# Patient Record
Sex: FEMALE | Race: WHITE | Employment: OTHER | URBAN - NONMETROPOLITAN AREA
[De-identification: names, ages, dates, MRNs, and addresses within clinical notes are randomized per-mention and may not be internally consistent; named-entity substitution may affect disease eponyms.]

---

## 2017-01-03 ENCOUNTER — ANTI-COAG VISIT (OUTPATIENT)
Dept: OTHER | Age: 82
End: 2017-01-03

## 2017-01-03 VITALS
DIASTOLIC BLOOD PRESSURE: 67 MMHG | WEIGHT: 240.4 LBS | HEART RATE: 79 BPM | BODY MASS INDEX: 38.8 KG/M2 | SYSTOLIC BLOOD PRESSURE: 153 MMHG

## 2017-01-03 DIAGNOSIS — I26.99 OTHER PULMONARY EMBOLISM WITHOUT ACUTE COR PULMONALE, UNSPECIFIED CHRONICITY (HCC): ICD-10-CM

## 2017-01-03 LAB — POC INR: 2 (ref 0.8–1.2)

## 2017-01-03 PROCEDURE — 99213 OFFICE O/P EST LOW 20 MIN: CPT | Performed by: NURSE PRACTITIONER

## 2017-01-03 PROCEDURE — 85610 PROTHROMBIN TIME: CPT | Performed by: NURSE PRACTITIONER

## 2017-01-03 ASSESSMENT — ENCOUNTER SYMPTOMS
CONSTIPATION: 0
BLOOD IN STOOL: 0
DIARRHEA: 0
SHORTNESS OF BREATH: 0

## 2017-01-04 ENCOUNTER — TELEPHONE (OUTPATIENT)
Dept: INTERNAL MEDICINE | Age: 82
End: 2017-01-04

## 2017-01-10 RX ORDER — CLOPIDOGREL BISULFATE 75 MG/1
75 TABLET ORAL DAILY
Qty: 30 TABLET | Refills: 0 | Status: SHIPPED | OUTPATIENT
Start: 2017-01-10 | End: 2017-03-01 | Stop reason: SDUPTHER

## 2017-01-17 ENCOUNTER — ANTI-COAG VISIT (OUTPATIENT)
Dept: OTHER | Age: 82
End: 2017-01-17

## 2017-01-17 VITALS
DIASTOLIC BLOOD PRESSURE: 62 MMHG | WEIGHT: 241.8 LBS | HEART RATE: 68 BPM | BODY MASS INDEX: 39.03 KG/M2 | SYSTOLIC BLOOD PRESSURE: 142 MMHG

## 2017-01-17 DIAGNOSIS — I26.99 OTHER PULMONARY EMBOLISM WITHOUT ACUTE COR PULMONALE, UNSPECIFIED CHRONICITY (HCC): ICD-10-CM

## 2017-01-17 LAB — POC INR: 1.3 (ref 0.8–1.2)

## 2017-01-17 PROCEDURE — 99999 PR OFFICE/OUTPT VISIT,PROCEDURE ONLY: CPT | Performed by: NURSE PRACTITIONER

## 2017-01-17 PROCEDURE — 85610 PROTHROMBIN TIME: CPT | Performed by: NURSE PRACTITIONER

## 2017-01-17 ASSESSMENT — ENCOUNTER SYMPTOMS
BLOOD IN STOOL: 0
CONSTIPATION: 0
DIARRHEA: 0
SHORTNESS OF BREATH: 0

## 2017-01-31 ENCOUNTER — ANTI-COAG VISIT (OUTPATIENT)
Dept: OTHER | Age: 82
End: 2017-01-31

## 2017-01-31 VITALS
HEART RATE: 88 BPM | SYSTOLIC BLOOD PRESSURE: 138 MMHG | DIASTOLIC BLOOD PRESSURE: 58 MMHG | WEIGHT: 240.2 LBS | BODY MASS INDEX: 38.77 KG/M2

## 2017-01-31 DIAGNOSIS — I26.99 OTHER PULMONARY EMBOLISM WITHOUT ACUTE COR PULMONALE, UNSPECIFIED CHRONICITY (HCC): ICD-10-CM

## 2017-01-31 LAB — POC INR: 3 (ref 0.8–1.2)

## 2017-01-31 PROCEDURE — 85610 PROTHROMBIN TIME: CPT | Performed by: PHARMACIST

## 2017-01-31 PROCEDURE — 99999 PR OFFICE/OUTPT VISIT,PROCEDURE ONLY: CPT | Performed by: PHARMACIST

## 2017-01-31 ASSESSMENT — ENCOUNTER SYMPTOMS
BLOOD IN STOOL: 0
DIARRHEA: 0
CONSTIPATION: 0
SHORTNESS OF BREATH: 0

## 2017-02-07 RX ORDER — AMLODIPINE BESYLATE 5 MG/1
TABLET ORAL
Qty: 30 TABLET | Refills: 0 | Status: SHIPPED | OUTPATIENT
Start: 2017-02-07 | End: 2017-04-20 | Stop reason: SDUPTHER

## 2017-02-14 ENCOUNTER — ANTI-COAG VISIT (OUTPATIENT)
Dept: OTHER | Age: 82
End: 2017-02-14

## 2017-02-14 VITALS
SYSTOLIC BLOOD PRESSURE: 166 MMHG | WEIGHT: 237 LBS | BODY MASS INDEX: 38.25 KG/M2 | HEART RATE: 80 BPM | DIASTOLIC BLOOD PRESSURE: 68 MMHG

## 2017-02-14 DIAGNOSIS — I26.99 OTHER PULMONARY EMBOLISM WITHOUT ACUTE COR PULMONALE, UNSPECIFIED CHRONICITY (HCC): ICD-10-CM

## 2017-02-14 LAB — POC INR: 3 (ref 0.8–1.2)

## 2017-02-14 PROCEDURE — 99999 PR OFFICE/OUTPT VISIT,PROCEDURE ONLY: CPT

## 2017-02-14 PROCEDURE — 85610 PROTHROMBIN TIME: CPT

## 2017-02-14 ASSESSMENT — ENCOUNTER SYMPTOMS
DIARRHEA: 0
BLOOD IN STOOL: 0
CONSTIPATION: 0
SHORTNESS OF BREATH: 0

## 2017-02-15 ENCOUNTER — OFFICE VISIT (OUTPATIENT)
Dept: CARDIOLOGY | Age: 82
End: 2017-02-15

## 2017-02-15 VITALS
SYSTOLIC BLOOD PRESSURE: 112 MMHG | BODY MASS INDEX: 38.47 KG/M2 | WEIGHT: 239.4 LBS | HEART RATE: 72 BPM | HEIGHT: 66 IN | DIASTOLIC BLOOD PRESSURE: 58 MMHG

## 2017-02-15 DIAGNOSIS — I10 ESSENTIAL HYPERTENSION: ICD-10-CM

## 2017-02-15 DIAGNOSIS — E78.5 DYSLIPIDEMIA: ICD-10-CM

## 2017-02-15 DIAGNOSIS — I77.9 MILD CAROTID ARTERY DISEASE (HCC): ICD-10-CM

## 2017-02-15 DIAGNOSIS — I50.32 CHF NYHA CLASS II, CHRONIC, DIASTOLIC (HCC): Primary | ICD-10-CM

## 2017-02-15 DIAGNOSIS — I25.10 CORONARY ARTERY DISEASE INVOLVING NATIVE CORONARY ARTERY OF NATIVE HEART WITHOUT ANGINA PECTORIS: ICD-10-CM

## 2017-02-15 DIAGNOSIS — R06.09 DOE (DYSPNEA ON EXERTION): ICD-10-CM

## 2017-02-15 DIAGNOSIS — Z95.810 BIVENTRICULAR IMPLANTABLE CARDIOVERTER-DEFIBRILLATOR IN SITU: ICD-10-CM

## 2017-02-15 DIAGNOSIS — Z87.891 EX-SMOKER: ICD-10-CM

## 2017-02-15 DIAGNOSIS — Z86.79 HISTORY OF CHF (CONGESTIVE HEART FAILURE): ICD-10-CM

## 2017-02-15 DIAGNOSIS — I51.89 DIASTOLIC DYSFUNCTION: ICD-10-CM

## 2017-02-15 PROCEDURE — 99213 OFFICE O/P EST LOW 20 MIN: CPT | Performed by: INTERNAL MEDICINE

## 2017-02-15 RX ORDER — CAPSAICIN 0.025 %
CREAM (GRAM) TOPICAL 2 TIMES DAILY
Status: ON HOLD | COMMUNITY
End: 2017-06-21 | Stop reason: HOSPADM

## 2017-02-15 RX ORDER — DOCUSATE SODIUM 100 MG/1
200 CAPSULE, LIQUID FILLED ORAL DAILY
COMMUNITY
End: 2018-06-26 | Stop reason: ALTCHOICE

## 2017-02-24 ENCOUNTER — TELEPHONE (OUTPATIENT)
Dept: CARDIOLOGY | Age: 82
End: 2017-02-24

## 2017-02-24 ENCOUNTER — PROCEDURE VISIT (OUTPATIENT)
Dept: CARDIOLOGY | Age: 82
End: 2017-02-24

## 2017-02-24 DIAGNOSIS — Z95.810 BIVENTRICULAR IMPLANTABLE CARDIOVERTER-DEFIBRILLATOR IN SITU: Primary | ICD-10-CM

## 2017-02-24 PROCEDURE — 93289 INTERROG DEVICE EVAL HEART: CPT | Performed by: INTERNAL MEDICINE

## 2017-02-27 ENCOUNTER — TELEPHONE (OUTPATIENT)
Dept: OTHER | Age: 82
End: 2017-02-27

## 2017-02-27 DIAGNOSIS — Z01.818 PRE-OP TESTING: Primary | ICD-10-CM

## 2017-02-28 ENCOUNTER — ANTI-COAG VISIT (OUTPATIENT)
Dept: OTHER | Age: 82
End: 2017-02-28

## 2017-02-28 VITALS
BODY MASS INDEX: 38.06 KG/M2 | SYSTOLIC BLOOD PRESSURE: 130 MMHG | WEIGHT: 235.8 LBS | DIASTOLIC BLOOD PRESSURE: 68 MMHG | HEART RATE: 72 BPM

## 2017-02-28 DIAGNOSIS — I26.99 OTHER PULMONARY EMBOLISM WITHOUT ACUTE COR PULMONALE, UNSPECIFIED CHRONICITY (HCC): ICD-10-CM

## 2017-02-28 LAB — POC INR: 2 (ref 0.8–1.2)

## 2017-02-28 PROCEDURE — 99212 OFFICE O/P EST SF 10 MIN: CPT | Performed by: NURSE PRACTITIONER

## 2017-02-28 PROCEDURE — 85610 PROTHROMBIN TIME: CPT | Performed by: NURSE PRACTITIONER

## 2017-02-28 ASSESSMENT — ENCOUNTER SYMPTOMS
BLOOD IN STOOL: 0
DIARRHEA: 0
CONSTIPATION: 0
SHORTNESS OF BREATH: 1

## 2017-03-01 RX ORDER — CLOPIDOGREL BISULFATE 75 MG/1
75 TABLET ORAL DAILY
Qty: 30 TABLET | Refills: 5 | Status: SHIPPED | OUTPATIENT
Start: 2017-03-01 | End: 2018-06-26 | Stop reason: ALTCHOICE

## 2017-03-07 ENCOUNTER — OFFICE VISIT (OUTPATIENT)
Dept: OTHER | Age: 82
End: 2017-03-07

## 2017-03-07 ENCOUNTER — ANTI-COAG VISIT (OUTPATIENT)
Dept: OTHER | Age: 82
End: 2017-03-07

## 2017-03-07 VITALS
BODY MASS INDEX: 37.93 KG/M2 | SYSTOLIC BLOOD PRESSURE: 149 MMHG | WEIGHT: 235 LBS | HEART RATE: 84 BPM | DIASTOLIC BLOOD PRESSURE: 62 MMHG

## 2017-03-07 VITALS
BODY MASS INDEX: 37.93 KG/M2 | DIASTOLIC BLOOD PRESSURE: 62 MMHG | WEIGHT: 235 LBS | SYSTOLIC BLOOD PRESSURE: 149 MMHG | HEART RATE: 84 BPM

## 2017-03-07 DIAGNOSIS — I26.99 OTHER PULMONARY EMBOLISM WITHOUT ACUTE COR PULMONALE, UNSPECIFIED CHRONICITY (HCC): ICD-10-CM

## 2017-03-07 LAB
INTERNATIONAL NORMALIZATION RATIO, POC: 2.2
POC INR: 2.2 (ref 0.8–1.2)

## 2017-03-07 PROCEDURE — 85610 PROTHROMBIN TIME: CPT | Performed by: PHARMACIST

## 2017-03-07 PROCEDURE — 99999 PR OFFICE/OUTPT VISIT,PROCEDURE ONLY: CPT | Performed by: PHARMACIST

## 2017-03-07 ASSESSMENT — ENCOUNTER SYMPTOMS
CONSTIPATION: 0
DIARRHEA: 0
SHORTNESS OF BREATH: 0
BLOOD IN STOOL: 0

## 2017-03-08 RX ORDER — ISOSORBIDE MONONITRATE 60 MG/1
60 TABLET, EXTENDED RELEASE ORAL DAILY
Qty: 30 TABLET | Refills: 5 | Status: SHIPPED | OUTPATIENT
Start: 2017-03-08 | End: 2018-06-26 | Stop reason: ALTCHOICE

## 2017-03-14 DIAGNOSIS — I26.99 OTHER PULMONARY EMBOLISM WITHOUT ACUTE COR PULMONALE, UNSPECIFIED CHRONICITY (HCC): Primary | ICD-10-CM

## 2017-03-16 ENCOUNTER — ANTI-COAG VISIT (OUTPATIENT)
Dept: OTHER | Age: 82
End: 2017-03-16

## 2017-03-16 VITALS
DIASTOLIC BLOOD PRESSURE: 64 MMHG | BODY MASS INDEX: 37.61 KG/M2 | HEART RATE: 75 BPM | WEIGHT: 233 LBS | SYSTOLIC BLOOD PRESSURE: 154 MMHG

## 2017-03-16 DIAGNOSIS — I26.99 OTHER PULMONARY EMBOLISM WITHOUT ACUTE COR PULMONALE, UNSPECIFIED CHRONICITY (HCC): ICD-10-CM

## 2017-03-16 LAB — POC INR: 1.1 (ref 0.8–1.2)

## 2017-03-16 ASSESSMENT — ENCOUNTER SYMPTOMS
BLOOD IN STOOL: 0
SHORTNESS OF BREATH: 0
DIARRHEA: 0
CONSTIPATION: 0

## 2017-03-20 ENCOUNTER — PROCEDURE VISIT (OUTPATIENT)
Dept: CARDIOLOGY | Age: 82
End: 2017-03-20

## 2017-03-20 DIAGNOSIS — Z95.810 BIVENTRICULAR IMPLANTABLE CARDIOVERTER-DEFIBRILLATOR IN SITU: Primary | ICD-10-CM

## 2017-03-20 PROCEDURE — 93284 PRGRMG EVAL IMPLANTABLE DFB: CPT | Performed by: INTERNAL MEDICINE

## 2017-03-23 ENCOUNTER — OFFICE VISIT (OUTPATIENT)
Dept: NEPHROLOGY | Age: 82
End: 2017-03-23

## 2017-03-23 ENCOUNTER — ANTI-COAG VISIT (OUTPATIENT)
Dept: OTHER | Age: 82
End: 2017-03-23

## 2017-03-23 VITALS
HEART RATE: 77 BPM | WEIGHT: 234 LBS | DIASTOLIC BLOOD PRESSURE: 58 MMHG | SYSTOLIC BLOOD PRESSURE: 151 MMHG | BODY MASS INDEX: 37.77 KG/M2

## 2017-03-23 VITALS
HEIGHT: 66 IN | BODY MASS INDEX: 37.61 KG/M2 | WEIGHT: 234 LBS | DIASTOLIC BLOOD PRESSURE: 68 MMHG | OXYGEN SATURATION: 99 % | SYSTOLIC BLOOD PRESSURE: 110 MMHG | HEART RATE: 71 BPM | RESPIRATION RATE: 18 BRPM

## 2017-03-23 DIAGNOSIS — I26.99 OTHER PULMONARY EMBOLISM WITHOUT ACUTE COR PULMONALE, UNSPECIFIED CHRONICITY (HCC): ICD-10-CM

## 2017-03-23 DIAGNOSIS — N17.9 AKI (ACUTE KIDNEY INJURY) (HCC): Primary | ICD-10-CM

## 2017-03-23 DIAGNOSIS — N18.4 CKD (CHRONIC KIDNEY DISEASE) STAGE 4, GFR 15-29 ML/MIN (HCC): ICD-10-CM

## 2017-03-23 LAB — POC INR: 2.4 (ref 0.8–1.2)

## 2017-03-23 PROCEDURE — 99213 OFFICE O/P EST LOW 20 MIN: CPT | Performed by: INTERNAL MEDICINE

## 2017-03-23 RX ORDER — POTASSIUM CHLORIDE 20 MEQ/1
20 TABLET, EXTENDED RELEASE ORAL DAILY
Refills: 0 | COMMUNITY
Start: 2017-01-18 | End: 2017-06-01

## 2017-03-23 ASSESSMENT — ENCOUNTER SYMPTOMS
DIARRHEA: 0
BLOOD IN STOOL: 0
SHORTNESS OF BREATH: 0
CONSTIPATION: 0

## 2017-03-24 RX ORDER — AMLODIPINE BESYLATE 5 MG/1
TABLET ORAL
Qty: 30 TABLET | Refills: 5 | Status: ON HOLD | OUTPATIENT
Start: 2017-03-24 | End: 2017-06-21 | Stop reason: HOSPADM

## 2017-04-06 ENCOUNTER — ANTI-COAG VISIT (OUTPATIENT)
Dept: OTHER | Age: 82
End: 2017-04-06

## 2017-04-06 VITALS
DIASTOLIC BLOOD PRESSURE: 56 MMHG | SYSTOLIC BLOOD PRESSURE: 133 MMHG | BODY MASS INDEX: 38.93 KG/M2 | WEIGHT: 241.2 LBS | HEART RATE: 75 BPM

## 2017-04-06 DIAGNOSIS — I26.99 OTHER PULMONARY EMBOLISM WITHOUT ACUTE COR PULMONALE, UNSPECIFIED CHRONICITY (HCC): ICD-10-CM

## 2017-04-06 LAB — POC INR: 2 (ref 0.8–1.2)

## 2017-04-06 ASSESSMENT — ENCOUNTER SYMPTOMS
DIARRHEA: 0
CONSTIPATION: 0
BLOOD IN STOOL: 0
SHORTNESS OF BREATH: 0

## 2017-04-20 ENCOUNTER — ANTI-COAG VISIT (OUTPATIENT)
Dept: OTHER | Age: 82
End: 2017-04-20

## 2017-04-20 VITALS
WEIGHT: 242.8 LBS | DIASTOLIC BLOOD PRESSURE: 61 MMHG | BODY MASS INDEX: 39.19 KG/M2 | HEART RATE: 80 BPM | SYSTOLIC BLOOD PRESSURE: 140 MMHG

## 2017-04-20 DIAGNOSIS — I26.99 OTHER PULMONARY EMBOLISM WITHOUT ACUTE COR PULMONALE, UNSPECIFIED CHRONICITY (HCC): ICD-10-CM

## 2017-04-20 LAB — POC INR: 1.7 (ref 0.8–1.2)

## 2017-04-20 ASSESSMENT — ENCOUNTER SYMPTOMS
DIARRHEA: 0
BLOOD IN STOOL: 0
CONSTIPATION: 0
SHORTNESS OF BREATH: 0

## 2017-04-24 RX ORDER — BUMETANIDE 2 MG/1
2 TABLET ORAL 2 TIMES DAILY
Qty: 14 TABLET | Refills: 0 | Status: SHIPPED | OUTPATIENT
Start: 2017-04-24 | End: 2017-05-18 | Stop reason: ALTCHOICE

## 2017-04-24 RX ORDER — BUMETANIDE 2 MG/1
2 TABLET ORAL 2 TIMES DAILY
COMMUNITY
Start: 2017-04-24 | End: 2017-04-24 | Stop reason: SDUPTHER

## 2017-04-27 ENCOUNTER — OFFICE VISIT (OUTPATIENT)
Dept: NEPHROLOGY | Age: 82
End: 2017-04-27

## 2017-04-27 VITALS
RESPIRATION RATE: 18 BRPM | HEART RATE: 87 BPM | WEIGHT: 236 LBS | OXYGEN SATURATION: 97 % | BODY MASS INDEX: 37.93 KG/M2 | DIASTOLIC BLOOD PRESSURE: 68 MMHG | HEIGHT: 66 IN | SYSTOLIC BLOOD PRESSURE: 118 MMHG

## 2017-04-27 DIAGNOSIS — N18.30 CKD (CHRONIC KIDNEY DISEASE) STAGE 3, GFR 30-59 ML/MIN (HCC): Primary | ICD-10-CM

## 2017-04-27 PROCEDURE — 99213 OFFICE O/P EST LOW 20 MIN: CPT | Performed by: INTERNAL MEDICINE

## 2017-05-04 ENCOUNTER — ANTI-COAG VISIT (OUTPATIENT)
Dept: OTHER | Age: 82
End: 2017-05-04

## 2017-05-04 VITALS
HEART RATE: 77 BPM | WEIGHT: 235.8 LBS | SYSTOLIC BLOOD PRESSURE: 142 MMHG | DIASTOLIC BLOOD PRESSURE: 63 MMHG | BODY MASS INDEX: 38.06 KG/M2

## 2017-05-04 DIAGNOSIS — I26.99 OTHER PULMONARY EMBOLISM WITHOUT ACUTE COR PULMONALE, UNSPECIFIED CHRONICITY (HCC): ICD-10-CM

## 2017-05-04 LAB — POC INR: 2.5 (ref 0.8–1.2)

## 2017-05-04 ASSESSMENT — ENCOUNTER SYMPTOMS
SHORTNESS OF BREATH: 1
BLOOD IN STOOL: 0
DIARRHEA: 0
CONSTIPATION: 0

## 2017-05-18 ENCOUNTER — ANTI-COAG VISIT (OUTPATIENT)
Dept: OTHER | Age: 82
End: 2017-05-18

## 2017-05-18 VITALS
SYSTOLIC BLOOD PRESSURE: 151 MMHG | BODY MASS INDEX: 37.45 KG/M2 | DIASTOLIC BLOOD PRESSURE: 65 MMHG | HEART RATE: 94 BPM | WEIGHT: 232 LBS

## 2017-05-18 DIAGNOSIS — I26.99 OTHER PULMONARY EMBOLISM WITHOUT ACUTE COR PULMONALE, UNSPECIFIED CHRONICITY (HCC): ICD-10-CM

## 2017-05-18 LAB — POC INR: 3 (ref 0.8–1.2)

## 2017-05-18 RX ORDER — METOPROLOL TARTRATE 50 MG/1
50 TABLET, FILM COATED ORAL 2 TIMES DAILY
Qty: 60 TABLET | Refills: 0 | Status: SHIPPED | OUTPATIENT
Start: 2017-05-18 | End: 2017-06-02 | Stop reason: SDUPTHER

## 2017-05-18 ASSESSMENT — ENCOUNTER SYMPTOMS
BLOOD IN STOOL: 0
SHORTNESS OF BREATH: 0
DIARRHEA: 0
CONSTIPATION: 0

## 2017-05-24 ENCOUNTER — OFFICE VISIT (OUTPATIENT)
Dept: CARDIOLOGY | Age: 82
End: 2017-05-24

## 2017-05-24 VITALS
DIASTOLIC BLOOD PRESSURE: 68 MMHG | WEIGHT: 230.6 LBS | SYSTOLIC BLOOD PRESSURE: 156 MMHG | HEART RATE: 72 BPM | BODY MASS INDEX: 37.22 KG/M2

## 2017-05-24 DIAGNOSIS — E78.5 DYSLIPIDEMIA: ICD-10-CM

## 2017-05-24 DIAGNOSIS — R06.09 DOE (DYSPNEA ON EXERTION): ICD-10-CM

## 2017-05-24 DIAGNOSIS — I20.8 STABLE ANGINA (HCC): ICD-10-CM

## 2017-05-24 DIAGNOSIS — I51.89 DIASTOLIC DYSFUNCTION: ICD-10-CM

## 2017-05-24 DIAGNOSIS — Z86.79 HISTORY OF CHF (CONGESTIVE HEART FAILURE): ICD-10-CM

## 2017-05-24 DIAGNOSIS — M79.89 LEG SWELLING: ICD-10-CM

## 2017-05-24 DIAGNOSIS — I10 ESSENTIAL HYPERTENSION: ICD-10-CM

## 2017-05-24 DIAGNOSIS — Z95.810 BIVENTRICULAR IMPLANTABLE CARDIOVERTER-DEFIBRILLATOR IN SITU: ICD-10-CM

## 2017-05-24 DIAGNOSIS — I25.10 CORONARY ARTERY DISEASE INVOLVING NATIVE CORONARY ARTERY OF NATIVE HEART WITHOUT ANGINA PECTORIS: Primary | ICD-10-CM

## 2017-05-24 DIAGNOSIS — Z87.891 EX-SMOKER: ICD-10-CM

## 2017-05-24 PROCEDURE — 99213 OFFICE O/P EST LOW 20 MIN: CPT | Performed by: INTERNAL MEDICINE

## 2017-06-01 ENCOUNTER — ANTI-COAG VISIT (OUTPATIENT)
Dept: OTHER | Age: 82
End: 2017-06-01

## 2017-06-01 VITALS
SYSTOLIC BLOOD PRESSURE: 138 MMHG | HEART RATE: 74 BPM | BODY MASS INDEX: 37.54 KG/M2 | WEIGHT: 232.6 LBS | DIASTOLIC BLOOD PRESSURE: 66 MMHG

## 2017-06-01 DIAGNOSIS — I26.99 OTHER PULMONARY EMBOLISM WITHOUT ACUTE COR PULMONALE, UNSPECIFIED CHRONICITY (HCC): ICD-10-CM

## 2017-06-01 LAB — POC INR: 1.2 (ref 0.8–1.2)

## 2017-06-01 RX ORDER — POTASSIUM CHLORIDE 1500 MG/1
10 TABLET, FILM COATED, EXTENDED RELEASE ORAL
Status: ON HOLD | COMMUNITY
End: 2018-06-13 | Stop reason: ALTCHOICE

## 2017-06-01 ASSESSMENT — ENCOUNTER SYMPTOMS
DIARRHEA: 0
CONSTIPATION: 0
SHORTNESS OF BREATH: 0
BLOOD IN STOOL: 0

## 2017-06-02 RX ORDER — METOPROLOL TARTRATE 50 MG/1
50 TABLET, FILM COATED ORAL 2 TIMES DAILY
Qty: 60 TABLET | Refills: 12 | Status: ON HOLD | OUTPATIENT
Start: 2017-06-02 | End: 2017-06-21 | Stop reason: HOSPADM

## 2017-06-13 ENCOUNTER — ANTI-COAG VISIT (OUTPATIENT)
Dept: OTHER | Age: 82
End: 2017-06-13

## 2017-06-13 VITALS
WEIGHT: 243.8 LBS | HEART RATE: 72 BPM | DIASTOLIC BLOOD PRESSURE: 62 MMHG | BODY MASS INDEX: 39.35 KG/M2 | SYSTOLIC BLOOD PRESSURE: 147 MMHG

## 2017-06-13 DIAGNOSIS — I26.99 OTHER PULMONARY EMBOLISM WITHOUT ACUTE COR PULMONALE, UNSPECIFIED CHRONICITY (HCC): ICD-10-CM

## 2017-06-13 LAB — POC INR: 4.8 (ref 0.8–1.2)

## 2017-06-13 ASSESSMENT — ENCOUNTER SYMPTOMS
DIARRHEA: 0
BLOOD IN STOOL: 0
CONSTIPATION: 0
SHORTNESS OF BREATH: 0

## 2017-06-16 ENCOUNTER — PROCEDURE VISIT (OUTPATIENT)
Dept: CARDIOLOGY | Age: 82
End: 2017-06-16

## 2017-06-16 DIAGNOSIS — Z95.810 BIVENTRICULAR IMPLANTABLE CARDIOVERTER-DEFIBRILLATOR IN SITU: Primary | ICD-10-CM

## 2017-06-16 PROCEDURE — 93295 DEV INTERROG REMOTE 1/2/MLT: CPT | Performed by: INTERNAL MEDICINE

## 2017-06-16 PROCEDURE — 93296 REM INTERROG EVL PM/IDS: CPT | Performed by: INTERNAL MEDICINE

## 2017-06-27 ENCOUNTER — CLINICAL DOCUMENTATION (OUTPATIENT)
Dept: FAMILY MEDICINE CLINIC | Age: 82
End: 2017-06-27

## 2017-06-27 VITALS
TEMPERATURE: 97.5 F | HEIGHT: 66 IN | RESPIRATION RATE: 16 BRPM | DIASTOLIC BLOOD PRESSURE: 75 MMHG | HEART RATE: 74 BPM | BODY MASS INDEX: 39.66 KG/M2 | WEIGHT: 246.8 LBS | SYSTOLIC BLOOD PRESSURE: 132 MMHG

## 2017-06-27 DIAGNOSIS — E11.69 TYPE 2 DIABETES MELLITUS WITH OTHER SPECIFIED COMPLICATION, WITH LONG-TERM CURRENT USE OF INSULIN (HCC): ICD-10-CM

## 2017-06-27 DIAGNOSIS — I50.32 CHRONIC DIASTOLIC CONGESTIVE HEART FAILURE (HCC): ICD-10-CM

## 2017-06-27 DIAGNOSIS — L97.519 DIABETIC ULCER OF RIGHT FOOT ASSOCIATED WITH TYPE 2 DIABETES MELLITUS (HCC): ICD-10-CM

## 2017-06-27 DIAGNOSIS — K21.9 GASTROESOPHAGEAL REFLUX DISEASE, ESOPHAGITIS PRESENCE NOT SPECIFIED: ICD-10-CM

## 2017-06-27 DIAGNOSIS — D50.9 IRON DEFICIENCY ANEMIA, UNSPECIFIED IRON DEFICIENCY ANEMIA TYPE: ICD-10-CM

## 2017-06-27 DIAGNOSIS — E78.2 MIXED HYPERLIPIDEMIA: ICD-10-CM

## 2017-06-27 DIAGNOSIS — E55.9 VITAMIN D DEFICIENCY: ICD-10-CM

## 2017-06-27 DIAGNOSIS — E16.2 HYPOGLYCEMIA: Primary | ICD-10-CM

## 2017-06-27 DIAGNOSIS — Z79.4 TYPE 2 DIABETES MELLITUS WITH OTHER SPECIFIED COMPLICATION, WITH LONG-TERM CURRENT USE OF INSULIN (HCC): ICD-10-CM

## 2017-06-27 DIAGNOSIS — Z86.711 HISTORY OF PULMONARY EMBOLISM: ICD-10-CM

## 2017-06-27 DIAGNOSIS — R55 SYNCOPE AND COLLAPSE: ICD-10-CM

## 2017-06-27 DIAGNOSIS — R53.81 PHYSICAL DECONDITIONING: ICD-10-CM

## 2017-06-27 DIAGNOSIS — I10 ESSENTIAL HYPERTENSION: ICD-10-CM

## 2017-06-27 DIAGNOSIS — E11.621 DIABETIC ULCER OF RIGHT FOOT ASSOCIATED WITH TYPE 2 DIABETES MELLITUS (HCC): ICD-10-CM

## 2017-06-27 DIAGNOSIS — E87.1 HYPONATREMIA: ICD-10-CM

## 2017-06-27 DIAGNOSIS — I25.10 CORONARY ARTERY DISEASE INVOLVING NATIVE CORONARY ARTERY OF NATIVE HEART WITHOUT ANGINA PECTORIS: ICD-10-CM

## 2017-06-27 DIAGNOSIS — I42.8 NON-ISCHEMIC CARDIOMYOPATHY (HCC): ICD-10-CM

## 2017-06-27 DIAGNOSIS — Z86.73 HISTORY OF CVA (CEREBROVASCULAR ACCIDENT): ICD-10-CM

## 2017-07-18 ENCOUNTER — HOSPITAL ENCOUNTER (OUTPATIENT)
Dept: PHYSICAL THERAPY | Age: 82
Setting detail: THERAPIES SERIES
Discharge: HOME | End: 2017-07-18
Payer: MEDICARE

## 2017-07-18 PROCEDURE — G8991 OTHER PT/OT GOAL STATUS: HCPCS

## 2017-07-18 PROCEDURE — 97162 PT EVAL MOD COMPLEX 30 MIN: CPT

## 2017-07-18 PROCEDURE — G8990 OTHER PT/OT CURRENT STATUS: HCPCS

## 2017-07-18 ASSESSMENT — PAIN SCALES - GENERAL: PAINLEVEL_OUTOF10: 4

## 2017-07-18 ASSESSMENT — PAIN DESCRIPTION - DESCRIPTORS: DESCRIPTORS: NUMBNESS;THROBBING;SHARP

## 2017-07-18 ASSESSMENT — PAIN DESCRIPTION - PAIN TYPE: TYPE: CHRONIC PAIN

## 2017-07-18 ASSESSMENT — PAIN DESCRIPTION - LOCATION: LOCATION: KNEE;FOOT

## 2017-07-18 ASSESSMENT — PAIN DESCRIPTION - ORIENTATION: ORIENTATION: RIGHT;LEFT

## 2017-07-25 ENCOUNTER — CLINICAL DOCUMENTATION (OUTPATIENT)
Dept: FAMILY MEDICINE CLINIC | Age: 82
End: 2017-07-25

## 2017-07-25 VITALS
WEIGHT: 255 LBS | DIASTOLIC BLOOD PRESSURE: 72 MMHG | BODY MASS INDEX: 40.98 KG/M2 | HEIGHT: 66 IN | RESPIRATION RATE: 18 BRPM | SYSTOLIC BLOOD PRESSURE: 134 MMHG | HEART RATE: 79 BPM | TEMPERATURE: 98.2 F

## 2017-07-25 DIAGNOSIS — R53.81 PHYSICAL DECONDITIONING: ICD-10-CM

## 2017-07-25 DIAGNOSIS — Z79.4 TYPE 2 DIABETES MELLITUS WITH OTHER SPECIFIED COMPLICATION, WITH LONG-TERM CURRENT USE OF INSULIN (HCC): ICD-10-CM

## 2017-07-25 DIAGNOSIS — E11.621 DIABETIC ULCER OF RIGHT FOOT ASSOCIATED WITH TYPE 2 DIABETES MELLITUS (HCC): ICD-10-CM

## 2017-07-25 DIAGNOSIS — E87.1 HYPONATREMIA: ICD-10-CM

## 2017-07-25 DIAGNOSIS — K21.9 GASTROESOPHAGEAL REFLUX DISEASE, ESOPHAGITIS PRESENCE NOT SPECIFIED: ICD-10-CM

## 2017-07-25 DIAGNOSIS — I42.8 NON-ISCHEMIC CARDIOMYOPATHY (HCC): ICD-10-CM

## 2017-07-25 DIAGNOSIS — N18.30 CKD (CHRONIC KIDNEY DISEASE) STAGE 3, GFR 30-59 ML/MIN (HCC): ICD-10-CM

## 2017-07-25 DIAGNOSIS — E11.69 TYPE 2 DIABETES MELLITUS WITH OTHER SPECIFIED COMPLICATION, WITH LONG-TERM CURRENT USE OF INSULIN (HCC): ICD-10-CM

## 2017-07-25 DIAGNOSIS — L97.519 DIABETIC ULCER OF RIGHT FOOT ASSOCIATED WITH TYPE 2 DIABETES MELLITUS (HCC): ICD-10-CM

## 2017-07-25 DIAGNOSIS — R55 SYNCOPE AND COLLAPSE: ICD-10-CM

## 2017-07-25 DIAGNOSIS — D50.9 IRON DEFICIENCY ANEMIA, UNSPECIFIED IRON DEFICIENCY ANEMIA TYPE: ICD-10-CM

## 2017-07-25 DIAGNOSIS — E16.2 HYPOGLYCEMIA: Primary | ICD-10-CM

## 2017-07-25 DIAGNOSIS — Z86.711 HISTORY OF PULMONARY EMBOLISM: ICD-10-CM

## 2017-07-25 DIAGNOSIS — I50.32 CHRONIC DIASTOLIC (CONGESTIVE) HEART FAILURE (HCC): ICD-10-CM

## 2017-07-25 DIAGNOSIS — E55.9 VITAMIN D DEFICIENCY: ICD-10-CM

## 2017-07-25 DIAGNOSIS — Z86.73 HISTORY OF CVA (CEREBROVASCULAR ACCIDENT): ICD-10-CM

## 2017-07-25 DIAGNOSIS — I25.10 CORONARY ARTERY DISEASE INVOLVING NATIVE CORONARY ARTERY OF NATIVE HEART WITHOUT ANGINA PECTORIS: ICD-10-CM

## 2017-07-25 DIAGNOSIS — I10 ESSENTIAL HYPERTENSION: ICD-10-CM

## 2017-07-25 DIAGNOSIS — E78.2 MIXED HYPERLIPIDEMIA: ICD-10-CM

## 2017-07-26 ENCOUNTER — HOSPITAL ENCOUNTER (OUTPATIENT)
Dept: PHYSICAL THERAPY | Age: 82
Setting detail: THERAPIES SERIES
Discharge: HOME | End: 2017-07-26
Payer: MEDICARE

## 2017-08-31 ENCOUNTER — TELEPHONE (OUTPATIENT)
Dept: CARDIOLOGY CLINIC | Age: 82
End: 2017-08-31

## 2017-09-18 ENCOUNTER — ANESTHESIA (OUTPATIENT)
Dept: ENDOSCOPY | Age: 82
End: 2017-09-18
Payer: MEDICARE

## 2017-09-18 ENCOUNTER — HOSPITAL ENCOUNTER (OUTPATIENT)
Age: 82
Setting detail: OUTPATIENT SURGERY
Discharge: OTHER INSTITUTION | End: 2017-09-18
Attending: INTERNAL MEDICINE | Admitting: INTERNAL MEDICINE
Payer: MEDICARE

## 2017-09-18 ENCOUNTER — ANESTHESIA EVENT (OUTPATIENT)
Dept: ENDOSCOPY | Age: 82
End: 2017-09-18
Payer: MEDICARE

## 2017-09-18 VITALS
TEMPERATURE: 96.7 F | WEIGHT: 261 LBS | HEART RATE: 71 BPM | BODY MASS INDEX: 41.95 KG/M2 | SYSTOLIC BLOOD PRESSURE: 175 MMHG | OXYGEN SATURATION: 95 % | DIASTOLIC BLOOD PRESSURE: 86 MMHG | HEIGHT: 66 IN | RESPIRATION RATE: 16 BRPM

## 2017-09-18 VITALS — SYSTOLIC BLOOD PRESSURE: 172 MMHG | OXYGEN SATURATION: 100 % | DIASTOLIC BLOOD PRESSURE: 73 MMHG

## 2017-09-18 LAB
BASOPHILS ABSOLUTE: ABNORMAL /ΜL
BASOPHILS RELATIVE PERCENT: ABNORMAL %
BUN BLDV-MCNC: 46 MG/DL
CALCIUM SERPL-MCNC: 9.2 MG/DL
CHLORIDE BLD-SCNC: 110 MMOL/L
CO2: 25 MMOL/L
CREAT SERPL-MCNC: 1.6 MG/DL
EOSINOPHILS ABSOLUTE: ABNORMAL /ΜL
EOSINOPHILS RELATIVE PERCENT: ABNORMAL %
GFR CALCULATED: 31
GLUCOSE BLD-MCNC: 78 MG/DL
HCT VFR BLD CALC: 31.7 % (ref 36–46)
HEMOGLOBIN: 10 G/DL (ref 12–16)
INR BLD: 1
LYMPHOCYTES ABSOLUTE: ABNORMAL /ΜL
LYMPHOCYTES RELATIVE PERCENT: ABNORMAL %
MCH RBC QN AUTO: ABNORMAL PG
MCHC RBC AUTO-ENTMCNC: ABNORMAL G/DL
MCV RBC AUTO: ABNORMAL FL
MONOCYTES ABSOLUTE: ABNORMAL /ΜL
MONOCYTES RELATIVE PERCENT: ABNORMAL %
NEUTROPHILS ABSOLUTE: ABNORMAL /ΜL
NEUTROPHILS RELATIVE PERCENT: ABNORMAL %
PLATELET # BLD: 216 K/ΜL
PMV BLD AUTO: ABNORMAL FL
POTASSIUM SERPL-SCNC: 4.9 MMOL/L
PROTIME: 11.1 SECONDS
RBC # BLD: 3.9 10^6/ΜL
SODIUM BLD-SCNC: 136 MMOL/L
WBC # BLD: 7.7 10^3/ML

## 2017-09-18 PROCEDURE — 3700000000 HC ANESTHESIA ATTENDED CARE: Performed by: INTERNAL MEDICINE

## 2017-09-18 PROCEDURE — 3700000001 HC ADD 15 MINUTES (ANESTHESIA): Performed by: INTERNAL MEDICINE

## 2017-09-18 PROCEDURE — 3609017100 HC EGD: Performed by: INTERNAL MEDICINE

## 2017-09-18 PROCEDURE — 3609017700 HC EGD DILATION GASTRIC/DUODENAL STRICTURE: Performed by: INTERNAL MEDICINE

## 2017-09-18 PROCEDURE — 2500000003 HC RX 250 WO HCPCS: Performed by: NURSE ANESTHETIST, CERTIFIED REGISTERED

## 2017-09-18 PROCEDURE — 2580000003 HC RX 258: Performed by: INTERNAL MEDICINE

## 2017-09-18 PROCEDURE — 7100000001 HC PACU RECOVERY - ADDTL 15 MIN: Performed by: INTERNAL MEDICINE

## 2017-09-18 PROCEDURE — 88305 TISSUE EXAM BY PATHOLOGIST: CPT

## 2017-09-18 PROCEDURE — 6360000002 HC RX W HCPCS: Performed by: NURSE ANESTHETIST, CERTIFIED REGISTERED

## 2017-09-18 PROCEDURE — 7100000000 HC PACU RECOVERY - FIRST 15 MIN: Performed by: INTERNAL MEDICINE

## 2017-09-18 RX ORDER — LIDOCAINE HYDROCHLORIDE 20 MG/ML
INJECTION, SOLUTION INFILTRATION; PERINEURAL PRN
Status: DISCONTINUED | OUTPATIENT
Start: 2017-09-18 | End: 2017-09-18 | Stop reason: SDUPTHER

## 2017-09-18 RX ORDER — PROPOFOL 10 MG/ML
INJECTION, EMULSION INTRAVENOUS PRN
Status: DISCONTINUED | OUTPATIENT
Start: 2017-09-18 | End: 2017-09-18 | Stop reason: SDUPTHER

## 2017-09-18 RX ORDER — ASCORBIC ACID 500 MG
500 TABLET ORAL 2 TIMES DAILY
COMMUNITY
End: 2018-06-26 | Stop reason: ALTCHOICE

## 2017-09-18 RX ORDER — PANTOPRAZOLE SODIUM 40 MG/1
40 TABLET, DELAYED RELEASE ORAL
Qty: 60 TABLET | Refills: 11 | Status: SHIPPED | OUTPATIENT
Start: 2017-09-18 | End: 2018-06-26 | Stop reason: ALTCHOICE

## 2017-09-18 RX ORDER — SODIUM CHLORIDE 450 MG/100ML
INJECTION, SOLUTION INTRAVENOUS CONTINUOUS
Status: DISCONTINUED | OUTPATIENT
Start: 2017-09-18 | End: 2017-09-18 | Stop reason: HOSPADM

## 2017-09-18 RX ADMIN — PROPOFOL 30 MG: 10 INJECTION, EMULSION INTRAVENOUS at 14:34

## 2017-09-18 RX ADMIN — PROPOFOL 50 MG: 10 INJECTION, EMULSION INTRAVENOUS at 14:26

## 2017-09-18 RX ADMIN — SODIUM CHLORIDE: 4.5 INJECTION, SOLUTION INTRAVENOUS at 13:47

## 2017-09-18 RX ADMIN — PROPOFOL 50 MG: 10 INJECTION, EMULSION INTRAVENOUS at 14:29

## 2017-09-18 RX ADMIN — LIDOCAINE HYDROCHLORIDE 100 MG: 20 INJECTION, SOLUTION INFILTRATION; PERINEURAL at 14:26

## 2017-09-18 ASSESSMENT — ENCOUNTER SYMPTOMS: SHORTNESS OF BREATH: 1

## 2017-09-18 ASSESSMENT — PAIN SCALES - GENERAL
PAINLEVEL_OUTOF10: 0
PAINLEVEL_OUTOF10: 0

## 2017-09-20 LAB
AVERAGE GLUCOSE: NORMAL
CHOLESTEROL, TOTAL: 126 MG/DL
CHOLESTEROL/HDL RATIO: 1.5
HBA1C MFR BLD: 6.6 %
HDLC SERPL-MCNC: 36 MG/DL (ref 35–70)
LDL CHOLESTEROL CALCULATED: 54 MG/DL (ref 0–160)
TRIGL SERPL-MCNC: 181 MG/DL
VITAMIN D 25-HYDROXY: 39
VITAMIN D2, 25 HYDROXY: NORMAL
VITAMIN D3,25 HYDROXY: NORMAL
VLDLC SERPL CALC-MCNC: 36 MG/DL

## 2017-09-21 ENCOUNTER — HOSPITAL ENCOUNTER (OUTPATIENT)
Dept: PHYSICAL THERAPY | Age: 82
Setting detail: THERAPIES SERIES
Discharge: HOME | End: 2017-09-21
Payer: MEDICARE

## 2017-09-21 PROCEDURE — G8991 OTHER PT/OT GOAL STATUS: HCPCS

## 2017-09-21 PROCEDURE — G8990 OTHER PT/OT CURRENT STATUS: HCPCS

## 2017-09-28 ENCOUNTER — OFFICE VISIT (OUTPATIENT)
Dept: NEPHROLOGY | Age: 82
End: 2017-09-28
Payer: MEDICARE

## 2017-09-28 VITALS
OXYGEN SATURATION: 99 % | BODY MASS INDEX: 42.91 KG/M2 | HEART RATE: 73 BPM | WEIGHT: 267 LBS | DIASTOLIC BLOOD PRESSURE: 54 MMHG | RESPIRATION RATE: 18 BRPM | HEIGHT: 66 IN | SYSTOLIC BLOOD PRESSURE: 128 MMHG

## 2017-09-28 DIAGNOSIS — N18.30 CKD (CHRONIC KIDNEY DISEASE), STAGE III (HCC): Primary | ICD-10-CM

## 2017-09-28 PROCEDURE — 99213 OFFICE O/P EST LOW 20 MIN: CPT | Performed by: INTERNAL MEDICINE

## 2017-09-28 RX ORDER — PREDNISONE 20 MG/1
20 TABLET ORAL DAILY
COMMUNITY
End: 2017-12-28 | Stop reason: ALTCHOICE

## 2017-10-03 ENCOUNTER — HOSPITAL ENCOUNTER (OUTPATIENT)
Dept: PHYSICAL THERAPY | Age: 82
Setting detail: THERAPIES SERIES
Discharge: HOME | End: 2017-10-03
Payer: MEDICARE

## 2017-10-03 PROCEDURE — 97140 MANUAL THERAPY 1/> REGIONS: CPT

## 2017-10-03 ASSESSMENT — PAIN DESCRIPTION - DESCRIPTORS: DESCRIPTORS: THROBBING;CONSTANT

## 2017-10-03 ASSESSMENT — PAIN DESCRIPTION - PAIN TYPE: TYPE: CHRONIC PAIN

## 2017-10-03 ASSESSMENT — PAIN DESCRIPTION - ORIENTATION: ORIENTATION: RIGHT;LEFT

## 2017-10-03 ASSESSMENT — PAIN DESCRIPTION - LOCATION: LOCATION: KNEE;FOOT

## 2017-10-03 NOTE — PROGRESS NOTES
region  -151.2 cm     TREATMENT SESSION  Manual Lymph Drainage  Unilateral Lower Extremity Manual Lymph Drainage (MLD):   Right  Trunk:  Effleurage, Profundus, Terminus, Inguinal Lnn, Axillary Lnn and (IA) Anterior Inguinal to Axillary (3 steps)       Lower Extremity:  Thigh (Anterior, Posterior, Lateral, Medial to Lateral), Knee (Anterior, Posterior, Lateral, Medial, Lower Leg (Anterior,Posterior), Ankle (Anterior, Posterior, Lateral, Medial and Foot/Toes (Anterior, Posterior)       Rework  Lower Extremity (Toes to Groin), Inguinal Lnn, Axillary Lnn, (IA) Anterior Inguinal to Axillary, Terminus, Profundus and Effleurage      Skin Care Measures  Washed involved extremity/body part and applied Original Eucerin Moisturizing creme and Vaseline to moisturize skin    Compression Bandaging  Location: Right lower leg (Metatarsal heads to knee joint line)  Compression Gradient: Moderate to Minimal  Supplies (per involved extremity):   Stockinette: Size: 4 inches, Thickness: Thick, soft   Transelast none   10 cm Artiflex Cotton 1 roll   15 cm Artiflex Cotton none   4 cm Rosidal K none   6 cm Rosidal K 1 roll   8 cm Rosidal K 1 roll   10 cm Rosidal K 1 roll   12 cm Rosidal K none   Rosidal Soft 1 roll   Tubigrip stockinette   -Size E: Metatarsal heads to above ankle (L)  -Size F: Heel to knee joint line (L)       Decongestive/Therapeutic Exercise  The patient was able to complete Decongestive Therapy exercises (x 10 reps) including ankle pumps (3 sets). The exercises were completed with compression bandages on, without complaints of pain from the patient. The Decongestive Therapy exercises assist with decreasing the swelling by increasing the muscle pumping action of the lymph collectors and by increasing the fluid uptake in the lymphatic system.       Patient Education  New Education Provided:   -10/03/2017: Reviewed compression bandaging precautions, goals, plan of care and safety awareness with recommendation for use of walker. Learner:patient  Method: demonstration, explanation and handout       Outcome: acknowledged understanding of precautions/goals/plan of care, demonstrated understanding and asked questions     GOALS   SHORT-TERM GOALS:  to be met in 6 weeks   X  Patient will demonstrate a decrease in circumferential measurements of the affected extremity by 10.0 cm working towards the lymphedema swelling stabilizing and patient being able to get measured and fitted for a new compression garment to be worn daily to keep swelling down. X  Patient will tolerate wearing the compression bandages from one treatment session until the next treatment session working towards meeting short-term goal #1. X Patient/family/caregiver will demonstrate and verbalize 3-5 skin care precautionary measures to decrease dry skin and risk of infection. X Patient/family/caregiver will demonstrate applying compression bandages with no greater than moderate assist and verbal cues from the therapist working towards being modified independent with applying the compression bandages for night time swelling. ASSESSMENT:  Assessment:  Patient progressing toward goal achievement. Activity Tolerance:  Patient tolerance of  treatment: Good.       Plan: Continue treatment according to established plan of care         Time In: 0945   Time Out: 1115   Timed Code Minutes: 90   Untimed Code Minutes: 0   Total Treatment Time: [address], [address][id number], [place] [id number]       10/3/2017

## 2017-10-04 ENCOUNTER — HOSPITAL ENCOUNTER (OUTPATIENT)
Dept: PHYSICAL THERAPY | Age: 82
Setting detail: THERAPIES SERIES
Discharge: HOME | End: 2017-10-04
Payer: MEDICARE

## 2017-10-06 ENCOUNTER — HOSPITAL ENCOUNTER (OUTPATIENT)
Dept: PHYSICAL THERAPY | Age: 82
Setting detail: THERAPIES SERIES
Discharge: HOME | End: 2017-10-06
Payer: MEDICARE

## 2017-10-06 ENCOUNTER — APPOINTMENT (OUTPATIENT)
Dept: PHYSICAL THERAPY | Age: 82
End: 2017-10-06
Payer: MEDICARE

## 2017-10-06 PROCEDURE — 97140 MANUAL THERAPY 1/> REGIONS: CPT

## 2017-10-09 ENCOUNTER — HOSPITAL ENCOUNTER (OUTPATIENT)
Dept: PHYSICAL THERAPY | Age: 82
Setting detail: THERAPIES SERIES
Discharge: HOME | End: 2017-10-09
Payer: MEDICARE

## 2017-10-11 ENCOUNTER — HOSPITAL ENCOUNTER (OUTPATIENT)
Dept: PHYSICAL THERAPY | Age: 82
Setting detail: THERAPIES SERIES
Discharge: HOME | End: 2017-10-11
Payer: MEDICARE

## 2017-10-11 PROCEDURE — 97140 MANUAL THERAPY 1/> REGIONS: CPT

## 2017-10-11 ASSESSMENT — PAIN SCALES - GENERAL: PAINLEVEL_OUTOF10: 5

## 2017-10-11 ASSESSMENT — PAIN DESCRIPTION - ORIENTATION: ORIENTATION: LEFT;RIGHT

## 2017-10-11 ASSESSMENT — PAIN DESCRIPTION - PAIN TYPE: TYPE: CHRONIC PAIN

## 2017-10-11 ASSESSMENT — PAIN DESCRIPTION - DESCRIPTORS: DESCRIPTORS: ACHING;THROBBING;SHARP

## 2017-10-11 ASSESSMENT — PAIN DESCRIPTION - LOCATION: LOCATION: KNEE

## 2017-10-11 NOTE — PROGRESS NOTES
[facility]  LYMPHEDEMA SERVICES  DAILY NOTE    Date: 10/11/2017  Patient Name: Dany Block        CSN: [id number]   : 1934  (80 y.o.)  Gender: female      Diagnosis: LYMPHEDEMA LE BILATERALLY  Referral Date : 17    PT Visit Information  PT Insurance Information: [employer] MEDICARE (MUST SUBMIT SCRIPT AND EVALUATION TO Butch Bynum). Total # of Visits to Date: 4  Plan of Care/Certification Expiration Date: 17  No Show: 0  Canceled Appointment: 0  Progress Note Counter: 4/10    Restrictions/Precautions  Fall risk, Universal precautions,Sit to stand transfer with moderate to minimal assistance due to knee pain. She was transported to the clinic via wheelchair (LACP). Pain  Patient Currently in Pain: Yes  Pain Assessment  Pain Assessment: 0-10  Pain Level: 5  Pain Type: Chronic pain  Pain Location: Knee  Pain Orientation: Left, Right  Pain Descriptors: Aching, Throbbing, Sharp    SUBJECTIVE     -10/11/2017: The patient presented to the Lymphedema clinic on this date with bilateral lower extremity compression bandages intact. Patient denied pain from the bandages but reported increased itching sensation in bilateral lower legs. Yesika Hall (staff at nursing home facility) tried to take my bandages off on Monday but I told them not to. They also tried to say that they didn't know that I had an appointment on Monday, but I know the girls knew I was suppose to be here, they just didn't set up the transportation. \" per patient. OBJECTIVE  Skin Appearance/Texture: Dry with areas of scabs bilateral lower legs. Skin Color: Moderate redness noted from ankle to just below knee joint line. Temperature: Normal  Hyperkeratosis: - None  Hyperplasia:  Moderate (Bilateral lower extremities)  Hyperpigmentation:Moderate (Bilateral lower extremities). Papillomas: None  Lymphorrhea (Weeping): Mild (Lateral aspect of the left lower leg).      TREATMENT SESSION  Manual Lymph Drainage  Unilateral Lower Extremity Manual Lymph Drainage (MLD):   Left  Trunk:  Effleurage, Profundus, Terminus, Inguinal Lnn, Axillary Lnn and (IA) Anterior Inguinal to Axillary (3 steps)       Lower Extremity:  Thigh (Anterior, Posterior, Lateral, Medial to Lateral), Knee (Anterior, Posterior, Lateral, Medial, Lower Leg (Anterior,Posterior), Ankle (Anterior, Posterior, Lateral, Medial and Foot/Toes (Anterior, Posterior)       Rework  Lower Extremity (Toes to Groin), Inguinal Lnn, Axillary Lnn, (IA) Anterior Inguinal to Axillary, Terminus, Profundus and Effleurage      Skin Care Measures  Washed involved extremity/body part and applied Original Eucerin Moisturizing creme and Vaseline to moisturize skin    Compression Bandaging  Location: Bilateral  lower leg (Metatarsal heads to knee joint line)  Compression Gradient: Moderate to Minimal  Supplies (per involved extremity):   Stockinette: Size: 4 inches, Thickness: Thick, soft   Transelast none   10 cm Artiflex Cotton 1 roll   15 cm Artiflex Cotton none   4 cm Rosidal K none   6 cm Rosidal K 1 roll   8 cm Rosidal K 1 roll   10 cm Rosidal K 1 roll   12 cm Rosidal K none   Rosidal Soft 1 roll   Tubigrip stockinette   -Left proximal lower leg to groin region. Secured with pantyhose. Decongestive/Therapeutic Exercise  The patient was able to complete Decongestive Therapy exercises (x 10 reps) including ankle pumps (3 sets). The exercises were completed with compression bandages on, without complaints of pain from the patient. The Decongestive Therapy exercises assist with decreasing the swelling by increasing the muscle pumping action of the lymph collectors and by increasing the fluid uptake in the lymphatic system. Patient Education  Education Provided:   -10/11/2017: Reviewed plan of care, discussed proper use of Tubigrip stockinette with pantyhose and wearing schedule.  Instructed the patient remove if increased pain or discomfort.   -10/03/2017: Reviewed compression bandaging precautions, goals, plan of care and safety awareness with recommendation for use of walker. Learner:patient  Method: demonstration, explanation and handout       Outcome: acknowledged understanding of precautions/goals/plan of care, demonstrated understanding and asked questions     GOALS   SHORT-TERM GOALS:  to be met in 6 weeks   X  Patient will demonstrate a decrease in circumferential measurements of the affected extremity by 10.0 cm working towards the lymphedema swelling stabilizing and patient being able to get measured and fitted for a new compression garment to be worn daily to keep swelling down. X  Patient will tolerate wearing the compression bandages from one treatment session until the next treatment session working towards meeting short-term goal #1. X Patient/family/caregiver will demonstrate and verbalize 3-5 skin care precautionary measures to decrease dry skin and risk of infection. X Patient/family/caregiver will demonstrate applying compression bandages with no greater than moderate assist and verbal cues from the therapist working towards being modified independent with applying the compression bandages for night time swelling. ASSESSMENT:  Assessment:  Patient progressing toward goal achievement. Activity Tolerance:  Patient tolerance of  treatment: Good. Plan: Week of October 9 (2x)-resume 3x/week the week of October 16, 2017.          Time In: 0945   Time Out: 1120   Timed Code Minutes: 95   Untimed Code Minutes: 0   Total Treatment Time: [id number] [facility], [facility] [address][id number], [place]       10/11/2017

## 2017-10-11 NOTE — PROGRESS NOTES
understanding and asked questions     GOALS   SHORT-TERM GOALS:  to be met in 6 weeks   X  Patient will demonstrate a decrease in circumferential measurements of the affected extremity by 10.0 cm working towards the lymphedema swelling stabilizing and patient being able to get measured and fitted for a new compression garment to be worn daily to keep swelling down. X  Patient will tolerate wearing the compression bandages from one treatment session until the next treatment session working towards meeting short-term goal #1. X Patient/family/caregiver will demonstrate and verbalize 3-5 skin care precautionary measures to decrease dry skin and risk of infection. X Patient/family/caregiver will demonstrate applying compression bandages with no greater than moderate assist and verbal cues from the therapist working towards being modified independent with applying the compression bandages for night time swelling. ASSESSMENT:  Assessment:  Patient progressing toward goal achievement. Activity Tolerance:  Patient tolerance of  treatment: Good.       Plan: Continue treatment according to established plan of care         Time In: 1445   Time Out: 1615   Timed Code Minutes: 90   Untimed Code Minutes: 0   Total Treatment Time: [address], [facility][id number], [facility]       10/06/2017

## 2017-10-13 ENCOUNTER — HOSPITAL ENCOUNTER (OUTPATIENT)
Dept: PHYSICAL THERAPY | Age: 82
Setting detail: THERAPIES SERIES
Discharge: HOME | End: 2017-10-13
Payer: MEDICARE

## 2017-10-13 PROCEDURE — 97140 MANUAL THERAPY 1/> REGIONS: CPT

## 2017-10-13 ASSESSMENT — PAIN DESCRIPTION - LOCATION: LOCATION: KNEE

## 2017-10-13 ASSESSMENT — PAIN DESCRIPTION - ORIENTATION: ORIENTATION: RIGHT;LEFT

## 2017-10-13 ASSESSMENT — PAIN DESCRIPTION - DESCRIPTORS: DESCRIPTORS: THROBBING;ACHING;SHARP

## 2017-10-13 ASSESSMENT — PAIN SCALES - GENERAL: PAINLEVEL_OUTOF10: 5

## 2017-10-13 ASSESSMENT — PAIN DESCRIPTION - PAIN TYPE: TYPE: CHRONIC PAIN

## 2017-10-13 NOTE — PROGRESS NOTES
[facility]  LYMPHEDEMA SERVICES  DAILY NOTE    Date: 10/13/2017  Patient Name: Anne Moura        CSN: [id number]   : 1934  (80 y.o.)  Gender: female      Diagnosis: LYMPHEDEMA LE BILATERALLY  Referral Date : 17    PT Visit Information  PT Insurance Information: [employer] MEDICARE (MUST SUBMIT SCRIPT AND EVALUATION TO Butch Bynum). Total # of Visits to Date: 5  Plan of Care/Certification Expiration Date: 17  No Show: 0  Canceled Appointment: 0  Progress Note Counter: 5/10    Restrictions/Precautions  Fall risk, Universal precautions,Sit to stand transfer with moderate to minimal assistance due to knee pain. She was transported to the clinic via wheelchair (LACP). Pain  Patient Currently in Pain: Yes  Pain Assessment  Pain Assessment: 0-10  Pain Level: 5  Pain Type: Chronic pain  Pain Location: Knee  Pain Orientation: Right, Left  Pain Descriptors: Throbbing, Aching, Sharp    SUBJECTIVE     -10/13/2017: The patient presented to the Lymphedema clinic on this date with bilateral lower extremity compression bandages intact. She reported that she needed assistance to remove the pantyhose that were donned during the last treatment session due to them feeling too tight and the Tubigrip stockinette on the left thigh region was too tight and she folded it down to the distal aspect of the left thigh/knee region. Patient slightly short of breath with sit to stand transfer and transfer from wheelchair to/from treatment table. Resolved shortness of breath with sitting briefly. Will continue to monitor shortness of breath.   -10/11/2017: The patient presented to the Lymphedema clinic on this date with bilateral lower extremity compression bandages intact. Patient denied pain from the bandages but reported increased itching sensation in bilateral lower legs. Lesley Lawson (staff at nursing home facility) tried to take my bandages off on Monday but I told them not to.   They also tried to say that they didn't know that I had an appointment on Monday, but I know the girls knew I was suppose to be here, they just didn't set up the transportation. \" per patient. OBJECTIVE  Skin Appearance/Texture: Dry with areas of scabs bilateral lower legs. Skin Color: Moderate redness noted from ankle to just below knee joint line. Scratch marks noted on the left thigh/knee region (Patient scratched leg). Temperature: Normal  Hyperkeratosis: - None  Hyperplasia:  Moderate (Bilateral lower extremities)  Hyperpigmentation:Moderate (Bilateral lower extremities). Papillomas: None  Lymphorrhea (Weeping): Mild (Lateral aspect of the left lower leg). TREATMENT SESSION  Manual Lymph Drainage  Unilateral Lower Extremity Manual Lymph Drainage (MLD):   Left  Trunk:  Effleurage, Profundus, Terminus, Inguinal Lnn, Axillary Lnn and (IA) Anterior Inguinal to Axillary (3 steps)       Lower Extremity:  Thigh (Anterior, Posterior, Lateral, Medial to Lateral), Knee (Anterior, Posterior, Lateral, Medial, Lower Leg (Anterior,Posterior), Ankle (Anterior, Posterior, Lateral, Medial and Foot/Toes (Anterior, Posterior)       Rework  Lower Extremity (Toes to Groin), Inguinal Lnn, Axillary Lnn, (IA) Anterior Inguinal to Axillary, Terminus, Profundus and Effleurage      Skin Care Measures  Washed involved extremity/body part and applied Original Eucerin Moisturizing creme and Vaseline to moisturize skin    Compression Bandaging  Location: Bilateral  lower leg (Metatarsal heads to knee joint line)  Compression Gradient: Moderate to Minimal  Supplies (per involved extremity):   Stockinette: Size: 4 inches, Thickness:  Thick, soft   Transelast none   10 cm Artiflex Cotton 1 roll   15 cm Artiflex Cotton none   4 cm Rosidal K none   6 cm Rosidal K 1 roll   8 cm Rosidal K 1 roll   10 cm Rosidal K 1 roll   12 cm Rosidal K none   Rosidal Soft 1 roll   Tubigrip stockinette   -NA       Decongestive/Therapeutic Exercise  The patient was able to complete Decongestive Therapy exercises (x 10 reps) including ankle pumps (3 sets). The exercises were completed with compression bandages on, without complaints of pain from the patient. The Decongestive Therapy exercises assist with decreasing the swelling by increasing the muscle pumping action of the lymph collectors and by increasing the fluid uptake in the lymphatic system. Patient Education  Education Provided:   -10/13/2017: Discussed possible options for thigh and lower abdominal/truncal compression.    -10/11/2017: Reviewed plan of care, discussed proper use of Tubigrip stockinette with pantyhose and wearing schedule. Instructed the patient remove if increased pain or discomfort.   -10/03/2017: Reviewed compression bandaging precautions, goals, plan of care and safety awareness with recommendation for use of walker. Learner:patient  Method: demonstration, explanation and handout       Outcome: acknowledged understanding of precautions/goals/plan of care, demonstrated understanding and asked questions     GOALS   SHORT-TERM GOALS:  to be met in 6 weeks   X  Patient will demonstrate a decrease in circumferential measurements of the affected extremity by 10.0 cm working towards the lymphedema swelling stabilizing and patient being able to get measured and fitted for a new compression garment to be worn daily to keep swelling down. X  Patient will tolerate wearing the compression bandages from one treatment session until the next treatment session working towards meeting short-term goal #1. X Patient/family/caregiver will demonstrate and verbalize 3-5 skin care precautionary measures to decrease dry skin and risk of infection. X Patient/family/caregiver will demonstrate applying compression bandages with no greater than moderate assist and verbal cues from the therapist working towards being modified independent with applying the compression bandages for night time swelling. ASSESSMENT:  Assessment:  Patient progressing toward goal achievement. Activity Tolerance:  Patient tolerance of  treatment: Good. Plan: Week of October 16 (3x).          Time In: 0930   Time Out: 1040   Timed Code Minutes: 70   Untimed Code Minutes: 0   Total Treatment Time: [address], [facility] [id number], [place] [id number]       10/13/2017

## 2017-10-16 ENCOUNTER — HOSPITAL ENCOUNTER (OUTPATIENT)
Dept: PHYSICAL THERAPY | Age: 82
Setting detail: THERAPIES SERIES
Discharge: HOME | End: 2017-10-16
Payer: MEDICARE

## 2017-10-16 PROCEDURE — 97140 MANUAL THERAPY 1/> REGIONS: CPT

## 2017-10-16 ASSESSMENT — PAIN DESCRIPTION - DESCRIPTORS: DESCRIPTORS: THROBBING;SHARP

## 2017-10-16 ASSESSMENT — PAIN SCALES - GENERAL: PAINLEVEL_OUTOF10: 5

## 2017-10-16 ASSESSMENT — PAIN DESCRIPTION - ORIENTATION: ORIENTATION: RIGHT;LEFT

## 2017-10-16 ASSESSMENT — PAIN DESCRIPTION - PAIN TYPE: TYPE: CHRONIC PAIN

## 2017-10-16 ASSESSMENT — PAIN DESCRIPTION - LOCATION: LOCATION: KNEE

## 2017-10-16 NOTE — PROGRESS NOTES
[facility]  LYMPHEDEMA SERVICES  DAILY NOTE    Date: 10/16/2017  Patient Name: Jayne Badillo        CSN: [id number]   : 1934  (80 y.o.)  Gender: female      Diagnosis: LYMPHEDEMA LE BILATERALLY  Referral Date : 17    PT Visit Information  PT Insurance Information: [employer] MEDICARE (MUST SUBMIT SCRIPT AND EVALUATION TO Butch Bynum). Total # of Visits to Date: 6  Plan of Care/Certification Expiration Date: 17  No Show: 0  Canceled Appointment: 0  Progress Note Counter: 6/10    Restrictions/Precautions  Fall risk, Universal precautions,Sit to stand transfer with moderate to minimal assistance due to knee pain. She was transported to the clinic via wheelchair (LACP). Pain  Patient Currently in Pain: Yes  Pain Assessment  Pain Assessment: 0-10  Pain Level: 5  Pain Type: Chronic pain  Pain Location: Knee  Pain Orientation: Right, Left  Pain Descriptors: Throbbing, Sharp (Increased pain with weight bearing in bilateral knees/lower extremities.)    SUBJECTIVE     -10/16/2017: The patient presented to the Lymphedema clinic on this date with bilateral lower extremity compression bandages intact. Noted increased swelling in bilateral thigh and knee regions. She denied pain from the compression bandages. -10/13/2017: The patient presented to the Lymphedema clinic on this date with bilateral lower extremity compression bandages intact. She reported that she needed assistance to remove the pantyhose that were donned during the last treatment session due to them feeling too tight and the Tubigrip stockinette on the left thigh region was too tight and she folded it down to the distal aspect of the left thigh/knee region. Patient slightly short of breath with sit to stand transfer and transfer from wheelchair to/from treatment table. Resolved shortness of breath with sitting briefly.  Will continue to monitor shortness of breath.   -10/11/2017: The patient presented to the Lymphedema clinic on this date with bilateral lower extremity compression bandages intact. Patient denied pain from the bandages but reported increased itching sensation in bilateral lower legs. Yesika Hall (staff at nursing home facility) tried to take my bandages off on Monday but I told them not to. They also tried to say that they didn't know that I had an appointment on Monday, but I know the girls knew I was suppose to be here, they just didn't set up the transportation. \" per patient. OBJECTIVE  Skin Appearance/Texture: Dry with areas of scabs bilateral lower legs. Skin Color: Moderate redness noted from ankle to just below knee joint line. Temperature: Normal  Hyperkeratosis: - None  Hyperplasia:  Moderate (Bilateral lower extremities)  Hyperpigmentation:Moderate (Bilateral lower extremities). Papillomas: None  Lymphorrhea (Weeping): None (Scabbed areas on the anterior aspect of bilateral lower legs-shins). MEASUREMENTS  -LOCATION (A): Metatarsal heads to knee joint line. -RIGHT: 327.2 cm (Decreased by 37.8 cm in comparison to baseline measurements). -LEFT: 335.3 cm (Decreased by 32.7 cm in comparison to baseline measurements). -LOCATION (B): Knee joint line to proximal thigh. -RIGHT: 306.9 cm (Increased by 10.3 cm in comparison to baseline measurements). -LEFT: 311.1 cm (Increased by 13.0 cm in comparison to baseline measurements).      TREATMENT SESSION  Manual Lymph Drainage  Unilateral Lower Extremity Manual Lymph Drainage (MLD):   Left  Trunk:  Effleurage, Profundus, Terminus, Inguinal Lnn, Axillary Lnn and (IA) Anterior Inguinal to Axillary (3 steps)       Lower Extremity:  Thigh (Anterior, Posterior, Lateral, Medial to Lateral), Knee (Anterior, Posterior, Lateral, Medial, Lower Leg (Anterior,Posterior), Ankle (Anterior, Posterior, Lateral, Medial and Foot/Toes (Anterior, Posterior)       Rework  Lower Extremity (Toes to Groin), Inguinal Lnn, Axillary Lnn, (IA) Anterior Inguinal to Axillary, Terminus, Profundus and Effleurage    Skin Care Measures  Washed involved extremity/body part and applied Original Eucerin Moisturizing creme and Vaseline to moisturize skin    Compression Bandaging  Location: Bilateral  lower leg (Metatarsal heads to knee joint line)  Compression Gradient: Moderate to Minimal  Supplies (per involved extremity):   Stockinette: Size: 4 inches, Thickness: Thick, soft   Transelast none   10 cm Artiflex Cotton 1 roll   15 cm Artiflex Cotton none   4 cm Rosidal K none   6 cm Rosidal K 1 roll   8 cm Rosidal K 1 roll   10 cm Rosidal K 1 roll   12 cm Rosidal K none   Rosidal Soft 1 roll   Slipshort    Tubigrip (Size H) -Extending down to mid-thigh region (Bilaterally). -Bilateral knee and distal thigh region. Decongestive/Therapeutic Exercise  The patient was able to complete Decongestive Therapy exercises (x 10 reps) including ankle pumps (2 sets). The exercises were completed with compression bandages on, without complaints of pain from the patient. The Decongestive Therapy exercises assist with decreasing the swelling by increasing the muscle pumping action of the lymph collectors and by increasing the fluid uptake in the lymphatic system. Patient Education  Education Provided:   -10/16/2017: Discussed options for compression until ready to obtain compression garments. -10/13/2017: Discussed possible options for thigh and lower abdominal/truncal compression.    -10/11/2017: Reviewed plan of care, discussed proper use of Tubigrip stockinette with pantyhose and wearing schedule. Instructed the patient remove if increased pain or discomfort.   -10/03/2017: Reviewed compression bandaging precautions, goals, plan of care and safety awareness with recommendation for use of walker.   Learner:patient  Method: demonstration, explanation and handout       Outcome: acknowledged understanding of precautions/goals/plan of care, demonstrated understanding and asked questions GOALS   SHORT-TERM GOALS:  to be met in 6 weeks   X  MET  (ONGOING)  Patient will demonstrate a decrease in circumferential measurements of the affected extremity by 10.0 cm working towards the lymphedema swelling stabilizing and patient being able to get measured and fitted for a new compression garment to be worn daily to keep swelling down. X  Patient will tolerate wearing the compression bandages from one treatment session until the next treatment session working towards meeting short-term goal #1. X Patient/family/caregiver will demonstrate and verbalize 3-5 skin care precautionary measures to decrease dry skin and risk of infection. X Patient/family/caregiver will demonstrate applying compression bandages with no greater than moderate assist and verbal cues from the therapist working towards being modified independent with applying the compression bandages for night time swelling. ASSESSMENT:  Assessment:  Patient progressing toward goal achievement.      -The patient has demonstrated good progress with the decrease in weeping of fluid from the lower extremities, bilaterally and by the decrease in total circumferential measurements as noted above. However, noted an increase in swelling in the knee and thigh regions, bilaterally. Activity Tolerance:  Patient tolerance of  treatment: Fair (+)-Decreased endurance and increased pain with weight bearing in lower extremities. Plan: Week of October 16 (3x).          Time In: 0930   Time Out: 1055   Timed Code Minutes: 85   Untimed Code Minutes: 0   Total Treatment Time: [address] [facility] [address]4757, [place]       10/16/2017

## 2017-10-16 NOTE — PROGRESS NOTES
are. She is following with lymphedema clinic for leg swelling     HTN/CKD: so far BPs <140/90. Denies CP/SOB. Labs stable with CKD3     HLD: on statin, tolerating well. No myalgias     Hx of PE: on hx PE and possibly DVT. On life long coumadin. INR at goal typically     Hx of CVA: no recent stroke sxs. No residual deficits that she's aware of.      SARAH: hx of SARAH. Currently on iron and working well. Last cbc stable. Last GI w/u 2011     Vit D def: on supplementation. Working well.      GERD: sxs controlled with meds. No dysphagia. I have reviewed patients past medical, surgical, social, and family history and have made updates where appropriate. Allergies and Medications were reviewed through the [facility] EMR.       Patient Active Problem List    Diagnosis Date Noted    Bilateral lower extremity edema 10/17/2017    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 ([address]     GERD (gastroesophageal reflux disease)     Heart failure with preserved ejection fraction ([address]      Dr. Abdon Patino His of Heparin induced thrombocytopenia     History of breast cancer      right 1982 s/p mastectomy and chemo, left 2007 s/p mastectomy      History of CVA (cerebrovascular accident)     Iron deficiency anemia      Dr. 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

## 2017-10-17 ENCOUNTER — CLINICAL DOCUMENTATION (OUTPATIENT)
Dept: FAMILY MEDICINE CLINIC | Age: 82
End: 2017-10-17

## 2017-10-17 VITALS
HEART RATE: 76 BPM | BODY MASS INDEX: 44.52 KG/M2 | RESPIRATION RATE: 18 BRPM | HEIGHT: 66 IN | DIASTOLIC BLOOD PRESSURE: 78 MMHG | TEMPERATURE: 98.2 F | SYSTOLIC BLOOD PRESSURE: 136 MMHG | WEIGHT: 277 LBS

## 2017-10-17 DIAGNOSIS — E11.69 TYPE 2 DIABETES MELLITUS WITH OTHER SPECIFIED COMPLICATION, WITH LONG-TERM CURRENT USE OF INSULIN (HCC): Primary | Chronic | ICD-10-CM

## 2017-10-17 DIAGNOSIS — L97.519 DIABETIC ULCER OF OTHER PART OF RIGHT FOOT ASSOCIATED WITH TYPE 2 DIABETES MELLITUS, UNSPECIFIED ULCER STAGE (HCC): ICD-10-CM

## 2017-10-17 DIAGNOSIS — I25.10 CORONARY ARTERY DISEASE INVOLVING NATIVE CORONARY ARTERY OF NATIVE HEART WITHOUT ANGINA PECTORIS: ICD-10-CM

## 2017-10-17 DIAGNOSIS — K21.9 GASTROESOPHAGEAL REFLUX DISEASE, ESOPHAGITIS PRESENCE NOT SPECIFIED: ICD-10-CM

## 2017-10-17 DIAGNOSIS — Z79.4 TYPE 2 DIABETES MELLITUS WITH OTHER SPECIFIED COMPLICATION, WITH LONG-TERM CURRENT USE OF INSULIN (HCC): Primary | Chronic | ICD-10-CM

## 2017-10-17 DIAGNOSIS — R60.0 BILATERAL LOWER EXTREMITY EDEMA: ICD-10-CM

## 2017-10-17 DIAGNOSIS — N18.30 CKD (CHRONIC KIDNEY DISEASE) STAGE 3, GFR 30-59 ML/MIN (HCC): Chronic | ICD-10-CM

## 2017-10-17 DIAGNOSIS — Z86.711 HISTORY OF PULMONARY EMBOLISM: Chronic | ICD-10-CM

## 2017-10-17 DIAGNOSIS — E78.2 MIXED HYPERLIPIDEMIA: Chronic | ICD-10-CM

## 2017-10-17 DIAGNOSIS — E55.9 VITAMIN D DEFICIENCY: ICD-10-CM

## 2017-10-17 DIAGNOSIS — I42.8 CARDIOMYOPATHY, NONISCHEMIC (HCC): Chronic | ICD-10-CM

## 2017-10-17 DIAGNOSIS — I50.30 HEART FAILURE WITH PRESERVED EJECTION FRACTION (HCC): Chronic | ICD-10-CM

## 2017-10-17 DIAGNOSIS — I10 ESSENTIAL HYPERTENSION: Chronic | ICD-10-CM

## 2017-10-17 DIAGNOSIS — Z86.73 HISTORY OF CVA (CEREBROVASCULAR ACCIDENT): ICD-10-CM

## 2017-10-17 DIAGNOSIS — E11.621 DIABETIC ULCER OF OTHER PART OF RIGHT FOOT ASSOCIATED WITH TYPE 2 DIABETES MELLITUS, UNSPECIFIED ULCER STAGE (HCC): ICD-10-CM

## 2017-10-17 DIAGNOSIS — D50.9 IRON DEFICIENCY ANEMIA, UNSPECIFIED IRON DEFICIENCY ANEMIA TYPE: ICD-10-CM

## 2017-10-18 ENCOUNTER — HOSPITAL ENCOUNTER (OUTPATIENT)
Dept: PHYSICAL THERAPY | Age: 82
Setting detail: THERAPIES SERIES
Discharge: HOME | End: 2017-10-18
Payer: MEDICARE

## 2017-10-18 PROCEDURE — 97140 MANUAL THERAPY 1/> REGIONS: CPT

## 2017-10-18 ASSESSMENT — PAIN DESCRIPTION - DESCRIPTORS: DESCRIPTORS: THROBBING;SHARP

## 2017-10-18 ASSESSMENT — PAIN DESCRIPTION - ORIENTATION: ORIENTATION: LEFT;RIGHT

## 2017-10-18 ASSESSMENT — PAIN DESCRIPTION - LOCATION: LOCATION: KNEE

## 2017-10-18 ASSESSMENT — PAIN DESCRIPTION - PAIN TYPE: TYPE: CHRONIC PAIN

## 2017-10-18 ASSESSMENT — PAIN SCALES - GENERAL: PAINLEVEL_OUTOF10: 5

## 2017-10-18 NOTE — PROGRESS NOTES
[facility]  LYMPHEDEMA SERVICES  DAILY NOTE    Date: 10/18/2017  Patient Name: Abdi Weber        CSN: [id number]   : 1934  (80 y.o.)  Gender: female      Diagnosis: LYMPHEDEMA LE BILATERALLY  Referral Date : 17    PT Visit Information  PT Insurance Information: [employer] MEDICARE (MUST SUBMIT SCRIPT AND EVALUATION TO Butch Bynum). Total # of Visits to Date: 7  Plan of Care/Certification Expiration Date: 17  No Show: 0  Canceled Appointment: 0  Progress Note Counter: 7/10    Restrictions/Precautions  Fall risk, Universal precautions,Sit to stand transfer with moderate to minimal assistance due to knee pain. She was transported to the clinic via wheelchair (LACP). Pain  Patient Currently in Pain: Yes  Pain Assessment  Pain Assessment: 0-10  Pain Level: 5  Pain Type: Chronic pain  Pain Location: Knee (With standing.)  Pain Orientation: Left, Right  Pain Descriptors: Throbbing, Sharp    SUBJECTIVE     -10/18/2017: The patient presented to the Lymphedema clinic on this date with bilateral lower extremity compression bandages intact. The patient reported that the slipshort was tight (tolerable during the first day) but she was unable to sleep in it, aids assisted the patient with removal.  She reported that she did not allow the aids to radha the slipshorts on yesterday or today, \"I told her that they're too tight, I can't wear that again\" per patient.   -10/16/2017: The patient presented to the Lymphedema clinic on this date with bilateral lower extremity compression bandages intact. Noted increased swelling in bilateral thigh and knee regions. She denied pain from the compression bandages. -10/13/2017: The patient presented to the Lymphedema clinic on this date with bilateral lower extremity compression bandages intact.   She reported that she needed assistance to remove the pantyhose that were donned during the last treatment session due to them feeling Effleurage    Skin Care Measures  Washed involved extremity/body part and applied Original Eucerin Moisturizing creme and Vaseline to moisturize skin    Compression Bandaging-(10/18/2017 No bandaging applied except Tubigrip stockinette size E with toe wraps and 1/4 inch thick grey foam applied to the lower extremities, due to the patient having an appointment this afternoon with the foot doctor.)  Location: Bilateral  lower leg (Metatarsal heads to knee joint line)  Compression Gradient: Moderate to Minimal  Supplies (per involved extremity):   Stockinette: Size: 4 inches, Thickness: Thick, soft   Transelast none   10 cm Artiflex Cotton 1 roll   15 cm Artiflex Cotton none   4 cm Rosidal K none   6 cm Rosidal K 1 roll   8 cm Rosidal K 1 roll   10 cm Rosidal K 1 roll   12 cm Rosidal K none   Rosidal Soft 1 roll   Slipshort    Tubigrip (Size H) -Extending down to mid-thigh region (Bilaterally). -Bilateral knee and distal thigh region. Decongestive/Therapeutic Exercise  The patient was able to complete Decongestive Therapy exercises (x 10 reps) including ankle pumps (2 sets). The exercises were completed with compression bandages on, without complaints of pain from the patient. The Decongestive Therapy exercises assist with decreasing the swelling by increasing the muscle pumping action of the lymph collectors and by increasing the fluid uptake in the lymphatic system. Patient Education  Education Provided:   -10/16/2017: Discussed options for compression until ready to obtain compression garments. -10/13/2017: Discussed possible options for thigh and lower abdominal/truncal compression.    -10/11/2017: Reviewed plan of care, discussed proper use of Tubigrip stockinette with pantyhose and wearing schedule.  Instructed the patient remove if increased pain or discomfort.   -10/03/2017: Reviewed compression bandaging precautions, goals, plan of care and safety awareness with recommendation for use of

## 2017-10-20 ENCOUNTER — APPOINTMENT (OUTPATIENT)
Dept: PHYSICAL THERAPY | Age: 82
End: 2017-10-20
Payer: MEDICARE

## 2017-10-24 ENCOUNTER — HOSPITAL ENCOUNTER (OUTPATIENT)
Dept: PHYSICAL THERAPY | Age: 82
Setting detail: THERAPIES SERIES
Discharge: HOME | End: 2017-10-24
Payer: MEDICARE

## 2017-10-24 PROCEDURE — 97140 MANUAL THERAPY 1/> REGIONS: CPT

## 2017-10-24 ASSESSMENT — PAIN DESCRIPTION - ORIENTATION: ORIENTATION: RIGHT;LEFT

## 2017-10-24 ASSESSMENT — PAIN DESCRIPTION - LOCATION: LOCATION: KNEE

## 2017-10-24 ASSESSMENT — PAIN DESCRIPTION - PAIN TYPE: TYPE: CHRONIC PAIN

## 2017-10-24 ASSESSMENT — PAIN DESCRIPTION - DESCRIPTORS: DESCRIPTORS: ACHING;THROBBING;SHARP

## 2017-10-24 ASSESSMENT — PAIN SCALES - GENERAL: PAINLEVEL_OUTOF10: 6

## 2017-10-24 NOTE — PROGRESS NOTES
joint line)  Compression Gradient: Moderate to Minimal  Supplies (per involved extremity):ISSUED NEW BANDAGES. Stockinette: Size: 4 inches, Thickness: Thick, soft   Transelast none   10 cm Artiflex Cotton 1 roll   15 cm Artiflex Cotton none   4 cm Rosidal K none   6 cm Rosidal K none   8 cm Rosidal K 1 roll   10 cm Rosidal K 1 roll   12 cm Rosidal K none   Rosidal Soft 1 roll   - -     Decongestive/Therapeutic Exercise  The patient was able to complete Decongestive Therapy exercises (x 10 reps) including ankle pumps (2 sets). The exercises were completed with compression bandages on, without complaints of pain from the patient. The Decongestive Therapy exercises assist with decreasing the swelling by increasing the muscle pumping action of the lymph collectors and by increasing the fluid uptake in the lymphatic system. Patient Education  Education Provided:   -10/24/2017: Reviewed plan of care and compression bandaging precautions. -10/16/2017: Discussed options for compression until ready to obtain compression garments. -10/13/2017: Discussed possible options for thigh and lower abdominal/truncal compression.    -10/11/2017: Reviewed plan of care, discussed proper use of Tubigrip stockinette with pantyhose and wearing schedule. Instructed the patient remove if increased pain or discomfort.   -10/03/2017: Reviewed compression bandaging precautions, goals, plan of care and safety awareness with recommendation for use of walker.   Learner:patient  Method: demonstration, explanation and handout       Outcome: acknowledged understanding of precautions/goals/plan of care, demonstrated understanding and asked questions     GOALS   SHORT-TERM GOALS:  to be met in 6 weeks   X  MET  (ONGOING)  Patient will demonstrate a decrease in circumferential measurements of the affected extremity by 10.0 cm working towards the lymphedema swelling stabilizing and patient being able to get measured and fitted for a new compression garment to be worn daily to keep swelling down. X  Patient will tolerate wearing the compression bandages from one treatment session until the next treatment session working towards meeting short-term goal #1. X Patient/family/caregiver will demonstrate and verbalize 3-5 skin care precautionary measures to decrease dry skin and risk of infection. X Patient/family/caregiver will demonstrate applying compression bandages with no greater than moderate assist and verbal cues from the therapist working towards being modified independent with applying the compression bandages for night time swelling. ASSESSMENT:  Assessment:  Patient progressing toward goal achievement. Activity Tolerance:  Patient tolerance of  treatment: Fair (+)-Decreased endurance and increased pain with weight bearing in lower extremities. Plan: Week of October 23 (2x).          Time In: 0930   Time Out: 1045   Timed Code Minutes: 75   Untimed Code Minutes: 0   Total Treatment Time: [address], [address] #[id number], [facility]       10/24/2017

## 2017-10-27 ENCOUNTER — HOSPITAL ENCOUNTER (OUTPATIENT)
Dept: PHYSICAL THERAPY | Age: 82
Setting detail: THERAPIES SERIES
Discharge: HOME | End: 2017-10-27
Payer: MEDICARE

## 2017-10-27 PROCEDURE — 97140 MANUAL THERAPY 1/> REGIONS: CPT

## 2017-10-27 ASSESSMENT — PAIN DESCRIPTION - LOCATION: LOCATION: HIP;GROIN

## 2017-10-27 ASSESSMENT — PAIN DESCRIPTION - DESCRIPTORS: DESCRIPTORS: STABBING;THROBBING

## 2017-10-27 ASSESSMENT — PAIN DESCRIPTION - PAIN TYPE: TYPE: CHRONIC PAIN

## 2017-10-27 ASSESSMENT — PAIN SCALES - GENERAL: PAINLEVEL_OUTOF10: 9

## 2017-10-27 ASSESSMENT — PAIN DESCRIPTION - ORIENTATION: ORIENTATION: LEFT

## 2017-10-27 NOTE — PROGRESS NOTES
(Anterior,Posterior), Ankle (Anterior, Posterior, Lateral, Medial and Foot/Toes (Anterior, Posterior)       Rework  Lower Extremity (Toes to Groin), Inguinal Lnn, Axillary Lnn, (IA) Anterior Inguinal to Axillary, Terminus, Profundus and Effleurage    Skin Care Measures  Washed involved extremity/body part and applied Original Eucerin Moisturizing creme and Vaseline to moisturize skin   -Dry 4 x 4 gauze applied to the anterior aspect of the anterior lower leg regions (left > right) and secured with Transelast roll). Compression Bandaging-  Location: Bilateral  lower leg (Metatarsal heads to knee joint line)  Compression Gradient: Moderate to Minimal  Supplies (per involved extremity):ISSUED NEW BANDAGES. Stockinette: Size: 4 inches, Thickness: Thick, soft   Transelast none   10 cm Artiflex Cotton 1 roll   15 cm Artiflex Cotton none   4 cm Rosidal K none   6 cm Rosidal K none   8 cm Rosidal K none   10 cm Rosidal K 1 roll   12 cm Rosidal K none   Rosidal Soft 1 roll   -Tubigrip stockinette -Size H applied on top of bandages. Decongestive/Therapeutic Exercise  The patient was able to complete Decongestive Therapy exercises (x 10 reps) including ankle pumps (2 sets). The exercises were completed with compression bandages on, without complaints of pain from the patient. The Decongestive Therapy exercises assist with decreasing the swelling by increasing the muscle pumping action of the lymph collectors and by increasing the fluid uptake in the lymphatic system. Patient Education  Education Provided:    -10/27/2017: Reviewed skin care and goal to heal skin as it was healing prior to last week when the patient went to MD appointment without compression bandages for several days and the increased swelling cause skin blistering, leaking and opening. Patient verbalized being in agreement.   -10/24/2017: Reviewed plan of care and compression bandaging precautions.    -10/16/2017: Discussed options for compression until ready to obtain compression garments. -10/13/2017: Discussed possible options for thigh and lower abdominal/truncal compression.    -10/11/2017: Reviewed plan of care, discussed proper use of Tubigrip stockinette with pantyhose and wearing schedule. Instructed the patient remove if increased pain or discomfort.   -10/03/2017: Reviewed compression bandaging precautions, goals, plan of care and safety awareness with recommendation for use of walker. Learner:patient  Method: demonstration, explanation and handout       Outcome: acknowledged understanding of precautions/goals/plan of care, demonstrated understanding and asked questions     GOALS   SHORT-TERM GOALS:  to be met in 6 weeks   X  MET  (ONGOING)  Patient will demonstrate a decrease in circumferential measurements of the affected extremity by 10.0 cm working towards the lymphedema swelling stabilizing and patient being able to get measured and fitted for a new compression garment to be worn daily to keep swelling down. X  MET  Patient will tolerate wearing the compression bandages from one treatment session until the next treatment session working towards meeting short-term goal #1. X  (ONGOING) Patient/family/caregiver will demonstrate and verbalize 3-5 skin care precautionary measures to decrease dry skin and risk of infection. X Patient/family/caregiver will demonstrate applying compression bandages with no greater than moderate assist and verbal cues from the therapist working towards being modified independent with applying the compression bandages for night time swelling. ASSESSMENT:  Assessment:  Patient progressing toward goal achievement. Activity Tolerance:  Patient tolerance of  treatment: Fair      Plan: Week of October 30 (2x).          Time In: 0930   Time Out: 1035   Timed Code Minutes: 65   Untimed Code Minutes: 0   Total Treatment Time: [address], P.T. #[id number], [facility]       10/27/2017

## 2017-10-30 ENCOUNTER — HOSPITAL ENCOUNTER (OUTPATIENT)
Dept: PHYSICAL THERAPY | Age: 82
Setting detail: THERAPIES SERIES
Discharge: HOME | End: 2017-10-30
Payer: MEDICARE

## 2017-11-02 ENCOUNTER — HOSPITAL ENCOUNTER (OUTPATIENT)
Dept: PHYSICAL THERAPY | Age: 82
Setting detail: THERAPIES SERIES
Discharge: HOME | End: 2017-11-02
Payer: MEDICARE

## 2017-11-02 PROCEDURE — 97140 MANUAL THERAPY 1/> REGIONS: CPT

## 2017-11-02 PROCEDURE — G8990 OTHER PT/OT CURRENT STATUS: HCPCS

## 2017-11-02 PROCEDURE — G8991 OTHER PT/OT GOAL STATUS: HCPCS

## 2017-11-02 ASSESSMENT — PAIN DESCRIPTION - LOCATION: LOCATION: HIP;GROIN

## 2017-11-02 ASSESSMENT — PAIN DESCRIPTION - PAIN TYPE: TYPE: CHRONIC PAIN

## 2017-11-02 ASSESSMENT — PAIN DESCRIPTION - ORIENTATION: ORIENTATION: LEFT

## 2017-11-02 ASSESSMENT — PAIN DESCRIPTION - DESCRIPTORS: DESCRIPTORS: SHARP;THROBBING

## 2017-11-02 ASSESSMENT — PAIN SCALES - GENERAL: PAINLEVEL_OUTOF10: 6

## 2017-11-02 NOTE — PROGRESS NOTES
understanding of precautions/goals/plan of care, demonstrated understanding and asked questions     GOALS   SHORT-TERM GOALS:  to be met in 6 weeks   X  MET    Patient will demonstrate a decrease in circumferential measurements of the affected extremity by 10.0 cm working towards the lymphedema swelling stabilizing and patient being able to get measured and fitted for a new compression garment to be worn daily to keep swelling down. X  MET  Patient will tolerate wearing the compression bandages from one treatment session until the next treatment session working towards meeting short-term goal #1. X  NOT MET Patient/family/caregiver will demonstrate and verbalize 3-5 skin care precautionary measures to decrease dry skin and risk of infection. X  NOT MET Patient/family/caregiver will demonstrate applying compression bandages with no greater than moderate assist and verbal cues from the therapist working towards being modified independent with applying the compression bandages for night time swelling. LONG-TERM GOALS:  to be met in 12 weeks   X  NOT MET 1. Lymphedema swelling will stabilize as noted by total circumferential changes being no greater than 3.0-5.0 cm in preparation for being measured for new compression garment(s) to be worn daily to keep swelling down. X  NOT MET 2. Patient/family/caregiver will demonstrate applying compression bandages with modified independence to apply at night to keep swelling down overnight to be able to radha compression stockings in the morning. X  NOT MET 3. Patient/family/caregiver will demonstrate donning/doffing compression garments with modified independence to wear compression garments daily to keep swelling down. X  NOT MET 4. Patient/family/caregiver will verbalize a good understanding regarding proper wearing and replacement schedule, precautions and care of garment(s).               ASSESSMENT:  Assessment:  Patient progressing toward goal PATIENT!       Suhail Oscar P.T. #[id number], [id number]       11/2/2017

## 2017-11-06 ENCOUNTER — HOSPITAL ENCOUNTER (OUTPATIENT)
Dept: PHYSICAL THERAPY | Age: 82
Setting detail: THERAPIES SERIES
Discharge: HOME | End: 2017-11-06
Payer: MEDICARE

## 2017-11-06 PROCEDURE — 97140 MANUAL THERAPY 1/> REGIONS: CPT

## 2017-11-06 ASSESSMENT — PAIN DESCRIPTION - PAIN TYPE: TYPE: CHRONIC PAIN

## 2017-11-06 ASSESSMENT — PAIN DESCRIPTION - DESCRIPTORS: DESCRIPTORS: STABBING;THROBBING

## 2017-11-06 ASSESSMENT — PAIN DESCRIPTION - ORIENTATION: ORIENTATION: LEFT

## 2017-11-06 ASSESSMENT — PAIN DESCRIPTION - LOCATION: LOCATION: GROIN;HIP

## 2017-11-06 ASSESSMENT — PAIN SCALES - GENERAL: PAINLEVEL_OUTOF10: 8

## 2017-11-06 NOTE — PROGRESS NOTES
circumferential measurements of the affected extremity by 10.0 cm working towards the lymphedema swelling stabilizing and patient being able to get measured and fitted for a new compression garment to be worn daily to keep swelling down. X  MET  Patient will tolerate wearing the compression bandages from one treatment session until the next treatment session working towards meeting short-term goal #1. X  NOT MET Patient/family/caregiver will demonstrate and verbalize 3-5 skin care precautionary measures to decrease dry skin and risk of infection. X  NOT MET Patient/family/caregiver will demonstrate applying compression bandages with no greater than moderate assist and verbal cues from the therapist working towards being modified independent with applying the compression bandages for night time swelling. LONG-TERM GOALS:  to be met in 12 weeks   X  NOT MET 1. Lymphedema swelling will stabilize as noted by total circumferential changes being no greater than 3.0-5.0 cm in preparation for being measured for new compression garment(s) to be worn daily to keep swelling down. X  NOT MET 2. Patient/family/caregiver will demonstrate applying compression bandages with modified independence to apply at night to keep swelling down overnight to be able to radha compression stockings in the morning. X  NOT MET 3. Patient/family/caregiver will demonstrate donning/doffing compression garments with modified independence to wear compression garments daily to keep swelling down. X  NOT MET 4. Patient/family/caregiver will verbalize a good understanding regarding proper wearing and replacement schedule, precautions and care of garment(s). ASSESSMENT:  Assessment:  Patient progressing toward goal achievement. Activity Tolerance:  Patient tolerance of  treatment: Fair      Plan: Week of Nov. 6 (2x).          Time In: 0930   Time Out: 1045   Timed Code Minutes: 75   Untimed Code Minutes: 0   Total Treatment Time: [address], P.T[address] #[id number], [facility]       11/6/2017

## 2017-11-09 ENCOUNTER — HOSPITAL ENCOUNTER (OUTPATIENT)
Dept: PHYSICAL THERAPY | Age: 82
Setting detail: THERAPIES SERIES
Discharge: HOME | End: 2017-11-09
Payer: MEDICARE

## 2017-11-09 ENCOUNTER — OFFICE VISIT (OUTPATIENT)
Dept: CARDIOLOGY CLINIC | Age: 82
End: 2017-11-09
Payer: MEDICARE

## 2017-11-09 VITALS
DIASTOLIC BLOOD PRESSURE: 70 MMHG | SYSTOLIC BLOOD PRESSURE: 162 MMHG | BODY MASS INDEX: 43.55 KG/M2 | HEART RATE: 74 BPM | WEIGHT: 271 LBS | HEIGHT: 66 IN

## 2017-11-09 DIAGNOSIS — I50.31 CHF (CONGESTIVE HEART FAILURE), NYHA CLASS II, ACUTE, DIASTOLIC (HCC): ICD-10-CM

## 2017-11-09 DIAGNOSIS — M79.671 RIGHT FOOT PAIN: ICD-10-CM

## 2017-11-09 DIAGNOSIS — I25.10 CORONARY ARTERY DISEASE INVOLVING NATIVE CORONARY ARTERY OF NATIVE HEART WITHOUT ANGINA PECTORIS: ICD-10-CM

## 2017-11-09 DIAGNOSIS — E66.01 MORBID OBESITY WITH BMI OF 40.0-44.9, ADULT (HCC): ICD-10-CM

## 2017-11-09 DIAGNOSIS — I10 ESSENTIAL HYPERTENSION: ICD-10-CM

## 2017-11-09 DIAGNOSIS — R06.09 DOE (DYSPNEA ON EXERTION): ICD-10-CM

## 2017-11-09 DIAGNOSIS — Z86.79 HISTORY OF CHF (CONGESTIVE HEART FAILURE): ICD-10-CM

## 2017-11-09 DIAGNOSIS — I51.89 DIASTOLIC DYSFUNCTION: ICD-10-CM

## 2017-11-09 DIAGNOSIS — Z01.818 PRE-OPERATIVE CLEARANCE: Primary | ICD-10-CM

## 2017-11-09 DIAGNOSIS — E78.5 DYSLIPIDEMIA: ICD-10-CM

## 2017-11-09 DIAGNOSIS — Z87.891 EX-SMOKER: ICD-10-CM

## 2017-11-09 PROCEDURE — G8417 CALC BMI ABV UP PARAM F/U: HCPCS | Performed by: INTERNAL MEDICINE

## 2017-11-09 PROCEDURE — 1090F PRES/ABSN URINE INCON ASSESS: CPT | Performed by: INTERNAL MEDICINE

## 2017-11-09 PROCEDURE — G8484 FLU IMMUNIZE NO ADMIN: HCPCS | Performed by: INTERNAL MEDICINE

## 2017-11-09 PROCEDURE — 99213 OFFICE O/P EST LOW 20 MIN: CPT | Performed by: INTERNAL MEDICINE

## 2017-11-09 PROCEDURE — G8427 DOCREV CUR MEDS BY ELIG CLIN: HCPCS | Performed by: INTERNAL MEDICINE

## 2017-11-09 PROCEDURE — G8399 PT W/DXA RESULTS DOCUMENT: HCPCS | Performed by: INTERNAL MEDICINE

## 2017-11-09 PROCEDURE — 1123F ACP DISCUSS/DSCN MKR DOCD: CPT | Performed by: INTERNAL MEDICINE

## 2017-11-09 PROCEDURE — 97140 MANUAL THERAPY 1/> REGIONS: CPT

## 2017-11-09 PROCEDURE — G8598 ASA/ANTIPLAT THER USED: HCPCS | Performed by: INTERNAL MEDICINE

## 2017-11-09 PROCEDURE — 4040F PNEUMOC VAC/ADMIN/RCVD: CPT | Performed by: INTERNAL MEDICINE

## 2017-11-09 PROCEDURE — 1036F TOBACCO NON-USER: CPT | Performed by: INTERNAL MEDICINE

## 2017-11-09 ASSESSMENT — PAIN DESCRIPTION - LOCATION: LOCATION: GROIN;HIP

## 2017-11-09 ASSESSMENT — PAIN SCALES - GENERAL: PAINLEVEL_OUTOF10: 8

## 2017-11-09 ASSESSMENT — PAIN DESCRIPTION - PAIN TYPE: TYPE: CHRONIC PAIN

## 2017-11-09 ASSESSMENT — PAIN DESCRIPTION - ORIENTATION: ORIENTATION: LEFT

## 2017-11-09 ASSESSMENT — PAIN DESCRIPTION - DESCRIPTORS: DESCRIPTORS: STABBING;THROBBING

## 2017-11-09 NOTE — PROGRESS NOTES
I certify that I have examined the patient below and determined that Physical Medicine and Rehabilitation service is necessary; that the secondary diagnosis for the provision of rehabilitation services is consistent with identified needs; that service will be furnished on an outpatient basis while the patient is in my care; that I approve the above plan of care for up to 90 days or as specifically noted above and will review it within that time frame or more often if the patients condition requires. Attestation, signature or co-signature of physician indicates approval of certification requirements.    ________________________ ____________ __________  Physician Signature   Date   Time    [facility]  LYMPHEDEMA SERVICES  DAILY NOTE & PROGRESS NOTE    Date: 2017  Patient Name: Damian Sifuentes        CSN: [id number]   : 1934  (80 y.o.)  Gender: female      Diagnosis: LYMPHEDEMA LE BILATERALLY  Referral Date : 17    PT Visit Information  PT Insurance Information: [employer] MEDICARE   Total # of Visits Approved: 18  Total # of Visits to Date: 15  Plan of Care/Certification Expiration Date: 17  No Show: 0  Canceled Appointment: 1  Progress Note Counter:     Restrictions/Precautions  Fall risk, Universal precautions,Sit to stand transfer with moderate to minimal assistance due to knee pain. She was transported to the clinic via wheelchair (LACP). Pain  Patient Currently in Pain: Yes  Pain Assessment  Pain Assessment: 0-10  Pain Level: 8  Pain Type: Chronic pain  Pain Location: Groin, Hip  Pain Orientation: Left  Pain Descriptors: Stabbing, Throbbing    SUBJECTIVE     -2017: The patient is a very pleasant and cooperative 80year old female who has undergone Complete Decongestive Therapy/Manual Lymph Drainage (CDT/MLD) treatment sessions due to having severe bilateral lower extremity and lower truncal Lymphedema swelling.  The patient has demonstrated good progress and has verbalized being pleased with the treatment results from the Complete Decongestive Therapy/Manual Lymph Drainage (CDT/MLD). She has difficulty tolerating compression bandaging and garments on the thighs and lower abdominal region, however, she is in need for compression to these areas. \"Sometimes they just get too tight around my stomach. I can tolerate the wraps on my lower legs, but it's up higher that gives me the trouble\" per patient. The therapist requested that the patient's nurse remove the compression bandages prior to today's treatment so that they (nursing and MD) could assess the patient's feet and open wound areas. The nurse returned the call and informed the therapist that they applied Betadine on all open wound areas and that will be the practice, when the patient comes to the Lymphedema clinic, the bandages will be removed and they will apply Betadine until further notice. The patient mentioned a possible pending surgery on her feet, will get additional information from the nursing home staff or patient's POA, it may alter current Lymphedema treatments. OBJECTIVE  Lymphedema Assessment:  Pitting Edema Slight (+1)-Dorsum of bilateral feet and ankles. Skin Appearance/Condition Mild Hyperplasia -Bilateral lower legs, slight dark discoloration (Hyperpigmentation) noted on bilateral lower legs (left > right). Skin Condition Dry-Decreased by approximately 75% in bilateral lower extremities in comparison to initial evaluation. Open Wound(s) Yes: Location: Plantar surface of bilateral feet (Metatarsal heads) also anterior aspect of the bilateral lower legs (shins)-the right leg wound no longer drains. Description\" Brownish drainage noted on the plantar surface of bilateral feet (left > right) and minimal to moderate drainage (greenish/yellowish in color) from the left leg wound. Tissue Temperature Normal   Skin Folds Small - Location: Bulging noted at bilateral knee region.     Fibrotic Tissue Decreased Fibrotic/firm tissue texture in the lower legs (bilaterally), continues to demonstrate fibrotic tissue in the ankle regions and thighs (medially). Orange Peel Texture Mild - Location: Medial thighs (distally). Nailbed Appearance/Condition Thickened. Leakage of Fluid Amount: Refer to Open Wounds above. Measurements -LOCATION (A): Metatarsal heads to knee joint line. -RIGHT: 327.2 cm (Decreased by 37.8 cm)  -LEFT: 335.3 cm (Decreased by 32.7 cm)  -LOCATION (B): Knee joint line to proximal thigh. -RIGHT: 306.9 cm (Increased by 10.3 cm)  -LEFT: 311.1 cm (Increased by 13.0 cm)  -Lower truncal: 150 cm        TREATMENT SESSION  Manual Lymph Drainage  Unilateral Lower Extremity Manual Lymph Drainage (MLD):   Left  Trunk:  Effleurage, Profundus, Terminus, Inguinal Lnn, Axillary Lnn and (IA) Anterior Inguinal to Axillary (3 steps)       Lower Extremity:  Thigh (Anterior, Posterior, Lateral, Medial to Lateral), Knee (Anterior, Posterior, Lateral, Medial, Lower Leg (Anterior,Posterior), Ankle (Anterior, Posterior, Lateral, Medial and Foot/Toes (Anterior, Posterior)       Rework  Lower Extremity (Toes to Groin), Inguinal Lnn, Axillary Lnn, (IA) Anterior Inguinal to Axillary, Terminus, Profundus and Effleurage    Skin Care Measures  Washed involved extremity/body part and applied Original Eucerin Moisturizing creme and Vaseline to moisturize skin   -Dry 4 x 4 gauze applied to the anterior aspect of the anterior lower leg regions (left > right) and secured with Transelast roll), and plantar surface of bilateral feet. Compression Bandaging-  Location: Bilateral  lower leg (Metatarsal heads to knee joint line)  Compression Gradient: Moderate to Minimal  Supplies (per involved extremity):   Stockinette: Size: 4 inches, Thickness:  Thick, soft   Transelast none   10 cm Artiflex Cotton 1 roll   15 cm Artiflex Cotton none   4 cm Rosidal K none   6 cm Rosidal K none   8 cm Rosidal K none   10 cm Rosidal K 1 roll   12 cm Rosidal K none   Rosidal Soft 1 roll   -Tubigrip stockinette -Size H applied on top of bandages. Decongestive/Therapeutic Exercise  The patient was able to complete Decongestive Therapy exercises (x 10 reps) including ankle pumps (3 sets). The exercises were completed with compression bandages on, without complaints of pain from the patient. The Decongestive Therapy exercises assist with decreasing the swelling by increasing the muscle pumping action of the lymph collectors and by increasing the fluid uptake in the lymphatic system. Patient Education  Education Provided:    -11/09/2017: Reviewed goals and plan of care. -11/06/2017: Discussed possibility of sequential compression pump for home use due to patient not tolerating compression bandaging around the thighs or abdominal region. -11/02/2017: Reviewed goals and plan of care.   -10/27/2017: Reviewed skin care and goal to heal skin as it was healing prior to last week when the patient went to MD appointment without compression bandages for several days and the increased swelling cause skin blistering, leaking and opening. Patient verbalized being in agreement.   -10/24/2017: Reviewed plan of care and compression bandaging precautions. -10/16/2017: Discussed options for compression until ready to obtain compression garments. -10/13/2017: Discussed possible options for thigh and lower abdominal/truncal compression.    -10/11/2017: Reviewed plan of care, discussed proper use of Tubigrip stockinette with pantyhose and wearing schedule. Instructed the patient remove if increased pain or discomfort.   -10/03/2017: Reviewed compression bandaging precautions, goals, plan of care and safety awareness with recommendation for use of walker.   Learner:patient  Method: demonstration, explanation and handout       Outcome: acknowledged understanding of precautions/goals/plan of care, demonstrated understanding and asked questions     GOALS Complete Decongestive Therapy/Manual Lymph Drainage (CDT/MLD) treatments as noted by the followin) The patient has demonstrated a decrease in total circumferential measurements in comparison to previous measurements by 37.8 cm and 32.7 cm from the right and left lower legs, respectively. 2) The patient has met 2/4 short term goals and 0/4 long term goals. 3) The patient has demonstrated a decrease in firm/fibrotic tissue from moderate to minimal (lower legs) as noted by the increase in skin stretch during MLD and by softened tissue texture. 4) The patient has demonstrated good wound healing in the lower leg (anterior shins) as noted by decreased drainage from the left and the absence of drainage on the right (anterior shins). 5) The patient has demonstrated decreased dry skin and noted by decreased flaking of skin by approximately 75% in comparison to the initial evaluation. The patient is extremely cooperative and is working towards meeting goals, however, she continues to struggle with increased swelling proximally from the knees to the lower abdominal region as noted by the increased swelling listed above from the knee joint line proximally. The patient has limited mobility and is not able to bend forward to touch her legs for donning compression stockings. She would benefit from a compression garment with velcro and a sequential compression pump for use when discharged. She would be able to use this equipment will assistance from the staff at the nursing home. The patient has good potential to meet all remaining goals while progressing through Phases I & II working towards becoming modified independent prior to discharge to home management with the proper equipment. Activity Tolerance:  Patient tolerance of  treatment: Fair (+)      Plan:  Recommend 5 weeks (2x/week).     THE PATIENT DEMONSTRATES GOOD POTENTIAL TO MAKE GOOD PROGRESS AND MEET ALL GOALS WITH TREATMENTS COMPLETED BY A SKILLED PHYSICAL THERAPIST/PHYSICAL THERAPIST ASSISTANT. Time In: 0830   Time Out: 0945   Timed Code Minutes: 75   Untimed Code Minutes: 0   Total Treatment Time: 75     [address] TO ASSIST THE PATIENT IN THE CARE OF THIS PATIENT!       Yumi Blanco, P.T. #[id number], URVASHI       11/9/2017

## 2017-11-09 NOTE — PROGRESS NOTES
Pt here as hospital follow up     Pt denies:chest pain,heart palpitations    Pt complains:SOB,dizziness,peripheral edema in bilateral legs and feet

## 2017-11-09 NOTE — PROGRESS NOTES
Chief Complaint   Patient presents with    [place] [facility] [place] Coronary Artery Disease    Congestive Heart Failure    Cardiac Clearance     [facility] foot surgery November 21,        80 yrs old pt with PMH of severe LV dysfunction, s/p Biv - ICD insertion, dyslipidemia, HTN, DM, CKD, Anemia is here for the cardiac clearance. Current Outpatient Prescriptions   Medication Sig Dispense Refill    mupirocin (BACTROBAN) 2 % ointment Apply topically 3 times daily Apply topically 3 times daily.       Insulin Aspart (NOVOLOG SC) Inject 12 Units into the skin 3 times daily      CAPSAICIN EX Apply topically      Ascorbic Acid (VITAMIN C) 500 MG tablet Take 500 mg by mouth 2 times daily      pantoprazole (PROTONIX) 40 MG tablet Take 1 tablet by mouth 2 times daily (before meals) Indications: Gastroesophageal Reflux Disease 30-60\" before breakfast and dinner 60 tablet 11    insulin glargine (LANTUS) 100 UNIT/ML injection vial Inject 35 Units into the skin nightly 1 vial 0    metoprolol tartrate (LOPRESSOR) 25 MG tablet Take 1 tablet by mouth 2 times daily 60 tablet 3    amLODIPine (NORVASC) 5 MG tablet Take 2 tablets by mouth daily 30 tablet 3    bumetanide (BUMEX) 1 MG tablet Take 1 tablet by mouth 2 times daily 30 tablet 0    vitamin D (ERGOCALCIFEROL) 53895 units CAPS capsule Take 50,000 Units by mouth Twice a Week Take on Monday and Thursday      albuterol sulfate (PROAIR RESPICLICK) 283 (90 Base) MCG/ACT aerosol powder inhalation Inhale 2 puffs into the lungs every 4 hours as needed for Wheezing or Shortness of Breath (space out to every 6 hours as needed when symptoms improve.)      warfarin (COUMADIN) 4 MG tablet Take 2 mg by mouth every evening Take on Sunday, Tuesday, Wednesday, Thursday, Saturday      magnesium hydroxide (MILK OF MAGNESIA) 400 MG/5ML suspension Take 30 mLs by mouth daily as needed for Constipation      potassium chloride (KLOR-CON M) 20 MEQ TBCR extended release tablet Take 10 mEq by mouth daily (with breakfast)       isosorbide mononitrate (IMDUR) 60 MG extended release tablet Take 1 tablet by mouth daily Indications: Treatment to Prevent Angina 30 tablet 5    clopidogrel (PLAVIX) 75 MG tablet Take 1 tablet by mouth daily Indications: Treatment to Prevent Blood Clotting 30 tablet 5    docusate sodium (COLACE) 100 MG capsule Take 100 mg by mouth 2 times daily       Multiple Vitamins-Minerals (OCUVITE PO) Take 1 tablet by mouth daily Indications: Disease of the Eye       pravastatin (PRAVACHOL) 40 MG tablet Take 40 mg by mouth every evening Indications: Blood Cholesterol Abnormal       miconazole (MICOTIN) 2 % powder Apply topically as needed for Itching Apply to abdmen 45 g 1    vitamin B-12 (CYANOCOBALAMIN) 500 MCG tablet Take 1,000 mcg by mouth daily Indications: Treatment to Prevent Vitamin Deficiency       acetaminophen (TYLENOL) 325 MG tablet Take 650 mg by mouth every 4 hours as needed for Pain or Fever Don't take more than 3,000 mg per day.  ferrous sulfate 325 (65 FE) MG EC tablet Take 325 mg by mouth daily (with breakfast). Indications: Anemia      predniSONE (DELTASONE) 20 MG tablet Take 20 mg by mouth daily       No current facility-administered medications for this visit. Review of Systems - General ROS: negative  Psychological ROS: negative  Hematological and Lymphatic ROS: No history of blood clots or bleeding disorder. Respiratory ROS: no cough, shortness of breath, or wheezing  Cardiovascular ROS: no chest pain, chronic dyspnea on exertion  Gastrointestinal ROS: negative  Genito-Urinary ROS: no dysuria, trouble voiding, or hematuria  Musculoskeletal ROS: negative  Neurological ROS: no TIA or stroke symptoms  Leg swelling/foot pain      BP (!) 162/70   Pulse 74   Ht 5' 6\" (1.676 m)   Wt 271 lb (122.9 kg)   Breastfeeding?  No   BMI 43.74 kg/m²       Physical Examination: General appearance - alert, well appearing, and in no distress  Mental status - alert, oriented to person, place, and time  Neck - supple, no significant adenopathy, no JVD, or carotid bruits  Chest - clear to auscultation, no wheezes, rales or rhonchi,  Heart - normal rate, regular rhythm, normal S1, S2, no murmurs  Abdomen - soft, nontender, nondistended, no masses   Neurological - alert, oriented, normal speech  Musculoskeletal - no joint tenderness, deformity or swelling  Extremities - peripheral pulses normal  1+ pedal edema    Left Heart catheterization on 12/13/16  LM/LCX/RCA- Patent  LAD - 50% ds     Rt heart cath  Mild-moderate pulm HTN  No shunting      Echo 2016  EF - 55%  Mild MR, Mild TR  Diastolic dysfunction grade 1      EKG   PPM rhythm     S/P Biv - ICD insertion on 3/2/11 by Dr. Clau Pulliam  Interrogation showed occ. SVT episode. Carotid duplex on 12/27/11  Mild ds    VANESSA on 12/27/11  Normal    Lower ext vein duplex - No venous insufficiency     Stress test on 7/22/14  No ischemia      Assessment/Plan:      Pt is scheduled for foot surgery and needs pre-op cardiac clearance. It is not an emergency surgery. Pt does not have any active cardiac condition like Acute coronary syndrome, decompensated CHF, significant MS or AS, significant arrhythmias. Pt is going for intermediate  risk sx. Pt's functional level is more than 4 METs. According to ACC/AHA guidelines, there is no need for any further cardiac work up or intervention prior to OR. Continue perioperative B-Blocker and ASA to reduce further cardiovascular events. NYHA class II Diastolic heart failure. Continue Nitrate/Diuretic/BB. EF - Normal  S/P BiV - ICD insertion. Normal EF    Non-obstructive Coronary artery disease, seems to be stable. Denies angina or change in breathing pattern. Continue Plavix/Statin/Nitrate/BB    Hypertension, on medical treatment. Needs better control. Will reassess after surgery. No venous insufficiency.     Hyperlipidemia: on statin, followed

## 2017-11-13 ENCOUNTER — HOSPITAL ENCOUNTER (OUTPATIENT)
Dept: PHYSICAL THERAPY | Age: 82
Setting detail: THERAPIES SERIES
Discharge: HOME | End: 2017-11-13
Payer: MEDICARE

## 2017-11-13 PROCEDURE — G8990 OTHER PT/OT CURRENT STATUS: HCPCS

## 2017-11-13 PROCEDURE — G8991 OTHER PT/OT GOAL STATUS: HCPCS

## 2017-11-13 PROCEDURE — 97140 MANUAL THERAPY 1/> REGIONS: CPT

## 2017-11-13 ASSESSMENT — PAIN DESCRIPTION - ORIENTATION: ORIENTATION: LEFT

## 2017-11-13 ASSESSMENT — PAIN SCALES - GENERAL: PAINLEVEL_OUTOF10: 6

## 2017-11-13 ASSESSMENT — PAIN DESCRIPTION - LOCATION: LOCATION: GROIN;HIP

## 2017-11-13 ASSESSMENT — PAIN DESCRIPTION - PAIN TYPE: TYPE: CHRONIC PAIN

## 2017-11-13 ASSESSMENT — PAIN DESCRIPTION - DESCRIPTORS: DESCRIPTORS: THROBBING;SORE

## 2017-11-13 NOTE — PROGRESS NOTES
-Size H applied on top of bandages. Decongestive/Therapeutic Exercise  The patient was able to complete Decongestive Therapy exercises (x 10 reps) including ankle pumps (3 sets). The exercises were completed with compression bandages on, without complaints of pain from the patient. The Decongestive Therapy exercises assist with decreasing the swelling by increasing the muscle pumping action of the lymph collectors and by increasing the fluid uptake in the lymphatic system. Patient Education  Education Provided:    -11/13/2017: Discussed recommendation for the patient to have a sequential compression pump and velcro compression garments (ie. CIRC AID leggings) in order to allow the patient to be more consistent and tolerate the garments when discharged from the program.   -11/09/2017: Reviewed goals and plan of care. -11/06/2017: Discussed possibility of sequential compression pump for home use due to patient not tolerating compression bandaging around the thighs or abdominal region. -11/02/2017: Reviewed goals and plan of care.   -10/27/2017: Reviewed skin care and goal to heal skin as it was healing prior to last week when the patient went to MD appointment without compression bandages for several days and the increased swelling cause skin blistering, leaking and opening. Patient verbalized being in agreement.   -10/24/2017: Reviewed plan of care and compression bandaging precautions. -10/16/2017: Discussed options for compression until ready to obtain compression garments. -10/13/2017: Discussed possible options for thigh and lower abdominal/truncal compression.    -10/11/2017: Reviewed plan of care, discussed proper use of Tubigrip stockinette with pantyhose and wearing schedule.  Instructed the patient remove if increased pain or discomfort.   -10/03/2017: Reviewed compression bandaging precautions, goals, plan of care and safety awareness with recommendation for use of

## 2017-11-16 ENCOUNTER — APPOINTMENT (OUTPATIENT)
Dept: PHYSICAL THERAPY | Age: 82
End: 2017-11-16
Payer: MEDICARE

## 2017-12-22 LAB
BASOPHILS ABSOLUTE: ABNORMAL /ΜL
BASOPHILS RELATIVE PERCENT: ABNORMAL %
BUN BLDV-MCNC: 36 MG/DL
CALCIUM SERPL-MCNC: 9 MG/DL
CHLORIDE BLD-SCNC: 104 MMOL/L
CO2: 29 MMOL/L
CREAT SERPL-MCNC: 1.7 MG/DL
EOSINOPHILS ABSOLUTE: ABNORMAL /ΜL
EOSINOPHILS RELATIVE PERCENT: ABNORMAL %
GFR CALCULATED: 29
GLUCOSE BLD-MCNC: 71 MG/DL
HCT VFR BLD CALC: 31.6 % (ref 36–46)
HEMOGLOBIN: 10 G/DL (ref 12–16)
LYMPHOCYTES ABSOLUTE: ABNORMAL /ΜL
LYMPHOCYTES RELATIVE PERCENT: ABNORMAL %
MCH RBC QN AUTO: ABNORMAL PG
MCHC RBC AUTO-ENTMCNC: ABNORMAL G/DL
MCV RBC AUTO: ABNORMAL FL
MONOCYTES ABSOLUTE: ABNORMAL /ΜL
MONOCYTES RELATIVE PERCENT: ABNORMAL %
NEUTROPHILS ABSOLUTE: ABNORMAL /ΜL
NEUTROPHILS RELATIVE PERCENT: ABNORMAL %
PLATELET # BLD: 230 K/ΜL
PMV BLD AUTO: ABNORMAL FL
POTASSIUM SERPL-SCNC: 5.1 MMOL/L
RBC # BLD: 3.97 10^6/ΜL
SODIUM BLD-SCNC: 138 MMOL/L
WBC # BLD: 8.5 10^3/ML

## 2017-12-28 ENCOUNTER — OFFICE VISIT (OUTPATIENT)
Dept: NEPHROLOGY | Age: 82
End: 2017-12-28
Payer: MEDICARE

## 2017-12-28 VITALS
BODY MASS INDEX: 42.43 KG/M2 | OXYGEN SATURATION: 96 % | HEIGHT: 66 IN | HEART RATE: 76 BPM | WEIGHT: 264 LBS | SYSTOLIC BLOOD PRESSURE: 136 MMHG | DIASTOLIC BLOOD PRESSURE: 68 MMHG | RESPIRATION RATE: 18 BRPM

## 2017-12-28 DIAGNOSIS — N18.30 CKD (CHRONIC KIDNEY DISEASE), STAGE III (HCC): Primary | ICD-10-CM

## 2017-12-28 PROCEDURE — G8427 DOCREV CUR MEDS BY ELIG CLIN: HCPCS | Performed by: INTERNAL MEDICINE

## 2017-12-28 PROCEDURE — G8399 PT W/DXA RESULTS DOCUMENT: HCPCS | Performed by: INTERNAL MEDICINE

## 2017-12-28 PROCEDURE — 1123F ACP DISCUSS/DSCN MKR DOCD: CPT | Performed by: INTERNAL MEDICINE

## 2017-12-28 PROCEDURE — G8417 CALC BMI ABV UP PARAM F/U: HCPCS | Performed by: INTERNAL MEDICINE

## 2017-12-28 PROCEDURE — G8484 FLU IMMUNIZE NO ADMIN: HCPCS | Performed by: INTERNAL MEDICINE

## 2017-12-28 PROCEDURE — 1036F TOBACCO NON-USER: CPT | Performed by: INTERNAL MEDICINE

## 2017-12-28 PROCEDURE — 4040F PNEUMOC VAC/ADMIN/RCVD: CPT | Performed by: INTERNAL MEDICINE

## 2017-12-28 PROCEDURE — 99213 OFFICE O/P EST LOW 20 MIN: CPT | Performed by: INTERNAL MEDICINE

## 2017-12-28 PROCEDURE — 1090F PRES/ABSN URINE INCON ASSESS: CPT | Performed by: INTERNAL MEDICINE

## 2017-12-28 PROCEDURE — G8598 ASA/ANTIPLAT THER USED: HCPCS | Performed by: INTERNAL MEDICINE

## 2017-12-28 RX ORDER — ERGOCALCIFEROL (VITAMIN D2) 1250 MCG
50000 CAPSULE ORAL
COMMUNITY
End: 2018-06-26 | Stop reason: ALTCHOICE

## 2017-12-28 RX ORDER — CIPROFLOXACIN 500 MG/1
500 TABLET, FILM COATED ORAL 2 TIMES DAILY
COMMUNITY
End: 2018-01-04 | Stop reason: ALTCHOICE

## 2017-12-28 RX ORDER — TRAMADOL HYDROCHLORIDE 50 MG/1
50 TABLET ORAL EVERY 6 HOURS PRN
Status: ON HOLD | COMMUNITY
End: 2018-06-13 | Stop reason: ALTCHOICE

## 2017-12-28 RX ORDER — AMLODIPINE BESYLATE 10 MG/1
10 TABLET ORAL DAILY
Status: ON HOLD | COMMUNITY
End: 2018-06-19 | Stop reason: HOSPADM

## 2017-12-28 NOTE — PROGRESS NOTES
[facility]    [facility]  [address], [place]  [phone]       Progress Note    12/28/2017 2:45 PM    Pt Name:    Cee Carvalho  YOB: 1934  Primary Care Physician:  Georgina Arroyo CNP       Chief Complaint:   Chief Complaint   Patient presents with    Chronic Kidney Disease        History of Chief Complaint: CKD stage IIIB from DM, HTN, aging and obesity. Subjective:  I last saw the patient in clinic 09/28/17. I follow the patient for Chronic Kidney disease stage IIIB. Since our last visit the patient has not been hospitalized. The patient is sleeping fairly well at night with 1-2 times per night nocturia. The patient has a good appetite and is remaining active. The patient denied N/V/C/D/SOB/CP. She has been gaining weight and fluid lately. She is on a low sodium diet. EGFR=pending        Objective:  VITALS:  /68 (Site: Left Arm, Position: Sitting, Cuff Size: Large Adult)   Pulse 76   Resp 18   Ht 5' 6\" (1.676 m)   Wt 264 lb (119.7 kg)   SpO2 96%   BMI 42.61 kg/m²   Weight:   Wt Readings from Last 3 Encounters:   12/28/17 264 lb (119.7 kg)   11/09/17 271 lb (122.9 kg)   10/17/17 277 lb (125.6 kg)     Body mass index is 42.61 kg/m². Physical examination    General:  Alert and cooperative with exam  HEENT:  Head: Normocephalic, no lesions, without obvious abnormality. Neck:   No JVD and no bruits. Thyroid gland is normal  Lungs:  clear to auscultation bilaterally  Heart:  regular rate and rhythm, S1, S2 normal, no murmur, click, rub or gallop  Abdomen:  soft, non-tender; bowel sounds normal; no masses,  no organomegaly  Extremities:  extremities normal, atraumatic, no cyanosis or edema  Neurologic:  Mental status: Alert, oriented, thought content appropriate  Skin:                Warm and dry with no rashes. Muscles:         Hand  and leg strength are equal and strong bilaterally.      Lab Data      CBC:   Lab Results as needed for Constipation      potassium chloride (KLOR-CON M) 20 MEQ TBCR extended release tablet Take 10 mEq by mouth daily (with breakfast)       isosorbide mononitrate (IMDUR) 60 MG extended release tablet Take 1 tablet by mouth daily Indications: Treatment to Prevent Angina 30 tablet 5    clopidogrel (PLAVIX) 75 MG tablet Take 1 tablet by mouth daily Indications: Treatment to Prevent Blood Clotting 30 tablet 5    docusate sodium (COLACE) 100 MG capsule Take 100 mg by mouth 2 times daily       Multiple Vitamins-Minerals (OCUVITE PO) Take 1 tablet by mouth daily Indications: Disease of the Eye       pravastatin (PRAVACHOL) 40 MG tablet Take 40 mg by mouth every evening Indications: Blood Cholesterol Abnormal       miconazole (MICOTIN) 2 % powder Apply topically as needed for Itching Apply to abdmen 45 g 1    vitamin B-12 (CYANOCOBALAMIN) 500 MCG tablet Take 1,000 mcg by mouth daily Indications: Treatment to Prevent Vitamin Deficiency       acetaminophen (TYLENOL) 325 MG tablet Take 650 mg by mouth every 4 hours as needed for Pain or Fever Don't take more than 3,000 mg per day.  ferrous sulfate 325 (65 FE) MG EC tablet Take 325 mg by mouth daily (with breakfast). Indications: Anemia       No current facility-administered medications for this visit. IMPRESSIONS:      1. Acute gain in weight per the facility physician (she has actually lost weight per our records)  2. CKD stage IIIB         PLAN:  1. We discussed the eGFR today. 2. We will continue all current medications without changes. 3. We will see the patient back in 1 week               _________________________________  Ramer Presume.  Rupal French, DO  [facility]      CC  Rehana Wilcox, CNP

## 2018-01-04 ENCOUNTER — OFFICE VISIT (OUTPATIENT)
Dept: CARDIOLOGY CLINIC | Age: 83
End: 2018-01-04
Payer: MEDICARE

## 2018-01-04 ENCOUNTER — OFFICE VISIT (OUTPATIENT)
Dept: NEPHROLOGY | Age: 83
End: 2018-01-04
Payer: MEDICARE

## 2018-01-04 VITALS
OXYGEN SATURATION: 98 % | SYSTOLIC BLOOD PRESSURE: 128 MMHG | RESPIRATION RATE: 18 BRPM | BODY MASS INDEX: 42.11 KG/M2 | DIASTOLIC BLOOD PRESSURE: 74 MMHG | HEIGHT: 66 IN | WEIGHT: 262 LBS | HEART RATE: 71 BPM

## 2018-01-04 VITALS
WEIGHT: 262 LBS | SYSTOLIC BLOOD PRESSURE: 122 MMHG | HEART RATE: 60 BPM | HEIGHT: 66 IN | BODY MASS INDEX: 42.11 KG/M2 | DIASTOLIC BLOOD PRESSURE: 60 MMHG

## 2018-01-04 DIAGNOSIS — N18.30 CKD (CHRONIC KIDNEY DISEASE), STAGE III (HCC): Primary | ICD-10-CM

## 2018-01-04 DIAGNOSIS — R06.09 DOE (DYSPNEA ON EXERTION): ICD-10-CM

## 2018-01-04 DIAGNOSIS — I77.9 MILD CAROTID ARTERY DISEASE (HCC): ICD-10-CM

## 2018-01-04 DIAGNOSIS — I25.10 CORONARY ARTERY DISEASE INVOLVING NATIVE CORONARY ARTERY OF NATIVE HEART WITHOUT ANGINA PECTORIS: ICD-10-CM

## 2018-01-04 DIAGNOSIS — I10 ESSENTIAL HYPERTENSION: ICD-10-CM

## 2018-01-04 DIAGNOSIS — Z87.891 EX-SMOKER: ICD-10-CM

## 2018-01-04 DIAGNOSIS — I50.32 CHRONIC DIASTOLIC CONGESTIVE HEART FAILURE (HCC): ICD-10-CM

## 2018-01-04 DIAGNOSIS — E78.5 DYSLIPIDEMIA: ICD-10-CM

## 2018-01-04 DIAGNOSIS — I50.32 CHF NYHA CLASS II, CHRONIC, DIASTOLIC (HCC): Primary | ICD-10-CM

## 2018-01-04 PROCEDURE — G8484 FLU IMMUNIZE NO ADMIN: HCPCS | Performed by: INTERNAL MEDICINE

## 2018-01-04 PROCEDURE — 4040F PNEUMOC VAC/ADMIN/RCVD: CPT | Performed by: INTERNAL MEDICINE

## 2018-01-04 PROCEDURE — G8417 CALC BMI ABV UP PARAM F/U: HCPCS | Performed by: INTERNAL MEDICINE

## 2018-01-04 PROCEDURE — 1036F TOBACCO NON-USER: CPT | Performed by: INTERNAL MEDICINE

## 2018-01-04 PROCEDURE — G8399 PT W/DXA RESULTS DOCUMENT: HCPCS | Performed by: INTERNAL MEDICINE

## 2018-01-04 PROCEDURE — 99213 OFFICE O/P EST LOW 20 MIN: CPT | Performed by: INTERNAL MEDICINE

## 2018-01-04 PROCEDURE — 1090F PRES/ABSN URINE INCON ASSESS: CPT | Performed by: INTERNAL MEDICINE

## 2018-01-04 PROCEDURE — G8427 DOCREV CUR MEDS BY ELIG CLIN: HCPCS | Performed by: INTERNAL MEDICINE

## 2018-01-04 PROCEDURE — G8598 ASA/ANTIPLAT THER USED: HCPCS | Performed by: INTERNAL MEDICINE

## 2018-01-04 PROCEDURE — 1123F ACP DISCUSS/DSCN MKR DOCD: CPT | Performed by: INTERNAL MEDICINE

## 2018-01-04 RX ORDER — HYDROCODONE BITARTRATE AND ACETAMINOPHEN 7.5; 325 MG/1; MG/1
1 TABLET ORAL 2 TIMES DAILY PRN
Qty: 12 TABLET | Refills: 0 | Status: SHIPPED | OUTPATIENT
Start: 2018-01-04 | End: 2018-01-04 | Stop reason: CLARIF

## 2018-01-04 RX ORDER — SODIUM PHOSPHATE, DIBASIC AND SODIUM PHOSPHATE, MONOBASIC 7; 19 G/133ML; G/133ML
1 ENEMA RECTAL
Status: ON HOLD | COMMUNITY
End: 2018-06-13 | Stop reason: ALTCHOICE

## 2018-01-04 NOTE — PROGRESS NOTES
09/18/2017    HCT 31.7 (A) 09/18/2017    MCV 84.3 06/19/2017     09/18/2017     BMP:    Lab Results   Component Value Date     09/18/2017     (L) 06/21/2017     06/20/2017    K 5.0 11/21/2017    K 4.9 09/18/2017    K 4.6 06/21/2017     09/18/2017    CL 94 (L) 06/21/2017    CL 97 (L) 06/20/2017    CO2 25 09/18/2017    CO2 25 06/21/2017    CO2 24 06/20/2017    BUN 46 09/18/2017    BUN 50 (H) 06/21/2017    BUN 39 (H) 06/20/2017    CREATININE 1.6 09/18/2017    CREATININE 1.9 (H) 06/21/2017    CREATININE 1.6 (H) 06/20/2017    GLUCOSE 78 09/18/2017    GLUCOSE 159 (H) 06/21/2017    GLUCOSE 174 (H) 06/20/2017      Hepatic:   Lab Results   Component Value Date    AST 15 06/19/2017    AST 12 03/17/2017    AST 16 06/11/2016    ALT 11 06/19/2017    ALT 9 (L) 03/17/2017    ALT 11 06/11/2016    BILITOT 0.2 (L) 06/19/2017    BILITOT 0.2 (L) 03/17/2017    BILITOT 0.2 (L) 06/11/2016    ALKPHOS 90 06/19/2017    ALKPHOS 90 03/17/2017    ALKPHOS 97 06/11/2016     BNP:   Lab Results   Component Value Date    BNP 46.0 05/20/2013    .5 (H) 02/08/2013     Lipids:   Lab Results   Component Value Date    CHOL 126 09/20/2017    HDL 36 09/20/2017     INR:   Lab Results   Component Value Date    INR 2.04 (H) 11/21/2017    INR 1.0 09/18/2017    INR 2.55 (H) 06/21/2017     URINE:   Lab Results   Component Value Date    NAUR 103 04/24/2017    PROTUR 20.0 04/11/2013     Lab Results   Component Value Date    NITRU NEGATIVE 06/19/2017    COLORU YELLOW 06/19/2017    PHUR 5.5 06/19/2017    LABCAST NONE SEEN 04/24/2017    LABCAST NONE SEEN 04/24/2017    WBCUA 0-2 06/19/2017    RBCUA 3-5 06/19/2017    MUCUS NONE SEEN 06/24/2012    YEAST NONE SEEN 06/19/2017    BACTERIA NONE 06/19/2017    CLARITYU Clear 04/02/2014    SPECGRAV 1.016 04/24/2017    LEUKOCYTESUR NEGATIVE 06/19/2017    UROBILINOGEN 0.2 06/19/2017    BILIRUBINUR NEGATIVE 06/19/2017    BLOODU SMALL 06/19/2017    GLUCOSEU NEGATIVE 06/19/2017    KETUA NEGATIVE 06/19/2017    AMORPHOUS URATES 06/24/2012      Microalbumen/Creatinine ratio:  No components found for: RUCREAT        Medications:    Current Outpatient Prescriptions   Medication Sig Dispense Refill    Glycerin, Laxative, (ADULT SUPPOSITORY RE) Place rectally      Sodium Phosphates (FLEET) 7-19 GM/118ML Place 1 enema rectally once as needed      amLODIPine (NORVASC) 10 MG tablet Take 10 mg by mouth daily      ergocalciferol (ERGOCALCIFEROL) 27480 units capsule Take 50,000 Units by mouth once a week      traMADol (ULTRAM) 50 MG tablet Take 50 mg by mouth every 6 hours as needed for Pain .       Insulin Aspart (NOVOLOG SC) Inject 12 Units into the skin 12 u bid 8 u qd      CAPSAICIN EX Apply topically      Ascorbic Acid (VITAMIN C) 500 MG tablet Take 500 mg by mouth 2 times daily      pantoprazole (PROTONIX) 40 MG tablet Take 1 tablet by mouth 2 times daily (before meals) Indications: Gastroesophageal Reflux Disease 30-60\" before breakfast and dinner 60 tablet 11    insulin glargine (LANTUS) 100 UNIT/ML injection vial Inject 35 Units into the skin nightly 1 vial 0    metoprolol tartrate (LOPRESSOR) 25 MG tablet Take 1 tablet by mouth 2 times daily 60 tablet 3    bumetanide (BUMEX) 1 MG tablet Take 1 tablet by mouth 2 times daily 30 tablet 0    vitamin D (ERGOCALCIFEROL) 28810 units CAPS capsule Take 50,000 Units by mouth Twice a Week Take on Monday and Thursday      albuterol sulfate (PROAIR RESPICLICK) 086 (90 Base) MCG/ACT aerosol powder inhalation Inhale 2 puffs into the lungs every 4 hours as needed for Wheezing or Shortness of Breath (space out to every 6 hours as needed when symptoms improve.)      warfarin (COUMADIN) 4 MG tablet Take 2 mg by mouth every evening Take on Sunday, Tuesday, Wednesday, Thursday, Saturday      magnesium hydroxide (MILK OF MAGNESIA) 400 MG/5ML suspension Take 30 mLs by mouth daily as needed for Constipation      potassium chloride (KLOR-CON M) 20 MEQ TBCR extended release tablet Take 10 mEq by mouth daily (with breakfast)       isosorbide mononitrate (IMDUR) 60 MG extended release tablet Take 1 tablet by mouth daily Indications: Treatment to Prevent Angina 30 tablet 5    clopidogrel (PLAVIX) 75 MG tablet Take 1 tablet by mouth daily Indications: Treatment to Prevent Blood Clotting 30 tablet 5    docusate sodium (COLACE) 100 MG capsule Take 100 mg by mouth 2 times daily       Multiple Vitamins-Minerals (OCUVITE PO) Take 1 tablet by mouth daily Indications: Disease of the Eye       pravastatin (PRAVACHOL) 40 MG tablet Take 40 mg by mouth every evening Indications: Blood Cholesterol Abnormal       vitamin B-12 (CYANOCOBALAMIN) 500 MCG tablet Take 1,000 mcg by mouth daily Indications: Treatment to Prevent Vitamin Deficiency       acetaminophen (TYLENOL) 325 MG tablet Take 650 mg by mouth every 4 hours as needed for Pain or Fever Don't take more than 3,000 mg per day.  ferrous sulfate 325 (65 FE) MG EC tablet Take 325 mg by mouth daily (with breakfast). Indications: Anemia       No current facility-administered medications for this visit. IMPRESSIONS:      1.   CKD stage IIIB. 2.   Stage III obesity         PLAN:  1. We discussed the eGFR today. 2. We will continue all current medications without changes. 3. We will see the patient back in 4 months.               _________________________________  Kathy Sheppard.  [place], DO  [facility]      CC  Rehana Wilcox, CNP

## 2018-01-04 NOTE — PROGRESS NOTES
Chief Complaint   Patient presents with   Marely      CAD CHF       80 yrs old pt with PMH of severe LV dysfunction, s/p Biv - ICD insertion, dyslipidemia, HTN, DM, CKD, Anemia is here for the follow up. Current Outpatient Prescriptions   Medication Sig Dispense Refill    amLODIPine (NORVASC) 10 MG tablet Take 10 mg by mouth daily      ergocalciferol (ERGOCALCIFEROL) 24974 units capsule Take 50,000 Units by mouth once a week      traMADol (ULTRAM) 50 MG tablet Take 50 mg by mouth every 6 hours as needed for Pain .       Insulin Aspart (NOVOLOG SC) Inject 12 Units into the skin 3 times daily      CAPSAICIN EX Apply topically      Ascorbic Acid (VITAMIN C) 500 MG tablet Take 500 mg by mouth 2 times daily      pantoprazole (PROTONIX) 40 MG tablet Take 1 tablet by mouth 2 times daily (before meals) Indications: Gastroesophageal Reflux Disease 30-60\" before breakfast and dinner 60 tablet 11    insulin glargine (LANTUS) 100 UNIT/ML injection vial Inject 35 Units into the skin nightly 1 vial 0    metoprolol tartrate (LOPRESSOR) 25 MG tablet Take 1 tablet by mouth 2 times daily 60 tablet 3    bumetanide (BUMEX) 1 MG tablet Take 1 tablet by mouth 2 times daily 30 tablet 0    vitamin D (ERGOCALCIFEROL) 89737 units CAPS capsule Take 50,000 Units by mouth Twice a Week Take on Monday and Thursday      warfarin (COUMADIN) 4 MG tablet Take 2 mg by mouth every evening Take on Sunday, Tuesday, Wednesday, Thursday, Saturday      magnesium hydroxide (MILK OF MAGNESIA) 400 MG/5ML suspension Take 30 mLs by mouth daily as needed for Constipation      potassium chloride (KLOR-CON M) 20 MEQ TBCR extended release tablet Take 10 mEq by mouth daily (with breakfast)       isosorbide mononitrate (IMDUR) 60 MG extended release tablet Take 1 tablet by mouth daily Indications: Treatment to Prevent Angina 30 tablet 5    clopidogrel (PLAVIX) 75 MG tablet Take 1 tablet by mouth daily Indications: Treatment to Prevent

## 2018-01-10 NOTE — PROGRESS NOTES
[facility]  LYMPHEDEMA [facility]  DISCHARGE NOTE    Date: 1/10/2018  Patient Name: Abdi Weber        CSN: [id number]   : 1934  (80 y.o.)  Gender: female      Diagnosis: LYMPHEDEMA LE BILATERALLY  Referral Date : 17    PT Visit Information  PT Insurance Information: [employer] MEDICARE   Total # of Visits Approved: 10 (Received new auths for 10 treatment sessions from 11/10/17 to 17 per Travis Gong)  Total # of Visits to Date: 1  Plan of Care/Certification Expiration Date: 17  No Show: 0  Canceled Appointment: 1  Progress Note Counter: 1/10    Restrictions/Precautions  Fall risk, Universal precautions,Sit to stand transfer with moderate to minimal assistance due to knee pain. She was transported to the clinic via wheelchair (LACP). Patient Education  Education Provided:    -2017: Discussed recommendation for the patient to have a sequential compression pump and velcro compression garments (ie. CIRC AID leggings) in order to allow the patient to be more consistent and tolerate the garments when discharged from the program.   -2017: Reviewed goals and plan of care. -2017: Discussed possibility of sequential compression pump for home use due to patient not tolerating compression bandaging around the thighs or abdominal region. -2017: Reviewed goals and plan of care.   -10/27/2017: Reviewed skin care and goal to heal skin as it was healing prior to last week when the patient went to MD appointment without compression bandages for several days and the increased swelling cause skin blistering, leaking and opening. Patient verbalized being in agreement.   -10/24/2017: Reviewed plan of care and compression bandaging precautions. -10/16/2017: Discussed options for compression until ready to obtain compression garments.    -10/13/2017: Discussed possible options for thigh and lower abdominal/truncal compression.    -10/11/2017: Reviewed plan of care, discussed proper use of Tubigrip stockinette with pantyhose and wearing schedule. Instructed the patient remove if increased pain or discomfort.   -10/03/2017: Reviewed compression bandaging precautions, goals, plan of care and safety awareness with recommendation for use of walker. Learner:patient  Method: demonstration, explanation and handout       Outcome: acknowledged understanding of precautions/goals/plan of care, demonstrated understanding and asked questions     GOALS   SHORT-TERM GOALS:  to be met in 6 weeks   X  MET    Patient will demonstrate a decrease in circumferential measurements of the affected extremity by 10.0 cm working towards the lymphedema swelling stabilizing and patient being able to get measured and fitted for a new compression garment to be worn daily to keep swelling down. X  MET  Patient will tolerate wearing the compression bandages from one treatment session until the next treatment session working towards meeting short-term goal #1. X  NOT MET Patient/family/caregiver will demonstrate and verbalize 3-5 skin care precautionary measures to decrease dry skin and risk of infection. X  NOT MET Patient/family/caregiver will demonstrate applying compression bandages with no greater than moderate assist and verbal cues from the therapist working towards being modified independent with applying the compression bandages for night time swelling. LONG-TERM GOALS:  to be met in 12 weeks   X  NOT MET 1. Lymphedema swelling will stabilize as noted by total circumferential changes being no greater than 3.0-5.0 cm in preparation for being measured for new compression garment(s) to be worn daily to keep swelling down. X  NOT MET 2. Patient/family/caregiver will demonstrate applying compression bandages with modified independence to apply at night to keep swelling down overnight to be able to radha compression stockings in the morning. X  NOT MET 3.  Patient/family/caregiver will demonstrate donning/doffing compression garments with modified independence to wear compression garments daily to keep swelling down. X  NOT MET 4. Patient/family/caregiver will verbalize a good understanding regarding proper wearing and replacement schedule, precautions and care of garment(s). ASSESSMENT:  Assessment:  All goals not met due to the patient having to stop Complete Decongestive Therapy/Manual Lymph Drainage (CDT/MLD) treatments in clinic due to recent foot/ankle surgery. The patient has good potential to make progress when she is medically stable to continue. She will need a new physician signed script to proceed with treatments. Plan:  DISCHARGE NOTE. NO CHARGES ON THIS DATE: DISCHARGE NOTE ONLY.   Time In: 0   Time Out: 0   Timed Code Minutes: 0   Untimed Code Minutes: 0   Total Treatment Time: [address], [id number]       1/10/2018

## 2018-02-05 ENCOUNTER — OFFICE VISIT (OUTPATIENT)
Dept: CARDIOLOGY CLINIC | Age: 83
End: 2018-02-05
Payer: MEDICARE

## 2018-02-05 VITALS
DIASTOLIC BLOOD PRESSURE: 62 MMHG | OXYGEN SATURATION: 98 % | HEIGHT: 66 IN | SYSTOLIC BLOOD PRESSURE: 159 MMHG | HEART RATE: 70 BPM | BODY MASS INDEX: 42.75 KG/M2 | WEIGHT: 266 LBS

## 2018-02-05 DIAGNOSIS — I50.32 CHF (CONGESTIVE HEART FAILURE), NYHA CLASS III, CHRONIC, DIASTOLIC (HCC): Primary | ICD-10-CM

## 2018-02-05 PROCEDURE — G8399 PT W/DXA RESULTS DOCUMENT: HCPCS | Performed by: NURSE PRACTITIONER

## 2018-02-05 PROCEDURE — G8484 FLU IMMUNIZE NO ADMIN: HCPCS | Performed by: NURSE PRACTITIONER

## 2018-02-05 PROCEDURE — G8598 ASA/ANTIPLAT THER USED: HCPCS | Performed by: NURSE PRACTITIONER

## 2018-02-05 PROCEDURE — 1123F ACP DISCUSS/DSCN MKR DOCD: CPT | Performed by: NURSE PRACTITIONER

## 2018-02-05 PROCEDURE — G8427 DOCREV CUR MEDS BY ELIG CLIN: HCPCS | Performed by: NURSE PRACTITIONER

## 2018-02-05 PROCEDURE — 99213 OFFICE O/P EST LOW 20 MIN: CPT | Performed by: NURSE PRACTITIONER

## 2018-02-05 PROCEDURE — 1090F PRES/ABSN URINE INCON ASSESS: CPT | Performed by: NURSE PRACTITIONER

## 2018-02-05 PROCEDURE — G8417 CALC BMI ABV UP PARAM F/U: HCPCS | Performed by: NURSE PRACTITIONER

## 2018-02-05 PROCEDURE — 1036F TOBACCO NON-USER: CPT | Performed by: NURSE PRACTITIONER

## 2018-02-05 PROCEDURE — 4040F PNEUMOC VAC/ADMIN/RCVD: CPT | Performed by: NURSE PRACTITIONER

## 2018-02-05 ASSESSMENT — ENCOUNTER SYMPTOMS
ABDOMINAL PAIN: 0
CHEST TIGHTNESS: 0
ABDOMINAL DISTENTION: 1
APNEA: 0
WHEEZING: 0
SHORTNESS OF BREATH: 1
COLOR CHANGE: 0
NAUSEA: 0
COUGH: 0

## 2018-02-05 NOTE — PROGRESS NOTES
failure with preserved ejection fraction (HCC)     Dr. Rafat Serrano His of Heparin induced thrombocytopenia     History of breast cancer     right 1982 s/p mastectomy and chemo, left 2007 s/p mastectomy    History of CVA (cerebrovascular accident)     History of pulmonary embolism 05/2014    on [facility] (coumadin)    Hyperlipidemia     Hypertension     pulmonary    Iron deficiency anemia     Dr. 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 No aortic stenosis. No mitral stenosis. Coronaries; mild disease of the LAD not more than 40%. LV; severe LV dysfunction and EF 30-35%.  CARDIAC CATHETERIZATION  05-    Mild disease of small distal LAD (approximately  50% stenosis) angiographically normal CA. Mild to moderate LV systolic dysfunction. EF 35%. Mildly elevated LV end diastolic pressure. No significant MR. Systemic hypertension.  CARDIAC DEFIBRILLATOR PLACEMENT  02/02/2011    CARDIOVASCULAR STRESS TEST  03/23/10    EF 30%  Severe LV Dys    COLONOSCOPY      Dr. Concepción Christian  03/22/10    EF 45%. LV mildly dilated. Systolic function mildly reducted. No regional wall motion abnormalities. Wall thickness mildly increased. LA mildly dilated. MV mild annular calification. Mild TR. Ventricular septum tickness mildly increased.  EYE SURGERY      bilateral cataracts    EYE SURGERY Left 9/2014    laser surgery for retinopathy?     FOOT SURGERY Left 1/12/2016    PACEMAKER PLACEMENT      IN EGD BALLOON DILATION ESOPHAGUS <30 MM DIAM N/A 01/04/18 71   01/04/18 60     Body mass index is 42.93 kg/m². ECHO:   11/29/16      Summary   Normal left ventricle size and systolic function. Ejection fraction was   estimated at 55-60%. There were no regional left ventricular wall motion   abnormalities and wall thickness was within normal limits.   Doppler parameters were consistent with abnormal left ventricular   relaxation (grade 1 diastolic dysfunction).   There was mild mitral regurgitation.   There was mild tricuspid regurgitation.      Signature      ----------------------------------------------------------------   Electronically signed by Serena Paredes MD (Interpreting   physician) on 11/29/2016 at 10:15 PM   ----------------------------------------------------------------      Findings      Mitral Valve   The mitral valve structure was normal with normal leaflet separation.   DOPPLER: The transmitral velocity was within the normal range with no   evidence for mitral stenosis. There was mild mitral regurgitation.      Aortic Valve   The aortic valve was trileaflet with normal thickness and cuspal   separation. DOPPLER: Transaortic velocity was within the normal range with   no evidence of aortic stenosis. There was no evidence of aortic   regurgitation.      Tricuspid Valve   The tricuspid valve structure was normal with normal leaflet separation.   DOPPLER: There was no evidence of tricuspid stenosis. There was mild   tricuspid regurgitation.      Left Atrium   Left atrial size was normal.      Left Ventricle   Normal left ventricle size and systolic function. Ejection fraction was   estimated at 55-60%.  There were no regional left ventricular wall motion   abnormalities and wall thickness was within normal limits.   Doppler parameters were consistent with abnormal left ventricular   relaxation (grade 1 diastolic dysfunction).      Right Atrium   Right atrial size was normal.      Right Ventricle   The right ventricular size was normal with

## 2018-02-15 ENCOUNTER — TELEPHONE (OUTPATIENT)
Dept: CARDIOLOGY CLINIC | Age: 83
End: 2018-02-15

## 2018-03-28 ENCOUNTER — NURSE ONLY (OUTPATIENT)
Dept: CARDIOLOGY CLINIC | Age: 83
End: 2018-03-28
Payer: MEDICARE

## 2018-03-28 DIAGNOSIS — Z95.810 BIVENTRICULAR IMPLANTABLE CARDIOVERTER-DEFIBRILLATOR IN SITU: Primary | Chronic | ICD-10-CM

## 2018-03-28 PROCEDURE — 93284 PRGRMG EVAL IMPLANTABLE DFB: CPT | Performed by: INTERNAL MEDICINE

## 2018-04-09 ENCOUNTER — OFFICE VISIT (OUTPATIENT)
Dept: CARDIOLOGY CLINIC | Age: 83
End: 2018-04-09
Payer: MEDICARE

## 2018-04-09 VITALS
DIASTOLIC BLOOD PRESSURE: 80 MMHG | WEIGHT: 263 LBS | OXYGEN SATURATION: 91 % | BODY MASS INDEX: 42.27 KG/M2 | SYSTOLIC BLOOD PRESSURE: 156 MMHG | HEIGHT: 66 IN | HEART RATE: 104 BPM

## 2018-04-09 DIAGNOSIS — I50.32 CHF (CONGESTIVE HEART FAILURE), NYHA CLASS III, CHRONIC, DIASTOLIC (HCC): Primary | ICD-10-CM

## 2018-04-09 PROCEDURE — 1036F TOBACCO NON-USER: CPT | Performed by: NURSE PRACTITIONER

## 2018-04-09 PROCEDURE — G8399 PT W/DXA RESULTS DOCUMENT: HCPCS | Performed by: NURSE PRACTITIONER

## 2018-04-09 PROCEDURE — G8427 DOCREV CUR MEDS BY ELIG CLIN: HCPCS | Performed by: NURSE PRACTITIONER

## 2018-04-09 PROCEDURE — G8417 CALC BMI ABV UP PARAM F/U: HCPCS | Performed by: NURSE PRACTITIONER

## 2018-04-09 PROCEDURE — 1123F ACP DISCUSS/DSCN MKR DOCD: CPT | Performed by: NURSE PRACTITIONER

## 2018-04-09 PROCEDURE — 4040F PNEUMOC VAC/ADMIN/RCVD: CPT | Performed by: NURSE PRACTITIONER

## 2018-04-09 PROCEDURE — G8598 ASA/ANTIPLAT THER USED: HCPCS | Performed by: NURSE PRACTITIONER

## 2018-04-09 PROCEDURE — 99213 OFFICE O/P EST LOW 20 MIN: CPT | Performed by: NURSE PRACTITIONER

## 2018-04-09 PROCEDURE — 1090F PRES/ABSN URINE INCON ASSESS: CPT | Performed by: NURSE PRACTITIONER

## 2018-04-09 RX ORDER — PROMETHAZINE HYDROCHLORIDE 25 MG/1
25 SUPPOSITORY RECTAL EVERY 4 HOURS PRN
Status: ON HOLD | COMMUNITY
End: 2018-06-13 | Stop reason: ALTCHOICE

## 2018-04-09 RX ORDER — WARFARIN SODIUM 2.5 MG/1
2.5 TABLET ORAL
Status: ON HOLD | COMMUNITY
End: 2018-06-13 | Stop reason: ALTCHOICE

## 2018-04-09 RX ORDER — PROMETHAZINE HYDROCHLORIDE 25 MG/1
25 TABLET ORAL EVERY 4 HOURS PRN
Status: ON HOLD | COMMUNITY
End: 2018-06-13 | Stop reason: ALTCHOICE

## 2018-04-09 RX ORDER — BISACODYL 10 MG
10 SUPPOSITORY, RECTAL RECTAL DAILY PRN
Status: ON HOLD | COMMUNITY
End: 2020-01-01

## 2018-04-09 RX ORDER — HYDRALAZINE HYDROCHLORIDE 10 MG/1
10 TABLET, FILM COATED ORAL 3 TIMES DAILY
Qty: 90 TABLET | Refills: 3 | Status: ON HOLD | OUTPATIENT
Start: 2018-04-09 | End: 2018-06-19 | Stop reason: HOSPADM

## 2018-04-09 ASSESSMENT — ENCOUNTER SYMPTOMS
ABDOMINAL PAIN: 0
WHEEZING: 0
NAUSEA: 0
SHORTNESS OF BREATH: 1
COUGH: 0
ABDOMINAL DISTENTION: 0
APNEA: 0
COLOR CHANGE: 0
CHEST TIGHTNESS: 0

## 2018-05-10 ENCOUNTER — OFFICE VISIT (OUTPATIENT)
Dept: NEPHROLOGY | Age: 83
End: 2018-05-10
Payer: MEDICARE

## 2018-05-10 VITALS
DIASTOLIC BLOOD PRESSURE: 64 MMHG | BODY MASS INDEX: 40.5 KG/M2 | OXYGEN SATURATION: 98 % | HEIGHT: 66 IN | WEIGHT: 252 LBS | HEART RATE: 69 BPM | SYSTOLIC BLOOD PRESSURE: 138 MMHG | RESPIRATION RATE: 16 BRPM

## 2018-05-10 DIAGNOSIS — N18.4 CKD (CHRONIC KIDNEY DISEASE), STAGE IV (HCC): ICD-10-CM

## 2018-05-10 DIAGNOSIS — N17.9 AKI (ACUTE KIDNEY INJURY) (HCC): Primary | ICD-10-CM

## 2018-05-10 LAB
BASOPHILS ABSOLUTE: ABNORMAL /ΜL
BASOPHILS RELATIVE PERCENT: ABNORMAL %
BUN BLDV-MCNC: 24 MG/DL
CALCIUM SERPL-MCNC: 9 MG/DL
CHLORIDE BLD-SCNC: 104 MMOL/L
CO2: 30 MMOL/L
CREAT SERPL-MCNC: 2.3 MG/DL
EOSINOPHILS ABSOLUTE: ABNORMAL /ΜL
EOSINOPHILS RELATIVE PERCENT: ABNORMAL %
GFR CALCULATED: 20
GLUCOSE BLD-MCNC: 74 MG/DL
HCT VFR BLD CALC: 31.3 % (ref 36–46)
HEMOGLOBIN: 10 G/DL (ref 12–16)
LYMPHOCYTES ABSOLUTE: ABNORMAL /ΜL
LYMPHOCYTES RELATIVE PERCENT: ABNORMAL %
MCH RBC QN AUTO: ABNORMAL PG
MCHC RBC AUTO-ENTMCNC: ABNORMAL G/DL
MCV RBC AUTO: ABNORMAL FL
MONOCYTES ABSOLUTE: ABNORMAL /ΜL
MONOCYTES RELATIVE PERCENT: ABNORMAL %
NEUTROPHILS ABSOLUTE: ABNORMAL /ΜL
NEUTROPHILS RELATIVE PERCENT: ABNORMAL %
PLATELET # BLD: 169 K/ΜL
PMV BLD AUTO: ABNORMAL FL
POTASSIUM SERPL-SCNC: 4.7 MMOL/L
RBC # BLD: 3.74 10^6/ΜL
SODIUM BLD-SCNC: 139 MMOL/L
WBC # BLD: 6.8 10^3/ML

## 2018-05-10 PROCEDURE — 4040F PNEUMOC VAC/ADMIN/RCVD: CPT | Performed by: INTERNAL MEDICINE

## 2018-05-10 PROCEDURE — G8417 CALC BMI ABV UP PARAM F/U: HCPCS | Performed by: INTERNAL MEDICINE

## 2018-05-10 PROCEDURE — G8427 DOCREV CUR MEDS BY ELIG CLIN: HCPCS | Performed by: INTERNAL MEDICINE

## 2018-05-10 PROCEDURE — 99213 OFFICE O/P EST LOW 20 MIN: CPT | Performed by: INTERNAL MEDICINE

## 2018-05-10 PROCEDURE — 1036F TOBACCO NON-USER: CPT | Performed by: INTERNAL MEDICINE

## 2018-05-10 PROCEDURE — 1090F PRES/ABSN URINE INCON ASSESS: CPT | Performed by: INTERNAL MEDICINE

## 2018-05-10 PROCEDURE — G8598 ASA/ANTIPLAT THER USED: HCPCS | Performed by: INTERNAL MEDICINE

## 2018-05-10 PROCEDURE — G8399 PT W/DXA RESULTS DOCUMENT: HCPCS | Performed by: INTERNAL MEDICINE

## 2018-05-10 PROCEDURE — 1123F ACP DISCUSS/DSCN MKR DOCD: CPT | Performed by: INTERNAL MEDICINE

## 2018-05-10 RX ORDER — ONDANSETRON 4 MG/1
4 TABLET, ORALLY DISINTEGRATING ORAL 2 TIMES DAILY PRN
Status: ON HOLD | COMMUNITY
End: 2018-06-13 | Stop reason: CLARIF

## 2018-05-18 ENCOUNTER — APPOINTMENT (OUTPATIENT)
Dept: CT IMAGING | Age: 83
End: 2018-05-18
Payer: MEDICARE

## 2018-05-18 ENCOUNTER — HOSPITAL ENCOUNTER (EMERGENCY)
Age: 83
Discharge: HOME | End: 2018-05-18
Payer: MEDICARE

## 2018-05-18 VITALS
OXYGEN SATURATION: 90 % | RESPIRATION RATE: 18 BRPM | TEMPERATURE: 97.9 F | SYSTOLIC BLOOD PRESSURE: 157 MMHG | HEART RATE: 87 BPM | WEIGHT: 268 LBS | BODY MASS INDEX: 43.07 KG/M2 | DIASTOLIC BLOOD PRESSURE: 59 MMHG | HEIGHT: 66 IN
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
## 2018-05-18 DIAGNOSIS — S00.03XA CONTUSION OF SCALP, INITIAL ENCOUNTER: ICD-10-CM

## 2018-05-18 LAB
ALBUMIN SERPL-MCNC: 3.5 G/DL (ref 3.5–5.1)
ALP BLD-CCNC: 104 U/L (ref 38–126)
ALT SERPL-CCNC: 14 U/L (ref 11–66)
ANION GAP SERPL CALCULATED.3IONS-SCNC: 12 MEQ/L (ref 8–16)
ANISOCYTOSIS: ABNORMAL
AST SERPL-CCNC: 18 U/L (ref 5–40)
BACTERIA: ABNORMAL
BASOPHILS # BLD: 0.6 %
BASOPHILS ABSOLUTE: 0 THOU/MM3 (ref 0–0.1)
BILIRUB SERPL-MCNC: 0.2 MG/DL (ref 0.3–1.2)
BILIRUBIN DIRECT: < 0.2 MG/DL (ref 0–0.3)
BILIRUBIN URINE: NEGATIVE
BLOOD, URINE: NEGATIVE
BUN BLDV-MCNC: 34 MG/DL (ref 7–22)
CALCIUM SERPL-MCNC: 9.4 MG/DL (ref 8.5–10.5)
CASTS: ABNORMAL /LPF
CASTS: ABNORMAL /LPF
CHARACTER, URINE: ABNORMAL
CHLORIDE BLD-SCNC: 101 MEQ/L (ref 98–111)
CO2: 25 MEQ/L (ref 23–33)
COLOR: YELLOW
CREAT SERPL-MCNC: 2.2 MG/DL (ref 0.4–1.2)
CRYSTALS: ABNORMAL
EKG ATRIAL RATE: 74 BPM
EKG P AXIS: 62 DEGREES
EKG P-R INTERVAL: 138 MS
EKG Q-T INTERVAL: 452 MS
EKG QRS DURATION: 178 MS
EKG QTC CALCULATION (BAZETT): 501 MS
EKG R AXIS: -64 DEGREES
EKG T AXIS: 112 DEGREES
EKG VENTRICULAR RATE: 74 BPM
EOSINOPHIL # BLD: 1.6 %
EOSINOPHILS ABSOLUTE: 0.1 THOU/MM3 (ref 0–0.4)
EPITHELIAL CELLS, UA: ABNORMAL /HPF
GFR SERPL CREATININE-BSD FRML MDRD: 21 ML/MIN/1.73M2
GLUCOSE BLD-MCNC: 149 MG/DL (ref 70–108)
GLUCOSE, URINE: NEGATIVE MG/DL
HCT VFR BLD CALC: 34.2 % (ref 37–47)
HEMOGLOBIN: 10.9 GM/DL (ref 12–16)
HYPOCHROMIA: ABNORMAL
INR BLD: 1.54 (ref 0.85–1.13)
KETONES, URINE: NEGATIVE
LEUKOCYTE ESTERASE, URINE: ABNORMAL
LYMPHOCYTES # BLD: 17.4 %
LYMPHOCYTES ABSOLUTE: 1.4 THOU/MM3 (ref 1–4.8)
MCH RBC QN AUTO: 26.6 PG (ref 27–31)
MCHC RBC AUTO-ENTMCNC: 32 GM/DL (ref 33–37)
MCV RBC AUTO: 83.1 FL (ref 81–99)
MISCELLANEOUS LAB TEST RESULT: ABNORMAL
MONOCYTES # BLD: 8.6 %
MONOCYTES ABSOLUTE: 0.7 THOU/MM3 (ref 0.4–1.3)
NITRITE, URINE: NEGATIVE
NUCLEATED RED BLOOD CELLS: 0 /100 WBC
OSMOLALITY CALCULATION: 286.1 MOSMOL/KG (ref 275–300)
PDW BLD-RTO: 19.8 % (ref 11.5–14.5)
PH UA: 6
PLATELET # BLD: 202 THOU/MM3 (ref 130–400)
PMV BLD AUTO: 9.4 FL (ref 7.4–10.4)
POTASSIUM SERPL-SCNC: 4.8 MEQ/L (ref 3.5–5.2)
PROTEIN UA: 100 MG/DL
RBC # BLD: 4.11 MILL/MM3 (ref 4.2–5.4)
RBC URINE: ABNORMAL /HPF
RENAL EPITHELIAL, UA: ABNORMAL
SEG NEUTROPHILS: 71.8 %
SEGMENTED NEUTROPHILS ABSOLUTE COUNT: 5.8 THOU/MM3 (ref 1.8–7.7)
SODIUM BLD-SCNC: 138 MEQ/L (ref 135–145)
SPECIFIC GRAVITY UA: 1.01 (ref 1–1.03)
TOTAL PROTEIN: 6.9 G/DL (ref 6.1–8)
TROPONIN T: 0.02 NG/ML
TROPONIN T: 0.04 NG/ML
UROBILINOGEN, URINE: 0.2 EU/DL
WBC # BLD: 8.1 THOU/MM3 (ref 4.8–10.8)
WBC UA: ABNORMAL /HPF
YEAST: ABNORMAL

## 2018-05-18 PROCEDURE — 70450 CT HEAD/BRAIN W/O DYE: CPT

## 2018-05-18 PROCEDURE — 82248 BILIRUBIN DIRECT: CPT

## 2018-05-18 PROCEDURE — 84484 ASSAY OF TROPONIN QUANT: CPT

## 2018-05-18 PROCEDURE — 85610 PROTHROMBIN TIME: CPT

## 2018-05-18 PROCEDURE — 99284 EMERGENCY DEPT VISIT MOD MDM: CPT

## 2018-05-18 PROCEDURE — 93010 ELECTROCARDIOGRAM REPORT: CPT | Performed by: INTERNAL MEDICINE

## 2018-05-18 PROCEDURE — 85025 COMPLETE CBC W/AUTO DIFF WBC: CPT

## 2018-05-18 PROCEDURE — 80053 COMPREHEN METABOLIC PANEL: CPT

## 2018-05-18 PROCEDURE — 81001 URINALYSIS AUTO W/SCOPE: CPT

## 2018-05-18 PROCEDURE — 93005 ELECTROCARDIOGRAM TRACING: CPT | Performed by: PHYSICIAN ASSISTANT

## 2018-05-18 PROCEDURE — 36415 COLL VENOUS BLD VENIPUNCTURE: CPT

## 2018-05-18 ASSESSMENT — ENCOUNTER SYMPTOMS
BACK PAIN: 0
NAUSEA: 0
DIARRHEA: 0
WHEEZING: 0
SHORTNESS OF BREATH: 0
VOMITING: 0
EYE DISCHARGE: 0
RHINORRHEA: 0
EYE PAIN: 0
COUGH: 0
ABDOMINAL PAIN: 0
SORE THROAT: 0

## 2018-05-18 ASSESSMENT — PAIN DESCRIPTION - ONSET
ONSET: ON-GOING
ONSET: ON-GOING

## 2018-05-18 ASSESSMENT — PAIN DESCRIPTION - FREQUENCY
FREQUENCY: CONTINUOUS

## 2018-05-18 ASSESSMENT — PAIN DESCRIPTION - LOCATION
LOCATION: HEAD

## 2018-05-18 ASSESSMENT — PAIN DESCRIPTION - DESCRIPTORS
DESCRIPTORS: HEADACHE

## 2018-05-18 ASSESSMENT — PAIN DESCRIPTION - PAIN TYPE
TYPE: ACUTE PAIN

## 2018-05-18 ASSESSMENT — PAIN SCALES - GENERAL
PAINLEVEL_OUTOF10: 1

## 2018-05-18 ASSESSMENT — PAIN DESCRIPTION - ORIENTATION
ORIENTATION: POSTERIOR
ORIENTATION: POSTERIOR

## 2018-05-31 LAB
BASOPHILS ABSOLUTE: ABNORMAL /ΜL
BASOPHILS RELATIVE PERCENT: ABNORMAL %
BUN BLDV-MCNC: 45 MG/DL
CALCIUM SERPL-MCNC: 9.2 MG/DL
CHLORIDE BLD-SCNC: 105 MMOL/L
CO2: 26 MMOL/L
CREAT SERPL-MCNC: 2.1 MG/DL
EOSINOPHILS ABSOLUTE: ABNORMAL /ΜL
EOSINOPHILS RELATIVE PERCENT: ABNORMAL %
GFR CALCULATED: 22
GLUCOSE BLD-MCNC: 90 MG/DL
HCT VFR BLD CALC: 31.7 % (ref 36–46)
HEMOGLOBIN: 10.4 G/DL (ref 12–16)
LYMPHOCYTES ABSOLUTE: ABNORMAL /ΜL
LYMPHOCYTES RELATIVE PERCENT: ABNORMAL %
MCH RBC QN AUTO: ABNORMAL PG
MCHC RBC AUTO-ENTMCNC: ABNORMAL G/DL
MCV RBC AUTO: ABNORMAL FL
MONOCYTES ABSOLUTE: ABNORMAL /ΜL
MONOCYTES RELATIVE PERCENT: ABNORMAL %
NEUTROPHILS ABSOLUTE: ABNORMAL /ΜL
NEUTROPHILS RELATIVE PERCENT: ABNORMAL %
PLATELET # BLD: 178 K/ΜL
PMV BLD AUTO: ABNORMAL FL
POTASSIUM SERPL-SCNC: 4.8 MMOL/L
RBC # BLD: 3.76 10^6/ΜL
SODIUM BLD-SCNC: 140 MMOL/L
WBC # BLD: 6.9 10^3/ML

## 2018-06-04 ENCOUNTER — TELEPHONE (OUTPATIENT)
Dept: CARDIOLOGY CLINIC | Age: 83
End: 2018-06-04

## 2018-06-07 ENCOUNTER — OFFICE VISIT (OUTPATIENT)
Dept: NEPHROLOGY | Age: 83
End: 2018-06-07
Payer: MEDICARE

## 2018-06-07 VITALS
DIASTOLIC BLOOD PRESSURE: 67 MMHG | OXYGEN SATURATION: 94 % | WEIGHT: 261 LBS | RESPIRATION RATE: 18 BRPM | SYSTOLIC BLOOD PRESSURE: 139 MMHG | HEIGHT: 66 IN | BODY MASS INDEX: 41.95 KG/M2 | HEART RATE: 94 BPM

## 2018-06-07 DIAGNOSIS — N18.4 CKD (CHRONIC KIDNEY DISEASE), STAGE IV (HCC): Primary | ICD-10-CM

## 2018-06-07 PROCEDURE — 99213 OFFICE O/P EST LOW 20 MIN: CPT | Performed by: INTERNAL MEDICINE

## 2018-06-07 PROCEDURE — 1090F PRES/ABSN URINE INCON ASSESS: CPT | Performed by: INTERNAL MEDICINE

## 2018-06-07 PROCEDURE — G8399 PT W/DXA RESULTS DOCUMENT: HCPCS | Performed by: INTERNAL MEDICINE

## 2018-06-07 PROCEDURE — G8427 DOCREV CUR MEDS BY ELIG CLIN: HCPCS | Performed by: INTERNAL MEDICINE

## 2018-06-07 PROCEDURE — G8417 CALC BMI ABV UP PARAM F/U: HCPCS | Performed by: INTERNAL MEDICINE

## 2018-06-07 PROCEDURE — 1123F ACP DISCUSS/DSCN MKR DOCD: CPT | Performed by: INTERNAL MEDICINE

## 2018-06-07 PROCEDURE — G8598 ASA/ANTIPLAT THER USED: HCPCS | Performed by: INTERNAL MEDICINE

## 2018-06-07 PROCEDURE — 1036F TOBACCO NON-USER: CPT | Performed by: INTERNAL MEDICINE

## 2018-06-07 PROCEDURE — 4040F PNEUMOC VAC/ADMIN/RCVD: CPT | Performed by: INTERNAL MEDICINE

## 2018-06-12 ENCOUNTER — HOSPITAL ENCOUNTER (INPATIENT)
Age: 83
LOS: 7 days | Discharge: NURSING, REHAB OR LONG-TERM CARE | DRG: 291 | End: 2018-06-19
Attending: FAMILY MEDICINE | Admitting: INTERNAL MEDICINE
Payer: MEDICARE

## 2018-06-12 ENCOUNTER — APPOINTMENT (OUTPATIENT)
Dept: GENERAL RADIOLOGY | Age: 83
DRG: 291 | End: 2018-06-12
Payer: MEDICARE

## 2018-06-12 DIAGNOSIS — E87.5 HYPERKALEMIA: ICD-10-CM

## 2018-06-12 DIAGNOSIS — N17.9 ACUTE RENAL FAILURE SUPERIMPOSED ON CHRONIC KIDNEY DISEASE, UNSPECIFIED CKD STAGE, UNSPECIFIED ACUTE RENAL FAILURE TYPE (HCC): ICD-10-CM

## 2018-06-12 DIAGNOSIS — N17.9 AKI (ACUTE KIDNEY INJURY) (HCC): ICD-10-CM

## 2018-06-12 DIAGNOSIS — N18.9 ACUTE RENAL FAILURE SUPERIMPOSED ON CHRONIC KIDNEY DISEASE, UNSPECIFIED CKD STAGE, UNSPECIFIED ACUTE RENAL FAILURE TYPE (HCC): ICD-10-CM

## 2018-06-12 DIAGNOSIS — J44.1 COPD EXACERBATION (HCC): Primary | ICD-10-CM

## 2018-06-12 DIAGNOSIS — I50.9 ACUTE ON CHRONIC CONGESTIVE HEART FAILURE, UNSPECIFIED CONGESTIVE HEART FAILURE TYPE: ICD-10-CM

## 2018-06-12 PROBLEM — I50.43 CHF (CONGESTIVE HEART FAILURE), NYHA CLASS I, ACUTE ON CHRONIC, COMBINED (HCC): Status: ACTIVE | Noted: 2018-06-12

## 2018-06-12 LAB
ALBUMIN SERPL-MCNC: 3.3 G/DL (ref 3.5–5.1)
ALP BLD-CCNC: 109 U/L (ref 38–126)
ALT SERPL-CCNC: 12 U/L (ref 11–66)
ANION GAP SERPL CALCULATED.3IONS-SCNC: 15 MEQ/L (ref 8–16)
ANISOCYTOSIS: ABNORMAL
APTT: 51.6 SECONDS (ref 22–38)
AST SERPL-CCNC: 16 U/L (ref 5–40)
BACTERIA: ABNORMAL /HPF
BASE EXCESS MIXED: 1.5 MMOL/L (ref -2–3)
BASOPHILS # BLD: 0.2 %
BASOPHILS ABSOLUTE: 0 THOU/MM3 (ref 0–0.1)
BILIRUB SERPL-MCNC: 0.3 MG/DL (ref 0.3–1.2)
BILIRUBIN URINE: NEGATIVE
BLOOD, URINE: ABNORMAL
BUN BLDV-MCNC: 46 MG/DL (ref 7–22)
CALCIUM SERPL-MCNC: 9.3 MG/DL (ref 8.5–10.5)
CASTS 2: ABNORMAL /LPF
CASTS UA: ABNORMAL /LPF
CHARACTER, URINE: CLEAR
CHLORIDE BLD-SCNC: 102 MEQ/L (ref 98–111)
CO2: 23 MEQ/L (ref 23–33)
COLLECTED BY:: ABNORMAL
COLOR: YELLOW
CREAT SERPL-MCNC: 2.3 MG/DL (ref 0.4–1.2)
CRYSTALS, UA: ABNORMAL
EKG ATRIAL RATE: 89 BPM
EKG P AXIS: 63 DEGREES
EKG P-R INTERVAL: 136 MS
EKG Q-T INTERVAL: 404 MS
EKG QRS DURATION: 148 MS
EKG QTC CALCULATION (BAZETT): 491 MS
EKG R AXIS: -104 DEGREES
EKG T AXIS: 74 DEGREES
EKG VENTRICULAR RATE: 89 BPM
EOSINOPHIL # BLD: 0.4 %
EOSINOPHILS ABSOLUTE: 0 THOU/MM3 (ref 0–0.4)
EPITHELIAL CELLS, UA: ABNORMAL /HPF
GFR SERPL CREATININE-BSD FRML MDRD: 20 ML/MIN/1.73M2
GLUCOSE BLD-MCNC: 194 MG/DL (ref 70–108)
GLUCOSE URINE: NEGATIVE MG/DL
HCO3, MIXED: 28 MMOL/L (ref 23–28)
HCT VFR BLD CALC: 30.8 % (ref 37–47)
HEMOGLOBIN: 9.9 GM/DL (ref 12–16)
INR BLD: 2.03 (ref 0.85–1.13)
KETONES, URINE: NEGATIVE
LACTIC ACID: 0.8 MMOL/L (ref 0.5–2.2)
LEUKOCYTE ESTERASE, URINE: NEGATIVE
LYMPHOCYTES # BLD: 10.4 %
LYMPHOCYTES ABSOLUTE: 1.1 THOU/MM3 (ref 1–4.8)
MCH RBC QN AUTO: 27.1 PG (ref 27–31)
MCHC RBC AUTO-ENTMCNC: 32.1 GM/DL (ref 33–37)
MCV RBC AUTO: 84.4 FL (ref 81–99)
MISCELLANEOUS 2: ABNORMAL
MONOCYTES # BLD: 7.7 %
MONOCYTES ABSOLUTE: 0.8 THOU/MM3 (ref 0.4–1.3)
NITRITE, URINE: NEGATIVE
NUCLEATED RED BLOOD CELLS: 0 /100 WBC
O2 SAT, MIXED: 80 %
OSMOLALITY CALCULATION: 296.6 MOSMOL/KG (ref 275–300)
PCO2, MIXED VENOUS: 54 MMHG (ref 41–51)
PDW BLD-RTO: 18.7 % (ref 11.5–14.5)
PH UA: 5
PH, MIXED: 7.33 (ref 7.31–7.41)
PLATELET # BLD: 213 THOU/MM3 (ref 130–400)
PMV BLD AUTO: 9.3 FL (ref 7.4–10.4)
PO2 MIXED: 48 MMHG (ref 25–40)
POTASSIUM REFLEX MAGNESIUM: 6 MEQ/L (ref 3.5–5.2)
PRO-BNP: 3621 PG/ML (ref 0–1800)
PROTEIN UA: 100
RBC # BLD: 3.65 MILL/MM3 (ref 4.2–5.4)
RBC URINE: ABNORMAL /HPF
RENAL EPITHELIAL, UA: ABNORMAL
SEG NEUTROPHILS: 81.3 %
SEGMENTED NEUTROPHILS ABSOLUTE COUNT: 8.7 THOU/MM3 (ref 1.8–7.7)
SODIUM BLD-SCNC: 140 MEQ/L (ref 135–145)
SPECIFIC GRAVITY, URINE: 1.01 (ref 1–1.03)
TOTAL PROTEIN: 7.1 G/DL (ref 6.1–8)
TROPONIN T: 0.03 NG/ML
TSH SERPL DL<=0.05 MIU/L-ACNC: 4.79 UIU/ML (ref 0.4–4.2)
UROBILINOGEN, URINE: 0.2 EU/DL
WBC # BLD: 10.7 THOU/MM3 (ref 4.8–10.8)
WBC UA: ABNORMAL /HPF
YEAST: ABNORMAL

## 2018-06-12 PROCEDURE — 1200000000 HC SEMI PRIVATE

## 2018-06-12 PROCEDURE — 96374 THER/PROPH/DIAG INJ IV PUSH: CPT

## 2018-06-12 PROCEDURE — 81001 URINALYSIS AUTO W/SCOPE: CPT

## 2018-06-12 PROCEDURE — 6360000002 HC RX W HCPCS: Performed by: FAMILY MEDICINE

## 2018-06-12 PROCEDURE — 71045 X-RAY EXAM CHEST 1 VIEW: CPT

## 2018-06-12 PROCEDURE — 84443 ASSAY THYROID STIM HORMONE: CPT

## 2018-06-12 PROCEDURE — 80053 COMPREHEN METABOLIC PANEL: CPT

## 2018-06-12 PROCEDURE — 94640 AIRWAY INHALATION TREATMENT: CPT

## 2018-06-12 PROCEDURE — 83605 ASSAY OF LACTIC ACID: CPT

## 2018-06-12 PROCEDURE — 96375 TX/PRO/DX INJ NEW DRUG ADDON: CPT

## 2018-06-12 PROCEDURE — 93010 ELECTROCARDIOGRAM REPORT: CPT | Performed by: INTERNAL MEDICINE

## 2018-06-12 PROCEDURE — 83880 ASSAY OF NATRIURETIC PEPTIDE: CPT

## 2018-06-12 PROCEDURE — 85610 PROTHROMBIN TIME: CPT

## 2018-06-12 PROCEDURE — 6370000000 HC RX 637 (ALT 250 FOR IP): Performed by: FAMILY MEDICINE

## 2018-06-12 PROCEDURE — 2580000003 HC RX 258: Performed by: FAMILY MEDICINE

## 2018-06-12 PROCEDURE — 93005 ELECTROCARDIOGRAM TRACING: CPT | Performed by: FAMILY MEDICINE

## 2018-06-12 PROCEDURE — 84484 ASSAY OF TROPONIN QUANT: CPT

## 2018-06-12 PROCEDURE — 99285 EMERGENCY DEPT VISIT HI MDM: CPT

## 2018-06-12 PROCEDURE — 36415 COLL VENOUS BLD VENIPUNCTURE: CPT

## 2018-06-12 PROCEDURE — 85730 THROMBOPLASTIN TIME PARTIAL: CPT

## 2018-06-12 PROCEDURE — 85025 COMPLETE CBC W/AUTO DIFF WBC: CPT

## 2018-06-12 RX ORDER — DEXTROSE MONOHYDRATE 25 G/50ML
25 INJECTION, SOLUTION INTRAVENOUS ONCE
Status: COMPLETED | OUTPATIENT
Start: 2018-06-12 | End: 2018-06-12

## 2018-06-12 RX ORDER — IPRATROPIUM BROMIDE AND ALBUTEROL SULFATE 2.5; .5 MG/3ML; MG/3ML
2 SOLUTION RESPIRATORY (INHALATION) ONCE
Status: COMPLETED | OUTPATIENT
Start: 2018-06-12 | End: 2018-06-12

## 2018-06-12 RX ORDER — FUROSEMIDE 10 MG/ML
80 INJECTION INTRAMUSCULAR; INTRAVENOUS ONCE
Status: COMPLETED | OUTPATIENT
Start: 2018-06-12 | End: 2018-06-12

## 2018-06-12 RX ORDER — METHYLPREDNISOLONE SODIUM SUCCINATE 125 MG/2ML
125 INJECTION, POWDER, LYOPHILIZED, FOR SOLUTION INTRAMUSCULAR; INTRAVENOUS ONCE
Status: COMPLETED | OUTPATIENT
Start: 2018-06-12 | End: 2018-06-12

## 2018-06-12 RX ORDER — SODIUM POLYSTYRENE SULFONATE 15 G/60ML
30 SUSPENSION ORAL; RECTAL ONCE
Status: COMPLETED | OUTPATIENT
Start: 2018-06-12 | End: 2018-06-12

## 2018-06-12 RX ORDER — MORPHINE SULFATE 4 MG/ML
4 INJECTION, SOLUTION INTRAMUSCULAR; INTRAVENOUS
Status: DISCONTINUED | OUTPATIENT
Start: 2018-06-12 | End: 2018-06-13 | Stop reason: HOSPADM

## 2018-06-12 RX ADMIN — FUROSEMIDE 80 MG: 10 INJECTION, SOLUTION INTRAMUSCULAR; INTRAVENOUS at 21:19

## 2018-06-12 RX ADMIN — METHYLPREDNISOLONE SODIUM SUCCINATE 125 MG: 125 INJECTION, POWDER, FOR SOLUTION INTRAMUSCULAR; INTRAVENOUS at 21:18

## 2018-06-12 RX ADMIN — IPRATROPIUM BROMIDE AND ALBUTEROL SULFATE 2 AMPULE: .5; 3 SOLUTION RESPIRATORY (INHALATION) at 21:21

## 2018-06-12 RX ADMIN — SODIUM POLYSTYRENE SULFONATE 30 G: 15 SUSPENSION ORAL; RECTAL at 23:34

## 2018-06-12 RX ADMIN — DEXTROSE MONOHYDRATE 25 G: 25 INJECTION, SOLUTION INTRAVENOUS at 23:15

## 2018-06-12 RX ADMIN — INSULIN HUMAN 10 UNITS: 100 INJECTION, SOLUTION PARENTERAL at 23:16

## 2018-06-12 ASSESSMENT — ENCOUNTER SYMPTOMS
SINUS PRESSURE: 0
VOMITING: 0
VOICE CHANGE: 0
ABDOMINAL DISTENTION: 0
NAUSEA: 0
SORE THROAT: 0
CHOKING: 0
TROUBLE SWALLOWING: 0
BACK PAIN: 0
FACIAL SWELLING: 0
BLOOD IN STOOL: 0
RHINORRHEA: 0
COLOR CHANGE: 0
WHEEZING: 1
ABDOMINAL PAIN: 0
COUGH: 1
CONSTIPATION: 0
EYE DISCHARGE: 0
DIARRHEA: 0

## 2018-06-13 ENCOUNTER — APPOINTMENT (OUTPATIENT)
Dept: GENERAL RADIOLOGY | Age: 83
DRG: 291 | End: 2018-06-13
Payer: MEDICARE

## 2018-06-13 LAB
ANION GAP SERPL CALCULATED.3IONS-SCNC: 14 MEQ/L (ref 8–16)
ANISOCYTOSIS: ABNORMAL
AVERAGE GLUCOSE: 96 MG/DL (ref 70–126)
BASOPHILS # BLD: 0.1 %
BASOPHILS ABSOLUTE: 0 THOU/MM3 (ref 0–0.1)
BUN BLDV-MCNC: 50 MG/DL (ref 7–22)
CALCIUM SERPL-MCNC: 9.2 MG/DL (ref 8.5–10.5)
CHLORIDE BLD-SCNC: 103 MEQ/L (ref 98–111)
CO2: 23 MEQ/L (ref 23–33)
CREAT SERPL-MCNC: 2.4 MG/DL (ref 0.4–1.2)
EOSINOPHIL # BLD: 0 %
EOSINOPHILS ABSOLUTE: 0 THOU/MM3 (ref 0–0.4)
GFR SERPL CREATININE-BSD FRML MDRD: 19 ML/MIN/1.73M2
GLUCOSE BLD-MCNC: 230 MG/DL (ref 70–108)
GLUCOSE BLD-MCNC: 231 MG/DL (ref 70–108)
GLUCOSE BLD-MCNC: 236 MG/DL (ref 70–108)
GLUCOSE BLD-MCNC: 247 MG/DL (ref 70–108)
GLUCOSE BLD-MCNC: 309 MG/DL (ref 70–108)
GLUCOSE BLD-MCNC: 340 MG/DL (ref 70–108)
HBA1C MFR BLD: 5.2 % (ref 4.4–6.4)
HCT VFR BLD CALC: 30.2 % (ref 37–47)
HEMOGLOBIN: 9.6 GM/DL (ref 12–16)
HYPOCHROMIA: ABNORMAL
LV EF: 58 %
LVEF MODALITY: NORMAL
LYMPHOCYTES # BLD: 8.5 %
LYMPHOCYTES ABSOLUTE: 0.5 THOU/MM3 (ref 1–4.8)
MAGNESIUM: 2.1 MG/DL (ref 1.6–2.4)
MCH RBC QN AUTO: 26.7 PG (ref 27–31)
MCHC RBC AUTO-ENTMCNC: 31.7 GM/DL (ref 33–37)
MCV RBC AUTO: 84.4 FL (ref 81–99)
MONOCYTES # BLD: 1.2 %
MONOCYTES ABSOLUTE: 0.1 THOU/MM3 (ref 0.4–1.3)
NUCLEATED RED BLOOD CELLS: 0 /100 WBC
PDW BLD-RTO: 18.2 % (ref 11.5–14.5)
PLATELET # BLD: 182 THOU/MM3 (ref 130–400)
PMV BLD AUTO: 9.2 FL (ref 7.4–10.4)
POTASSIUM SERPL-SCNC: 5.6 MEQ/L (ref 3.5–5.2)
PRO-BNP: 4253 PG/ML (ref 0–1800)
RBC # BLD: 3.58 MILL/MM3 (ref 4.2–5.4)
SEG NEUTROPHILS: 90.2 %
SEGMENTED NEUTROPHILS ABSOLUTE COUNT: 5.3 THOU/MM3 (ref 1.8–7.7)
SODIUM BLD-SCNC: 140 MEQ/L (ref 135–145)
T4 FREE: 1 NG/DL (ref 0.93–1.76)
TROPONIN T: 0.01 NG/ML
TROPONIN T: < 0.01 NG/ML
TSH SERPL DL<=0.05 MIU/L-ACNC: 2.65 UIU/ML (ref 0.4–4.2)
TSH SERPL DL<=0.05 MIU/L-ACNC: 3.42 UIU/ML (ref 0.4–4.2)
WBC # BLD: 5.9 THOU/MM3 (ref 4.8–10.8)

## 2018-06-13 PROCEDURE — 84484 ASSAY OF TROPONIN QUANT: CPT

## 2018-06-13 PROCEDURE — 1200000000 HC SEMI PRIVATE

## 2018-06-13 PROCEDURE — 84443 ASSAY THYROID STIM HORMONE: CPT

## 2018-06-13 PROCEDURE — 85025 COMPLETE CBC W/AUTO DIFF WBC: CPT

## 2018-06-13 PROCEDURE — 83036 HEMOGLOBIN GLYCOSYLATED A1C: CPT

## 2018-06-13 PROCEDURE — 93306 TTE W/DOPPLER COMPLETE: CPT

## 2018-06-13 PROCEDURE — 2700000000 HC OXYGEN THERAPY PER DAY

## 2018-06-13 PROCEDURE — 6370000000 HC RX 637 (ALT 250 FOR IP): Performed by: INTERNAL MEDICINE

## 2018-06-13 PROCEDURE — 99223 1ST HOSP IP/OBS HIGH 75: CPT | Performed by: INTERNAL MEDICINE

## 2018-06-13 PROCEDURE — 84439 ASSAY OF FREE THYROXINE: CPT

## 2018-06-13 PROCEDURE — 83735 ASSAY OF MAGNESIUM: CPT

## 2018-06-13 PROCEDURE — 80048 BASIC METABOLIC PNL TOTAL CA: CPT

## 2018-06-13 PROCEDURE — 94640 AIRWAY INHALATION TREATMENT: CPT

## 2018-06-13 PROCEDURE — 2500000003 HC RX 250 WO HCPCS: Performed by: INTERNAL MEDICINE

## 2018-06-13 PROCEDURE — 36415 COLL VENOUS BLD VENIPUNCTURE: CPT

## 2018-06-13 PROCEDURE — 82948 REAGENT STRIP/BLOOD GLUCOSE: CPT

## 2018-06-13 PROCEDURE — 2580000003 HC RX 258: Performed by: INTERNAL MEDICINE

## 2018-06-13 PROCEDURE — 83880 ASSAY OF NATRIURETIC PEPTIDE: CPT

## 2018-06-13 RX ORDER — CLOPIDOGREL BISULFATE 75 MG/1
75 TABLET ORAL DAILY
Status: DISCONTINUED | OUTPATIENT
Start: 2018-06-13 | End: 2018-06-19 | Stop reason: HOSPADM

## 2018-06-13 RX ORDER — GUAIFENESIN 100 MG/5ML
200 SYRUP ORAL EVERY 4 HOURS PRN
Status: ON HOLD | COMMUNITY
End: 2020-01-01 | Stop reason: HOSPADM

## 2018-06-13 RX ORDER — INSULIN GLARGINE 100 [IU]/ML
22 INJECTION, SOLUTION SUBCUTANEOUS NIGHTLY
Status: DISCONTINUED | OUTPATIENT
Start: 2018-06-13 | End: 2018-06-19 | Stop reason: HOSPADM

## 2018-06-13 RX ORDER — METOPROLOL TARTRATE 50 MG/1
50 TABLET, FILM COATED ORAL 2 TIMES DAILY
Status: ON HOLD | COMMUNITY
End: 2018-06-19 | Stop reason: HOSPADM

## 2018-06-13 RX ORDER — ISOSORBIDE MONONITRATE 60 MG/1
60 TABLET, EXTENDED RELEASE ORAL DAILY
Status: DISCONTINUED | OUTPATIENT
Start: 2018-06-13 | End: 2018-06-19 | Stop reason: HOSPADM

## 2018-06-13 RX ORDER — ACETAMINOPHEN 325 MG/1
650 TABLET ORAL EVERY 4 HOURS PRN
Status: DISCONTINUED | OUTPATIENT
Start: 2018-06-13 | End: 2018-06-19 | Stop reason: HOSPADM

## 2018-06-13 RX ORDER — DOCUSATE SODIUM 100 MG/1
200 CAPSULE, LIQUID FILLED ORAL DAILY
Status: DISCONTINUED | OUTPATIENT
Start: 2018-06-13 | End: 2018-06-19 | Stop reason: HOSPADM

## 2018-06-13 RX ORDER — DEXTROSE MONOHYDRATE 25 G/50ML
12.5 INJECTION, SOLUTION INTRAVENOUS PRN
Status: DISCONTINUED | OUTPATIENT
Start: 2018-06-13 | End: 2018-06-19 | Stop reason: HOSPADM

## 2018-06-13 RX ORDER — INSULIN GLARGINE 100 [IU]/ML
35 INJECTION, SOLUTION SUBCUTANEOUS NIGHTLY
Status: DISCONTINUED | OUTPATIENT
Start: 2018-06-13 | End: 2018-06-13

## 2018-06-13 RX ORDER — BUMETANIDE 0.25 MG/ML
1 INJECTION, SOLUTION INTRAMUSCULAR; INTRAVENOUS ONCE
Status: COMPLETED | OUTPATIENT
Start: 2018-06-13 | End: 2018-06-13

## 2018-06-13 RX ORDER — IPRATROPIUM BROMIDE AND ALBUTEROL SULFATE 2.5; .5 MG/3ML; MG/3ML
1 SOLUTION RESPIRATORY (INHALATION)
Status: DISCONTINUED | OUTPATIENT
Start: 2018-06-13 | End: 2018-06-19 | Stop reason: HOSPADM

## 2018-06-13 RX ORDER — PANTOPRAZOLE SODIUM 40 MG/1
40 TABLET, DELAYED RELEASE ORAL
Status: DISCONTINUED | OUTPATIENT
Start: 2018-06-13 | End: 2018-06-17

## 2018-06-13 RX ORDER — LANOLIN ALCOHOL/MO/W.PET/CERES
1000 CREAM (GRAM) TOPICAL DAILY
Status: DISCONTINUED | OUTPATIENT
Start: 2018-06-13 | End: 2018-06-19 | Stop reason: HOSPADM

## 2018-06-13 RX ORDER — M-VIT,TX,IRON,MINS/CALC/FOLIC 27MG-0.4MG
1 TABLET ORAL DAILY
COMMUNITY
End: 2018-06-26 | Stop reason: ALTCHOICE

## 2018-06-13 RX ORDER — DEXTROSE MONOHYDRATE 50 MG/ML
100 INJECTION, SOLUTION INTRAVENOUS PRN
Status: DISCONTINUED | OUTPATIENT
Start: 2018-06-13 | End: 2018-06-19 | Stop reason: HOSPADM

## 2018-06-13 RX ORDER — WARFARIN SODIUM 2.5 MG/1
2.5 TABLET ORAL DAILY
Status: DISCONTINUED | OUTPATIENT
Start: 2018-06-13 | End: 2018-06-13 | Stop reason: ALTCHOICE

## 2018-06-13 RX ORDER — ERGOCALCIFEROL 1.25 MG/1
50000 CAPSULE ORAL
Status: DISCONTINUED | OUTPATIENT
Start: 2018-06-14 | End: 2018-06-19 | Stop reason: HOSPADM

## 2018-06-13 RX ORDER — SODIUM CHLORIDE 0.9 % (FLUSH) 0.9 %
10 SYRINGE (ML) INJECTION 2 TIMES DAILY
Status: DISCONTINUED | OUTPATIENT
Start: 2018-06-13 | End: 2018-06-19 | Stop reason: HOSPADM

## 2018-06-13 RX ORDER — WARFARIN SODIUM 3 MG/1
3 TABLET ORAL DAILY
COMMUNITY
End: 2018-06-26 | Stop reason: ALTCHOICE

## 2018-06-13 RX ORDER — INSULIN GLARGINE 100 [IU]/ML
16 INJECTION, SOLUTION SUBCUTANEOUS NIGHTLY
COMMUNITY
End: 2019-01-01

## 2018-06-13 RX ORDER — LANOLIN ALCOHOL/MO/W.PET/CERES
1000 CREAM (GRAM) TOPICAL DAILY
COMMUNITY
End: 2018-06-26 | Stop reason: ALTCHOICE

## 2018-06-13 RX ORDER — PROMETHAZINE HYDROCHLORIDE 25 MG/1
25 TABLET ORAL EVERY 4 HOURS PRN
Status: DISCONTINUED | OUTPATIENT
Start: 2018-06-13 | End: 2018-06-19 | Stop reason: HOSPADM

## 2018-06-13 RX ORDER — ONDANSETRON 4 MG/1
4 TABLET, FILM COATED ORAL 2 TIMES DAILY PRN
COMMUNITY
End: 2018-06-26 | Stop reason: ALTCHOICE

## 2018-06-13 RX ORDER — BUMETANIDE 1 MG/1
1 TABLET ORAL 2 TIMES DAILY
Status: DISCONTINUED | OUTPATIENT
Start: 2018-06-13 | End: 2018-06-14

## 2018-06-13 RX ORDER — ONDANSETRON 4 MG/1
4 TABLET, ORALLY DISINTEGRATING ORAL EVERY 8 HOURS PRN
Status: DISCONTINUED | OUTPATIENT
Start: 2018-06-13 | End: 2018-06-19 | Stop reason: HOSPADM

## 2018-06-13 RX ORDER — NICOTINE POLACRILEX 4 MG
15 LOZENGE BUCCAL PRN
Status: DISCONTINUED | OUTPATIENT
Start: 2018-06-13 | End: 2018-06-19 | Stop reason: HOSPADM

## 2018-06-13 RX ORDER — HYDRALAZINE HYDROCHLORIDE 10 MG/1
10 TABLET, FILM COATED ORAL 3 TIMES DAILY
Status: DISCONTINUED | OUTPATIENT
Start: 2018-06-13 | End: 2018-06-14

## 2018-06-13 RX ORDER — WARFARIN SODIUM 3 MG/1
3 TABLET ORAL ONCE
Status: COMPLETED | OUTPATIENT
Start: 2018-06-13 | End: 2018-06-13

## 2018-06-13 RX ADMIN — BUMETANIDE 1 MG: 1 TABLET ORAL at 08:35

## 2018-06-13 RX ADMIN — INSULIN LISPRO 12 UNITS: 100 INJECTION, SOLUTION INTRAVENOUS; SUBCUTANEOUS at 17:16

## 2018-06-13 RX ADMIN — Medication 3 UNITS: at 21:31

## 2018-06-13 RX ADMIN — Medication 1000 MCG: at 08:35

## 2018-06-13 RX ADMIN — PANTOPRAZOLE SODIUM 40 MG: 40 TABLET, DELAYED RELEASE ORAL at 15:57

## 2018-06-13 RX ADMIN — CLOPIDOGREL BISULFATE 75 MG: 75 TABLET ORAL at 08:37

## 2018-06-13 RX ADMIN — METOPROLOL TARTRATE 25 MG: 25 TABLET ORAL at 08:35

## 2018-06-13 RX ADMIN — METOPROLOL TARTRATE 25 MG: 25 TABLET ORAL at 20:11

## 2018-06-13 RX ADMIN — HYDRALAZINE HYDROCHLORIDE 10 MG: 10 TABLET, FILM COATED ORAL at 08:35

## 2018-06-13 RX ADMIN — Medication 10 ML: at 20:12

## 2018-06-13 RX ADMIN — IPRATROPIUM BROMIDE AND ALBUTEROL SULFATE 1 AMPULE: .5; 3 SOLUTION RESPIRATORY (INHALATION) at 18:09

## 2018-06-13 RX ADMIN — PANTOPRAZOLE SODIUM 40 MG: 40 TABLET, DELAYED RELEASE ORAL at 05:38

## 2018-06-13 RX ADMIN — WARFARIN SODIUM 3 MG: 3 TABLET ORAL at 17:16

## 2018-06-13 RX ADMIN — INSULIN GLARGINE 22 UNITS: 100 INJECTION, SOLUTION SUBCUTANEOUS at 21:30

## 2018-06-13 RX ADMIN — INSULIN LISPRO 6 UNITS: 100 INJECTION, SOLUTION INTRAVENOUS; SUBCUTANEOUS at 08:36

## 2018-06-13 RX ADMIN — DOCUSATE SODIUM 200 MG: 100 CAPSULE, LIQUID FILLED ORAL at 08:37

## 2018-06-13 RX ADMIN — IPRATROPIUM BROMIDE AND ALBUTEROL SULFATE 1 AMPULE: .5; 3 SOLUTION RESPIRATORY (INHALATION) at 21:06

## 2018-06-13 RX ADMIN — INSULIN LISPRO 12 UNITS: 100 INJECTION, SOLUTION INTRAVENOUS; SUBCUTANEOUS at 11:54

## 2018-06-13 RX ADMIN — BUMETANIDE 1 MG: 1 TABLET ORAL at 20:11

## 2018-06-13 RX ADMIN — ISOSORBIDE MONONITRATE 60 MG: 60 TABLET ORAL at 08:35

## 2018-06-13 RX ADMIN — IPRATROPIUM BROMIDE AND ALBUTEROL SULFATE 1 AMPULE: .5; 3 SOLUTION RESPIRATORY (INHALATION) at 08:21

## 2018-06-13 RX ADMIN — BUMETANIDE 1 MG: 0.25 INJECTION INTRAMUSCULAR; INTRAVENOUS at 09:58

## 2018-06-13 RX ADMIN — IPRATROPIUM BROMIDE AND ALBUTEROL SULFATE 1 AMPULE: .5; 3 SOLUTION RESPIRATORY (INHALATION) at 12:28

## 2018-06-13 RX ADMIN — HYDRALAZINE HYDROCHLORIDE 10 MG: 10 TABLET, FILM COATED ORAL at 15:55

## 2018-06-13 RX ADMIN — HYDRALAZINE HYDROCHLORIDE 10 MG: 10 TABLET, FILM COATED ORAL at 20:11

## 2018-06-13 ASSESSMENT — PAIN SCALES - GENERAL
PAINLEVEL_OUTOF10: 0

## 2018-06-14 ENCOUNTER — TELEPHONE (OUTPATIENT)
Dept: CARDIOLOGY CLINIC | Age: 83
End: 2018-06-14

## 2018-06-14 LAB
ANION GAP SERPL CALCULATED.3IONS-SCNC: 14 MEQ/L (ref 8–16)
BUN BLDV-MCNC: 61 MG/DL (ref 7–22)
CALCIUM SERPL-MCNC: 9.1 MG/DL (ref 8.5–10.5)
CHLORIDE BLD-SCNC: 99 MEQ/L (ref 98–111)
CO2: 26 MEQ/L (ref 23–33)
CREAT SERPL-MCNC: 2.3 MG/DL (ref 0.4–1.2)
GFR SERPL CREATININE-BSD FRML MDRD: 20 ML/MIN/1.73M2
GLUCOSE BLD-MCNC: 108 MG/DL (ref 70–108)
GLUCOSE BLD-MCNC: 123 MG/DL (ref 70–108)
GLUCOSE BLD-MCNC: 142 MG/DL (ref 70–108)
GLUCOSE BLD-MCNC: 237 MG/DL (ref 70–108)
GLUCOSE BLD-MCNC: 254 MG/DL (ref 70–108)
INR BLD: 1.55 (ref 0.85–1.13)
MAGNESIUM: 2 MG/DL (ref 1.6–2.4)
POTASSIUM SERPL-SCNC: 4.8 MEQ/L (ref 3.5–5.2)
SODIUM BLD-SCNC: 139 MEQ/L (ref 135–145)

## 2018-06-14 PROCEDURE — 2700000000 HC OXYGEN THERAPY PER DAY

## 2018-06-14 PROCEDURE — 36415 COLL VENOUS BLD VENIPUNCTURE: CPT

## 2018-06-14 PROCEDURE — 85610 PROTHROMBIN TIME: CPT

## 2018-06-14 PROCEDURE — 6370000000 HC RX 637 (ALT 250 FOR IP): Performed by: INTERNAL MEDICINE

## 2018-06-14 PROCEDURE — 80048 BASIC METABOLIC PNL TOTAL CA: CPT

## 2018-06-14 PROCEDURE — 6370000000 HC RX 637 (ALT 250 FOR IP): Performed by: PHARMACIST

## 2018-06-14 PROCEDURE — 99231 SBSQ HOSP IP/OBS SF/LOW 25: CPT | Performed by: NURSE PRACTITIONER

## 2018-06-14 PROCEDURE — 2580000003 HC RX 258: Performed by: INTERNAL MEDICINE

## 2018-06-14 PROCEDURE — 94640 AIRWAY INHALATION TREATMENT: CPT

## 2018-06-14 PROCEDURE — 99222 1ST HOSP IP/OBS MODERATE 55: CPT | Performed by: INTERNAL MEDICINE

## 2018-06-14 PROCEDURE — 82948 REAGENT STRIP/BLOOD GLUCOSE: CPT

## 2018-06-14 PROCEDURE — 6360000002 HC RX W HCPCS: Performed by: INTERNAL MEDICINE

## 2018-06-14 PROCEDURE — 6370000000 HC RX 637 (ALT 250 FOR IP): Performed by: NURSE PRACTITIONER

## 2018-06-14 PROCEDURE — 1200000000 HC SEMI PRIVATE

## 2018-06-14 PROCEDURE — 83735 ASSAY OF MAGNESIUM: CPT

## 2018-06-14 RX ORDER — WARFARIN SODIUM 5 MG/1
5 TABLET ORAL
Status: COMPLETED | OUTPATIENT
Start: 2018-06-14 | End: 2018-06-14

## 2018-06-14 RX ORDER — HYDRALAZINE HYDROCHLORIDE 25 MG/1
25 TABLET, FILM COATED ORAL 3 TIMES DAILY
Status: DISCONTINUED | OUTPATIENT
Start: 2018-06-14 | End: 2018-06-15

## 2018-06-14 RX ORDER — FUROSEMIDE 10 MG/ML
80 INJECTION INTRAMUSCULAR; INTRAVENOUS EVERY 8 HOURS
Status: DISCONTINUED | OUTPATIENT
Start: 2018-06-14 | End: 2018-06-15

## 2018-06-14 RX ADMIN — HYDRALAZINE HYDROCHLORIDE 25 MG: 25 TABLET, FILM COATED ORAL at 21:46

## 2018-06-14 RX ADMIN — ERGOCALCIFEROL 50000 UNITS: 1.25 CAPSULE ORAL at 08:48

## 2018-06-14 RX ADMIN — Medication 1000 MCG: at 08:48

## 2018-06-14 RX ADMIN — INSULIN GLARGINE 22 UNITS: 100 INJECTION, SOLUTION SUBCUTANEOUS at 21:46

## 2018-06-14 RX ADMIN — IPRATROPIUM BROMIDE AND ALBUTEROL SULFATE 1 AMPULE: .5; 3 SOLUTION RESPIRATORY (INHALATION) at 08:10

## 2018-06-14 RX ADMIN — PANTOPRAZOLE SODIUM 40 MG: 40 TABLET, DELAYED RELEASE ORAL at 16:06

## 2018-06-14 RX ADMIN — IPRATROPIUM BROMIDE AND ALBUTEROL SULFATE 1 AMPULE: .5; 3 SOLUTION RESPIRATORY (INHALATION) at 11:55

## 2018-06-14 RX ADMIN — FUROSEMIDE 80 MG: 10 INJECTION, SOLUTION INTRAMUSCULAR; INTRAVENOUS at 21:46

## 2018-06-14 RX ADMIN — HYDRALAZINE HYDROCHLORIDE 10 MG: 10 TABLET, FILM COATED ORAL at 08:48

## 2018-06-14 RX ADMIN — Medication 10 ML: at 21:47

## 2018-06-14 RX ADMIN — METOPROLOL TARTRATE 25 MG: 25 TABLET ORAL at 21:46

## 2018-06-14 RX ADMIN — Medication 10 ML: at 08:49

## 2018-06-14 RX ADMIN — METOPROLOL TARTRATE 25 MG: 25 TABLET ORAL at 08:48

## 2018-06-14 RX ADMIN — ISOSORBIDE MONONITRATE 60 MG: 60 TABLET ORAL at 08:48

## 2018-06-14 RX ADMIN — INSULIN LISPRO 9 UNITS: 100 INJECTION, SOLUTION INTRAVENOUS; SUBCUTANEOUS at 17:56

## 2018-06-14 RX ADMIN — BUMETANIDE 1 MG: 1 TABLET ORAL at 08:48

## 2018-06-14 RX ADMIN — DOCUSATE SODIUM 200 MG: 100 CAPSULE, LIQUID FILLED ORAL at 08:48

## 2018-06-14 RX ADMIN — WARFARIN SODIUM 5 MG: 5 TABLET ORAL at 16:09

## 2018-06-14 RX ADMIN — Medication 2 UNITS: at 21:47

## 2018-06-14 RX ADMIN — IPRATROPIUM BROMIDE AND ALBUTEROL SULFATE 1 AMPULE: .5; 3 SOLUTION RESPIRATORY (INHALATION) at 17:06

## 2018-06-14 RX ADMIN — PANTOPRAZOLE SODIUM 40 MG: 40 TABLET, DELAYED RELEASE ORAL at 06:39

## 2018-06-14 RX ADMIN — IPRATROPIUM BROMIDE AND ALBUTEROL SULFATE 1 AMPULE: .5; 3 SOLUTION RESPIRATORY (INHALATION) at 21:05

## 2018-06-14 RX ADMIN — CLOPIDOGREL BISULFATE 75 MG: 75 TABLET ORAL at 08:48

## 2018-06-14 ASSESSMENT — PAIN SCALES - GENERAL
PAINLEVEL_OUTOF10: 0

## 2018-06-15 ENCOUNTER — TELEPHONE (OUTPATIENT)
Dept: CARDIOLOGY CLINIC | Age: 83
End: 2018-06-15

## 2018-06-15 LAB
ANION GAP SERPL CALCULATED.3IONS-SCNC: 15 MEQ/L (ref 8–16)
BUN BLDV-MCNC: 70 MG/DL (ref 7–22)
CALCIUM SERPL-MCNC: 9.6 MG/DL (ref 8.5–10.5)
CHLORIDE BLD-SCNC: 99 MEQ/L (ref 98–111)
CO2: 26 MEQ/L (ref 23–33)
CREAT SERPL-MCNC: 2.3 MG/DL (ref 0.4–1.2)
GFR SERPL CREATININE-BSD FRML MDRD: 20 ML/MIN/1.73M2
GLUCOSE BLD-MCNC: 102 MG/DL (ref 70–108)
GLUCOSE BLD-MCNC: 107 MG/DL (ref 70–108)
GLUCOSE BLD-MCNC: 177 MG/DL (ref 70–108)
GLUCOSE BLD-MCNC: 198 MG/DL (ref 70–108)
GLUCOSE BLD-MCNC: 200 MG/DL (ref 70–108)
INR BLD: 1.58 (ref 0.85–1.13)
MAGNESIUM: 2.1 MG/DL (ref 1.6–2.4)
POTASSIUM SERPL-SCNC: 4.8 MEQ/L (ref 3.5–5.2)
PRO-BNP: 3978 PG/ML (ref 0–1800)
SODIUM BLD-SCNC: 140 MEQ/L (ref 135–145)

## 2018-06-15 PROCEDURE — 2500000003 HC RX 250 WO HCPCS: Performed by: INTERNAL MEDICINE

## 2018-06-15 PROCEDURE — 36415 COLL VENOUS BLD VENIPUNCTURE: CPT

## 2018-06-15 PROCEDURE — 85610 PROTHROMBIN TIME: CPT

## 2018-06-15 PROCEDURE — 83735 ASSAY OF MAGNESIUM: CPT

## 2018-06-15 PROCEDURE — 99233 SBSQ HOSP IP/OBS HIGH 50: CPT | Performed by: INTERNAL MEDICINE

## 2018-06-15 PROCEDURE — 94640 AIRWAY INHALATION TREATMENT: CPT

## 2018-06-15 PROCEDURE — 2580000003 HC RX 258: Performed by: INTERNAL MEDICINE

## 2018-06-15 PROCEDURE — 6370000000 HC RX 637 (ALT 250 FOR IP): Performed by: INTERNAL MEDICINE

## 2018-06-15 PROCEDURE — 6360000002 HC RX W HCPCS: Performed by: INTERNAL MEDICINE

## 2018-06-15 PROCEDURE — 94760 N-INVAS EAR/PLS OXIMETRY 1: CPT

## 2018-06-15 PROCEDURE — 6370000000 HC RX 637 (ALT 250 FOR IP): Performed by: NURSE PRACTITIONER

## 2018-06-15 PROCEDURE — 83880 ASSAY OF NATRIURETIC PEPTIDE: CPT

## 2018-06-15 PROCEDURE — 82948 REAGENT STRIP/BLOOD GLUCOSE: CPT

## 2018-06-15 PROCEDURE — 80048 BASIC METABOLIC PNL TOTAL CA: CPT

## 2018-06-15 PROCEDURE — 1200000000 HC SEMI PRIVATE

## 2018-06-15 RX ORDER — WARFARIN SODIUM 3 MG/1
6 TABLET ORAL
Status: COMPLETED | OUTPATIENT
Start: 2018-06-15 | End: 2018-06-15

## 2018-06-15 RX ORDER — HYDRALAZINE HYDROCHLORIDE 50 MG/1
50 TABLET, FILM COATED ORAL 3 TIMES DAILY
Status: DISCONTINUED | OUTPATIENT
Start: 2018-06-15 | End: 2018-06-17

## 2018-06-15 RX ORDER — METOPROLOL SUCCINATE 25 MG/1
25 TABLET, EXTENDED RELEASE ORAL DAILY
Status: DISCONTINUED | OUTPATIENT
Start: 2018-06-15 | End: 2018-06-16

## 2018-06-15 RX ORDER — ATORVASTATIN CALCIUM 10 MG/1
10 TABLET, FILM COATED ORAL NIGHTLY
Status: DISCONTINUED | OUTPATIENT
Start: 2018-06-15 | End: 2018-06-19 | Stop reason: HOSPADM

## 2018-06-15 RX ORDER — FUROSEMIDE 10 MG/ML
80 INJECTION INTRAMUSCULAR; INTRAVENOUS ONCE
Status: COMPLETED | OUTPATIENT
Start: 2018-06-15 | End: 2018-06-15

## 2018-06-15 RX ADMIN — ATORVASTATIN CALCIUM 10 MG: 10 TABLET, FILM COATED ORAL at 21:40

## 2018-06-15 RX ADMIN — INSULIN GLARGINE 22 UNITS: 100 INJECTION, SOLUTION SUBCUTANEOUS at 21:40

## 2018-06-15 RX ADMIN — PANTOPRAZOLE SODIUM 40 MG: 40 TABLET, DELAYED RELEASE ORAL at 06:25

## 2018-06-15 RX ADMIN — INSULIN LISPRO 3 UNITS: 100 INJECTION, SOLUTION INTRAVENOUS; SUBCUTANEOUS at 12:42

## 2018-06-15 RX ADMIN — IPRATROPIUM BROMIDE AND ALBUTEROL SULFATE 1 AMPULE: .5; 3 SOLUTION RESPIRATORY (INHALATION) at 19:16

## 2018-06-15 RX ADMIN — CLOPIDOGREL BISULFATE 75 MG: 75 TABLET ORAL at 09:19

## 2018-06-15 RX ADMIN — INSULIN LISPRO 3 UNITS: 100 INJECTION, SOLUTION INTRAVENOUS; SUBCUTANEOUS at 16:42

## 2018-06-15 RX ADMIN — Medication 3 UNITS: at 21:40

## 2018-06-15 RX ADMIN — HYDRALAZINE HYDROCHLORIDE 50 MG: 50 TABLET, FILM COATED ORAL at 12:42

## 2018-06-15 RX ADMIN — METOPROLOL SUCCINATE 25 MG: 25 TABLET, FILM COATED, EXTENDED RELEASE ORAL at 16:42

## 2018-06-15 RX ADMIN — Medication 10 ML: at 21:40

## 2018-06-15 RX ADMIN — Medication 1000 MCG: at 12:42

## 2018-06-15 RX ADMIN — HYDRALAZINE HYDROCHLORIDE 50 MG: 50 TABLET, FILM COATED ORAL at 21:40

## 2018-06-15 RX ADMIN — IPRATROPIUM BROMIDE AND ALBUTEROL SULFATE 1 AMPULE: .5; 3 SOLUTION RESPIRATORY (INHALATION) at 12:33

## 2018-06-15 RX ADMIN — PANTOPRAZOLE SODIUM 40 MG: 40 TABLET, DELAYED RELEASE ORAL at 16:42

## 2018-06-15 RX ADMIN — FUROSEMIDE 80 MG: 10 INJECTION, SOLUTION INTRAMUSCULAR; INTRAVENOUS at 09:14

## 2018-06-15 RX ADMIN — METOPROLOL TARTRATE 25 MG: 25 TABLET ORAL at 09:19

## 2018-06-15 RX ADMIN — Medication 10 ML: at 09:14

## 2018-06-15 RX ADMIN — DOCUSATE SODIUM 200 MG: 100 CAPSULE, LIQUID FILLED ORAL at 09:19

## 2018-06-15 RX ADMIN — IPRATROPIUM BROMIDE AND ALBUTEROL SULFATE 1 AMPULE: .5; 3 SOLUTION RESPIRATORY (INHALATION) at 09:07

## 2018-06-15 RX ADMIN — FUROSEMIDE 80 MG: 10 INJECTION, SOLUTION INTRAMUSCULAR; INTRAVENOUS at 06:25

## 2018-06-15 RX ADMIN — WARFARIN SODIUM 6 MG: 3 TABLET ORAL at 16:42

## 2018-06-15 RX ADMIN — BUMETANIDE 0.5 MG/HR: 0.25 INJECTION INTRAMUSCULAR; INTRAVENOUS at 09:14

## 2018-06-15 RX ADMIN — ISOSORBIDE MONONITRATE 60 MG: 60 TABLET ORAL at 12:42

## 2018-06-16 LAB
ANION GAP SERPL CALCULATED.3IONS-SCNC: 15 MEQ/L (ref 8–16)
BUN BLDV-MCNC: 76 MG/DL (ref 7–22)
CALCIUM SERPL-MCNC: 9.3 MG/DL (ref 8.5–10.5)
CHLORIDE BLD-SCNC: 97 MEQ/L (ref 98–111)
CO2: 24 MEQ/L (ref 23–33)
CREAT SERPL-MCNC: 2.1 MG/DL (ref 0.4–1.2)
GFR SERPL CREATININE-BSD FRML MDRD: 22 ML/MIN/1.73M2
GLUCOSE BLD-MCNC: 118 MG/DL (ref 70–108)
GLUCOSE BLD-MCNC: 125 MG/DL (ref 70–108)
GLUCOSE BLD-MCNC: 153 MG/DL (ref 70–108)
GLUCOSE BLD-MCNC: 172 MG/DL (ref 70–108)
GLUCOSE BLD-MCNC: 176 MG/DL (ref 70–108)
INR BLD: 2.33 (ref 0.85–1.13)
MAGNESIUM: 2 MG/DL (ref 1.6–2.4)
POTASSIUM SERPL-SCNC: 4.3 MEQ/L (ref 3.5–5.2)
SODIUM BLD-SCNC: 136 MEQ/L (ref 135–145)

## 2018-06-16 PROCEDURE — 36415 COLL VENOUS BLD VENIPUNCTURE: CPT

## 2018-06-16 PROCEDURE — 6370000000 HC RX 637 (ALT 250 FOR IP): Performed by: INTERNAL MEDICINE

## 2018-06-16 PROCEDURE — 83735 ASSAY OF MAGNESIUM: CPT

## 2018-06-16 PROCEDURE — 85610 PROTHROMBIN TIME: CPT

## 2018-06-16 PROCEDURE — 6370000000 HC RX 637 (ALT 250 FOR IP): Performed by: NURSE PRACTITIONER

## 2018-06-16 PROCEDURE — 82948 REAGENT STRIP/BLOOD GLUCOSE: CPT

## 2018-06-16 PROCEDURE — 99232 SBSQ HOSP IP/OBS MODERATE 35: CPT | Performed by: INTERNAL MEDICINE

## 2018-06-16 PROCEDURE — 99231 SBSQ HOSP IP/OBS SF/LOW 25: CPT | Performed by: NURSE PRACTITIONER

## 2018-06-16 PROCEDURE — 6370000000 HC RX 637 (ALT 250 FOR IP): Performed by: PHARMACIST

## 2018-06-16 PROCEDURE — 2580000003 HC RX 258: Performed by: INTERNAL MEDICINE

## 2018-06-16 PROCEDURE — 1200000000 HC SEMI PRIVATE

## 2018-06-16 PROCEDURE — 94640 AIRWAY INHALATION TREATMENT: CPT

## 2018-06-16 PROCEDURE — 80048 BASIC METABOLIC PNL TOTAL CA: CPT

## 2018-06-16 PROCEDURE — 2500000003 HC RX 250 WO HCPCS: Performed by: INTERNAL MEDICINE

## 2018-06-16 RX ORDER — WARFARIN SODIUM 3 MG/1
3 TABLET ORAL ONCE
Status: COMPLETED | OUTPATIENT
Start: 2018-06-16 | End: 2018-06-16

## 2018-06-16 RX ORDER — METOPROLOL SUCCINATE 50 MG/1
50 TABLET, EXTENDED RELEASE ORAL DAILY
Status: DISCONTINUED | OUTPATIENT
Start: 2018-06-17 | End: 2018-06-19 | Stop reason: HOSPADM

## 2018-06-16 RX ADMIN — DOCUSATE SODIUM 200 MG: 100 CAPSULE, LIQUID FILLED ORAL at 08:56

## 2018-06-16 RX ADMIN — CLOPIDOGREL BISULFATE 75 MG: 75 TABLET ORAL at 08:56

## 2018-06-16 RX ADMIN — WARFARIN SODIUM 3 MG: 3 TABLET ORAL at 17:44

## 2018-06-16 RX ADMIN — IPRATROPIUM BROMIDE AND ALBUTEROL SULFATE 1 AMPULE: .5; 3 SOLUTION RESPIRATORY (INHALATION) at 16:18

## 2018-06-16 RX ADMIN — ISOSORBIDE MONONITRATE 60 MG: 60 TABLET ORAL at 08:56

## 2018-06-16 RX ADMIN — IPRATROPIUM BROMIDE AND ALBUTEROL SULFATE 1 AMPULE: .5; 3 SOLUTION RESPIRATORY (INHALATION) at 21:51

## 2018-06-16 RX ADMIN — BUMETANIDE 0.25 MG/HR: 0.25 INJECTION INTRAMUSCULAR; INTRAVENOUS at 08:56

## 2018-06-16 RX ADMIN — INSULIN LISPRO 3 UNITS: 100 INJECTION, SOLUTION INTRAVENOUS; SUBCUTANEOUS at 13:30

## 2018-06-16 RX ADMIN — HYDRALAZINE HYDROCHLORIDE 50 MG: 50 TABLET, FILM COATED ORAL at 22:46

## 2018-06-16 RX ADMIN — HYDRALAZINE HYDROCHLORIDE 50 MG: 50 TABLET, FILM COATED ORAL at 08:56

## 2018-06-16 RX ADMIN — ATORVASTATIN CALCIUM 10 MG: 10 TABLET, FILM COATED ORAL at 22:47

## 2018-06-16 RX ADMIN — METOPROLOL SUCCINATE 25 MG: 25 TABLET, FILM COATED, EXTENDED RELEASE ORAL at 08:56

## 2018-06-16 RX ADMIN — Medication 2 UNITS: at 22:49

## 2018-06-16 RX ADMIN — HYDRALAZINE HYDROCHLORIDE 50 MG: 50 TABLET, FILM COATED ORAL at 13:30

## 2018-06-16 RX ADMIN — INSULIN GLARGINE 22 UNITS: 100 INJECTION, SOLUTION SUBCUTANEOUS at 22:47

## 2018-06-16 RX ADMIN — Medication 1000 MCG: at 08:56

## 2018-06-16 RX ADMIN — INSULIN LISPRO 3 UNITS: 100 INJECTION, SOLUTION INTRAVENOUS; SUBCUTANEOUS at 17:45

## 2018-06-16 RX ADMIN — IPRATROPIUM BROMIDE AND ALBUTEROL SULFATE 1 AMPULE: .5; 3 SOLUTION RESPIRATORY (INHALATION) at 09:01

## 2018-06-16 RX ADMIN — PANTOPRAZOLE SODIUM 40 MG: 40 TABLET, DELAYED RELEASE ORAL at 17:44

## 2018-06-16 RX ADMIN — IPRATROPIUM BROMIDE AND ALBUTEROL SULFATE 1 AMPULE: .5; 3 SOLUTION RESPIRATORY (INHALATION) at 12:56

## 2018-06-16 RX ADMIN — PANTOPRAZOLE SODIUM 40 MG: 40 TABLET, DELAYED RELEASE ORAL at 06:52

## 2018-06-17 LAB
ANION GAP SERPL CALCULATED.3IONS-SCNC: 13 MEQ/L (ref 8–16)
BUN BLDV-MCNC: 74 MG/DL (ref 7–22)
CALCIUM SERPL-MCNC: 9 MG/DL (ref 8.5–10.5)
CHLORIDE BLD-SCNC: 101 MEQ/L (ref 98–111)
CO2: 27 MEQ/L (ref 23–33)
CREAT SERPL-MCNC: 2.2 MG/DL (ref 0.4–1.2)
GFR SERPL CREATININE-BSD FRML MDRD: 21 ML/MIN/1.73M2
GLUCOSE BLD-MCNC: 113 MG/DL (ref 70–108)
GLUCOSE BLD-MCNC: 128 MG/DL (ref 70–108)
GLUCOSE BLD-MCNC: 210 MG/DL (ref 70–108)
GLUCOSE BLD-MCNC: 222 MG/DL (ref 70–108)
GLUCOSE BLD-MCNC: 246 MG/DL (ref 70–108)
INR BLD: 2.97 (ref 0.85–1.13)
MAGNESIUM: 1.9 MG/DL (ref 1.6–2.4)
POTASSIUM SERPL-SCNC: 4.3 MEQ/L (ref 3.5–5.2)
PRO-BNP: 3510 PG/ML (ref 0–1800)
SODIUM BLD-SCNC: 141 MEQ/L (ref 135–145)

## 2018-06-17 PROCEDURE — 6370000000 HC RX 637 (ALT 250 FOR IP): Performed by: PHARMACIST

## 2018-06-17 PROCEDURE — 85610 PROTHROMBIN TIME: CPT

## 2018-06-17 PROCEDURE — 99232 SBSQ HOSP IP/OBS MODERATE 35: CPT | Performed by: INTERNAL MEDICINE

## 2018-06-17 PROCEDURE — 1200000000 HC SEMI PRIVATE

## 2018-06-17 PROCEDURE — 83880 ASSAY OF NATRIURETIC PEPTIDE: CPT

## 2018-06-17 PROCEDURE — 82948 REAGENT STRIP/BLOOD GLUCOSE: CPT

## 2018-06-17 PROCEDURE — 6370000000 HC RX 637 (ALT 250 FOR IP): Performed by: INTERNAL MEDICINE

## 2018-06-17 PROCEDURE — 2500000003 HC RX 250 WO HCPCS: Performed by: INTERNAL MEDICINE

## 2018-06-17 PROCEDURE — 80048 BASIC METABOLIC PNL TOTAL CA: CPT

## 2018-06-17 PROCEDURE — 83735 ASSAY OF MAGNESIUM: CPT

## 2018-06-17 PROCEDURE — 99999 PR OFFICE/OUTPT VISIT,PROCEDURE ONLY: CPT | Performed by: INTERNAL MEDICINE

## 2018-06-17 PROCEDURE — 2580000003 HC RX 258: Performed by: INTERNAL MEDICINE

## 2018-06-17 PROCEDURE — 6370000000 HC RX 637 (ALT 250 FOR IP): Performed by: NURSE PRACTITIONER

## 2018-06-17 PROCEDURE — 94640 AIRWAY INHALATION TREATMENT: CPT

## 2018-06-17 PROCEDURE — 36415 COLL VENOUS BLD VENIPUNCTURE: CPT

## 2018-06-17 RX ORDER — FAMOTIDINE 20 MG/1
20 TABLET, FILM COATED ORAL DAILY
Status: DISCONTINUED | OUTPATIENT
Start: 2018-06-17 | End: 2018-06-19 | Stop reason: HOSPADM

## 2018-06-17 RX ORDER — BUMETANIDE 0.25 MG/ML
1 INJECTION, SOLUTION INTRAMUSCULAR; INTRAVENOUS EVERY 12 HOURS
Status: DISCONTINUED | OUTPATIENT
Start: 2018-06-17 | End: 2018-06-18

## 2018-06-17 RX ADMIN — Medication 3 UNITS: at 21:17

## 2018-06-17 RX ADMIN — INSULIN LISPRO 6 UNITS: 100 INJECTION, SOLUTION INTRAVENOUS; SUBCUTANEOUS at 12:28

## 2018-06-17 RX ADMIN — FAMOTIDINE 20 MG: 20 TABLET ORAL at 08:14

## 2018-06-17 RX ADMIN — BUMETANIDE 1 MG: 0.25 INJECTION INTRAMUSCULAR; INTRAVENOUS at 08:13

## 2018-06-17 RX ADMIN — INSULIN GLARGINE 22 UNITS: 100 INJECTION, SOLUTION SUBCUTANEOUS at 21:17

## 2018-06-17 RX ADMIN — INSULIN LISPRO 6 UNITS: 100 INJECTION, SOLUTION INTRAVENOUS; SUBCUTANEOUS at 18:01

## 2018-06-17 RX ADMIN — HYDRALAZINE HYDROCHLORIDE 75 MG: 50 TABLET, FILM COATED ORAL at 21:17

## 2018-06-17 RX ADMIN — Medication 1.5 MG: at 18:01

## 2018-06-17 RX ADMIN — DOCUSATE SODIUM 200 MG: 100 CAPSULE, LIQUID FILLED ORAL at 08:14

## 2018-06-17 RX ADMIN — METOPROLOL SUCCINATE 50 MG: 50 TABLET, FILM COATED, EXTENDED RELEASE ORAL at 08:14

## 2018-06-17 RX ADMIN — ISOSORBIDE MONONITRATE 60 MG: 60 TABLET ORAL at 08:14

## 2018-06-17 RX ADMIN — Medication 10 ML: at 21:17

## 2018-06-17 RX ADMIN — IPRATROPIUM BROMIDE AND ALBUTEROL SULFATE 1 AMPULE: .5; 3 SOLUTION RESPIRATORY (INHALATION) at 09:48

## 2018-06-17 RX ADMIN — Medication 1000 MCG: at 08:14

## 2018-06-17 RX ADMIN — IPRATROPIUM BROMIDE AND ALBUTEROL SULFATE 1 AMPULE: .5; 3 SOLUTION RESPIRATORY (INHALATION) at 17:54

## 2018-06-17 RX ADMIN — HYDRALAZINE HYDROCHLORIDE 75 MG: 50 TABLET, FILM COATED ORAL at 08:14

## 2018-06-17 RX ADMIN — IPRATROPIUM BROMIDE AND ALBUTEROL SULFATE 1 AMPULE: .5; 3 SOLUTION RESPIRATORY (INHALATION) at 21:38

## 2018-06-17 RX ADMIN — ATORVASTATIN CALCIUM 10 MG: 10 TABLET, FILM COATED ORAL at 21:17

## 2018-06-17 RX ADMIN — IPRATROPIUM BROMIDE AND ALBUTEROL SULFATE 1 AMPULE: .5; 3 SOLUTION RESPIRATORY (INHALATION) at 14:09

## 2018-06-17 RX ADMIN — Medication 10 ML: at 08:14

## 2018-06-17 RX ADMIN — PANTOPRAZOLE SODIUM 40 MG: 40 TABLET, DELAYED RELEASE ORAL at 06:21

## 2018-06-17 RX ADMIN — HYDRALAZINE HYDROCHLORIDE 75 MG: 50 TABLET, FILM COATED ORAL at 14:19

## 2018-06-17 RX ADMIN — BUMETANIDE 1 MG: 0.25 INJECTION INTRAMUSCULAR; INTRAVENOUS at 18:01

## 2018-06-17 RX ADMIN — CLOPIDOGREL BISULFATE 75 MG: 75 TABLET ORAL at 08:14

## 2018-06-17 ASSESSMENT — PAIN SCALES - GENERAL: PAINLEVEL_OUTOF10: 0

## 2018-06-18 ENCOUNTER — APPOINTMENT (OUTPATIENT)
Dept: NON INVASIVE DIAGNOSTICS | Age: 83
DRG: 291 | End: 2018-06-18
Payer: MEDICARE

## 2018-06-18 LAB
ANION GAP SERPL CALCULATED.3IONS-SCNC: 15 MEQ/L (ref 8–16)
BUN BLDV-MCNC: 71 MG/DL (ref 7–22)
CALCIUM SERPL-MCNC: 9.4 MG/DL (ref 8.5–10.5)
CHLORIDE BLD-SCNC: 100 MEQ/L (ref 98–111)
CO2: 25 MEQ/L (ref 23–33)
CREAT SERPL-MCNC: 2.1 MG/DL (ref 0.4–1.2)
GFR SERPL CREATININE-BSD FRML MDRD: 22 ML/MIN/1.73M2
GLUCOSE BLD-MCNC: 141 MG/DL (ref 70–108)
GLUCOSE BLD-MCNC: 221 MG/DL (ref 70–108)
GLUCOSE BLD-MCNC: 229 MG/DL (ref 70–108)
GLUCOSE BLD-MCNC: 258 MG/DL (ref 70–108)
INR BLD: 3.07 (ref 0.85–1.13)
MAGNESIUM: 1.8 MG/DL (ref 1.6–2.4)
POTASSIUM SERPL-SCNC: 4.1 MEQ/L (ref 3.5–5.2)
SODIUM BLD-SCNC: 140 MEQ/L (ref 135–145)

## 2018-06-18 PROCEDURE — 6370000000 HC RX 637 (ALT 250 FOR IP): Performed by: INTERNAL MEDICINE

## 2018-06-18 PROCEDURE — 80048 BASIC METABOLIC PNL TOTAL CA: CPT

## 2018-06-18 PROCEDURE — 2580000003 HC RX 258: Performed by: INTERNAL MEDICINE

## 2018-06-18 PROCEDURE — 99232 SBSQ HOSP IP/OBS MODERATE 35: CPT | Performed by: INTERNAL MEDICINE

## 2018-06-18 PROCEDURE — 83735 ASSAY OF MAGNESIUM: CPT

## 2018-06-18 PROCEDURE — 94640 AIRWAY INHALATION TREATMENT: CPT

## 2018-06-18 PROCEDURE — 2500000003 HC RX 250 WO HCPCS: Performed by: INTERNAL MEDICINE

## 2018-06-18 PROCEDURE — 36415 COLL VENOUS BLD VENIPUNCTURE: CPT

## 2018-06-18 PROCEDURE — 78452 HT MUSCLE IMAGE SPECT MULT: CPT

## 2018-06-18 PROCEDURE — 6370000000 HC RX 637 (ALT 250 FOR IP): Performed by: NURSE PRACTITIONER

## 2018-06-18 PROCEDURE — 3430000000 HC RX DIAGNOSTIC RADIOPHARMACEUTICAL: Performed by: INTERNAL MEDICINE

## 2018-06-18 PROCEDURE — 6360000002 HC RX W HCPCS

## 2018-06-18 PROCEDURE — A9500 TC99M SESTAMIBI: HCPCS | Performed by: INTERNAL MEDICINE

## 2018-06-18 PROCEDURE — 85610 PROTHROMBIN TIME: CPT

## 2018-06-18 PROCEDURE — 82948 REAGENT STRIP/BLOOD GLUCOSE: CPT

## 2018-06-18 PROCEDURE — 1200000000 HC SEMI PRIVATE

## 2018-06-18 PROCEDURE — 93017 CV STRESS TEST TRACING ONLY: CPT

## 2018-06-18 RX ORDER — BUMETANIDE 0.25 MG/ML
2 INJECTION, SOLUTION INTRAMUSCULAR; INTRAVENOUS EVERY 8 HOURS
Status: DISCONTINUED | OUTPATIENT
Start: 2018-06-18 | End: 2018-06-19

## 2018-06-18 RX ADMIN — HYDRALAZINE HYDROCHLORIDE 75 MG: 50 TABLET, FILM COATED ORAL at 10:43

## 2018-06-18 RX ADMIN — IPRATROPIUM BROMIDE AND ALBUTEROL SULFATE 1 AMPULE: .5; 3 SOLUTION RESPIRATORY (INHALATION) at 17:26

## 2018-06-18 RX ADMIN — ISOSORBIDE MONONITRATE 60 MG: 60 TABLET ORAL at 10:42

## 2018-06-18 RX ADMIN — INSULIN LISPRO 6 UNITS: 100 INJECTION, SOLUTION INTRAVENOUS; SUBCUTANEOUS at 12:56

## 2018-06-18 RX ADMIN — INSULIN GLARGINE 22 UNITS: 100 INJECTION, SOLUTION SUBCUTANEOUS at 21:59

## 2018-06-18 RX ADMIN — IPRATROPIUM BROMIDE AND ALBUTEROL SULFATE 1 AMPULE: .5; 3 SOLUTION RESPIRATORY (INHALATION) at 09:58

## 2018-06-18 RX ADMIN — Medication 10 ML: at 10:42

## 2018-06-18 RX ADMIN — METOPROLOL SUCCINATE 50 MG: 50 TABLET, FILM COATED, EXTENDED RELEASE ORAL at 10:42

## 2018-06-18 RX ADMIN — ATORVASTATIN CALCIUM 10 MG: 10 TABLET, FILM COATED ORAL at 22:00

## 2018-06-18 RX ADMIN — IPRATROPIUM BROMIDE AND ALBUTEROL SULFATE 1 AMPULE: .5; 3 SOLUTION RESPIRATORY (INHALATION) at 13:53

## 2018-06-18 RX ADMIN — IPRATROPIUM BROMIDE AND ALBUTEROL SULFATE 1 AMPULE: .5; 3 SOLUTION RESPIRATORY (INHALATION) at 21:47

## 2018-06-18 RX ADMIN — Medication 3 UNITS: at 22:00

## 2018-06-18 RX ADMIN — DOCUSATE SODIUM 200 MG: 100 CAPSULE, LIQUID FILLED ORAL at 10:41

## 2018-06-18 RX ADMIN — Medication 34.6 MILLICURIE: at 08:35

## 2018-06-18 RX ADMIN — Medication 0.5 MG: at 16:57

## 2018-06-18 RX ADMIN — HYDRALAZINE HYDROCHLORIDE 75 MG: 50 TABLET, FILM COATED ORAL at 22:00

## 2018-06-18 RX ADMIN — Medication 9 MILLICURIE: at 07:25

## 2018-06-18 RX ADMIN — Medication 10 ML: at 06:30

## 2018-06-18 RX ADMIN — HYDRALAZINE HYDROCHLORIDE 75 MG: 50 TABLET, FILM COATED ORAL at 16:12

## 2018-06-18 RX ADMIN — BUMETANIDE 2 MG: 0.25 INJECTION INTRAMUSCULAR; INTRAVENOUS at 17:53

## 2018-06-18 RX ADMIN — INSULIN LISPRO 9 UNITS: 100 INJECTION, SOLUTION INTRAVENOUS; SUBCUTANEOUS at 16:12

## 2018-06-18 RX ADMIN — FAMOTIDINE 20 MG: 20 TABLET ORAL at 10:42

## 2018-06-18 RX ADMIN — ERGOCALCIFEROL 50000 UNITS: 1.25 CAPSULE ORAL at 12:57

## 2018-06-18 RX ADMIN — BUMETANIDE 1 MG: 0.25 INJECTION INTRAMUSCULAR; INTRAVENOUS at 06:29

## 2018-06-18 RX ADMIN — Medication 10 ML: at 21:59

## 2018-06-18 RX ADMIN — CLOPIDOGREL BISULFATE 75 MG: 75 TABLET ORAL at 10:41

## 2018-06-18 RX ADMIN — Medication 1000 MCG: at 10:42

## 2018-06-18 ASSESSMENT — PAIN SCALES - GENERAL: PAINLEVEL_OUTOF10: 0

## 2018-06-19 VITALS
OXYGEN SATURATION: 96 % | WEIGHT: 256.6 LBS | RESPIRATION RATE: 18 BRPM | HEART RATE: 87 BPM | SYSTOLIC BLOOD PRESSURE: 161 MMHG | DIASTOLIC BLOOD PRESSURE: 70 MMHG | TEMPERATURE: 98 F | BODY MASS INDEX: 41.24 KG/M2 | HEIGHT: 66 IN

## 2018-06-19 LAB
ANION GAP SERPL CALCULATED.3IONS-SCNC: 13 MEQ/L (ref 8–16)
BUN BLDV-MCNC: 66 MG/DL (ref 7–22)
CALCIUM SERPL-MCNC: 9.5 MG/DL (ref 8.5–10.5)
CHLORIDE BLD-SCNC: 101 MEQ/L (ref 98–111)
CO2: 26 MEQ/L (ref 23–33)
CREAT SERPL-MCNC: 2.2 MG/DL (ref 0.4–1.2)
GFR SERPL CREATININE-BSD FRML MDRD: 21 ML/MIN/1.73M2
GLUCOSE BLD-MCNC: 148 MG/DL (ref 70–108)
GLUCOSE BLD-MCNC: 170 MG/DL (ref 70–108)
GLUCOSE BLD-MCNC: 232 MG/DL (ref 70–108)
INR BLD: 2.74 (ref 0.85–1.13)
POTASSIUM SERPL-SCNC: 3.9 MEQ/L (ref 3.5–5.2)
SODIUM BLD-SCNC: 140 MEQ/L (ref 135–145)

## 2018-06-19 PROCEDURE — 36415 COLL VENOUS BLD VENIPUNCTURE: CPT

## 2018-06-19 PROCEDURE — 6360000002 HC RX W HCPCS: Performed by: INTERNAL MEDICINE

## 2018-06-19 PROCEDURE — 82948 REAGENT STRIP/BLOOD GLUCOSE: CPT

## 2018-06-19 PROCEDURE — 2500000003 HC RX 250 WO HCPCS: Performed by: INTERNAL MEDICINE

## 2018-06-19 PROCEDURE — 6370000000 HC RX 637 (ALT 250 FOR IP): Performed by: NURSE PRACTITIONER

## 2018-06-19 PROCEDURE — 2580000003 HC RX 258: Performed by: INTERNAL MEDICINE

## 2018-06-19 PROCEDURE — 6370000000 HC RX 637 (ALT 250 FOR IP): Performed by: INTERNAL MEDICINE

## 2018-06-19 PROCEDURE — 99231 SBSQ HOSP IP/OBS SF/LOW 25: CPT | Performed by: NURSE PRACTITIONER

## 2018-06-19 PROCEDURE — 94640 AIRWAY INHALATION TREATMENT: CPT

## 2018-06-19 PROCEDURE — 80048 BASIC METABOLIC PNL TOTAL CA: CPT

## 2018-06-19 PROCEDURE — 99232 SBSQ HOSP IP/OBS MODERATE 35: CPT | Performed by: INTERNAL MEDICINE

## 2018-06-19 PROCEDURE — 85610 PROTHROMBIN TIME: CPT

## 2018-06-19 RX ORDER — POTASSIUM CHLORIDE 20 MEQ/1
10 TABLET, EXTENDED RELEASE ORAL DAILY
Qty: 60 TABLET | Refills: 3 | Status: SHIPPED | OUTPATIENT
Start: 2018-06-19 | End: 2018-09-11 | Stop reason: ALTCHOICE

## 2018-06-19 RX ORDER — METOPROLOL SUCCINATE 50 MG/1
50 TABLET, EXTENDED RELEASE ORAL DAILY
Qty: 30 TABLET | Refills: 3 | DISCHARGE
Start: 2018-06-20 | End: 2018-09-11 | Stop reason: SDUPTHER

## 2018-06-19 RX ORDER — WARFARIN SODIUM 3 MG/1
3 TABLET ORAL
Status: COMPLETED | OUTPATIENT
Start: 2018-06-19 | End: 2018-06-19

## 2018-06-19 RX ORDER — BUMETANIDE 0.25 MG/ML
2 INJECTION, SOLUTION INTRAMUSCULAR; INTRAVENOUS EVERY 8 HOURS
Status: DISCONTINUED | OUTPATIENT
Start: 2018-06-19 | End: 2018-06-19 | Stop reason: RX

## 2018-06-19 RX ORDER — FUROSEMIDE 10 MG/ML
80 INJECTION INTRAMUSCULAR; INTRAVENOUS EVERY 8 HOURS
Status: DISCONTINUED | OUTPATIENT
Start: 2018-06-19 | End: 2018-06-19 | Stop reason: HOSPADM

## 2018-06-19 RX ORDER — HYDRALAZINE HYDROCHLORIDE 25 MG/1
75 TABLET, FILM COATED ORAL 3 TIMES DAILY
Qty: 90 TABLET | Refills: 3 | Status: ON HOLD | DISCHARGE
Start: 2018-06-19 | End: 2019-03-30 | Stop reason: HOSPADM

## 2018-06-19 RX ORDER — IPRATROPIUM BROMIDE AND ALBUTEROL SULFATE 2.5; .5 MG/3ML; MG/3ML
3 SOLUTION RESPIRATORY (INHALATION)
Qty: 360 ML | Status: ON HOLD | DISCHARGE
Start: 2018-06-19 | End: 2019-03-27

## 2018-06-19 RX ORDER — BUMETANIDE 1 MG/1
2 TABLET ORAL 2 TIMES DAILY
Qty: 60 TABLET | Refills: 1 | Status: SHIPPED | OUTPATIENT
Start: 2018-06-19 | End: 2018-10-24

## 2018-06-19 RX ADMIN — FUROSEMIDE 80 MG: 10 INJECTION, SOLUTION INTRAMUSCULAR; INTRAVENOUS at 08:29

## 2018-06-19 RX ADMIN — HYDRALAZINE HYDROCHLORIDE 75 MG: 50 TABLET, FILM COATED ORAL at 12:50

## 2018-06-19 RX ADMIN — IPRATROPIUM BROMIDE AND ALBUTEROL SULFATE 1 AMPULE: .5; 3 SOLUTION RESPIRATORY (INHALATION) at 13:30

## 2018-06-19 RX ADMIN — CLOPIDOGREL BISULFATE 75 MG: 75 TABLET ORAL at 08:29

## 2018-06-19 RX ADMIN — Medication 1000 MCG: at 08:29

## 2018-06-19 RX ADMIN — FAMOTIDINE 20 MG: 20 TABLET ORAL at 08:29

## 2018-06-19 RX ADMIN — IPRATROPIUM BROMIDE AND ALBUTEROL SULFATE 1 AMPULE: .5; 3 SOLUTION RESPIRATORY (INHALATION) at 16:08

## 2018-06-19 RX ADMIN — HYDRALAZINE HYDROCHLORIDE 75 MG: 50 TABLET, FILM COATED ORAL at 08:29

## 2018-06-19 RX ADMIN — IPRATROPIUM BROMIDE AND ALBUTEROL SULFATE 1 AMPULE: .5; 3 SOLUTION RESPIRATORY (INHALATION) at 08:29

## 2018-06-19 RX ADMIN — INSULIN LISPRO 3 UNITS: 100 INJECTION, SOLUTION INTRAVENOUS; SUBCUTANEOUS at 08:31

## 2018-06-19 RX ADMIN — ISOSORBIDE MONONITRATE 60 MG: 60 TABLET ORAL at 08:29

## 2018-06-19 RX ADMIN — Medication 10 ML: at 08:29

## 2018-06-19 RX ADMIN — DOCUSATE SODIUM 200 MG: 100 CAPSULE, LIQUID FILLED ORAL at 08:29

## 2018-06-19 RX ADMIN — METOPROLOL SUCCINATE 50 MG: 50 TABLET, FILM COATED, EXTENDED RELEASE ORAL at 08:29

## 2018-06-19 RX ADMIN — BUMETANIDE 2 MG: 0.25 INJECTION INTRAMUSCULAR; INTRAVENOUS at 04:36

## 2018-06-19 RX ADMIN — INSULIN LISPRO 6 UNITS: 100 INJECTION, SOLUTION INTRAVENOUS; SUBCUTANEOUS at 12:50

## 2018-06-19 RX ADMIN — WARFARIN SODIUM 3 MG: 3 TABLET ORAL at 16:07

## 2018-06-25 LAB
BUN BLDV-MCNC: 36 MG/DL
CALCIUM SERPL-MCNC: 8.5 MG/DL
CHLORIDE BLD-SCNC: 107 MMOL/L
CO2: 28 MMOL/L
CREAT SERPL-MCNC: 1.9 MG/DL
GFR CALCULATED: 25
GLUCOSE BLD-MCNC: 128 MG/DL
POTASSIUM SERPL-SCNC: 4.5 MMOL/L
SODIUM BLD-SCNC: 143 MMOL/L

## 2018-06-26 ENCOUNTER — OFFICE VISIT (OUTPATIENT)
Dept: NEPHROLOGY | Age: 83
End: 2018-06-26
Payer: MEDICARE

## 2018-06-26 VITALS — HEART RATE: 77 BPM | SYSTOLIC BLOOD PRESSURE: 154 MMHG | DIASTOLIC BLOOD PRESSURE: 69 MMHG | OXYGEN SATURATION: 96 %

## 2018-06-26 DIAGNOSIS — D63.8 ANEMIA, CHRONIC DISEASE: ICD-10-CM

## 2018-06-26 DIAGNOSIS — I50.32 CHRONIC DIASTOLIC CHF (CONGESTIVE HEART FAILURE) (HCC): ICD-10-CM

## 2018-06-26 DIAGNOSIS — N18.4 CKD (CHRONIC KIDNEY DISEASE), STAGE IV (HCC): Primary | ICD-10-CM

## 2018-06-26 DIAGNOSIS — E55.9 VITAMIN D DEFICIENCY: Chronic | ICD-10-CM

## 2018-06-26 DIAGNOSIS — D50.8 IRON DEFICIENCY ANEMIA DUE TO DIETARY CAUSES: ICD-10-CM

## 2018-06-26 PROCEDURE — 1123F ACP DISCUSS/DSCN MKR DOCD: CPT | Performed by: NURSE PRACTITIONER

## 2018-06-26 PROCEDURE — 99213 OFFICE O/P EST LOW 20 MIN: CPT | Performed by: NURSE PRACTITIONER

## 2018-06-26 PROCEDURE — G8598 ASA/ANTIPLAT THER USED: HCPCS | Performed by: NURSE PRACTITIONER

## 2018-06-26 PROCEDURE — G8417 CALC BMI ABV UP PARAM F/U: HCPCS | Performed by: NURSE PRACTITIONER

## 2018-06-26 PROCEDURE — 4040F PNEUMOC VAC/ADMIN/RCVD: CPT | Performed by: NURSE PRACTITIONER

## 2018-06-26 PROCEDURE — G8399 PT W/DXA RESULTS DOCUMENT: HCPCS | Performed by: NURSE PRACTITIONER

## 2018-06-26 PROCEDURE — 1036F TOBACCO NON-USER: CPT | Performed by: NURSE PRACTITIONER

## 2018-06-26 PROCEDURE — 1090F PRES/ABSN URINE INCON ASSESS: CPT | Performed by: NURSE PRACTITIONER

## 2018-06-26 PROCEDURE — G8427 DOCREV CUR MEDS BY ELIG CLIN: HCPCS | Performed by: NURSE PRACTITIONER

## 2018-06-26 PROCEDURE — 1111F DSCHRG MED/CURRENT MED MERGE: CPT | Performed by: NURSE PRACTITIONER

## 2018-06-27 ENCOUNTER — PROCEDURE VISIT (OUTPATIENT)
Dept: CARDIOLOGY CLINIC | Age: 83
End: 2018-06-27
Payer: MEDICARE

## 2018-06-27 DIAGNOSIS — Z95.810 BIVENTRICULAR IMPLANTABLE CARDIOVERTER-DEFIBRILLATOR IN SITU: Primary | Chronic | ICD-10-CM

## 2018-06-27 PROCEDURE — 93296 REM INTERROG EVL PM/IDS: CPT | Performed by: INTERNAL MEDICINE

## 2018-06-27 PROCEDURE — 93295 DEV INTERROG REMOTE 1/2/MLT: CPT | Performed by: INTERNAL MEDICINE

## 2018-07-03 LAB
FERRITIN: 721 NG/ML (ref 9–150)
IRON: 61
IRON: 61
TOTAL IRON BINDING CAPACITY: 230

## 2018-07-05 ENCOUNTER — OFFICE VISIT (OUTPATIENT)
Dept: NEPHROLOGY | Age: 83
End: 2018-07-05
Payer: MEDICARE

## 2018-07-05 VITALS
DIASTOLIC BLOOD PRESSURE: 58 MMHG | OXYGEN SATURATION: 94 % | WEIGHT: 257 LBS | HEART RATE: 85 BPM | SYSTOLIC BLOOD PRESSURE: 117 MMHG | BODY MASS INDEX: 41.48 KG/M2

## 2018-07-05 DIAGNOSIS — N18.4 CKD (CHRONIC KIDNEY DISEASE), STAGE IV (HCC): Primary | ICD-10-CM

## 2018-07-05 DIAGNOSIS — I82.723: ICD-10-CM

## 2018-07-05 PROCEDURE — 1123F ACP DISCUSS/DSCN MKR DOCD: CPT | Performed by: INTERNAL MEDICINE

## 2018-07-05 PROCEDURE — 4040F PNEUMOC VAC/ADMIN/RCVD: CPT | Performed by: INTERNAL MEDICINE

## 2018-07-05 PROCEDURE — 1111F DSCHRG MED/CURRENT MED MERGE: CPT | Performed by: INTERNAL MEDICINE

## 2018-07-05 PROCEDURE — G8399 PT W/DXA RESULTS DOCUMENT: HCPCS | Performed by: INTERNAL MEDICINE

## 2018-07-05 PROCEDURE — G8417 CALC BMI ABV UP PARAM F/U: HCPCS | Performed by: INTERNAL MEDICINE

## 2018-07-05 PROCEDURE — 1036F TOBACCO NON-USER: CPT | Performed by: INTERNAL MEDICINE

## 2018-07-05 PROCEDURE — 99213 OFFICE O/P EST LOW 20 MIN: CPT | Performed by: INTERNAL MEDICINE

## 2018-07-05 PROCEDURE — 1090F PRES/ABSN URINE INCON ASSESS: CPT | Performed by: INTERNAL MEDICINE

## 2018-07-05 PROCEDURE — G8598 ASA/ANTIPLAT THER USED: HCPCS | Performed by: INTERNAL MEDICINE

## 2018-07-05 PROCEDURE — G8427 DOCREV CUR MEDS BY ELIG CLIN: HCPCS | Performed by: INTERNAL MEDICINE

## 2018-07-05 RX ORDER — LANOLIN ALCOHOL/MO/W.PET/CERES
1000 CREAM (GRAM) TOPICAL DAILY
COMMUNITY
End: 2019-01-01

## 2018-07-05 RX ORDER — CLOPIDOGREL BISULFATE 75 MG/1
75 TABLET ORAL EVERY EVENING
COMMUNITY

## 2018-07-05 RX ORDER — PANTOPRAZOLE SODIUM 40 MG/1
40 TABLET, DELAYED RELEASE ORAL 2 TIMES DAILY
COMMUNITY
End: 2019-06-06

## 2018-07-05 RX ORDER — ONDANSETRON 4 MG/1
4 TABLET, FILM COATED ORAL 2 TIMES DAILY
Status: ON HOLD | COMMUNITY
End: 2020-01-01 | Stop reason: SDUPTHER

## 2018-07-09 ENCOUNTER — OFFICE VISIT (OUTPATIENT)
Dept: CARDIOLOGY CLINIC | Age: 83
End: 2018-07-09
Payer: MEDICARE

## 2018-07-09 ENCOUNTER — TELEPHONE (OUTPATIENT)
Dept: NEPHROLOGY | Age: 83
End: 2018-07-09
Payer: MEDICARE

## 2018-07-09 ENCOUNTER — TELEPHONE (OUTPATIENT)
Dept: CARDIAC REHAB | Age: 83
End: 2018-07-09

## 2018-07-09 ENCOUNTER — HOSPITAL ENCOUNTER (OUTPATIENT)
Dept: GENERAL RADIOLOGY | Age: 83
Discharge: HOME | End: 2018-07-09
Payer: MEDICARE

## 2018-07-09 ENCOUNTER — HOSPITAL ENCOUNTER (OUTPATIENT)
Age: 83
Discharge: HOME | End: 2018-07-09
Payer: MEDICARE

## 2018-07-09 VITALS
HEART RATE: 103 BPM | DIASTOLIC BLOOD PRESSURE: 61 MMHG | HEIGHT: 66 IN | BODY MASS INDEX: 40.82 KG/M2 | OXYGEN SATURATION: 92 % | WEIGHT: 254 LBS | SYSTOLIC BLOOD PRESSURE: 117 MMHG

## 2018-07-09 DIAGNOSIS — I50.32 CHF (CONGESTIVE HEART FAILURE), NYHA CLASS III, CHRONIC, DIASTOLIC (HCC): Primary | ICD-10-CM

## 2018-07-09 DIAGNOSIS — D50.8 IRON DEFICIENCY ANEMIA DUE TO DIETARY CAUSES: ICD-10-CM

## 2018-07-09 DIAGNOSIS — N18.4 CKD (CHRONIC KIDNEY DISEASE), STAGE IV (HCC): Primary | ICD-10-CM

## 2018-07-09 DIAGNOSIS — D63.8 ANEMIA, CHRONIC DISEASE: ICD-10-CM

## 2018-07-09 DIAGNOSIS — I50.32 CHF (CONGESTIVE HEART FAILURE), NYHA CLASS III, CHRONIC, DIASTOLIC (HCC): ICD-10-CM

## 2018-07-09 PROCEDURE — 99213 OFFICE O/P EST LOW 20 MIN: CPT | Performed by: NURSE PRACTITIONER

## 2018-07-09 PROCEDURE — G8417 CALC BMI ABV UP PARAM F/U: HCPCS | Performed by: NURSE PRACTITIONER

## 2018-07-09 PROCEDURE — 99490 CHRNC CARE MGMT STAFF 1ST 20: CPT | Performed by: NURSE PRACTITIONER

## 2018-07-09 PROCEDURE — G8598 ASA/ANTIPLAT THER USED: HCPCS | Performed by: NURSE PRACTITIONER

## 2018-07-09 PROCEDURE — 4040F PNEUMOC VAC/ADMIN/RCVD: CPT | Performed by: NURSE PRACTITIONER

## 2018-07-09 PROCEDURE — G8427 DOCREV CUR MEDS BY ELIG CLIN: HCPCS | Performed by: NURSE PRACTITIONER

## 2018-07-09 PROCEDURE — 71046 X-RAY EXAM CHEST 2 VIEWS: CPT

## 2018-07-09 PROCEDURE — 1111F DSCHRG MED/CURRENT MED MERGE: CPT | Performed by: NURSE PRACTITIONER

## 2018-07-09 PROCEDURE — 1036F TOBACCO NON-USER: CPT | Performed by: NURSE PRACTITIONER

## 2018-07-09 PROCEDURE — 1123F ACP DISCUSS/DSCN MKR DOCD: CPT | Performed by: NURSE PRACTITIONER

## 2018-07-09 PROCEDURE — 1090F PRES/ABSN URINE INCON ASSESS: CPT | Performed by: NURSE PRACTITIONER

## 2018-07-09 PROCEDURE — G8399 PT W/DXA RESULTS DOCUMENT: HCPCS | Performed by: NURSE PRACTITIONER

## 2018-07-09 RX ORDER — ASCORBIC ACID 500 MG
500 TABLET ORAL 2 TIMES DAILY
COMMUNITY
End: 2018-10-19 | Stop reason: ALTCHOICE

## 2018-07-09 RX ORDER — ISOSORBIDE MONONITRATE 60 MG/1
60 TABLET, EXTENDED RELEASE ORAL DAILY
Status: ON HOLD | COMMUNITY
End: 2019-03-27

## 2018-07-09 RX ORDER — FERROUS SULFATE 325(65) MG
325 TABLET ORAL 2 TIMES DAILY
Qty: 60 TABLET | Refills: 3 | Status: ON HOLD | OUTPATIENT
Start: 2018-07-09 | End: 2020-01-01

## 2018-07-09 RX ORDER — ACETAMINOPHEN 325 MG/1
650 TABLET ORAL EVERY 4 HOURS PRN
COMMUNITY
End: 2018-08-27 | Stop reason: SDUPTHER

## 2018-07-09 RX ORDER — GUAIFENESIN 600 MG/1
600 TABLET, EXTENDED RELEASE ORAL 2 TIMES DAILY
Qty: 60 TABLET | Refills: 1 | Status: SHIPPED | OUTPATIENT
Start: 2018-07-09 | End: 2018-08-08

## 2018-07-09 RX ORDER — DOCUSATE SODIUM 100 MG/1
200 CAPSULE, LIQUID FILLED ORAL DAILY
COMMUNITY

## 2018-07-09 ASSESSMENT — ENCOUNTER SYMPTOMS
COLOR CHANGE: 0
ABDOMINAL DISTENTION: 0
SHORTNESS OF BREATH: 1
COUGH: 1
WHEEZING: 0
NAUSEA: 0
ABDOMINAL PAIN: 0
APNEA: 0
CHEST TIGHTNESS: 0

## 2018-07-09 NOTE — TELEPHONE ENCOUNTER
Chronic Care Management    Kendra Garcia is a 80 y. o.oldfemale Is followed for Chronic Care Management for kidney disease,   Rosealee Lax most recent   Lab Results   Component Value Date    CREATININE 1.9 06/25/2018    LABGLOM 25 06/25/2018    LABGLOM 21 06/19/2018   . Immunization History   Administered Date(s) Administered    Influenza Vaccine, unspecified formulation 10/31/2016    Influenza Virus Vaccine 10/03/2013, 10/09/2014, 12/24/2015    Pneumococcal 13-valent Conjugate ([id number]) 12/14/2016    Pneumococcal Polysaccharide ([id number]) 06/20/2017       Diagnostic Data:    BMP:  Lab Results   Component Value Date     06/25/2018    K 4.5 06/25/2018    K 6.0 06/12/2018     06/25/2018    CO2 28 06/25/2018    BUN 36 06/25/2018    CREATININE 1.9 06/25/2018    LABGLOM 25 06/25/2018    LABGLOM 21 06/19/2018    GLUCOSE 128 06/25/2018    GLUCOSE 316 11/09/2016       Anemia: Goal hgb 10 g/dL  Lab Results   Component Value Date    HGB 9.6 06/13/2018    HGB 9.9 06/12/2018    HGB 10.4 05/31/2018    HGB 10.9 05/18/2018    HGB 10.0 05/09/2018      Lab Results   Component Value Date    FERRITIN 721 07/03/2018    IRON 61 07/03/2018    IRON 61 07/03/2018    LABIRON 22 03/17/2017    [id number] 03/17/2017    FOLATE > 20.0 03/17/2017        6/13/18 7/3/18          Hgb 9.3 10.8          Aranesp - -                      Retic Count            T sat  27          Iron  61          Ferritan            Venofer            PO Iron                                         Assessment:    Diagnosis Orders   1. CKD (chronic kidney disease), stage IV ([facility] [address]     2. Anemia, chronic disease  Hgb at goal, will follow Oct labs previously ordered per Dr Elodia Tucker   3. Iron deficiency anemia due to dietary causes  Start PO iron twice daily.  Script sent in but may not be covered by her insurance  Pt to keep FU appt with Dr Joshua Townsend, APRN - CNP APRN    Time spent- 15 minutes non face-to-face

## 2018-07-09 NOTE — PROGRESS NOTES
release tablet Take 1 tablet by mouth daily 30 tablet 3    insulin aspart (NOVOLOG) 100 UNIT/ML injection vial Inject 12 Units into the skin 2 times daily (with meals)      insulin aspart (NOVOLOG) 100 UNIT/ML injection vial Inject 8 Units into the skin Daily with lunch      insulin aspart (NOVOLOG) 100 UNIT/ML injection vial Inject 0-6 Units into the skin 2 times daily (with meals) Low Dose Corrective Insulin Scale for Meals 0-150 = No insulin, 151-200 = 1 unit, 201-250 = 2 units, 251-300 = 3 units, 301-350 = 4 units, 351-400 = 5 units, 401 - 450 = 6 units      insulin glargine (LANTUS) 100 UNIT/ML injection vial Inject 22 Units into the skin nightly      guaiFENesin (ROBITUSSIN) 100 MG/5ML syrup Take 200 mg by mouth every 4 hours as needed for Cough      Multiple Vitamins-Minerals (OCUVITE EXTRA PO) Take 1 tablet by mouth daily       bisacodyl (DULCOLAX) 10 MG suppository Place 10 mg rectally daily as needed for Constipation      magnesium hydroxide (MILK OF MAGNESIA) 400 MG/5ML suspension Take 30 mLs by mouth daily as needed for Constipation      pravastatin (PRAVACHOL) 40 MG tablet Take 40 mg by mouth every evening Indications: Blood Cholesterol Abnormal       acetaminophen (TYLENOL) 325 MG tablet Take 650 mg by mouth 4 times daily       potassium chloride (KLOR-CON M) 20 MEQ extended release tablet Take 0.5 tablets by mouth daily 60 tablet 3     No current facility-administered medications for this visit. No Known Allergies    SUBJECTIVE:   Review of Systems   Constitutional: Negative for activity change, appetite change, fatigue and fever. HENT: Negative for congestion. Respiratory: Positive for cough and shortness of breath (with activty ). Negative for apnea, chest tightness and wheezing. Cardiovascular: Negative for chest pain, palpitations and leg swelling. Gastrointestinal: Negative for abdominal distention, abdominal pain and nausea.    Genitourinary: Negative for difficulty urinating and dysuria. Musculoskeletal: Positive for gait problem (walker, WC). Negative for arthralgias. Skin: Negative for color change. Neurological: Negative for dizziness, numbness and headaches. Psychiatric/Behavioral: Negative for agitation, confusion and sleep disturbance. The patient is not nervous/anxious. OBJECTIVE:   Today's Vitals:  /61   Pulse 103   Ht 5' 6\" (1.676 m)   Wt 254 lb (115.2 kg)   SpO2 92%   BMI 41.00 kg/m²     Physical Exam   Constitutional: She is oriented to person, place, and time. She appears well-developed and well-nourished. HENT:   Head: Normocephalic and atraumatic. Eyes: Pupils are equal, round, and reactive to light. Neck: Normal range of motion. No JVD present. Cardiovascular: Normal rate and normal heart sounds. No murmur heard. +ICD   Pulmonary/Chest: Effort normal. No respiratory distress. She has no rales. Abdominal: Soft. She exhibits no distension. There is no tenderness. Musculoskeletal: She exhibits edema (trace LE, wearing compression hose ). She exhibits no tenderness. Neurological: She is alert and oriented to person, place, and time. Skin: Skin is warm and dry. Psychiatric: She has a normal mood and affect. Her behavior is normal.   Vitals reviewed. Wt Readings from Last 3 Encounters:   07/09/18 254 lb (115.2 kg)   07/05/18 257 lb (116.6 kg)   06/19/18 256 lb 9.6 oz (116.4 kg)     BP Readings from Last 3 Encounters:   07/09/18 117/61   07/05/18 (!) 117/58   06/26/18 (!) 154/69     Pulse Readings from Last 3 Encounters:   07/09/18 103   07/05/18 85   06/26/18 77     Body mass index is 41 kg/m². ECHO:   6/13/18   Summary   Normal left ventricle size and systolic function. Ejection fraction was   estimated at 55-60%.  There were no regional left ventricular wall motion   abnormalities and wall thickness was within normal limits.   There was trivial mitral regurgitation.  Dane Randall was mild tricuspid regurgitation.   Pulmonary artery systolic pressure calculated from tricuspid regurgitation   jet is 35 mmhg.      Signature      ----------------------------------------------------------------   Electronically signed by Ck Knowles MD (Interpreting   physician) on 06/13/2018 at 09:23 PM   ----------------------------------------------------------------      Findings      Mitral Valve   The mitral valve structure was normal with normal leaflet separation.   DOPPLER: The transmitral velocity was within the normal range with no   evidence for mitral stenosis. There was trivial mitral regurgitation.      Aortic Valve   The aortic valve was trileaflet with normal thickness and cuspal   separation. DOPPLER: Transaortic velocity was within the normal range with   no evidence of aortic stenosis. There was no evidence of aortic   regurgitation.      Tricuspid Valve   The tricuspid valve structure was normal with normal leaflet separation.   DOPPLER: There was no evidence of tricuspid stenosis. There was mild   tricuspid regurgitation.      Pulmonic Valve   Pulmonary artery systolic pressure calculated from tricuspid regurgitation   jet is 35 mmhg.      Left Atrium   Left atrial size was normal.      Left Ventricle   Normal left ventricle size and systolic function. Ejection fraction was   estimated at 55-60%.  There were no regional left ventricular wall motion   abnormalities and wall thickness was within normal limits.      Right Atrium   Right atrial size was normal.      Right Ventricle   The right ventricular size was normal.      Aorta / Great Vessels   Aortic root dimension within normal limits.       Results reviewed:  BNP:   Lab Results   Component Value Date    BNP 46.0 05/20/2013     CBC:   Lab Results   Component Value Date    WBC 5.9 06/13/2018    RBC 3.58 06/13/2018    HGB 9.6 06/13/2018    HCT 30.2 06/13/2018     06/13/2018     CMP:    Lab Results   Component Value Date     06/25/2018    K Return in about 2 months (around 9/9/2018). or sooner if needed     Patient given educational materials - see patient instructions. We discussed the importance of weighing oneself and recording daily. We also discussed the importance of a low sodium diet, higher sodium foods to avoid and better low sodium food options. Patient verbalizes understanding of plan of care using teach back method, and is agreeable to the treatment plan.        Electronically signed by ARELI Weiner CNP on 7/9/2018 at 9:49 AM

## 2018-07-09 NOTE — TELEPHONE ENCOUNTER
Spoke with Jesus ROOT regarding nutrition workshops that will be starting on Monday, July 23rd from 12- 2p. She states the Misa Velasquez is currently in a nursing home and that she will be seeing patient tomorrow. She will pass on the information to call back if she is interested. Office number provided.

## 2018-07-16 ENCOUNTER — HOSPITAL ENCOUNTER (OUTPATIENT)
Dept: INTERVENTIONAL RADIOLOGY/VASCULAR | Age: 83
Discharge: HOME | End: 2018-07-16
Payer: MEDICARE

## 2018-07-16 DIAGNOSIS — I82.723: ICD-10-CM

## 2018-07-16 DIAGNOSIS — N18.4 CKD (CHRONIC KIDNEY DISEASE), STAGE IV (HCC): ICD-10-CM

## 2018-07-16 PROCEDURE — 93971 EXTREMITY STUDY: CPT

## 2018-08-13 NOTE — TELEPHONE ENCOUNTER
Have been transferred to 3 different departments regarding this Appeal   [phone] is the # to get directly to a representative. Ref #  [id number]     Spoke with the Laila Norris at [facility]-  Call Ref# [id number] I am being told that the fax and address on the paperwork that was sent by Natali Del Rio is incorrect.        Faxed info directly to the supervisor -  [phone]- this will be expedited    54mins

## 2018-08-27 ENCOUNTER — OFFICE VISIT (OUTPATIENT)
Dept: CARDIOTHORACIC SURGERY | Age: 83
End: 2018-08-27
Payer: MEDICARE

## 2018-08-27 VITALS
BODY MASS INDEX: 39.83 KG/M2 | SYSTOLIC BLOOD PRESSURE: 154 MMHG | HEIGHT: 67 IN | DIASTOLIC BLOOD PRESSURE: 69 MMHG | WEIGHT: 253.8 LBS | HEART RATE: 84 BPM

## 2018-08-27 DIAGNOSIS — Z87.891 EX-SMOKER: ICD-10-CM

## 2018-08-27 DIAGNOSIS — J43.9 PULMONARY EMPHYSEMA, UNSPECIFIED EMPHYSEMA TYPE (HCC): ICD-10-CM

## 2018-08-27 DIAGNOSIS — E11.69 TYPE 2 DIABETES MELLITUS WITH OTHER SPECIFIED COMPLICATION, WITH LONG-TERM CURRENT USE OF INSULIN (HCC): ICD-10-CM

## 2018-08-27 DIAGNOSIS — D75.829 HIT (HEPARIN-INDUCED THROMBOCYTOPENIA): ICD-10-CM

## 2018-08-27 DIAGNOSIS — E11.22 CKD STAGE 4 DUE TO TYPE 2 DIABETES MELLITUS (HCC): Primary | ICD-10-CM

## 2018-08-27 DIAGNOSIS — I44.7 LBBB (LEFT BUNDLE BRANCH BLOCK): ICD-10-CM

## 2018-08-27 DIAGNOSIS — Z01.818 PRE-OP EXAM: ICD-10-CM

## 2018-08-27 DIAGNOSIS — N18.4 CKD STAGE 4 DUE TO TYPE 2 DIABETES MELLITUS (HCC): Primary | ICD-10-CM

## 2018-08-27 DIAGNOSIS — Z95.810 BIVENTRICULAR IMPLANTABLE CARDIOVERTER-DEFIBRILLATOR IN SITU: ICD-10-CM

## 2018-08-27 DIAGNOSIS — Z79.4 TYPE 2 DIABETES MELLITUS WITH OTHER SPECIFIED COMPLICATION, WITH LONG-TERM CURRENT USE OF INSULIN (HCC): ICD-10-CM

## 2018-08-27 DIAGNOSIS — Z01.810 ENCOUNTER FOR PREPROCEDURAL CARDIOVASCULAR EXAMINATION: ICD-10-CM

## 2018-08-27 DIAGNOSIS — M88.9 PAGET'S DISEASE OF BONE: ICD-10-CM

## 2018-08-27 PROCEDURE — 1036F TOBACCO NON-USER: CPT | Performed by: THORACIC SURGERY (CARDIOTHORACIC VASCULAR SURGERY)

## 2018-08-27 PROCEDURE — 1123F ACP DISCUSS/DSCN MKR DOCD: CPT | Performed by: THORACIC SURGERY (CARDIOTHORACIC VASCULAR SURGERY)

## 2018-08-27 PROCEDURE — 99204 OFFICE O/P NEW MOD 45 MIN: CPT | Performed by: THORACIC SURGERY (CARDIOTHORACIC VASCULAR SURGERY)

## 2018-08-27 PROCEDURE — 3023F SPIROM DOC REV: CPT | Performed by: THORACIC SURGERY (CARDIOTHORACIC VASCULAR SURGERY)

## 2018-08-27 PROCEDURE — 4040F PNEUMOC VAC/ADMIN/RCVD: CPT | Performed by: THORACIC SURGERY (CARDIOTHORACIC VASCULAR SURGERY)

## 2018-08-27 PROCEDURE — G8427 DOCREV CUR MEDS BY ELIG CLIN: HCPCS | Performed by: THORACIC SURGERY (CARDIOTHORACIC VASCULAR SURGERY)

## 2018-08-27 PROCEDURE — G8926 SPIRO NO PERF OR DOC: HCPCS | Performed by: THORACIC SURGERY (CARDIOTHORACIC VASCULAR SURGERY)

## 2018-08-27 PROCEDURE — G8417 CALC BMI ABV UP PARAM F/U: HCPCS | Performed by: THORACIC SURGERY (CARDIOTHORACIC VASCULAR SURGERY)

## 2018-08-27 PROCEDURE — G8399 PT W/DXA RESULTS DOCUMENT: HCPCS | Performed by: THORACIC SURGERY (CARDIOTHORACIC VASCULAR SURGERY)

## 2018-08-27 PROCEDURE — G8598 ASA/ANTIPLAT THER USED: HCPCS | Performed by: THORACIC SURGERY (CARDIOTHORACIC VASCULAR SURGERY)

## 2018-08-27 PROCEDURE — 1090F PRES/ABSN URINE INCON ASSESS: CPT | Performed by: THORACIC SURGERY (CARDIOTHORACIC VASCULAR SURGERY)

## 2018-08-27 PROCEDURE — 1101F PT FALLS ASSESS-DOCD LE1/YR: CPT | Performed by: THORACIC SURGERY (CARDIOTHORACIC VASCULAR SURGERY)

## 2018-08-27 RX ORDER — WARFARIN SODIUM 3 MG/1
3 TABLET ORAL DAILY
COMMUNITY
End: 2018-10-11 | Stop reason: ALTCHOICE

## 2018-08-27 ASSESSMENT — ENCOUNTER SYMPTOMS: SHORTNESS OF BREATH: 1

## 2018-08-28 NOTE — PROGRESS NOTES
[address] [place] [address]  Dept: [phone]  Dept Fax: [phone]: [phone]    Visit Date: 8/27/2018    Ms. Darby Arteaga is a 80 y.o. female  who presented for:  Chief Complaint   Patient presents with    New Patient     rederral from Dr. Annalisa Feng Other     CKD    Consultation     access creation       HPI:   HPI : 81 y/o morbidly obese lady BMI 39.75 kg/m2, hx right mastectomy axillary lymph node dissection for carcinoma in the 80's then left breast lumpectomy 2012. Left handed, no right upper extremity swelling or lymphedema post lymph node dissection. Venous duplex in office with proximal tourniquet showed a large right cephalic vein in the antecubital area about 6 mm extending to deltoid area. Bifurcates distal to the flexion crease. Since is has been several years after the right axillary node dissection and there is no lymphedema or any extremity swelling, it appears reasonable to try to create a right upper extremity AV fistula for subsequent hemodialysis. Potential risks benefits and alternatives were explained, Mrs Darby Arteaga very well appeared to understand, all of her questions were answered to her satisfaction and she is agreeable.         Current Outpatient Prescriptions:     warfarin (COUMADIN) 3 MG tablet, Take 3 mg by mouth daily, Disp: , Rfl:     vitamin C (ASCORBIC ACID) 500 MG tablet, Take 500 mg by mouth 2 times daily, Disp: , Rfl:     docusate sodium (COLACE) 100 MG capsule, Take 200 mg by mouth daily, Disp: , Rfl:     isosorbide mononitrate (IMDUR) 60 MG extended release tablet, Take 60 mg by mouth daily, Disp: , Rfl:     Multiple Vitamins-Minerals (MULTIVITAL) TABS, Take 1 tablet by mouth every morning, Disp: , Rfl:     ferrous sulfate (JAMIR-RAJANI) 325 (65 Fe) MG tablet, Take 1 tablet by mouth 2 times daily, Disp: 60 tablet, Rfl: 3    clopidogrel (PLAVIX) 75 MG tablet, Take 75 mg by mouth daily, Disp: , Rfl:     vitamin B-12 (CYANOCOBALAMIN) 1000 MCG tablet, Take 1,000 mcg by mouth daily, Disp: , Rfl:     ondansetron (ZOFRAN) 4 MG tablet, Take 4 mg by mouth 2 times daily , Disp: , Rfl:     pantoprazole (PROTONIX) 40 MG tablet, Take 40 mg by mouth 2 times daily , Disp: , Rfl:     bumetanide (BUMEX) 1 MG tablet, Take 2 tablets by mouth 2 times daily, Disp: 60 tablet, Rfl: 1    potassium chloride (KLOR-CON M) 20 MEQ extended release tablet, Take 0.5 tablets by mouth daily, Disp: 60 tablet, Rfl: 3    ipratropium-albuterol (DUONEB) 0.5-2.5 (3) MG/3ML SOLN nebulizer solution, Inhale 3 mLs into the lungs every 4 hours (while awake), Disp: 360 mL, Rfl:     hydrALAZINE (APRESOLINE) 25 MG tablet, Take 3 tablets by mouth 3 times daily (Patient taking differently: Take 25 mg by mouth 3 times daily ), Disp: 90 tablet, Rfl: 3    metoprolol succinate (TOPROL XL) 50 MG extended release tablet, Take 1 tablet by mouth daily, Disp: 30 tablet, Rfl: 3    insulin aspart (NOVOLOG) 100 UNIT/ML injection vial, Inject 12 Units into the skin 2 times daily (with meals), Disp: , Rfl:     insulin aspart (NOVOLOG) 100 UNIT/ML injection vial, Inject 8 Units into the skin Daily with lunch, Disp: , Rfl:     insulin aspart (NOVOLOG) 100 UNIT/ML injection vial, Inject 0-6 Units into the skin 2 times daily (with meals) Low Dose Corrective Insulin Scale for Meals 0-150 = No insulin, 151-200 = 1 unit, 201-250 = 2 units, 251-300 = 3 units, 301-350 = 4 units, 351-400 = 5 units, 401 - 450 = 6 units, Disp: , Rfl:     insulin glargine (LANTUS) 100 UNIT/ML injection vial, Inject 22 Units into the skin nightly, Disp: , Rfl:     guaiFENesin (ROBITUSSIN) 100 MG/5ML syrup, Take 200 mg by mouth every 4 hours as needed for Cough, Disp: , Rfl:     Multiple Vitamins-Minerals (OCUVITE EXTRA PO), Take 1 tablet by mouth daily , Disp: , Rfl:     bisacodyl (DULCOLAX) 10 MG suppository, Place 10 mg rectally daily as needed for Constipation, Disp: , Rfl:     magnesium hydroxide (MILK OF MAGNESIA) 400 MG/5ML suspension, Take 30 mLs by mouth daily as needed for Constipation, Disp: , Rfl:     pravastatin (PRAVACHOL) 40 MG tablet, Take 40 mg by mouth every evening Indications: Blood Cholesterol Abnormal , Disp: , Rfl:     acetaminophen (TYLENOL) 325 MG tablet, Take 650 mg by mouth 4 times daily , Disp: , Rfl:     No Known Allergies    Past Medical History  Gen Max  has a past medical history of CAD (coronary artery disease); Cancer ([facility] Cardiomyopathy, nonischemic (HCC); CHF (congestive heart failure) (HCC); CKD (chronic kidney disease) stage 3, GFR 30-59 ml/min; Congenital heart disease; COPD exacerbation ([facility] 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 ([facility] DM2 (diabetes mellitus, type 2) ([facility] Ex-smoker; GERD (gastroesophageal reflux disease); Heart failure with preserved ejection fraction ([facility] His of Heparin induced thrombocytopenia; History of breast cancer; History of CVA (cerebrovascular accident); History of pulmonary embolism; Hyperlipidemia; Hypertension; Iron deficiency anemia; LBBB (left bundle branch block); Osteoarthritis; Paget's disease of bone; [facility] BiV ICD; and Vitamin D deficiency. Social History  Gen Max  reports that she quit smoking about 66 years ago. Her smoking use included Cigarettes. She has a 1.00 pack-year smoking history. She has never used smokeless tobacco. She reports that she does not drink alcohol or use drugs. Family History  Radha family history includes Cancer in her brother, sister, and sister; Diabetes in her father and mother; Heart Disease in her brother and mother; Pacemaker in her mother; Stroke in her mother. There is no family history of bicuspid aortic valve, aneurysms, heart transplant, pacemakers, defibrillators, or sudden cardiac death.       Past Surgical History   Past Surgical History:   Procedure Laterality Date    BREAST SURGERY      s/p right mastectomy 1980's and left mastectomy 2007    CARDIAC CATHETERIZATION  11-    Hemodynamics w pulmonary hypertension, systemic hypertension and no sats gradient difference. No aortic stenosis. No mitral stenosis. Coronaries; mild disease of the LAD not more than 40%. LV; severe LV dysfunction and EF 30-35%.  CARDIAC CATHETERIZATION  05-    Mild disease of small distal LAD (approximately  50% stenosis) angiographically normal CA. Mild to moderate LV systolic dysfunction. EF 35%. Mildly elevated LV end diastolic pressure. No significant MR. Systemic hypertension.  CARDIAC DEFIBRILLATOR PLACEMENT  02/02/2011    CARDIOVASCULAR STRESS TEST  03/23/10    EF 30%  Severe LV Dys    COLONOSCOPY      Dr. Adalberto Marx  03/22/10    EF 45%. LV mildly dilated. Systolic function mildly reducted. No regional wall motion abnormalities. Wall thickness mildly increased. LA mildly dilated. MV mild annular calification. Mild TR. Ventricular septum tickness mildly increased.  ENDOSCOPY, COLON, DIAGNOSTIC      EYE SURGERY      bilateral cataracts    EYE SURGERY Left 9/2014    laser surgery for retinopathy?  FOOT SURGERY Left 1/12/2016    PACEMAKER PLACEMENT      SC EGD BALLOON DILATION ESOPHAGUS <30 MM DIAM N/A 9/18/2017    EGD ESOPHAGOGASTRODUODENOSCOPY DILATATION performed by Barbie Alonso MD at [facility]    SC ESOPHAGOGASTRODUODENOSCOPY TRANSORAL DIAGNOSTIC  9/18/2017    EGD ESOPHAGOGASTRODUODENOSCOPY performed by Barbie Alonso MD at [facility]       Subjective:     Review of Systems   Respiratory: Positive for shortness of breath. Musculoskeletal: Positive for gait problem.        Objective:     BP (!) 154/69   Pulse 84   Ht 5' 7\" (1.702 m)   Wt 253 lb 12.8 oz (115.1 kg)   BMI 39.75 kg/m²     Wt Readings from Last 3 Encounters:   08/27/18 253 lb 12.8 oz (115.1 kg)   07/09/18 254 lb (115.2 kg)   07/05/18 257 lb (116.6 kg)     BP Readings from Last 3 Encounters:   08/27/18 (!) 154/69   07/09/18 117/61   07/05/18 (!) 117/58       Physical Exam   Constitutional: She is oriented to person, place, and time. She appears well-developed and well-nourished. HENT:   Head: Normocephalic and atraumatic. Right Ear: External ear normal.   Left Ear: External ear normal.   Nose: Nose normal.   Mouth/Throat: Oropharynx is clear and moist.   Eyes: Pupils are equal, round, and reactive to light. Conjunctivae and EOM are normal. No scleral icterus. Neck: Normal range of motion. Neck supple. No JVD present. No tracheal deviation present. No thyromegaly present. Cardiovascular: Normal rate, regular rhythm, S1 normal and S2 normal.  Exam reveals no S3, no S4, no distant heart sounds and no friction rub. No murmur heard. Pulses:       Carotid pulses are 2+ on the right side, and 2+ on the left side. Radial pulses are 2+ on the right side, and 2+ on the left side. Femoral pulses are 1+ on the right side, and 1+ on the left side. Dorsalis pedis pulses are 0 on the right side, and 0 on the left side. Posterior tibial pulses are 0 on the right side, and 0 on the left side. Modified Memo test done right wrist. There was spontaneous capillary refill all fingertips upon release of the ulnar artery whilst occluding the radial artery at the wrist.   Pulmonary/Chest: Effort normal and breath sounds normal. No stridor. No respiratory distress. She has no wheezes. She has no rales. She exhibits no tenderness. Abdominal: Soft. Bowel sounds are normal. She exhibits no distension and no mass. There is no tenderness. There is no rebound and no guarding. Musculoskeletal: Normal range of motion. She exhibits no edema, tenderness or deformity. Lymphadenopathy:     She has no cervical adenopathy. Neurological: She is alert and oriented to person, place, and time. She has normal reflexes. No cranial nerve deficit. She exhibits normal muscle tone. mitral stenosis. There was trivial mitral regurgitation. Aortic Valve   The aortic valve was trileaflet with normal thickness and cuspal   separation. DOPPLER: Transaortic velocity was within the normal range with   no evidence of aortic stenosis. There was no evidence of aortic   regurgitation. Tricuspid Valve   The tricuspid valve structure was normal with normal leaflet separation. DOPPLER: There was no evidence of tricuspid stenosis. There was mild   tricuspid regurgitation. Pulmonic Valve   Pulmonary artery systolic pressure calculated from tricuspid regurgitation   jet is 35 mmhg. Left Atrium   Left atrial size was normal.      Left Ventricle   Normal left ventricle size and systolic function. Ejection fraction was   estimated at 55-60%. There were no regional left ventricular wall motion   abnormalities and wall thickness was within normal limits. Right Atrium   Right atrial size was normal.      Right Ventricle   The right ventricular size was normal.      Aorta / Great Vessels   Aortic root dimension within normal limits.      M-Mode/2D Measurements & Calculations      LV Diastolic    LV Systolic Dimension: 3.3  AV Cusp Separation: 1.7 cmLA   Dimension: 4.8  cm                          Dimension: 4.1 cmAO Root   cm              LV Volume Diastolic: 124 ml Dimension: 2.8 cm   LV FS:31.3 %    LV Volume Systolic: 78.6 ml   LV PW           LV EDV/LV EDV Index: 903   Diastolic: 1 cm PO/44 T^1QJ ESV/LV ESV   Septum          Index: 44.1 ml/20 m^2       RV Diastolic Dimension: 3.2 cm   Diastolic: 0.9  EF Calculated: 59.2 %   cm                                          LA/Aorta: 1.46     Doppler Measurements & Calculations      MV Peak E-Wave: 98.7 cm/s AV Peak Velocity: 132  LVOT Peak Velocity: 112   MV Peak A-Wave: 79.5 cm/s cm/s                   cm/s   MV E/A Ratio: 1.24        AV Peak Gradient: 6.97 LVOT Peak Gradient: 5   MV Peak Gradient: 3.9     mmHg                   mmHg   mmHg MV Deceleration Time: 180   msec                                             TR Velocity:270 cm/s                                                    TR Gradient:29.16 mmHg                                                    PV Peak Velocity: 83.4                                                    cm/s                             AV DVI (Vmax):0.85     PV Peak Gradient: 2.78                                                    mmHg     [email or web address] [id number]        CATH:     Assessment/Plan     1. CKD stage 4 due to type 2 diabetes mellitus ([address]    2. Pre-op exam    3. Encounter for preprocedural cardiovascular examination     4. [facility] BiV ICD    5. LBBB (left bundle branch block)    6. Type 2 diabetes mellitus with other specified complication, with long-term current use of insulin (HCC)    7. Paget's disease of bone    8. Ex-smoker    9. His of Heparin induced thrombocytopenia    10. Pulmonary emphysema, unspecified emphysema type ([address]      Orders Placed This Encounter   Procedures    Stress test ([facility])     No orders of the defined types were placed in this encounter. Return in about 2 weeks (around 9/10/2018) for Right upper extremity AV fistula.       Electronically signed by Liza Delgado MD   8/27/2018 at 10:06 PM

## 2018-08-31 NOTE — TELEPHONE ENCOUNTER
Spoke with Francheska Ng at [address] are having system issues right now. Francheska Ng did take my number, hopefully he will call me back when he has some answers or when the system comes back up.      26mins

## 2018-09-03 ENCOUNTER — HOSPITAL ENCOUNTER (INPATIENT)
Age: 83
LOS: 4 days | Discharge: NURSING, REHAB OR LONG-TERM CARE | DRG: 683 | End: 2018-09-07
Attending: FAMILY MEDICINE | Admitting: INTERNAL MEDICINE
Payer: MEDICARE

## 2018-09-03 ENCOUNTER — APPOINTMENT (OUTPATIENT)
Dept: GENERAL RADIOLOGY | Age: 83
DRG: 683 | End: 2018-09-03
Payer: MEDICARE

## 2018-09-03 ENCOUNTER — APPOINTMENT (OUTPATIENT)
Dept: CT IMAGING | Age: 83
DRG: 683 | End: 2018-09-03
Payer: MEDICARE

## 2018-09-03 ENCOUNTER — APPOINTMENT (OUTPATIENT)
Dept: ULTRASOUND IMAGING | Age: 83
DRG: 683 | End: 2018-09-03
Payer: MEDICARE

## 2018-09-03 DIAGNOSIS — S40.011A CONTUSION OF MULTIPLE SITES OF RIGHT SHOULDER AND UPPER ARM, INITIAL ENCOUNTER: ICD-10-CM

## 2018-09-03 DIAGNOSIS — S40.021A CONTUSION OF MULTIPLE SITES OF RIGHT SHOULDER AND UPPER ARM, INITIAL ENCOUNTER: ICD-10-CM

## 2018-09-03 DIAGNOSIS — N17.9 AKI (ACUTE KIDNEY INJURY) (HCC): ICD-10-CM
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
## 2018-09-03 PROBLEM — S80.02XA CONTUSION OF LEFT KNEE: Status: ACTIVE | Noted: 2018-09-03

## 2018-09-03 PROBLEM — E87.5 HYPERKALEMIA: Status: ACTIVE | Noted: 2018-09-03

## 2018-09-03 PROBLEM — L73.9 FOLLICULITIS: Status: ACTIVE | Noted: 2018-09-03

## 2018-09-03 LAB
ANION GAP SERPL CALCULATED.3IONS-SCNC: 15 MEQ/L (ref 8–16)
APTT: 42.7 SECONDS (ref 22–38)
BACTERIA: ABNORMAL
BASOPHILS # BLD: 0.5 %
BASOPHILS ABSOLUTE: 0 THOU/MM3 (ref 0–0.1)
BILIRUBIN URINE: NEGATIVE
BLOOD, URINE: NEGATIVE
BUN BLDV-MCNC: 54 MG/DL (ref 7–22)
CALCIUM SERPL-MCNC: 9.6 MG/DL (ref 8.5–10.5)
CASTS: ABNORMAL /LPF
CASTS: ABNORMAL /LPF
CHARACTER, URINE: CLEAR
CHLORIDE BLD-SCNC: 102 MEQ/L (ref 98–111)
CHLORIDE, URINE: 66.3 MEQ/L
CO2: 21 MEQ/L (ref 23–33)
COLOR: YELLOW
CREAT SERPL-MCNC: 3.4 MG/DL (ref 0.4–1.2)
CREATININE URINE: 58.6 MG/DL
CRYSTALS: ABNORMAL
EKG ATRIAL RATE: 110 BPM
EKG P AXIS: 67 DEGREES
EKG Q-T INTERVAL: 458 MS
EKG QRS DURATION: 178 MS
EKG QTC CALCULATION (BAZETT): 569 MS
EKG R AXIS: -117 DEGREES
EKG T AXIS: 72 DEGREES
EKG VENTRICULAR RATE: 93 BPM
EOSINOPHIL # BLD: 3.1 %
EOSINOPHIL SMEAR: NORMAL
EOSINOPHILS ABSOLUTE: 0.2 THOU/MM3 (ref 0–0.4)
EPITHELIAL CELLS, UA: ABNORMAL /HPF
ERYTHROCYTE [DISTWIDTH] IN BLOOD BY AUTOMATED COUNT: 15.1 % (ref 11.5–14.5)
ERYTHROCYTE [DISTWIDTH] IN BLOOD BY AUTOMATED COUNT: 47.9 FL (ref 35–45)
GFR SERPL CREATININE-BSD FRML MDRD: 13 ML/MIN/1.73M2
GLUCOSE BLD-MCNC: 108 MG/DL (ref 70–108)
GLUCOSE BLD-MCNC: 109 MG/DL (ref 70–108)
GLUCOSE BLD-MCNC: 112 MG/DL (ref 70–108)
GLUCOSE BLD-MCNC: 234 MG/DL (ref 70–108)
GLUCOSE, URINE: NEGATIVE MG/DL
HCT VFR BLD CALC: 33.7 % (ref 37–47)
HEMOGLOBIN: 10.6 GM/DL (ref 12–16)
IMMATURE GRANS (ABS): 0.03 THOU/MM3 (ref 0–0.07)
IMMATURE GRANULOCYTES: 0.5 %
INR BLD: 2.09 (ref 0.85–1.13)
KETONES, URINE: NEGATIVE
LEUKOCYTE ESTERASE, URINE: NEGATIVE
LYMPHOCYTES # BLD: 23.6 %
LYMPHOCYTES ABSOLUTE: 1.4 THOU/MM3 (ref 1–4.8)
MCH RBC QN AUTO: 27.9 PG (ref 26–33)
MCHC RBC AUTO-ENTMCNC: 31.5 GM/DL (ref 32.2–35.5)
MCV RBC AUTO: 88.7 FL (ref 81–99)
MISCELLANEOUS LAB TEST RESULT: ABNORMAL
MONOCYTES # BLD: 9.7 %
MONOCYTES ABSOLUTE: 0.6 THOU/MM3 (ref 0.4–1.3)
NITRITE, URINE: NEGATIVE
NUCLEATED RED BLOOD CELLS: 0 /100 WBC
OSMOLALITY CALCULATION: 291 MOSMOL/KG (ref 275–300)
PH UA: 5.5
PLATELET # BLD: 204 THOU/MM3 (ref 130–400)
PMV BLD AUTO: 11.5 FL (ref 9.4–12.4)
POTASSIUM SERPL-SCNC: 5.5 MEQ/L (ref 3.5–5.2)
POTASSIUM SERPL-SCNC: 5.7 MEQ/L (ref 3.5–5.2)
POTASSIUM SERPL-SCNC: 5.9 MEQ/L (ref 3.5–5.2)
POTASSIUM, URINE: 44.7 MEQ/L
PROTEIN UA: 30 MG/DL
RBC # BLD: 3.8 MILL/MM3 (ref 4.2–5.4)
RBC URINE: ABNORMAL /HPF
RENAL EPITHELIAL, UA: ABNORMAL
SEG NEUTROPHILS: 62.6 %
SEGMENTED NEUTROPHILS ABSOLUTE COUNT: 3.8 THOU/MM3 (ref 1.8–7.7)
SODIUM BLD-SCNC: 138 MEQ/L (ref 135–145)
SODIUM URINE: 79 MEQ/L
SPECIFIC GRAVITY UA: 1.01 (ref 1–1.03)
SPECIMEN: NORMAL
UROBILINOGEN, URINE: 0.2 EU/DL
WBC # BLD: 6.1 THOU/MM3 (ref 4.8–10.8)
WBC UA: ABNORMAL /HPF
YEAST: ABNORMAL

## 2018-09-03 PROCEDURE — 6370000000 HC RX 637 (ALT 250 FOR IP): Performed by: INTERNAL MEDICINE

## 2018-09-03 PROCEDURE — 90471 IMMUNIZATION ADMIN: CPT | Performed by: FAMILY MEDICINE

## 2018-09-03 PROCEDURE — 82570 ASSAY OF URINE CREATININE: CPT

## 2018-09-03 PROCEDURE — 87075 CULTR BACTERIA EXCEPT BLOOD: CPT

## 2018-09-03 PROCEDURE — 2580000003 HC RX 258: Performed by: INTERNAL MEDICINE

## 2018-09-03 PROCEDURE — 87070 CULTURE OTHR SPECIMN AEROBIC: CPT

## 2018-09-03 PROCEDURE — 73564 X-RAY EXAM KNEE 4 OR MORE: CPT

## 2018-09-03 PROCEDURE — 84133 ASSAY OF URINE POTASSIUM: CPT

## 2018-09-03 PROCEDURE — 99285 EMERGENCY DEPT VISIT HI MDM: CPT

## 2018-09-03 PROCEDURE — 73060 X-RAY EXAM OF HUMERUS: CPT

## 2018-09-03 PROCEDURE — 99222 1ST HOSP IP/OBS MODERATE 55: CPT | Performed by: SURGERY

## 2018-09-03 PROCEDURE — 85025 COMPLETE CBC W/AUTO DIFF WBC: CPT

## 2018-09-03 PROCEDURE — 94640 AIRWAY INHALATION TREATMENT: CPT

## 2018-09-03 PROCEDURE — 72170 X-RAY EXAM OF PELVIS: CPT

## 2018-09-03 PROCEDURE — 87077 CULTURE AEROBIC IDENTIFY: CPT

## 2018-09-03 PROCEDURE — 6820000001 HC L2 TRAUMA SURGERY EVALUATION

## 2018-09-03 PROCEDURE — 6370000000 HC RX 637 (ALT 250 FOR IP): Performed by: PHYSICIAN ASSISTANT

## 2018-09-03 PROCEDURE — 1200000003 HC TELEMETRY R&B

## 2018-09-03 PROCEDURE — 73030 X-RAY EXAM OF SHOULDER: CPT

## 2018-09-03 PROCEDURE — 90715 TDAP VACCINE 7 YRS/> IM: CPT | Performed by: FAMILY MEDICINE

## 2018-09-03 PROCEDURE — 87147 CULTURE TYPE IMMUNOLOGIC: CPT

## 2018-09-03 PROCEDURE — 73590 X-RAY EXAM OF LOWER LEG: CPT

## 2018-09-03 PROCEDURE — 84300 ASSAY OF URINE SODIUM: CPT

## 2018-09-03 PROCEDURE — 76770 US EXAM ABDO BACK WALL COMP: CPT

## 2018-09-03 PROCEDURE — 93010 ELECTROCARDIOGRAM REPORT: CPT | Performed by: NUCLEAR MEDICINE

## 2018-09-03 PROCEDURE — 89190 NASAL SMEAR FOR EOSINOPHILS: CPT

## 2018-09-03 PROCEDURE — 87205 SMEAR GRAM STAIN: CPT

## 2018-09-03 PROCEDURE — 85730 THROMBOPLASTIN TIME PARTIAL: CPT

## 2018-09-03 PROCEDURE — 6360000002 HC RX W HCPCS: Performed by: INTERNAL MEDICINE

## 2018-09-03 PROCEDURE — 82436 ASSAY OF URINE CHLORIDE: CPT

## 2018-09-03 PROCEDURE — 93005 ELECTROCARDIOGRAM TRACING: CPT | Performed by: FAMILY MEDICINE

## 2018-09-03 PROCEDURE — 85610 PROTHROMBIN TIME: CPT

## 2018-09-03 PROCEDURE — 81001 URINALYSIS AUTO W/SCOPE: CPT

## 2018-09-03 PROCEDURE — 84132 ASSAY OF SERUM POTASSIUM: CPT

## 2018-09-03 PROCEDURE — 87040 BLOOD CULTURE FOR BACTERIA: CPT

## 2018-09-03 PROCEDURE — 82948 REAGENT STRIP/BLOOD GLUCOSE: CPT

## 2018-09-03 PROCEDURE — 71046 X-RAY EXAM CHEST 2 VIEWS: CPT

## 2018-09-03 PROCEDURE — 36415 COLL VENOUS BLD VENIPUNCTURE: CPT

## 2018-09-03 PROCEDURE — 70450 CT HEAD/BRAIN W/O DYE: CPT

## 2018-09-03 PROCEDURE — 2580000003 HC RX 258: Performed by: FAMILY MEDICINE

## 2018-09-03 PROCEDURE — APPSS180 APP SPLIT SHARED TIME > 60 MINUTES: Performed by: PHYSICIAN ASSISTANT

## 2018-09-03 PROCEDURE — 2709999900 HC NON-CHARGEABLE SUPPLY

## 2018-09-03 PROCEDURE — 87186 SC STD MICRODIL/AGAR DIL: CPT

## 2018-09-03 PROCEDURE — 6360000002 HC RX W HCPCS: Performed by: FAMILY MEDICINE

## 2018-09-03 PROCEDURE — 80048 BASIC METABOLIC PNL TOTAL CA: CPT

## 2018-09-03 RX ORDER — IPRATROPIUM BROMIDE AND ALBUTEROL SULFATE 2.5; .5 MG/3ML; MG/3ML
3 SOLUTION RESPIRATORY (INHALATION)
Status: DISCONTINUED | OUTPATIENT
Start: 2018-09-03 | End: 2018-09-07 | Stop reason: HOSPADM

## 2018-09-03 RX ORDER — ONDANSETRON 4 MG/1
4 TABLET, FILM COATED ORAL 2 TIMES DAILY
Status: DISCONTINUED | OUTPATIENT
Start: 2018-09-03 | End: 2018-09-07 | Stop reason: HOSPADM

## 2018-09-03 RX ORDER — ONDANSETRON 2 MG/ML
4 INJECTION INTRAMUSCULAR; INTRAVENOUS EVERY 6 HOURS PRN
Status: DISCONTINUED | OUTPATIENT
Start: 2018-09-03 | End: 2018-09-03 | Stop reason: ALTCHOICE

## 2018-09-03 RX ORDER — SODIUM CHLORIDE 0.9 % (FLUSH) 0.9 %
10 SYRINGE (ML) INJECTION PRN
Status: DISCONTINUED | OUTPATIENT
Start: 2018-09-03 | End: 2018-09-07 | Stop reason: HOSPADM

## 2018-09-03 RX ORDER — PANTOPRAZOLE SODIUM 40 MG/1
40 TABLET, DELAYED RELEASE ORAL 2 TIMES DAILY
Status: DISCONTINUED | OUTPATIENT
Start: 2018-09-03 | End: 2018-09-07 | Stop reason: HOSPADM

## 2018-09-03 RX ORDER — FERROUS SULFATE 325(65) MG
325 TABLET ORAL 2 TIMES DAILY
Status: DISCONTINUED | OUTPATIENT
Start: 2018-09-03 | End: 2018-09-07 | Stop reason: HOSPADM

## 2018-09-03 RX ORDER — MORPHINE SULFATE 2 MG/ML
2 INJECTION, SOLUTION INTRAMUSCULAR; INTRAVENOUS ONCE
Status: COMPLETED | OUTPATIENT
Start: 2018-09-03 | End: 2018-09-03

## 2018-09-03 RX ORDER — DOCUSATE SODIUM 100 MG/1
200 CAPSULE, LIQUID FILLED ORAL DAILY
Status: DISCONTINUED | OUTPATIENT
Start: 2018-09-03 | End: 2018-09-07 | Stop reason: HOSPADM

## 2018-09-03 RX ORDER — CLOPIDOGREL BISULFATE 75 MG/1
75 TABLET ORAL DAILY
Status: DISCONTINUED | OUTPATIENT
Start: 2018-09-03 | End: 2018-09-07 | Stop reason: HOSPADM

## 2018-09-03 RX ORDER — GUAIFENESIN 100 MG/5ML
200 SOLUTION ORAL EVERY 4 HOURS PRN
Status: DISCONTINUED | OUTPATIENT
Start: 2018-09-03 | End: 2018-09-07 | Stop reason: HOSPADM

## 2018-09-03 RX ORDER — DOXYCYCLINE HYCLATE 100 MG
100 TABLET ORAL EVERY 12 HOURS SCHEDULED
Status: DISCONTINUED | OUTPATIENT
Start: 2018-09-03 | End: 2018-09-07 | Stop reason: HOSPADM

## 2018-09-03 RX ORDER — METOPROLOL SUCCINATE 50 MG/1
50 TABLET, EXTENDED RELEASE ORAL DAILY
Status: DISCONTINUED | OUTPATIENT
Start: 2018-09-03 | End: 2018-09-07 | Stop reason: HOSPADM

## 2018-09-03 RX ORDER — PRAVASTATIN SODIUM 40 MG
40 TABLET ORAL NIGHTLY
Status: DISCONTINUED | OUTPATIENT
Start: 2018-09-03 | End: 2018-09-07 | Stop reason: HOSPADM

## 2018-09-03 RX ORDER — HEPARIN SODIUM 5000 [USP'U]/ML
5000 INJECTION, SOLUTION INTRAVENOUS; SUBCUTANEOUS EVERY 8 HOURS SCHEDULED
Status: DISCONTINUED | OUTPATIENT
Start: 2018-09-03 | End: 2018-09-03 | Stop reason: ALTCHOICE

## 2018-09-03 RX ORDER — ASCORBIC ACID 500 MG
500 TABLET ORAL 2 TIMES DAILY
Status: DISCONTINUED | OUTPATIENT
Start: 2018-09-03 | End: 2018-09-07 | Stop reason: HOSPADM

## 2018-09-03 RX ORDER — 0.9 % SODIUM CHLORIDE 0.9 %
500 INTRAVENOUS SOLUTION INTRAVENOUS ONCE
Status: COMPLETED | OUTPATIENT
Start: 2018-09-03 | End: 2018-09-03

## 2018-09-03 RX ORDER — MULTIVITAMIN WITH FOLIC ACID 400 MCG
1 TABLET ORAL EVERY MORNING
Status: DISCONTINUED | OUTPATIENT
Start: 2018-09-04 | End: 2018-09-07 | Stop reason: HOSPADM

## 2018-09-03 RX ORDER — DOCUSATE SODIUM 100 MG/1
100 CAPSULE, LIQUID FILLED ORAL 2 TIMES DAILY
Status: DISCONTINUED | OUTPATIENT
Start: 2018-09-03 | End: 2018-09-03 | Stop reason: SDUPTHER

## 2018-09-03 RX ORDER — SODIUM POLYSTYRENE SULFONATE 15 G/60ML
30 SUSPENSION ORAL; RECTAL ONCE
Status: COMPLETED | OUTPATIENT
Start: 2018-09-03 | End: 2018-09-03

## 2018-09-03 RX ORDER — SODIUM CHLORIDE 9 MG/ML
INJECTION, SOLUTION INTRAVENOUS CONTINUOUS
Status: DISCONTINUED | OUTPATIENT
Start: 2018-09-03 | End: 2018-09-03

## 2018-09-03 RX ORDER — HYDROCODONE BITARTRATE AND ACETAMINOPHEN 5; 325 MG/1; MG/1
1 TABLET ORAL EVERY 6 HOURS PRN
Status: DISCONTINUED | OUTPATIENT
Start: 2018-09-03 | End: 2018-09-07 | Stop reason: HOSPADM

## 2018-09-03 RX ORDER — ISOSORBIDE MONONITRATE 60 MG/1
60 TABLET, EXTENDED RELEASE ORAL DAILY
Status: DISCONTINUED | OUTPATIENT
Start: 2018-09-03 | End: 2018-09-07 | Stop reason: HOSPADM

## 2018-09-03 RX ORDER — BISACODYL 10 MG
10 SUPPOSITORY, RECTAL RECTAL DAILY PRN
Status: DISCONTINUED | OUTPATIENT
Start: 2018-09-03 | End: 2018-09-07 | Stop reason: HOSPADM

## 2018-09-03 RX ORDER — LANOLIN ALCOHOL/MO/W.PET/CERES
1000 CREAM (GRAM) TOPICAL DAILY
Status: DISCONTINUED | OUTPATIENT
Start: 2018-09-03 | End: 2018-09-07 | Stop reason: HOSPADM

## 2018-09-03 RX ORDER — SODIUM CHLORIDE 0.9 % (FLUSH) 0.9 %
10 SYRINGE (ML) INJECTION EVERY 12 HOURS SCHEDULED
Status: DISCONTINUED | OUTPATIENT
Start: 2018-09-03 | End: 2018-09-07 | Stop reason: HOSPADM

## 2018-09-03 RX ORDER — ONDANSETRON 4 MG/1
4 TABLET, FILM COATED ORAL EVERY 6 HOURS PRN
Status: DISCONTINUED | OUTPATIENT
Start: 2018-09-03 | End: 2018-09-07 | Stop reason: HOSPADM

## 2018-09-03 RX ORDER — LIDOCAINE 50 MG/G
2 PATCH TOPICAL DAILY
Status: DISCONTINUED | OUTPATIENT
Start: 2018-09-03 | End: 2018-09-07 | Stop reason: HOSPADM

## 2018-09-03 RX ORDER — INSULIN GLARGINE 100 [IU]/ML
22 INJECTION, SOLUTION SUBCUTANEOUS NIGHTLY
Status: DISCONTINUED | OUTPATIENT
Start: 2018-09-03 | End: 2018-09-07 | Stop reason: HOSPADM

## 2018-09-03 RX ORDER — HYDRALAZINE HYDROCHLORIDE 25 MG/1
25 TABLET, FILM COATED ORAL 3 TIMES DAILY
Status: DISCONTINUED | OUTPATIENT
Start: 2018-09-03 | End: 2018-09-07 | Stop reason: HOSPADM

## 2018-09-03 RX ORDER — WARFARIN SODIUM 3 MG/1
3 TABLET ORAL
Status: COMPLETED | OUTPATIENT
Start: 2018-09-03 | End: 2018-09-03

## 2018-09-03 RX ORDER — SODIUM CHLORIDE 9 MG/ML
INJECTION, SOLUTION INTRAVENOUS CONTINUOUS
Status: DISCONTINUED | OUTPATIENT
Start: 2018-09-03 | End: 2018-09-07 | Stop reason: HOSPADM

## 2018-09-03 RX ADMIN — INSULIN GLARGINE 22 UNITS: 100 INJECTION, SOLUTION SUBCUTANEOUS at 22:30

## 2018-09-03 RX ADMIN — DOXYCYCLINE HYCLATE 100 MG: 100 TABLET, COATED ORAL at 22:29

## 2018-09-03 RX ADMIN — TETANUS TOXOID, REDUCED DIPHTHERIA TOXOID AND ACELLULAR PERTUSSIS VACCINE, ADSORBED 0.5 ML: 5; 2.5; 8; 8; 2.5 SUSPENSION INTRAMUSCULAR at 09:48

## 2018-09-03 RX ADMIN — GUAIFENESIN 200 MG: 200 SOLUTION ORAL at 16:56

## 2018-09-03 RX ADMIN — HYDRALAZINE HYDROCHLORIDE 25 MG: 25 TABLET ORAL at 22:29

## 2018-09-03 RX ADMIN — WARFARIN SODIUM 3 MG: 3 TABLET ORAL at 18:47

## 2018-09-03 RX ADMIN — Medication 500 MG: at 22:29

## 2018-09-03 RX ADMIN — DOCUSATE SODIUM 200 MG: 100 CAPSULE, LIQUID FILLED ORAL at 16:58

## 2018-09-03 RX ADMIN — SODIUM CHLORIDE: 9 INJECTION, SOLUTION INTRAVENOUS at 16:09

## 2018-09-03 RX ADMIN — Medication 1000 MCG: at 16:55

## 2018-09-03 RX ADMIN — ISOSORBIDE MONONITRATE 60 MG: 60 TABLET ORAL at 16:58

## 2018-09-03 RX ADMIN — PRAVASTATIN SODIUM 40 MG: 40 TABLET ORAL at 22:29

## 2018-09-03 RX ADMIN — HYDRALAZINE HYDROCHLORIDE 25 MG: 25 TABLET ORAL at 16:58

## 2018-09-03 RX ADMIN — HYDROCODONE BITARTRATE AND ACETAMINOPHEN 1 TABLET: 5; 325 TABLET ORAL at 16:55

## 2018-09-03 RX ADMIN — IPRATROPIUM BROMIDE AND ALBUTEROL SULFATE 3 ML: .5; 3 SOLUTION RESPIRATORY (INHALATION) at 21:36

## 2018-09-03 RX ADMIN — FERROUS SULFATE TAB 325 MG (65 MG ELEMENTAL FE) 325 MG: 325 (65 FE) TAB at 22:30

## 2018-09-03 RX ADMIN — MORPHINE SULFATE 2 MG: 2 INJECTION, SOLUTION INTRAMUSCULAR; INTRAVENOUS at 12:51

## 2018-09-03 RX ADMIN — METOPROLOL SUCCINATE 50 MG: 50 TABLET, FILM COATED, EXTENDED RELEASE ORAL at 16:58

## 2018-09-03 RX ADMIN — SODIUM CHLORIDE 500 ML: 9 INJECTION, SOLUTION INTRAVENOUS at 09:44

## 2018-09-03 RX ADMIN — CLOPIDOGREL BISULFATE 75 MG: 75 TABLET ORAL at 17:00

## 2018-09-03 RX ADMIN — ONDANSETRON HYDROCHLORIDE 4 MG: 4 TABLET, FILM COATED ORAL at 22:29

## 2018-09-03 RX ADMIN — SODIUM POLYSTYRENE SULFONATE 30 G: 15 SUSPENSION ORAL; RECTAL at 16:56

## 2018-09-03 RX ADMIN — PANTOPRAZOLE SODIUM 40 MG: 40 TABLET, DELAYED RELEASE ORAL at 22:30

## 2018-09-03 RX ADMIN — IPRATROPIUM BROMIDE AND ALBUTEROL SULFATE 3 ML: .5; 3 SOLUTION RESPIRATORY (INHALATION) at 18:02

## 2018-09-03 RX ADMIN — SODIUM CHLORIDE: 9 INJECTION, SOLUTION INTRAVENOUS at 15:54

## 2018-09-03 ASSESSMENT — ENCOUNTER SYMPTOMS
RHINORRHEA: 0
SINUS PRESSURE: 0
ANAL BLEEDING: 0
EYE PAIN: 0
BLOOD IN STOOL: 0
EYE REDNESS: 0
PHOTOPHOBIA: 0
NAUSEA: 0
COLOR CHANGE: 0
CONSTIPATION: 0
WHEEZING: 0
VOICE CHANGE: 0
STRIDOR: 0
BACK PAIN: 0
SINUS PAIN: 0
EYE DISCHARGE: 0
SORE THROAT: 0
COUGH: 0
RECTAL PAIN: 0
DIARRHEA: 0
VOMITING: 0
TROUBLE SWALLOWING: 0
EYE ITCHING: 0
ABDOMINAL PAIN: 0
APNEA: 0
CHOKING: 0
SHORTNESS OF BREATH: 0
FACIAL SWELLING: 0
ABDOMINAL DISTENTION: 0
CHEST TIGHTNESS: 0

## 2018-09-03 ASSESSMENT — PAIN SCALES - GENERAL
PAINLEVEL_OUTOF10: 0
PAINLEVEL_OUTOF10: 9
PAINLEVEL_OUTOF10: 0
PAINLEVEL_OUTOF10: 10
PAINLEVEL_OUTOF10: 9

## 2018-09-03 ASSESSMENT — PAIN DESCRIPTION - ORIENTATION
ORIENTATION: LEFT
ORIENTATION_2: RIGHT

## 2018-09-03 ASSESSMENT — PAIN DESCRIPTION - PAIN TYPE
TYPE_2: ACUTE PAIN
TYPE: ACUTE PAIN;CHRONIC PAIN

## 2018-09-03 ASSESSMENT — PAIN DESCRIPTION - LOCATION
LOCATION_2: SHOULDER
LOCATION: LEG

## 2018-09-03 ASSESSMENT — PAIN DESCRIPTION - INTENSITY: RATING_2: 9

## 2018-09-03 NOTE — PROGRESS NOTES
range.     Thank you for the consult.      Karie Bryan, PharmD, [facility] [place] [facility]  9/3/2018  5:01 PM

## 2018-09-03 NOTE — ED PROVIDER NOTES
Take 200 mg by mouth daily    FERROUS SULFATE (JAMIR-RAJANI) 325 (65 FE) MG TABLET    Take 1 tablet by mouth 2 times daily    GUAIFENESIN (ROBITUSSIN) 100 MG/5ML SYRUP    Take 200 mg by mouth every 4 hours as needed for Cough    HYDRALAZINE (APRESOLINE) 25 MG TABLET    Take 3 tablets by mouth 3 times daily    INSULIN ASPART (NOVOLOG) 100 UNIT/ML INJECTION VIAL    Inject 12 Units into the skin 2 times daily (with meals)    INSULIN ASPART (NOVOLOG) 100 UNIT/ML INJECTION VIAL    Inject 8 Units into the skin Daily with lunch    INSULIN ASPART (NOVOLOG) 100 UNIT/ML INJECTION VIAL    Inject 0-6 Units into the skin 2 times daily (with meals) Low Dose Corrective Insulin Scale for Meals 0-150 = No insulin, 151-200 = 1 unit, 201-250 = 2 units, 251-300 = 3 units, 301-350 = 4 units, 351-400 = 5 units, 401 - 450 = 6 units    INSULIN GLARGINE (LANTUS) 100 UNIT/ML INJECTION VIAL    Inject 22 Units into the skin nightly    IPRATROPIUM-ALBUTEROL (DUONEB) 0.5-2.5 (3) MG/3ML SOLN NEBULIZER SOLUTION    Inhale 3 mLs into the lungs every 4 hours (while awake)    ISOSORBIDE MONONITRATE (IMDUR) 60 MG EXTENDED RELEASE TABLET    Take 60 mg by mouth daily    MAGNESIUM HYDROXIDE (MILK OF MAGNESIA) 400 MG/5ML SUSPENSION    Take 30 mLs by mouth daily as needed for Constipation    METOPROLOL SUCCINATE (TOPROL XL) 50 MG EXTENDED RELEASE TABLET    Take 1 tablet by mouth daily    MULTIPLE VITAMINS-MINERALS (MULTIVITAL) TABS    Take 1 tablet by mouth every morning    MULTIPLE VITAMINS-MINERALS (OCUVITE EXTRA PO)    Take 1 tablet by mouth daily     ONDANSETRON (ZOFRAN) 4 MG TABLET    Take 4 mg by mouth 2 times daily     PANTOPRAZOLE (PROTONIX) 40 MG TABLET    Take 40 mg by mouth 2 times daily     POTASSIUM CHLORIDE (KLOR-CON M) 20 MEQ EXTENDED RELEASE TABLET    Take 0.5 tablets by mouth daily    PRAVASTATIN (PRAVACHOL) 40 MG TABLET    Take 40 mg by mouth every evening Indications: Blood Cholesterol Abnormal     VITAMIN B-12 (CYANOCOBALAMIN) 1000 MCG TABLET    Take 1,000 mcg by mouth daily    VITAMIN C (ASCORBIC ACID) 500 MG TABLET    Take 500 mg by mouth 2 times daily    WARFARIN (COUMADIN) 3 MG TABLET    Take 3 mg by mouth daily       ALLERGIES     has No Known Allergies. FAMILY HISTORY     indicated that her mother is . She indicated that her father is . She indicated that both of her sisters are . She indicated that both of her brothers are . She indicated that her maternal grandmother is . She indicated that her maternal grandfather is . She indicated that her paternal grandmother is . She indicated that her paternal grandfather is . family history includes Cancer in her brother, sister, and sister; Diabetes in her father and mother; Heart Disease in her brother and mother; Pacemaker in her mother; Stroke in her mother. SOCIAL HISTORY      reports that she quit smoking about 66 years ago. Her smoking use included Cigarettes. She has a 1.00 pack-year smoking history. She has never used smokeless tobacco. She reports that she does not drink alcohol or use drugs. PHYSICAL EXAM     INITIAL VITALS:  weight is 250 lb (113.4 kg). Her oral temperature is 97.7 °F (36.5 °C). Her blood pressure is 131/55 (abnormal) and her pulse is 90. Her respiration is 16 and oxygen saturation is 93%. Physical Exam   Constitutional: She is oriented to person, place, and time. She appears well-developed and well-nourished. GCS 15   HENT:   She has some frontal scalp sores which ooze a little bit of blood but appear to be nontraumatic in origin    No scalp tenderness elsewhere    Nose and face normal             Eyes: Pupils are equal, round, and reactive to light. Conjunctivae and EOM are normal.   Neck: Normal range of motion. Neck supple. No JVD present. No direct neck tenderness to palpation   Cardiovascular: Normal rate and regular rhythm.     Pulmonary/Chest: Effort normal and breath sounds normal. She exhibits no tenderness. Nontender over the sternum, clavicle, ribs   Abdominal: Soft. Bowel sounds are normal. There is no tenderness. There is no rebound and no guarding. Musculoskeletal:   Tender over the right shoulder without deformity    Tender over both shins    Skin tear right shin mild       Lymphadenopathy:     She has no cervical adenopathy. Neurological: She is alert and oriented to person, place, and time. She exhibits normal muscle tone. Skin: Skin is warm and dry. She has some frontal scalp sores which are small raised bumps some erosions of the epidermis with little bit of dermal oozing   Psychiatric: She has a normal mood and affect. Her behavior is normal.   Nursing note and vitals reviewed. DIFFERENTIAL DIAGNOSIS:       Fall with multiple contusions    Question of history of hitting her head on Coumadin        DIAGNOSTIC RESULTS     EKG: All EKG's are interpreted by the Emergency Department Physician who either signs or Co-signs this chart in the absence of a cardiologist.  EKG interpreted by Moo Amador MD:    EKG showed paced rhythm with rate 93. QRS complexes show left axis, 170 ms conduction. ST-T waves show changes secondary to paced rhythm        RADIOLOGY: non-plain film images(s) such as CT, Ultrasound and MRI are read by the radiologist.    XR TIBIA FIBULA RIGHT (2 VIEWS)   Final Result   1. No acute fracture or malalignment. **This report has been created using voice recognition software. It may contain minor errors which are inherent in voice recognition technology. **      Final report electronically signed by Dr. Cleveland Magaña on 9/3/2018 9:41 AM      XR TIBIA FIBULA LEFT (2 VIEWS)   Final Result   1. No acute fracture or malalignment. **This report has been created using voice recognition software. It may contain minor errors which are inherent in voice recognition technology. **      Final report electronically signed by Dr. Cleveland Magaña on 9/3/2018 9:39 AM      XR PELVIS (1-2 VIEWS)   Final Result   1. 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 2. No acute fracture. 3. Bilateral hip joint arthrosis is noted, greater on the left. **This report has been created using voice recognition software. It may contain minor errors which are inherent in voice recognition technology. **      Final report electronically signed by Dr. Jorge Lau on 9/3/2018 9:38 AM      XR SHOULDER RIGHT (MIN 2 VIEWS)   Final Result   1. There is a subtle cortical linear lucency demonstrated along the inferior aspect of the glenoid which may represent a nondisplaced fracture. There is also spurring along the inferior glenoid. No other acute fracture or malalignment is otherwise seen. **This report has been created using voice recognition software. It may contain minor errors which are inherent in voice recognition technology. **      Final report electronically signed by Dr. Jorge Lau on 9/3/2018 9:35 AM      XR HUMERUS RIGHT (MIN 2 VIEWS)   Final Result   1. No acute fracture or malalignment is seen. **This report has been created using voice recognition software. It may contain minor errors which are inherent in voice recognition technology. **      Final report electronically signed by Dr. Jorge Lau on 9/3/2018 9:21 AM      XR CHEST STANDARD (2 VW)   Final Result   1. Mild elevation of the right hemidiaphragm. The lung volumes are slightly low. No other acute cardiopulmonary findings are noted. **This report has been created using voice recognition software. It may contain minor errors which are inherent in voice recognition technology. **      Final report electronically signed by Dr. Jorge Lau on 9/3/2018 9:19 AM      CT HEAD WO CONTRAST   Final Result   1. No acute intracranial hemorrhage or mass effect.  Focal areas of chronic appearing encephalomalacia along the right temporal occipital region and anterior left temporal lobe are demonstrated. 2. Mild to moderate chronic small vessel ischemic change. **This report has been created using voice recognition software. It may contain minor errors which are inherent in voice recognition technology. **      Final report electronically signed by Dr. Vida Matt on 9/3/2018 9:09 AM          LABS:   Labs Reviewed   PROTIME-INR - Abnormal; Notable for the following:        Result Value    INR 2.09 (*)     All other components within normal limits   APTT - Abnormal; Notable for the following:     aPTT 42.7 (*)     All other components within normal limits   CBC WITH AUTO DIFFERENTIAL - Abnormal; Notable for the following:     RBC 3.80 (*)     Hemoglobin 10.6 (*)     Hematocrit 33.7 (*)     MCHC 31.5 (*)     RDW-CV 15.1 (*)     RDW-SD 47.9 (*)     All other components within normal limits   BASIC METABOLIC PANEL - Abnormal; Notable for the following:     Potassium 5.7 (*)     CO2 21 (*)     Glucose 109 (*)     BUN 54 (*)     CREATININE 3.4 (*)     All other components within normal limits   GLOMERULAR FILTRATION RATE, ESTIMATED - Abnormal; Notable for the following:     Est, Glom Filt Rate 13 (*)     All other components within normal limits   ANION GAP   OSMOLALITY       EMERGENCY DEPARTMENT COURSE:   Vitals:    Vitals:    09/03/18 0741 09/03/18 0944 09/03/18 1049   BP: (!) 137/58 (!) 150/58 (!) 131/55   Pulse: 95 89 90   Resp: 15 16 16   Temp:  97.7 °F (36.5 °C)    TempSrc:  Oral    SpO2: 94% 94% 93%   Weight: 250 lb (113.4 kg)         7:56 AM: The patient was seen and evaluated.     Nursing notes reviewed    Patient has plain x-rays are negative for fractures    CT of the brain no intracranial bleeding    INR 2.09     anemia 10.6    BUN 54, creatinine 3.4 elevated from previous creatinine of 2.2    Potassium 5.7     Given Boostrix IM    IV hydration    Recommend admission because the acute kidney injury    Did consult

## 2018-09-03 NOTE — ED NOTES
Patient resting quietly in cot showing no signs of distress. Rates pain 10/10. Dr. Chana Salamanca notified. Will continue to monitor.       Jose Rivas RN  09/03/18 [id number]

## 2018-09-03 NOTE — H&P
Trauma H&P     Patient: Corby Penn date: 9/3/2018   YOB: 1934 Date of Evaluation: 9/3/2018  MRN: [id number]  Acct: [de-identified]    Injury Date: 9/3/18  Injury time: Morning  PCP: No primary care provider on file. Referring physician: Mor Lawrence MD    Time of Trauma Surgeon Notification:    Time of Trauma Surgeon Arrival:     Assessment:      Active Problems:    SHAILA (acute kidney injury) ([facility]    Hyperkalemia    Fall    Contusion of right shoulder    Contusion of left knee    Folliculitis  Resolved Problems:    * No resolved hospital problems.  *    Plan:      Patient with no traumatic injuries requiring admission to trauma services  Patient will be admitted to medicine for SHAILA with hyperkalemia  Will order SLP cognitive evaluation 2/2 possible closed head injury   Lidoderm patches in addition to Yumiko Gemma for pain control      Activation: []Level I (Trauma Alert) []Level II (Injury Call) [x]Level III (Trauma Consult)  Mode of Arrival: EMS transportation  Referring Facility:   Loss of Consciousness [x]No []Yes[]Unknown  N/A Duration(min)  Mechanism of Injury:  []Motor Vehicle crash   []Single Vehicle [] []Passenger []Scene Fatality []Front Seat  []Restrained   []Air Bag Deployed   []Ejected []Rollover []Pedestrian []Trapped   Type of vehicle:   Protective Devices:   []Motorcycle  Wearing Helmet []Yes []No  []Bicycle  Wearing Helmet []Yes []No  [x]Fall   Distance - Standing  []Assault    Abuse Reported []Yes []No  []Gunshot  []Stabbing  []Work Related  []Burn: []Flame []Scald []Electrical []Chemical []Contact []Inhalation []House Fire  []Other:   Patient Active Problem List   Diagnosis    Cardiomyopathy, nonischemic ([facility]    Hypertension    LBBB (left bundle branch block)    Hyperlipidemia    [facility] [employer] ICD    CKD (chronic kidney disease) stage 3, GFR 30-59 ml/min    Ex-smoker    Mild carotid artery disease ([facility]    CAD (coronary artery disease)    Hyperkalemia    numbness or tingling of bilateral upper and lower extremities. No focal deficits were noted on neurological examination. Labs revealed incidental findings of SHAILA and hyperkalemia. CT imaging of head was negative for acute process, x-rays of right and left tibia/fibular were negative. X-ray of chest, pelvis, right shoulder, and right humerus were all negative as well. Patient will be admitted under medicine service for SHAILA and hyperkalemia. Trauma services will place consult for SLP cognitive evaluation secondary to head injury, and will see patient as necessary. Review of Systems:   Review of Systems   Constitutional: Negative for activity change, appetite change, chills, diaphoresis, fatigue, fever and unexpected weight change. HENT: Negative for congestion, dental problem, drooling, ear discharge, ear pain, facial swelling, hearing loss, mouth sores, nosebleeds, postnasal drip, rhinorrhea, sinus pain, sinus pressure, sneezing, sore throat, tinnitus, trouble swallowing and voice change. Eyes: Negative for photophobia, pain, discharge, redness, itching and visual disturbance. Respiratory: Negative for apnea, cough, choking, chest tightness, shortness of breath, wheezing and stridor. Cardiovascular: Negative for chest pain, palpitations and leg swelling. Gastrointestinal: Negative for abdominal distention, abdominal pain, anal bleeding, blood in stool, constipation, diarrhea, nausea, rectal pain and vomiting. Endocrine: Negative for cold intolerance, heat intolerance, polydipsia, polyphagia and polyuria. Genitourinary: Negative for difficulty urinating, dysuria, enuresis, flank pain and frequency. Musculoskeletal: Positive for arthralgias and myalgias. Negative for back pain, gait problem, joint swelling, neck pain and neck stiffness. Skin: Positive for wound (Scalp wound, right LE skin tear). Negative for color change, pallor and rash.    Allergic/Immunologic: Negative for environmental allergies, food allergies and immunocompromised state. Neurological: Positive for headaches. Negative for dizziness, tremors, seizures, syncope, facial asymmetry, speech difficulty, weakness, light-headedness and numbness. Hematological: Negative for adenopathy. Bruises/bleeds easily. Psychiatric/Behavioral: Negative for agitation, confusion, decreased concentration, dysphoric mood, hallucinations, self-injury, sleep disturbance and suicidal ideas. The patient is not nervous/anxious and is not hyperactive. Patient has no known allergies. Past Surgical History:   Procedure Laterality Date    BREAST SURGERY      s/p right mastectomy 1980's and left mastectomy 2007    CARDIAC CATHETERIZATION  11-    Hemodynamics w pulmonary hypertension, systemic hypertension and no sats gradient difference. No aortic stenosis. No mitral stenosis. Coronaries; mild disease of the LAD not more than 40%. LV; severe LV dysfunction and EF 30-35%.  CARDIAC CATHETERIZATION  05-    Mild disease of small distal LAD (approximately  50% stenosis) angiographically normal CA. Mild to moderate LV systolic dysfunction. EF 35%. Mildly elevated LV end diastolic pressure. No significant MR. Systemic hypertension.  CARDIAC DEFIBRILLATOR PLACEMENT  02/02/2011    CARDIOVASCULAR STRESS TEST  03/23/10    EF 30%  Severe LV Dys    COLONOSCOPY      Dr. Ferro Factor  03/22/10    EF 45%. LV mildly dilated. Systolic function mildly reducted. No regional wall motion abnormalities. Wall thickness mildly increased. LA mildly dilated. MV mild annular calification. Mild TR. Ventricular septum tickness mildly increased.  ENDOSCOPY, COLON, DIAGNOSTIC      EYE SURGERY      bilateral cataracts    EYE SURGERY Left 9/2014    laser surgery for retinopathy?     FOOT SURGERY Left 1/12/2016    PACEMAKER PLACEMENT      [place] EGD BALLOON DILATION ESOPHAGUS <30 MM DIAM N/A 9/18/2017    EGD Mild disease of small distal LAD (approximately  50% stenosis) angiographically normal CA. Mild to moderate LV systolic dysfunction. EF 35%. Mildly elevated LV end diastolic pressure. No significant MR. Systemic hypertension.  CARDIAC DEFIBRILLATOR PLACEMENT  02/02/2011    CARDIOVASCULAR STRESS TEST  03/23/10    EF 30%  Severe LV Dys    COLONOSCOPY      Dr. Martha Braun  03/22/10    EF 45%. LV mildly dilated. Systolic function mildly reducted. No regional wall motion abnormalities. Wall thickness mildly increased. LA mildly dilated. MV mild annular calification. Mild TR. Ventricular septum tickness mildly increased.  ENDOSCOPY, COLON, DIAGNOSTIC      EYE SURGERY      bilateral cataracts    EYE SURGERY Left 9/2014    laser surgery for retinopathy?  FOOT SURGERY Left 1/12/2016    PACEMAKER PLACEMENT      [place] EGD BALLOON DILATION ESOPHAGUS <30 MM DIAM N/A 9/18/2017    EGD ESOPHAGOGASTRODUODENOSCOPY DILATATION performed by Berto Cordon MD at [address] [facility]    NE ESOPHAGOGASTRODUODENOSCOPY TRANSORAL DIAGNOSTIC  9/18/2017    EGD ESOPHAGOGASTRODUODENOSCOPY performed by Berto Cordon MD at [facility] Marital status:       Spouse name: N/A    Number of children: 0    Years of education: N/A     Occupational History    Retired       Social History Main Topics    Smoking status: Former Smoker     Packs/day: 0.50     Years: 2.00     Types: Cigarettes     Quit date: 1/1/1952    Smokeless tobacco: Never Used    Alcohol use No    Drug use: No    Sexual activity: No     Other Topics Concern    None     Social History Narrative    None     Family History   Problem Relation Age of Onset    Diabetes Mother    [facility] Pacemaker Mother     Stroke Mother     Heart Disease Mother     Diabetes Father     Cancer Sister     Cancer Brother     Cancer Sister     Heart Disease Brother         cardiomegaly       Home medications:    Previous Medications    ACETAMINOPHEN (TYLENOL) 325 MG TABLET    Take 650 mg by mouth 4 times daily     BISACODYL (DULCOLAX) 10 MG SUPPOSITORY    Place 10 mg rectally daily as needed for Constipation    BUMETANIDE (BUMEX) 1 MG TABLET    Take 2 tablets by mouth 2 times daily    CLOPIDOGREL (PLAVIX) 75 MG TABLET    Take 75 mg by mouth daily    DOCUSATE SODIUM (COLACE) 100 MG CAPSULE    Take 200 mg by mouth daily    FERROUS SULFATE (JAMIR-RAJANI) 325 (65 FE) MG TABLET    Take 1 tablet by mouth 2 times daily    GUAIFENESIN (ROBITUSSIN) 100 MG/5ML SYRUP    Take 200 mg by mouth every 4 hours as needed for Cough    HYDRALAZINE (APRESOLINE) 25 MG TABLET    Take 3 tablets by mouth 3 times daily    INSULIN ASPART (NOVOLOG) 100 UNIT/ML INJECTION VIAL    Inject 12 Units into the skin 2 times daily (with meals)    INSULIN ASPART (NOVOLOG) 100 UNIT/ML INJECTION VIAL    Inject 8 Units into the skin Daily with lunch    INSULIN ASPART (NOVOLOG) 100 UNIT/ML INJECTION VIAL    Inject 0-6 Units into the skin 2 times daily (with meals) Low Dose Corrective Insulin Scale for Meals 0-150 = No insulin, 151-200 = 1 unit, 201-250 = 2 units, 251-300 = 3 units, 301-350 = 4 units, 351-400 = 5 units, 401 - 450 = 6 units    INSULIN GLARGINE (LANTUS) 100 UNIT/ML INJECTION VIAL    Inject 22 Units into the skin nightly    IPRATROPIUM-ALBUTEROL (DUONEB) 0.5-2.5 (3) MG/3ML SOLN NEBULIZER SOLUTION    Inhale 3 mLs into the lungs every 4 hours (while awake)    ISOSORBIDE MONONITRATE (IMDUR) 60 MG EXTENDED RELEASE TABLET    Take 60 mg by mouth daily    MAGNESIUM HYDROXIDE (MILK OF MAGNESIA) 400 MG/5ML SUSPENSION    Take 30 mLs by mouth daily as needed for Constipation    METOPROLOL SUCCINATE (TOPROL XL) 50 MG EXTENDED RELEASE TABLET    Take 1 tablet by mouth daily    MULTIPLE VITAMINS-MINERALS (MULTIVITAL) TABS    Take 1 tablet by mouth every morning    MULTIPLE VITAMINS-MINERALS (OCUVITE EXTRA PO)    Take 1 tablet by mouth daily ONDANSETRON (ZOFRAN) 4 MG TABLET    Take 4 mg by mouth 2 times daily     PANTOPRAZOLE (PROTONIX) 40 MG TABLET    Take 40 mg by mouth 2 times daily     POTASSIUM CHLORIDE (KLOR-CON M) 20 MEQ EXTENDED RELEASE TABLET    Take 0.5 tablets by mouth daily    PRAVASTATIN (PRAVACHOL) 40 MG TABLET    Take 40 mg by mouth every evening Indications: Blood Cholesterol Abnormal     VITAMIN B-12 (CYANOCOBALAMIN) 1000 MCG TABLET    Take 1,000 mcg by mouth daily    VITAMIN C (ASCORBIC ACID) 500 MG TABLET    Take 500 mg by mouth 2 times daily    WARFARIN (COUMADIN) 3 MG TABLET    Take 3 mg by mouth daily       Hospital medications:  Scheduled Meds:  Continuous Infusions:   sodium chloride       PRN Meds:  Objective   ED TRIAGE VITALS  BP: (!) 131/55, Temp: 97.7 °F (36.5 °C), Pulse: 90, Resp: 16, SpO2: 93 %  [place] Coma Scale  Eye Opening: Spontaneous  Best Verbal Response: Oriented  Best Motor Response: Obeys commands  Kaushik Coma Scale Score: 15  Results for orders placed or performed during the hospital encounter of 09/03/18   Protime-INR   Result Value Ref Range    INR 2.09 (H) 0.85 - 1.13   APTT   Result Value Ref Range    aPTT 42.7 (H) 22.0 - 38.0 seconds   CBC auto differential   Result Value Ref Range    WBC 6.1 4.8 - 10.8 thou/mm3    RBC 3.80 (L) 4.20 - 5.40 mill/mm3    Hemoglobin 10.6 (L) 12.0 - 16.0 gm/dl    Hematocrit 33.7 (L) 37.0 - 47.0 %    MCV 88.7 81.0 - 99.0 fL    MCH 27.9 26.0 - 33.0 pg    MCHC 31.5 (L) 32.2 - 35.5 gm/dl    RDW-CV 15.1 (H) 11.5 - 14.5 %    RDW-SD 47.9 (H) 35.0 - 45.0 fL    Platelets 505 407 - 474 thou/mm3    MPV 11.5 9.4 - 12.4 fL    Seg Neutrophils 62.6 %    Lymphocytes 23.6 %    Monocytes 9.7 %    Eosinophils 3.1 %    Basophils 0.5 %    Immature Granulocytes 0.5 %    Segs Absolute 3.8 1.8 - 7.7 thou/mm3    Lymphocytes # 1.4 1.0 - 4.8 thou/mm3    Monocytes # 0.6 0.4 - 1.3 thou/mm3    Eosinophils # 0.2 0.0 - 0.4 thou/mm3    Basophils # 0.0 0.0 - 0.1 thou/mm3    Immature Grans (Abs) 0.03 0.00 - 0.07 thou/mm3    nRBC 0 /100 wbc   Basic Metabolic Panel   Result Value Ref Range    Sodium 138 135 - 145 meq/L    Potassium 5.7 (H) 3.5 - 5.2 meq/L    Chloride 102 98 - 111 meq/L    CO2 21 (L) 23 - 33 meq/L    Glucose 109 (H) 70 - 108 mg/dL    BUN 54 (H) 7 - 22 mg/dL    CREATININE 3.4 (HH) 0.4 - 1.2 mg/dL    Calcium 9.6 8.5 - 10.5 mg/dL   Anion Gap   Result Value Ref Range    Anion Gap 15.0 8.0 - 16.0 meq/L   Osmolality   Result Value Ref Range    Osmolality Calc 291.0 275.0 - 300 mOsmol/kg   Glomerular Filtration Rate, Estimated   Result Value Ref Range    Est, Glom Filt Rate 13 (A) ml/min/1.73m2   EKG Fall   Result Value Ref Range    Ventricular Rate 93 BPM    Atrial Rate 110 BPM    QRS Duration 178 ms    Q-T Interval 458 ms    QTc Calculation (Bazett) 569 ms    P Axis 67 degrees    R Axis -117 degrees    T Axis 72 degrees       Physical Exam:  Patient Vitals for the past 24 hrs:   BP Temp Temp src Pulse Resp SpO2 Weight   09/03/18 1049 (!) 131/55 - - 90 16 93 % -   09/03/18 0944 (!) 150/58 97.7 °F (36.5 °C) Oral 89 16 94 % -   09/03/18 0741 (!) 137/58 - - 95 15 94 % 250 lb (113.4 kg)     Primary Assessment:  Airway: Patent, trachea midline  Breathing: Breath sounds present and equal bilaterally, spontaneous, and unlabored  Circulation: Hemodynamically stable, 2+ central and peripheral pulses. Disability: HARRELL x 4, following commands. GCS =15    Secondary Assessment:  General: Alert, NAD. Head: Normocephalic, sore of parietal scalp ×2, active bleeding noted from one, mid face stable, Tympanic membranes intact, Nares patent bilaterally, no epistaxis. Mouth clear of foreign bodies, no lacerations or abrasions. Eyes: PERRLA, EOMI, Nontraumatic  Neurologic: A & O x3. Following commands. CN 2-12 intact, no focal deficits  Neck:  Trachea midline. Cervical spines NTTP midline, without step-offs, crepitus or deformity. Back:TL spines are NTTP midline, without step-offs, crepitus or deformity.   No abrasions, contusions, or ecchymosis noted. Lungs: Clear to auscultation bilaterally. Chest Wall: Chest rise symmetrical.  Chest wall without tenderness to palpation. No crepitus, deformities, lacerations, or abrasions. Heart: RRR. Normal S1/S2. No obvious M/G/R. Abdomen:  Soft, NTTP. No guarding. Non-peritoneal.  Pelvis:  NTTP, stable to compression. Femoral pulses 2+. GI/: No blood at the urinary meatus. No gross hematuria. Extremities: No gross deformities. PMS intact. Radial /DP/PT pulses 2+ bilaterally. Limited range of motion right upper extremity, pain with forward flexion, skin tear of RLE  Skin: Skin warm and dry. Normal for ethnicity. Various sites of ecchymosis     Radiology:     XR TIBIA FIBULA RIGHT (2 VIEWS)   Final Result   1. No acute fracture or malalignment. **This report has been created using voice recognition software. It may contain minor errors which are inherent in voice recognition technology. **      Final report electronically signed by Dr. Imtiaz Thomas on 9/3/2018 9:41 AM      XR TIBIA FIBULA LEFT (2 VIEWS)   Final Result   1. No acute fracture or malalignment. **This report has been created using voice recognition software. It may contain minor errors which are inherent in voice recognition technology. **      Final report electronically signed by Dr. Imtiaz Thomas on 9/3/2018 9:39 AM      XR PELVIS (1-2 VIEWS)   Final Result   1. 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 2. No acute fracture. 3. Bilateral hip joint arthrosis is noted, greater on the left. **This report has been created using voice recognition software. It may contain minor errors which are inherent in voice recognition technology. **      Final report electronically signed by Dr. Imtiaz Thomas on 9/3/2018 9:38 AM      XR SHOULDER RIGHT (MIN 2 VIEWS)   Final Result   1.  There is a subtle cortical linear lucency demonstrated along the inferior aspect of the glenoid which may represent a nondisplaced fracture. There is also spurring along the inferior glenoid. No other acute fracture or malalignment is otherwise seen. **This report has been created using voice recognition software. It may contain minor errors which are inherent in voice recognition technology. **      Final report electronically signed by Dr. Sunday Sarah on 9/3/2018 9:35 AM      XR HUMERUS RIGHT (MIN 2 VIEWS)   Final Result   1. No acute fracture or malalignment is seen. **This report has been created using voice recognition software. It may contain minor errors which are inherent in voice recognition technology. **      Final report electronically signed by Dr. Sunday Sarah on 9/3/2018 9:21 AM      XR CHEST STANDARD (2 VW)   Final Result   1. Mild elevation of the right hemidiaphragm. The lung volumes are slightly low. No other acute cardiopulmonary findings are noted. **This report has been created using voice recognition software. It may contain minor errors which are inherent in voice recognition technology. **      Final report electronically signed by Dr. Sunday Sarah on 9/3/2018 9:19 AM      CT HEAD WO CONTRAST   Final Result   1. No acute intracranial hemorrhage or mass effect. Focal areas of chronic appearing encephalomalacia along the right temporal occipital region and anterior left temporal lobe are demonstrated. 2. Mild to moderate chronic small vessel ischemic change. **This report has been created using voice recognition software. It may contain minor errors which are inherent in voice recognition technology. **      Final report electronically signed by Dr. Sunday Sarah on 9/3/2018 9:09 AM        Fast Exam: No    Electronically signed by Yael English PA-C on 9/3/2018 at 4:59 PM Patient seen and examined independently by me. Above discussed and I agree with CNP.    Labs, cultures, and radiographs where available were reviewed. See orders for the updated patient care plan. Mayte Yao MD, This was a trauma by fall on anticoagulants.   I was called well after the patient had been worked up technically this likely wouldn't have been a level II trauma consult with possible poor potential for head injury but given the timeline I felt it best to be a level III consultation patient has a small scalp abrasion otherwise CT the head was negative but has significant medical issues discussed with Sharlene Lerma CNP I feel appropriate for medicine admit there is no other evidence of traumatic injury we'll see as needed  9/3/2018   6:20 PM

## 2018-09-03 NOTE — ED NOTES
Patient resting quietly in cot showing no signs of distress at this time. Family at bedside. Rates pain 5/10 but denies any need for pain medication at this time. Updated on POC. Will continue to monitor.       Imani Elena RN  09/03/18 [id number]

## 2018-09-03 NOTE — ED NOTES
Patient resting quietly in cot showing no signs of distress. Medicated per MAR. Will continue to monitor.       Sandeep Ballesteros RN  09/03/18 [id number]

## 2018-09-03 NOTE — H&P
right 1982 s/p mastectomy and chemo, left 2007 s/p mastectomy    History of CVA (cerebrovascular accident)     History of pulmonary embolism 05/2014    on [address] (coumadin)    Hyperlipidemia     Hypertension     pulmonary    Iron deficiency anemia     Dr. 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 No aortic stenosis. No mitral stenosis. Coronaries; mild disease of the LAD not more than 40%. LV; severe LV dysfunction and EF 30-35%.  CARDIAC CATHETERIZATION  05-    Mild disease of small distal LAD (approximately  50% stenosis) angiographically normal CA. Mild to moderate LV systolic dysfunction. EF 35%. Mildly elevated LV end diastolic pressure. No significant MR. Systemic hypertension.  CARDIAC DEFIBRILLATOR PLACEMENT  02/02/2011    CARDIOVASCULAR STRESS TEST  03/23/10    EF 30%  Severe LV Dys    COLONOSCOPY      Dr. Brittney Becker  03/22/10    EF 45%. LV mildly dilated. Systolic function mildly reducted. No regional wall motion abnormalities. Wall thickness mildly increased. LA mildly dilated. MV mild annular calification. Mild TR. Ventricular septum tickness mildly increased.  ENDOSCOPY, COLON, DIAGNOSTIC      EYE SURGERY      bilateral cataracts    EYE SURGERY Left 9/2014    laser surgery for retinopathy?     FOOT SURGERY Left 1/12/2016    PACEMAKER PLACEMENT      HI EGD BALLOON DILATION ESOPHAGUS <30 MM DIAM N/A 9/18/2017    EGD ESOPHAGOGASTRODUODENOSCOPY DILATATION performed by Haley Fitzpatrick MD at [facility]    IL ESOPHAGOGASTRODUODENOSCOPY TRANSORAL DIAGNOSTIC  9/18/2017    EGD ESOPHAGOGASTRODUODENOSCOPY performed by Mariana Osorio MD at [facility]       Home Medications:   No current facility-administered medications on file prior to encounter.       Current Outpatient Prescriptions on File Prior to Encounter   Medication Sig Dispense Refill    warfarin (COUMADIN) 3 MG tablet Take 3 mg by mouth daily      vitamin C (ASCORBIC ACID) 500 MG tablet Take 500 mg by mouth 2 times daily      docusate sodium (COLACE) 100 MG capsule Take 200 mg by mouth daily      isosorbide mononitrate (IMDUR) 60 MG extended release tablet Take 60 mg by mouth daily      Multiple Vitamins-Minerals (MULTIVITAL) TABS Take 1 tablet by mouth every morning      ferrous sulfate (JAMIR-RAJANI) 325 (65 Fe) MG tablet Take 1 tablet by mouth 2 times daily 60 tablet 3    clopidogrel (PLAVIX) 75 MG tablet Take 75 mg by mouth daily      vitamin B-12 (CYANOCOBALAMIN) 1000 MCG tablet Take 1,000 mcg by mouth daily      ondansetron (ZOFRAN) 4 MG tablet Take 4 mg by mouth 2 times daily       pantoprazole (PROTONIX) 40 MG tablet Take 40 mg by mouth 2 times daily       bumetanide (BUMEX) 1 MG tablet Take 2 tablets by mouth 2 times daily 60 tablet 1    potassium chloride (KLOR-CON M) 20 MEQ extended release tablet Take 0.5 tablets by mouth daily 60 tablet 3    ipratropium-albuterol (DUONEB) 0.5-2.5 (3) MG/3ML SOLN nebulizer solution Inhale 3 mLs into the lungs every 4 hours (while awake) 360 mL     hydrALAZINE (APRESOLINE) 25 MG tablet Take 3 tablets by mouth 3 times daily (Patient taking differently: Take 25 mg by mouth 3 times daily ) 90 tablet 3    metoprolol succinate (TOPROL XL) 50 MG extended release tablet Take 1 tablet by mouth daily 30 tablet 3    insulin aspart (NOVOLOG) 100 UNIT/ML injection vial Inject 12 Units into the skin 2 times daily (with meals)      insulin aspart (NOVOLOG) 100 UNIT/ML injection vial Inject 8 Units into the skin Daily with lunch      insulin aspart (NOVOLOG) 100 UNIT/ML injection vial Inject 0-6 Units into the skin 2 times daily (with meals) Low Dose Corrective Insulin Scale for Meals 0-150 = No insulin, 151-200 = 1 unit, 201-250 = 2 units, 251-300 = 3 units, 301-350 = 4 units, 351-400 = 5 units, 401 - 450 = 6 units      insulin glargine (LANTUS) 100 UNIT/ML injection vial Inject 22 Units into the skin nightly      guaiFENesin (ROBITUSSIN) 100 MG/5ML syrup Take 200 mg by mouth every 4 hours as needed for Cough      Multiple Vitamins-Minerals (OCUVITE EXTRA PO) Take 1 tablet by mouth daily       bisacodyl (DULCOLAX) 10 MG suppository Place 10 mg rectally daily as needed for Constipation      magnesium hydroxide (MILK OF MAGNESIA) 400 MG/5ML suspension Take 30 mLs by mouth daily as needed for Constipation      pravastatin (PRAVACHOL) 40 MG tablet Take 40 mg by mouth every evening Indications: Blood Cholesterol Abnormal       acetaminophen (TYLENOL) 325 MG tablet Take 650 mg by mouth 4 times daily          Allergies:  Patient has no known allergies. Social History:    reports that she quit smoking about 66 years ago. Her smoking use included Cigarettes. She has a 1.00 pack-year smoking history. She has never used smokeless tobacco. She reports that she does not drink alcohol or use drugs.     Family History:   Family History   Problem Relation Age of Onset    Diabetes Mother    [facility] Pacemaker Mother     Stroke Mother     Heart Disease Mother     Diabetes Father     Cancer Sister     Cancer Brother     Cancer Sister     Heart Disease Brother         cardiomegaly       Review of systems:    CNS: As per HPI or otherwise negative    CARDIOVASCULAR: No chest pain, dyspnea at rest or with activity, no orthopnea or PND, no palpitations, no ankle edema  RESPIRATORY: No cough wheezing , rhinorrhea, nasal congestion or sore throat  GASTROINTESTINAL SYSTEM: No nausea,  vomiting , diarrhea , abdominal pain mill/mm3    Hemoglobin 10.6 (L) 12.0 - 16.0 gm/dl    Hematocrit 33.7 (L) 37.0 - 47.0 %    MCV 88.7 81.0 - 99.0 fL    MCH 27.9 26.0 - 33.0 pg    MCHC 31.5 (L) 32.2 - 35.5 gm/dl    RDW-CV 15.1 (H) 11.5 - 14.5 %    RDW-SD 47.9 (H) 35.0 - 45.0 fL    Platelets 372 757 - 306 thou/mm3    MPV 11.5 9.4 - 12.4 fL    Seg Neutrophils 62.6 %    Lymphocytes 23.6 %    Monocytes 9.7 %    Eosinophils 3.1 %    Basophils 0.5 %    Immature Granulocytes 0.5 %    Segs Absolute 3.8 1.8 - 7.7 thou/mm3    Lymphocytes # 1.4 1.0 - 4.8 thou/mm3    Monocytes # 0.6 0.4 - 1.3 thou/mm3    Eosinophils # 0.2 0.0 - 0.4 thou/mm3    Basophils # 0.0 0.0 - 0.1 thou/mm3    Immature Grans (Abs) 0.03 0.00 - 0.07 thou/mm3    nRBC 0 /100 wbc   Basic Metabolic Panel    Collection Time: 09/03/18  7:56 AM   Result Value Ref Range    Sodium 138 135 - 145 meq/L    Potassium 5.7 (H) 3.5 - 5.2 meq/L    Chloride 102 98 - 111 meq/L    CO2 21 (L) 23 - 33 meq/L    Glucose 109 (H) 70 - 108 mg/dL    BUN 54 (H) 7 - 22 mg/dL    CREATININE 3.4 (HH) 0.4 - 1.2 mg/dL    Calcium 9.6 8.5 - 10.5 mg/dL   Anion Gap    Collection Time: 09/03/18  7:56 AM   Result Value Ref Range    Anion Gap 15.0 8.0 - 16.0 meq/L   Osmolality    Collection Time: 09/03/18  7:56 AM   Result Value Ref Range    Osmolality Calc 291.0 275.0 - 300 mOsmol/kg   Glomerular Filtration Rate, Estimated    Collection Time: 09/03/18  7:56 AM   Result Value Ref Range    Est, Glom Filt Rate 13 (A) ml/min/1.73m2   Protime-INR    Collection Time: 09/03/18  9:08 AM   Result Value Ref Range    INR 2.09 (H) 0.85 - 1.13   APTT    Collection Time: 09/03/18  9:08 AM   Result Value Ref Range    aPTT 42.7 (H) 22.0 - 38.0 seconds   EKG Fall    Collection Time: 09/03/18  9:37 AM   Result Value Ref Range    Ventricular Rate 93 BPM    Atrial Rate 110 BPM    QRS Duration 178 ms    Q-T Interval 458 ms    QTc Calculation (Bazett) 569 ms    P Axis 67 degrees    R Axis -117 degrees    T Axis 72 degrees       Radiology:     Xr Chest Standard (2 Vw)    Result Date: 9/3/2018  PROCEDURE: XR CHEST (2 VW) CLINICAL INFORMATION: fall,  COMPARISON: 7/9/2018 TECHNIQUE:  AP and lateral chest x-ray  FINDINGS: There is mild elevation of the right hemidiaphragm. There is a left-sided AICD. The heart size appears within the upper limits of normal. No focal consolidation or pleural effusion is seen. No acute osseous findings are demonstrated. 1. Mild elevation of the right hemidiaphragm. The lung volumes are slightly low. No other acute cardiopulmonary findings are noted. **This report has been created using voice recognition software. It may contain minor errors which are inherent in voice recognition technology. ** Final report electronically signed by Dr. Alecia Vann on 9/3/2018 9:19 AM    Xr Pelvis (1-2 Views)    Result Date: 9/3/2018  PROCEDURE: XR PELVIS (1-2 VIEWS) CLINICAL INFORMATION: fall,  COMPARISON: 6/19/2017 TECHNIQUE:  AP pelvis single view  FINDINGS: There is increased sclerosis of the left iliac bone which may be related to Paget's disease. This had a similar appearance in June 2017. There is joint space narrowing within the left hip joint. The visualized femoral head contours appear within normal limits. Gas and stool-filled bowel loops are seen overlying the lower abdomen and pelvis. 1. 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 2. No acute fracture. 3. Bilateral hip joint arthrosis is noted, greater on the left. **This report has been created using voice recognition software. It may contain minor errors which are inherent in voice recognition technology. ** Final report electronically signed by Dr. Alecia Vann on 9/3/2018 9:38 AM    Xr Shoulder Right (min 2 Views)    Result Date: 9/3/2018  PROCEDURE: XR SHOULDER RIGHT (MIN 2 VIEWS) CLINICAL INFORMATION: fall,  COMPARISON: No prior study.  TECHNIQUE:  Right shoulder 3 views  FINDINGS: 3 views of the right shoulder were obtained. There is a subtle linear lucency through the inferior glenoid. There is also spurring along the inferior aspect of the glenoid. The humeral head contour appears within normal limits. The visualized lungs appear clear. 1. There is a subtle cortical linear lucency demonstrated along the inferior aspect of the glenoid which may represent a nondisplaced fracture. There is also spurring along the inferior glenoid. No other acute fracture or malalignment is otherwise seen. **This report has been created using voice recognition software. It may contain minor errors which are inherent in voice recognition technology. ** Final report electronically signed by Dr. Eva Curran on 9/3/2018 9:35 AM    Xr Humerus Right (min 2 Views)    Result Date: 9/3/2018  PROCEDURE: XR HUMERUS RIGHT (MIN 2 VIEWS) CLINICAL INFORMATION: fall,  COMPARISON: No prior study. TECHNIQUE:  Right humerus 3 views  FINDINGS: 3 views of the right humerus were obtained. No acute fracture or malalignment is demonstrated. There is mild spurring along the greater tuberosity. There is mild spurring along the inferior aspect of the glenoid. 1. No acute fracture or malalignment is seen. **This report has been created using voice recognition software. It may contain minor errors which are inherent in voice recognition technology. ** Final report electronically signed by Dr. Eva Curran on 9/3/2018 9:21 AM    Xr Tibia Fibula Left (2 Views)    Result Date: 9/3/2018  PROCEDURE: XR TIBIA FIBULA LEFT (2 VIEWS) CLINICAL INFORMATION: fall,  COMPARISON: No prior study. TECHNIQUE:  Left tibia and fibula 2 views  FINDINGS: No acute fracture or malalignment is seen. Linear vessel calcifications are seen dorsal to the knee joint on the crossfire lateral projection. 1. No acute fracture or malalignment. **This report has been created using voice recognition software. It may contain minor errors which are inherent in voice recognition technology. ** Final

## 2018-09-03 NOTE — ED TRIAGE NOTES
Pt presents to ED via EMS from [facility]. C/c fall. Per pt she tripped on the door jam of the bathroom and fell hitting her right shoulder on the toilet. Pt also states she thinks she may have hit her head. Pt c/o right shoulder and right knee pain. Pt also has a skin tear on the right lower extremity. Pt is A&O x 4, respirations easy and unlabored.

## 2018-09-03 NOTE — ED NOTES
Patient transported to [facility] via cart in stable condition. Patient monitored on cardiac telemetry.      Contact made with floor prior to transport        FITZ Perdomo-P  09/03/18 3030

## 2018-09-04 LAB
ALBUMIN SERPL-MCNC: 3 G/DL (ref 3.5–5.1)
ALP BLD-CCNC: 71 U/L (ref 38–126)
ALT SERPL-CCNC: 8 U/L (ref 11–66)
ANION GAP SERPL CALCULATED.3IONS-SCNC: 14 MEQ/L (ref 8–16)
AST SERPL-CCNC: 13 U/L (ref 5–40)
BASOPHILS # BLD: 0.3 %
BASOPHILS ABSOLUTE: 0 THOU/MM3 (ref 0–0.1)
BILIRUB SERPL-MCNC: < 0.2 MG/DL (ref 0.3–1.2)
BUN BLDV-MCNC: 51 MG/DL (ref 7–22)
CALCIUM SERPL-MCNC: 8.5 MG/DL (ref 8.5–10.5)
CHLORIDE BLD-SCNC: 105 MEQ/L (ref 98–111)
CO2: 21 MEQ/L (ref 23–33)
CREAT SERPL-MCNC: 2.8 MG/DL (ref 0.4–1.2)
EOSINOPHIL # BLD: 3.2 %
EOSINOPHILS ABSOLUTE: 0.2 THOU/MM3 (ref 0–0.4)
ERYTHROCYTE [DISTWIDTH] IN BLOOD BY AUTOMATED COUNT: 15 % (ref 11.5–14.5)
ERYTHROCYTE [DISTWIDTH] IN BLOOD BY AUTOMATED COUNT: 48 FL (ref 35–45)
GFR SERPL CREATININE-BSD FRML MDRD: 16 ML/MIN/1.73M2
GLUCOSE BLD-MCNC: 113 MG/DL (ref 70–108)
GLUCOSE BLD-MCNC: 165 MG/DL (ref 70–108)
GLUCOSE BLD-MCNC: 166 MG/DL (ref 70–108)
GLUCOSE BLD-MCNC: 177 MG/DL (ref 70–108)
GLUCOSE BLD-MCNC: 193 MG/DL (ref 70–108)
HCT VFR BLD CALC: 28.1 % (ref 37–47)
HEMOGLOBIN: 8.9 GM/DL (ref 12–16)
IMMATURE GRANS (ABS): 0.03 THOU/MM3 (ref 0–0.07)
IMMATURE GRANULOCYTES: 0.5 %
INR BLD: 2.03 (ref 0.85–1.13)
LYMPHOCYTES # BLD: 18.8 %
LYMPHOCYTES ABSOLUTE: 1.2 THOU/MM3 (ref 1–4.8)
MAGNESIUM: 1.8 MG/DL (ref 1.6–2.4)
MCH RBC QN AUTO: 28.3 PG (ref 26–33)
MCHC RBC AUTO-ENTMCNC: 31.7 GM/DL (ref 32.2–35.5)
MCV RBC AUTO: 89.2 FL (ref 81–99)
MONOCYTES # BLD: 12 %
MONOCYTES ABSOLUTE: 0.8 THOU/MM3 (ref 0.4–1.3)
NUCLEATED RED BLOOD CELLS: 0 /100 WBC
PLATELET # BLD: 164 THOU/MM3 (ref 130–400)
PMV BLD AUTO: 11.3 FL (ref 9.4–12.4)
POTASSIUM REFLEX MAGNESIUM: 5 MEQ/L (ref 3.5–5.2)
RBC # BLD: 3.15 MILL/MM3 (ref 4.2–5.4)
SEG NEUTROPHILS: 65.2 %
SEGMENTED NEUTROPHILS ABSOLUTE COUNT: 4.3 THOU/MM3 (ref 1.8–7.7)
SODIUM BLD-SCNC: 140 MEQ/L (ref 135–145)
TOTAL PROTEIN: 6.2 G/DL (ref 6.1–8)
WBC # BLD: 6.6 THOU/MM3 (ref 4.8–10.8)

## 2018-09-04 PROCEDURE — 6370000000 HC RX 637 (ALT 250 FOR IP): Performed by: INTERNAL MEDICINE

## 2018-09-04 PROCEDURE — 6360000002 HC RX W HCPCS: Performed by: INTERNAL MEDICINE

## 2018-09-04 PROCEDURE — 94640 AIRWAY INHALATION TREATMENT: CPT

## 2018-09-04 PROCEDURE — 80053 COMPREHEN METABOLIC PANEL: CPT

## 2018-09-04 PROCEDURE — 85025 COMPLETE CBC W/AUTO DIFF WBC: CPT

## 2018-09-04 PROCEDURE — 82948 REAGENT STRIP/BLOOD GLUCOSE: CPT

## 2018-09-04 PROCEDURE — 6370000000 HC RX 637 (ALT 250 FOR IP)

## 2018-09-04 PROCEDURE — 2580000003 HC RX 258: Performed by: INTERNAL MEDICINE

## 2018-09-04 PROCEDURE — 94760 N-INVAS EAR/PLS OXIMETRY 1: CPT

## 2018-09-04 PROCEDURE — 83735 ASSAY OF MAGNESIUM: CPT

## 2018-09-04 PROCEDURE — 36415 COLL VENOUS BLD VENIPUNCTURE: CPT

## 2018-09-04 PROCEDURE — 6370000000 HC RX 637 (ALT 250 FOR IP): Performed by: PHYSICIAN ASSISTANT

## 2018-09-04 PROCEDURE — 1200000003 HC TELEMETRY R&B

## 2018-09-04 PROCEDURE — 85610 PROTHROMBIN TIME: CPT

## 2018-09-04 RX ORDER — WARFARIN SODIUM 3 MG/1
3 TABLET ORAL ONCE
Status: COMPLETED | OUTPATIENT
Start: 2018-09-04 | End: 2018-09-04

## 2018-09-04 RX ORDER — DEXTROSE MONOHYDRATE 50 MG/ML
100 INJECTION, SOLUTION INTRAVENOUS PRN
Status: DISCONTINUED | OUTPATIENT
Start: 2018-09-04 | End: 2018-09-07 | Stop reason: HOSPADM

## 2018-09-04 RX ORDER — NICOTINE POLACRILEX 4 MG
15 LOZENGE BUCCAL PRN
Status: DISCONTINUED | OUTPATIENT
Start: 2018-09-04 | End: 2018-09-07 | Stop reason: HOSPADM

## 2018-09-04 RX ORDER — SODIUM POLYSTYRENE SULFONATE 15 G/60ML
15 SUSPENSION ORAL; RECTAL ONCE
Status: COMPLETED | OUTPATIENT
Start: 2018-09-04 | End: 2018-09-04

## 2018-09-04 RX ORDER — DEXTROSE MONOHYDRATE 25 G/50ML
12.5 INJECTION, SOLUTION INTRAVENOUS PRN
Status: DISCONTINUED | OUTPATIENT
Start: 2018-09-04 | End: 2018-09-07 | Stop reason: HOSPADM

## 2018-09-04 RX ADMIN — PRAVASTATIN SODIUM 40 MG: 40 TABLET ORAL at 20:04

## 2018-09-04 RX ADMIN — METOPROLOL SUCCINATE 50 MG: 50 TABLET, FILM COATED, EXTENDED RELEASE ORAL at 08:46

## 2018-09-04 RX ADMIN — IPRATROPIUM BROMIDE AND ALBUTEROL SULFATE 3 ML: .5; 3 SOLUTION RESPIRATORY (INHALATION) at 16:14

## 2018-09-04 RX ADMIN — Medication 500 MG: at 20:04

## 2018-09-04 RX ADMIN — HYDRALAZINE HYDROCHLORIDE 25 MG: 25 TABLET ORAL at 20:04

## 2018-09-04 RX ADMIN — HYDROCODONE BITARTRATE AND ACETAMINOPHEN 1 TABLET: 5; 325 TABLET ORAL at 02:00

## 2018-09-04 RX ADMIN — SODIUM CHLORIDE: 9 INJECTION, SOLUTION INTRAVENOUS at 05:42

## 2018-09-04 RX ADMIN — SODIUM POLYSTYRENE SULFONATE 15 G: 15 SUSPENSION ORAL; RECTAL at 01:54

## 2018-09-04 RX ADMIN — DOXYCYCLINE HYCLATE 100 MG: 100 TABLET, COATED ORAL at 08:46

## 2018-09-04 RX ADMIN — HYDROCODONE BITARTRATE AND ACETAMINOPHEN 1 TABLET: 5; 325 TABLET ORAL at 18:25

## 2018-09-04 RX ADMIN — IPRATROPIUM BROMIDE AND ALBUTEROL SULFATE 3 ML: .5; 3 SOLUTION RESPIRATORY (INHALATION) at 12:33

## 2018-09-04 RX ADMIN — INSULIN LISPRO 8 UNITS: 100 INJECTION, SOLUTION INTRAVENOUS; SUBCUTANEOUS at 11:55

## 2018-09-04 RX ADMIN — ONDANSETRON HYDROCHLORIDE 4 MG: 4 TABLET, FILM COATED ORAL at 08:46

## 2018-09-04 RX ADMIN — HYDRALAZINE HYDROCHLORIDE 25 MG: 25 TABLET ORAL at 08:46

## 2018-09-04 RX ADMIN — HYDRALAZINE HYDROCHLORIDE 25 MG: 25 TABLET ORAL at 16:29

## 2018-09-04 RX ADMIN — CLOPIDOGREL BISULFATE 75 MG: 75 TABLET ORAL at 08:41

## 2018-09-04 RX ADMIN — IPRATROPIUM BROMIDE AND ALBUTEROL SULFATE 3 ML: .5; 3 SOLUTION RESPIRATORY (INHALATION) at 07:25

## 2018-09-04 RX ADMIN — FERROUS SULFATE TAB 325 MG (65 MG ELEMENTAL FE) 325 MG: 325 (65 FE) TAB at 20:04

## 2018-09-04 RX ADMIN — DOCUSATE SODIUM 200 MG: 100 CAPSULE, LIQUID FILLED ORAL at 08:42

## 2018-09-04 RX ADMIN — PANTOPRAZOLE SODIUM 40 MG: 40 TABLET, DELAYED RELEASE ORAL at 08:41

## 2018-09-04 RX ADMIN — FERROUS SULFATE TAB 325 MG (65 MG ELEMENTAL FE) 325 MG: 325 (65 FE) TAB at 08:46

## 2018-09-04 RX ADMIN — Medication 12 UNITS: at 08:53

## 2018-09-04 RX ADMIN — PANTOPRAZOLE SODIUM 40 MG: 40 TABLET, DELAYED RELEASE ORAL at 20:04

## 2018-09-04 RX ADMIN — IPRATROPIUM BROMIDE AND ALBUTEROL SULFATE 3 ML: .5; 3 SOLUTION RESPIRATORY (INHALATION) at 20:21

## 2018-09-04 RX ADMIN — ONDANSETRON HYDROCHLORIDE 4 MG: 4 TABLET, FILM COATED ORAL at 20:04

## 2018-09-04 RX ADMIN — DOXYCYCLINE HYCLATE 100 MG: 100 TABLET, COATED ORAL at 20:03

## 2018-09-04 RX ADMIN — Medication 500 MG: at 09:03

## 2018-09-04 RX ADMIN — INSULIN GLARGINE 22 UNITS: 100 INJECTION, SOLUTION SUBCUTANEOUS at 20:35

## 2018-09-04 RX ADMIN — Medication 1000 MCG: at 09:02

## 2018-09-04 RX ADMIN — ISOSORBIDE MONONITRATE 60 MG: 60 TABLET ORAL at 08:46

## 2018-09-04 RX ADMIN — WARFARIN SODIUM 3 MG: 3 TABLET ORAL at 20:10

## 2018-09-04 RX ADMIN — Medication 10 ML: at 09:03

## 2018-09-04 ASSESSMENT — PAIN SCALES - GENERAL
PAINLEVEL_OUTOF10: 10
PAINLEVEL_OUTOF10: 5
PAINLEVEL_OUTOF10: 2
PAINLEVEL_OUTOF10: 1
PAINLEVEL_OUTOF10: 0
PAINLEVEL_OUTOF10: 10

## 2018-09-04 ASSESSMENT — PAIN DESCRIPTION - ORIENTATION: ORIENTATION: RIGHT

## 2018-09-04 ASSESSMENT — PAIN DESCRIPTION - LOCATION: LOCATION: SHOULDER

## 2018-09-04 ASSESSMENT — PAIN DESCRIPTION - PAIN TYPE: TYPE: ACUTE PAIN

## 2018-09-04 NOTE — FLOWSHEET NOTE
09/04/18 4332   Provider Notification   Reason for Communication Review case   Provider Name Dr Farahma Due   Provider Notification Physician   Method of Communication Secure Message   Response Waiting for response   Notification Time [phone] From: ana ren [facility] [address] RE: [facility] Pt BUN 50 creat 2.8 is it ok to consult nephrology?

## 2018-09-04 NOTE — FLOWSHEET NOTE
09/03/18 2371   Provider Notification   Reason for Communication Review case  (potassium 5.5)   Provider Name Dr. German Mak   Provider Notification Physician   Method of Communication Secure Message   Response Waiting for response   Notification Time [phone]

## 2018-09-04 NOTE — CARE COORDINATION
18, 11:35 AM      Nj Johnston       Admitted from: ED 9/3/2018/ [place] day: 1   Location: [facility]-A Reason for admit: [facility] [N17.[facility]] Status: IP  Admit order signed?: yes  PMH:  has a past medical history of CAD (coronary artery disease); Cancer ([facility] Cardiomyopathy, nonischemic (HCC); CHF (congestive heart failure) (HCC); CKD (chronic kidney disease) stage 3, GFR 30-59 ml/min; Congenital heart disease; COPD exacerbation ([facility] 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 ([facility] DM2 (diabetes mellitus, type 2) ([facility] Ex-smoker; GERD (gastroesophageal reflux disease); Heart failure with preserved ejection fraction ([facility] His of Heparin induced thrombocytopenia; History of breast cancer; History of CVA (cerebrovascular accident); History of pulmonary embolism; Hyperlipidemia; Hypertension; Iron deficiency anemia; LBBB (left bundle branch block); Osteoarthritis; Paget's disease of bone; [facility] BiV ICD; and Vitamin D deficiency. Procedure: none  Pertinent abnormal Imagin shoulder xray  There is a subtle cortical linear lucency demonstrated along the inferior aspect of the glenoid which may represent a nondisplaced fracture. There is also spurring along the inferior glenoid.  No other acute fracture or malalignment is otherwise seen.           Medications:  Scheduled Meds:   clopidogrel  75 mg Oral Daily    docusate sodium  200 mg Oral Daily    ferrous sulfate  325 mg Oral BID    hydrALAZINE  25 mg Oral TID    insulin lispro  12 Units Subcutaneous BID WC    insulin lispro  8 Units Subcutaneous Lunch    insulin glargine  22 Units Subcutaneous Nightly    ipratropium-albuterol  3 mL Inhalation Q4H WA    isosorbide mononitrate  60 mg Oral Daily    metoprolol succinate  50 mg Oral Daily    multivitamin  1 tablet Oral QAM    ondansetron  4 mg Oral BID    pantoprazole  40 mg Oral BID    pravastatin  40 mg Oral Nightly    vitamin B-12  1,000

## 2018-09-04 NOTE — FLOWSHEET NOTE
09/04/18 0035   Provider Notification   Reason for Communication Review case  (potassium 5.5)   Provider Name Dr. Jenny Mueller   Provider Notification Physician   Method of Communication Call   Response See orders  (15 gm. Kayexalate once, and BMP in the morning.)   Notification Time 6047   Notified Dr. Jenny Mueller regarding potassium of 5.5. Dr. Jenny Mueller ordered 15 gm. of Kayexalate to be given once and a BMP in the morning. Patient already has an order for CMP with reflex to Mg for the morning.

## 2018-09-04 NOTE — PROGRESS NOTES
Transdermal Daily    warfarin (COUMADIN) daily dosing (placeholder)   Other RX Placeholder     Continuous Infusions:   dextrose      sodium chloride 75 mL/hr at 09/04/18 0542     PRN Meds:.glucose, dextrose, glucagon (rDNA), dextrose, bisacodyl, guaiFENesin, magnesium hydroxide, sodium chloride flush, HYDROcodone 5 mg - acetaminophen, ondansetron  Continuous Infusions:   dextrose      sodium chloride 75 mL/hr at 09/04/18 0542       Intake/Output Summary (Last 24 hours) at 09/04/18 1527  Last data filed at 09/04/18 1456   Gross per 24 hour   Intake          2338.91 ml   Output              450 ml   Net          1888.91 ml       CBC:   Recent Labs      09/03/18 0756 09/04/18   0540   WBC  6.1  6.6   HGB  10.6*  8.9*   HCT  33.7*  28.1*   PLT  204  164     BMP:  Recent Labs      09/03/18 0756 09/03/18   1608  09/03/18 2052 09/04/18   0540   NA  138   --    --   140   K  5.7*  5.9*  5.5*  5.0   CL  102   --    --   105   CO2  21*   --    --   21*   BUN  54*   --    --   51*   CREATININE  3.4*   --    --   2.8*   GLUCOSE  109*   --    --   165*     Hepatic: Recent Labs      09/04/18   0540   AST  13   ALT  8*   BILITOT  <0.2*   ALKPHOS  71     Urine: urine culture  ABGs: No results found for: PHART, PO2ART, RLL8JMO  INR:   Recent Labs      09/03/18   0908 09/04/18   0540   INR  2.09*  2.03*     -----------------------------------------------------------------  Data Review Recent Labs      09/03/18 0756 09/04/18   0540   WBC  6.1  6.6   HGB  10.6*  8.9*   HCT  33.7*  28.1*   PLT  204  164     Recent Labs      09/03/18 0756 09/03/18 2052 09/04/18   0540   NA  138   --    --   140   CL  102   --    --   105   K  5.7*   < >  5.5*  5.0   CO2  21*   --    --   21*   BUN  54*   --    --   51*   CREATININE  3.4*   --    --   2.8*   GLUCOSE  109*   --    --   165*   MG   --    --    --   1.8   CALCIUM  9.6   --    --   8.5   PROT   --    --    --   6.2   BILITOT   --    --    --   <0.2*   ALKPHOS   -- Will change to eliquis  Will cont therapy  meds   Labs  Pt/ot  Pos bc could be contaminant.  Will cont present atb  Pharmacy declined the use of Shani Spain MD

## 2018-09-04 NOTE — PLAN OF CARE
Problem: Pain:  Goal: Pain level will decrease  Pain level will decrease   Outcome: Ongoing  Patient has denied any pain so far this shift. PRN medication is available. Will continue to monitor. Problem: Falls - Risk of:  Goal: Will remain free from falls  Will remain free from falls   Outcome: Ongoing  No falls this shift, bed alarm on, nonskid socks on, and falling star precautions in place. Patient uses call light appropriately. Call light and personal belongings are in reach. Problem: Risk for Impaired Skin Integrity  Goal: Tissue integrity - skin and mucous membranes  Structural intactness and normal physiological function of skin and  mucous membranes. Outcome: Ongoing  No new breakdown noted. Patient is able to reposition self, and encouraging to turn every 2 hours and as needed. Problem: Discharge Planning:  Goal: Discharged to appropriate level of care  Discharged to appropriate level of care   Outcome: Ongoing  Patient is from [facility], and states she will probably go back there when she leaves. Actively participates in care planning. Problem: Injury - Risk of, Postfracture Complications:  Goal: Absence of compartment syndrome signs and symptoms  Absence of compartment syndrome signs and symptoms   Outcome: Ongoing  Neuro checks to right arm are WNL, arm is warm and pink, patient denies increased pain, and pulses are palpable. Comments: Care plan reviewed with patient. Patient verbalizes understanding of the plan of care and contributes to goal setting.

## 2018-09-04 NOTE — PROGRESS NOTES
Patient was seen and examined by Renny Moreira PA-C. I agree with the assessment and plan as outlined by Renny Moreira PA-C; as described in her note.     Electronically signed by Kelsea Moore MD on 9/4/2018 at 2:17 PM

## 2018-09-05 LAB
ANION GAP SERPL CALCULATED.3IONS-SCNC: 14 MEQ/L (ref 8–16)
BASOPHILS # BLD: 0.4 %
BASOPHILS ABSOLUTE: 0 THOU/MM3 (ref 0–0.1)
BUN BLDV-MCNC: 48 MG/DL (ref 7–22)
CALCIUM SERPL-MCNC: 8.8 MG/DL (ref 8.5–10.5)
CHLORIDE BLD-SCNC: 105 MEQ/L (ref 98–111)
CHLORIDE, URINE: 28 MEQ/L
CO2: 20 MEQ/L (ref 23–33)
CREAT SERPL-MCNC: 3 MG/DL (ref 0.4–1.2)
CREATININE URINE: < 4.2 MG/DL
EOSINOPHIL # BLD: 3.4 %
EOSINOPHILS ABSOLUTE: 0.3 THOU/MM3 (ref 0–0.4)
ERYTHROCYTE [DISTWIDTH] IN BLOOD BY AUTOMATED COUNT: 15.2 % (ref 11.5–14.5)
ERYTHROCYTE [DISTWIDTH] IN BLOOD BY AUTOMATED COUNT: 48.8 FL (ref 35–45)
GFR SERPL CREATININE-BSD FRML MDRD: 15 ML/MIN/1.73M2
GLUCOSE BLD-MCNC: 102 MG/DL (ref 70–108)
GLUCOSE BLD-MCNC: 115 MG/DL (ref 70–108)
GLUCOSE BLD-MCNC: 127 MG/DL (ref 70–108)
GLUCOSE BLD-MCNC: 161 MG/DL (ref 70–108)
GLUCOSE BLD-MCNC: 195 MG/DL (ref 70–108)
HCT VFR BLD CALC: 29.4 % (ref 37–47)
HEMOGLOBIN: 9 GM/DL (ref 12–16)
IMMATURE GRANS (ABS): 0.03 THOU/MM3 (ref 0–0.07)
IMMATURE GRANULOCYTES: 0.4 %
INR BLD: 2.09 (ref 0.85–1.13)
LYMPHOCYTES # BLD: 22.6 %
LYMPHOCYTES ABSOLUTE: 1.7 THOU/MM3 (ref 1–4.8)
MCH RBC QN AUTO: 27.9 PG (ref 26–33)
MCHC RBC AUTO-ENTMCNC: 30.6 GM/DL (ref 32.2–35.5)
MCV RBC AUTO: 91 FL (ref 81–99)
MONOCYTES # BLD: 12.2 %
MONOCYTES ABSOLUTE: 0.9 THOU/MM3 (ref 0.4–1.3)
NUCLEATED RED BLOOD CELLS: 0 /100 WBC
PLATELET # BLD: 157 THOU/MM3 (ref 130–400)
PMV BLD AUTO: 11.1 FL (ref 9.4–12.4)
POTASSIUM SERPL-SCNC: 4.9 MEQ/L (ref 3.5–5.2)
PROT/CREAT RATIO, UR: 0.95
PROTEIN, URINE: < 4 MG/DL
RBC # BLD: 3.23 MILL/MM3 (ref 4.2–5.4)
SEG NEUTROPHILS: 61 %
SEGMENTED NEUTROPHILS ABSOLUTE COUNT: 4.6 THOU/MM3 (ref 1.8–7.7)
SODIUM BLD-SCNC: 139 MEQ/L (ref 135–145)
SODIUM URINE: 50 MEQ/L
WBC # BLD: 7.6 THOU/MM3 (ref 4.8–10.8)

## 2018-09-05 PROCEDURE — 6360000002 HC RX W HCPCS: Performed by: INTERNAL MEDICINE

## 2018-09-05 PROCEDURE — 97110 THERAPEUTIC EXERCISES: CPT

## 2018-09-05 PROCEDURE — 84156 ASSAY OF PROTEIN URINE: CPT

## 2018-09-05 PROCEDURE — 99223 1ST HOSP IP/OBS HIGH 75: CPT | Performed by: NURSE PRACTITIONER

## 2018-09-05 PROCEDURE — 2700000000 HC OXYGEN THERAPY PER DAY

## 2018-09-05 PROCEDURE — 36415 COLL VENOUS BLD VENIPUNCTURE: CPT

## 2018-09-05 PROCEDURE — 97166 OT EVAL MOD COMPLEX 45 MIN: CPT

## 2018-09-05 PROCEDURE — 2580000003 HC RX 258: Performed by: NURSE PRACTITIONER

## 2018-09-05 PROCEDURE — 80048 BASIC METABOLIC PNL TOTAL CA: CPT

## 2018-09-05 PROCEDURE — 2580000003 HC RX 258: Performed by: INTERNAL MEDICINE

## 2018-09-05 PROCEDURE — 97530 THERAPEUTIC ACTIVITIES: CPT

## 2018-09-05 PROCEDURE — 97161 PT EVAL LOW COMPLEX 20 MIN: CPT

## 2018-09-05 PROCEDURE — 6370000000 HC RX 637 (ALT 250 FOR IP)

## 2018-09-05 PROCEDURE — 85610 PROTHROMBIN TIME: CPT

## 2018-09-05 PROCEDURE — 84300 ASSAY OF URINE SODIUM: CPT

## 2018-09-05 PROCEDURE — 6370000000 HC RX 637 (ALT 250 FOR IP): Performed by: INTERNAL MEDICINE

## 2018-09-05 PROCEDURE — G8978 MOBILITY CURRENT STATUS: HCPCS

## 2018-09-05 PROCEDURE — 6370000000 HC RX 637 (ALT 250 FOR IP): Performed by: PHYSICIAN ASSISTANT

## 2018-09-05 PROCEDURE — 94640 AIRWAY INHALATION TREATMENT: CPT

## 2018-09-05 PROCEDURE — 85025 COMPLETE CBC W/AUTO DIFF WBC: CPT

## 2018-09-05 PROCEDURE — 1200000003 HC TELEMETRY R&B

## 2018-09-05 PROCEDURE — 82436 ASSAY OF URINE CHLORIDE: CPT

## 2018-09-05 PROCEDURE — G8987 SELF CARE CURRENT STATUS: HCPCS

## 2018-09-05 PROCEDURE — 82948 REAGENT STRIP/BLOOD GLUCOSE: CPT

## 2018-09-05 PROCEDURE — 51798 US URINE CAPACITY MEASURE: CPT

## 2018-09-05 PROCEDURE — 92523 SPEECH SOUND LANG COMPREHEN: CPT

## 2018-09-05 PROCEDURE — G8979 MOBILITY GOAL STATUS: HCPCS

## 2018-09-05 PROCEDURE — 82570 ASSAY OF URINE CREATININE: CPT

## 2018-09-05 PROCEDURE — G8988 SELF CARE GOAL STATUS: HCPCS

## 2018-09-05 RX ORDER — FUROSEMIDE 10 MG/ML
20 INJECTION INTRAMUSCULAR; INTRAVENOUS ONCE
Status: COMPLETED | OUTPATIENT
Start: 2018-09-05 | End: 2018-09-05

## 2018-09-05 RX ORDER — WARFARIN SODIUM 3 MG/1
3 TABLET ORAL
Status: COMPLETED | OUTPATIENT
Start: 2018-09-05 | End: 2018-09-05

## 2018-09-05 RX ADMIN — Medication 12 UNITS: at 17:49

## 2018-09-05 RX ADMIN — Medication 500 MG: at 20:16

## 2018-09-05 RX ADMIN — Medication 1000 MCG: at 08:19

## 2018-09-05 RX ADMIN — SODIUM CHLORIDE: 9 INJECTION, SOLUTION INTRAVENOUS at 21:53

## 2018-09-05 RX ADMIN — HYDROCODONE BITARTRATE AND ACETAMINOPHEN 1 TABLET: 5; 325 TABLET ORAL at 08:18

## 2018-09-05 RX ADMIN — IPRATROPIUM BROMIDE AND ALBUTEROL SULFATE 3 ML: .5; 3 SOLUTION RESPIRATORY (INHALATION) at 16:12

## 2018-09-05 RX ADMIN — PANTOPRAZOLE SODIUM 40 MG: 40 TABLET, DELAYED RELEASE ORAL at 08:18

## 2018-09-05 RX ADMIN — HYDRALAZINE HYDROCHLORIDE 25 MG: 25 TABLET ORAL at 08:19

## 2018-09-05 RX ADMIN — HYDRALAZINE HYDROCHLORIDE 25 MG: 25 TABLET ORAL at 14:34

## 2018-09-05 RX ADMIN — HYDROCODONE BITARTRATE AND ACETAMINOPHEN 1 TABLET: 5; 325 TABLET ORAL at 02:07

## 2018-09-05 RX ADMIN — HYDRALAZINE HYDROCHLORIDE 25 MG: 25 TABLET ORAL at 20:16

## 2018-09-05 RX ADMIN — FUROSEMIDE 20 MG: 10 INJECTION, SOLUTION INTRAMUSCULAR; INTRAVENOUS at 14:34

## 2018-09-05 RX ADMIN — THERA TABS 1 TABLET: TAB at 08:19

## 2018-09-05 RX ADMIN — WARFARIN SODIUM 3 MG: 3 TABLET ORAL at 17:49

## 2018-09-05 RX ADMIN — PANTOPRAZOLE SODIUM 40 MG: 40 TABLET, DELAYED RELEASE ORAL at 20:19

## 2018-09-05 RX ADMIN — SODIUM CHLORIDE: 9 INJECTION, SOLUTION INTRAVENOUS at 07:36

## 2018-09-05 RX ADMIN — METOPROLOL SUCCINATE 50 MG: 50 TABLET, FILM COATED, EXTENDED RELEASE ORAL at 08:19

## 2018-09-05 RX ADMIN — Medication 12 UNITS: at 07:37

## 2018-09-05 RX ADMIN — FERROUS SULFATE TAB 325 MG (65 MG ELEMENTAL FE) 325 MG: 325 (65 FE) TAB at 08:19

## 2018-09-05 RX ADMIN — ONDANSETRON HYDROCHLORIDE 4 MG: 4 TABLET, FILM COATED ORAL at 20:15

## 2018-09-05 RX ADMIN — CLOPIDOGREL BISULFATE 75 MG: 75 TABLET ORAL at 08:18

## 2018-09-05 RX ADMIN — DOXYCYCLINE HYCLATE 100 MG: 100 TABLET, COATED ORAL at 20:16

## 2018-09-05 RX ADMIN — ONDANSETRON HYDROCHLORIDE 4 MG: 4 TABLET, FILM COATED ORAL at 08:20

## 2018-09-05 RX ADMIN — DOCUSATE SODIUM 200 MG: 100 CAPSULE, LIQUID FILLED ORAL at 08:18

## 2018-09-05 RX ADMIN — IPRATROPIUM BROMIDE AND ALBUTEROL SULFATE 3 ML: .5; 3 SOLUTION RESPIRATORY (INHALATION) at 19:46

## 2018-09-05 RX ADMIN — INSULIN GLARGINE 22 UNITS: 100 INJECTION, SOLUTION SUBCUTANEOUS at 20:24

## 2018-09-05 RX ADMIN — PRAVASTATIN SODIUM 40 MG: 40 TABLET ORAL at 20:16

## 2018-09-05 RX ADMIN — DOXYCYCLINE HYCLATE 100 MG: 100 TABLET, COATED ORAL at 08:19

## 2018-09-05 RX ADMIN — FERROUS SULFATE TAB 325 MG (65 MG ELEMENTAL FE) 325 MG: 325 (65 FE) TAB at 20:15

## 2018-09-05 RX ADMIN — ONDANSETRON 4 MG: 4 TABLET, FILM COATED ORAL at 10:26

## 2018-09-05 RX ADMIN — Medication 500 MG: at 08:19

## 2018-09-05 RX ADMIN — IPRATROPIUM BROMIDE AND ALBUTEROL SULFATE 3 ML: .5; 3 SOLUTION RESPIRATORY (INHALATION) at 11:20

## 2018-09-05 RX ADMIN — ISOSORBIDE MONONITRATE 60 MG: 60 TABLET ORAL at 08:20

## 2018-09-05 ASSESSMENT — PAIN SCALES - GENERAL
PAINLEVEL_OUTOF10: 7
PAINLEVEL_OUTOF10: 5
PAINLEVEL_OUTOF10: 5
PAINLEVEL_OUTOF10: 4
PAINLEVEL_OUTOF10: 8
PAINLEVEL_OUTOF10: 0
PAINLEVEL_OUTOF10: 7

## 2018-09-05 ASSESSMENT — PAIN DESCRIPTION - PAIN TYPE
TYPE: ACUTE PAIN

## 2018-09-05 ASSESSMENT — PAIN DESCRIPTION - DESCRIPTORS: DESCRIPTORS: ACHING

## 2018-09-05 ASSESSMENT — PAIN DESCRIPTION - FREQUENCY: FREQUENCY: INTERMITTENT

## 2018-09-05 ASSESSMENT — PAIN DESCRIPTION - LOCATION
LOCATION: SHOULDER

## 2018-09-05 ASSESSMENT — PAIN DESCRIPTION - ORIENTATION
ORIENTATION: RIGHT

## 2018-09-05 ASSESSMENT — PAIN DESCRIPTION - PROGRESSION: CLINICAL_PROGRESSION: GRADUALLY WORSENING

## 2018-09-05 NOTE — PROGRESS NOTES
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 No aortic stenosis. No mitral stenosis. Coronaries; mild disease of the LAD not more than 40%. LV; severe LV dysfunction and EF 30-35%.  CARDIAC CATHETERIZATION  05-    Mild disease of small distal LAD (approximately  50% stenosis) angiographically normal CA. Mild to moderate LV systolic dysfunction. EF 35%. Mildly elevated LV end diastolic pressure. No significant MR. Systemic hypertension.  CARDIAC DEFIBRILLATOR PLACEMENT  02/02/2011    CARDIOVASCULAR STRESS TEST  03/23/10    EF 30%  Severe LV Dys    COLONOSCOPY      Dr. Ann Lee  03/22/10    EF 45%. LV mildly dilated. Systolic function mildly reducted. No regional wall motion abnormalities. Wall thickness mildly increased. LA mildly dilated. MV mild annular calification. Mild TR. Ventricular septum tickness mildly increased.  ENDOSCOPY, COLON, DIAGNOSTIC      EYE SURGERY      bilateral cataracts    EYE SURGERY Left 9/2014    laser surgery for retinopathy?     FOOT SURGERY Left 1/12/2016    PACEMAKER PLACEMENT      AL EGD BALLOON DILATION ESOPHAGUS <30 MM DIAM N/A 9/18/2017    EGD ESOPHAGOGASTRODUODENOSCOPY DILATATION performed by Vielka Hurd MD at [facility]    AL ESOPHAGOGASTRODUODENOSCOPY TRANSORAL DIAGNOSTIC  9/18/2017    EGD ESOPHAGOGASTRODUODENOSCOPY performed by Vielka Hurd MD at [facility]       Restrictions/Precautions:  Weight Bearing, General Precautions, Fall Risk         Right Upper Extremity Weight Bearing: Non Weight Bearing  Other Strengthening, Balance Training, Functional Mobility Training, Transfer Training, Gait Training, Home Exercise Program, Safety Education & Training, Patient/Caregiver Education & Training, Endurance Training    Goals:  Patient goals : go back to [employer] term goals  Time Frame for Short term goals: 3 days  Short term goal 1: Pt to be Min A x 1 for supine <> sit to get in/out of bed  Short term goal 2: Pt to be CGA for sit <> stand to get up to ambulate  Short term goal 3: Pt to ambulate > 10 ft with RW with SBA to get to the bathroom  Long term goals  Time Frame for Long term goals : not set due to short ELOS    Evaluation Complexity: Based on the findings of patient history, examination, clinical presentation, and decision making during this evaluation, the evaluation of Zack Alexis is of low complexity. PT G-Codes  Functional Assessment Tool Used: [facility]  Functional Limitation: Mobility: Walking and moving around  Mobility: Walking and Moving Around Current Status (): At least 60 percent but less than 80 percent impaired, limited or restricted  Mobility: Walking and Moving Around Goal Status (): At least 60 percent but less than 80 percent impaired, limited or restricted       AM-PAC Inpatient Mobility without Stair Climbing Raw Score : 10  AM-PAC Inpatient without Stair Climbing T-Scale Score : 34.07  Mobility Inpatient CMS 0-100% Score: 71.66  Mobility Inpatient without Stair CMS G-Code Modifier : SANTOSH Garcia.  Sherice Hassan [place] [address]

## 2018-09-05 NOTE — PROGRESS NOTES
right 1982 s/p mastectomy and chemo, left 2007 s/p mastectomy    History of CVA (cerebrovascular accident)     History of pulmonary embolism 05/2014    on [address] (coumadin)    Hyperlipidemia     Hypertension     pulmonary    Iron deficiency anemia     Dr. 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 No aortic stenosis. No mitral stenosis. Coronaries; mild disease of the LAD not more than 40%. LV; severe LV dysfunction and EF 30-35%.  CARDIAC CATHETERIZATION  05-    Mild disease of small distal LAD (approximately  50% stenosis) angiographically normal CA. Mild to moderate LV systolic dysfunction. EF 35%. Mildly elevated LV end diastolic pressure. No significant MR. Systemic hypertension.  CARDIAC DEFIBRILLATOR PLACEMENT  02/02/2011    CARDIOVASCULAR STRESS TEST  03/23/10    EF 30%  Severe LV Dys    COLONOSCOPY      Dr. Josefina Vargas  03/22/10    EF 45%. LV mildly dilated. Systolic function mildly reducted. No regional wall motion abnormalities. Wall thickness mildly increased. LA mildly dilated. MV mild annular calification. Mild TR. Ventricular septum tickness mildly increased.  ENDOSCOPY, COLON, DIAGNOSTIC      EYE SURGERY      bilateral cataracts    EYE SURGERY Left 9/2014    laser surgery for retinopathy?     FOOT SURGERY Left 1/12/2016    PACEMAKER PLACEMENT      MD EGD BALLOON DILATION ESOPHAGUS <30 MM DIAM N/A 9/18/2017    EGD ESOPHAGOGASTRODUODENOSCOPY DILATATION performed by Ruperto Staton MD at [facility] Endoscopy pain  Activity Tolerance: Pt tolerated sitting at EOB ~5-7 minutes before attempting to stand. Increased assistance required to complete with posterior lean noted requiring moderate cuing to correct to midline/upright posture. Assessment:  Assessment: Pt presented requiring increased assistance for ADLs, mobility, and transfers s/p fall. Pt will continue to benefit from OT services to increase independence with these tasks to facilitate return to PLOF. Performance deficits / Impairments: Decreased functional mobility , Decreased ADL status, Decreased ROM, Decreased strength, Decreased endurance, Decreased balance, Decreased fine motor control, Decreased coordination  Prognosis: Fair  Discharge Recommendations: Patient would benefit from continued therapy after discharge, Continue to assess pending progress    Clinical Decision Making: Clinical Decision making was of Moderate Complexity as the result of analysis of data from a detailed assessment, a consideration of several treatment options, the presence of comorbidities affecting the plan of care and the need for minimal to moderate modifications or assistance required to complete the evaluation.     Patient Education:  Patient Education: OT role, POC, RUE precautions/restrictions, appropriate positioning of sling, RUE distal exercises, safety with transfers/mobility    Equipment Recommendations:  Equipment Needed: No  Other: defer to next level of care    Safety:  Safety Devices in place: Yes  Type of devices: Call light within reach, Chair alarm in place, Gait belt, Left in chair, Nurse notified    Plan:  Times per week: 5x  Current Treatment Recommendations: Strengthening, ROM, Balance Training, Functional Mobility Training, Endurance Training, Safety Education & Training, Patient/Caregiver Education & Training, Equipment Evaluation, Education, & procurement, Self-Care / ADL    Goals:  Patient goals : \"start moving better\"    Short term goals  Time Frame

## 2018-09-05 NOTE — PLAN OF CARE
Problem: DISCHARGE BARRIERS  Goal: Patient's continuum of care needs are met  Outcome: Ongoing  Return to [facility]. See  progress notes.

## 2018-09-05 NOTE — PLAN OF CARE
Problem: Impaired respiratory status  Goal: Clear lung sounds  Outcome: Ongoing  Breath sounds diminished throughout- continue tx's as ordered

## 2018-09-05 NOTE — CONSULTS
[facility]    [address], [address]  [facility]  9/5/2018 3:29 PM    Pt Name:    Waleska Shannon  MRN:     [id number]   [id number]  YOB: 1934  Admit Date:    9/3/2018  7:35 AM  Primary Care Physician:  No primary care provider on file. Reason for Consult: SHAILA    Admit Chief Complaint: Fall     History:   The patient is a 80y.o. year old  female who is an ECF resident at [facility], with a PMH that includes CAD on Plavix, HTN, DM, CHF with AICD in place, COPD, GERD, HIT, breast Ca, PE, pagets disease, CKD III followed by Dr. Dewey Gar. She fell going into the bathroom, striking her right shoulder on the toilet and hitting her head. She had right shoulder and left knee pain and was sent into the ED for evaluation. She denies having any light headedness or dizziness prior to her fall and denies loss of consciousness. She states she has not been ill, no fevers/chills, NVD. She does state that she has been gaining weight lately, up to a pound a day, and she does not feel her diuretics are working as well as they used to as she is not urinating as much as previously. She also has been more fatigued. She noted worsening SOB after being admitted which improved with oxygen. She reports rigth shoulder pain 10/10, sharp and aching in nature. Improves with rest and meds, worsened by motion. Upon arrival to the ED ,  She was found to have a possible non displaced fracture of the right shoulder, negative CT head for acute findings, negative chest xray. INR was 2.09, Hg 10.6, Hct 33.7, K 5.7, crt 3.4, BUN 3.4, bicarb 21. BP was 130-150's. She was admitted to Dr. Kevin Marx on 9/3/18. She has been on normal saline at 75 mL/hr. She had 375 mL of UOP recorded yesterday and 150 mL the day before. She had a renal US which showed atrophic kidneys, no hydro and grossly normal bladder with 560 mL pre-void content.  Her creatinine has come down to 3.0, and potassium remains high at 4.9 with bicarb 20, so we are consulted. Home medications include Bumex, Coumadin, Plavix, Protonix, potassium, hydralazine, metoprolol, insulin, among other meds. She states she restricts fluids to approximately 4 cups of water daily. For the past few months her baseline creatinine has been ranging 1.9-2.2. Past Medical History:  Past Medical History:   Diagnosis Date    CAD (coronary artery disease)     nonobstructive    Cancer ([facility] [address] [facility] 2007    breast cancer    Cardiomyopathy, [facility]ischemic [facility]     Dr. Rula Rizvi CHF (congestive heart failure) ([facility]     CKD (chronic kidney disease) stage 3, GFR 30-59 ml/min     Dr. Chuckie Charles Congenital heart disease     COPD exacerbation ([facility]     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 ([facility]     DM2 (diabetes mellitus, type 2) ([facility] 1998    with CKD3, R foot ulcer and hx of prior ulcerations and PVD    Ex-smoker 10/2/2012    GERD (gastroesophageal reflux disease)     Heart failure with preserved ejection fraction ([facility]     Dr. Rula Rizvi His of Heparin induced thrombocytopenia     History of breast cancer     right 1982 s/p mastectomy and chemo, left 2007 s/p mastectomy    History of CVA (cerebrovascular accident)     History of pulmonary embolism 05/2014    on [facility] (coumadin)    Hyperlipidemia     Hypertension     pulmonary    Iron deficiency anemia     Dr. 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 No aortic stenosis.  No (TOPROL XL) 50 MG extended release tablet Take 1 tablet by mouth daily 6/20/18   Jamel Barillasbecki, APRN - CNP   insulin aspart (NOVOLOG) 100 UNIT/ML injection vial Inject 12 Units into the skin 2 times daily (with meals)    Historical Provider, MD   insulin aspart (NOVOLOG) 100 UNIT/ML injection vial Inject 8 Units into the skin Daily with lunch    Historical Provider, MD   insulin aspart (NOVOLOG) 100 UNIT/ML injection vial Inject 0-6 Units into the skin 2 times daily (with meals) Low Dose Corrective Insulin Scale for Meals 0-150 = No insulin, 151-200 = 1 unit, 201-250 = 2 units, 251-300 = 3 units, 301-350 = 4 units, 351-400 = 5 units, 401 - 450 = 6 units    Historical Provider, MD   insulin glargine (LANTUS) 100 UNIT/ML injection vial Inject 22 Units into the skin nightly    Historical Provider, MD   guaiFENesin (ROBITUSSIN) 100 MG/5ML syrup Take 200 mg by mouth every 4 hours as needed for Cough    Historical Provider, MD   Multiple Vitamins-Minerals (OCUVITE EXTRA PO) Take 1 tablet by mouth daily     Historical Provider, MD   bisacodyl (DULCOLAX) 10 MG suppository Place 10 mg rectally daily as needed for Constipation    Historical Provider, MD   magnesium hydroxide (MILK OF MAGNESIA) 400 MG/5ML suspension Take 30 mLs by mouth daily as needed for Constipation    Historical Provider, MD   pravastatin (PRAVACHOL) 40 MG tablet Take 40 mg by mouth every evening Indications: Blood Cholesterol Abnormal  5/8/16   Historical Provider, MD   acetaminophen (TYLENOL) 325 MG tablet Take 650 mg by mouth 4 times daily     Historical Provider, MD       Review of Systems:  Source of data: Patient  Constitutional: Denies fever, chills, Reports malaise, fatigue. HEENT: Denies visual changes, runny nose, sore throat, dysphagia, [place], tinnitus. Chest: Denies sputum production, cough. Reports SOB. Cardiovascular: Denies palpitations, chest pain or pressure. GI: Denies abdominal pain, vomiting, diarrhea, constipation. --   105  105   CO2  21*   --    --   21*  20*   BUN  54*   --    --   51*  48*   CREATININE  3.4*   --    --   2.8*  3.0*   GLUCOSE  109*   --    --   165*  102   CALCIUM  9.6   --    --   8.5  8.8   MG   --    --    --   1.8   --     < > = values in this interval not displayed. Hepatic: Recent Labs      09/04/18   0540   LABALBU  3.0*   AST  13   ALT  8*   BILITOT  <0.2*   ALKPHOS  71     INR:   Recent Labs      09/03/18   0908  09/04/18   0540  09/05/18   0555   INR  2.09*  2.03*  2.09*     Results for Macy Vail (MRN [id number]) as of 9/5/2018 16:05   Ref.  Range 9/3/2018 18:37 9/3/2018 18:37   Color, UA Latest Ref Range: YELLOW-STR  YELLOW    Bilirubin, Urine Latest Ref Range: NEGATIVE  NEGATIVE    Ketones, Urine Latest Ref Range: NEGATIVE  NEGATIVE    Specific Gravity, UA Latest Ref Range: 1.002 - 1.03  1.015    Blood, Urine Latest Ref Range: NEGATIVE  NEGATIVE    pH, UA Latest Ref Range: 5.0 - 9.0  5.5    Protein, UA Latest Ref Range: NEGATIVE mg/dl 30 (A)    Urobilinogen, Urine Latest Ref Range: 0.0 - 1.0 eu/dl 0.2    Nitrite, Urine Latest Ref Range: NEGATIVE  NEGATIVE    Leukocyte Esterase, Urine Latest Ref Range: NEGATIVE  NEGATIVE    Glucose, Urine Latest Ref Range: NEGATIVE mg/dl NEGATIVE    Casts Latest Ref Range: NONE SEEN /lpf NONE SEEN NONE SEEN   WBC, UA Latest Ref Range: 0-4/hpf /hpf 0-2    RBC, UA Latest Ref Range: 0-2/hpf /hpf 0-2    Epi Cells Latest Ref Range: 3-5/hpf /hpf 0-2    Renal Epithelial, Urine Latest Ref Range: NONE SEEN  NONE SEEN    Bacteria, UA Latest Ref Range: FEW/NONE S  NONE    Yeast, Urine Latest Ref Range: NONE SEEN  NONE SEEN    Crystals Latest Ref Range: NONE SEEN  NONE SEEN    Character, Urine Latest Ref Range: CLR-SL.JOIE  CLEAR    Chloride, Urine Latest Units: meq/l 66.3    Creatinine, Urine Latest Units: mg/dl 58.6    Potassium, Urine Latest Units: meq/l 44.7    Sodium, Ur Latest Units: meq/l 79      US renal 9/3/18  Narrative   PROCEDURE: US RENAL COMPLETE     in the setting of chronic CHF, also possibility of worsening of CKD   - Send stat urine sodium, chloride, UPCR. U/A reviewed and does not appear to be UTI.   - Given lasix to stimulate UOP, has not urinated since 0900, bladder scan ordered by me, > 999 mL in the bladder. Place knowles now. Fits clinical picture with acidosis and SHAILA. Renal US also showed 560 mL in bladder. If creatinine does not come down after knowles insertion may need to repeat renal US to evaluate for hydro. - Avoid NSAID's, non-emergent IV contrast dye, fleets phospho enema, ACE, ARB and diuretics for now. - Hold Bumex, potassium at this time. Will diurese PRN. - Will continue IVF but reduce rate to 50 mL/hr in light of CHF.   - BMP in am.     2. Urinary retention, acute. See plan above. Watch for post obstructive diuresis. 3. Hyperkalemia, borderline today with K 4.9, down from 5.9 on admission, partly due to urinary retention, and exogenous administration of potassium. Has had kayexalate 30 g and 15 g this admission. 4. Acidosis, likely 2/2 urinary retention/SHAILA, monitor with daily labs. Bicarb 20 today. 5. Anemia, normocytic, likely chronic disease related, will send iron studies and CBC in am.   6. Chronic diastolic CHF/AICD in place, mild crackles and edema, however, beleive this is due to retention. Will monitor closely and reduce fluids to 50 mL/hr. Mag in am.   7. Right shoulder fracture s/p fall, orthopedics/trauma following. 8. Scalp lesion, + MRSA, on Doxycycline per primary service. 9. CKD III/IV, likely due to DM/HTN/obesity/senile nephrosclerosis, baseline crt 1.9-2.2 in the past few months with eGFR around 20 mL/min    Discussed with Dr. Leslie Han. All labs, radiology and medications were reviewed and discussed with the patient and RN caring for pt. Thank you for the consult. Please feel free to call me if you have any questions.      ARELI Chaves, CNP  9/5/2018  3:29 PM  [facility]

## 2018-09-05 NOTE — CARE COORDINATION
9/5/18, 2:04 PM    DISCHARGE BARRIERS    SW informed by RN that physician plans to discharge patient tomorrow and has placed consult for nephrology. Therapy recommending follow-up at [facility]. EASTON called and left message for Katie Perry at [facility] to update.

## 2018-09-05 NOTE — CARE COORDINATION
9/5/18  1:52 PM  Internal med and Ortho following. Sling to right arm. Creatinine 3.0. 92% on 2L NC. Blood culture pending. Doxycycline. PT/OT.

## 2018-09-05 NOTE — PROCEDURES
Bladder scan was completed by ROBB Cox at 1600. The residual amount of >999 ml was reported to [facility].

## 2018-09-05 NOTE — PROGRESS NOTES
Clinical Pharmacy Note    Warfarin consult follow-up    Recent Labs      09/05/18   0555   INR  2.09*     Recent Labs      09/03/18   0756  09/04/18   0540  09/05/18   0555   HGB  10.6*  8.9*  9.0*   HCT  33.7*  28.1*  29.4*   PLT  204  164  157       Significant Drug-Drug Interactions:  New warfarin drug-drug interactions: none  Discontinued drug-drug interactions: none  Current warfarin drug-drug interactions: doxycycline, clopidogrel        Date INR Warfarin Dose   9/3/18 2.09 3 mg    9/4/2018 2.03  3 mg     9/5/2018  2.09  3 mg                                       Notes:                     Daily PT/INR until stable within therapeutic range.      Lulu Hernandez, PharmD  9/5/2018  2:48 PM

## 2018-09-05 NOTE — PROGRESS NOTES
2011    Vitamin D deficiency        Precautions: Fall risk  Pain:  None reported    Subjective:  Pt alert and pleasant, cooperative with clinician and POC for completion of evaluation. SOCIAL HISTORY: Pt current resident at [facility] within the local area. Required assistance for completion of ADLs, not responsible for IADLs including driving and management of medications/finances. Wears glasses and hearing aids. ORAL MOTOR:  Labial / Facial:  WFL  Lingual: WFL  Soft Palate: DNT  Sensation: DNT  Dentition: Sparse natural dentition     SPEECH / VOICE:  Speech: impaired articulatory precision resulting in blended word boundaries, however, speech intelligibility approximated 95% across all contexts  Voice: dysphonic tendencies with hoarse quality noted; will continue to monitor    LANGUAGE:  Receptive:  1 step commands: 3/3  2 step commands: 2/3  Simple yes/no: 3/3  Complex yes/no: 3/3    Expressive:  Automatic speech: WFL, 1-10 numerical counting  Sentence Completion (automatic): 2/3  Confrontational namin/3  Responsive namin/2  Sentence Formulation: Intact for basic wants/needs  Conversational speech: Impaired thought organization   Paraphasias: None evidenced  Repetition: 1/2 phrase level      COGNITION: Mozier Cognitive Assessment (MOCA) version 7.2 completed. Pt scored 14/25. Normal is greater than or equal to 26/30. *adjusted score given pt unable to complete written subtests  Orientation: 4/6  Immediate recall: 4/5  Short term recall: 2/5  Divergent namin items named within 1 minute time frame (target=11)  Problem solvin/3  Reasonin/2 verbal  Sequencin/1  Thought organization: Impaired  Insight: Adequate  Attention: Adequate sustained and selective attention   Numerical relations: 4/5 serial subtraction     SWALLOWING:  CEM Martin reports pt with current safe toleration for diet of regular with thin liquids with pt denying concerns for swallow function.  Will monitor peripherally. IMPRESSIONS: Pt presents with a moderate cognitive deficit given impaired temporal orientation, immediate and delayed recall, working memory/mental manipulation, problem solving, verbal reasoning, sequencing, thought organization, processing speed, and mental flexibility. Expressive and receptive language mildly impaired with communicative breakdowns occurring due to presence of anomia and decreased confrontational naming; impaired execution for multi-step commands, potentially impacting ADL independence. Dysphonic tendencies noted with pt indicating have \"become worse\" as of recent; will monitor to determine need for further assessment. No presence of dysphagia or dysarthria. Skilled ST services are medically necessary to decrease risk of medical decline and for improving cognitive domains to obtain optimal level of functionality. EDUCATION:   Learner: [x]Patient [] Significant other [] Son/Daughter [] Parent     [] Other:   Education: [x] Reviewed results and recommendations of this evaluation     [] Reviewed diet and strategies     [] Reviewed signs, symptoms and risk of aspiration     [] Demonstrated how to thick liquid appropriately. [x] Reviewed goals and Plan of Care     [] OTHER:   Method: [x] Discussion [] Demonstration [] Hand-out     [] OTHER:   Evaluation of Education:     [x] Verbalizes understanding [] Demonstrates with assistance     [] Demonstrates without assistance [x]Needs further instruction     [] No evidence of learning  [x] Family not present    PLAN / TREATMENT RECOMMENDATIONS:  [x] Skilled SLP intervention on acute care 3-5 x per week or until goals met and/or pt plateaus in function.   Specific interventions for next session may include: cognitive tasks      SHORT TERM GOALS:  Short-term Goals  Timeframe for Short-term Goals: two weeks  Goal 1: Pt will complete basic orientation, immediate, and delayed recall tasks with 70% accuracy given mod cues to improve retention of novel and functional information. Goal 2: Pt will complete problem solving, reasoning, sequencing, and thought organization tasks with 70% accuracy given mod cues to improve awareness for ADL completion. Goal 3: Pt will complete expressive (confrontational, responsive, divergent) and receptive (multi-step commands) language tasks with 70% accuracy given mod cues to improve efficacy for communicative exchanges. Goal 4: Monitor voice; complete further assessment should continued deficits present.        LONG TERM GOALS:  No LTG established given short ELOS      David Arrieta M.A., [address] [place] [address]

## 2018-09-05 NOTE — PROGRESS NOTES
Orthopedic Progress Note    Waleska Draft  9/5/2018    Subjective:     CHIEF COMPLAINT:  Right shoulder pain     HISTORY OF PRESENT ILLNESS:       Following up on Radha, 80 y.o. female diagnosed with right shoulder minimally displaced glenoid fracture, OA, possible RCT s/p a fall hitting her shoulder on toilet. Still reports moderate pain in right shoulder with locking, catching, popping and grinding sensations to right shoulder. Decreased motion strength right shoulder. Pain mechanical symptoms debilitating in nature altering her ADL's. Responding well to conservative methods of treatment sling, ice, activity modification, PT, OT, pain medications per admitting. Objective:     /60   Pulse 102   Temp 99.1 °F (37.3 °C) (Oral)   Resp 18   Ht 5' 7\" (1.702 m)   Wt 254 lb 10.1 oz (115.5 kg)   SpO2 93% Comment: repostion/ deep breaths  BMI 39.88 kg/m²     Intake/Output Summary (Last 24 hours) at 09/05/18 0729  Last data filed at 09/05/18 0332   Gross per 24 hour   Intake          2829.22 ml   Output              375 ml   Net          2454.22 ml     ROS:  She denies numbness and tingling in the hand. She does report some swelling to the right shoulder. Physical Exam:  Gen: alert and oriented  Head: normocephalic atraumatic   Neck: supple  Chest: no audible wheezes  Heart: RRR   Abdomen: soft  Pelvis: stable  Extremity:  Right shoulder skin intact, continued mild swelling. Mild tenderness along the superior anterior aspect of the right shoulder. ROM right shoulder decreased. Positive crepitance throughout ROM. Positive Neers, Hawking, cross arm adduction test.  Good stability to apprehension relocation test.  Positive Speeds Yeagersons test.  Right shoulder 3/5 motor/muscle strength. Hand sensation intact. Normal radial pulse capillary refill.     Data Review  CBC: Lab Results   Component Value Date    WBC 7.6 09/05/2018    RBC 3.23 09/05/2018    HGB 9.0 09/05/2018    HCT 29.4 09/05/2018     09/05/2018     BMP:  Lab Results   Component Value Date     09/05/2018    K 4.9 09/05/2018    K 5.0 09/04/2018     09/05/2018    CO2 20 09/05/2018    BUN 48 09/05/2018    CREATININE 3.0 09/05/2018    CALCIUM 8.8 09/05/2018    GLUCOSE 102 09/05/2018    GLUCOSE 316 11/09/2016     PT/INR:    Lab Results   Component Value Date    PROTIME 24.2 11/21/2017    PROTIME 11.1 09/18/2017    INR 2.09 09/05/2018       Radiology: See electronic record to view reports. Reports reviewed. Assessment:     Radiology: See electronic record to view reports. Reports reviewed.     X-rays 3 views right shoulder show changes consistent with:     There is a subtle cortical linear lucency demonstrated along the inferior aspect of the glenoid which may represent a nondisplaced fracture. There is also spurring along the inferior glenoid. No other acute fracture or malalignment is otherwise seen.     As per read by Dr. Eva Curran     ASSESSMENT:Active Problems:    SHAILA (acute kidney injury) ([facility] [address]    Hyperkalemia    Fall    Contusion of right shoulder    Contusion of left knee    Folliculitis  Resolved Problems:    * No resolved hospital problems. *     Right shoulder minimally displaced glenoid fracture  Right shoulder OA  Possible right shoulder RCT     PLAN:  1)  Continue current hospital course and pain management per admitting. 2)  Sling RUE. Only come out of sling for PT/OT and showering.   3)  Ice right shoulder at least TID x 30 min  4)  PT- Codman and pendulum exercises, NWB right UE  5)  OT-ADL's, NWB RUE  6)  F/u with Dr. Maximilian Morales office at [facility] 9/17 at 1 pm.  Appt provided on patients chart  7)  Okay to d/c from ortho standpoint.     Electronically signed by Michi Flores PA-C on 9/5/2018 at 7:29 AM

## 2018-09-05 NOTE — FLOWSHEET NOTE
Inserted a 16 fr knowles catheter using aseptic technique. 10 mL of saline put into catheter balloon. Balloon intact. Catheter secured to right upper thigh. Patient voiced some discomfort, but tolerated well. Patient stated she felt \"a lot of relief\"  after urine drained. Obtained a urine specimen for nurse, urine was clear, darnell in color.      Lucero [address], SN

## 2018-09-05 NOTE — PLAN OF CARE
Problem: Pain:  Goal: Pain level will decrease  Pain level will decrease   Outcome: Ongoing  Patient has pain in right shoulder. Pain medication prn. Pain rated on 0-10 pain rating scale. Will continue to reassess. Problem: Falls - Risk of:  Goal: Will remain free from falls  Will remain free from falls   Outcome: Ongoing  Call light within reach. Side rails up x2. Bed alarm on. Non skid slippers available. Problem: Risk for Impaired Skin Integrity  Goal: Tissue integrity - skin and mucous membranes  Structural intactness and normal physiological function of skin and  mucous membranes. Outcome: Ongoing  Patient has DTI on coccyx. Turning patient every 2 hours and prn. Problem: Discharge Planning:  Goal: Discharged to appropriate level of care  Discharged to appropriate level of care   Outcome: Ongoing  Patient plans to be discharged to [facility] when medically stable. Problem: Infection - Surgical Site:  Goal: Will show no infection signs and symptoms  Will show no infection signs and symptoms   Outcome: Ongoing  Patient in contact isolation for MRSA    Comments: Care plan reviewed with patient. Patient verbalize understanding of the plan of care and contribute to goal setting.

## 2018-09-06 LAB
ANION GAP SERPL CALCULATED.3IONS-SCNC: 12 MEQ/L (ref 8–16)
BUN BLDV-MCNC: 43 MG/DL (ref 7–22)
CALCIUM SERPL-MCNC: 8.7 MG/DL (ref 8.5–10.5)
CHLORIDE BLD-SCNC: 106 MEQ/L (ref 98–111)
CO2: 20 MEQ/L (ref 23–33)
CREAT SERPL-MCNC: 2.5 MG/DL (ref 0.4–1.2)
EOSINOPHIL SMEAR: NORMAL
ERYTHROCYTE [DISTWIDTH] IN BLOOD BY AUTOMATED COUNT: 14.9 % (ref 11.5–14.5)
ERYTHROCYTE [DISTWIDTH] IN BLOOD BY AUTOMATED COUNT: 48.8 FL (ref 35–45)
FERRITIN: 591 NG/ML (ref 10–291)
GFR SERPL CREATININE-BSD FRML MDRD: 18 ML/MIN/1.73M2
GLUCOSE BLD-MCNC: 108 MG/DL (ref 70–108)
GLUCOSE BLD-MCNC: 120 MG/DL (ref 70–108)
GLUCOSE BLD-MCNC: 121 MG/DL (ref 70–108)
GLUCOSE BLD-MCNC: 148 MG/DL (ref 70–108)
GLUCOSE BLD-MCNC: 151 MG/DL (ref 70–108)
HCT VFR BLD CALC: 28.2 % (ref 37–47)
HEMOGLOBIN: 8.7 GM/DL (ref 12–16)
INR BLD: 2.64 (ref 0.85–1.13)
IRON SATURATION: 17 % (ref 20–50)
IRON: 24 UG/DL (ref 50–170)
MAGNESIUM: 1.7 MG/DL (ref 1.6–2.4)
MCH RBC QN AUTO: 28.5 PG (ref 26–33)
MCHC RBC AUTO-ENTMCNC: 30.9 GM/DL (ref 32.2–35.5)
MCV RBC AUTO: 92.5 FL (ref 81–99)
PLATELET # BLD: 145 THOU/MM3 (ref 130–400)
PMV BLD AUTO: 11.4 FL (ref 9.4–12.4)
POTASSIUM SERPL-SCNC: 4.3 MEQ/L (ref 3.5–5.2)
RBC # BLD: 3.05 MILL/MM3 (ref 4.2–5.4)
SODIUM BLD-SCNC: 138 MEQ/L (ref 135–145)
SPECIMEN: NORMAL
TOTAL IRON BINDING CAPACITY: 143 UG/DL (ref 171–450)
WBC # BLD: 7.8 THOU/MM3 (ref 4.8–10.8)

## 2018-09-06 PROCEDURE — 6360000002 HC RX W HCPCS: Performed by: INTERNAL MEDICINE

## 2018-09-06 PROCEDURE — 94640 AIRWAY INHALATION TREATMENT: CPT

## 2018-09-06 PROCEDURE — 83735 ASSAY OF MAGNESIUM: CPT

## 2018-09-06 PROCEDURE — 82948 REAGENT STRIP/BLOOD GLUCOSE: CPT

## 2018-09-06 PROCEDURE — 97127 HC SP THER IVNTJ W/FOCUS COG FUNCJ: CPT

## 2018-09-06 PROCEDURE — 99232 SBSQ HOSP IP/OBS MODERATE 35: CPT | Performed by: INTERNAL MEDICINE

## 2018-09-06 PROCEDURE — 6370000000 HC RX 637 (ALT 250 FOR IP): Performed by: INTERNAL MEDICINE

## 2018-09-06 PROCEDURE — 6370000000 HC RX 637 (ALT 250 FOR IP): Performed by: PHYSICIAN ASSISTANT

## 2018-09-06 PROCEDURE — 83550 IRON BINDING TEST: CPT

## 2018-09-06 PROCEDURE — 2580000003 HC RX 258: Performed by: NURSE PRACTITIONER

## 2018-09-06 PROCEDURE — 82728 ASSAY OF FERRITIN: CPT

## 2018-09-06 PROCEDURE — 2700000000 HC OXYGEN THERAPY PER DAY

## 2018-09-06 PROCEDURE — 89190 NASAL SMEAR FOR EOSINOPHILS: CPT

## 2018-09-06 PROCEDURE — 85610 PROTHROMBIN TIME: CPT

## 2018-09-06 PROCEDURE — 80048 BASIC METABOLIC PNL TOTAL CA: CPT

## 2018-09-06 PROCEDURE — 83540 ASSAY OF IRON: CPT

## 2018-09-06 PROCEDURE — 85027 COMPLETE CBC AUTOMATED: CPT

## 2018-09-06 PROCEDURE — 6370000000 HC RX 637 (ALT 250 FOR IP)

## 2018-09-06 PROCEDURE — 1200000003 HC TELEMETRY R&B

## 2018-09-06 PROCEDURE — 36415 COLL VENOUS BLD VENIPUNCTURE: CPT

## 2018-09-06 RX ORDER — WARFARIN SODIUM 2 MG/1
2 TABLET ORAL
Status: COMPLETED | OUTPATIENT
Start: 2018-09-06 | End: 2018-09-06

## 2018-09-06 RX ADMIN — IPRATROPIUM BROMIDE AND ALBUTEROL SULFATE 3 ML: .5; 3 SOLUTION RESPIRATORY (INHALATION) at 20:50

## 2018-09-06 RX ADMIN — FERROUS SULFATE TAB 325 MG (65 MG ELEMENTAL FE) 325 MG: 325 (65 FE) TAB at 20:26

## 2018-09-06 RX ADMIN — METOPROLOL SUCCINATE 50 MG: 50 TABLET, FILM COATED, EXTENDED RELEASE ORAL at 09:47

## 2018-09-06 RX ADMIN — ONDANSETRON HYDROCHLORIDE 4 MG: 4 TABLET, FILM COATED ORAL at 09:46

## 2018-09-06 RX ADMIN — HYDRALAZINE HYDROCHLORIDE 25 MG: 25 TABLET ORAL at 14:06

## 2018-09-06 RX ADMIN — HYDRALAZINE HYDROCHLORIDE 25 MG: 25 TABLET ORAL at 20:26

## 2018-09-06 RX ADMIN — ONDANSETRON HYDROCHLORIDE 4 MG: 4 TABLET, FILM COATED ORAL at 20:26

## 2018-09-06 RX ADMIN — IPRATROPIUM BROMIDE AND ALBUTEROL SULFATE 3 ML: .5; 3 SOLUTION RESPIRATORY (INHALATION) at 17:32

## 2018-09-06 RX ADMIN — PANTOPRAZOLE SODIUM 40 MG: 40 TABLET, DELAYED RELEASE ORAL at 09:48

## 2018-09-06 RX ADMIN — ISOSORBIDE MONONITRATE 60 MG: 60 TABLET ORAL at 09:47

## 2018-09-06 RX ADMIN — THERA TABS 1 TABLET: TAB at 09:46

## 2018-09-06 RX ADMIN — Medication 500 MG: at 09:46

## 2018-09-06 RX ADMIN — INSULIN GLARGINE 22 UNITS: 100 INJECTION, SOLUTION SUBCUTANEOUS at 22:46

## 2018-09-06 RX ADMIN — HYDROCODONE BITARTRATE AND ACETAMINOPHEN 1 TABLET: 5; 325 TABLET ORAL at 01:51

## 2018-09-06 RX ADMIN — Medication 12 UNITS: at 08:18

## 2018-09-06 RX ADMIN — DOCUSATE SODIUM 200 MG: 100 CAPSULE, LIQUID FILLED ORAL at 09:46

## 2018-09-06 RX ADMIN — DOXYCYCLINE HYCLATE 100 MG: 100 TABLET, COATED ORAL at 09:45

## 2018-09-06 RX ADMIN — Medication 1000 MCG: at 09:46

## 2018-09-06 RX ADMIN — CLOPIDOGREL BISULFATE 75 MG: 75 TABLET ORAL at 09:48

## 2018-09-06 RX ADMIN — SODIUM CHLORIDE: 9 INJECTION, SOLUTION INTRAVENOUS at 20:13

## 2018-09-06 RX ADMIN — HYDRALAZINE HYDROCHLORIDE 25 MG: 25 TABLET ORAL at 09:48

## 2018-09-06 RX ADMIN — PANTOPRAZOLE SODIUM 40 MG: 40 TABLET, DELAYED RELEASE ORAL at 20:28

## 2018-09-06 RX ADMIN — IPRATROPIUM BROMIDE AND ALBUTEROL SULFATE 3 ML: .5; 3 SOLUTION RESPIRATORY (INHALATION) at 08:34

## 2018-09-06 RX ADMIN — WARFARIN SODIUM 2 MG: 2 TABLET ORAL at 17:17

## 2018-09-06 RX ADMIN — Medication 12 UNITS: at 17:18

## 2018-09-06 RX ADMIN — INSULIN LISPRO 8 UNITS: 100 INJECTION, SOLUTION INTRAVENOUS; SUBCUTANEOUS at 11:46

## 2018-09-06 RX ADMIN — IPRATROPIUM BROMIDE AND ALBUTEROL SULFATE 3 ML: .5; 3 SOLUTION RESPIRATORY (INHALATION) at 12:10

## 2018-09-06 RX ADMIN — Medication 500 MG: at 20:26

## 2018-09-06 RX ADMIN — PRAVASTATIN SODIUM 40 MG: 40 TABLET ORAL at 20:26

## 2018-09-06 RX ADMIN — DOXYCYCLINE HYCLATE 100 MG: 100 TABLET, COATED ORAL at 20:26

## 2018-09-06 RX ADMIN — FERROUS SULFATE TAB 325 MG (65 MG ELEMENTAL FE) 325 MG: 325 (65 FE) TAB at 09:47

## 2018-09-06 ASSESSMENT — PAIN DESCRIPTION - ORIENTATION
ORIENTATION_2: RIGHT
ORIENTATION: RIGHT

## 2018-09-06 ASSESSMENT — PAIN DESCRIPTION - LOCATION
LOCATION: SHOULDER
LOCATION_2: SHOULDER

## 2018-09-06 ASSESSMENT — PAIN SCALES - GENERAL
PAINLEVEL_OUTOF10: 0
PAINLEVEL_OUTOF10: 3
PAINLEVEL_OUTOF10: 9

## 2018-09-06 ASSESSMENT — PAIN DESCRIPTION - PAIN TYPE
TYPE_2: ACUTE PAIN
TYPE: ACUTE PAIN

## 2018-09-06 ASSESSMENT — PAIN DESCRIPTION - INTENSITY: RATING_2: 8

## 2018-09-06 ASSESSMENT — PAIN DESCRIPTION - FREQUENCY: FREQUENCY: INTERMITTENT

## 2018-09-06 ASSESSMENT — PAIN DESCRIPTION - DESCRIPTORS: DESCRIPTORS: THROBBING

## 2018-09-06 NOTE — PROGRESS NOTES
dosing (placeholder)   Other RX Placeholder     Continuous Infusions:   dextrose      sodium chloride 50 mL/hr at 09/06/18 0838     PRN Meds:.glucose, dextrose, glucagon (rDNA), dextrose, bisacodyl, guaiFENesin, magnesium hydroxide, sodium chloride flush, HYDROcodone 5 mg - acetaminophen, ondansetron  Continuous Infusions:   dextrose      sodium chloride 50 mL/hr at 09/06/18 0838       Intake/Output Summary (Last 24 hours) at 09/06/18 1117  Last data filed at 09/06/18 0955   Gross per 24 hour   Intake          3314.43 ml   Output             2075 ml   Net          1239.43 ml       CBC:   Recent Labs      09/04/18 0540  09/05/18 0555  09/06/18   0531   WBC  6.6  7.6  7.8   HGB  8.9*  9.0*  8.7*   HCT  28.1*  29.4*  28.2*   PLT  164  157  145     BMP:    Recent Labs      09/04/18 0540 09/05/18 0555 09/06/18 0531   NA  140  139  138   K  5.0  4.9  4.3   CL  105  105  106   CO2  21*  20*  20*   BUN  51*  48*  43*   CREATININE  2.8*  3.0*  2.5*   GLUCOSE  165*  102  108     Hepatic:   Recent Labs      09/04/18 0540   AST  13   ALT  8*   BILITOT  <0.2*   ALKPHOS  71     Urine: urine culture  ABGs: No results found for: PHART, PO2ART, BOD6GTR  INR:   Recent Labs      09/04/18 0540 09/05/18 0555 09/06/18 0531   INR  2.03*  2.09*  2.64*     -----------------------------------------------------------------  Data Review   Recent Labs      09/05/18 0555 09/06/18   0531   WBC  7.6  7.8   HGB  9.0*  8.7*   HCT  29.4*  28.2*   PLT  157  145     Recent Labs      09/04/18 0540 09/05/18 0555 09/06/18   0531   NA  140  139  138   CL  105  105  106   K  5.0  4.9  4.3   CO2  21*  20*  20*   BUN  51*  48*  43*   CREATININE  2.8*  3.0*  2.5*   GLUCOSE  165*  102  108   MG  1.8   --   1.7   CALCIUM  8.5  8.8  8.7   PROT  6.2   --    --    BILITOT  <0.2*   --    --    ALKPHOS  71   --    --      No results for input(s): CKTOTAL, CKMB, CKMBINDEX, CKMBCALCMETH, TROPONINI in the last 72 hours.   No results for input(s): HDL, LDLDIRECT, TRIG in the last 72 hours. Invalid input(s): TOTALCHLTL, LDLCHLC  No results for input(s): BNP, TSH, FT4, ACETONE in the last 72 hours. Assessment   Active Problems:    SHAILA (acute kidney injury) ([address]    Hyperkalemia    Fall    Contusion of right shoulder    Contusion of left knee    Folliculitis  Resolved Problems:    * No resolved hospital problems.  *        Patient Active Problem List   Diagnosis    Cardiomyopathy, nonischemic (HCC)    Hypertension    LBBB (left bundle branch block)    Hyperlipidemia    [facility] BiV ICD    CKD (chronic kidney disease) stage 3, GFR 30-59 ml/min    Ex-smoker    Mild carotid artery disease ([address]    CAD (coronary artery disease)    Hyperkalemia    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 ([address]    DM2 [facility]diabetes mellitus, type 2) ([facility]    GERD (gastroesophageal reflux disease)    Heart failure with preserved ejection fraction ([address]    His of Heparin induced thrombocytopenia    History of breast cancer    History of CVA (cerebrovascular accident)    History of pulmonary embolism    Iron deficiency anemia    Osteoarthritis    Paget's disease of bone    Vitamin D deficiency    Bilateral lower extremity edema    CHF (congestive heart failure), NYHA class I, acute on chronic, combined ([address]    COPD exacerbation (HCC)    Acute renal failure superimposed on chronic kidney disease (HCC)    Acute on chronic congestive heart failure (HCC)    Other fluid overload (CODE)    Acute on chronic diastolic ACC/AHA stage C congestive heart failure ([address]    SHAILA (acute kidney injury) ([address]    SHAILA (acute kidney injury) ([address]    Hyperkalemia    Fall    Contusion of right shoulder    Contusion of left knee    Folliculitis        Plan   Discharge held  Will keep on the current    Corazon Strickland MD

## 2018-09-06 NOTE — FLOWSHEET NOTE
[address]  OCCUPATIONAL THERAPY MISSED TREATMENT NOTE  [facility] TELEMET[facility] 6K  [id number]      Date: 2018  Patient Name: Yonathan Wolff        CSN: [id number]   : 1934  (80 y.o.)  Gender: female   Referring Practitioner: Waddell Siemens, PA-C  Diagnosis: SHAILA         REASON FOR MISSED TREATMENT:  Patient with other ancillary department.

## 2018-09-06 NOTE — PROGRESS NOTES
[facility]    [address], [facility]  [place] [facility] Progress Note  9/6/2018 8:10 AM    Pt Name:    Lacey Rendon  MRN:     [id number]   [id number]  YOB: 1934  Admit Date:    9/3/2018  7:35 AM  Primary Care Physician:  No primary care provider on file. Reason for Consult: SHAILA    Admit Chief Complaint: Fall     History:   The patient is a 80y.o. year old  female who is an ECF resident at [facility], with a PMH that includes CAD on Plavix, HTN, DM, CHF with AICD in place, COPD, GERD, HIT, breast Ca, PE, pagets disease, CKD III followed by Dr. Leslie Han. She fell going into the bathroom, striking her right shoulder on the toilet and hitting her head. She had right shoulder and left knee pain and was sent into the ED for evaluation. She denies having any light headedness or dizziness prior to her fall and denies loss of consciousness. She states she has not been ill, no fevers/chills, NVD. She does state that she has been gaining weight lately, up to a pound a day, and she does not feel her diuretics are working as well as they used to as she is not urinating as much as previously. She also has been more fatigued. She noted worsening SOB after being admitted which improved with oxygen. She reports rigth shoulder pain 10/10, sharp and aching in nature. Improves with rest and meds, worsened by motion. Upon arrival to the ED ,  She was found to have a possible non displaced fracture of the right shoulder, negative CT head for acute findings, negative chest xray. INR was 2.09, Hg 10.6, Hct 33.7, K 5.7, crt 3.4, BUN 3.4, bicarb 21. BP was 130-150's. She was admitted to Dr. Neto Dyson on 9/3/18. She has been on normal saline at 75 mL/hr. She had 375 mL of UOP recorded yesterday and 150 mL the day before. She had a renal US which showed atrophic kidneys, no hydro and grossly normal bladder with 560 mL pre-void content.  Her creatinine has come down to 3.0, and potassium remains high at 4.9 with bicarb 20, so we are consulted. Home medications include Bumex, Coumadin, Plavix, Protonix, potassium, hydralazine, metoprolol, insulin, among other meds. She states she restricts fluids to approximately 4 cups of water daily. For the past few months her baseline creatinine has been ranging 1.9-2.2.     24 hour summary:  The patient was relaxing in bed eating breakfast. She complains of right shoulder pain. Her appetite is good. Urine output has improved but eGFR has not improved (typical for the early recovery phase of acute tubular necrosis).         Past Medical History:  Past Medical History:   Diagnosis Date    CAD (coronary artery disease)     nonobstructive    Cancer ([facility] [address] 2007    breast cancer    Cardiomyopathy, nonischemic ([facility] [address]     Dr. Cassi Thompson CHF (congestive heart failure) ([facility] [address]     CKD (chronic kidney disease) stage 3, GFR 30-59 ml/min     Dr. Luis Antonio Montano Congenital heart disease     COPD exacerbation ([facility] [address]     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 ([facility] [address]     DM2 (diabetes mellitus, type 2) ([facility] [address] 1998    with CKD3, R foot ulcer and hx of prior ulcerations and PVD    Ex-smoker 10/2/2012    GERD (gastroesophageal reflux disease)     Heart failure with preserved ejection fraction ([facility] [address]     Dr. Cassi Thompson His of Heparin induced thrombocytopenia     History of breast cancer     right 1982 s/p mastectomy and chemo, left 2007 s/p mastectomy    History of CVA (cerebrovascular accident)     History of pulmonary embolism 05/2014    on [address] (coumadin)    Hyperlipidemia     Hypertension     pulmonary    Iron deficiency anemia     Dr. Giselle Lu did EGD and colonoscopy 2011 - normal/neg w/u per chart    LBBB (left bundle branch block)     Osteoarthritis     Paget's disease of bone 09/2014    left hip    [facility] [employer] BiV ICD 11/17/2011    Vitamin D deficiency        Past Surgical History:  Past  Marital status:      Spouse name: N/A    Number of children: 0    Years of education: N/A     Occupational History    Retired       Social History Main Topics    Smoking status: Former Smoker     Packs/day: 0.50     Years: 2.00     Types: Cigarettes     Quit date: 1/1/1952    Smokeless tobacco: Never Used    Alcohol use No    Drug use: No    Sexual activity: No     Other Topics Concern    Not on file     Social History Narrative    No narrative on file       Home Meds:  Prior to Admission medications    Medication Sig Start Date End Date Taking?  Authorizing Provider   warfarin (COUMADIN) 3 MG tablet Take 3 mg by mouth daily    Historical Provider, MD   vitamin C (ASCORBIC ACID) 500 MG tablet Take 500 mg by mouth 2 times daily    Historical Provider, MD   docusate sodium (COLACE) 100 MG capsule Take 200 mg by mouth daily    Historical Provider, MD   isosorbide mononitrate (IMDUR) 60 MG extended release tablet Take 60 mg by mouth daily    Historical Provider, MD   Multiple Vitamins-Minerals (MULTIVITAL) TABS Take 1 tablet by mouth every morning    Historical Provider, MD   ferrous sulfate (JAMIR-RAJANI) 325 (65 Fe) MG tablet Take 1 tablet by mouth 2 times daily 7/9/18   ARELI Vaughan - CNP   clopidogrel (PLAVIX) 75 MG tablet Take 75 mg by mouth daily    Historical Provider, MD   vitamin B-12 (CYANOCOBALAMIN) 1000 MCG tablet Take 1,000 mcg by mouth daily    Historical Provider, MD   ondansetron (ZOFRAN) 4 MG tablet Take 4 mg by mouth 2 times daily     Historical Provider, MD   pantoprazole (PROTONIX) 40 MG tablet Take 40 mg by mouth 2 times daily     Historical Provider, MD   bumetanide (BUMEX) 1 MG tablet Take 2 tablets by mouth 2 times daily 6/19/18   Sonia Fernando MD   potassium chloride (KLOR-CON M) 20 MEQ extended release tablet Take 0.5 tablets by mouth daily 6/19/18   Sonia Fernando MD   ipratropium-albuterol (DUONEB) 0.5-2.5 (3) MG/3ML SOLN nebulizer solution Inhale 3 mLs fatigue. HEENT: Denies visual changes, runny nose, sore throat, dysphagia, Sun'aq, tinnitus. Chest: Denies sputum production, cough. Reports SOB. Cardiovascular: Denies palpitations, chest pain or pressure. GI: Denies abdominal pain, vomiting, diarrhea, constipation. Reports nausea. : Denies hematuria, foamy urine, hesitancy, or painful urination. Reports reduced urination. Skin: Denies wounds, rashes, redness. Reports skin tear leg. Musculoskeletal: Denies pain, swelling, tingling/numbness, weakness. Reports right shoulder pain. Neurological: Denies dizziness, light headedness, syncope, confusion, numbness, tingling, gait disturbance. Current Meds:  Infusion:    dextrose      sodium chloride 50 mL/hr at 09/05/18 2153     Meds:    clopidogrel  75 mg Oral Daily    docusate sodium  200 mg Oral Daily    ferrous sulfate  325 mg Oral BID    hydrALAZINE  25 mg Oral TID    insulin lispro  12 Units Subcutaneous BID WC    insulin lispro  8 Units Subcutaneous Lunch    insulin glargine  22 Units Subcutaneous Nightly    ipratropium-albuterol  3 mL Inhalation Q4H WA    isosorbide mononitrate  60 mg Oral Daily    metoprolol succinate  50 mg Oral Daily    multivitamin  1 tablet Oral QAM    ondansetron  4 mg Oral BID    pantoprazole  40 mg Oral BID    pravastatin  40 mg Oral Nightly    vitamin B-12  1,000 mcg Oral Daily    vitamin C  500 mg Oral BID    sodium chloride flush  10 mL Intravenous 2 times per day    doxycycline hyclate  100 mg Oral 2 times per day    lidocaine  2 patch Transdermal Daily    warfarin (COUMADIN) daily dosing (placeholder)   Other RX Placeholder     Meds prn: glucose, dextrose, glucagon (rDNA), dextrose, bisacodyl, guaiFENesin, magnesium hydroxide, sodium chloride flush, HYDROcodone 5 mg - acetaminophen, ondansetron     Allergies/Intolerances: ALLERGIES: Patient has no known allergies.     Lab Data  CBC:   Recent Labs      09/04/18   0540  09/05/18   0555 44.7    Sodium, Ur Latest Units: meq/l 79      US renal 9/3/18  Narrative   PROCEDURE: US RENAL COMPLETE       CLINICAL INFORMATION: SHAILA, SHAILA.       COMPARISON: Renal ultrasound dated 4/8/2013       TECHNIQUE:Real-time and color flow ultrasound of the kidneys and bladder were performed.        FINDINGS:       Kidneys:    Morphology: The kidneys are atrophic bilaterally. Echogenicity is normal..   Mass/cyst: none   Hydronephrosis: none   Shadowing calculi: none       RIGHT KIDNEY - 7.1 x 4.0 x 4.0 cm   Resistive Index - unable to obtain    Cortical Thickness - 1.0 cm       LEFT KIDNEY - 8.7 x 5.0 x 4.8 cm   Resistive Index - 0.79   Cortical Thickness - 1.1 cm       URINARY BLADDER: No bladder wall thickening or obvious mass. Bilateral urine bladder jets were not studied. Pre-Void - 560 mL   Post void: Not attempted           Impression       Atrophic kidneys. No hydronephrosis. Elevated left renal resistive index. Grossly normal urinary bladder. Narrative     Source: scalp       Site: swab of scalp drainage          Current Antibiotics: none   Culture & Susceptibility     STAPHYLOCOCCUS AUREUS     Antibiotic Interpretation AMADO Unit Status   Penicillin G Resistant >=0.5 mcg/mL Final   clindamycin Sensitive <=0.25 mcg/mL Final   erythromycin Sensitive <=0.25 mcg/mL Final   gentamicin Sensitive <=0.5 mcg/mL Final   oxacillin Resistant >=4 mcg/mL Final   tetracycline Sensitive <=1 mcg/mL Final   trimethoprim-sulfamethoxazole Sensitive <=10 mcg/mL Final        Echocardiogram 6/13/18  Conclusions      Summary   Normal left ventricle size and systolic function. Ejection fraction was   estimated at 55-60%.  There were no regional left ventricular wall motion   abnormalities and wall thickness was within normal limits.   There was trivial mitral regurgitation.   There was mild tricuspid regurgitation.   Pulmonary artery systolic pressure calculated from tricuspid regurgitation   jet is 35 mmhg.     24HR

## 2018-09-07 VITALS
SYSTOLIC BLOOD PRESSURE: 135 MMHG | DIASTOLIC BLOOD PRESSURE: 61 MMHG | OXYGEN SATURATION: 95 % | TEMPERATURE: 97.9 F | RESPIRATION RATE: 16 BRPM | BODY MASS INDEX: 41.21 KG/M2 | HEIGHT: 67 IN | WEIGHT: 262.57 LBS | HEART RATE: 78 BPM

## 2018-09-07 LAB
GLUCOSE BLD-MCNC: 102 MG/DL (ref 70–108)
GLUCOSE BLD-MCNC: 128 MG/DL (ref 70–108)
INR BLD: 2.83 (ref 0.85–1.13)

## 2018-09-07 PROCEDURE — 2700000000 HC OXYGEN THERAPY PER DAY

## 2018-09-07 PROCEDURE — 94640 AIRWAY INHALATION TREATMENT: CPT

## 2018-09-07 PROCEDURE — 97535 SELF CARE MNGMENT TRAINING: CPT

## 2018-09-07 PROCEDURE — 94760 N-INVAS EAR/PLS OXIMETRY 1: CPT

## 2018-09-07 PROCEDURE — 2580000003 HC RX 258: Performed by: INTERNAL MEDICINE

## 2018-09-07 PROCEDURE — 6370000000 HC RX 637 (ALT 250 FOR IP): Performed by: INTERNAL MEDICINE

## 2018-09-07 PROCEDURE — 6360000002 HC RX W HCPCS: Performed by: INTERNAL MEDICINE

## 2018-09-07 PROCEDURE — 85610 PROTHROMBIN TIME: CPT

## 2018-09-07 PROCEDURE — 97110 THERAPEUTIC EXERCISES: CPT

## 2018-09-07 PROCEDURE — 82948 REAGENT STRIP/BLOOD GLUCOSE: CPT

## 2018-09-07 PROCEDURE — 97530 THERAPEUTIC ACTIVITIES: CPT

## 2018-09-07 PROCEDURE — 36415 COLL VENOUS BLD VENIPUNCTURE: CPT

## 2018-09-07 PROCEDURE — 6370000000 HC RX 637 (ALT 250 FOR IP): Performed by: PHYSICIAN ASSISTANT

## 2018-09-07 PROCEDURE — 99232 SBSQ HOSP IP/OBS MODERATE 35: CPT | Performed by: INTERNAL MEDICINE

## 2018-09-07 RX ORDER — HYDROCODONE BITARTRATE AND ACETAMINOPHEN 5; 325 MG/1; MG/1
1 TABLET ORAL EVERY 6 HOURS PRN
Qty: 10 TABLET | Refills: 0 | Status: SHIPPED | OUTPATIENT
Start: 2018-09-07 | End: 2018-09-14

## 2018-09-07 RX ORDER — DOXYCYCLINE HYCLATE 100 MG
100 TABLET ORAL EVERY 12 HOURS SCHEDULED
Qty: 20 TABLET | Refills: 0 | DISCHARGE
Start: 2018-09-07 | End: 2018-09-17

## 2018-09-07 RX ORDER — WARFARIN SODIUM 2 MG/1
2 TABLET ORAL
Status: DISCONTINUED | OUTPATIENT
Start: 2018-09-07 | End: 2018-09-07 | Stop reason: HOSPADM

## 2018-09-07 RX ADMIN — DOCUSATE SODIUM 200 MG: 100 CAPSULE, LIQUID FILLED ORAL at 08:08

## 2018-09-07 RX ADMIN — IPRATROPIUM BROMIDE AND ALBUTEROL SULFATE 3 ML: .5; 3 SOLUTION RESPIRATORY (INHALATION) at 11:00

## 2018-09-07 RX ADMIN — ISOSORBIDE MONONITRATE 60 MG: 60 TABLET ORAL at 08:01

## 2018-09-07 RX ADMIN — THERA TABS 1 TABLET: TAB at 08:01

## 2018-09-07 RX ADMIN — HYDROCODONE BITARTRATE AND ACETAMINOPHEN 1 TABLET: 5; 325 TABLET ORAL at 00:22

## 2018-09-07 RX ADMIN — CLOPIDOGREL BISULFATE 75 MG: 75 TABLET ORAL at 08:08

## 2018-09-07 RX ADMIN — PANTOPRAZOLE SODIUM 40 MG: 40 TABLET, DELAYED RELEASE ORAL at 08:08

## 2018-09-07 RX ADMIN — Medication 500 MG: at 08:01

## 2018-09-07 RX ADMIN — HYDRALAZINE HYDROCHLORIDE 25 MG: 25 TABLET ORAL at 08:01

## 2018-09-07 RX ADMIN — Medication 1000 MCG: at 08:01

## 2018-09-07 RX ADMIN — Medication 10 ML: at 08:01

## 2018-09-07 RX ADMIN — Medication 12 UNITS: at 08:03

## 2018-09-07 RX ADMIN — METOPROLOL SUCCINATE 50 MG: 50 TABLET, FILM COATED, EXTENDED RELEASE ORAL at 08:01

## 2018-09-07 RX ADMIN — ONDANSETRON HYDROCHLORIDE 4 MG: 4 TABLET, FILM COATED ORAL at 08:09

## 2018-09-07 RX ADMIN — HYDROCODONE BITARTRATE AND ACETAMINOPHEN 1 TABLET: 5; 325 TABLET ORAL at 08:08

## 2018-09-07 RX ADMIN — FERROUS SULFATE TAB 325 MG (65 MG ELEMENTAL FE) 325 MG: 325 (65 FE) TAB at 08:01

## 2018-09-07 RX ADMIN — DOXYCYCLINE HYCLATE 100 MG: 100 TABLET, COATED ORAL at 08:01

## 2018-09-07 ASSESSMENT — PAIN SCALES - GENERAL
PAINLEVEL_OUTOF10: 4
PAINLEVEL_OUTOF10: 8
PAINLEVEL_OUTOF10: 4
PAINLEVEL_OUTOF10: 0
PAINLEVEL_OUTOF10: 3

## 2018-09-07 ASSESSMENT — PAIN DESCRIPTION - LOCATION: LOCATION: SHOULDER

## 2018-09-07 ASSESSMENT — PAIN DESCRIPTION - ORIENTATION: ORIENTATION: RIGHT

## 2018-09-07 ASSESSMENT — PAIN DESCRIPTION - FREQUENCY: FREQUENCY: INTERMITTENT

## 2018-09-07 NOTE — DISCHARGE SUMMARY
suppository  Place 10 mg rectally daily as needed for Constipation             bumetanide (BUMEX) 1 MG tablet  Take 2 tablets by mouth 2 times daily             clopidogrel (PLAVIX) 75 MG tablet  Take 75 mg by mouth daily             docusate sodium (COLACE) 100 MG capsule  Take 200 mg by mouth daily             doxycycline hyclate (VIBRA-TABS) 100 MG tablet  Take 1 tablet by mouth every 12 hours for 10 days             ferrous sulfate (JAMIR-RAJANI) 325 (65 Fe) MG tablet  Take 1 tablet by mouth 2 times daily             guaiFENesin (ROBITUSSIN) 100 MG/5ML syrup  Take 200 mg by mouth every 4 hours as needed for Cough             hydrALAZINE (APRESOLINE) 25 MG tablet  Take 3 tablets by mouth 3 times daily             HYDROcodone-acetaminophen (NORCO) 5-325 MG per tablet  Take 1 tablet by mouth every 6 hours as needed for Pain for up to 7 days. .             insulin aspart (NOVOLOG) 100 UNIT/ML injection vial  Inject 12 Units into the skin 2 times daily (with meals)             insulin aspart (NOVOLOG) 100 UNIT/ML injection vial  Inject 8 Units into the skin Daily with lunch             insulin aspart (NOVOLOG) 100 UNIT/ML injection vial  Inject 0-6 Units into the skin 2 times daily (with meals) Low Dose Corrective Insulin Scale for Meals 0-150 = No insulin, 151-200 = 1 unit, 201-250 = 2 units, 251-300 = 3 units, 301-350 = 4 units, 351-400 = 5 units, 401 - 450 = 6 units             insulin glargine (LANTUS) 100 UNIT/ML injection vial  Inject 22 Units into the skin nightly             ipratropium-albuterol (DUONEB) 0.5-2.5 (3) MG/3ML SOLN nebulizer solution  Inhale 3 mLs into the lungs every 4 hours (while awake)             isosorbide mononitrate (IMDUR) 60 MG extended release tablet  Take 60 mg by mouth daily             magnesium hydroxide (MILK OF MAGNESIA) 400 MG/5ML suspension  Take 30 mLs by mouth daily as needed for Constipation             metoprolol succinate (TOPROL XL) 50 MG extended release tablet  Take 1 tablet by mouth daily             Multiple Vitamins-Minerals (MULTIVITAL) TABS  Take 1 tablet by mouth every morning             Multiple Vitamins-Minerals (OCUVITE EXTRA PO)  Take 1 tablet by mouth daily              ondansetron (ZOFRAN) 4 MG tablet  Take 4 mg by mouth 2 times daily              pantoprazole (PROTONIX) 40 MG tablet  Take 40 mg by mouth 2 times daily              potassium chloride (KLOR-CON M) 20 MEQ extended release tablet  Take 0.5 tablets by mouth daily             pravastatin (PRAVACHOL) 40 MG tablet  Take 40 mg by mouth every evening Indications: Blood Cholesterol Abnormal              vitamin B-12 (CYANOCOBALAMIN) 1000 MCG tablet  Take 1,000 mcg by mouth daily             vitamin C (ASCORBIC ACID) 500 MG tablet  Take 500 mg by mouth 2 times daily             warfarin (COUMADIN) 3 MG tablet  Take 3 mg by mouth daily               Scheduled Meds: Scheduled Meds:   clopidogrel  75 mg Oral Daily    docusate sodium  200 mg Oral Daily    ferrous sulfate  325 mg Oral BID    hydrALAZINE  25 mg Oral TID    insulin lispro  12 Units Subcutaneous BID WC    insulin lispro  8 Units Subcutaneous Lunch    insulin glargine  22 Units Subcutaneous Nightly    ipratropium-albuterol  3 mL Inhalation Q4H WA    isosorbide mononitrate  60 mg Oral Daily    metoprolol succinate  50 mg Oral Daily    multivitamin  1 tablet Oral QAM    ondansetron  4 mg Oral BID    pantoprazole  40 mg Oral BID    pravastatin  40 mg Oral Nightly    vitamin B-12  1,000 mcg Oral Daily    vitamin C  500 mg Oral BID    sodium chloride flush  10 mL Intravenous 2 times per day    doxycycline hyclate  100 mg Oral 2 times per day    lidocaine  2 patch Transdermal Daily    warfarin (COUMADIN) daily dosing (placeholder)   Other RX Placeholder     Continuous Infusions:   dextrose      sodium chloride 50 mL/hr at 09/06/18 2013     PRN Meds:.glucose, dextrose, glucagon (rDNA), dextrose, bisacodyl, guaiFENesin, magnesium hydroxide,

## 2018-09-07 NOTE — PROGRESS NOTES
Clinical Pharmacy Note                                               Warfarin Discharge Recommendations      Pt discharged from [facility] today after admission for fall    INR today:  Recent Labs      09/07/18   0432   INR  2.83*       Coumadin 3 mg tabs    Interacting medications at discharge: doxycycline, clopidogrel    INR goal during admission:2-3    Date INR Warfarin Dose   9/3/18 2.09 3 mg    9/4/2018 2.03  3 mg     9/5/2018  2.09  3 mg    9/6/2018  2.64  2 mg     9/7/2018  2.83  2 mg       Recommendations for discharge:   Date Warfarin Dose   9/8/18 2 mg   9/9/18 2 mg   9/10/18 INR     Provider dosing warfarin: Lydia Ann [facility]    Recheck INR:  9/10/18  with results to provider at [facility]

## 2018-09-07 NOTE — PROGRESS NOTES
[facility] 60  INPATIENT OCCUPATIONAL THERAPY  [facility] RENAL TELEMETRY 6K  DAILY NOTE     Time:  Time In: 3077  Time Out: 2725  Timed Code Treatment Minutes: 25 Minutes  Minutes: 25          Date: 2018  Patient Name: Yonathan Wolff,   Gender: female      Room: [facility]16/016-A  MRN: [id number]  : 1934  (80 y.o.)  Referring Practitioner: Waddell Siemens, PA-C  Diagnosis: SHAILA  Additional Pertinent Hx: The patient is a 80 y.o. female  who presents with right shoulder pain s/p a fall that resulted in her hitting her shoulder on her toilet. She reports moderate to severe pain in right shoulder. Xray R shoulder showed There is a subtle cortical linear lucency demonstrated along the inferior aspect of the glenoid which may represent a nondisplaced fracture. There is also spurring along the inferior glenoid. . All other xrays negative for fx. Non op intervention.      Past Medical History:   Diagnosis Date    CAD (coronary artery disease)     nonobstructive    Cancer ([address]     breast cancer    Cardiomyopathy, nonischemic ([address]     Dr. Diana Kumar CHF (congestive heart failure) ([address]     CKD (chronic kidney disease) stage 3, GFR 30-59 ml/min     Dr. Brandon Alves Congenital heart disease     COPD exacerbation ([address]     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 ([address]     DM2 (diabetes mellitus, type 2) ([address]     with CKD3, R foot ulcer and hx of prior ulcerations and PVD    Ex-smoker 10/2/2012    GERD (gastroesophageal reflux disease)     Heart failure with preserved ejection fraction ([address]     Dr. Diana Kumar His of Heparin induced thrombocytopenia     History of breast cancer     right  s/p mastectomy and chemo, left  s/p mastectomy    History of CVA (cerebrovascular accident)     History of pulmonary embolism 2014    on [address] (coumadin)    Hyperlipidemia     Hypertension     pulmonary    Iron deficiency anemia     Dr. Maycol Swanson did EGD and colonoscopy  Bearing  Other position/activity restrictions: ok for Codmans/Pendulum exercises  Right Upper Extremity Brace/Splint: Sling (ok to come out of sling for PT/OT and shower)    Prior Level of Function:  ADL Assistance: Needs assistance  Homemaking Assistance: Needs assistance (staff completes all)  Ambulation Assistance: Independent  Transfer Assistance: Independent  Additional Comments: Pt would use w/c when outside of room, RW inside of room. Pt reported was no longer doing therapy prior to this admission. Subjective     Subjective: cooperative, pleasant, voiced being fearful of falling         Pain:  Pain Assessment  Patient Currently in Pain: Yes (L knee & thigh; no number given)       Objective  Overall Cognitive Status: WFL        ADL  Grooming: Setup (combed hair while seated in recliner)          Bed mobility  Supine to Sit: Maximum assistance (x 1)  Scooting: Maximal assistance    Transfers  Sit to stand: Moderate assistance (x 1 + min A x 1)  Stand to sit: Moderate assistance (x 1 into recliner from [address])       Balance  Sitting Balance: Stand by assistance  Standing Balance: Minimal assistance (x 2 at EOB, then min A x 1 in BABATUNDE STEDY)     Time: Able to  [address] with erect posture LUE support on bar 30 seconds x 2 during session  Comment: Therapist educated Pt on technique using BABATUNDE STEDY for sit-stand, Pt demo good awareness of maintaining NWB RUE with BABATUNDE STEDY use     Functional Mobility  Functional Mobility Comments: Unable to advance either LE-d/t pain & fearful of falling           Comment: Completed R elbow flexion/extension, wrist flexion/extension, composite finger flexion/extension, & finger opposition x 10 reps. Noted decreased AROM d/t edema in RUE. Therapist assisted Pt with placing pillow under RUE at end of session.                          Activity Tolerance: fair, fatigues quickly          Assessment:     Performance deficits / Impairments: Decreased functional mobility

## 2018-09-07 NOTE — TELEPHONE ENCOUNTER
[facility] Medicare today, was transferred 5 times. Amber [address] 833am  Ximena 837am  Mercedez Matias 907am call ref#  [id number]    Supervisor of Exped[employer] appeal dept -  Lovely Padron had originally told me that there was no appeal on file. I told her that is not true and told her to refer back to the phone conversation on 8.13.18 with Supervisor Augustin. Mercedez Matias then came back on the phone and states that the Appeal was received after the 60 day window. And I would need to do a whole new prior auth. I told her that was not true. Refer to phone call on 8.13.2018. The denial was sent out on 6.26.2018 and the 1st Appeal was sent on 7. 6.2018, the 2nd appeal was sent on 8.13.2018 and was confirmed received by JOSELITO CHUA ( so that is within the 60 day window)   I asked to speak with a supervisor. Spoke with Tamiko at [employer] appeals: she did find some notes in the record from Reissued recvd 8.13.2018 8.28.2018-  Unable to find Claim info-  ( well yes b/c this is NOT a claim appeal-  It is a Prior [facility] appeal)  9.4.2018-  Notation was made-     Tamiko will call me back today.      1hr 28min

## 2018-09-07 NOTE — PROGRESS NOTES
Bearing  Other position/activity restrictions: ok for Codmans/Pendulum exercises  Right Upper Extremity Brace/Splint: Sling (ok to come out of sling for PT/OT and shower)    Prior Level of Function:  ADL Assistance: Needs assistance  Homemaking Assistance: Needs assistance (staff completes all)  Ambulation Assistance: Independent  Transfer Assistance: Independent  Additional Comments: Pt would use w/c when outside of room, RW inside of room. Pt reported was no longer doing therapy prior to this admission. Subjective:     Subjective: pt in recliner upon arrival and agrees to therapy. Tech in to assist with transfers    Pain:  Yes. Pain Assessment  Pain Assessment: 0-10  Pain Level: 4 (R shoulder, L thigh)       Social/Functional:  Lives With: Other (comment)  Type of Home: Facility ([address] [place] [address] since 11/17)  Home Equipment: Rolling walker, [employer], Cane     Objective:     Transfers  Sit to Stand: Minimal Assistance (of 2 from recliner, cue for hand placement)  Stand to sit: Minimal Assistance (of 2 to recliner to control descent)    Balance  Comments: static stand ~3mins with CGA/min A of 1 and mod/min A of another. pt leaning L and posterior. pt needing cues and assist to weight shift forward. Attmepted pre gait weight shifting L to R however pt c/o pain in LLE and nausea, requesting to sit. Exercises:  Exercises  Comments: reclined BLE therex to increase strength and independence with functional mobility 1x10 reps ankle pumps, quad sets, glute sets, heel slides, hip ab/add, SLR        Activity Tolerance:  Activity Tolerance: Patient limited by fatigue;Patient limited by pain; Patient limited by endurance  Activity Tolerance: nausea    Assessment: Body structures, Functions, Activity limitations: Decreased functional mobility , Decreased strength, Decreased endurance, Decreased balance  Assessment: pt tolerated session fair. pt is limited by pain and functional tolerance.  pt will benefit from cont

## 2018-09-07 NOTE — CARE COORDINATION
9/7/18, 12:10 PM    Discharge plan discussed by  and . Discharge plan reviewed with patient/ family. Patient/ family verbalize understanding of discharge plan and are in agreement with plan. Understanding was demonstrated using the teach back method. Services After Discharge  Services At/After Discharge: In ambulance, [employer], Skilled Therapy, Nursing Services (Patito Wheeler)   IMM Letter  IMM Letter given to Patient/Family/Significant other/Guardian/POA/by[de-identified] Eladio Pereira CM   IMM Letter date given[de-identified] 09/07/18  IMM Letter time given[de-identified] 46     SW notified Franco Li at [facility] of discharge today. SW faxed AVS and script. [facility] for 1:30 pm . Pt is agreeable with POC. RN Allie Ferrari updated.

## 2018-09-07 NOTE — PROGRESS NOTES
ml   Output              825 ml   Net          1534.96 ml     I/O last 3 completed shifts: In: 2888.7 [P.O.:1700; I.V.:1188.7]  Out: 1225 [Urine:1225]  I/O this shift: In: 48 [P.O.:50]  Out: -   Admission weight: 250 lb (113.4 kg)  Wt Readings from Last 3 Encounters:   09/06/18 262 lb 9.1 oz (119.1 kg)   08/27/18 253 lb 12.8 oz (115.1 kg)   07/09/18 254 lb (115.2 kg)     Body mass index is 41.12 kg/m². Physical Examination:  VITALS:  /61   Pulse 106   Temp 97.8 °F (36.6 °C) (Oral)   Resp 22   Ht 5' 7\" (1.702 m)   Wt 262 lb 9.1 oz (119.1 kg)   SpO2 96%   BMI 41.12 kg/m²   Weight:   Wt Readings from Last 3 Encounters:   09/06/18 262 lb 9.1 oz (119.1 kg)   08/27/18 253 lb 12.8 oz (115.1 kg)   07/09/18 254 lb (115.2 kg)     Constitutional: alert and cooperative with exam, appears comfortable, no distress  Head: NC/AT, scalp lesion noted to be scabbed over. Eyes: no icteric sclera, no pallor conjunctiva, no discharge seen from either eye  Oral: dry oral mucus membranes  Ears: normal external appearance of left and right ear, both ear lobules nontender to palpation  Nose: no active drainage  Neck: No jugular venous distention, appears symmetric, good ROM  Lungs: Air entry B/L, soft crackles in bilateral bases posterior. Heart: regular rate and rhythm, S1, S2, no murmurs rubs or gallops. Extremities: Generalized and bilateral 1+ pitting bilateral UE/LE edema. Right arm in sling. Painful to mobilize. GI: soft, non-tender, no guarding  : Unable to urinate, palpable urinary bladder.    Skin: no rash seen on exposed extremities, pale, dry appearing  Musculo: moves all extremities  Neuro: no slurred speech, no facial drooping, symmetric strength  Psychiatric: Awake, alert, Oriented    Impression and Plan:    1. SHAILA/CKD III, likely multifactorial due to urinary retention, possibly some component of effective arterial blood volume depletion due to diuretics and fluid restriction in the setting of

## 2018-09-09 LAB
AEROBIC CULTURE: ABNORMAL
ANAEROBIC CULTURE: ABNORMAL
BLOOD CULTURE, ROUTINE: NORMAL
BLOOD CULTURE, ROUTINE: NORMAL
GRAM STAIN RESULT: ABNORMAL
ORGANISM: ABNORMAL

## 2018-09-10 ENCOUNTER — TELEPHONE (OUTPATIENT)
Dept: NEPHROLOGY | Age: 83
End: 2018-09-10

## 2018-09-11 ENCOUNTER — OFFICE VISIT (OUTPATIENT)
Dept: CARDIOLOGY CLINIC | Age: 83
End: 2018-09-11
Payer: MEDICARE

## 2018-09-11 VITALS
DIASTOLIC BLOOD PRESSURE: 70 MMHG | SYSTOLIC BLOOD PRESSURE: 130 MMHG | BODY MASS INDEX: 41.91 KG/M2 | HEART RATE: 110 BPM | HEIGHT: 67 IN | WEIGHT: 267 LBS | OXYGEN SATURATION: 96 %

## 2018-09-11 DIAGNOSIS — I50.32 CHF (CONGESTIVE HEART FAILURE), NYHA CLASS III, CHRONIC, DIASTOLIC (HCC): Primary | ICD-10-CM

## 2018-09-11 PROCEDURE — 1123F ACP DISCUSS/DSCN MKR DOCD: CPT | Performed by: NURSE PRACTITIONER

## 2018-09-11 PROCEDURE — 4040F PNEUMOC VAC/ADMIN/RCVD: CPT | Performed by: NURSE PRACTITIONER

## 2018-09-11 PROCEDURE — 1090F PRES/ABSN URINE INCON ASSESS: CPT | Performed by: NURSE PRACTITIONER

## 2018-09-11 PROCEDURE — 1101F PT FALLS ASSESS-DOCD LE1/YR: CPT | Performed by: NURSE PRACTITIONER

## 2018-09-11 PROCEDURE — G8598 ASA/ANTIPLAT THER USED: HCPCS | Performed by: NURSE PRACTITIONER

## 2018-09-11 PROCEDURE — G8417 CALC BMI ABV UP PARAM F/U: HCPCS | Performed by: NURSE PRACTITIONER

## 2018-09-11 PROCEDURE — 1111F DSCHRG MED/CURRENT MED MERGE: CPT | Performed by: NURSE PRACTITIONER

## 2018-09-11 PROCEDURE — 1036F TOBACCO NON-USER: CPT | Performed by: NURSE PRACTITIONER

## 2018-09-11 PROCEDURE — G8427 DOCREV CUR MEDS BY ELIG CLIN: HCPCS | Performed by: NURSE PRACTITIONER

## 2018-09-11 PROCEDURE — 99213 OFFICE O/P EST LOW 20 MIN: CPT | Performed by: NURSE PRACTITIONER

## 2018-09-11 PROCEDURE — G8399 PT W/DXA RESULTS DOCUMENT: HCPCS | Performed by: NURSE PRACTITIONER

## 2018-09-11 RX ORDER — METOPROLOL SUCCINATE 50 MG/1
75 TABLET, EXTENDED RELEASE ORAL DAILY
Qty: 30 TABLET | Refills: 3 | Status: ON HOLD
Start: 2018-09-11 | End: 2018-10-18 | Stop reason: ALTCHOICE

## 2018-09-11 RX ORDER — METOCLOPRAMIDE 5 MG/1
5 TABLET ORAL 3 TIMES DAILY
COMMUNITY
End: 2018-10-24

## 2018-09-11 RX ORDER — SPIRONOLACTONE 25 MG/1
25 TABLET ORAL DAILY
Qty: 30 TABLET | Refills: 3
Start: 2018-09-11 | End: 2018-09-25 | Stop reason: ALTCHOICE

## 2018-09-11 RX ORDER — METOLAZONE 5 MG/1
5 TABLET ORAL DAILY
Qty: 3 TABLET | Refills: 0
Start: 2018-09-11 | End: 2018-09-25 | Stop reason: SDUPTHER

## 2018-09-11 ASSESSMENT — ENCOUNTER SYMPTOMS
ABDOMINAL DISTENTION: 0
COUGH: 0
SHORTNESS OF BREATH: 1

## 2018-09-11 NOTE — PROGRESS NOTES
disease)     Heart failure with preserved ejection fraction [facility]     Dr. Clint Maza His of Heparin induced thrombocytopenia     History of breast cancer     right 1982 s/p mastectomy and chemo, left 2007 s/p mastectomy    History of CVA (cerebrovascular accident)     History of pulmonary embolism 05/2014    on [address] (coumadin)    Hyperlipidemia     Hypertension     pulmonary    Iron deficiency anemia     Dr. 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 No aortic stenosis. No mitral stenosis. Coronaries; mild disease of the LAD not more than 40%. LV; severe LV dysfunction and EF 30-35%.  CARDIAC CATHETERIZATION  05-    Mild disease of small distal LAD (approximately  50% stenosis) angiographically normal CA. Mild to moderate LV systolic dysfunction. EF 35%. Mildly elevated LV end diastolic pressure. No significant MR. Systemic hypertension.  CARDIAC DEFIBRILLATOR PLACEMENT  02/02/2011    CARDIOVASCULAR STRESS TEST  03/23/10    EF 30%  Severe LV Dys    COLONOSCOPY      Dr. Carlito Castle  03/22/10    EF 45%. LV mildly dilated. Systolic function mildly reducted. No regional wall motion abnormalities. Wall thickness mildly increased. LA mildly dilated. MV mild annular calification. Mild TR. Ventricular septum tickness mildly increased.  ENDOSCOPY, COLON, DIAGNOSTIC      EYE SURGERY      bilateral cataracts    EYE SURGERY Left 9/2014    laser surgery for retinopathy?     FOOT SURGERY Left 1/12/2016    PACEMAKER PLACEMENT Allergies    SUBJECTIVE:   Review of Systems   Constitutional: Negative for activity change, appetite change and fatigue. Respiratory: Positive for shortness of breath. Negative for cough. Cardiovascular: Positive for leg swelling. Negative for chest pain and palpitations. Gastrointestinal: Negative for abdominal distention. Neurological: Negative for weakness, light-headedness and headaches. Hematological: Negative for adenopathy. Psychiatric/Behavioral: Negative for sleep disturbance. OBJECTIVE:   Today's Vitals:  /70   Pulse 110   Ht 5' 7\" (1.702 m)   Wt 267 lb (121.1 kg)   SpO2 96%   BMI 41.82 kg/m²     Physical Exam   Constitutional: She is oriented to person, place, and time. She appears well-developed and well-nourished. No distress. HENT:   Head: Normocephalic and atraumatic. Eyes: Conjunctivae are normal.   Neck: Normal range of motion. Neck supple. No JVD   Cardiovascular: Regular rhythm and normal heart sounds. Tachycardia present. No murmur heard. Pulmonary/Chest: Effort normal and breath sounds normal. No respiratory distress. She has no wheezes. She has no rales. Abdominal: Soft. Bowel sounds are normal. She exhibits distension. There is no tenderness. Musculoskeletal: Normal range of motion. She exhibits edema (significant pitting edema 4+ bilaterally, R shin with weeping area/drsg intact. ). Right arm in sling, mild pitting edema to right hand. Neurological: She is alert and oriented to person, place, and time. Coordination normal.   Skin: Skin is warm and dry. She is not diaphoretic. Psychiatric: She has a normal mood and affect. Her behavior is normal.   Vitals reviewed.       Wt Readings from Last 3 Encounters:   09/11/18 267 lb (121.1 kg)   09/06/18 262 lb 9.1 oz (119.1 kg)   08/27/18 253 lb 12.8 oz (115.1 kg)     BP Readings from Last 3 Encounters:   09/11/18 130/70   09/07/18 135/61   08/27/18 (!) 154/69     Pulse Readings from Last 3 Encounters: 09/11/18 110   09/07/18 78   08/27/18 84     Body mass index is 41.82 kg/m². ECHO:    Electronically signed by Ila Palomino MD (Interpreting  [phone]) on 06/13/2018 at 09:23 PM   ----------------------------------------------------------------      Findings      Mitral Valve   The mitral valve structure was normal with normal leaflet separation.   DOPPLER: The transmitral velocity was within the normal range with no   evidence for mitral stenosis. There was trivial mitral regurgitation.      Aortic Valve   The aortic valve was trileaflet with normal thickness and cuspal   separation. DOPPLER: Transaortic velocity was within the normal range with   no evidence of aortic stenosis. There was no evidence of aortic   regurgitation.      Tricuspid Valve   The tricuspid valve structure was normal with normal leaflet separation.   DOPPLER: There was no evidence of tricuspid stenosis. There was mild   tricuspid regurgitation.      Pulmonic Valve   Pulmonary artery systolic pressure calculated from tricuspid regurgitation   jet is 35 mmhg.      Left Atrium   Left atrial size was normal.      Left Ventricle   Normal left ventricle size and systolic function. Ejection fraction was   estimated at 55-60%. There were no regional left ventricular wall motion   abnormalities and wall thickness was within normal limits.      Right Atrium   Right atrial size was normal.      Right Ventricle   The right ventricular size was normal.      Aorta / Great Vessels   Aortic root dimension within normal limits.     Results reviewed:  BNP:   Lab Results   Component Value Date    BNP 46.0 05/20/2013     CBC:   Lab Results   Component Value Date    WBC 7.8 09/06/2018    RBC 3.05 09/06/2018    HGB 8.7 09/06/2018    HCT 28.2 09/06/2018     09/06/2018     CMP:    Lab Results   Component Value Date     09/06/2018    K 4.3 09/06/2018    K 5.0 09/04/2018     09/06/2018    CO2 20 09/06/2018    BUN 43 09/06/2018    CREATININE

## 2018-09-13 LAB
BUN BLDV-MCNC: 51 MG/DL
CALCIUM SERPL-MCNC: 9.2 MG/DL
CHLORIDE BLD-SCNC: 109 MMOL/L
CO2: 23 MMOL/L
CREAT SERPL-MCNC: 2.2 MG/DL
GFR CALCULATED: 21
GLUCOSE BLD-MCNC: 118 MG/DL
POTASSIUM SERPL-SCNC: 4.4 MMOL/L
SODIUM BLD-SCNC: 141 MMOL/L

## 2018-09-14 NOTE — TELEPHONE ENCOUNTER
Spoke with Tamiko at [place] Status of this appeal -  Auth #  [id number]  Still waiting for the actual ERLANGER JORGE ALBERTO!!!!!!!!!!!!!!!!!!!!!    Now I need to get the [id number] re-approved-  The valid date has  (18)  Spoke with León Lora has been extended- at [facility] valid thru 2018-10.22.2018

## 2018-09-17 ENCOUNTER — TELEPHONE (OUTPATIENT)
Dept: CARDIOLOGY CLINIC | Age: 83
End: 2018-09-17

## 2018-09-18 ENCOUNTER — TELEPHONE (OUTPATIENT)
Dept: CARDIOTHORACIC SURGERY | Age: 83
End: 2018-09-18

## 2018-09-18 LAB
BUN BLDV-MCNC: 59 MG/DL
CALCIUM SERPL-MCNC: 8.8 MG/DL
CHLORIDE BLD-SCNC: 107 MMOL/L
CO2: 25 MMOL/L
CREAT SERPL-MCNC: 2.6 MG/DL
GFR CALCULATED: 18
GLUCOSE BLD-MCNC: 91 MG/DL
POTASSIUM SERPL-SCNC: 4.7 MMOL/L
SODIUM BLD-SCNC: 141 MMOL/L

## 2018-09-20 ENCOUNTER — TELEPHONE (OUTPATIENT)
Dept: CARDIOLOGY CLINIC | Age: 83
End: 2018-09-20

## 2018-09-25 ENCOUNTER — TELEPHONE (OUTPATIENT)
Dept: CARDIOLOGY CLINIC | Age: 83
End: 2018-09-25

## 2018-09-25 DIAGNOSIS — I50.32 CHF (CONGESTIVE HEART FAILURE), NYHA CLASS III, CHRONIC, DIASTOLIC (HCC): ICD-10-CM

## 2018-09-25 RX ORDER — METOLAZONE 5 MG/1
2.5 TABLET ORAL DAILY
Qty: 2 TABLET | Refills: 0
Start: 2018-09-25 | End: 2018-10-24

## 2018-10-01 ENCOUNTER — HOSPITAL ENCOUNTER (OUTPATIENT)
Dept: NON INVASIVE DIAGNOSTICS | Age: 83
Discharge: HOME | End: 2018-10-01
Payer: MEDICARE

## 2018-10-01 DIAGNOSIS — Z01.818 PRE-OP EXAM: ICD-10-CM

## 2018-10-01 DIAGNOSIS — Z01.810 ENCOUNTER FOR PREPROCEDURAL CARDIOVASCULAR EXAMINATION: ICD-10-CM

## 2018-10-01 PROCEDURE — A9500 TC99M SESTAMIBI: HCPCS | Performed by: THORACIC SURGERY (CARDIOTHORACIC VASCULAR SURGERY)

## 2018-10-01 PROCEDURE — 93017 CV STRESS TEST TRACING ONLY: CPT

## 2018-10-01 PROCEDURE — 3430000000 HC RX DIAGNOSTIC RADIOPHARMACEUTICAL: Performed by: THORACIC SURGERY (CARDIOTHORACIC VASCULAR SURGERY)

## 2018-10-01 PROCEDURE — 6360000002 HC RX W HCPCS

## 2018-10-01 PROCEDURE — 78452 HT MUSCLE IMAGE SPECT MULT: CPT

## 2018-10-01 PROCEDURE — 2709999900 HC NON-CHARGEABLE SUPPLY

## 2018-10-01 RX ADMIN — Medication 9.9 MILLICURIE: at 12:00

## 2018-10-01 RX ADMIN — Medication 33.7 MILLICURIE: at 13:15

## 2018-10-03 ENCOUNTER — PROCEDURE VISIT (OUTPATIENT)
Dept: CARDIOLOGY CLINIC | Age: 83
End: 2018-10-03
Payer: MEDICARE

## 2018-10-03 DIAGNOSIS — Z95.810 BIVENTRICULAR IMPLANTABLE CARDIOVERTER-DEFIBRILLATOR IN SITU: Primary | Chronic | ICD-10-CM
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
## 2018-10-03 PROCEDURE — 93296 REM INTERROG EVL PM/IDS: CPT | Performed by: INTERNAL MEDICINE

## 2018-10-03 PROCEDURE — 93295 DEV INTERROG REMOTE 1/2/MLT: CPT | Performed by: INTERNAL MEDICINE

## 2018-10-09 LAB
BASOPHILS ABSOLUTE: 0.2 /ΜL
BASOPHILS RELATIVE PERCENT: 2.3 %
BUN BLDV-MCNC: 53 MG/DL
CALCIUM SERPL-MCNC: 9.6 MG/DL
CHLORIDE BLD-SCNC: 103 MMOL/L
CO2: 28 MMOL/L
CREAT SERPL-MCNC: 2.5 MG/DL
EOSINOPHILS ABSOLUTE: 0.2 /ΜL
EOSINOPHILS RELATIVE PERCENT: 3.1 %
GFR CALCULATED: 18
GLUCOSE BLD-MCNC: 117 MG/DL
HCT VFR BLD CALC: 32.7 % (ref 36–46)
HEMOGLOBIN: 10.4 G/DL (ref 12–16)
LYMPHOCYTES ABSOLUTE: 1.6 /ΜL
LYMPHOCYTES RELATIVE PERCENT: 22.7 %
MCH RBC QN AUTO: 28.3 PG
MCHC RBC AUTO-ENTMCNC: 31.7 G/DL
MCV RBC AUTO: 89.3 FL
MONOCYTES ABSOLUTE: 0.5 /ΜL
MONOCYTES RELATIVE PERCENT: 7.1 %
NEUTROPHILS ABSOLUTE: 4.5 /ΜL
NEUTROPHILS RELATIVE PERCENT: 64.8 %
PLATELET # BLD: 177 K/ΜL
PMV BLD AUTO: 9.9 FL
POTASSIUM SERPL-SCNC: 4.6 MMOL/L
RBC # BLD: 3.66 10^6/ΜL
SODIUM BLD-SCNC: 140 MMOL/L
WBC # BLD: 6.9 10^3/ML

## 2018-10-11 ENCOUNTER — OFFICE VISIT (OUTPATIENT)
Dept: NEPHROLOGY | Age: 83
End: 2018-10-11
Payer: MEDICARE

## 2018-10-11 VITALS
SYSTOLIC BLOOD PRESSURE: 163 MMHG | HEIGHT: 66 IN | DIASTOLIC BLOOD PRESSURE: 66 MMHG | WEIGHT: 252 LBS | OXYGEN SATURATION: 94 % | BODY MASS INDEX: 40.5 KG/M2 | HEART RATE: 69 BPM

## 2018-10-11 DIAGNOSIS — N18.4 CKD (CHRONIC KIDNEY DISEASE), STAGE IV (HCC): Primary | ICD-10-CM

## 2018-10-11 PROCEDURE — 1123F ACP DISCUSS/DSCN MKR DOCD: CPT | Performed by: INTERNAL MEDICINE

## 2018-10-11 PROCEDURE — G8598 ASA/ANTIPLAT THER USED: HCPCS | Performed by: INTERNAL MEDICINE

## 2018-10-11 PROCEDURE — G8417 CALC BMI ABV UP PARAM F/U: HCPCS | Performed by: INTERNAL MEDICINE

## 2018-10-11 PROCEDURE — 1090F PRES/ABSN URINE INCON ASSESS: CPT | Performed by: INTERNAL MEDICINE

## 2018-10-11 PROCEDURE — G8427 DOCREV CUR MEDS BY ELIG CLIN: HCPCS | Performed by: INTERNAL MEDICINE

## 2018-10-11 PROCEDURE — 1101F PT FALLS ASSESS-DOCD LE1/YR: CPT | Performed by: INTERNAL MEDICINE

## 2018-10-11 PROCEDURE — G8484 FLU IMMUNIZE NO ADMIN: HCPCS | Performed by: INTERNAL MEDICINE

## 2018-10-11 PROCEDURE — 4040F PNEUMOC VAC/ADMIN/RCVD: CPT | Performed by: INTERNAL MEDICINE

## 2018-10-11 PROCEDURE — 99213 OFFICE O/P EST LOW 20 MIN: CPT | Performed by: INTERNAL MEDICINE

## 2018-10-11 PROCEDURE — 1036F TOBACCO NON-USER: CPT | Performed by: INTERNAL MEDICINE

## 2018-10-11 PROCEDURE — G8399 PT W/DXA RESULTS DOCUMENT: HCPCS | Performed by: INTERNAL MEDICINE

## 2018-10-11 RX ORDER — ASCORBIC ACID 500 MG
500 TABLET ORAL 2 TIMES DAILY
Status: ON HOLD | COMMUNITY
End: 2020-01-01 | Stop reason: HOSPADM

## 2018-10-11 NOTE — PROGRESS NOTES
[facility]    [address] [facility]  [address], [address]  [phone]       Progress Note    10/11/2018 3:50 PM    Pt Name:    Anthony Peterson:    1934  Primary Care Physician:  Ventura Tiwari MD       Chief Complaint:   Chief Complaint   Patient presents with    Chronic Kidney Disease     iv    Hypertension    Diabetes        History of Chief Complaint: CKD stage IV from DM, Aging, HTN     Subjective:  I last saw the patient in clinic 07/05/18. I follow the patient for Chronic Kidney disease stage IV. Since our last visit the patient has been hospitalized at [facility] after fracturing her right elbow. . The patient is sleeping well at night with 1-2 times per night nocturia. The patient has a good appetite and is remaining active. The patient denied N/V/C/D/SOB/CP. Last six eGFR readings:  Lab Results   Component Value Date    LABGLOM 18 10/09/2018    LABGLOM 18 09/18/2018    LABGLOM 21 09/13/2018    LABGLOM 18 09/06/2018    LABGLOM 15 09/05/2018    LABGLOM 16 09/04/2018    LABGLOM 13 09/03/2018    LABGLOM 25 06/25/2018    LABGLOM 21 06/19/2018    LABGLOM 22 06/18/2018          Objective:  VITALS:  BP (!) 163/66 (Site: Left Lower Arm, Position: Sitting, Cuff Size: Small Adult)   Pulse 69   Ht 5' 6\" (1.676 m)   Wt 252 lb (114.3 kg)   SpO2 94%   BMI 40.67 kg/m²   Weight:   Wt Readings from Last 3 Encounters:   10/11/18 252 lb (114.3 kg)   09/11/18 267 lb (121.1 kg)   09/06/18 262 lb 9.1 oz (119.1 kg)     Body mass index is 40.67 kg/m². Physical examination    General:  Alert and cooperative with exam  HEENT:  Head: Normocephalic, no lesions, without obvious abnormality. Neck:   No JVD and no bruits.  Thyroid gland is normal  Lungs:  clear to auscultation bilaterally  Heart:  regular rate and rhythm, S1, S2 normal, no murmur, click, rub or gallop  Abdomen:  soft, non-tender; bowel sounds normal; no masses,  no organomegaly  Extremities:  extremities normal,

## 2018-10-16 ENCOUNTER — TELEPHONE (OUTPATIENT)
Dept: CARDIOLOGY CLINIC | Age: 83
End: 2018-10-16

## 2018-10-16 ENCOUNTER — PREP FOR PROCEDURE (OUTPATIENT)
Dept: CARDIOLOGY CLINIC | Age: 83
End: 2018-10-16

## 2018-10-16 RX ORDER — SODIUM CHLORIDE 0.9 % (FLUSH) 0.9 %
10 SYRINGE (ML) INJECTION PRN
Status: CANCELLED | OUTPATIENT
Start: 2018-10-16

## 2018-10-16 RX ORDER — SODIUM CHLORIDE 9 MG/ML
INJECTION, SOLUTION INTRAVENOUS CONTINUOUS
Status: CANCELLED | OUTPATIENT
Start: 2018-10-16

## 2018-10-16 RX ORDER — ASPIRIN 325 MG
325 TABLET ORAL ONCE
Status: CANCELLED | OUTPATIENT
Start: 2018-10-16 | End: 2018-10-16

## 2018-10-16 RX ORDER — NITROGLYCERIN 0.4 MG/1
0.4 TABLET SUBLINGUAL EVERY 5 MIN PRN
Status: CANCELLED | OUTPATIENT
Start: 2018-10-16

## 2018-10-16 RX ORDER — SODIUM CHLORIDE 0.9 % (FLUSH) 0.9 %
10 SYRINGE (ML) INJECTION EVERY 12 HOURS SCHEDULED
Status: CANCELLED | OUTPATIENT
Start: 2018-10-16

## 2018-10-16 NOTE — TELEPHONE ENCOUNTER
Spoke with nurse Luwanna Klinefelter  Orders given to hold bumex today then decrease to 2 mg daily  Orders reviewed regarding Earnestine Quintanilla states transportation is set up and coumadin is being held(patient on lovenox) and diabetic orders will be followed

## 2018-10-18 ENCOUNTER — HOSPITAL ENCOUNTER (OUTPATIENT)
Dept: INPATIENT UNIT | Age: 83
Discharge: HOME | End: 2018-10-18
Attending: INTERNAL MEDICINE | Admitting: INTERNAL MEDICINE
Payer: MEDICARE

## 2018-10-18 VITALS
RESPIRATION RATE: 16 BRPM | WEIGHT: 252 LBS | DIASTOLIC BLOOD PRESSURE: 51 MMHG | BODY MASS INDEX: 40.5 KG/M2 | OXYGEN SATURATION: 96 % | TEMPERATURE: 98 F | SYSTOLIC BLOOD PRESSURE: 147 MMHG | HEIGHT: 66 IN | HEART RATE: 75 BPM

## 2018-10-18 DIAGNOSIS — I50.32 CHF (CONGESTIVE HEART FAILURE), NYHA CLASS III, CHRONIC, DIASTOLIC (HCC): ICD-10-CM

## 2018-10-18 LAB
ANION GAP SERPL CALCULATED.3IONS-SCNC: 13 MEQ/L (ref 8–16)
BUN BLDV-MCNC: 60 MG/DL (ref 7–22)
CALCIUM SERPL-MCNC: 9.7 MG/DL (ref 8.5–10.5)
CHLORIDE BLD-SCNC: 102 MEQ/L (ref 98–111)
CO2: 26 MEQ/L (ref 23–33)
COLLECTED BY:: ABNORMAL
COLLECTED BY:: ABNORMAL
CREAT SERPL-MCNC: 2.7 MG/DL (ref 0.4–1.2)
ERYTHROCYTE [DISTWIDTH] IN BLOOD BY AUTOMATED COUNT: 14.6 % (ref 11.5–14.5)
ERYTHROCYTE [DISTWIDTH] IN BLOOD BY AUTOMATED COUNT: 50.2 FL (ref 35–45)
GFR SERPL CREATININE-BSD FRML MDRD: 17 ML/MIN/1.73M2
GLUCOSE BLD-MCNC: 132 MG/DL (ref 70–108)
HCT VFR BLD CALC: 34 % (ref 37–47)
HEMOGLOBIN: 10.2 GM/DL (ref 12–16)
INR BLD: 1.08 (ref 0.85–1.13)
MCH RBC QN AUTO: 28.5 PG (ref 26–33)
MCHC RBC AUTO-ENTMCNC: 30 GM/DL (ref 32.2–35.5)
MCV RBC AUTO: 95 FL (ref 81–99)
PLATELET # BLD: 182 THOU/MM3 (ref 130–400)
PMV BLD AUTO: 11.4 FL (ref 9.4–12.4)
POC O2 SATURATION: 65 % (ref 94–97)
POC O2 SATURATION: 66 % (ref 94–97)
POTASSIUM REFLEX MAGNESIUM: 4.8 MEQ/L (ref 3.5–5.2)
RBC # BLD: 3.58 MILL/MM3 (ref 4.2–5.4)
SODIUM BLD-SCNC: 141 MEQ/L (ref 135–145)
SOURCE, BLOOD GAS: ABNORMAL
SOURCE, BLOOD GAS: ABNORMAL
WBC # BLD: 6 THOU/MM3 (ref 4.8–10.8)

## 2018-10-18 PROCEDURE — 93568 NJX CAR CTH NSLC P-ART ANGRP: CPT | Performed by: INTERNAL MEDICINE

## 2018-10-18 PROCEDURE — 2580000003 HC RX 258: Performed by: NURSE PRACTITIONER

## 2018-10-18 PROCEDURE — 82810 BLOOD GASES O2 SAT ONLY: CPT

## 2018-10-18 PROCEDURE — 36415 COLL VENOUS BLD VENIPUNCTURE: CPT

## 2018-10-18 PROCEDURE — C2624 WIRELESS PRESSURE SENSOR: HCPCS

## 2018-10-18 PROCEDURE — C1769 GUIDE WIRE: HCPCS

## 2018-10-18 PROCEDURE — 93451 RIGHT HEART CATH: CPT

## 2018-10-18 PROCEDURE — 76937 US GUIDE VASCULAR ACCESS: CPT

## 2018-10-18 PROCEDURE — 85610 PROTHROMBIN TIME: CPT

## 2018-10-18 PROCEDURE — 2500000003 HC RX 250 WO HCPCS

## 2018-10-18 PROCEDURE — 2709999900 HC NON-CHARGEABLE SUPPLY

## 2018-10-18 PROCEDURE — 6360000004 HC RX CONTRAST MEDICATION: Performed by: INTERNAL MEDICINE

## 2018-10-18 PROCEDURE — 85027 COMPLETE CBC AUTOMATED: CPT

## 2018-10-18 PROCEDURE — 80048 BASIC METABOLIC PNL TOTAL CA: CPT

## 2018-10-18 PROCEDURE — 93568 NJX CAR CTH NSLC P-ART ANGRP: CPT

## 2018-10-18 PROCEDURE — 93799 UNLISTED CV SVC/PROCEDURE: CPT

## 2018-10-18 PROCEDURE — C1894 INTRO/SHEATH, NON-LASER: HCPCS

## 2018-10-18 PROCEDURE — C1751 CATH, INF, PER/CENT/MIDLINE: HCPCS

## 2018-10-18 PROCEDURE — 93451 RIGHT HEART CATH: CPT | Performed by: INTERNAL MEDICINE

## 2018-10-18 PROCEDURE — 6370000000 HC RX 637 (ALT 250 FOR IP)

## 2018-10-18 PROCEDURE — 93799 UNLISTED CV SVC/PROCEDURE: CPT | Performed by: INTERNAL MEDICINE

## 2018-10-18 PROCEDURE — 6360000002 HC RX W HCPCS

## 2018-10-18 RX ORDER — SODIUM CHLORIDE 9 MG/ML
INJECTION, SOLUTION INTRAVENOUS CONTINUOUS
Status: DISCONTINUED | OUTPATIENT
Start: 2018-10-18 | End: 2018-10-18 | Stop reason: HOSPADM

## 2018-10-18 RX ORDER — ACETAMINOPHEN 325 MG/1
650 TABLET ORAL EVERY 4 HOURS PRN
Status: DISCONTINUED | OUTPATIENT
Start: 2018-10-18 | End: 2018-10-18 | Stop reason: HOSPADM

## 2018-10-18 RX ORDER — ASPIRIN 325 MG
325 TABLET ORAL ONCE
Status: DISCONTINUED | OUTPATIENT
Start: 2018-10-18 | End: 2018-10-18 | Stop reason: HOSPADM

## 2018-10-18 RX ORDER — ASPIRIN 325 MG
325 TABLET ORAL ONCE
Qty: 30 TABLET | Refills: 3 | Status: SHIPPED | OUTPATIENT
Start: 2018-10-18 | End: 2018-10-18 | Stop reason: HOSPADM

## 2018-10-18 RX ORDER — SODIUM CHLORIDE 0.9 % (FLUSH) 0.9 %
10 SYRINGE (ML) INJECTION EVERY 12 HOURS SCHEDULED
Status: DISCONTINUED | OUTPATIENT
Start: 2018-10-18 | End: 2018-10-18 | Stop reason: SDUPTHER

## 2018-10-18 RX ORDER — NITROGLYCERIN 0.4 MG/1
TABLET SUBLINGUAL
Qty: 25 TABLET | Refills: 3 | Status: SHIPPED | OUTPATIENT
Start: 2018-10-18

## 2018-10-18 RX ORDER — WARFARIN SODIUM 2.5 MG/1
2.5 TABLET ORAL
COMMUNITY
End: 2018-10-19 | Stop reason: ALTCHOICE

## 2018-10-18 RX ORDER — SODIUM CHLORIDE 0.9 % (FLUSH) 0.9 %
10 SYRINGE (ML) INJECTION PRN
Status: DISCONTINUED | OUTPATIENT
Start: 2018-10-18 | End: 2018-10-18 | Stop reason: HOSPADM

## 2018-10-18 RX ORDER — SODIUM CHLORIDE 0.9 % (FLUSH) 0.9 %
10 SYRINGE (ML) INJECTION EVERY 12 HOURS SCHEDULED
Status: DISCONTINUED | OUTPATIENT
Start: 2018-10-18 | End: 2018-10-18 | Stop reason: HOSPADM

## 2018-10-18 RX ORDER — SODIUM CHLORIDE 0.9 % (FLUSH) 0.9 %
10 SYRINGE (ML) INJECTION PRN
Status: DISCONTINUED | OUTPATIENT
Start: 2018-10-18 | End: 2018-10-18 | Stop reason: SDUPTHER

## 2018-10-18 RX ORDER — METOPROLOL SUCCINATE 50 MG/1
75 TABLET, EXTENDED RELEASE ORAL DAILY
Status: ON HOLD | COMMUNITY
End: 2019-03-27

## 2018-10-18 RX ORDER — METOPROLOL SUCCINATE 50 MG/1
75 TABLET, EXTENDED RELEASE ORAL DAILY
Status: ON HOLD | COMMUNITY
End: 2018-10-18 | Stop reason: ALTCHOICE

## 2018-10-18 RX ORDER — ATROPINE SULFATE 0.4 MG/ML
0.5 AMPUL (ML) INJECTION
Status: DISCONTINUED | OUTPATIENT
Start: 2018-10-18 | End: 2018-10-18 | Stop reason: HOSPADM

## 2018-10-18 RX ORDER — NITROGLYCERIN 0.4 MG/1
0.4 TABLET SUBLINGUAL EVERY 5 MIN PRN
Status: DISCONTINUED | OUTPATIENT
Start: 2018-10-18 | End: 2018-10-18 | Stop reason: HOSPADM

## 2018-10-18 RX ORDER — ONDANSETRON 2 MG/ML
4 INJECTION INTRAMUSCULAR; INTRAVENOUS EVERY 6 HOURS PRN
Status: DISCONTINUED | OUTPATIENT
Start: 2018-10-18 | End: 2018-10-18 | Stop reason: HOSPADM

## 2018-10-18 RX ADMIN — IOPAMIDOL 10 ML: 755 INJECTION, SOLUTION INTRAVENOUS at 10:25

## 2018-10-18 RX ADMIN — SODIUM CHLORIDE: 9 INJECTION, SOLUTION INTRAVENOUS at 08:06

## 2018-10-18 NOTE — PLAN OF CARE
Problem: Falls - Risk of:  Goal: Will remain free from falls  Will remain free from falls   No falls     Problem: Risk for Impaired Skin Integrity  Goal: Tissue integrity - skin and mucous membranes  Structural intactness and normal physiological function of skin and  mucous membranes.    Right femoral site looks good with no bleeding or hematoma     Problem: Discharge Planning:  Goal: Discharged to appropriate level of care  Discharged to appropriate level of care   Discharge instructions given to pt, \"voices understanding\"  \"ready to go home\"    1550 discharged per own home wc with assist of [address] [place] [address], [address] [place] [address] with pt and glasses

## 2018-10-18 NOTE — PRE SEDATION
further questions and wished to proceed; the consent form was signed. MEDICAL HISTORY  []ASHD/ANGINA/MI/CHF   []Hypertension  []Diabetes  []Hyperlipidemia  []Smoking  []Family Hx of ASHD  [x]Additional information:       has a past medical history of CAD (coronary artery disease); Cancer ([facility] Cardiomyopathy, nonischemic (HCC); CHF (congestive heart failure) ([facility] CKD (chronic kidney disease) stage 3, GFR 30-59 ml/min ([facility] Congenital heart disease; COPD exacerbation ([facility] 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 ([facility] DM2 (diabetes mellitus, type 2) ([facility] Ex-smoker; GERD (gastroesophageal reflux disease); Heart failure with preserved ejection fraction ([facility] His of Heparin induced thrombocytopenia; History of breast cancer; History of CVA (cerebrovascular accident); History of pulmonary embolism; Hyperlipidemia; Hypertension; Iron deficiency anemia; LBBB (left bundle branch block); Osteoarthritis; Paget's disease of bone; [facility] [place] BiV ICD; and Vitamin D deficiency. SURGICAL HISTORY   has a past surgical history that includes doppler echocardiography (03/22/10); cardiovascular stress test (03/23/10); pacemaker placement; eye surgery; Colonoscopy; Eye surgery (Left, 9/2014); Foot surgery (Left, 1/12/2016); Breast surgery; Cardiac catheterization (11-); Cardiac catheterization (05-); Cardiac defibrillator placement (02/02/2011); pr egd balloon dilation esophagus <30 mm diam (N/A, 9/18/2017); pr esophagogastroduodenoscopy transoral diagnostic (9/18/2017); and Endoscopy, colon, diagnostic.   Additional information:       ALLERGIES   Allergies as of 10/18/2018    (No Known Allergies)     Additional information:       MEDICATIONS   Aspirin  [x] 81 mg  [] 325 mg  [] None  Antiplatelet drug therapy use last 5 days  [x]No []Yes  Coumadin Use Last 5 Days [x]No []Yes  Other anticoagulant use last 5 days  []No [x]Yes    Current Facility-Administered Medications:     0.9 % PHYSICAL:   General: No acute distress  HEENT:  Unremarkable for age  Neck: without increased JVD, carotid pulses 2+ bilaterally without bruits  Heart: RRR, S1 & S2 WNL, S4 gallop, without murmurs or rubs    Lungs: Clear to auscultation    Abdomen: BS present, without HSM, masses, or tenderness    Extremities: without C,C,E.  Pulses 2+ bilaterally  Mental Status: Alert & Oriented        PLANNED PROCEDURE   []Cath  []PCI                []Pacemaker/AICD  []RONY             []Cardioversion []Peripheral angiography/PTA  [x]Other: Cardiomems      SEDATION  Planned agent:[x]Midazolam []Meperidine [x]Sublimaze []Morphine  []Diazepam  []Other:     ASA Classification:  []1 []2 [x]3 []4 []5  Class 1: A normal healthy patient  Class 2: Pt with mild to moderate systemic disease  Class 3: Severe systemic disease or disturbance  Class 4: Severe systemic disorders that are already life threatening. Class 5: Moribund pt with little chances of survival, for more than 24 hours. Mallampati I Airway Classification:   []1 []2 [x]3 []4    [x]Pre-procedure diagnostic studies complete and results available. Comment:    [x]Previous sedation/anesthesia experiences assessed. Comment:    [x]The patient is an appropriate candidate to undergo the planned procedure sedation and anesthesia. (Refer to nursing sedation/analgesia documentation record)  [x]Formulation and discussion of sedation/procedure plan, risks, and expectations with patient and/or responsible adult completed. [x]Patient examined immediately prior to the procedure.  (Refer to nursing sedation/analgesia documentation record)    Shailesh Painting MD   Electronically signed 10/18/2018 at 8:38 AM

## 2018-10-19 ENCOUNTER — OFFICE VISIT (OUTPATIENT)
Dept: CARDIOLOGY CLINIC | Age: 83
End: 2018-10-19
Payer: MEDICARE

## 2018-10-19 VITALS
HEIGHT: 66 IN | BODY MASS INDEX: 40.47 KG/M2 | DIASTOLIC BLOOD PRESSURE: 62 MMHG | HEART RATE: 68 BPM | SYSTOLIC BLOOD PRESSURE: 152 MMHG | WEIGHT: 251.8 LBS

## 2018-10-19 DIAGNOSIS — I10 ESSENTIAL HYPERTENSION: Chronic | ICD-10-CM

## 2018-10-19 DIAGNOSIS — Z95.810 BIVENTRICULAR IMPLANTABLE CARDIOVERTER-DEFIBRILLATOR IN SITU: Chronic | ICD-10-CM

## 2018-10-19 DIAGNOSIS — E78.2 MIXED HYPERLIPIDEMIA: Chronic | ICD-10-CM

## 2018-10-19 DIAGNOSIS — I50.32 CHF (CONGESTIVE HEART FAILURE), NYHA CLASS III, CHRONIC, DIASTOLIC (HCC): Primary | ICD-10-CM

## 2018-10-19 DIAGNOSIS — I44.7 LBBB (LEFT BUNDLE BRANCH BLOCK): Chronic | ICD-10-CM

## 2018-10-19 PROCEDURE — 4040F PNEUMOC VAC/ADMIN/RCVD: CPT | Performed by: INTERNAL MEDICINE

## 2018-10-19 PROCEDURE — 99214 OFFICE O/P EST MOD 30 MIN: CPT | Performed by: INTERNAL MEDICINE

## 2018-10-19 PROCEDURE — G8417 CALC BMI ABV UP PARAM F/U: HCPCS | Performed by: INTERNAL MEDICINE

## 2018-10-19 PROCEDURE — 1090F PRES/ABSN URINE INCON ASSESS: CPT | Performed by: INTERNAL MEDICINE

## 2018-10-19 PROCEDURE — 1101F PT FALLS ASSESS-DOCD LE1/YR: CPT | Performed by: INTERNAL MEDICINE

## 2018-10-19 PROCEDURE — 1123F ACP DISCUSS/DSCN MKR DOCD: CPT | Performed by: INTERNAL MEDICINE

## 2018-10-19 PROCEDURE — G8399 PT W/DXA RESULTS DOCUMENT: HCPCS | Performed by: INTERNAL MEDICINE

## 2018-10-19 PROCEDURE — G8427 DOCREV CUR MEDS BY ELIG CLIN: HCPCS | Performed by: INTERNAL MEDICINE

## 2018-10-19 PROCEDURE — 1036F TOBACCO NON-USER: CPT | Performed by: INTERNAL MEDICINE

## 2018-10-19 PROCEDURE — G8484 FLU IMMUNIZE NO ADMIN: HCPCS | Performed by: INTERNAL MEDICINE

## 2018-10-19 PROCEDURE — G8598 ASA/ANTIPLAT THER USED: HCPCS | Performed by: INTERNAL MEDICINE

## 2018-10-19 NOTE — PROGRESS NOTES
Packs/day: 0.50     Years: 2.00     Types: Cigarettes     Quit date: 1/1/1952    Smokeless tobacco: Never Used    Alcohol use No    Drug use: No    Sexual activity: No     Other Topics Concern    Not on file     Social History Narrative    No narrative on file       Current Outpatient Prescriptions   Medication Sig Dispense Refill    metoprolol succinate (TOPROL XL) 50 MG extended release tablet Take 75 mg by mouth daily       Probiotic Product (PROBIOTIC-10) CAPS Take 1 capsule by mouth      ascorbic acid (VITAMIN C) 500 MG tablet Take 500 mg by mouth 2 times daily      enoxaparin (LOVENOX) 120 MG/0.8ML injection Inject 1 mg/kg into the skin daily      docusate sodium (COLACE) 100 MG capsule Take 200 mg by mouth daily      isosorbide mononitrate (IMDUR) 60 MG extended release tablet Take 60 mg by mouth daily      Multiple Vitamins-Minerals (MULTIVITAL) TABS Take 1 tablet by mouth every morning      ferrous sulfate (JAMIR-RAJANI) 325 (65 Fe) MG tablet Take 1 tablet by mouth 2 times daily 60 tablet 3    clopidogrel (PLAVIX) 75 MG tablet Take 75 mg by mouth daily      vitamin B-12 (CYANOCOBALAMIN) 1000 MCG tablet Take 1,000 mcg by mouth daily      ondansetron (ZOFRAN) 4 MG tablet Take 4 mg by mouth 2 times daily       pantoprazole (PROTONIX) 40 MG tablet Take 40 mg by mouth 2 times daily       bumetanide (BUMEX) 1 MG tablet Take 2 tablets by mouth 2 times daily 60 tablet 1    ipratropium-albuterol (DUONEB) 0.5-2.5 (3) MG/3ML SOLN nebulizer solution Inhale 3 mLs into the lungs every 4 hours (while awake) 360 mL     hydrALAZINE (APRESOLINE) 25 MG tablet Take 3 tablets by mouth 3 times daily 90 tablet 3    insulin aspart (NOVOLOG) 100 UNIT/ML injection vial Inject 12 Units into the skin 2 times daily (with meals)      insulin glargine (LANTUS) 100 UNIT/ML injection vial Inject 22 Units into the skin nightly      guaiFENesin (ROBITUSSIN) 100 MG/5ML syrup Take 200 mg by mouth every 4 hours as needed for Cough      Multiple Vitamins-Minerals (OCUVITE EXTRA PO) Take 1 tablet by mouth daily       bisacodyl (DULCOLAX) 10 MG suppository Place 10 mg rectally daily as needed for Constipation      pravastatin (PRAVACHOL) 40 MG tablet Take 40 mg by mouth every evening Indications: Blood Cholesterol Abnormal       acetaminophen (TYLENOL) 325 MG tablet Take 650 mg by mouth 4 times daily       nitroGLYCERIN (NITROSTAT) 0.4 MG SL tablet up to max of 3 total doses. If no relief after 1 dose, call 911. 25 tablet 3    metolazone (ZAROXOLYN) 5 MG tablet Take 0.5 tablets by mouth daily for 3 days 2 tablet 0    metoclopramide (REGLAN) 5 MG tablet Take 5 mg by mouth 3 times daily      magnesium hydroxide (MILK OF MAGNESIA) 400 MG/5ML suspension Take 30 mLs by mouth daily as needed for Constipation       No current facility-administered medications for this visit. Review of Systems -     General ROS: negative  Psychological ROS: negative  Hematological and Lymphatic ROS: No history of blood clots or bleeding disorder. Respiratory ROS: no cough, shortness of breath, or wheezing  Cardiovascular ROS: no chest pain or dyspnea on exertion  Gastrointestinal ROS: negative  Genito-Urinary ROS: no dysuria, trouble voiding, or hematuria  Musculoskeletal ROS: negative  Neurological ROS: no TIA or stroke symptoms  Dermatological ROS: negative      Blood pressure (!) 145/58, pulse 68, height 5' 6\" (1.676 m), weight 251 lb 12.8 oz (114.2 kg), not currently breastfeeding.         Physical Examination:    General appearance - alert, well appearing, and in no distress  Mental status - alert, oriented to person, place, and time  Neck - supple, no significant adenopathy, no JVD, or carotid bruits  Chest - clear to auscultation, no wheezes, rales or rhonchi, symmetric air entry  Heart - normal rate, regular rhythm, normal S1, S2, no murmurs, rubs, clicks or gallops  Abdomen - soft, nontender, nondistended, no masses or organomegaly  Neurological - alert, oriented, normal speech, no focal findings or movement disorder noted  Musculoskeletal - no joint tenderness, deformity or swelling  Extremities - peripheral pulses normal, no pedal edema, no clubbing or cyanosis  Skin - normal coloration and turgor, no rashes, no suspicious skin lesions noted    Lab  No results for input(s): CKTOTAL, CKMB, CKMBINDEX, TROPONINI in the last 72 hours. CBC:   Lab Results   Component Value Date    WBC 6.0 10/18/2018    RBC 3.58 10/18/2018    HGB 10.2 10/18/2018    HCT 34.0 10/18/2018    MCV 95.0 10/18/2018    MCH 28.5 10/18/2018    MCHC 30.0 10/18/2018    RDW 18.2 06/13/2018     10/18/2018    MPV 11.4 10/18/2018     BMP:    Lab Results   Component Value Date     10/18/2018    K 4.8 10/18/2018     10/18/2018    CO2 26 10/18/2018    BUN 60 10/18/2018    LABALBU 3.0 09/04/2018    CREATININE 2.7 10/18/2018    CALCIUM 9.7 10/18/2018    LABGLOM 17 10/18/2018    GLUCOSE 132 10/18/2018    GLUCOSE 316 11/09/2016     Hepatic Function Panel:    Lab Results   Component Value Date    ALKPHOS 71 09/04/2018    ALT 8 09/04/2018    AST 13 09/04/2018    PROT 6.2 09/04/2018    BILITOT <0.2 09/04/2018    BILIDIR <0.2 05/18/2018    LABALBU 3.0 09/04/2018     Magnesium:    Lab Results   Component Value Date    MG 1.7 09/06/2018     Warfarin PT/INR:  No components found for: PTPATWAR, PTINRWAR  HgBA1c:    Lab Results   Component Value Date    LABA1C 5.2 06/13/2018     FLP:    Lab Results   Component Value Date    TRIG 181 09/20/2017    HDL 36 09/20/2017    LDLCALC 54 09/20/2017     TSH:    Lab Results   Component Value Date    TSH 2.650 06/13/2018           Assessment   Diagnosis Orders   1. CHF (congestive heart failure), NYHA class III, chronic, diastolic (HCC)     2. [facility] BiV ICD     3. LBBB (left bundle branch block)     4. Essential hypertension     5.  Mixed hyperlipidemia             Dr. Opal Miller office Visit DX       NYHA class II Diastolic

## 2018-10-19 NOTE — PROCEDURES
CardioMEMS guidewire into the left PA. We  removed the [place] catheter. We calibrated the device per  's recommendation. This was inserted over the guidewire. Once we had the PA sensor implant in position, this was released under  cine imaging. Once released, the delivery catheter was removed. The  PA [place]-Claudette catheter was re-advanced up over the 0.018 wire into the  left PA. We then removed the CardioMEMS guidewire. We performed  calibration of the CardioMEMS sensor per 's  recommendations. Once we were satisfied, all equipment was removed  from the patient. The 12-Brazilian sheath was removed from the patient. Manual pressure was used for hemostasis. The patient tolerated the  procedure well. IMMEDIATE COMPLICATIONS:  None. MEDICATIONS:  See EMR. SUMMARY:  Successful CardioMEMS implantation; right heart  catheterization; PA angiogram.    PLAN:  1. Bed rest.  2.  Routine access site care. 3.  Oral anticoagulation/antiplatelet therapy per protocol. 4.  Follow up with myself or [facility] Failure [facility] within one to two  weeks postprocedure. All of the above was explained to the patient and the patient's  family. They were agreeable and amenable to the plan.         Isaías Moraes MD    D: 10/18/2018 10:15:32       T: 10/18/2018 23:54:27     [email or web address]  Job#: [id number]     Doc#: [id number]    CC:

## 2018-10-22 ENCOUNTER — TELEPHONE (OUTPATIENT)
Dept: CARDIOLOGY CLINIC | Age: 83
End: 2018-10-22

## 2018-10-24 ENCOUNTER — OFFICE VISIT (OUTPATIENT)
Dept: CARDIOLOGY CLINIC | Age: 83
End: 2018-10-24
Payer: MEDICARE

## 2018-10-24 VITALS
BODY MASS INDEX: 40.5 KG/M2 | WEIGHT: 252 LBS | HEIGHT: 66 IN | HEART RATE: 90 BPM | DIASTOLIC BLOOD PRESSURE: 72 MMHG | OXYGEN SATURATION: 96 % | SYSTOLIC BLOOD PRESSURE: 134 MMHG

## 2018-10-24 DIAGNOSIS — I50.32 CHF (CONGESTIVE HEART FAILURE), NYHA CLASS III, CHRONIC, DIASTOLIC (HCC): ICD-10-CM

## 2018-10-24 DIAGNOSIS — S81.801A WOUND OF RIGHT LOWER EXTREMITY, INITIAL ENCOUNTER: Primary | ICD-10-CM

## 2018-10-24 PROCEDURE — G8417 CALC BMI ABV UP PARAM F/U: HCPCS | Performed by: NURSE PRACTITIONER

## 2018-10-24 PROCEDURE — 1036F TOBACCO NON-USER: CPT | Performed by: NURSE PRACTITIONER

## 2018-10-24 PROCEDURE — G8399 PT W/DXA RESULTS DOCUMENT: HCPCS | Performed by: NURSE PRACTITIONER

## 2018-10-24 PROCEDURE — 1090F PRES/ABSN URINE INCON ASSESS: CPT | Performed by: NURSE PRACTITIONER

## 2018-10-24 PROCEDURE — G8598 ASA/ANTIPLAT THER USED: HCPCS | Performed by: NURSE PRACTITIONER

## 2018-10-24 PROCEDURE — 1123F ACP DISCUSS/DSCN MKR DOCD: CPT | Performed by: NURSE PRACTITIONER

## 2018-10-24 PROCEDURE — 99213 OFFICE O/P EST LOW 20 MIN: CPT | Performed by: NURSE PRACTITIONER

## 2018-10-24 PROCEDURE — 1101F PT FALLS ASSESS-DOCD LE1/YR: CPT | Performed by: NURSE PRACTITIONER

## 2018-10-24 PROCEDURE — G8484 FLU IMMUNIZE NO ADMIN: HCPCS | Performed by: NURSE PRACTITIONER

## 2018-10-24 PROCEDURE — G8427 DOCREV CUR MEDS BY ELIG CLIN: HCPCS | Performed by: NURSE PRACTITIONER

## 2018-10-24 PROCEDURE — 4040F PNEUMOC VAC/ADMIN/RCVD: CPT | Performed by: NURSE PRACTITIONER

## 2018-10-24 RX ORDER — BUMETANIDE 2 MG/1
2 TABLET ORAL 2 TIMES DAILY
Status: ON HOLD | COMMUNITY
End: 2019-01-01 | Stop reason: HOSPADM

## 2018-10-24 RX ORDER — CEPHALEXIN 250 MG/1
500 CAPSULE ORAL 4 TIMES DAILY
Qty: 40 CAPSULE | Refills: 0
Start: 2018-10-24 | End: 2019-01-23 | Stop reason: ALTCHOICE

## 2018-10-24 ASSESSMENT — ENCOUNTER SYMPTOMS
CHEST TIGHTNESS: 0
NAUSEA: 0
ABDOMINAL PAIN: 0
APNEA: 0
WHEEZING: 0
COUGH: 0
SHORTNESS OF BREATH: 1
ABDOMINAL DISTENTION: 0
COLOR CHANGE: 0

## 2018-10-24 NOTE — PROGRESS NOTES
 vitamin B-12 (CYANOCOBALAMIN) 1000 MCG tablet Take 1,000 mcg by mouth daily      ondansetron (ZOFRAN) 4 MG tablet Take 4 mg by mouth 2 times daily       pantoprazole (PROTONIX) 40 MG tablet Take 40 mg by mouth 2 times daily       ipratropium-albuterol (DUONEB) 0.5-2.5 (3) MG/3ML SOLN nebulizer solution Inhale 3 mLs into the lungs every 4 hours (while awake) 360 mL     hydrALAZINE (APRESOLINE) 25 MG tablet Take 3 tablets by mouth 3 times daily 90 tablet 3    insulin aspart (NOVOLOG) 100 UNIT/ML injection vial Inject 12 Units into the skin 2 times daily (with meals)      insulin glargine (LANTUS) 100 UNIT/ML injection vial Inject 22 Units into the skin nightly      guaiFENesin (ROBITUSSIN) 100 MG/5ML syrup Take 200 mg by mouth every 4 hours as needed for Cough      Multiple Vitamins-Minerals (OCUVITE EXTRA PO) Take 1 tablet by mouth daily       bisacodyl (DULCOLAX) 10 MG suppository Place 10 mg rectally daily as needed for Constipation      magnesium hydroxide (MILK OF MAGNESIA) 400 MG/5ML suspension Take 30 mLs by mouth daily as needed for Constipation      pravastatin (PRAVACHOL) 40 MG tablet Take 40 mg by mouth every evening Indications: Blood Cholesterol Abnormal       acetaminophen (TYLENOL) 325 MG tablet Take 650 mg by mouth 4 times daily       metolazone (ZAROXOLYN) 5 MG tablet Take 0.5 tablets by mouth daily for 3 days 2 tablet 0     No current facility-administered medications for this visit. No Known Allergies    SUBJECTIVE:   Review of Systems   Constitutional: Negative for activity change, appetite change, fatigue and fever. HENT: Negative for congestion. Respiratory: Positive for shortness of breath (with exertion ). Negative for apnea, cough, chest tightness and wheezing. Cardiovascular: Negative for chest pain, palpitations and leg swelling. Gastrointestinal: Negative for abdominal distention, abdominal pain and nausea.    Genitourinary: Negative for difficulty urinating and dysuria. Musculoskeletal: Positive for gait problem (walker or WC). Negative for arthralgias. Skin: Negative for color change. Neurological: Negative for dizziness, numbness and headaches. Psychiatric/Behavioral: Negative for agitation, confusion and sleep disturbance. The patient is not nervous/anxious. OBJECTIVE:   Today's Vitals:  /72   Pulse 90   Ht 5' 6\" (1.676 m)   Wt 252 lb (114.3 kg)   SpO2 96%   BMI 40.67 kg/m²     Physical Exam   Constitutional: She is oriented to person, place, and time. She appears well-developed and well-nourished. HENT:   Head: Normocephalic and atraumatic. Eyes: Pupils are equal, round, and reactive to light. Neck: Normal range of motion. No JVD present. Cardiovascular: Normal rate and normal heart sounds. No murmur heard. +ICD   Pulmonary/Chest: Effort normal. No respiratory distress. She has no rales. Abdominal: Soft. She exhibits no distension. There is no tenderness. Musculoskeletal: She exhibits no edema or tenderness. Neurological: She is alert and oriented to person, place, and time. Skin: Skin is warm and dry. Right groin site red, yeasty with yellow drainage from a 1 cm area to the right of the insertion site (approx 2 inches)   Psychiatric: She has a normal mood and affect. Her behavior is normal.   Vitals reviewed. Wt Readings from Last 3 Encounters:   10/24/18 252 lb (114.3 kg)   10/19/18 251 lb 12.8 oz (114.2 kg)   10/18/18 252 lb (114.3 kg)     BP Readings from Last 3 Encounters:   10/24/18 134/72   10/19/18 (!) 152/62   10/18/18 (!) 147/51     Pulse Readings from Last 3 Encounters:   10/24/18 90   10/19/18 68   10/18/18 75     Body mass index is 40.67 kg/m². ECHO:   6/13/18   Summary   Normal left ventricle size and systolic function. Ejection fraction was   estimated at 55-60%.  There were no regional left ventricular wall motion   abnormalities and wall thickness was within normal limits.   There was trivial

## 2018-10-27 LAB
AEROBIC CULTURE: ABNORMAL
ANAEROBIC CULTURE: ABNORMAL
GRAM STAIN RESULT: ABNORMAL
ORGANISM: ABNORMAL
ORGANISM: ABNORMAL

## 2018-10-30 LAB
ALBUMIN SERPL-MCNC: 3.2 G/DL
ALP BLD-CCNC: 72 U/L
ALT SERPL-CCNC: 8 U/L
ANION GAP SERPL CALCULATED.3IONS-SCNC: 1.2 MMOL/L
AST SERPL-CCNC: 12 U/L
BILIRUB SERPL-MCNC: 0.2 MG/DL (ref 0.1–1.4)
BUN BLDV-MCNC: 47 MG/DL
CALCIUM SERPL-MCNC: 8.8 MG/DL
CHLORIDE BLD-SCNC: 105 MMOL/L
CHOLESTEROL, TOTAL: 87 MG/DL
CHOLESTEROL/HDL RATIO: 28
CO2: 26 MMOL/L
CREAT SERPL-MCNC: 2.4 MG/DL
GFR CALCULATED: 19
GLUCOSE BLD-MCNC: 79 MG/DL
HDLC SERPL-MCNC: 28 MG/DL (ref 35–70)
LDL CHOLESTEROL CALCULATED: 34 MG/DL (ref 0–160)
POTASSIUM SERPL-SCNC: 4.7 MMOL/L
SODIUM BLD-SCNC: 138 MMOL/L
TOTAL PROTEIN: 5.8
TRIGL SERPL-MCNC: 124 MG/DL
VLDLC SERPL CALC-MCNC: 25 MG/DL

## 2018-11-01 ENCOUNTER — TELEPHONE (OUTPATIENT)
Dept: CARDIOLOGY CLINIC | Age: 83
End: 2018-11-01

## 2018-11-02 DIAGNOSIS — I50.32 CHF (CONGESTIVE HEART FAILURE), NYHA CLASS III, CHRONIC, DIASTOLIC (HCC): Primary | ICD-10-CM

## 2018-11-02 PROCEDURE — 93297 REM INTERROG DEV EVAL ICPMS: CPT | Performed by: NURSE PRACTITIONER

## 2018-11-02 PROCEDURE — 93299 PR PR INTERRO EVALM REMOTE, UP TO 30 DAYS: CPT | Performed by: NURSE PRACTITIONER

## 2018-11-07 LAB
BUN BLDV-MCNC: 54 MG/DL
CALCIUM SERPL-MCNC: 9.2 MG/DL
CHLORIDE BLD-SCNC: 108 MMOL/L
CO2: 28 MMOL/L
CREAT SERPL-MCNC: 2.6 MG/DL
GFR CALCULATED: 18
GLUCOSE BLD-MCNC: 91 MG/DL
POTASSIUM SERPL-SCNC: 5.2 MMOL/L
SODIUM BLD-SCNC: 142 MMOL/L

## 2018-11-14 LAB
BASOPHILS ABSOLUTE: ABNORMAL /ΜL
BASOPHILS RELATIVE PERCENT: ABNORMAL %
EOSINOPHILS ABSOLUTE: ABNORMAL /ΜL
EOSINOPHILS RELATIVE PERCENT: ABNORMAL %
HCT VFR BLD CALC: 30.7 % (ref 36–46)
HEMOGLOBIN: 10 G/DL (ref 12–16)
LYMPHOCYTES ABSOLUTE: ABNORMAL /ΜL
LYMPHOCYTES RELATIVE PERCENT: ABNORMAL %
MCH RBC QN AUTO: ABNORMAL PG
MCHC RBC AUTO-ENTMCNC: ABNORMAL G/DL
MCV RBC AUTO: ABNORMAL FL
MONOCYTES ABSOLUTE: ABNORMAL /ΜL
MONOCYTES RELATIVE PERCENT: ABNORMAL %
NEUTROPHILS ABSOLUTE: ABNORMAL /ΜL
NEUTROPHILS RELATIVE PERCENT: ABNORMAL %
PLATELET # BLD: 162 K/ΜL
PMV BLD AUTO: ABNORMAL FL
RBC # BLD: 3.45 10^6/ΜL
WBC # BLD: 5.6 10^3/ML

## 2018-11-26 ENCOUNTER — TELEPHONE (OUTPATIENT)
Dept: CARDIOLOGY CLINIC | Age: 83
End: 2018-11-26

## 2018-12-04 DIAGNOSIS — I50.32 CHF (CONGESTIVE HEART FAILURE), NYHA CLASS III, CHRONIC, DIASTOLIC (HCC): Primary | ICD-10-CM

## 2018-12-04 PROCEDURE — 93297 REM INTERROG DEV EVAL ICPMS: CPT | Performed by: NURSE PRACTITIONER

## 2018-12-04 PROCEDURE — 93299 PR PR INTERRO EVALM REMOTE, UP TO 30 DAYS: CPT | Performed by: NURSE PRACTITIONER

## 2018-12-05 ENCOUNTER — TELEPHONE (OUTPATIENT)
Dept: CARDIOLOGY CLINIC | Age: 83
End: 2018-12-05

## 2018-12-05 NOTE — TELEPHONE ENCOUNTER
Please call [facility] for recall appt not this patient's niece/POA

## 2018-12-11 ENCOUNTER — OFFICE VISIT (OUTPATIENT)
Dept: NEPHROLOGY | Age: 83
End: 2018-12-11
Payer: MEDICARE

## 2018-12-11 VITALS
DIASTOLIC BLOOD PRESSURE: 60 MMHG | BODY MASS INDEX: 45.64 KG/M2 | HEART RATE: 80 BPM | WEIGHT: 284 LBS | HEIGHT: 66 IN | OXYGEN SATURATION: 96 % | SYSTOLIC BLOOD PRESSURE: 143 MMHG

## 2018-12-11 DIAGNOSIS — Z11.59 NEED FOR HEPATITIS B SCREENING TEST: ICD-10-CM

## 2018-12-11 DIAGNOSIS — D50.8 IRON DEFICIENCY ANEMIA DUE TO DIETARY CAUSES: ICD-10-CM

## 2018-12-11 DIAGNOSIS — N18.4 CKD (CHRONIC KIDNEY DISEASE), STAGE IV (HCC): Primary | ICD-10-CM

## 2018-12-11 DIAGNOSIS — D63.8 ANEMIA, CHRONIC DISEASE: ICD-10-CM

## 2018-12-11 PROCEDURE — 4040F PNEUMOC VAC/ADMIN/RCVD: CPT | Performed by: NURSE PRACTITIONER

## 2018-12-11 PROCEDURE — G8484 FLU IMMUNIZE NO ADMIN: HCPCS | Performed by: NURSE PRACTITIONER

## 2018-12-11 PROCEDURE — G8417 CALC BMI ABV UP PARAM F/U: HCPCS | Performed by: NURSE PRACTITIONER

## 2018-12-11 PROCEDURE — G8399 PT W/DXA RESULTS DOCUMENT: HCPCS | Performed by: NURSE PRACTITIONER

## 2018-12-11 PROCEDURE — 1101F PT FALLS ASSESS-DOCD LE1/YR: CPT | Performed by: NURSE PRACTITIONER

## 2018-12-11 PROCEDURE — 1123F ACP DISCUSS/DSCN MKR DOCD: CPT | Performed by: NURSE PRACTITIONER

## 2018-12-11 PROCEDURE — 1090F PRES/ABSN URINE INCON ASSESS: CPT | Performed by: NURSE PRACTITIONER

## 2018-12-11 PROCEDURE — 99213 OFFICE O/P EST LOW 20 MIN: CPT | Performed by: NURSE PRACTITIONER

## 2018-12-11 PROCEDURE — 1036F TOBACCO NON-USER: CPT | Performed by: NURSE PRACTITIONER

## 2018-12-11 PROCEDURE — G8427 DOCREV CUR MEDS BY ELIG CLIN: HCPCS | Performed by: NURSE PRACTITIONER

## 2018-12-11 PROCEDURE — G8598 ASA/ANTIPLAT THER USED: HCPCS | Performed by: NURSE PRACTITIONER

## 2018-12-11 RX ORDER — WARFARIN SODIUM 3 MG/1
3 TABLET ORAL DAILY
COMMUNITY
End: 2019-10-29 | Stop reason: ALTCHOICE

## 2018-12-11 NOTE — PROGRESS NOTES
Mayuri Rock    [id number]      1934   80 y.o. Chronic Care Management Visit  Patient Consent for Chronic Care Management Signed: Date: 6/26/18    Mayuri Rock is a 80 y. o.oldfemale came for follow up for Chronic Care Management for chronic kidney disease of several year duration and anemia of chronic disease. Last Office Note from Dr Rachel Velazquez was reviewed and discussed with pt. The Creatinine was   Lab Results   Component Value Date    CREATININE 2.6 11/07/2018    at that time. Mayuri Rock states that they overall feel good. Reports fair/good appetite. The pt states compliance with meds and denies adverse effects. Uses pill box. States compliance with prescription meds. The pt denies that they need help with the phone, transportation, shopping, preparing meals, housework, laundry, medications or managing money? Pt lives at extended care facility     REVIEW OF SYSTEMS:   GENERAL: Usual state of health. Stable weight  HEENT:  Denies Upper respiratory complaints  CARDIOVASCULAR: Denies chest pain/angina. RESPIRATORY:Denies sob  ABDOMEN: Denies nausea, vomiting  CNS:Denies headache, dizziness  MUSCULOSKELETAL: Reports joint arthralgia  SKIN: Denies rash, pruritis  HEMATOLOGIC: Denies bruising  ENDOCRINE:  Negative for heat or cold intolerance    EXAM:  VITALS:   BP (!) 143/66 (Site: Left Upper Arm, Position: Sitting, Cuff Size: Large Adult)   Pulse 80   Ht 5' 6\" (1.676 m)   Wt 284 lb (128.8 kg)   SpO2 96%   BMI 45.84 kg/m²  Body mass index is 45.84 kg/m². CONSTITUTIONAL:  No acute distress. HEENT:  Head is normocephalic, Neck is supple  CARDIOVASCULAR: No lower extremity edema  RESPIRATORY: No shortness of breath. NEUROLOGICAL: Patient is alert and oriented to person, place, and time. SKIN: No rash on exposed surfaces, No significant bruises  MUSCULOSKELETAL: Movement is coordinated  EXTREMITIES: Moves all extremities.  Chronic lower extremities edema  PSYCHIATRIC: mood and affect appropriate needed for Constipation      magnesium hydroxide (MILK OF MAGNESIA) 400 MG/5ML suspension Take 30 mLs by mouth daily as needed for Constipation      pravastatin (PRAVACHOL) 40 MG tablet Take 40 mg by mouth every evening Indications: Blood Cholesterol Abnormal       acetaminophen (TYLENOL) 325 MG tablet Take 650 mg by mouth 4 times daily        No current facility-administered medications for this visit. Home Medication reviewed and discussed with pt    Diagnostic Data:    Labs reviewed and discussed with pt  BMP:  Lab Results   Component Value Date     11/07/2018    K 5.2 11/07/2018    K 4.8 10/18/2018     11/07/2018    CO2 28 11/07/2018    BUN 54 11/07/2018    CREATININE 2.6 11/07/2018    LABGLOM 18 11/07/2018    LABGLOM 17 10/18/2018    GLUCOSE 91 11/07/2018    GLUCOSE 316 11/09/2016     Lab Results   Component Value Date    PHOS 3.2 10/18/2014    CALCIUM 9.2 11/07/2018     No results found for: PTH    Anemia: Goal hgb 10-11 g/dL. Lab Results   Component Value Date    HGB 10.0 11/14/2018    HGB 10.2 10/18/2018    HGB 10.4 10/09/2018    HGB 8.7 09/06/2018    HGB 9.0 09/05/2018      Lab Results   Component Value Date    FERRITIN 591 09/06/2018    IRON 24 09/06/2018    LABIRON 17 09/06/2018    [id number] 1625 03/17/2017    FOLATE > 20.0 03/17/2017     No results found for: RETICABS    Last Colonoscopy Date: Does not remember    Immunization:  Immunization History   Administered Date(s) Administered    Influenza Vaccine, unspecified formulation 10/31/2016    Influenza Virus Vaccine 10/03/2013, 10/09/2014, 12/24/2015    Pneumococcal 13-valent Conjugate ([id number]) 12/14/2016    Pneumococcal Polysaccharide ([id number]) 06/20/2017    Tdap (Boostrix, Adacel) 09/03/2018       Advanced Care Planning:     Pt advanced Directives:  DNR-Comfort Care Arrest                    Assessment:     1.    Chronic Kidney Disease Stage IV    Goals of care include slowing progression of Chronic Kidney Disease by

## 2019-01-01 ENCOUNTER — HOSPITAL ENCOUNTER (OUTPATIENT)
Dept: NURSING | Age: 84
End: 2019-01-01
Payer: MEDICARE

## 2019-01-01 ENCOUNTER — TELEPHONE (OUTPATIENT)
Dept: NEPHROLOGY | Age: 84
End: 2019-01-01

## 2019-01-01 ENCOUNTER — HOSPITAL ENCOUNTER (INPATIENT)
Age: 84
LOS: 10 days | Discharge: NURSING, REHAB OR LONG-TERM CARE | DRG: 291 | End: 2019-12-19
Attending: EMERGENCY MEDICINE | Admitting: INTERNAL MEDICINE
Payer: MEDICARE

## 2019-01-01 ENCOUNTER — APPOINTMENT (OUTPATIENT)
Dept: GENERAL RADIOLOGY | Age: 84
DRG: 291 | End: 2019-01-01
Payer: MEDICARE

## 2019-01-01 ENCOUNTER — APPOINTMENT (OUTPATIENT)
Dept: INTERVENTIONAL RADIOLOGY/VASCULAR | Age: 84
DRG: 291 | End: 2019-01-01
Payer: MEDICARE

## 2019-01-01 ENCOUNTER — TELEPHONE (OUTPATIENT)
Dept: CARDIOLOGY CLINIC | Age: 84
End: 2019-01-01

## 2019-01-01 ENCOUNTER — HOSPITAL ENCOUNTER (OUTPATIENT)
Dept: NURSING | Age: 84
Discharge: HOME | End: 2019-11-18
Payer: MEDICARE

## 2019-01-01 ENCOUNTER — OFFICE VISIT (OUTPATIENT)
Dept: CARDIOLOGY CLINIC | Age: 84
End: 2019-01-01
Payer: MEDICARE

## 2019-01-01 ENCOUNTER — TELEPHONE (OUTPATIENT)
Dept: NEPHROLOGY | Age: 84
End: 2019-01-01
Payer: MEDICARE

## 2019-01-01 ENCOUNTER — OFFICE VISIT (OUTPATIENT)
Dept: NEPHROLOGY | Age: 84
End: 2019-01-01
Payer: MEDICARE

## 2019-01-01 ENCOUNTER — HOSPITAL ENCOUNTER (OUTPATIENT)
Dept: NURSING | Age: 84
Discharge: HOME | End: 2019-11-25
Payer: MEDICARE

## 2019-01-01 VITALS
OXYGEN SATURATION: 98 % | SYSTOLIC BLOOD PRESSURE: 135 MMHG | WEIGHT: 246.3 LBS | HEIGHT: 66 IN | DIASTOLIC BLOOD PRESSURE: 63 MMHG | RESPIRATION RATE: 18 BRPM | BODY MASS INDEX: 39.58 KG/M2 | HEART RATE: 75 BPM | TEMPERATURE: 97.4 F

## 2019-01-01 VITALS
HEIGHT: 66 IN | HEART RATE: 75 BPM | RESPIRATION RATE: 18 BRPM | OXYGEN SATURATION: 79 % | WEIGHT: 278 LBS | BODY MASS INDEX: 44.68 KG/M2 | DIASTOLIC BLOOD PRESSURE: 69 MMHG | SYSTOLIC BLOOD PRESSURE: 136 MMHG

## 2019-01-01 VITALS
HEART RATE: 73 BPM | DIASTOLIC BLOOD PRESSURE: 51 MMHG | OXYGEN SATURATION: 92 % | TEMPERATURE: 97.1 F | SYSTOLIC BLOOD PRESSURE: 139 MMHG | RESPIRATION RATE: 18 BRPM

## 2019-01-01 VITALS
SYSTOLIC BLOOD PRESSURE: 174 MMHG | RESPIRATION RATE: 18 BRPM | OXYGEN SATURATION: 97 % | TEMPERATURE: 96.9 F | DIASTOLIC BLOOD PRESSURE: 76 MMHG | HEART RATE: 71 BPM

## 2019-01-01 VITALS
BODY MASS INDEX: 44.77 KG/M2 | OXYGEN SATURATION: 90 % | SYSTOLIC BLOOD PRESSURE: 140 MMHG | HEART RATE: 80 BPM | DIASTOLIC BLOOD PRESSURE: 76 MMHG | WEIGHT: 277.4 LBS

## 2019-01-01 VITALS — HEART RATE: 68 BPM | OXYGEN SATURATION: 100 % | SYSTOLIC BLOOD PRESSURE: 110 MMHG | DIASTOLIC BLOOD PRESSURE: 52 MMHG

## 2019-01-01 DIAGNOSIS — D50.8 IRON DEFICIENCY ANEMIA SECONDARY TO INADEQUATE DIETARY IRON INTAKE: ICD-10-CM

## 2019-01-01 DIAGNOSIS — I50.32 CHF (CONGESTIVE HEART FAILURE), NYHA CLASS III, CHRONIC, DIASTOLIC (HCC): Primary | ICD-10-CM

## 2019-01-01 DIAGNOSIS — N18.4 CKD (CHRONIC KIDNEY DISEASE), STAGE IV (HCC): Primary | ICD-10-CM

## 2019-01-01 DIAGNOSIS — N18.4 ANEMIA OF CHRONIC RENAL FAILURE, STAGE 4 (SEVERE) (HCC): ICD-10-CM

## 2019-01-01 DIAGNOSIS — D63.1 ANEMIA OF CHRONIC RENAL FAILURE, STAGE 4 (SEVERE) (HCC): ICD-10-CM

## 2019-01-01 DIAGNOSIS — E87.29 RESPIRATORY ACIDOSIS: ICD-10-CM

## 2019-01-01 DIAGNOSIS — D50.8 IRON DEFICIENCY ANEMIA DUE TO DIETARY CAUSES: ICD-10-CM

## 2019-01-01 DIAGNOSIS — M88.9 PAGET'S DISEASE OF BONE: Chronic | ICD-10-CM

## 2019-01-01 DIAGNOSIS — D63.8 ANEMIA, CHRONIC DISEASE: ICD-10-CM

## 2019-01-01 DIAGNOSIS — I10 ESSENTIAL HYPERTENSION: ICD-10-CM

## 2019-01-01 DIAGNOSIS — N18.4 CKD (CHRONIC KIDNEY DISEASE), STAGE IV (HCC): ICD-10-CM

## 2019-01-01 DIAGNOSIS — I50.32 CHF (CONGESTIVE HEART FAILURE), NYHA CLASS II, CHRONIC, DIASTOLIC (HCC): Primary | ICD-10-CM

## 2019-01-01 DIAGNOSIS — D63.8 ANEMIA, CHRONIC DISEASE: Primary | ICD-10-CM

## 2019-01-01 DIAGNOSIS — I50.9 CHRONIC CONGESTIVE HEART FAILURE, UNSPECIFIED HEART FAILURE TYPE (HCC): Primary | ICD-10-CM

## 2019-01-01 DIAGNOSIS — Z23 NEED FOR PROPHYLACTIC VACCINATION AGAINST HEPATITIS B VIRUS: ICD-10-CM

## 2019-01-01 LAB
ABO: NORMAL
ABO: NORMAL
ABSOLUTE RETIC #: 136 THOU/MM3 (ref 20–115)
ALLEN TEST: POSITIVE
ALLEN TEST: POSITIVE
AMORPHOUS: ABNORMAL
ANION GAP SERPL CALCULATED.3IONS-SCNC: 10 MEQ/L (ref 8–16)
ANION GAP SERPL CALCULATED.3IONS-SCNC: 11 MEQ/L (ref 8–16)
ANION GAP SERPL CALCULATED.3IONS-SCNC: 12 MEQ/L (ref 8–16)
ANION GAP SERPL CALCULATED.3IONS-SCNC: 12 MEQ/L (ref 8–16)
ANION GAP SERPL CALCULATED.3IONS-SCNC: 13 MEQ/L (ref 8–16)
ANION GAP SERPL CALCULATED.3IONS-SCNC: 13 MEQ/L (ref 8–16)
ANION GAP SERPL CALCULATED.3IONS-SCNC: 14 MEQ/L (ref 8–16)
ANION GAP SERPL CALCULATED.3IONS-SCNC: 16 MEQ/L (ref 8–16)
ANION GAP SERPL CALCULATED.3IONS-SCNC: 8 MEQ/L (ref 8–16)
ANISOCYTOSIS: PRESENT
ANTIBODY SCREEN: NORMAL
ANTIBODY SCREEN: NORMAL
APTT: 31.7 SECONDS (ref 22–38)
BACTERIA: ABNORMAL /HPF
BASE EXCESS (CALCULATED): 2.3 MMOL/L (ref -2.5–2.5)
BASE EXCESS (CALCULATED): 4.5 MMOL/L (ref -2.5–2.5)
BASOPHILIA: ABNORMAL
BASOPHILIC STIPPLING: ABNORMAL
BASOPHILS # BLD: 0.3 %
BASOPHILS # BLD: 0.4 %
BASOPHILS # BLD: 0.5 %
BASOPHILS ABSOLUTE: 0 THOU/MM3 (ref 0–0.1)
BASOPHILS ABSOLUTE: ABNORMAL
BASOPHILS ABSOLUTE: ABNORMAL
BASOPHILS RELATIVE PERCENT: ABNORMAL
BASOPHILS RELATIVE PERCENT: ABNORMAL
BILIRUB SERPL-MCNC: 0.4 MG/DL (ref 0.3–1.2)
BILIRUBIN DIRECT: < 0.2 MG/DL (ref 0–0.3)
BILIRUBIN URINE: NEGATIVE
BLOOD CULTURE, ROUTINE: NORMAL
BLOOD CULTURE, ROUTINE: NORMAL
BLOOD, URINE: NEGATIVE
BUN BLDV-MCNC: 24 MG/DL
BUN BLDV-MCNC: 30 MG/DL (ref 7–22)
BUN BLDV-MCNC: 43 MG/DL (ref 7–22)
BUN BLDV-MCNC: 48 MG/DL (ref 7–22)
BUN BLDV-MCNC: 52 MG/DL (ref 7–22)
BUN BLDV-MCNC: 58 MG/DL (ref 7–22)
BUN BLDV-MCNC: 61 MG/DL
BUN BLDV-MCNC: 62 MG/DL (ref 7–22)
BUN BLDV-MCNC: 64 MG/DL (ref 7–22)
BUN BLDV-MCNC: 67 MG/DL (ref 7–22)
BUN BLDV-MCNC: 68 MG/DL (ref 7–22)
BUN BLDV-MCNC: 73 MG/DL (ref 7–22)
BUN BLDV-MCNC: 74 MG/DL (ref 7–22)
BUN BLDV-MCNC: 76 MG/DL (ref 7–22)
BUN BLDV-MCNC: 85 MG/DL (ref 7–22)
CALCIUM SERPL-MCNC: 8.3 MG/DL (ref 8.5–10.5)
CALCIUM SERPL-MCNC: 8.4 MG/DL (ref 8.5–10.5)
CALCIUM SERPL-MCNC: 8.5 MG/DL (ref 8.5–10.5)
CALCIUM SERPL-MCNC: 8.5 MG/DL (ref 8.5–10.5)
CALCIUM SERPL-MCNC: 8.7 MG/DL (ref 8.5–10.5)
CALCIUM SERPL-MCNC: 8.8 MG/DL (ref 8.5–10.5)
CALCIUM SERPL-MCNC: 9 MG/DL
CALCIUM SERPL-MCNC: 9 MG/DL (ref 8.5–10.5)
CALCIUM SERPL-MCNC: 9.1 MG/DL
CALCIUM SERPL-MCNC: 9.1 MG/DL (ref 8.5–10.5)
CALCIUM SERPL-MCNC: 9.1 MG/DL (ref 8.5–10.5)
CASTS 2: ABNORMAL /LPF
CASTS UA: ABNORMAL /LPF
CHARACTER, URINE: CLEAR
CHLORIDE BLD-SCNC: 100 MEQ/L (ref 98–111)
CHLORIDE BLD-SCNC: 100 MEQ/L (ref 98–111)
CHLORIDE BLD-SCNC: 101 MEQ/L (ref 98–111)
CHLORIDE BLD-SCNC: 101 MEQ/L (ref 98–111)
CHLORIDE BLD-SCNC: 102 MEQ/L (ref 98–111)
CHLORIDE BLD-SCNC: 102 MEQ/L (ref 98–111)
CHLORIDE BLD-SCNC: 106 MMOL/L
CHLORIDE BLD-SCNC: 109 MMOL/L
CHLORIDE BLD-SCNC: 96 MEQ/L (ref 98–111)
CHLORIDE BLD-SCNC: 96 MEQ/L (ref 98–111)
CHLORIDE BLD-SCNC: 97 MEQ/L (ref 98–111)
CHLORIDE BLD-SCNC: 99 MEQ/L (ref 98–111)
CO2: 23 MEQ/L (ref 23–33)
CO2: 23 MEQ/L (ref 23–33)
CO2: 24 MEQ/L (ref 23–33)
CO2: 24 MEQ/L (ref 23–33)
CO2: 25 MEQ/L (ref 23–33)
CO2: 25 MMOL/L
CO2: 26 MEQ/L (ref 23–33)
CO2: 27 MEQ/L (ref 23–33)
CO2: 27 MEQ/L (ref 23–33)
CO2: 30 MMOL/L
COLLECTED BY:: ABNORMAL
COLLECTED BY:: ABNORMAL
COLOR: YELLOW
CREAT SERPL-MCNC: 2 MG/DL (ref 0.4–1.2)
CREAT SERPL-MCNC: 2 MG/DL (ref 0.4–1.2)
CREAT SERPL-MCNC: 2.2 MG/DL (ref 0.4–1.2)
CREAT SERPL-MCNC: 2.3 MG/DL
CREAT SERPL-MCNC: 2.5 MG/DL
CREAT SERPL-MCNC: 2.6 MG/DL (ref 0.4–1.2)
CREAT SERPL-MCNC: 2.6 MG/DL (ref 0.4–1.2)
CREAT SERPL-MCNC: 2.8 MG/DL (ref 0.4–1.2)
CREAT SERPL-MCNC: 3.1 MG/DL (ref 0.4–1.2)
CREAT SERPL-MCNC: 3.2 MG/DL (ref 0.4–1.2)
CREAT SERPL-MCNC: 3.2 MG/DL (ref 0.4–1.2)
CREAT SERPL-MCNC: 3.4 MG/DL (ref 0.4–1.2)
CREAT SERPL-MCNC: 3.5 MG/DL (ref 0.4–1.2)
CREAT SERPL-MCNC: 3.6 MG/DL (ref 0.4–1.2)
CREAT SERPL-MCNC: 3.7 MG/DL (ref 0.4–1.2)
CRYSTALS, UA: ABNORMAL
DEVICE: ABNORMAL
DEVICE: ABNORMAL
DIFFERENTIAL TYPE: ABNORMAL
DIRECT COOMBS: NORMAL
EKG ATRIAL RATE: 84 BPM
EKG P AXIS: 52 DEGREES
EKG P-R INTERVAL: 150 MS
EKG Q-T INTERVAL: 432 MS
EKG QRS DURATION: 154 MS
EKG QTC CALCULATION (BAZETT): 510 MS
EKG R AXIS: -123 DEGREES
EKG T AXIS: 45 DEGREES
EKG VENTRICULAR RATE: 84 BPM
EOSINOPHIL # BLD: 1.6 %
EOSINOPHIL # BLD: 3.8 %
EOSINOPHIL # BLD: 5 %
EOSINOPHIL # BLD: 5.7 %
EOSINOPHIL # BLD: 6.1 %
EOSINOPHIL # BLD: 6.2 %
EOSINOPHIL # BLD: 6.4 %
EOSINOPHIL # BLD: 6.7 %
EOSINOPHIL # BLD: 6.8 %
EOSINOPHILS ABSOLUTE: 0.1 THOU/MM3 (ref 0–0.4)
EOSINOPHILS ABSOLUTE: 0.3 THOU/MM3 (ref 0–0.4)
EOSINOPHILS ABSOLUTE: 0.3 THOU/MM3 (ref 0–0.4)
EOSINOPHILS ABSOLUTE: 0.4 THOU/MM3 (ref 0–0.4)
EOSINOPHILS ABSOLUTE: 0.5 THOU/MM3 (ref 0–0.4)
EOSINOPHILS ABSOLUTE: ABNORMAL
EOSINOPHILS ABSOLUTE: ABNORMAL
EOSINOPHILS RELATIVE PERCENT: ABNORMAL
EOSINOPHILS RELATIVE PERCENT: ABNORMAL
EPITHELIAL CELLS, UA: ABNORMAL /HPF
ERYTHROCYTE [DISTWIDTH] IN BLOOD BY AUTOMATED COUNT: 16.1 % (ref 11.5–14.5)
ERYTHROCYTE [DISTWIDTH] IN BLOOD BY AUTOMATED COUNT: 16.2 % (ref 11.5–14.5)
ERYTHROCYTE [DISTWIDTH] IN BLOOD BY AUTOMATED COUNT: 16.3 % (ref 11.5–14.5)
ERYTHROCYTE [DISTWIDTH] IN BLOOD BY AUTOMATED COUNT: 16.7 % (ref 11.5–14.5)
ERYTHROCYTE [DISTWIDTH] IN BLOOD BY AUTOMATED COUNT: 16.8 % (ref 11.5–14.5)
ERYTHROCYTE [DISTWIDTH] IN BLOOD BY AUTOMATED COUNT: 16.9 % (ref 11.5–14.5)
ERYTHROCYTE [DISTWIDTH] IN BLOOD BY AUTOMATED COUNT: 17.2 % (ref 11.5–14.5)
ERYTHROCYTE [DISTWIDTH] IN BLOOD BY AUTOMATED COUNT: 17.7 % (ref 11.5–14.5)
ERYTHROCYTE [DISTWIDTH] IN BLOOD BY AUTOMATED COUNT: 18.3 % (ref 11.5–14.5)
ERYTHROCYTE [DISTWIDTH] IN BLOOD BY AUTOMATED COUNT: 18.4 % (ref 11.5–14.5)
ERYTHROCYTE [DISTWIDTH] IN BLOOD BY AUTOMATED COUNT: 18.4 % (ref 11.5–14.5)
ERYTHROCYTE [DISTWIDTH] IN BLOOD BY AUTOMATED COUNT: 18.6 % (ref 11.5–14.5)
ERYTHROCYTE [DISTWIDTH] IN BLOOD BY AUTOMATED COUNT: 18.8 % (ref 11.5–14.5)
ERYTHROCYTE [DISTWIDTH] IN BLOOD BY AUTOMATED COUNT: 55.8 FL (ref 35–45)
ERYTHROCYTE [DISTWIDTH] IN BLOOD BY AUTOMATED COUNT: 56 FL (ref 35–45)
ERYTHROCYTE [DISTWIDTH] IN BLOOD BY AUTOMATED COUNT: 56.7 FL (ref 35–45)
ERYTHROCYTE [DISTWIDTH] IN BLOOD BY AUTOMATED COUNT: 58.2 FL (ref 35–45)
ERYTHROCYTE [DISTWIDTH] IN BLOOD BY AUTOMATED COUNT: 59.1 FL (ref 35–45)
ERYTHROCYTE [DISTWIDTH] IN BLOOD BY AUTOMATED COUNT: 60.4 FL (ref 35–45)
ERYTHROCYTE [DISTWIDTH] IN BLOOD BY AUTOMATED COUNT: 60.6 FL (ref 35–45)
ERYTHROCYTE [DISTWIDTH] IN BLOOD BY AUTOMATED COUNT: 63.8 FL (ref 35–45)
ERYTHROCYTE [DISTWIDTH] IN BLOOD BY AUTOMATED COUNT: 65.6 FL (ref 35–45)
ERYTHROCYTE [DISTWIDTH] IN BLOOD BY AUTOMATED COUNT: 67.3 FL (ref 35–45)
ERYTHROCYTE [DISTWIDTH] IN BLOOD BY AUTOMATED COUNT: 67.4 FL (ref 35–45)
ERYTHROCYTE [DISTWIDTH] IN BLOOD BY AUTOMATED COUNT: 67.7 FL (ref 35–45)
ERYTHROCYTE [DISTWIDTH] IN BLOOD BY AUTOMATED COUNT: 68.7 FL (ref 35–45)
FERRITIN: 1134 NG/ML (ref 9–150)
FERRITIN: 1245 NG/ML (ref 10–291)
FERRITIN: 414 NG/ML (ref 9–150)
FIBRIN SPLIT PRODUCTS: < 5 MCG/ML
FIBRINOGEN: 416 MG/100ML (ref 155–475)
FLU A ANTIGEN: NEGATIVE
FLU B ANTIGEN: NEGATIVE
FOLATE: 7.9 NG/ML (ref 4.8–24.2)
GFR CALCULATED: 18
GFR CALCULATED: 20
GFR SERPL CREATININE-BSD FRML MDRD: 12 ML/MIN/1.73M2
GFR SERPL CREATININE-BSD FRML MDRD: 13 ML/MIN/1.73M2
GFR SERPL CREATININE-BSD FRML MDRD: 14 ML/MIN/1.73M2
GFR SERPL CREATININE-BSD FRML MDRD: 16 ML/MIN/1.73M2
GFR SERPL CREATININE-BSD FRML MDRD: 17 ML/MIN/1.73M2
GFR SERPL CREATININE-BSD FRML MDRD: 17 ML/MIN/1.73M2
GFR SERPL CREATININE-BSD FRML MDRD: 21 ML/MIN/1.73M2
GFR SERPL CREATININE-BSD FRML MDRD: 24 ML/MIN/1.73M2
GFR SERPL CREATININE-BSD FRML MDRD: 24 ML/MIN/1.73M2
GLUCOSE BLD-MCNC: 102 MG/DL
GLUCOSE BLD-MCNC: 104 MG/DL (ref 70–108)
GLUCOSE BLD-MCNC: 104 MG/DL (ref 70–108)
GLUCOSE BLD-MCNC: 105 MG/DL (ref 70–108)
GLUCOSE BLD-MCNC: 106 MG/DL (ref 70–108)
GLUCOSE BLD-MCNC: 110 MG/DL (ref 70–108)
GLUCOSE BLD-MCNC: 112 MG/DL (ref 70–108)
GLUCOSE BLD-MCNC: 112 MG/DL (ref 70–108)
GLUCOSE BLD-MCNC: 113 MG/DL (ref 70–108)
GLUCOSE BLD-MCNC: 114 MG/DL (ref 70–108)
GLUCOSE BLD-MCNC: 116 MG/DL (ref 70–108)
GLUCOSE BLD-MCNC: 116 MG/DL (ref 70–108)
GLUCOSE BLD-MCNC: 118 MG/DL (ref 70–108)
GLUCOSE BLD-MCNC: 126 MG/DL (ref 70–108)
GLUCOSE BLD-MCNC: 127 MG/DL (ref 70–108)
GLUCOSE BLD-MCNC: 127 MG/DL (ref 70–108)
GLUCOSE BLD-MCNC: 128 MG/DL (ref 70–108)
GLUCOSE BLD-MCNC: 131 MG/DL (ref 70–108)
GLUCOSE BLD-MCNC: 132 MG/DL (ref 70–108)
GLUCOSE BLD-MCNC: 134 MG/DL (ref 70–108)
GLUCOSE BLD-MCNC: 137 MG/DL (ref 70–108)
GLUCOSE BLD-MCNC: 138 MG/DL (ref 70–108)
GLUCOSE BLD-MCNC: 140 MG/DL (ref 70–108)
GLUCOSE BLD-MCNC: 140 MG/DL (ref 70–108)
GLUCOSE BLD-MCNC: 141 MG/DL (ref 70–108)
GLUCOSE BLD-MCNC: 145 MG/DL (ref 70–108)
GLUCOSE BLD-MCNC: 145 MG/DL (ref 70–108)
GLUCOSE BLD-MCNC: 146 MG/DL (ref 70–108)
GLUCOSE BLD-MCNC: 147 MG/DL (ref 70–108)
GLUCOSE BLD-MCNC: 147 MG/DL (ref 70–108)
GLUCOSE BLD-MCNC: 149 MG/DL (ref 70–108)
GLUCOSE BLD-MCNC: 149 MG/DL (ref 70–108)
GLUCOSE BLD-MCNC: 150 MG/DL (ref 70–108)
GLUCOSE BLD-MCNC: 150 MG/DL (ref 70–108)
GLUCOSE BLD-MCNC: 151 MG/DL (ref 70–108)
GLUCOSE BLD-MCNC: 151 MG/DL (ref 70–108)
GLUCOSE BLD-MCNC: 157 MG/DL (ref 70–108)
GLUCOSE BLD-MCNC: 160 MG/DL (ref 70–108)
GLUCOSE BLD-MCNC: 163 MG/DL (ref 70–108)
GLUCOSE BLD-MCNC: 165 MG/DL (ref 70–108)
GLUCOSE BLD-MCNC: 166 MG/DL (ref 70–108)
GLUCOSE BLD-MCNC: 171 MG/DL (ref 70–108)
GLUCOSE BLD-MCNC: 173 MG/DL (ref 70–108)
GLUCOSE BLD-MCNC: 175 MG/DL (ref 70–108)
GLUCOSE BLD-MCNC: 175 MG/DL (ref 70–108)
GLUCOSE BLD-MCNC: 176 MG/DL (ref 70–108)
GLUCOSE BLD-MCNC: 180 MG/DL (ref 70–108)
GLUCOSE BLD-MCNC: 187 MG/DL (ref 70–108)
GLUCOSE BLD-MCNC: 189 MG/DL (ref 70–108)
GLUCOSE BLD-MCNC: 195 MG/DL (ref 70–108)
GLUCOSE BLD-MCNC: 195 MG/DL (ref 70–108)
GLUCOSE BLD-MCNC: 88 MG/DL
GLUCOSE BLD-MCNC: 98 MG/DL (ref 70–108)
GLUCOSE URINE: NEGATIVE MG/DL
HAPTOGLOBIN: 187 MG/DL (ref 30–200)
HBV SURFACE AB TITR SER: NEGATIVE {TITER}
HCO3: 30 MMOL/L (ref 23–28)
HCO3: 32 MMOL/L (ref 23–28)
HCT VFR BLD CALC: 23.8 % (ref 37–47)
HCT VFR BLD CALC: 25.7 % (ref 37–47)
HCT VFR BLD CALC: 25.9 % (ref 37–47)
HCT VFR BLD CALC: 26.2 % (ref 37–47)
HCT VFR BLD CALC: 26.4 % (ref 37–47)
HCT VFR BLD CALC: 26.5 % (ref 37–47)
HCT VFR BLD CALC: 27.2 % (ref 37–47)
HCT VFR BLD CALC: 27.6 % (ref 37–47)
HCT VFR BLD CALC: 28 % (ref 37–47)
HCT VFR BLD CALC: 28.3 % (ref 37–47)
HCT VFR BLD CALC: 29 % (ref 36–46)
HCT VFR BLD CALC: 29.2 % (ref 37–47)
HCT VFR BLD CALC: 29.6 % (ref 37–47)
HCT VFR BLD CALC: 29.7 % (ref 37–47)
HCT VFR BLD CALC: 30.1 % (ref 37–47)
HCT VFR BLD CALC: 30.9 % (ref 36–46)
HCT VFR BLD CALC: 30.9 % (ref 36–46)
HCT VFR BLD CALC: 31.4 % (ref 37–47)
HCT VFR BLD CALC: 31.5 % (ref 36–46)
HCT VFR BLD CALC: 31.7 % (ref 37–47)
HCT VFR BLD CALC: 31.8 % (ref 36–46)
HCT VFR BLD CALC: 32.7 % (ref 37–47)
HCT VFR BLD CALC: 32.9 % (ref 37–47)
HEMOGLOBIN: 6.7 GM/DL (ref 12–16)
HEMOGLOBIN: 7.5 GM/DL (ref 12–16)
HEMOGLOBIN: 7.6 GM/DL (ref 12–16)
HEMOGLOBIN: 7.6 GM/DL (ref 12–16)
HEMOGLOBIN: 7.7 GM/DL (ref 12–16)
HEMOGLOBIN: 7.9 GM/DL (ref 12–16)
HEMOGLOBIN: 7.9 GM/DL (ref 12–16)
HEMOGLOBIN: 8 GM/DL (ref 12–16)
HEMOGLOBIN: 8 GM/DL (ref 12–16)
HEMOGLOBIN: 8.1 GM/DL (ref 12–16)
HEMOGLOBIN: 8.2 GM/DL (ref 12–16)
HEMOGLOBIN: 8.2 GM/DL (ref 12–16)
HEMOGLOBIN: 8.4 GM/DL (ref 12–16)
HEMOGLOBIN: 8.5 GM/DL (ref 12–16)
HEMOGLOBIN: 8.7 GM/DL (ref 12–16)
HEMOGLOBIN: 9 G/DL (ref 12–16)
HEMOGLOBIN: 9.1 GM/DL (ref 12–16)
HEMOGLOBIN: 9.2 GM/DL (ref 12–16)
HEMOGLOBIN: 9.2 GM/DL (ref 12–16)
HEMOGLOBIN: 9.5 G/DL (ref 12–16)
HEMOGLOBIN: 9.5 G/DL (ref 12–16)
HEMOGLOBIN: 9.7 G/DL (ref 12–16)
HEMOGLOBIN: 9.8 G/DL (ref 12–16)
HEPATITIS B CORE IGM ANTIBODY: NEGATIVE
HEPATITIS B SURFACE ANTIGEN: NEGATIVE
HYPOCHROMIA: PRESENT
IFIO2: 35
IFIO2: 4
IMMATURE GRANS (ABS): 0.03 THOU/MM3 (ref 0–0.07)
IMMATURE GRANS (ABS): 0.04 THOU/MM3 (ref 0–0.07)
IMMATURE GRANS (ABS): 0.05 THOU/MM3 (ref 0–0.07)
IMMATURE GRANS (ABS): 0.05 THOU/MM3 (ref 0–0.07)
IMMATURE GRANS (ABS): 0.06 THOU/MM3 (ref 0–0.07)
IMMATURE GRANS (ABS): 0.08 THOU/MM3 (ref 0–0.07)
IMMATURE GRANS (ABS): 0.09 THOU/MM3 (ref 0–0.07)
IMMATURE GRANS (ABS): 0.11 THOU/MM3 (ref 0–0.07)
IMMATURE GRANS (ABS): 0.12 THOU/MM3 (ref 0–0.07)
IMMATURE GRANULOCYTES: 0.5 %
IMMATURE GRANULOCYTES: 0.7 %
IMMATURE GRANULOCYTES: 0.7 %
IMMATURE GRANULOCYTES: 0.8 %
IMMATURE GRANULOCYTES: 1 %
IMMATURE GRANULOCYTES: 1.2 %
IMMATURE GRANULOCYTES: 1.2 %
IMMATURE GRANULOCYTES: 1.6 %
IMMATURE GRANULOCYTES: 1.7 %
IMMATURE RETIC FRACT: 21.2 % (ref 3–15.9)
INR BLD: 1.03 (ref 0.85–1.13)
INR BLD: 1.04 (ref 0.85–1.13)
INR BLD: 1.06 (ref 0.85–1.13)
INR BLD: 1.11 (ref 0.85–1.13)
INR BLD: 1.24 (ref 0.85–1.13)
INR BLD: 1.63 (ref 0.85–1.13)
INR BLD: 1.91 (ref 0.85–1.13)
INR BLD: 2.15 (ref 0.85–1.13)
INR BLD: 2.17 (ref 0.85–1.13)
INR BLD: 2.32 (ref 0.85–1.13)
INR BLD: 3.06 (ref 0.85–1.13)
INR BLD: 3.53 (ref 0.85–1.13)
IRON SATURATION: 16
IRON SATURATION: 31 % (ref 20–50)
IRON: 29
IRON: 49 UG/DL (ref 50–170)
KETONES, URINE: NEGATIVE
LACTIC ACID: 0.5 MMOL/L (ref 0.5–2.2)
LD: 183 U/L (ref 100–190)
LD: 188 U/L (ref 100–190)
LEUKOCYTE ESTERASE, URINE: ABNORMAL
LV EF: 55 %
LVEF MODALITY: NORMAL
LYMPHOCYTES # BLD: 12 %
LYMPHOCYTES # BLD: 12.7 %
LYMPHOCYTES # BLD: 13.1 %
LYMPHOCYTES # BLD: 14.1 %
LYMPHOCYTES # BLD: 16.8 %
LYMPHOCYTES # BLD: 18 %
LYMPHOCYTES # BLD: 18.7 %
LYMPHOCYTES # BLD: 18.9 %
LYMPHOCYTES # BLD: 22.7 %
LYMPHOCYTES ABSOLUTE: 0.9 THOU/MM3 (ref 1–4.8)
LYMPHOCYTES ABSOLUTE: 1 THOU/MM3 (ref 1–4.8)
LYMPHOCYTES ABSOLUTE: 1.4 THOU/MM3 (ref 1–4.8)
LYMPHOCYTES ABSOLUTE: 1.4 THOU/MM3 (ref 1–4.8)
LYMPHOCYTES ABSOLUTE: ABNORMAL
LYMPHOCYTES ABSOLUTE: ABNORMAL
LYMPHOCYTES RELATIVE PERCENT: ABNORMAL
LYMPHOCYTES RELATIVE PERCENT: ABNORMAL
MAGNESIUM: 1.8 MG/DL (ref 1.6–2.4)
MAGNESIUM: 1.8 MG/DL (ref 1.6–2.4)
MAGNESIUM: 1.9 MG/DL (ref 1.6–2.4)
MAGNESIUM: 2 MG/DL (ref 1.6–2.4)
MAGNESIUM: 2.1 MG/DL (ref 1.6–2.4)
MAGNESIUM: 2.1 MG/DL (ref 1.6–2.4)
MAGNESIUM: 2.3 MG/DL (ref 1.6–2.4)
MCH RBC QN AUTO: 27.3 PG (ref 26–33)
MCH RBC QN AUTO: 27.5 PG (ref 26–33)
MCH RBC QN AUTO: 27.6 PG (ref 26–33)
MCH RBC QN AUTO: 27.7 PG (ref 26–33)
MCH RBC QN AUTO: 27.8 PG (ref 26–33)
MCH RBC QN AUTO: 27.9 PG (ref 26–33)
MCH RBC QN AUTO: 28 PG (ref 26–33)
MCH RBC QN AUTO: 28 PG (ref 26–33)
MCH RBC QN AUTO: 28.6 PG (ref 26–33)
MCH RBC QN AUTO: ABNORMAL PG
MCH RBC QN AUTO: ABNORMAL PG
MCHC RBC AUTO-ENTMCNC: 27.6 GM/DL (ref 32.2–35.5)
MCHC RBC AUTO-ENTMCNC: 27.7 GM/DL (ref 32.2–35.5)
MCHC RBC AUTO-ENTMCNC: 27.7 GM/DL (ref 32.2–35.5)
MCHC RBC AUTO-ENTMCNC: 27.8 GM/DL (ref 32.2–35.5)
MCHC RBC AUTO-ENTMCNC: 28 GM/DL (ref 32.2–35.5)
MCHC RBC AUTO-ENTMCNC: 28.4 GM/DL (ref 32.2–35.5)
MCHC RBC AUTO-ENTMCNC: 28.6 GM/DL (ref 32.2–35.5)
MCHC RBC AUTO-ENTMCNC: 29 GM/DL (ref 32.2–35.5)
MCHC RBC AUTO-ENTMCNC: 29.1 GM/DL (ref 32.2–35.5)
MCHC RBC AUTO-ENTMCNC: 29.3 GM/DL (ref 32.2–35.5)
MCHC RBC AUTO-ENTMCNC: 29.4 GM/DL (ref 32.2–35.5)
MCHC RBC AUTO-ENTMCNC: 29.6 GM/DL (ref 32.2–35.5)
MCHC RBC AUTO-ENTMCNC: 30.5 GM/DL (ref 32.2–35.5)
MCHC RBC AUTO-ENTMCNC: ABNORMAL G/DL
MCHC RBC AUTO-ENTMCNC: ABNORMAL G/DL
MCV RBC AUTO: 100 FL (ref 81–99)
MCV RBC AUTO: 100.7 FL (ref 81–99)
MCV RBC AUTO: 91.6 FL (ref 81–99)
MCV RBC AUTO: 94.8 FL (ref 81–99)
MCV RBC AUTO: 94.8 FL (ref 81–99)
MCV RBC AUTO: 94.9 FL (ref 81–99)
MCV RBC AUTO: 95.8 FL (ref 81–99)
MCV RBC AUTO: 97.3 FL (ref 81–99)
MCV RBC AUTO: 97.8 FL (ref 81–99)
MCV RBC AUTO: 98.2 FL (ref 81–99)
MCV RBC AUTO: 98.3 FL (ref 81–99)
MCV RBC AUTO: 98.5 FL (ref 81–99)
MCV RBC AUTO: 99.7 FL (ref 81–99)
MCV RBC AUTO: ABNORMAL FL
MCV RBC AUTO: ABNORMAL FL
MISCELLANEOUS 2: ABNORMAL
MONOCYTES # BLD: 10.1 %
MONOCYTES # BLD: 10.7 %
MONOCYTES # BLD: 11.4 %
MONOCYTES # BLD: 13.5 %
MONOCYTES # BLD: 14.2 %
MONOCYTES # BLD: 6.3 %
MONOCYTES # BLD: 6.9 %
MONOCYTES # BLD: 9.7 %
MONOCYTES # BLD: 9.9 %
MONOCYTES ABSOLUTE: 0.3 THOU/MM3 (ref 0.4–1.3)
MONOCYTES ABSOLUTE: 0.5 THOU/MM3 (ref 0.4–1.3)
MONOCYTES ABSOLUTE: 0.7 THOU/MM3 (ref 0.4–1.3)
MONOCYTES ABSOLUTE: 0.7 THOU/MM3 (ref 0.4–1.3)
MONOCYTES ABSOLUTE: 0.8 THOU/MM3 (ref 0.4–1.3)
MONOCYTES ABSOLUTE: ABNORMAL
MONOCYTES ABSOLUTE: ABNORMAL
MONOCYTES RELATIVE PERCENT: ABNORMAL
MONOCYTES RELATIVE PERCENT: ABNORMAL
MRSA SCREEN: ABNORMAL
MUCUS: ABNORMAL
NEUTROPHILS ABSOLUTE: ABNORMAL
NEUTROPHILS ABSOLUTE: ABNORMAL
NEUTROPHILS RELATIVE PERCENT: ABNORMAL
NEUTROPHILS RELATIVE PERCENT: ABNORMAL
NITRITE, URINE: NEGATIVE
NUCLEATED RED BLOOD CELLS: 0 /100 WBC
O2 SATURATION: 83 %
O2 SATURATION: 83 %
ORGANISM: ABNORMAL
ORGANISM: ABNORMAL
OSMOLALITY CALCULATION: 299.6 MOSMOL/KG (ref 275–300)
PATHOLOGIST REVIEW: ABNORMAL
PCO2: 62 MMHG (ref 35–45)
PCO2: 66 MMHG (ref 35–45)
PH BLOOD GAS: 7.29 (ref 7.35–7.45)
PH BLOOD GAS: 7.29 (ref 7.35–7.45)
PH UA: 5 (ref 5–9)
PHOSPHORUS: 3.2 MG/DL (ref 2.4–4.7)
PHOSPHORUS: 3.8 MG/DL (ref 2.4–4.7)
PHOSPHORUS: 4.1 MG/DL (ref 2.4–4.7)
PLATELET # BLD: 127 THOU/MM3 (ref 130–400)
PLATELET # BLD: 128 K/ΜL
PLATELET # BLD: 151 K/ΜL
PLATELET # BLD: 177 THOU/MM3 (ref 130–400)
PLATELET # BLD: 178 THOU/MM3 (ref 130–400)
PLATELET # BLD: 179 THOU/MM3 (ref 130–400)
PLATELET # BLD: 180 THOU/MM3 (ref 130–400)
PLATELET # BLD: 184 THOU/MM3 (ref 130–400)
PLATELET # BLD: 45 THOU/MM3 (ref 130–400)
PLATELET # BLD: 54 THOU/MM3 (ref 130–400)
PLATELET # BLD: 62 THOU/MM3 (ref 130–400)
PLATELET # BLD: 63 THOU/MM3 (ref 130–400)
PLATELET # BLD: 70 THOU/MM3 (ref 130–400)
PLATELET # BLD: 71 THOU/MM3 (ref 130–400)
PLATELET # BLD: 78 THOU/MM3 (ref 130–400)
PLATELET ESTIMATE: ABNORMAL
PMV BLD AUTO: 10.6 FL (ref 9.4–12.4)
PMV BLD AUTO: 10.9 FL (ref 9.4–12.4)
PMV BLD AUTO: 11 FL (ref 9.4–12.4)
PMV BLD AUTO: 11.2 FL (ref 9.4–12.4)
PMV BLD AUTO: 11.4 FL (ref 9.4–12.4)
PMV BLD AUTO: 11.5 FL (ref 9.4–12.4)
PMV BLD AUTO: 11.6 FL (ref 9.4–12.4)
PMV BLD AUTO: 12.2 FL (ref 9.4–12.4)
PMV BLD AUTO: 12.3 FL (ref 9.4–12.4)
PMV BLD AUTO: 12.4 FL (ref 9.4–12.4)
PMV BLD AUTO: 12.5 FL (ref 9.4–12.4)
PMV BLD AUTO: 12.6 FL (ref 9.4–12.4)
PMV BLD AUTO: 13 FL (ref 9.4–12.4)
PMV BLD AUTO: ABNORMAL FL
PMV BLD AUTO: ABNORMAL FL
PO2: 54 MMHG (ref 71–104)
PO2: 54 MMHG (ref 71–104)
POTASSIUM REFLEX MAGNESIUM: 5 MEQ/L (ref 3.5–5.2)
POTASSIUM SERPL-SCNC: 3.7 MEQ/L (ref 3.5–5.2)
POTASSIUM SERPL-SCNC: 3.8 MEQ/L (ref 3.5–5.2)
POTASSIUM SERPL-SCNC: 3.9 MEQ/L (ref 3.5–5.2)
POTASSIUM SERPL-SCNC: 4 MEQ/L (ref 3.5–5.2)
POTASSIUM SERPL-SCNC: 4 MEQ/L (ref 3.5–5.2)
POTASSIUM SERPL-SCNC: 4.2 MEQ/L (ref 3.5–5.2)
POTASSIUM SERPL-SCNC: 4.5 MEQ/L (ref 3.5–5.2)
POTASSIUM SERPL-SCNC: 4.9 MEQ/L (ref 3.5–5.2)
POTASSIUM SERPL-SCNC: 5 MEQ/L (ref 3.5–5.2)
POTASSIUM SERPL-SCNC: 5.1 MEQ/L (ref 3.5–5.2)
POTASSIUM SERPL-SCNC: 5.1 MEQ/L (ref 3.5–5.2)
POTASSIUM SERPL-SCNC: 5.2 MEQ/L (ref 3.5–5.2)
POTASSIUM SERPL-SCNC: 5.2 MMOL/L
POTASSIUM SERPL-SCNC: 5.4 MMOL/L
POTASSIUM SERPL-SCNC: 5.7 MEQ/L (ref 3.5–5.2)
PRO-BNP: ABNORMAL PG/ML (ref 0–1800)
PROCALCITONIN: 0.13 NG/ML (ref 0.01–0.09)
PROCALCITONIN: 0.25 NG/ML (ref 0.01–0.09)
PROCALCITONIN: 0.28 NG/ML (ref 0.01–0.09)
PROCALCITONIN: 0.32 NG/ML (ref 0.01–0.09)
PROTEIN UA: NEGATIVE
RBC # BLD: 2.7 MILL/MM3 (ref 4.2–5.4)
RBC # BLD: 2.71 MILL/MM3 (ref 4.2–5.4)
RBC # BLD: 2.73 MILL/MM3 (ref 4.2–5.4)
RBC # BLD: 2.86 MILL/MM3 (ref 4.2–5.4)
RBC # BLD: 2.87 MILL/MM3 (ref 4.2–5.4)
RBC # BLD: 2.91 MILL/MM3 (ref 4.2–5.4)
RBC # BLD: 2.95 MILL/MM3 (ref 4.2–5.4)
RBC # BLD: 2.97 MILL/MM3 (ref 4.2–5.4)
RBC # BLD: 2.97 MILL/MM3 (ref 4.2–5.4)
RBC # BLD: 3.14 MILL/MM3 (ref 4.2–5.4)
RBC # BLD: 3.19 10^6/ΜL
RBC # BLD: 3.31 MILL/MM3 (ref 4.2–5.4)
RBC # BLD: 3.33 MILL/MM3 (ref 4.2–5.4)
RBC # BLD: 3.34 MILL/MM3 (ref 4.2–5.4)
RBC # BLD: 3.59 10^6/ΜL
RBC URINE: ABNORMAL /HPF
RENAL EPITHELIAL, UA: ABNORMAL
RETIC HEMOGLOBIN: 27.1 PG (ref 28.2–35.7)
RETICULOCYTE ABSOLUTE COUNT: 4.6 % (ref 0.5–2)
RH FACTOR: NORMAL
RH FACTOR: NORMAL
SCAN OF BLOOD SMEAR: NORMAL
SCAN OF BLOOD SMEAR: NORMAL
SEG NEUTROPHILS: 60.1 %
SEG NEUTROPHILS: 61.2 %
SEG NEUTROPHILS: 62.5 %
SEG NEUTROPHILS: 62.9 %
SEG NEUTROPHILS: 68.9 %
SEG NEUTROPHILS: 69 %
SEG NEUTROPHILS: 69 %
SEG NEUTROPHILS: 69.3 %
SEG NEUTROPHILS: 76.4 %
SEGMENTED NEUTROPHILS ABSOLUTE COUNT: 2.6 THOU/MM3 (ref 1.8–7.7)
SEGMENTED NEUTROPHILS ABSOLUTE COUNT: 3.4 THOU/MM3 (ref 1.8–7.7)
SEGMENTED NEUTROPHILS ABSOLUTE COUNT: 3.5 THOU/MM3 (ref 1.8–7.7)
SEGMENTED NEUTROPHILS ABSOLUTE COUNT: 4.6 THOU/MM3 (ref 1.8–7.7)
SEGMENTED NEUTROPHILS ABSOLUTE COUNT: 4.8 THOU/MM3 (ref 1.8–7.7)
SEGMENTED NEUTROPHILS ABSOLUTE COUNT: 4.9 THOU/MM3 (ref 1.8–7.7)
SEGMENTED NEUTROPHILS ABSOLUTE COUNT: 5.1 THOU/MM3 (ref 1.8–7.7)
SEGMENTED NEUTROPHILS ABSOLUTE COUNT: 5.5 THOU/MM3 (ref 1.8–7.7)
SEGMENTED NEUTROPHILS ABSOLUTE COUNT: 5.7 THOU/MM3 (ref 1.8–7.7)
SET PEEP: 6 MMHG
SODIUM BLD-SCNC: 133 MEQ/L (ref 135–145)
SODIUM BLD-SCNC: 134 MEQ/L (ref 135–145)
SODIUM BLD-SCNC: 136 MEQ/L (ref 135–145)
SODIUM BLD-SCNC: 137 MEQ/L (ref 135–145)
SODIUM BLD-SCNC: 137 MEQ/L (ref 135–145)
SODIUM BLD-SCNC: 138 MEQ/L (ref 135–145)
SODIUM BLD-SCNC: 138 MEQ/L (ref 135–145)
SODIUM BLD-SCNC: 139 MEQ/L (ref 135–145)
SODIUM BLD-SCNC: 139 MEQ/L (ref 135–145)
SODIUM BLD-SCNC: 141 MEQ/L (ref 135–145)
SODIUM BLD-SCNC: 141 MMOL/L
SODIUM BLD-SCNC: 143 MMOL/L
SOURCE, BLOOD GAS: ABNORMAL
SOURCE, BLOOD GAS: ABNORMAL
SPECIFIC GRAVITY, URINE: 1.01 (ref 1–1.03)
STREP PNEUMO AG, UR: NEGATIVE
TOTAL IRON BINDING CAPACITY: 158 UG/DL (ref 171–450)
TOTAL IRON BINDING CAPACITY: 177
TOTAL IRON BINDING CAPACITY: 184
TROPONIN T: 0.04 NG/ML
URINE CULTURE REFLEX: ABNORMAL
UROBILINOGEN, URINE: 0.2 EU/DL (ref 0–1)
VANCOMYCIN RANDOM: 10.1 UG/ML (ref 0.1–39.9)
VITAMIN B-12: 1483 PG/ML (ref 211–911)
WBC # BLD: 4.1 THOU/MM3 (ref 4.8–10.8)
WBC # BLD: 5.1 10^3/ML
WBC # BLD: 5.3 10^3/ML
WBC # BLD: 5.5 THOU/MM3 (ref 4.8–10.8)
WBC # BLD: 5.6 THOU/MM3 (ref 4.8–10.8)
WBC # BLD: 5.7 THOU/MM3 (ref 4.8–10.8)
WBC # BLD: 6.7 THOU/MM3 (ref 4.8–10.8)
WBC # BLD: 7 THOU/MM3 (ref 4.8–10.8)
WBC # BLD: 7 THOU/MM3 (ref 4.8–10.8)
WBC # BLD: 7.1 THOU/MM3 (ref 4.8–10.8)
WBC # BLD: 7.3 THOU/MM3 (ref 4.8–10.8)
WBC # BLD: 7.4 THOU/MM3 (ref 4.8–10.8)
WBC # BLD: 7.4 THOU/MM3 (ref 4.8–10.8)
WBC # BLD: 7.8 THOU/MM3 (ref 4.8–10.8)
WBC # BLD: 7.9 THOU/MM3 (ref 4.8–10.8)
WBC UA: ABNORMAL /HPF
YEAST: ABNORMAL

## 2019-01-01 PROCEDURE — 86901 BLOOD TYPING SEROLOGIC RH(D): CPT

## 2019-01-01 PROCEDURE — 86900 BLOOD TYPING SEROLOGIC ABO: CPT

## 2019-01-01 PROCEDURE — 6370000000 HC RX 637 (ALT 250 FOR IP): Performed by: INTERNAL MEDICINE

## 2019-01-01 PROCEDURE — 83550 IRON BINDING TEST: CPT

## 2019-01-01 PROCEDURE — 99285 EMERGENCY DEPT VISIT HI MDM: CPT

## 2019-01-01 PROCEDURE — 2700000000 HC OXYGEN THERAPY PER DAY

## 2019-01-01 PROCEDURE — 83735 ASSAY OF MAGNESIUM: CPT

## 2019-01-01 PROCEDURE — 97530 THERAPEUTIC ACTIVITIES: CPT

## 2019-01-01 PROCEDURE — 84100 ASSAY OF PHOSPHORUS: CPT

## 2019-01-01 PROCEDURE — 2580000003 HC RX 258: Performed by: FAMILY MEDICINE

## 2019-01-01 PROCEDURE — 2709999900 HC NON-CHARGEABLE SUPPLY

## 2019-01-01 PROCEDURE — 85610 PROTHROMBIN TIME: CPT

## 2019-01-01 PROCEDURE — G8598 ASA/ANTIPLAT THER USED: HCPCS | Performed by: NURSE PRACTITIONER

## 2019-01-01 PROCEDURE — 87186 SC STD MICRODIL/AGAR DIL: CPT

## 2019-01-01 PROCEDURE — 6370000000 HC RX 637 (ALT 250 FOR IP): Performed by: FAMILY MEDICINE

## 2019-01-01 PROCEDURE — 1200000003 HC TELEMETRY R&B

## 2019-01-01 PROCEDURE — 4040F PNEUMOC VAC/ADMIN/RCVD: CPT | Performed by: INTERNAL MEDICINE

## 2019-01-01 PROCEDURE — 97166 OT EVAL MOD COMPLEX 45 MIN: CPT

## 2019-01-01 PROCEDURE — 36415 COLL VENOUS BLD VENIPUNCTURE: CPT

## 2019-01-01 PROCEDURE — 80048 BASIC METABOLIC PNL TOTAL CA: CPT

## 2019-01-01 PROCEDURE — 2580000003 HC RX 258: Performed by: INTERNAL MEDICINE

## 2019-01-01 PROCEDURE — 2140000000 HC CCU INTERMEDIATE R&B

## 2019-01-01 PROCEDURE — 82948 REAGENT STRIP/BLOOD GLUCOSE: CPT

## 2019-01-01 PROCEDURE — 82746 ASSAY OF FOLIC ACID SERUM: CPT

## 2019-01-01 PROCEDURE — 86923 COMPATIBILITY TEST ELECTRIC: CPT

## 2019-01-01 PROCEDURE — 84145 PROCALCITONIN (PCT): CPT

## 2019-01-01 PROCEDURE — 99232 SBSQ HOSP IP/OBS MODERATE 35: CPT | Performed by: INTERNAL MEDICINE

## 2019-01-01 PROCEDURE — 6360000002 HC RX W HCPCS

## 2019-01-01 PROCEDURE — C1894 INTRO/SHEATH, NON-LASER: HCPCS

## 2019-01-01 PROCEDURE — 99214 OFFICE O/P EST MOD 30 MIN: CPT | Performed by: NURSE PRACTITIONER

## 2019-01-01 PROCEDURE — 2500000003 HC RX 250 WO HCPCS: Performed by: INTERNAL MEDICINE

## 2019-01-01 PROCEDURE — 93010 ELECTROCARDIOGRAM REPORT: CPT | Performed by: INTERNAL MEDICINE

## 2019-01-01 PROCEDURE — 86705 HEP B CORE ANTIBODY IGM: CPT

## 2019-01-01 PROCEDURE — 6360000002 HC RX W HCPCS: Performed by: FAMILY MEDICINE

## 2019-01-01 PROCEDURE — 36415 COLL VENOUS BLD VENIPUNCTURE: CPT | Performed by: NURSE PRACTITIONER

## 2019-01-01 PROCEDURE — 2500000003 HC RX 250 WO HCPCS: Performed by: FAMILY MEDICINE

## 2019-01-01 PROCEDURE — 87081 CULTURE SCREEN ONLY: CPT

## 2019-01-01 PROCEDURE — 94660 CPAP INITIATION&MGMT: CPT

## 2019-01-01 PROCEDURE — 94640 AIRWAY INHALATION TREATMENT: CPT

## 2019-01-01 PROCEDURE — 94761 N-INVAS EAR/PLS OXIMETRY MLT: CPT

## 2019-01-01 PROCEDURE — 90935 HEMODIALYSIS ONE EVALUATION: CPT | Performed by: INTERNAL MEDICINE

## 2019-01-01 PROCEDURE — 83615 LACTATE (LD) (LDH) ENZYME: CPT

## 2019-01-01 PROCEDURE — 84484 ASSAY OF TROPONIN QUANT: CPT

## 2019-01-01 PROCEDURE — 85027 COMPLETE CBC AUTOMATED: CPT

## 2019-01-01 PROCEDURE — 85385 FIBRINOGEN ANTIGEN: CPT

## 2019-01-01 PROCEDURE — 99223 1ST HOSP IP/OBS HIGH 75: CPT | Performed by: INTERNAL MEDICINE

## 2019-01-01 PROCEDURE — 2580000003 HC RX 258: Performed by: NURSE PRACTITIONER

## 2019-01-01 PROCEDURE — 1036F TOBACCO NON-USER: CPT | Performed by: NURSE PRACTITIONER

## 2019-01-01 PROCEDURE — 94760 N-INVAS EAR/PLS OXIMETRY 1: CPT

## 2019-01-01 PROCEDURE — 71045 X-RAY EXAM CHEST 1 VIEW: CPT

## 2019-01-01 PROCEDURE — 85018 HEMOGLOBIN: CPT

## 2019-01-01 PROCEDURE — 97110 THERAPEUTIC EXERCISES: CPT

## 2019-01-01 PROCEDURE — 6370000000 HC RX 637 (ALT 250 FOR IP): Performed by: PHARMACIST

## 2019-01-01 PROCEDURE — 99232 SBSQ HOSP IP/OBS MODERATE 35: CPT | Performed by: FAMILY MEDICINE

## 2019-01-01 PROCEDURE — 1090F PRES/ABSN URINE INCON ASSESS: CPT | Performed by: INTERNAL MEDICINE

## 2019-01-01 PROCEDURE — 6360000002 HC RX W HCPCS: Performed by: RADIOLOGY

## 2019-01-01 PROCEDURE — 80202 ASSAY OF VANCOMYCIN: CPT

## 2019-01-01 PROCEDURE — 2500000003 HC RX 250 WO HCPCS: Performed by: RADIOLOGY

## 2019-01-01 PROCEDURE — 83880 ASSAY OF NATRIURETIC PEPTIDE: CPT

## 2019-01-01 PROCEDURE — 2580000003 HC RX 258: Performed by: PHYSICIAN ASSISTANT

## 2019-01-01 PROCEDURE — 96365 THER/PROPH/DIAG IV INF INIT: CPT

## 2019-01-01 PROCEDURE — 1090F PRES/ABSN URINE INCON ASSESS: CPT | Performed by: NURSE PRACTITIONER

## 2019-01-01 PROCEDURE — 85025 COMPLETE CBC W/AUTO DIFF WBC: CPT

## 2019-01-01 PROCEDURE — 82247 BILIRUBIN TOTAL: CPT

## 2019-01-01 PROCEDURE — 93005 ELECTROCARDIOGRAM TRACING: CPT | Performed by: EMERGENCY MEDICINE

## 2019-01-01 PROCEDURE — G8427 DOCREV CUR MEDS BY ELIG CLIN: HCPCS | Performed by: INTERNAL MEDICINE

## 2019-01-01 PROCEDURE — 99233 SBSQ HOSP IP/OBS HIGH 50: CPT | Performed by: INTERNAL MEDICINE

## 2019-01-01 PROCEDURE — 90935 HEMODIALYSIS ONE EVALUATION: CPT

## 2019-01-01 PROCEDURE — 81001 URINALYSIS AUTO W/SCOPE: CPT

## 2019-01-01 PROCEDURE — 6360000002 HC RX W HCPCS: Performed by: INTERNAL MEDICINE

## 2019-01-01 PROCEDURE — 77001 FLUOROGUIDE FOR VEIN DEVICE: CPT | Performed by: RADIOLOGY

## 2019-01-01 PROCEDURE — 4040F PNEUMOC VAC/ADMIN/RCVD: CPT | Performed by: NURSE PRACTITIONER

## 2019-01-01 PROCEDURE — 84132 ASSAY OF SERUM POTASSIUM: CPT

## 2019-01-01 PROCEDURE — 86850 RBC ANTIBODY SCREEN: CPT

## 2019-01-01 PROCEDURE — 1123F ACP DISCUSS/DSCN MKR DOCD: CPT | Performed by: NURSE PRACTITIONER

## 2019-01-01 PROCEDURE — 36558 INSERT TUNNELED CV CATH: CPT | Performed by: RADIOLOGY

## 2019-01-01 PROCEDURE — 1036F TOBACCO NON-USER: CPT | Performed by: INTERNAL MEDICINE

## 2019-01-01 PROCEDURE — 93264 REM MNTR WRLS P-ART PRS SNR: CPT | Performed by: NURSE PRACTITIONER

## 2019-01-01 PROCEDURE — 87040 BLOOD CULTURE FOR BACTERIA: CPT

## 2019-01-01 PROCEDURE — 99233 SBSQ HOSP IP/OBS HIGH 50: CPT | Performed by: FAMILY MEDICINE

## 2019-01-01 PROCEDURE — 83540 ASSAY OF IRON: CPT

## 2019-01-01 PROCEDURE — G8399 PT W/DXA RESULTS DOCUMENT: HCPCS | Performed by: INTERNAL MEDICINE

## 2019-01-01 PROCEDURE — P9016 RBC LEUKOCYTES REDUCED: HCPCS

## 2019-01-01 PROCEDURE — 82607 VITAMIN B-12: CPT

## 2019-01-01 PROCEDURE — 6360000002 HC RX W HCPCS: Performed by: NURSE PRACTITIONER

## 2019-01-01 PROCEDURE — 5A1D70Z PERFORMANCE OF URINARY FILTRATION, INTERMITTENT, LESS THAN 6 HOURS PER DAY: ICD-10-PCS | Performed by: INTERNAL MEDICINE

## 2019-01-01 PROCEDURE — G8417 CALC BMI ABV UP PARAM F/U: HCPCS | Performed by: NURSE PRACTITIONER

## 2019-01-01 PROCEDURE — 85362 FIBRIN DEGRADATION PRODUCTS: CPT

## 2019-01-01 PROCEDURE — 85014 HEMATOCRIT: CPT

## 2019-01-01 PROCEDURE — 82803 BLOOD GASES ANY COMBINATION: CPT

## 2019-01-01 PROCEDURE — 0JH63XZ INSERTION OF TUNNELED VASCULAR ACCESS DEVICE INTO CHEST SUBCUTANEOUS TISSUE AND FASCIA, PERCUTANEOUS APPROACH: ICD-10-PCS | Performed by: RADIOLOGY

## 2019-01-01 PROCEDURE — G8484 FLU IMMUNIZE NO ADMIN: HCPCS | Performed by: NURSE PRACTITIONER

## 2019-01-01 PROCEDURE — G8484 FLU IMMUNIZE NO ADMIN: HCPCS | Performed by: INTERNAL MEDICINE

## 2019-01-01 PROCEDURE — 99239 HOSP IP/OBS DSCHRG MGMT >30: CPT | Performed by: INTERNAL MEDICINE

## 2019-01-01 PROCEDURE — G8399 PT W/DXA RESULTS DOCUMENT: HCPCS | Performed by: NURSE PRACTITIONER

## 2019-01-01 PROCEDURE — 87449 NOS EACH ORGANISM AG IA: CPT

## 2019-01-01 PROCEDURE — 97535 SELF CARE MNGMENT TRAINING: CPT

## 2019-01-01 PROCEDURE — 36430 TRANSFUSION BLD/BLD COMPNT: CPT

## 2019-01-01 PROCEDURE — G8417 CALC BMI ABV UP PARAM F/U: HCPCS | Performed by: INTERNAL MEDICINE

## 2019-01-01 PROCEDURE — 99213 OFFICE O/P EST LOW 20 MIN: CPT | Performed by: NURSE PRACTITIONER

## 2019-01-01 PROCEDURE — 87804 INFLUENZA ASSAY W/OPTIC: CPT

## 2019-01-01 PROCEDURE — 83010 ASSAY OF HAPTOGLOBIN QUANT: CPT

## 2019-01-01 PROCEDURE — G8427 DOCREV CUR MEDS BY ELIG CLIN: HCPCS | Performed by: NURSE PRACTITIONER

## 2019-01-01 PROCEDURE — G8598 ASA/ANTIPLAT THER USED: HCPCS | Performed by: INTERNAL MEDICINE

## 2019-01-01 PROCEDURE — 1123F ACP DISCUSS/DSCN MKR DOCD: CPT | Performed by: INTERNAL MEDICINE

## 2019-01-01 PROCEDURE — 02H633Z INSERTION OF INFUSION DEVICE INTO RIGHT ATRIUM, PERCUTANEOUS APPROACH: ICD-10-PCS | Performed by: RADIOLOGY

## 2019-01-01 PROCEDURE — 77001 FLUOROGUIDE FOR VEIN DEVICE: CPT

## 2019-01-01 PROCEDURE — 85046 RETICYTE/HGB CONCENTRATE: CPT

## 2019-01-01 PROCEDURE — 96374 THER/PROPH/DIAG INJ IV PUSH: CPT

## 2019-01-01 PROCEDURE — 86706 HEP B SURFACE ANTIBODY: CPT

## 2019-01-01 PROCEDURE — 87899 AGENT NOS ASSAY W/OPTIC: CPT

## 2019-01-01 PROCEDURE — 99213 OFFICE O/P EST LOW 20 MIN: CPT | Performed by: INTERNAL MEDICINE

## 2019-01-01 PROCEDURE — 96374 THER/PROPH/DIAG INJ IV PUSH: CPT | Performed by: NURSE PRACTITIONER

## 2019-01-01 PROCEDURE — 99232 SBSQ HOSP IP/OBS MODERATE 35: CPT | Performed by: PHYSICIAN ASSISTANT

## 2019-01-01 PROCEDURE — 76937 US GUIDE VASCULAR ACCESS: CPT

## 2019-01-01 PROCEDURE — 99490 CHRNC CARE MGMT STAFF 1ST 20: CPT | Performed by: NURSE PRACTITIONER

## 2019-01-01 PROCEDURE — 2500000003 HC RX 250 WO HCPCS

## 2019-01-01 PROCEDURE — 87086 URINE CULTURE/COLONY COUNT: CPT

## 2019-01-01 PROCEDURE — 87077 CULTURE AEROBIC IDENTIFY: CPT

## 2019-01-01 PROCEDURE — 2500000003 HC RX 250 WO HCPCS: Performed by: EMERGENCY MEDICINE

## 2019-01-01 PROCEDURE — 83605 ASSAY OF LACTIC ACID: CPT

## 2019-01-01 PROCEDURE — 85730 THROMBOPLASTIN TIME PARTIAL: CPT

## 2019-01-01 PROCEDURE — 1111F DSCHRG MED/CURRENT MED MERGE: CPT | Performed by: NURSE PRACTITIONER

## 2019-01-01 PROCEDURE — 97162 PT EVAL MOD COMPLEX 30 MIN: CPT

## 2019-01-01 PROCEDURE — 36600 WITHDRAWAL OF ARTERIAL BLOOD: CPT

## 2019-01-01 PROCEDURE — 99223 1ST HOSP IP/OBS HIGH 75: CPT | Performed by: NUCLEAR MEDICINE

## 2019-01-01 PROCEDURE — 86880 COOMBS TEST DIRECT: CPT

## 2019-01-01 PROCEDURE — 93306 TTE W/DOPPLER COMPLETE: CPT

## 2019-01-01 PROCEDURE — 2580000003 HC RX 258: Performed by: RADIOLOGY

## 2019-01-01 PROCEDURE — 82728 ASSAY OF FERRITIN: CPT

## 2019-01-01 PROCEDURE — 36556 INSERT NON-TUNNEL CV CATH: CPT

## 2019-01-01 PROCEDURE — 6370000000 HC RX 637 (ALT 250 FOR IP): Performed by: EMERGENCY MEDICINE

## 2019-01-01 PROCEDURE — 87340 HEPATITIS B SURFACE AG IA: CPT

## 2019-01-01 PROCEDURE — 6370000000 HC RX 637 (ALT 250 FOR IP)

## 2019-01-01 PROCEDURE — 82248 BILIRUBIN DIRECT: CPT

## 2019-01-01 RX ORDER — METOPROLOL SUCCINATE 100 MG/1
100 TABLET, EXTENDED RELEASE ORAL DAILY
Status: DISCONTINUED | OUTPATIENT
Start: 2019-01-01 | End: 2019-01-01 | Stop reason: HOSPADM

## 2019-01-01 RX ORDER — HYDRALAZINE HYDROCHLORIDE 50 MG/1
100 TABLET, FILM COATED ORAL 3 TIMES DAILY
Status: DISCONTINUED | OUTPATIENT
Start: 2019-01-01 | End: 2019-01-01 | Stop reason: HOSPADM

## 2019-01-01 RX ORDER — SODIUM CHLORIDE 9 MG/ML
INJECTION, SOLUTION INTRAVENOUS CONTINUOUS
Status: DISCONTINUED | OUTPATIENT
Start: 2019-01-01 | End: 2019-01-01

## 2019-01-01 RX ORDER — METOLAZONE 5 MG/1
10 TABLET ORAL ONCE
Status: COMPLETED | OUTPATIENT
Start: 2019-01-01 | End: 2019-01-01

## 2019-01-01 RX ORDER — MIDAZOLAM HYDROCHLORIDE 1 MG/ML
1 INJECTION INTRAMUSCULAR; INTRAVENOUS ONCE
Status: DISCONTINUED | OUTPATIENT
Start: 2019-01-01 | End: 2019-01-01

## 2019-01-01 RX ORDER — 0.9 % SODIUM CHLORIDE 0.9 %
250 INTRAVENOUS SOLUTION INTRAVENOUS ONCE
Status: COMPLETED | OUTPATIENT
Start: 2019-01-01 | End: 2019-01-01

## 2019-01-01 RX ORDER — ISOSORBIDE DINITRATE 20 MG/1
40 TABLET ORAL 3 TIMES DAILY
Status: DISCONTINUED | OUTPATIENT
Start: 2019-01-01 | End: 2019-01-01

## 2019-01-01 RX ORDER — POTASSIUM CHLORIDE 20 MEQ/1
40 TABLET, EXTENDED RELEASE ORAL ONCE
Status: COMPLETED | OUTPATIENT
Start: 2019-01-01 | End: 2019-01-01

## 2019-01-01 RX ORDER — DOCUSATE SODIUM 100 MG/1
200 CAPSULE, LIQUID FILLED ORAL DAILY
Status: DISCONTINUED | OUTPATIENT
Start: 2019-01-01 | End: 2019-01-01 | Stop reason: HOSPADM

## 2019-01-01 RX ORDER — FUROSEMIDE 10 MG/ML
80 INJECTION INTRAMUSCULAR; INTRAVENOUS ONCE
Status: COMPLETED | OUTPATIENT
Start: 2019-01-01 | End: 2019-01-01

## 2019-01-01 RX ORDER — METOLAZONE 5 MG/1
10 TABLET ORAL DAILY
Status: DISCONTINUED | OUTPATIENT
Start: 2019-01-01 | End: 2019-01-01

## 2019-01-01 RX ORDER — MIDAZOLAM HYDROCHLORIDE 1 MG/ML
0.5 INJECTION INTRAMUSCULAR; INTRAVENOUS ONCE
Status: COMPLETED | OUTPATIENT
Start: 2019-01-01 | End: 2019-01-01

## 2019-01-01 RX ORDER — AMLODIPINE BESYLATE 5 MG/1
5 TABLET ORAL DAILY
Status: DISCONTINUED | OUTPATIENT
Start: 2019-01-01 | End: 2019-01-01

## 2019-01-01 RX ORDER — IPRATROPIUM BROMIDE AND ALBUTEROL SULFATE 2.5; .5 MG/3ML; MG/3ML
1 SOLUTION RESPIRATORY (INHALATION) EVERY 6 HOURS PRN
Status: DISCONTINUED | OUTPATIENT
Start: 2019-01-01 | End: 2019-01-01 | Stop reason: HOSPADM

## 2019-01-01 RX ORDER — ONDANSETRON 2 MG/ML
4 INJECTION INTRAMUSCULAR; INTRAVENOUS EVERY 30 MIN PRN
Status: DISCONTINUED | OUTPATIENT
Start: 2019-01-01 | End: 2019-01-01 | Stop reason: SDUPTHER

## 2019-01-01 RX ORDER — FERROUS SULFATE 325(65) MG
325 TABLET ORAL 2 TIMES DAILY
Status: DISCONTINUED | OUTPATIENT
Start: 2019-01-01 | End: 2019-01-01

## 2019-01-01 RX ORDER — BUMETANIDE 2 MG/1
2 TABLET ORAL EVERY 8 HOURS
Qty: 30 TABLET | Refills: 3 | DISCHARGE
Start: 2019-01-01 | End: 2019-01-01

## 2019-01-01 RX ORDER — DIPHENHYDRAMINE HYDROCHLORIDE 50 MG/ML
50 INJECTION INTRAMUSCULAR; INTRAVENOUS ONCE
Status: CANCELLED | OUTPATIENT
Start: 2019-01-01

## 2019-01-01 RX ORDER — ACETAMINOPHEN 325 MG/1
650 TABLET ORAL 4 TIMES DAILY
Status: DISCONTINUED | OUTPATIENT
Start: 2019-01-01 | End: 2019-01-01 | Stop reason: HOSPADM

## 2019-01-01 RX ORDER — BUMETANIDE 0.25 MG/ML
2 INJECTION, SOLUTION INTRAMUSCULAR; INTRAVENOUS ONCE
Status: COMPLETED | OUTPATIENT
Start: 2019-01-01 | End: 2019-01-01

## 2019-01-01 RX ORDER — BUMETANIDE 2 MG/1
2 TABLET ORAL 3 TIMES DAILY
Status: ON HOLD | COMMUNITY
End: 2020-01-01 | Stop reason: HOSPADM

## 2019-01-01 RX ORDER — SODIUM CHLORIDE 9 MG/ML
INJECTION, SOLUTION INTRAVENOUS CONTINUOUS
Status: CANCELLED | OUTPATIENT
Start: 2019-01-01

## 2019-01-01 RX ORDER — IPRATROPIUM BROMIDE AND ALBUTEROL SULFATE 2.5; .5 MG/3ML; MG/3ML
1 SOLUTION RESPIRATORY (INHALATION) ONCE
Status: COMPLETED | OUTPATIENT
Start: 2019-01-01 | End: 2019-01-01

## 2019-01-01 RX ORDER — BUMETANIDE 1 MG/1
2 TABLET ORAL DAILY
Status: DISCONTINUED | OUTPATIENT
Start: 2019-01-01 | End: 2019-01-01

## 2019-01-01 RX ORDER — BUMETANIDE 1 MG/1
2 TABLET ORAL EVERY 8 HOURS
Status: DISCONTINUED | OUTPATIENT
Start: 2019-01-01 | End: 2019-01-01 | Stop reason: HOSPADM

## 2019-01-01 RX ORDER — METHYLPREDNISOLONE SODIUM SUCCINATE 125 MG/2ML
125 INJECTION, POWDER, LYOPHILIZED, FOR SOLUTION INTRAMUSCULAR; INTRAVENOUS ONCE
Status: CANCELLED | OUTPATIENT
Start: 2019-01-01

## 2019-01-01 RX ORDER — DEXTROSE MONOHYDRATE 25 G/50ML
12.5 INJECTION, SOLUTION INTRAVENOUS PRN
Status: DISCONTINUED | OUTPATIENT
Start: 2019-01-01 | End: 2019-01-01 | Stop reason: HOSPADM

## 2019-01-01 RX ORDER — FERROUS SULFATE 325(65) MG
325 TABLET ORAL
Status: DISCONTINUED | OUTPATIENT
Start: 2019-01-01 | End: 2019-01-01 | Stop reason: HOSPADM

## 2019-01-01 RX ORDER — SODIUM CHLORIDE 0.9 % (FLUSH) 0.9 %
10 SYRINGE (ML) INJECTION EVERY 12 HOURS SCHEDULED
Status: DISCONTINUED | OUTPATIENT
Start: 2019-01-01 | End: 2019-01-01 | Stop reason: HOSPADM

## 2019-01-01 RX ORDER — SODIUM CHLORIDE 450 MG/100ML
INJECTION, SOLUTION INTRAVENOUS CONTINUOUS
Status: DISCONTINUED | OUTPATIENT
Start: 2019-01-01 | End: 2019-01-01 | Stop reason: HOSPADM

## 2019-01-01 RX ORDER — WARFARIN SODIUM 2 MG/1
TABLET ORAL
Status: ON HOLD | COMMUNITY
End: 2020-01-01 | Stop reason: HOSPADM

## 2019-01-01 RX ORDER — ONDANSETRON 2 MG/ML
4 INJECTION INTRAMUSCULAR; INTRAVENOUS EVERY 6 HOURS PRN
Status: DISCONTINUED | OUTPATIENT
Start: 2019-01-01 | End: 2019-01-01 | Stop reason: HOSPADM

## 2019-01-01 RX ORDER — CLOPIDOGREL BISULFATE 75 MG/1
75 TABLET ORAL EVERY EVENING
Status: DISCONTINUED | OUTPATIENT
Start: 2019-01-01 | End: 2019-01-01 | Stop reason: HOSPADM

## 2019-01-01 RX ORDER — SODIUM CHLORIDE 0.9 % (FLUSH) 0.9 %
10 SYRINGE (ML) INJECTION PRN
Status: DISCONTINUED | OUTPATIENT
Start: 2019-01-01 | End: 2019-01-01 | Stop reason: HOSPADM

## 2019-01-01 RX ORDER — HYDROCODONE BITARTRATE AND ACETAMINOPHEN 5; 325 MG/1; MG/1
1 TABLET ORAL EVERY 6 HOURS PRN
Qty: 12 TABLET | Refills: 0 | Status: SHIPPED | OUTPATIENT
Start: 2019-01-01 | End: 2020-01-01

## 2019-01-01 RX ORDER — ASCORBIC ACID 500 MG
500 TABLET ORAL 2 TIMES DAILY
Status: DISCONTINUED | OUTPATIENT
Start: 2019-01-01 | End: 2019-01-01

## 2019-01-01 RX ORDER — IPRATROPIUM BROMIDE AND ALBUTEROL SULFATE 2.5; .5 MG/3ML; MG/3ML
1 SOLUTION RESPIRATORY (INHALATION)
Status: DISCONTINUED | OUTPATIENT
Start: 2019-01-01 | End: 2019-01-01 | Stop reason: HOSPADM

## 2019-01-01 RX ORDER — PANTOPRAZOLE SODIUM 40 MG/1
40 TABLET, DELAYED RELEASE ORAL
Status: DISCONTINUED | OUTPATIENT
Start: 2019-01-01 | End: 2019-01-01 | Stop reason: HOSPADM

## 2019-01-01 RX ORDER — WARFARIN SODIUM 2 MG/1
2 TABLET ORAL DAILY
Status: DISCONTINUED | OUTPATIENT
Start: 2019-01-01 | End: 2019-01-01 | Stop reason: DRUGHIGH

## 2019-01-01 RX ORDER — ISOSORBIDE DINITRATE 40 MG/1
40 TABLET ORAL 3 TIMES DAILY
Qty: 90 TABLET | Refills: 2 | Status: ON HOLD
Start: 2019-01-01 | End: 2020-01-01 | Stop reason: HOSPADM

## 2019-01-01 RX ORDER — WARFARIN SODIUM 1 MG/1
1 TABLET ORAL ONCE
Status: COMPLETED | OUTPATIENT
Start: 2019-01-01 | End: 2019-01-01

## 2019-01-01 RX ORDER — EPINEPHRINE 1 MG/ML
0.3 INJECTION, SOLUTION, CONCENTRATE INTRAVENOUS PRN
Status: CANCELLED | OUTPATIENT
Start: 2019-01-01

## 2019-01-01 RX ORDER — ISOSORBIDE DINITRATE 20 MG/1
40 TABLET ORAL 3 TIMES DAILY
Status: DISCONTINUED | OUTPATIENT
Start: 2019-01-01 | End: 2019-01-01 | Stop reason: HOSPADM

## 2019-01-01 RX ORDER — DIAPER,BRIEF,INFANT-TODD,DISP
EACH MISCELLANEOUS ONCE
Status: DISCONTINUED | OUTPATIENT
Start: 2019-01-01 | End: 2019-01-01 | Stop reason: HOSPADM

## 2019-01-01 RX ORDER — BISACODYL 10 MG
10 SUPPOSITORY, RECTAL RECTAL DAILY PRN
Status: DISCONTINUED | OUTPATIENT
Start: 2019-01-01 | End: 2019-01-01 | Stop reason: HOSPADM

## 2019-01-01 RX ORDER — DEXTROSE MONOHYDRATE 50 MG/ML
100 INJECTION, SOLUTION INTRAVENOUS PRN
Status: DISCONTINUED | OUTPATIENT
Start: 2019-01-01 | End: 2019-01-01 | Stop reason: HOSPADM

## 2019-01-01 RX ORDER — WARFARIN SODIUM 1 MG/1
1 TABLET ORAL ONCE
Status: DISCONTINUED | OUTPATIENT
Start: 2019-01-01 | End: 2019-01-01

## 2019-01-01 RX ORDER — PRAVASTATIN SODIUM 40 MG
40 TABLET ORAL EVERY EVENING
Status: DISCONTINUED | OUTPATIENT
Start: 2019-01-01 | End: 2019-01-01 | Stop reason: HOSPADM

## 2019-01-01 RX ORDER — ONDANSETRON 4 MG/1
4 TABLET, FILM COATED ORAL 2 TIMES DAILY
Status: DISCONTINUED | OUTPATIENT
Start: 2019-01-01 | End: 2019-01-01 | Stop reason: HOSPADM

## 2019-01-01 RX ORDER — BUMETANIDE 0.25 MG/ML
2 INJECTION, SOLUTION INTRAMUSCULAR; INTRAVENOUS EVERY 8 HOURS
Status: DISCONTINUED | OUTPATIENT
Start: 2019-01-01 | End: 2019-01-01

## 2019-01-01 RX ORDER — NITROGLYCERIN 0.4 MG/1
0.4 TABLET SUBLINGUAL EVERY 5 MIN PRN
Status: DISCONTINUED | OUTPATIENT
Start: 2019-01-01 | End: 2019-01-01 | Stop reason: HOSPADM

## 2019-01-01 RX ORDER — GUAIFENESIN 100 MG/5ML
200 SOLUTION ORAL EVERY 4 HOURS PRN
Status: DISCONTINUED | OUTPATIENT
Start: 2019-01-01 | End: 2019-01-01

## 2019-01-01 RX ORDER — NICOTINE POLACRILEX 4 MG
15 LOZENGE BUCCAL PRN
Status: DISCONTINUED | OUTPATIENT
Start: 2019-01-01 | End: 2019-01-01 | Stop reason: HOSPADM

## 2019-01-01 RX ORDER — GUAIFENESIN 600 MG/1
600 TABLET, EXTENDED RELEASE ORAL 2 TIMES DAILY
Status: DISCONTINUED | OUTPATIENT
Start: 2019-01-01 | End: 2019-01-01 | Stop reason: HOSPADM

## 2019-01-01 RX ORDER — METHYLPREDNISOLONE SODIUM SUCCINATE 40 MG/ML
40 INJECTION, POWDER, LYOPHILIZED, FOR SOLUTION INTRAMUSCULAR; INTRAVENOUS DAILY
Status: DISCONTINUED | OUTPATIENT
Start: 2019-01-01 | End: 2019-01-01

## 2019-01-01 RX ORDER — BUMETANIDE 1 MG/1
2 TABLET ORAL EVERY 8 HOURS
Status: DISCONTINUED | OUTPATIENT
Start: 2019-01-01 | End: 2019-01-01

## 2019-01-01 RX ORDER — 0.9 % SODIUM CHLORIDE 0.9 %
250 INTRAVENOUS SOLUTION INTRAVENOUS ONCE
Status: DISCONTINUED | OUTPATIENT
Start: 2019-01-01 | End: 2019-01-01 | Stop reason: HOSPADM

## 2019-01-01 RX ORDER — AMLODIPINE BESYLATE 5 MG/1
5 TABLET ORAL DAILY
Qty: 30 TABLET | Refills: 5 | Status: ON HOLD
Start: 2019-01-01 | End: 2020-01-01 | Stop reason: HOSPADM

## 2019-01-01 RX ORDER — HYDROCODONE BITARTRATE AND ACETAMINOPHEN 5; 325 MG/1; MG/1
1 TABLET ORAL EVERY 6 HOURS PRN
Status: DISCONTINUED | OUTPATIENT
Start: 2019-01-01 | End: 2019-01-01 | Stop reason: HOSPADM

## 2019-01-01 RX ORDER — NITROGLYCERIN 20 MG/100ML
5 INJECTION INTRAVENOUS CONTINUOUS
Status: DISCONTINUED | OUTPATIENT
Start: 2019-01-01 | End: 2019-01-01

## 2019-01-01 RX ADMIN — IPRATROPIUM BROMIDE AND ALBUTEROL SULFATE 1 AMPULE: .5; 3 SOLUTION RESPIRATORY (INHALATION) at 15:56

## 2019-01-01 RX ADMIN — IPRATROPIUM BROMIDE AND ALBUTEROL SULFATE 1 AMPULE: .5; 3 SOLUTION RESPIRATORY (INHALATION) at 13:06

## 2019-01-01 RX ADMIN — DOCUSATE SODIUM 200 MG: 100 CAPSULE, LIQUID FILLED ORAL at 13:18

## 2019-01-01 RX ADMIN — ACETAMINOPHEN 650 MG: 325 TABLET ORAL at 10:21

## 2019-01-01 RX ADMIN — IPRATROPIUM BROMIDE AND ALBUTEROL SULFATE 1 AMPULE: .5; 3 SOLUTION RESPIRATORY (INHALATION) at 12:28

## 2019-01-01 RX ADMIN — BUMETANIDE 2 MG: 1 TABLET ORAL at 14:31

## 2019-01-01 RX ADMIN — FERROUS SULFATE TAB 325 MG (65 MG ELEMENTAL FE) 325 MG: 325 (65 FE) TAB at 20:18

## 2019-01-01 RX ADMIN — FERRIC CARBOXYMALTOSE INJECTION 750 MG: 50 INJECTION, SOLUTION INTRAVENOUS at 13:46

## 2019-01-01 RX ADMIN — ONDANSETRON HYDROCHLORIDE 4 MG: 4 TABLET, FILM COATED ORAL at 08:33

## 2019-01-01 RX ADMIN — IPRATROPIUM BROMIDE AND ALBUTEROL SULFATE 1 AMPULE: .5; 3 SOLUTION RESPIRATORY (INHALATION) at 12:35

## 2019-01-01 RX ADMIN — HYDRALAZINE HYDROCHLORIDE 100 MG: 50 TABLET, FILM COATED ORAL at 21:11

## 2019-01-01 RX ADMIN — HYDRALAZINE HYDROCHLORIDE 100 MG: 50 TABLET, FILM COATED ORAL at 20:29

## 2019-01-01 RX ADMIN — DOCUSATE SODIUM 200 MG: 100 CAPSULE, LIQUID FILLED ORAL at 10:20

## 2019-01-01 RX ADMIN — METOLAZONE 10 MG: 5 TABLET ORAL at 17:53

## 2019-01-01 RX ADMIN — HYDRALAZINE HYDROCHLORIDE 100 MG: 50 TABLET, FILM COATED ORAL at 08:41

## 2019-01-01 RX ADMIN — ACETAMINOPHEN 650 MG: 325 TABLET ORAL at 20:29

## 2019-01-01 RX ADMIN — METOPROLOL SUCCINATE 100 MG: 100 TABLET, FILM COATED, EXTENDED RELEASE ORAL at 17:48

## 2019-01-01 RX ADMIN — ACETAMINOPHEN 650 MG: 325 TABLET ORAL at 09:42

## 2019-01-01 RX ADMIN — BUMETANIDE 2 MG: 0.25 INJECTION INTRAMUSCULAR; INTRAVENOUS at 18:55

## 2019-01-01 RX ADMIN — ACETAMINOPHEN 650 MG: 325 TABLET ORAL at 15:06

## 2019-01-01 RX ADMIN — MICONAZOLE NITRATE: 20 POWDER TOPICAL at 21:12

## 2019-01-01 RX ADMIN — FERROUS SULFATE TAB 325 MG (65 MG ELEMENTAL FE) 325 MG: 325 (65 FE) TAB at 07:26

## 2019-01-01 RX ADMIN — ISOSORBIDE DINITRATE 40 MG: 20 TABLET ORAL at 15:01

## 2019-01-01 RX ADMIN — GUAIFENESIN 600 MG: 600 TABLET, EXTENDED RELEASE ORAL at 21:11

## 2019-01-01 RX ADMIN — HYDRALAZINE HYDROCHLORIDE 100 MG: 50 TABLET, FILM COATED ORAL at 08:22

## 2019-01-01 RX ADMIN — GUAIFENESIN 600 MG: 600 TABLET, EXTENDED RELEASE ORAL at 10:31

## 2019-01-01 RX ADMIN — Medication 500 MG: at 20:29

## 2019-01-01 RX ADMIN — FERROUS SULFATE TAB 325 MG (65 MG ELEMENTAL FE) 325 MG: 325 (65 FE) TAB at 12:58

## 2019-01-01 RX ADMIN — PANTOPRAZOLE SODIUM 40 MG: 40 TABLET, DELAYED RELEASE ORAL at 05:11

## 2019-01-01 RX ADMIN — BUMETANIDE 1 MG/HR: 0.25 INJECTION INTRAMUSCULAR; INTRAVENOUS at 19:54

## 2019-01-01 RX ADMIN — BUMETANIDE 1 MG/HR: 0.25 INJECTION INTRAMUSCULAR; INTRAVENOUS at 20:40

## 2019-01-01 RX ADMIN — ISOSORBIDE DINITRATE 40 MG: 20 TABLET ORAL at 09:37

## 2019-01-01 RX ADMIN — IPRATROPIUM BROMIDE AND ALBUTEROL SULFATE 1 AMPULE: .5; 3 SOLUTION RESPIRATORY (INHALATION) at 21:29

## 2019-01-01 RX ADMIN — ACETAMINOPHEN 650 MG: 325 TABLET ORAL at 08:32

## 2019-01-01 RX ADMIN — IPRATROPIUM BROMIDE AND ALBUTEROL SULFATE 1 AMPULE: .5; 3 SOLUTION RESPIRATORY (INHALATION) at 16:47

## 2019-01-01 RX ADMIN — ISOSORBIDE DINITRATE 40 MG: 20 TABLET ORAL at 13:20

## 2019-01-01 RX ADMIN — INSULIN LISPRO 1 UNITS: 100 INJECTION, SOLUTION INTRAVENOUS; SUBCUTANEOUS at 09:42

## 2019-01-01 RX ADMIN — ONDANSETRON HYDROCHLORIDE 4 MG: 4 TABLET, FILM COATED ORAL at 21:10

## 2019-01-01 RX ADMIN — MAGNESIUM GLUCONATE 500 MG ORAL TABLET 400 MG: 500 TABLET ORAL at 09:04

## 2019-01-01 RX ADMIN — ACETAMINOPHEN 650 MG: 325 TABLET ORAL at 08:22

## 2019-01-01 RX ADMIN — PANTOPRAZOLE SODIUM 40 MG: 40 TABLET, DELAYED RELEASE ORAL at 20:08

## 2019-01-01 RX ADMIN — ACETAMINOPHEN 650 MG: 325 TABLET ORAL at 21:03

## 2019-01-01 RX ADMIN — PRAVASTATIN SODIUM 40 MG: 40 TABLET ORAL at 17:53

## 2019-01-01 RX ADMIN — ISOSORBIDE DINITRATE 40 MG: 20 TABLET ORAL at 20:31

## 2019-01-01 RX ADMIN — PRAVASTATIN SODIUM 40 MG: 40 TABLET ORAL at 20:08

## 2019-01-01 RX ADMIN — ACETAMINOPHEN 650 MG: 325 TABLET ORAL at 12:58

## 2019-01-01 RX ADMIN — FERROUS SULFATE TAB 325 MG (65 MG ELEMENTAL FE) 325 MG: 325 (65 FE) TAB at 21:00

## 2019-01-01 RX ADMIN — ACETAMINOPHEN 650 MG: 325 TABLET ORAL at 15:26

## 2019-01-01 RX ADMIN — HYDRALAZINE HYDROCHLORIDE 100 MG: 50 TABLET, FILM COATED ORAL at 20:55

## 2019-01-01 RX ADMIN — IPRATROPIUM BROMIDE AND ALBUTEROL SULFATE 1 AMPULE: .5; 3 SOLUTION RESPIRATORY (INHALATION) at 12:27

## 2019-01-01 RX ADMIN — ISOSORBIDE DINITRATE 40 MG: 20 TABLET ORAL at 20:55

## 2019-01-01 RX ADMIN — ACETAMINOPHEN 650 MG: 325 TABLET ORAL at 13:19

## 2019-01-01 RX ADMIN — ACETAMINOPHEN 650 MG: 325 TABLET ORAL at 08:41

## 2019-01-01 RX ADMIN — PRAVASTATIN SODIUM 40 MG: 40 TABLET ORAL at 16:27

## 2019-01-01 RX ADMIN — PANTOPRAZOLE SODIUM 40 MG: 40 TABLET, DELAYED RELEASE ORAL at 07:26

## 2019-01-01 RX ADMIN — BUMETANIDE 1 MG/HR: 0.25 INJECTION INTRAMUSCULAR; INTRAVENOUS at 14:36

## 2019-01-01 RX ADMIN — ISOSORBIDE DINITRATE 40 MG: 20 TABLET ORAL at 10:31

## 2019-01-01 RX ADMIN — BUMETANIDE 2 MG: 0.25 INJECTION INTRAMUSCULAR; INTRAVENOUS at 13:18

## 2019-01-01 RX ADMIN — METOPROLOL SUCCINATE 100 MG: 100 TABLET, FILM COATED, EXTENDED RELEASE ORAL at 08:22

## 2019-01-01 RX ADMIN — IPRATROPIUM BROMIDE AND ALBUTEROL SULFATE 1 AMPULE: .5; 3 SOLUTION RESPIRATORY (INHALATION) at 12:04

## 2019-01-01 RX ADMIN — HYDRALAZINE HYDROCHLORIDE 100 MG: 50 TABLET, FILM COATED ORAL at 01:51

## 2019-01-01 RX ADMIN — PANTOPRAZOLE SODIUM 40 MG: 40 TABLET, DELAYED RELEASE ORAL at 05:43

## 2019-01-01 RX ADMIN — BUMETANIDE 2 MG: 0.25 INJECTION INTRAMUSCULAR; INTRAVENOUS at 09:42

## 2019-01-01 RX ADMIN — BUMETANIDE 2 MG: 0.25 INJECTION INTRAMUSCULAR; INTRAVENOUS at 23:15

## 2019-01-01 RX ADMIN — HYDRALAZINE HYDROCHLORIDE 100 MG: 50 TABLET, FILM COATED ORAL at 14:24

## 2019-01-01 RX ADMIN — FERROUS SULFATE TAB 325 MG (65 MG ELEMENTAL FE) 325 MG: 325 (65 FE) TAB at 08:32

## 2019-01-01 RX ADMIN — PANTOPRAZOLE SODIUM 40 MG: 40 TABLET, DELAYED RELEASE ORAL at 15:26

## 2019-01-01 RX ADMIN — Medication 500 MG: at 08:32

## 2019-01-01 RX ADMIN — METOPROLOL SUCCINATE 100 MG: 100 TABLET, FILM COATED, EXTENDED RELEASE ORAL at 08:32

## 2019-01-01 RX ADMIN — IPRATROPIUM BROMIDE AND ALBUTEROL SULFATE 1 AMPULE: .5; 3 SOLUTION RESPIRATORY (INHALATION) at 07:39

## 2019-01-01 RX ADMIN — PANTOPRAZOLE SODIUM 40 MG: 40 TABLET, DELAYED RELEASE ORAL at 16:42

## 2019-01-01 RX ADMIN — BUMETANIDE 1 MG/HR: 0.25 INJECTION INTRAMUSCULAR; INTRAVENOUS at 20:38

## 2019-01-01 RX ADMIN — DOCUSATE SODIUM 200 MG: 100 CAPSULE, LIQUID FILLED ORAL at 12:59

## 2019-01-01 RX ADMIN — HYDRALAZINE HYDROCHLORIDE 100 MG: 50 TABLET, FILM COATED ORAL at 14:25

## 2019-01-01 RX ADMIN — IPRATROPIUM BROMIDE AND ALBUTEROL SULFATE 1 AMPULE: .5; 3 SOLUTION RESPIRATORY (INHALATION) at 22:02

## 2019-01-01 RX ADMIN — PRAVASTATIN SODIUM 40 MG: 40 TABLET ORAL at 15:02

## 2019-01-01 RX ADMIN — MICONAZOLE NITRATE: 20 POWDER TOPICAL at 10:32

## 2019-01-01 RX ADMIN — HYDRALAZINE HYDROCHLORIDE 100 MG: 50 TABLET, FILM COATED ORAL at 10:31

## 2019-01-01 RX ADMIN — IPRATROPIUM BROMIDE AND ALBUTEROL SULFATE 1 AMPULE: .5; 3 SOLUTION RESPIRATORY (INHALATION) at 15:53

## 2019-01-01 RX ADMIN — ONDANSETRON HYDROCHLORIDE 4 MG: 4 TABLET, FILM COATED ORAL at 20:08

## 2019-01-01 RX ADMIN — MICONAZOLE NITRATE: 20 POWDER TOPICAL at 22:59

## 2019-01-01 RX ADMIN — MAGNESIUM GLUCONATE 500 MG ORAL TABLET 400 MG: 500 TABLET ORAL at 15:01

## 2019-01-01 RX ADMIN — IPRATROPIUM BROMIDE AND ALBUTEROL SULFATE 1 AMPULE: .5; 3 SOLUTION RESPIRATORY (INHALATION) at 12:49

## 2019-01-01 RX ADMIN — IPRATROPIUM BROMIDE AND ALBUTEROL SULFATE 1 AMPULE: .5; 3 SOLUTION RESPIRATORY (INHALATION) at 09:27

## 2019-01-01 RX ADMIN — CEFEPIME HYDROCHLORIDE 1 G: 1 INJECTION, POWDER, FOR SOLUTION INTRAMUSCULAR; INTRAVENOUS at 09:41

## 2019-01-01 RX ADMIN — ACETAMINOPHEN 650 MG: 325 TABLET ORAL at 14:12

## 2019-01-01 RX ADMIN — GUAIFENESIN 600 MG: 600 TABLET, EXTENDED RELEASE ORAL at 20:55

## 2019-01-01 RX ADMIN — ISOSORBIDE DINITRATE 40 MG: 20 TABLET ORAL at 20:05

## 2019-01-01 RX ADMIN — Medication 500 MG: at 01:51

## 2019-01-01 RX ADMIN — SODIUM CHLORIDE, PRESERVATIVE FREE 10 ML: 5 INJECTION INTRAVENOUS at 20:09

## 2019-01-01 RX ADMIN — SODIUM CHLORIDE, PRESERVATIVE FREE 10 ML: 5 INJECTION INTRAVENOUS at 21:06

## 2019-01-01 RX ADMIN — HYDRALAZINE HYDROCHLORIDE 100 MG: 50 TABLET, FILM COATED ORAL at 21:12

## 2019-01-01 RX ADMIN — SALINE NASAL SPRAY 1 SPRAY: 1.5 SOLUTION NASAL at 22:14

## 2019-01-01 RX ADMIN — PANTOPRAZOLE SODIUM 40 MG: 40 TABLET, DELAYED RELEASE ORAL at 15:01

## 2019-01-01 RX ADMIN — IPRATROPIUM BROMIDE AND ALBUTEROL SULFATE 1 AMPULE: .5; 3 SOLUTION RESPIRATORY (INHALATION) at 19:40

## 2019-01-01 RX ADMIN — IPRATROPIUM BROMIDE AND ALBUTEROL SULFATE 1 AMPULE: .5; 3 SOLUTION RESPIRATORY (INHALATION) at 13:51

## 2019-01-01 RX ADMIN — ONDANSETRON 4 MG: 2 INJECTION INTRAMUSCULAR; INTRAVENOUS at 12:13

## 2019-01-01 RX ADMIN — ISOSORBIDE DINITRATE 40 MG: 20 TABLET ORAL at 09:41

## 2019-01-01 RX ADMIN — GUAIFENESIN 600 MG: 600 TABLET, EXTENDED RELEASE ORAL at 21:12

## 2019-01-01 RX ADMIN — SODIUM CHLORIDE 250 ML: 9 INJECTION, SOLUTION INTRAVENOUS at 02:33

## 2019-01-01 RX ADMIN — INSULIN LISPRO 12 UNITS: 100 INJECTION, SOLUTION INTRAVENOUS; SUBCUTANEOUS at 17:52

## 2019-01-01 RX ADMIN — ISOSORBIDE DINITRATE 40 MG: 20 TABLET ORAL at 09:04

## 2019-01-01 RX ADMIN — ACETAMINOPHEN 650 MG: 325 TABLET ORAL at 22:59

## 2019-01-01 RX ADMIN — FERRIC CARBOXYMALTOSE INJECTION 750 MG: 50 INJECTION, SOLUTION INTRAVENOUS at 15:14

## 2019-01-01 RX ADMIN — ACETAMINOPHEN 650 MG: 325 TABLET ORAL at 17:48

## 2019-01-01 RX ADMIN — ISOSORBIDE DINITRATE 40 MG: 20 TABLET ORAL at 14:31

## 2019-01-01 RX ADMIN — FERROUS SULFATE TAB 325 MG (65 MG ELEMENTAL FE) 325 MG: 325 (65 FE) TAB at 08:41

## 2019-01-01 RX ADMIN — ONDANSETRON HYDROCHLORIDE 4 MG: 4 TABLET, FILM COATED ORAL at 20:34

## 2019-01-01 RX ADMIN — HYDRALAZINE HYDROCHLORIDE 100 MG: 50 TABLET, FILM COATED ORAL at 09:41

## 2019-01-01 RX ADMIN — IPRATROPIUM BROMIDE AND ALBUTEROL SULFATE 1 AMPULE: .5; 3 SOLUTION RESPIRATORY (INHALATION) at 13:42

## 2019-01-01 RX ADMIN — CEFEPIME HYDROCHLORIDE 1 G: 1 INJECTION, POWDER, FOR SOLUTION INTRAMUSCULAR; INTRAVENOUS at 13:19

## 2019-01-01 RX ADMIN — BUMETANIDE 2 MG: 0.25 INJECTION INTRAMUSCULAR; INTRAVENOUS at 21:10

## 2019-01-01 RX ADMIN — INSULIN LISPRO 1 UNITS: 100 INJECTION, SOLUTION INTRAVENOUS; SUBCUTANEOUS at 20:09

## 2019-01-01 RX ADMIN — ISOSORBIDE DINITRATE 40 MG: 20 TABLET ORAL at 08:40

## 2019-01-01 RX ADMIN — HYDROMORPHONE HYDROCHLORIDE 0.5 MG: 1 INJECTION, SOLUTION INTRAMUSCULAR; INTRAVENOUS; SUBCUTANEOUS at 08:58

## 2019-01-01 RX ADMIN — ONDANSETRON HYDROCHLORIDE 4 MG: 4 TABLET, FILM COATED ORAL at 21:12

## 2019-01-01 RX ADMIN — PANTOPRAZOLE SODIUM 40 MG: 40 TABLET, DELAYED RELEASE ORAL at 06:50

## 2019-01-01 RX ADMIN — IPRATROPIUM BROMIDE AND ALBUTEROL SULFATE 1 AMPULE: .5; 3 SOLUTION RESPIRATORY (INHALATION) at 16:05

## 2019-01-01 RX ADMIN — ACETAMINOPHEN 650 MG: 325 TABLET ORAL at 20:30

## 2019-01-01 RX ADMIN — INSULIN LISPRO 12 UNITS: 100 INJECTION, SOLUTION INTRAVENOUS; SUBCUTANEOUS at 08:40

## 2019-01-01 RX ADMIN — SODIUM CHLORIDE, PRESERVATIVE FREE 10 ML: 5 INJECTION INTRAVENOUS at 21:16

## 2019-01-01 RX ADMIN — DOCUSATE SODIUM 200 MG: 100 CAPSULE, LIQUID FILLED ORAL at 20:08

## 2019-01-01 RX ADMIN — FERROUS SULFATE TAB 325 MG (65 MG ELEMENTAL FE) 325 MG: 325 (65 FE) TAB at 22:58

## 2019-01-01 RX ADMIN — HYDRALAZINE HYDROCHLORIDE 100 MG: 50 TABLET, FILM COATED ORAL at 20:59

## 2019-01-01 RX ADMIN — SODIUM CHLORIDE, PRESERVATIVE FREE 10 ML: 5 INJECTION INTRAVENOUS at 09:05

## 2019-01-01 RX ADMIN — FERROUS SULFATE TAB 325 MG (65 MG ELEMENTAL FE) 325 MG: 325 (65 FE) TAB at 20:55

## 2019-01-01 RX ADMIN — ONDANSETRON HYDROCHLORIDE 4 MG: 4 TABLET, FILM COATED ORAL at 20:55

## 2019-01-01 RX ADMIN — MICONAZOLE NITRATE: 20 POWDER TOPICAL at 20:29

## 2019-01-01 RX ADMIN — IPRATROPIUM BROMIDE AND ALBUTEROL SULFATE 1 AMPULE: .5; 3 SOLUTION RESPIRATORY (INHALATION) at 21:16

## 2019-01-01 RX ADMIN — CEFEPIME HYDROCHLORIDE 1 G: 1 INJECTION, POWDER, FOR SOLUTION INTRAMUSCULAR; INTRAVENOUS at 09:43

## 2019-01-01 RX ADMIN — MICONAZOLE NITRATE: 20 POWDER TOPICAL at 17:48

## 2019-01-01 RX ADMIN — IPRATROPIUM BROMIDE AND ALBUTEROL SULFATE 1 AMPULE: .5; 3 SOLUTION RESPIRATORY (INHALATION) at 10:07

## 2019-01-01 RX ADMIN — FERROUS SULFATE TAB 325 MG (65 MG ELEMENTAL FE) 325 MG: 325 (65 FE) TAB at 21:12

## 2019-01-01 RX ADMIN — FERROUS SULFATE TAB 325 MG (65 MG ELEMENTAL FE) 325 MG: 325 (65 FE) TAB at 10:32

## 2019-01-01 RX ADMIN — IPRATROPIUM BROMIDE AND ALBUTEROL SULFATE 1 AMPULE: .5; 3 SOLUTION RESPIRATORY (INHALATION) at 17:05

## 2019-01-01 RX ADMIN — PANTOPRAZOLE SODIUM 40 MG: 40 TABLET, DELAYED RELEASE ORAL at 05:35

## 2019-01-01 RX ADMIN — IPRATROPIUM BROMIDE AND ALBUTEROL SULFATE 1 AMPULE: .5; 3 SOLUTION RESPIRATORY (INHALATION) at 07:50

## 2019-01-01 RX ADMIN — ISOSORBIDE DINITRATE 40 MG: 20 TABLET ORAL at 10:20

## 2019-01-01 RX ADMIN — PANTOPRAZOLE SODIUM 40 MG: 40 TABLET, DELAYED RELEASE ORAL at 05:00

## 2019-01-01 RX ADMIN — BUMETANIDE 1 MG/HR: 0.25 INJECTION INTRAMUSCULAR; INTRAVENOUS at 00:56

## 2019-01-01 RX ADMIN — CLOPIDOGREL BISULFATE 75 MG: 75 TABLET ORAL at 17:53

## 2019-01-01 RX ADMIN — MICONAZOLE NITRATE: 20 POWDER TOPICAL at 21:06

## 2019-01-01 RX ADMIN — IPRATROPIUM BROMIDE AND ALBUTEROL SULFATE 1 AMPULE: .5; 3 SOLUTION RESPIRATORY (INHALATION) at 20:38

## 2019-01-01 RX ADMIN — ACETAMINOPHEN 650 MG: 325 TABLET ORAL at 16:42

## 2019-01-01 RX ADMIN — BUMETANIDE 2 MG: 1 TABLET ORAL at 20:33

## 2019-01-01 RX ADMIN — CLOPIDOGREL BISULFATE 75 MG: 75 TABLET ORAL at 16:42

## 2019-01-01 RX ADMIN — VANCOMYCIN HYDROCHLORIDE 1500 MG: 5 INJECTION, POWDER, LYOPHILIZED, FOR SOLUTION INTRAVENOUS at 01:57

## 2019-01-01 RX ADMIN — Medication 0.5 MG: at 17:51

## 2019-01-01 RX ADMIN — MAGNESIUM GLUCONATE 500 MG ORAL TABLET 400 MG: 500 TABLET ORAL at 09:37

## 2019-01-01 RX ADMIN — FERROUS SULFATE TAB 325 MG (65 MG ELEMENTAL FE) 325 MG: 325 (65 FE) TAB at 21:11

## 2019-01-01 RX ADMIN — FERROUS SULFATE TAB 325 MG (65 MG ELEMENTAL FE) 325 MG: 325 (65 FE) TAB at 10:21

## 2019-01-01 RX ADMIN — BUMETANIDE 2 MG: 1 TABLET ORAL at 18:03

## 2019-01-01 RX ADMIN — ACETAMINOPHEN 650 MG: 325 TABLET ORAL at 13:20

## 2019-01-01 RX ADMIN — CEFEPIME HYDROCHLORIDE 1 G: 1 INJECTION, POWDER, FOR SOLUTION INTRAMUSCULAR; INTRAVENOUS at 12:49

## 2019-01-01 RX ADMIN — GUAIFENESIN 600 MG: 600 TABLET, EXTENDED RELEASE ORAL at 20:08

## 2019-01-01 RX ADMIN — BUMETANIDE 1 MG/HR: 0.25 INJECTION INTRAMUSCULAR; INTRAVENOUS at 09:38

## 2019-01-01 RX ADMIN — ONDANSETRON 4 MG: 2 INJECTION INTRAMUSCULAR; INTRAVENOUS at 21:32

## 2019-01-01 RX ADMIN — DARBEPOETIN ALFA 40 MCG: 40 INJECTION, SOLUTION INTRAVENOUS; SUBCUTANEOUS at 20:09

## 2019-01-01 RX ADMIN — METOPROLOL SUCCINATE 100 MG: 100 TABLET, FILM COATED, EXTENDED RELEASE ORAL at 12:58

## 2019-01-01 RX ADMIN — CEFEPIME HYDROCHLORIDE 1 G: 1 INJECTION, POWDER, FOR SOLUTION INTRAMUSCULAR; INTRAVENOUS at 09:05

## 2019-01-01 RX ADMIN — ONDANSETRON HYDROCHLORIDE 4 MG: 4 TABLET, FILM COATED ORAL at 08:22

## 2019-01-01 RX ADMIN — HYDRALAZINE HYDROCHLORIDE 100 MG: 50 TABLET, FILM COATED ORAL at 22:58

## 2019-01-01 RX ADMIN — CHLOROTHIAZIDE SODIUM 500 MG: 500 INJECTION, POWDER, LYOPHILIZED, FOR SOLUTION INTRAVENOUS at 13:20

## 2019-01-01 RX ADMIN — HYDRALAZINE HYDROCHLORIDE 100 MG: 50 TABLET, FILM COATED ORAL at 09:04

## 2019-01-01 RX ADMIN — HYDRALAZINE HYDROCHLORIDE 100 MG: 50 TABLET, FILM COATED ORAL at 20:05

## 2019-01-01 RX ADMIN — ACETAMINOPHEN 650 MG: 325 TABLET ORAL at 20:08

## 2019-01-01 RX ADMIN — IPRATROPIUM BROMIDE AND ALBUTEROL SULFATE 1 AMPULE: .5; 3 SOLUTION RESPIRATORY (INHALATION) at 12:23

## 2019-01-01 RX ADMIN — ISOSORBIDE DINITRATE 40 MG: 20 TABLET ORAL at 15:26

## 2019-01-01 RX ADMIN — HYDRALAZINE HYDROCHLORIDE 100 MG: 50 TABLET, FILM COATED ORAL at 23:08

## 2019-01-01 RX ADMIN — MAGNESIUM GLUCONATE 500 MG ORAL TABLET 400 MG: 500 TABLET ORAL at 20:08

## 2019-01-01 RX ADMIN — HYDRALAZINE HYDROCHLORIDE 100 MG: 50 TABLET, FILM COATED ORAL at 13:20

## 2019-01-01 RX ADMIN — HYDRALAZINE HYDROCHLORIDE 100 MG: 50 TABLET, FILM COATED ORAL at 14:31

## 2019-01-01 RX ADMIN — ISOSORBIDE DINITRATE 40 MG: 20 TABLET ORAL at 16:27

## 2019-01-01 RX ADMIN — BUMETANIDE 2 MG: 0.25 INJECTION INTRAMUSCULAR; INTRAVENOUS at 21:12

## 2019-01-01 RX ADMIN — METOPROLOL SUCCINATE 100 MG: 100 TABLET, FILM COATED, EXTENDED RELEASE ORAL at 10:20

## 2019-01-01 RX ADMIN — PANTOPRAZOLE SODIUM 40 MG: 40 TABLET, DELAYED RELEASE ORAL at 17:52

## 2019-01-01 RX ADMIN — ISOSORBIDE DINITRATE 40 MG: 20 TABLET ORAL at 21:11

## 2019-01-01 RX ADMIN — DOCUSATE SODIUM 200 MG: 100 CAPSULE, LIQUID FILLED ORAL at 09:41

## 2019-01-01 RX ADMIN — BUMETANIDE 2 MG: 0.25 INJECTION INTRAMUSCULAR; INTRAVENOUS at 01:49

## 2019-01-01 RX ADMIN — DOCUSATE SODIUM 200 MG: 100 CAPSULE, LIQUID FILLED ORAL at 09:38

## 2019-01-01 RX ADMIN — IPRATROPIUM BROMIDE AND ALBUTEROL SULFATE 1 AMPULE: .5; 3 SOLUTION RESPIRATORY (INHALATION) at 17:21

## 2019-01-01 RX ADMIN — METOPROLOL SUCCINATE 100 MG: 100 TABLET, FILM COATED, EXTENDED RELEASE ORAL at 09:04

## 2019-01-01 RX ADMIN — FERROUS SULFATE TAB 325 MG (65 MG ELEMENTAL FE) 325 MG: 325 (65 FE) TAB at 01:51

## 2019-01-01 RX ADMIN — DOCUSATE SODIUM 200 MG: 100 CAPSULE, LIQUID FILLED ORAL at 08:32

## 2019-01-01 RX ADMIN — PANTOPRAZOLE SODIUM 40 MG: 40 TABLET, DELAYED RELEASE ORAL at 06:52

## 2019-01-01 RX ADMIN — ISOSORBIDE DINITRATE 40 MG: 20 TABLET ORAL at 20:18

## 2019-01-01 RX ADMIN — SODIUM CHLORIDE, PRESERVATIVE FREE 10 ML: 5 INJECTION INTRAVENOUS at 20:24

## 2019-01-01 RX ADMIN — DOCUSATE SODIUM 200 MG: 100 CAPSULE, LIQUID FILLED ORAL at 08:41

## 2019-01-01 RX ADMIN — ACETAMINOPHEN 650 MG: 325 TABLET ORAL at 17:53

## 2019-01-01 RX ADMIN — INSULIN LISPRO 12 UNITS: 100 INJECTION, SOLUTION INTRAVENOUS; SUBCUTANEOUS at 08:56

## 2019-01-01 RX ADMIN — MICONAZOLE NITRATE: 20 POWDER TOPICAL at 20:19

## 2019-01-01 RX ADMIN — BUMETANIDE 2 MG: 0.25 INJECTION INTRAMUSCULAR; INTRAVENOUS at 02:36

## 2019-01-01 RX ADMIN — Medication 10 ML: at 02:08

## 2019-01-01 RX ADMIN — HYDRALAZINE HYDROCHLORIDE 100 MG: 50 TABLET, FILM COATED ORAL at 10:20

## 2019-01-01 RX ADMIN — PRAVASTATIN SODIUM 40 MG: 40 TABLET ORAL at 16:42

## 2019-01-01 RX ADMIN — ONDANSETRON HYDROCHLORIDE 4 MG: 4 TABLET, FILM COATED ORAL at 20:29

## 2019-01-01 RX ADMIN — ISOSORBIDE DINITRATE 40 MG: 20 TABLET ORAL at 17:16

## 2019-01-01 RX ADMIN — ISOSORBIDE DINITRATE 40 MG: 20 TABLET ORAL at 08:22

## 2019-01-01 RX ADMIN — INSULIN LISPRO 1 UNITS: 100 INJECTION, SOLUTION INTRAVENOUS; SUBCUTANEOUS at 17:01

## 2019-01-01 RX ADMIN — GUAIFENESIN 600 MG: 600 TABLET, EXTENDED RELEASE ORAL at 09:41

## 2019-01-01 RX ADMIN — METOLAZONE 10 MG: 5 TABLET ORAL at 15:01

## 2019-01-01 RX ADMIN — MICONAZOLE NITRATE: 20 POWDER TOPICAL at 12:58

## 2019-01-01 RX ADMIN — ACETAMINOPHEN 650 MG: 325 TABLET ORAL at 21:12

## 2019-01-01 RX ADMIN — SODIUM CHLORIDE, PRESERVATIVE FREE 10 ML: 5 INJECTION INTRAVENOUS at 09:43

## 2019-01-01 RX ADMIN — PANTOPRAZOLE SODIUM 40 MG: 40 TABLET, DELAYED RELEASE ORAL at 05:06

## 2019-01-01 RX ADMIN — DOXYCYCLINE 100 MG: 100 INJECTION, POWDER, LYOPHILIZED, FOR SOLUTION INTRAVENOUS at 02:07

## 2019-01-01 RX ADMIN — PANTOPRAZOLE SODIUM 40 MG: 40 TABLET, DELAYED RELEASE ORAL at 17:16

## 2019-01-01 RX ADMIN — PRAVASTATIN SODIUM 40 MG: 40 TABLET ORAL at 17:48

## 2019-01-01 RX ADMIN — BUMETANIDE 2 MG: 1 TABLET ORAL at 07:26

## 2019-01-01 RX ADMIN — METOPROLOL SUCCINATE 100 MG: 100 TABLET, FILM COATED, EXTENDED RELEASE ORAL at 08:40

## 2019-01-01 RX ADMIN — INSULIN LISPRO 12 UNITS: 100 INJECTION, SOLUTION INTRAVENOUS; SUBCUTANEOUS at 17:53

## 2019-01-01 RX ADMIN — GUAIFENESIN 600 MG: 600 TABLET, EXTENDED RELEASE ORAL at 08:22

## 2019-01-01 RX ADMIN — ONDANSETRON HYDROCHLORIDE 4 MG: 4 TABLET, FILM COATED ORAL at 09:38

## 2019-01-01 RX ADMIN — IPRATROPIUM BROMIDE AND ALBUTEROL SULFATE 1 AMPULE: .5; 3 SOLUTION RESPIRATORY (INHALATION) at 21:00

## 2019-01-01 RX ADMIN — ONDANSETRON HYDROCHLORIDE 4 MG: 4 TABLET, FILM COATED ORAL at 20:19

## 2019-01-01 RX ADMIN — MICONAZOLE NITRATE: 20 POWDER TOPICAL at 08:23

## 2019-01-01 RX ADMIN — IPRATROPIUM BROMIDE AND ALBUTEROL SULFATE 1 AMPULE: .5; 3 SOLUTION RESPIRATORY (INHALATION) at 08:55

## 2019-01-01 RX ADMIN — SODIUM CHLORIDE, PRESERVATIVE FREE 10 ML: 5 INJECTION INTRAVENOUS at 08:24

## 2019-01-01 RX ADMIN — METOPROLOL SUCCINATE 100 MG: 100 TABLET, FILM COATED, EXTENDED RELEASE ORAL at 14:35

## 2019-01-01 RX ADMIN — FERROUS SULFATE TAB 325 MG (65 MG ELEMENTAL FE) 325 MG: 325 (65 FE) TAB at 08:22

## 2019-01-01 RX ADMIN — FERROUS SULFATE TAB 325 MG (65 MG ELEMENTAL FE) 325 MG: 325 (65 FE) TAB at 20:05

## 2019-01-01 RX ADMIN — IPRATROPIUM BROMIDE AND ALBUTEROL SULFATE 1 AMPULE: .5; 3 SOLUTION RESPIRATORY (INHALATION) at 15:39

## 2019-01-01 RX ADMIN — IPRATROPIUM BROMIDE AND ALBUTEROL SULFATE 1 AMPULE: .5; 3 SOLUTION RESPIRATORY (INHALATION) at 21:09

## 2019-01-01 RX ADMIN — BUMETANIDE 1 MG/HR: 0.25 INJECTION INTRAMUSCULAR; INTRAVENOUS at 10:42

## 2019-01-01 RX ADMIN — BUMETANIDE 2 MG: 1 TABLET ORAL at 23:08

## 2019-01-01 RX ADMIN — FUROSEMIDE 80 MG: 10 INJECTION INTRAMUSCULAR; INTRAVENOUS at 10:54

## 2019-01-01 RX ADMIN — IPRATROPIUM BROMIDE AND ALBUTEROL SULFATE 1 AMPULE: .5; 3 SOLUTION RESPIRATORY (INHALATION) at 20:08

## 2019-01-01 RX ADMIN — ACETAMINOPHEN 650 MG: 325 TABLET ORAL at 20:18

## 2019-01-01 RX ADMIN — ONDANSETRON HYDROCHLORIDE 4 MG: 4 TABLET, FILM COATED ORAL at 12:58

## 2019-01-01 RX ADMIN — BUMETANIDE 2 MG: 0.25 INJECTION INTRAMUSCULAR; INTRAVENOUS at 12:59

## 2019-01-01 RX ADMIN — MICONAZOLE NITRATE: 20 POWDER TOPICAL at 14:35

## 2019-01-01 RX ADMIN — BUMETANIDE 2 MG: 1 TABLET ORAL at 15:01

## 2019-01-01 RX ADMIN — PANTOPRAZOLE SODIUM 40 MG: 40 TABLET, DELAYED RELEASE ORAL at 17:45

## 2019-01-01 RX ADMIN — IPRATROPIUM BROMIDE AND ALBUTEROL SULFATE 1 AMPULE: .5; 3 SOLUTION RESPIRATORY (INHALATION) at 09:36

## 2019-01-01 RX ADMIN — MICONAZOLE NITRATE: 20 POWDER TOPICAL at 20:11

## 2019-01-01 RX ADMIN — ONDANSETRON HYDROCHLORIDE 4 MG: 4 TABLET, FILM COATED ORAL at 21:05

## 2019-01-01 RX ADMIN — POTASSIUM CHLORIDE 40 MEQ: 20 TABLET, EXTENDED RELEASE ORAL at 10:54

## 2019-01-01 RX ADMIN — METOPROLOL SUCCINATE 100 MG: 100 TABLET, FILM COATED, EXTENDED RELEASE ORAL at 13:19

## 2019-01-01 RX ADMIN — Medication 10 ML: at 02:39

## 2019-01-01 RX ADMIN — PANTOPRAZOLE SODIUM 40 MG: 40 TABLET, DELAYED RELEASE ORAL at 05:16

## 2019-01-01 RX ADMIN — MICONAZOLE NITRATE: 20 POWDER TOPICAL at 09:49

## 2019-01-01 RX ADMIN — SODIUM CHLORIDE, PRESERVATIVE FREE 10 ML: 5 INJECTION INTRAVENOUS at 20:55

## 2019-01-01 RX ADMIN — HYDRALAZINE HYDROCHLORIDE 100 MG: 50 TABLET, FILM COATED ORAL at 15:26

## 2019-01-01 RX ADMIN — IPRATROPIUM BROMIDE AND ALBUTEROL SULFATE 1 AMPULE: .5; 3 SOLUTION RESPIRATORY (INHALATION) at 16:06

## 2019-01-01 RX ADMIN — IPRATROPIUM BROMIDE AND ALBUTEROL SULFATE 1 AMPULE: .5; 3 SOLUTION RESPIRATORY (INHALATION) at 21:36

## 2019-01-01 RX ADMIN — ISOSORBIDE DINITRATE 40 MG: 20 TABLET ORAL at 21:12

## 2019-01-01 RX ADMIN — PRAVASTATIN SODIUM 40 MG: 40 TABLET ORAL at 17:16

## 2019-01-01 RX ADMIN — CHLOROTHIAZIDE SODIUM 500 MG: 500 INJECTION, POWDER, LYOPHILIZED, FOR SOLUTION INTRAVENOUS at 01:01

## 2019-01-01 RX ADMIN — MAGNESIUM GLUCONATE 500 MG ORAL TABLET 400 MG: 500 TABLET ORAL at 20:30

## 2019-01-01 RX ADMIN — ISOSORBIDE DINITRATE 40 MG: 20 TABLET ORAL at 23:08

## 2019-01-01 RX ADMIN — MIDAZOLAM 0.5 MG: 1 INJECTION INTRAMUSCULAR; INTRAVENOUS at 08:58

## 2019-01-01 RX ADMIN — BUMETANIDE 1 MG/HR: 0.25 INJECTION INTRAMUSCULAR; INTRAVENOUS at 10:15

## 2019-01-01 RX ADMIN — PANTOPRAZOLE SODIUM 40 MG: 40 TABLET, DELAYED RELEASE ORAL at 16:27

## 2019-01-01 RX ADMIN — HYDRALAZINE HYDROCHLORIDE 100 MG: 50 TABLET, FILM COATED ORAL at 20:34

## 2019-01-01 RX ADMIN — ACETAMINOPHEN 650 MG: 325 TABLET ORAL at 14:35

## 2019-01-01 RX ADMIN — ISOSORBIDE DINITRATE 40 MG: 20 TABLET ORAL at 22:59

## 2019-01-01 RX ADMIN — BUMETANIDE 1 MG/HR: 0.25 INJECTION INTRAMUSCULAR; INTRAVENOUS at 04:30

## 2019-01-01 RX ADMIN — ONDANSETRON HYDROCHLORIDE 4 MG: 4 TABLET, FILM COATED ORAL at 09:41

## 2019-01-01 RX ADMIN — Medication 10 ML: at 13:20

## 2019-01-01 RX ADMIN — METOPROLOL SUCCINATE 100 MG: 100 TABLET, FILM COATED, EXTENDED RELEASE ORAL at 09:37

## 2019-01-01 RX ADMIN — BUMETANIDE 2 MG: 0.25 INJECTION INTRAMUSCULAR; INTRAVENOUS at 08:22

## 2019-01-01 RX ADMIN — INSULIN LISPRO 1 UNITS: 100 INJECTION, SOLUTION INTRAVENOUS; SUBCUTANEOUS at 21:11

## 2019-01-01 RX ADMIN — ACETAMINOPHEN 650 MG: 325 TABLET ORAL at 14:24

## 2019-01-01 RX ADMIN — ONDANSETRON 4 MG: 2 INJECTION INTRAMUSCULAR; INTRAVENOUS at 14:42

## 2019-01-01 RX ADMIN — SODIUM CHLORIDE 50000 UNITS: 0.9 IRRIGANT IRRIGATION at 09:10

## 2019-01-01 RX ADMIN — INSULIN LISPRO 2 UNITS: 100 INJECTION, SOLUTION INTRAVENOUS; SUBCUTANEOUS at 18:28

## 2019-01-01 RX ADMIN — FERROUS SULFATE TAB 325 MG (65 MG ELEMENTAL FE) 325 MG: 325 (65 FE) TAB at 20:29

## 2019-01-01 RX ADMIN — ACETAMINOPHEN 650 MG: 325 TABLET ORAL at 09:38

## 2019-01-01 RX ADMIN — SODIUM CHLORIDE, PRESERVATIVE FREE 10 ML: 5 INJECTION INTRAVENOUS at 21:11

## 2019-01-01 RX ADMIN — PANTOPRAZOLE SODIUM 40 MG: 40 TABLET, DELAYED RELEASE ORAL at 17:53

## 2019-01-01 RX ADMIN — GUAIFENESIN 600 MG: 600 TABLET, EXTENDED RELEASE ORAL at 09:04

## 2019-01-01 RX ADMIN — INSULIN LISPRO 1 UNITS: 100 INJECTION, SOLUTION INTRAVENOUS; SUBCUTANEOUS at 20:30

## 2019-01-01 RX ADMIN — GUAIFENESIN 600 MG: 600 TABLET, EXTENDED RELEASE ORAL at 12:58

## 2019-01-01 RX ADMIN — HYDRALAZINE HYDROCHLORIDE 100 MG: 50 TABLET, FILM COATED ORAL at 09:38

## 2019-01-01 RX ADMIN — GUAIFENESIN 600 MG: 600 TABLET, EXTENDED RELEASE ORAL at 20:30

## 2019-01-01 RX ADMIN — SODIUM CHLORIDE, PRESERVATIVE FREE 10 ML: 5 INJECTION INTRAVENOUS at 12:17

## 2019-01-01 RX ADMIN — SODIUM CHLORIDE, PRESERVATIVE FREE 10 ML: 5 INJECTION INTRAVENOUS at 09:53

## 2019-01-01 RX ADMIN — DARBEPOETIN ALFA 40 MCG: 40 INJECTION, SOLUTION INTRAVENOUS; SUBCUTANEOUS at 17:52

## 2019-01-01 RX ADMIN — INSULIN LISPRO 1 UNITS: 100 INJECTION, SOLUTION INTRAVENOUS; SUBCUTANEOUS at 17:17

## 2019-01-01 RX ADMIN — HYDRALAZINE HYDROCHLORIDE 100 MG: 50 TABLET, FILM COATED ORAL at 16:27

## 2019-01-01 RX ADMIN — VANCOMYCIN HYDROCHLORIDE 1500 MG: 5 INJECTION, POWDER, LYOPHILIZED, FOR SOLUTION INTRAVENOUS at 13:15

## 2019-01-01 RX ADMIN — DOCUSATE SODIUM 200 MG: 100 CAPSULE, LIQUID FILLED ORAL at 08:22

## 2019-01-01 RX ADMIN — ONDANSETRON HYDROCHLORIDE 4 MG: 4 TABLET, FILM COATED ORAL at 10:31

## 2019-01-01 RX ADMIN — BUMETANIDE 2 MG: 0.25 INJECTION INTRAMUSCULAR; INTRAVENOUS at 01:03

## 2019-01-01 RX ADMIN — IPRATROPIUM BROMIDE AND ALBUTEROL SULFATE 1 AMPULE: .5; 3 SOLUTION RESPIRATORY (INHALATION) at 07:57

## 2019-01-01 RX ADMIN — SODIUM CHLORIDE, PRESERVATIVE FREE 10 ML: 5 INJECTION INTRAVENOUS at 10:32

## 2019-01-01 RX ADMIN — SODIUM CHLORIDE, PRESERVATIVE FREE 10 ML: 5 INJECTION INTRAVENOUS at 20:35

## 2019-01-01 RX ADMIN — HYDRALAZINE HYDROCHLORIDE 100 MG: 50 TABLET, FILM COATED ORAL at 18:04

## 2019-01-01 RX ADMIN — GUAIFENESIN 600 MG: 600 TABLET, EXTENDED RELEASE ORAL at 09:38

## 2019-01-01 RX ADMIN — BUMETANIDE 2 MG: 0.25 INJECTION INTRAMUSCULAR; INTRAVENOUS at 15:31

## 2019-01-01 RX ADMIN — PRAVASTATIN SODIUM 40 MG: 40 TABLET ORAL at 21:01

## 2019-01-01 RX ADMIN — MICONAZOLE NITRATE: 20 POWDER TOPICAL at 08:42

## 2019-01-01 RX ADMIN — HYDRALAZINE HYDROCHLORIDE 100 MG: 50 TABLET, FILM COATED ORAL at 15:01

## 2019-01-01 RX ADMIN — ACETAMINOPHEN 650 MG: 325 TABLET ORAL at 21:11

## 2019-01-01 RX ADMIN — IPRATROPIUM BROMIDE AND ALBUTEROL SULFATE 1 AMPULE: .5; 3 SOLUTION RESPIRATORY (INHALATION) at 17:38

## 2019-01-01 RX ADMIN — ACETAMINOPHEN 650 MG: 325 TABLET ORAL at 17:51

## 2019-01-01 RX ADMIN — HYDRALAZINE HYDROCHLORIDE 100 MG: 50 TABLET, FILM COATED ORAL at 20:18

## 2019-01-01 RX ADMIN — IPRATROPIUM BROMIDE AND ALBUTEROL SULFATE 1 AMPULE: .5; 3 SOLUTION RESPIRATORY (INHALATION) at 15:50

## 2019-01-01 RX ADMIN — FERROUS SULFATE TAB 325 MG (65 MG ELEMENTAL FE) 325 MG: 325 (65 FE) TAB at 09:38

## 2019-01-01 RX ADMIN — METOLAZONE 10 MG: 5 TABLET ORAL at 12:58

## 2019-01-01 RX ADMIN — WARFARIN SODIUM 1 MG: 1 TABLET ORAL at 17:53

## 2019-01-01 RX ADMIN — FERROUS SULFATE TAB 325 MG (65 MG ELEMENTAL FE) 325 MG: 325 (65 FE) TAB at 09:41

## 2019-01-01 RX ADMIN — ISOSORBIDE DINITRATE 40 MG: 20 TABLET ORAL at 20:59

## 2019-01-01 RX ADMIN — SODIUM CHLORIDE 250 ML: 9 INJECTION, SOLUTION INTRAVENOUS at 17:49

## 2019-01-01 RX ADMIN — DARBEPOETIN ALFA 40 MCG: 40 INJECTION, SOLUTION INTRAVENOUS; SUBCUTANEOUS at 13:20

## 2019-01-01 RX ADMIN — ACETAMINOPHEN 650 MG: 325 TABLET ORAL at 02:02

## 2019-01-01 RX ADMIN — HYDRALAZINE HYDROCHLORIDE 100 MG: 50 TABLET, FILM COATED ORAL at 17:16

## 2019-01-01 RX ADMIN — BUMETANIDE 2 MG: 1 TABLET ORAL at 05:34

## 2019-01-01 RX ADMIN — ISOSORBIDE DINITRATE 40 MG: 20 TABLET ORAL at 14:24

## 2019-01-01 RX ADMIN — MICONAZOLE NITRATE: 20 POWDER TOPICAL at 09:39

## 2019-01-01 RX ADMIN — HYDRALAZINE HYDROCHLORIDE 100 MG: 50 TABLET, FILM COATED ORAL at 08:32

## 2019-01-01 RX ADMIN — ISOSORBIDE DINITRATE 40 MG: 20 TABLET ORAL at 18:04

## 2019-01-01 RX ADMIN — METHYLPREDNISOLONE SODIUM SUCCINATE 40 MG: 40 INJECTION, POWDER, FOR SOLUTION INTRAMUSCULAR; INTRAVENOUS at 02:01

## 2019-01-01 RX ADMIN — DOCUSATE SODIUM 200 MG: 100 CAPSULE, LIQUID FILLED ORAL at 17:45

## 2019-01-01 RX ADMIN — IPRATROPIUM BROMIDE AND ALBUTEROL SULFATE 1 AMPULE: .5; 3 SOLUTION RESPIRATORY (INHALATION) at 08:22

## 2019-01-01 RX ADMIN — CLOPIDOGREL BISULFATE 75 MG: 75 TABLET ORAL at 17:51

## 2019-01-01 RX ADMIN — PRAVASTATIN SODIUM 40 MG: 40 TABLET ORAL at 17:52

## 2019-01-01 RX ADMIN — BUMETANIDE 2 MG: 0.25 INJECTION INTRAMUSCULAR; INTRAVENOUS at 01:58

## 2019-01-01 RX ADMIN — PRAVASTATIN SODIUM 40 MG: 40 TABLET ORAL at 15:29

## 2019-01-01 RX ADMIN — MICONAZOLE NITRATE: 20 POWDER TOPICAL at 10:22

## 2019-01-01 RX ADMIN — MICONAZOLE NITRATE: 20 POWDER TOPICAL at 20:05

## 2019-01-01 RX ADMIN — METOPROLOL SUCCINATE 100 MG: 100 TABLET, FILM COATED, EXTENDED RELEASE ORAL at 09:41

## 2019-01-01 RX ADMIN — IPRATROPIUM BROMIDE AND ALBUTEROL SULFATE 1 AMPULE: .5; 3 SOLUTION RESPIRATORY (INHALATION) at 20:50

## 2019-01-01 RX ADMIN — CEFEPIME HYDROCHLORIDE 1 G: 1 INJECTION, POWDER, FOR SOLUTION INTRAMUSCULAR; INTRAVENOUS at 08:22

## 2019-01-01 RX ADMIN — NITROGLYCERIN 5 MCG/MIN: 20 INJECTION INTRAVENOUS at 02:07

## 2019-01-01 RX ADMIN — MICONAZOLE NITRATE: 20 POWDER TOPICAL at 20:55

## 2019-01-01 ASSESSMENT — PAIN SCALES - GENERAL
PAINLEVEL_OUTOF10: 0
PAINLEVEL_OUTOF10: 3
PAINLEVEL_OUTOF10: 0
PAINLEVEL_OUTOF10: 3
PAINLEVEL_OUTOF10: 0
PAINLEVEL_OUTOF10: 1
PAINLEVEL_OUTOF10: 0
PAINLEVEL_OUTOF10: 1
PAINLEVEL_OUTOF10: 0

## 2019-01-01 ASSESSMENT — ENCOUNTER SYMPTOMS
CHEST TIGHTNESS: 0
COLOR CHANGE: 0
SHORTNESS OF BREATH: 0
CHEST TIGHTNESS: 0
APNEA: 0
NAUSEA: 0
SHORTNESS OF BREATH: 1
COLOR CHANGE: 0
ABDOMINAL PAIN: 0
ABDOMINAL DISTENTION: 0
NAUSEA: 0
COUGH: 0
APNEA: 0
WHEEZING: 0
CONSTIPATION: 1
ABDOMINAL DISTENTION: 0
ABDOMINAL PAIN: 0
WHEEZING: 0
WHEEZING: 1
ABDOMINAL PAIN: 0
SHORTNESS OF BREATH: 1
COUGH: 0

## 2019-01-01 ASSESSMENT — PAIN - FUNCTIONAL ASSESSMENT
PAIN_FUNCTIONAL_ASSESSMENT: 0-10
PAIN_FUNCTIONAL_ASSESSMENT: 0-10

## 2019-01-08 LAB
BASOPHILS ABSOLUTE: ABNORMAL /ΜL
BASOPHILS RELATIVE PERCENT: ABNORMAL %
BUN BLDV-MCNC: 41 MG/DL
CALCIUM SERPL-MCNC: 9.2 MG/DL
CHLORIDE BLD-SCNC: 106 MMOL/L
CO2: 27 MMOL/L
CREAT SERPL-MCNC: 2.3 MG/DL
EOSINOPHILS ABSOLUTE: ABNORMAL /ΜL
EOSINOPHILS RELATIVE PERCENT: ABNORMAL %
GFR CALCULATED: 20
GLUCOSE BLD-MCNC: 117 MG/DL
HCT VFR BLD CALC: 31.5 % (ref 36–46)
HEMOGLOBIN: 10.1 G/DL (ref 12–16)
LYMPHOCYTES ABSOLUTE: ABNORMAL /ΜL
LYMPHOCYTES RELATIVE PERCENT: ABNORMAL %
MCH RBC QN AUTO: ABNORMAL PG
MCHC RBC AUTO-ENTMCNC: ABNORMAL G/DL
MCV RBC AUTO: ABNORMAL FL
MONOCYTES ABSOLUTE: ABNORMAL /ΜL
MONOCYTES RELATIVE PERCENT: ABNORMAL %
NEUTROPHILS ABSOLUTE: ABNORMAL /ΜL
NEUTROPHILS RELATIVE PERCENT: ABNORMAL %
PLATELET # BLD: 195 K/ΜL
PMV BLD AUTO: ABNORMAL FL
POTASSIUM SERPL-SCNC: 5.3 MMOL/L
RBC # BLD: 3.62 10^6/ΜL
SODIUM BLD-SCNC: 141 MMOL/L
WBC # BLD: 6.7 10^3/ML

## 2019-01-09 ENCOUNTER — PROCEDURE VISIT (OUTPATIENT)
Dept: CARDIOLOGY CLINIC | Age: 84
End: 2019-01-09
Payer: MEDICARE

## 2019-01-09 DIAGNOSIS — Z95.810 BIVENTRICULAR IMPLANTABLE CARDIOVERTER-DEFIBRILLATOR IN SITU: Primary | Chronic | ICD-10-CM

## 2019-01-09 PROCEDURE — 93296 REM INTERROG EVL PM/IDS: CPT | Performed by: INTERNAL MEDICINE

## 2019-01-09 PROCEDURE — 93295 DEV INTERROG REMOTE 1/2/MLT: CPT | Performed by: INTERNAL MEDICINE

## 2019-01-10 ENCOUNTER — OFFICE VISIT (OUTPATIENT)
Dept: NEPHROLOGY | Age: 84
End: 2019-01-10
Payer: MEDICARE

## 2019-01-10 ENCOUNTER — TELEPHONE (OUTPATIENT)
Dept: NEPHROLOGY | Age: 84
End: 2019-01-10

## 2019-01-10 VITALS
DIASTOLIC BLOOD PRESSURE: 66 MMHG | HEART RATE: 75 BPM | WEIGHT: 255 LBS | OXYGEN SATURATION: 94 % | HEIGHT: 66 IN | RESPIRATION RATE: 18 BRPM | BODY MASS INDEX: 40.98 KG/M2 | SYSTOLIC BLOOD PRESSURE: 140 MMHG

## 2019-01-10 DIAGNOSIS — N18.30 CKD (CHRONIC KIDNEY DISEASE), STAGE III (HCC): Primary | ICD-10-CM

## 2019-01-10 PROCEDURE — 99213 OFFICE O/P EST LOW 20 MIN: CPT | Performed by: INTERNAL MEDICINE

## 2019-01-10 PROCEDURE — 1090F PRES/ABSN URINE INCON ASSESS: CPT | Performed by: INTERNAL MEDICINE

## 2019-01-10 PROCEDURE — G8399 PT W/DXA RESULTS DOCUMENT: HCPCS | Performed by: INTERNAL MEDICINE

## 2019-01-10 PROCEDURE — 1036F TOBACCO NON-USER: CPT | Performed by: INTERNAL MEDICINE

## 2019-01-10 PROCEDURE — G8427 DOCREV CUR MEDS BY ELIG CLIN: HCPCS | Performed by: INTERNAL MEDICINE

## 2019-01-10 PROCEDURE — 1123F ACP DISCUSS/DSCN MKR DOCD: CPT | Performed by: INTERNAL MEDICINE

## 2019-01-10 PROCEDURE — G8484 FLU IMMUNIZE NO ADMIN: HCPCS | Performed by: INTERNAL MEDICINE

## 2019-01-10 PROCEDURE — 4040F PNEUMOC VAC/ADMIN/RCVD: CPT | Performed by: INTERNAL MEDICINE

## 2019-01-10 PROCEDURE — 1101F PT FALLS ASSESS-DOCD LE1/YR: CPT | Performed by: INTERNAL MEDICINE

## 2019-01-10 PROCEDURE — G8598 ASA/ANTIPLAT THER USED: HCPCS | Performed by: INTERNAL MEDICINE

## 2019-01-10 PROCEDURE — G8417 CALC BMI ABV UP PARAM F/U: HCPCS | Performed by: INTERNAL MEDICINE

## 2019-01-23 ENCOUNTER — OFFICE VISIT (OUTPATIENT)
Dept: CARDIOLOGY CLINIC | Age: 84
End: 2019-01-23
Payer: MEDICARE

## 2019-01-23 VITALS
OXYGEN SATURATION: 93 % | BODY MASS INDEX: 41 KG/M2 | DIASTOLIC BLOOD PRESSURE: 60 MMHG | HEART RATE: 70 BPM | SYSTOLIC BLOOD PRESSURE: 122 MMHG | WEIGHT: 254 LBS

## 2019-01-23 DIAGNOSIS — I50.32 CHF (CONGESTIVE HEART FAILURE), NYHA CLASS II, CHRONIC, DIASTOLIC (HCC): Primary | ICD-10-CM

## 2019-01-23 PROCEDURE — G8598 ASA/ANTIPLAT THER USED: HCPCS | Performed by: NURSE PRACTITIONER

## 2019-01-23 PROCEDURE — G8484 FLU IMMUNIZE NO ADMIN: HCPCS | Performed by: NURSE PRACTITIONER

## 2019-01-23 PROCEDURE — 1090F PRES/ABSN URINE INCON ASSESS: CPT | Performed by: NURSE PRACTITIONER

## 2019-01-23 PROCEDURE — 1036F TOBACCO NON-USER: CPT | Performed by: NURSE PRACTITIONER

## 2019-01-23 PROCEDURE — 1123F ACP DISCUSS/DSCN MKR DOCD: CPT | Performed by: NURSE PRACTITIONER

## 2019-01-23 PROCEDURE — G8417 CALC BMI ABV UP PARAM F/U: HCPCS | Performed by: NURSE PRACTITIONER

## 2019-01-23 PROCEDURE — G8399 PT W/DXA RESULTS DOCUMENT: HCPCS | Performed by: NURSE PRACTITIONER

## 2019-01-23 PROCEDURE — 99213 OFFICE O/P EST LOW 20 MIN: CPT | Performed by: NURSE PRACTITIONER

## 2019-01-23 PROCEDURE — G8427 DOCREV CUR MEDS BY ELIG CLIN: HCPCS | Performed by: NURSE PRACTITIONER

## 2019-01-23 PROCEDURE — 1101F PT FALLS ASSESS-DOCD LE1/YR: CPT | Performed by: NURSE PRACTITIONER

## 2019-01-23 PROCEDURE — 4040F PNEUMOC VAC/ADMIN/RCVD: CPT | Performed by: NURSE PRACTITIONER

## 2019-01-23 RX ORDER — GUAIFENESIN 600 MG/1
600 TABLET, EXTENDED RELEASE ORAL 2 TIMES DAILY
Status: ON HOLD | COMMUNITY
End: 2019-03-30 | Stop reason: HOSPADM

## 2019-01-23 ASSESSMENT — ENCOUNTER SYMPTOMS
NAUSEA: 0
COUGH: 0
ABDOMINAL PAIN: 0
WHEEZING: 0
COLOR CHANGE: 0
APNEA: 0
CHEST TIGHTNESS: 0
SHORTNESS OF BREATH: 1
ABDOMINAL DISTENTION: 0

## 2019-02-05 ENCOUNTER — TELEPHONE (OUTPATIENT)
Dept: NEPHROLOGY | Age: 84
End: 2019-02-05
Payer: MEDICARE

## 2019-02-05 DIAGNOSIS — D63.8 ANEMIA, CHRONIC DISEASE: Primary | ICD-10-CM

## 2019-02-05 DIAGNOSIS — N18.4 CKD (CHRONIC KIDNEY DISEASE), STAGE IV (HCC): ICD-10-CM

## 2019-02-05 DIAGNOSIS — D50.8 IRON DEFICIENCY ANEMIA DUE TO DIETARY CAUSES: ICD-10-CM

## 2019-02-05 LAB
FERRITIN: 362 NG/ML (ref 9–150)
HCT VFR BLD CALC: 30.5 % (ref 36–46)
HEMOGLOBIN: 9.9 G/DL (ref 12–16)
IRON SATURATION: 24
IRON SATURATION: 24

## 2019-02-05 PROCEDURE — 99490 CHRNC CARE MGMT STAFF 1ST 20: CPT | Performed by: NURSE PRACTITIONER

## 2019-02-06 DIAGNOSIS — I50.32 CHF (CONGESTIVE HEART FAILURE), NYHA CLASS III, CHRONIC, DIASTOLIC (HCC): Primary | ICD-10-CM

## 2019-02-06 LAB
ALBUMIN SERPL-MCNC: NORMAL G/DL
ALP BLD-CCNC: NORMAL U/L
ALT SERPL-CCNC: NORMAL U/L
ANION GAP SERPL CALCULATED.3IONS-SCNC: NORMAL MMOL/L
AST SERPL-CCNC: NORMAL U/L
AVERAGE GLUCOSE: NORMAL
BILIRUB SERPL-MCNC: NORMAL MG/DL (ref 0.1–1.4)
BUN BLDV-MCNC: 44 MG/DL
CALCIUM SERPL-MCNC: 9.5 MG/DL
CHLORIDE BLD-SCNC: 102 MMOL/L
CO2: 30 MMOL/L
CREAT SERPL-MCNC: 2.2 MG/DL
GFR CALCULATED: 21
GLUCOSE BLD-MCNC: 72 MG/DL
HBA1C MFR BLD: 4.9 %
POTASSIUM SERPL-SCNC: 4.9 MMOL/L
SODIUM BLD-SCNC: 138 MMOL/L
TOTAL PROTEIN: NORMAL

## 2019-02-06 PROCEDURE — 93264 REM MNTR WRLS P-ART PRS SNR: CPT | Performed by: NURSE PRACTITIONER

## 2019-03-05 LAB
FERRITIN: 310 NG/ML (ref 9–150)
HCT VFR BLD CALC: 30.1 % (ref 36–46)
HEMOGLOBIN: 9.7 G/DL (ref 12–16)

## 2019-03-06 ENCOUNTER — TELEPHONE (OUTPATIENT)
Dept: NEPHROLOGY | Age: 84
End: 2019-03-06
Payer: MEDICARE

## 2019-03-06 DIAGNOSIS — N18.4 CKD (CHRONIC KIDNEY DISEASE), STAGE IV (HCC): ICD-10-CM

## 2019-03-06 DIAGNOSIS — D63.8 ANEMIA, CHRONIC DISEASE: ICD-10-CM

## 2019-03-06 PROCEDURE — 99490 CHRNC CARE MGMT STAFF 1ST 20: CPT | Performed by: NURSE PRACTITIONER

## 2019-03-08 ENCOUNTER — HOSPITAL ENCOUNTER (OUTPATIENT)
Dept: NURSING | Age: 84
End: 2019-03-08
Payer: MEDICARE

## 2019-03-13 ENCOUNTER — HOSPITAL ENCOUNTER (OUTPATIENT)
Dept: NURSING | Age: 84
Discharge: HOME | End: 2019-03-13
Payer: MEDICARE

## 2019-03-13 VITALS
OXYGEN SATURATION: 97 % | HEART RATE: 77 BPM | SYSTOLIC BLOOD PRESSURE: 149 MMHG | DIASTOLIC BLOOD PRESSURE: 67 MMHG | RESPIRATION RATE: 18 BRPM | TEMPERATURE: 97.9 F

## 2019-03-13 DIAGNOSIS — D63.8 ANEMIA, CHRONIC DISEASE: Primary | ICD-10-CM

## 2019-03-13 DIAGNOSIS — N18.4 CKD (CHRONIC KIDNEY DISEASE), STAGE IV (HCC): ICD-10-CM

## 2019-03-13 DIAGNOSIS — D50.8 IRON DEFICIENCY ANEMIA SECONDARY TO INADEQUATE DIETARY IRON INTAKE: ICD-10-CM

## 2019-03-13 PROCEDURE — 96372 THER/PROPH/DIAG INJ SC/IM: CPT

## 2019-03-13 PROCEDURE — 6360000002 HC RX W HCPCS: Performed by: NURSE PRACTITIONER

## 2019-03-13 RX ADMIN — DARBEPOETIN ALFA 40 MCG: 40 INJECTION, SOLUTION INTRAVENOUS; SUBCUTANEOUS at 13:06

## 2019-03-13 ASSESSMENT — PAIN - FUNCTIONAL ASSESSMENT: PAIN_FUNCTIONAL_ASSESSMENT: 0-10

## 2019-03-13 NOTE — PROGRESS NOTES
__m__ Safety:       (Environmental)   [place] to environment   Ensure ID band is correct and in place/ allergy band as needed   Assess for fall risk   Initiate fall precautions as applicable (fall band, side rails, etc.)   Call light within reach   Bed in low position/ wheels locked    ____ Pain:        Assess pain level and characteristics   Administer analgesics as ordered   Assess effectiveness of pain management and report to MD as needed    ____ Knowledge Deficit:   Assess baseline knowledge   Provide teaching at level of understanding   Provide teaching via preferred learning method   Evaluate teaching effectiveness    ____ Hemodynamic/Respiratory Status:       (Pre and Post Procedure Monitoring)   Assess/Monitor vital signs and LOC   Assess Baseline SpO2 prior to any sedation   Obtain weight/height   Assess vital signs/ LOC until patient meets discharge criteria   Monitor procedure site and notify MD of any issues    ____ Infection-Risk of Central Venous Catheter:   Monitor for infection signs and symptoms (catheter site redness, temperature elevation, etc)   Assess for infection risks   Educate regarding infection prevention   Manage central venous catheter (flushes/ dressing changes per protocol)

## 2019-03-22 DIAGNOSIS — I50.23 ACUTE ON CHRONIC SYSTOLIC CONGESTIVE HEART FAILURE (HCC): Primary | ICD-10-CM

## 2019-03-22 PROCEDURE — 93264 REM MNTR WRLS P-ART PRS SNR: CPT | Performed by: NURSE PRACTITIONER

## 2019-03-26 ENCOUNTER — APPOINTMENT (OUTPATIENT)
Dept: GENERAL RADIOLOGY | Age: 84
DRG: 189 | End: 2019-03-26
Payer: MEDICARE

## 2019-03-26 ENCOUNTER — HOSPITAL ENCOUNTER (INPATIENT)
Age: 84
LOS: 3 days | Discharge: NURSING, REHAB OR LONG-TERM CARE | DRG: 189 | End: 2019-03-30
Attending: FAMILY MEDICINE | Admitting: INTERNAL MEDICINE
Payer: MEDICARE

## 2019-03-26 ENCOUNTER — TELEPHONE (OUTPATIENT)
Dept: CARDIOLOGY CLINIC | Age: 84
End: 2019-03-26

## 2019-03-26 DIAGNOSIS — R77.8 ELEVATED TROPONIN: ICD-10-CM

## 2019-03-26 DIAGNOSIS — N17.9 AKI (ACUTE KIDNEY INJURY) (HCC): ICD-10-CM

## 2019-03-26 DIAGNOSIS — I50.32 CHF (CONGESTIVE HEART FAILURE), NYHA CLASS II, CHRONIC, DIASTOLIC (HCC): Primary | ICD-10-CM

## 2019-03-26 DIAGNOSIS — I50.21 ACUTE SYSTOLIC CONGESTIVE HEART FAILURE (HCC): Primary | ICD-10-CM

## 2019-03-26 LAB
ALBUMIN SERPL-MCNC: 3.3 G/DL (ref 3.5–5.1)
ALP BLD-CCNC: 108 U/L (ref 38–126)
ALT SERPL-CCNC: 16 U/L (ref 11–66)
ANION GAP SERPL CALCULATED.3IONS-SCNC: 11 MEQ/L (ref 8–16)
AST SERPL-CCNC: 22 U/L (ref 5–40)
BACTERIA: ABNORMAL
BASOPHILS # BLD: 0.3 %
BASOPHILS ABSOLUTE: 0 THOU/MM3 (ref 0–0.1)
BILIRUB SERPL-MCNC: 0.3 MG/DL (ref 0.3–1.2)
BILIRUBIN DIRECT: < 0.2 MG/DL (ref 0–0.3)
BILIRUBIN URINE: NEGATIVE
BLOOD, URINE: NEGATIVE
BUN BLDV-MCNC: 51 MG/DL (ref 7–22)
CALCIUM SERPL-MCNC: 9.2 MG/DL (ref 8.5–10.5)
CASTS: ABNORMAL /LPF
CASTS: ABNORMAL /LPF
CHARACTER, URINE: CLEAR
CHLORIDE BLD-SCNC: 105 MEQ/L (ref 98–111)
CO2: 26 MEQ/L (ref 23–33)
COLOR: YELLOW
CREAT SERPL-MCNC: 2.5 MG/DL (ref 0.4–1.2)
CRYSTALS: ABNORMAL
EKG ATRIAL RATE: 93 BPM
EKG P AXIS: 58 DEGREES
EKG P-R INTERVAL: 190 MS
EKG Q-T INTERVAL: 360 MS
EKG QRS DURATION: 120 MS
EKG QTC CALCULATION (BAZETT): 447 MS
EKG R AXIS: -103 DEGREES
EKG T AXIS: 69 DEGREES
EKG VENTRICULAR RATE: 93 BPM
EOSINOPHIL # BLD: 1.3 %
EOSINOPHILS ABSOLUTE: 0.1 THOU/MM3 (ref 0–0.4)
EPITHELIAL CELLS, UA: ABNORMAL /HPF
ERYTHROCYTE [DISTWIDTH] IN BLOOD BY AUTOMATED COUNT: 14.2 % (ref 11.5–14.5)
ERYTHROCYTE [DISTWIDTH] IN BLOOD BY AUTOMATED COUNT: 48.2 FL (ref 35–45)
FLU A ANTIGEN: NEGATIVE
FLU B ANTIGEN: NEGATIVE
GFR SERPL CREATININE-BSD FRML MDRD: 18 ML/MIN/1.73M2
GLUCOSE BLD-MCNC: 160 MG/DL (ref 70–108)
GLUCOSE, URINE: NEGATIVE MG/DL
HCT VFR BLD CALC: 29.3 % (ref 37–47)
HEMOCCULT STL QL: NEGATIVE
HEMOGLOBIN: 8.9 GM/DL (ref 12–16)
IMMATURE GRANS (ABS): 0.02 THOU/MM3 (ref 0–0.07)
IMMATURE GRANULOCYTES: 0.3 %
INR BLD: 3.43 (ref 0.85–1.13)
KETONES, URINE: NEGATIVE
LACTIC ACID, SEPSIS: 0.8 MMOL/L (ref 0.5–1.9)
LEUKOCYTE ESTERASE, URINE: ABNORMAL
LIPASE: 17.2 U/L (ref 5.6–51.3)
LYMPHOCYTES # BLD: 21.1 %
LYMPHOCYTES ABSOLUTE: 1.6 THOU/MM3 (ref 1–4.8)
MCH RBC QN AUTO: 27.8 PG (ref 26–33)
MCHC RBC AUTO-ENTMCNC: 30.4 GM/DL (ref 32.2–35.5)
MCV RBC AUTO: 91.6 FL (ref 81–99)
MISCELLANEOUS LAB TEST RESULT: ABNORMAL
MONOCYTES # BLD: 11.3 %
MONOCYTES ABSOLUTE: 0.8 THOU/MM3 (ref 0.4–1.3)
NITRITE, URINE: NEGATIVE
NUCLEATED RED BLOOD CELLS: 0 /100 WBC
OSMOLALITY CALCULATION: 300.2 MOSMOL/KG (ref 275–300)
PH UA: 5 (ref 5–9)
PLATELET # BLD: 173 THOU/MM3 (ref 130–400)
PMV BLD AUTO: 11.2 FL (ref 9.4–12.4)
POTASSIUM SERPL-SCNC: 5.4 MEQ/L (ref 3.5–5.2)
PRO-BNP: 6431 PG/ML (ref 0–1800)
PROCALCITONIN: 0.16 NG/ML (ref 0.01–0.09)
PROTEIN UA: 100 MG/DL
RBC # BLD: 3.2 MILL/MM3 (ref 4.2–5.4)
RBC URINE: ABNORMAL /HPF
RENAL EPITHELIAL, UA: ABNORMAL
SEG NEUTROPHILS: 65.7 %
SEGMENTED NEUTROPHILS ABSOLUTE COUNT: 4.9 THOU/MM3 (ref 1.8–7.7)
SODIUM BLD-SCNC: 142 MEQ/L (ref 135–145)
SPECIFIC GRAVITY UA: 1.02 (ref 1–1.03)
TOTAL PROTEIN: 6.7 G/DL (ref 6.1–8)
TROPONIN T: 0.04 NG/ML
UROBILINOGEN, URINE: 0.2 EU/DL (ref 0–1)
WBC # BLD: 7.5 THOU/MM3 (ref 4.8–10.8)
WBC UA: ABNORMAL /HPF
YEAST: ABNORMAL

## 2019-03-26 PROCEDURE — 83605 ASSAY OF LACTIC ACID: CPT

## 2019-03-26 PROCEDURE — 83690 ASSAY OF LIPASE: CPT

## 2019-03-26 PROCEDURE — 87040 BLOOD CULTURE FOR BACTERIA: CPT

## 2019-03-26 PROCEDURE — 99223 1ST HOSP IP/OBS HIGH 75: CPT | Performed by: INTERNAL MEDICINE

## 2019-03-26 PROCEDURE — 84484 ASSAY OF TROPONIN QUANT: CPT

## 2019-03-26 PROCEDURE — 82272 OCCULT BLD FECES 1-3 TESTS: CPT

## 2019-03-26 PROCEDURE — 93010 ELECTROCARDIOGRAM REPORT: CPT | Performed by: INTERNAL MEDICINE

## 2019-03-26 PROCEDURE — 80048 BASIC METABOLIC PNL TOTAL CA: CPT

## 2019-03-26 PROCEDURE — 2500000003 HC RX 250 WO HCPCS: Performed by: FAMILY MEDICINE

## 2019-03-26 PROCEDURE — 85610 PROTHROMBIN TIME: CPT

## 2019-03-26 PROCEDURE — 85025 COMPLETE CBC W/AUTO DIFF WBC: CPT

## 2019-03-26 PROCEDURE — 87086 URINE CULTURE/COLONY COUNT: CPT

## 2019-03-26 PROCEDURE — 36415 COLL VENOUS BLD VENIPUNCTURE: CPT

## 2019-03-26 PROCEDURE — 96374 THER/PROPH/DIAG INJ IV PUSH: CPT

## 2019-03-26 PROCEDURE — 83880 ASSAY OF NATRIURETIC PEPTIDE: CPT

## 2019-03-26 PROCEDURE — 99285 EMERGENCY DEPT VISIT HI MDM: CPT

## 2019-03-26 PROCEDURE — 36600 WITHDRAWAL OF ARTERIAL BLOOD: CPT

## 2019-03-26 PROCEDURE — 84145 PROCALCITONIN (PCT): CPT

## 2019-03-26 PROCEDURE — 80076 HEPATIC FUNCTION PANEL: CPT

## 2019-03-26 PROCEDURE — 81001 URINALYSIS AUTO W/SCOPE: CPT

## 2019-03-26 PROCEDURE — 71045 X-RAY EXAM CHEST 1 VIEW: CPT

## 2019-03-26 PROCEDURE — 87804 INFLUENZA ASSAY W/OPTIC: CPT

## 2019-03-26 PROCEDURE — 93005 ELECTROCARDIOGRAM TRACING: CPT | Performed by: FAMILY MEDICINE

## 2019-03-26 RX ORDER — BUMETANIDE 0.25 MG/ML
2 INJECTION, SOLUTION INTRAMUSCULAR; INTRAVENOUS ONCE
Status: COMPLETED | OUTPATIENT
Start: 2019-03-26 | End: 2019-03-26

## 2019-03-26 RX ORDER — ISOSORBIDE MONONITRATE 30 MG/1
30 TABLET, EXTENDED RELEASE ORAL NIGHTLY
Qty: 30 TABLET | Refills: 3
Start: 2019-03-26 | End: 2019-04-11 | Stop reason: ALTCHOICE

## 2019-03-26 RX ADMIN — BUMETANIDE 2 MG: 0.25 INJECTION INTRAMUSCULAR; INTRAVENOUS at 23:36

## 2019-03-26 ASSESSMENT — ENCOUNTER SYMPTOMS
COLOR CHANGE: 1
EYE DISCHARGE: 0
BLOOD IN STOOL: 0
SHORTNESS OF BREATH: 1
ABDOMINAL PAIN: 0

## 2019-03-26 NOTE — TELEPHONE ENCOUNTER
Nurse Jennie notified and verbalized understanding with read back  Orders also faxed to [phone]

## 2019-03-26 NOTE — TELEPHONE ENCOUNTER
Double Bumex to 2 mg BID x 3 days   Metolazone 2.5 mg daily x 2 days - give 1 hour prior to Bumex start 3/27  Potassium 20 mEq PO x 2 days   BMP in 1 week  Increase Imdur to 60 mg AM - 30 mg PM (from 60 mg daily)  Call Fri with update

## 2019-03-27 ENCOUNTER — PROCEDURE VISIT (OUTPATIENT)
Dept: CARDIOLOGY CLINIC | Age: 84
End: 2019-03-27

## 2019-03-27 DIAGNOSIS — Z95.810 BIVENTRICULAR IMPLANTABLE CARDIOVERTER-DEFIBRILLATOR IN SITU: Primary | ICD-10-CM

## 2019-03-27 PROBLEM — N17.9 AKI (ACUTE KIDNEY INJURY) (HCC): Status: ACTIVE | Noted: 2019-03-27

## 2019-03-27 LAB
GLUCOSE BLD-MCNC: 145 MG/DL (ref 70–108)
GLUCOSE BLD-MCNC: 212 MG/DL (ref 70–108)
GLUCOSE BLD-MCNC: 219 MG/DL (ref 70–108)
GLUCOSE BLD-MCNC: 234 MG/DL (ref 70–108)
GLUCOSE BLD-MCNC: 89 MG/DL (ref 70–108)
INR BLD: 3.64 (ref 0.85–1.13)
LACTIC ACID, SEPSIS: 0.6 MMOL/L (ref 0.5–1.9)
MRSA SCREEN RT-PCR: POSITIVE
TROPONIN T: 0.02 NG/ML
TROPONIN T: 0.03 NG/ML
VANCOMYCIN RESISTANT ENTEROCOCCUS: NEGATIVE

## 2019-03-27 PROCEDURE — 87081 CULTURE SCREEN ONLY: CPT

## 2019-03-27 PROCEDURE — 6360000002 HC RX W HCPCS: Performed by: INTERNAL MEDICINE

## 2019-03-27 PROCEDURE — 87641 MR-STAPH DNA AMP PROBE: CPT

## 2019-03-27 PROCEDURE — 6370000000 HC RX 637 (ALT 250 FOR IP): Performed by: INTERNAL MEDICINE

## 2019-03-27 PROCEDURE — 85610 PROTHROMBIN TIME: CPT

## 2019-03-27 PROCEDURE — 99223 1ST HOSP IP/OBS HIGH 75: CPT | Performed by: NUCLEAR MEDICINE

## 2019-03-27 PROCEDURE — 2700000000 HC OXYGEN THERAPY PER DAY

## 2019-03-27 PROCEDURE — 2060000000 HC ICU INTERMEDIATE R&B

## 2019-03-27 PROCEDURE — 87500 VANOMYCIN DNA AMP PROBE: CPT

## 2019-03-27 PROCEDURE — 99233 SBSQ HOSP IP/OBS HIGH 50: CPT | Performed by: HOSPITALIST

## 2019-03-27 PROCEDURE — 87147 CULTURE TYPE IMMUNOLOGIC: CPT

## 2019-03-27 PROCEDURE — 2580000003 HC RX 258: Performed by: INTERNAL MEDICINE

## 2019-03-27 PROCEDURE — 2709999900 HC NON-CHARGEABLE SUPPLY

## 2019-03-27 PROCEDURE — 94761 N-INVAS EAR/PLS OXIMETRY MLT: CPT

## 2019-03-27 PROCEDURE — 84484 ASSAY OF TROPONIN QUANT: CPT

## 2019-03-27 PROCEDURE — 83605 ASSAY OF LACTIC ACID: CPT

## 2019-03-27 PROCEDURE — 82948 REAGENT STRIP/BLOOD GLUCOSE: CPT

## 2019-03-27 PROCEDURE — 94640 AIRWAY INHALATION TREATMENT: CPT

## 2019-03-27 PROCEDURE — 36415 COLL VENOUS BLD VENIPUNCTURE: CPT

## 2019-03-27 RX ORDER — LACTOBACILLUS RHAMNOSUS GG 10B CELL
1 CAPSULE ORAL DAILY
Status: DISCONTINUED | OUTPATIENT
Start: 2019-03-27 | End: 2019-03-30 | Stop reason: HOSPADM

## 2019-03-27 RX ORDER — DOCUSATE SODIUM 100 MG/1
200 CAPSULE, LIQUID FILLED ORAL DAILY
Status: DISCONTINUED | OUTPATIENT
Start: 2019-03-27 | End: 2019-03-30 | Stop reason: HOSPADM

## 2019-03-27 RX ORDER — METOPROLOL SUCCINATE 25 MG/1
75 TABLET, EXTENDED RELEASE ORAL DAILY
Status: ON HOLD | COMMUNITY
End: 2019-03-30 | Stop reason: HOSPADM

## 2019-03-27 RX ORDER — LANOLIN ALCOHOL/MO/W.PET/CERES
1000 CREAM (GRAM) TOPICAL DAILY
Status: DISCONTINUED | OUTPATIENT
Start: 2019-03-27 | End: 2019-03-30 | Stop reason: HOSPADM

## 2019-03-27 RX ORDER — NICOTINE POLACRILEX 4 MG
15 LOZENGE BUCCAL PRN
Status: DISCONTINUED | OUTPATIENT
Start: 2019-03-27 | End: 2019-03-30 | Stop reason: HOSPADM

## 2019-03-27 RX ORDER — M-VIT,TX,IRON,MINS/CALC/FOLIC 27MG-0.4MG
1 TABLET ORAL EVERY MORNING
Status: DISCONTINUED | OUTPATIENT
Start: 2019-03-27 | End: 2019-03-30 | Stop reason: HOSPADM

## 2019-03-27 RX ORDER — BISACODYL 10 MG
10 SUPPOSITORY, RECTAL RECTAL DAILY PRN
Status: DISCONTINUED | OUTPATIENT
Start: 2019-03-27 | End: 2019-03-30 | Stop reason: HOSPADM

## 2019-03-27 RX ORDER — PANTOPRAZOLE SODIUM 40 MG/1
40 TABLET, DELAYED RELEASE ORAL
Status: DISCONTINUED | OUTPATIENT
Start: 2019-03-27 | End: 2019-03-30 | Stop reason: HOSPADM

## 2019-03-27 RX ORDER — DEXTROSE MONOHYDRATE 50 MG/ML
100 INJECTION, SOLUTION INTRAVENOUS PRN
Status: DISCONTINUED | OUTPATIENT
Start: 2019-03-27 | End: 2019-03-30 | Stop reason: HOSPADM

## 2019-03-27 RX ORDER — ASCORBIC ACID 500 MG
500 TABLET ORAL DAILY
Status: DISCONTINUED | OUTPATIENT
Start: 2019-03-27 | End: 2019-03-30 | Stop reason: HOSPADM

## 2019-03-27 RX ORDER — INSULIN GLARGINE 100 [IU]/ML
22 INJECTION, SOLUTION SUBCUTANEOUS NIGHTLY
Status: DISCONTINUED | OUTPATIENT
Start: 2019-03-27 | End: 2019-03-30 | Stop reason: HOSPADM

## 2019-03-27 RX ORDER — WARFARIN SODIUM 3 MG/1
3 TABLET ORAL DAILY
Status: DISCONTINUED | OUTPATIENT
Start: 2019-03-27 | End: 2019-03-27 | Stop reason: SDUPTHER

## 2019-03-27 RX ORDER — PRAVASTATIN SODIUM 40 MG
40 TABLET ORAL EVERY EVENING
Status: DISCONTINUED | OUTPATIENT
Start: 2019-03-27 | End: 2019-03-30 | Stop reason: HOSPADM

## 2019-03-27 RX ORDER — ISOSORBIDE MONONITRATE 30 MG/1
30 TABLET, EXTENDED RELEASE ORAL NIGHTLY
Status: DISCONTINUED | OUTPATIENT
Start: 2019-03-27 | End: 2019-03-30 | Stop reason: HOSPADM

## 2019-03-27 RX ORDER — ISOSORBIDE MONONITRATE 60 MG/1
60 TABLET, EXTENDED RELEASE ORAL DAILY
COMMUNITY
End: 2019-04-11

## 2019-03-27 RX ORDER — ISOSORBIDE MONONITRATE 60 MG/1
60 TABLET, EXTENDED RELEASE ORAL DAILY
Status: DISCONTINUED | OUTPATIENT
Start: 2019-03-27 | End: 2019-03-30 | Stop reason: HOSPADM

## 2019-03-27 RX ORDER — LEVOFLOXACIN 750 MG/1
750 TABLET ORAL
Status: DISCONTINUED | OUTPATIENT
Start: 2019-03-28 | End: 2019-03-30 | Stop reason: HOSPADM

## 2019-03-27 RX ORDER — CLOPIDOGREL BISULFATE 75 MG/1
75 TABLET ORAL DAILY
Status: DISCONTINUED | OUTPATIENT
Start: 2019-03-27 | End: 2019-03-30 | Stop reason: HOSPADM

## 2019-03-27 RX ORDER — METHYLPREDNISOLONE SODIUM SUCCINATE 40 MG/ML
40 INJECTION, POWDER, LYOPHILIZED, FOR SOLUTION INTRAMUSCULAR; INTRAVENOUS EVERY 8 HOURS
Status: DISCONTINUED | OUTPATIENT
Start: 2019-03-27 | End: 2019-03-28

## 2019-03-27 RX ORDER — GUAIFENESIN 100 MG/5ML
200 SOLUTION ORAL EVERY 4 HOURS PRN
Status: DISCONTINUED | OUTPATIENT
Start: 2019-03-27 | End: 2019-03-30 | Stop reason: HOSPADM

## 2019-03-27 RX ORDER — SODIUM CHLORIDE 0.9 % (FLUSH) 0.9 %
10 SYRINGE (ML) INJECTION PRN
Status: DISCONTINUED | OUTPATIENT
Start: 2019-03-27 | End: 2019-03-30 | Stop reason: HOSPADM

## 2019-03-27 RX ORDER — IPRATROPIUM BROMIDE AND ALBUTEROL SULFATE 2.5; .5 MG/3ML; MG/3ML
1 SOLUTION RESPIRATORY (INHALATION) EVERY 4 HOURS
Status: DISCONTINUED | OUTPATIENT
Start: 2019-03-27 | End: 2019-03-28

## 2019-03-27 RX ORDER — BUMETANIDE 1 MG/1
2 TABLET ORAL DAILY
Status: DISCONTINUED | OUTPATIENT
Start: 2019-03-27 | End: 2019-03-30 | Stop reason: HOSPADM

## 2019-03-27 RX ORDER — IPRATROPIUM BROMIDE AND ALBUTEROL SULFATE 2.5; .5 MG/3ML; MG/3ML
1 SOLUTION RESPIRATORY (INHALATION) EVERY 6 HOURS PRN
COMMUNITY

## 2019-03-27 RX ORDER — ONDANSETRON 4 MG/1
4 TABLET, FILM COATED ORAL 2 TIMES DAILY
Status: DISCONTINUED | OUTPATIENT
Start: 2019-03-27 | End: 2019-03-30 | Stop reason: HOSPADM

## 2019-03-27 RX ORDER — DEXTROSE MONOHYDRATE 25 G/50ML
12.5 INJECTION, SOLUTION INTRAVENOUS PRN
Status: DISCONTINUED | OUTPATIENT
Start: 2019-03-27 | End: 2019-03-30 | Stop reason: HOSPADM

## 2019-03-27 RX ORDER — ONDANSETRON 2 MG/ML
4 INJECTION INTRAMUSCULAR; INTRAVENOUS EVERY 6 HOURS PRN
Status: DISCONTINUED | OUTPATIENT
Start: 2019-03-27 | End: 2019-03-30 | Stop reason: HOSPADM

## 2019-03-27 RX ORDER — FERROUS SULFATE 325(65) MG
325 TABLET ORAL 2 TIMES DAILY
Status: DISCONTINUED | OUTPATIENT
Start: 2019-03-27 | End: 2019-03-30 | Stop reason: HOSPADM

## 2019-03-27 RX ORDER — ACETAMINOPHEN 325 MG/1
650 TABLET ORAL 4 TIMES DAILY
Status: DISCONTINUED | OUTPATIENT
Start: 2019-03-27 | End: 2019-03-30 | Stop reason: HOSPADM

## 2019-03-27 RX ORDER — LEVOFLOXACIN 5 MG/ML
750 INJECTION, SOLUTION INTRAVENOUS
Status: DISCONTINUED | OUTPATIENT
Start: 2019-03-27 | End: 2019-03-27

## 2019-03-27 RX ORDER — SODIUM CHLORIDE 0.9 % (FLUSH) 0.9 %
10 SYRINGE (ML) INJECTION EVERY 12 HOURS SCHEDULED
Status: DISCONTINUED | OUTPATIENT
Start: 2019-03-27 | End: 2019-03-30 | Stop reason: HOSPADM

## 2019-03-27 RX ADMIN — INSULIN LISPRO 12 UNITS: 100 INJECTION, SOLUTION INTRAVENOUS; SUBCUTANEOUS at 18:05

## 2019-03-27 RX ADMIN — ONDANSETRON HYDROCHLORIDE 4 MG: 4 TABLET, FILM COATED ORAL at 20:47

## 2019-03-27 RX ADMIN — IPRATROPIUM BROMIDE AND ALBUTEROL SULFATE 1 AMPULE: .5; 3 SOLUTION RESPIRATORY (INHALATION) at 19:44

## 2019-03-27 RX ADMIN — INSULIN LISPRO 1 UNITS: 100 INJECTION, SOLUTION INTRAVENOUS; SUBCUTANEOUS at 20:50

## 2019-03-27 RX ADMIN — MULTIPLE VITAMINS W/ MINERALS TAB 1 TABLET: TAB at 09:11

## 2019-03-27 RX ADMIN — FERROUS SULFATE TAB 325 MG (65 MG ELEMENTAL FE) 325 MG: 325 (65 FE) TAB at 20:47

## 2019-03-27 RX ADMIN — IPRATROPIUM BROMIDE AND ALBUTEROL SULFATE 1 AMPULE: .5; 3 SOLUTION RESPIRATORY (INHALATION) at 01:44

## 2019-03-27 RX ADMIN — INSULIN GLARGINE 22 UNITS: 100 INJECTION, SOLUTION SUBCUTANEOUS at 20:50

## 2019-03-27 RX ADMIN — Medication 1000 MCG: at 09:10

## 2019-03-27 RX ADMIN — FERROUS SULFATE TAB 325 MG (65 MG ELEMENTAL FE) 325 MG: 325 (65 FE) TAB at 09:10

## 2019-03-27 RX ADMIN — IPRATROPIUM BROMIDE AND ALBUTEROL SULFATE 1 AMPULE: .5; 3 SOLUTION RESPIRATORY (INHALATION) at 04:53

## 2019-03-27 RX ADMIN — Medication 1 CAPSULE: at 09:10

## 2019-03-27 RX ADMIN — HYDRALAZINE HYDROCHLORIDE 75 MG: 50 TABLET, FILM COATED ORAL at 20:47

## 2019-03-27 RX ADMIN — ACETAMINOPHEN 650 MG: 325 TABLET ORAL at 09:11

## 2019-03-27 RX ADMIN — LEVOFLOXACIN 750 MG: 5 INJECTION, SOLUTION INTRAVENOUS at 02:17

## 2019-03-27 RX ADMIN — INSULIN LISPRO 12 UNITS: 100 INJECTION, SOLUTION INTRAVENOUS; SUBCUTANEOUS at 09:14

## 2019-03-27 RX ADMIN — INSULIN LISPRO 2 UNITS: 100 INJECTION, SOLUTION INTRAVENOUS; SUBCUTANEOUS at 18:04

## 2019-03-27 RX ADMIN — ACETAMINOPHEN 650 MG: 325 TABLET ORAL at 22:47

## 2019-03-27 RX ADMIN — HYDRALAZINE HYDROCHLORIDE 75 MG: 50 TABLET, FILM COATED ORAL at 15:54

## 2019-03-27 RX ADMIN — METHYLPREDNISOLONE SODIUM SUCCINATE 40 MG: 40 INJECTION, POWDER, FOR SOLUTION INTRAMUSCULAR; INTRAVENOUS at 18:07

## 2019-03-27 RX ADMIN — PRAVASTATIN SODIUM 40 MG: 40 TABLET ORAL at 18:05

## 2019-03-27 RX ADMIN — Medication 500 MG: at 09:11

## 2019-03-27 RX ADMIN — IPRATROPIUM BROMIDE AND ALBUTEROL SULFATE 1 AMPULE: .5; 3 SOLUTION RESPIRATORY (INHALATION) at 16:18

## 2019-03-27 RX ADMIN — CLOPIDOGREL BISULFATE 75 MG: 75 TABLET ORAL at 09:10

## 2019-03-27 RX ADMIN — Medication 10 ML: at 20:47

## 2019-03-27 RX ADMIN — ACETAMINOPHEN 650 MG: 325 TABLET ORAL at 18:04

## 2019-03-27 RX ADMIN — MAGNESIUM HYDROXIDE 30 ML: 400 SUSPENSION ORAL at 15:58

## 2019-03-27 RX ADMIN — ISOSORBIDE MONONITRATE 60 MG: 60 TABLET ORAL at 09:11

## 2019-03-27 RX ADMIN — INSULIN LISPRO 1 UNITS: 100 INJECTION, SOLUTION INTRAVENOUS; SUBCUTANEOUS at 09:11

## 2019-03-27 RX ADMIN — INSULIN LISPRO 2 UNITS: 100 INJECTION, SOLUTION INTRAVENOUS; SUBCUTANEOUS at 12:01

## 2019-03-27 RX ADMIN — BUMETANIDE 2 MG: 1 TABLET ORAL at 09:10

## 2019-03-27 RX ADMIN — PANTOPRAZOLE SODIUM 40 MG: 40 TABLET, DELAYED RELEASE ORAL at 15:54

## 2019-03-27 RX ADMIN — IPRATROPIUM BROMIDE AND ALBUTEROL SULFATE 1 AMPULE: .5; 3 SOLUTION RESPIRATORY (INHALATION) at 07:53

## 2019-03-27 RX ADMIN — METOPROLOL SUCCINATE 75 MG: 50 TABLET, FILM COATED, EXTENDED RELEASE ORAL at 09:11

## 2019-03-27 RX ADMIN — Medication 10 ML: at 09:11

## 2019-03-27 RX ADMIN — Medication 10 ML: at 18:07

## 2019-03-27 RX ADMIN — HYDRALAZINE HYDROCHLORIDE 75 MG: 50 TABLET, FILM COATED ORAL at 09:10

## 2019-03-27 RX ADMIN — PANTOPRAZOLE SODIUM 40 MG: 40 TABLET, DELAYED RELEASE ORAL at 07:29

## 2019-03-27 RX ADMIN — METHYLPREDNISOLONE SODIUM SUCCINATE 40 MG: 40 INJECTION, POWDER, FOR SOLUTION INTRAMUSCULAR; INTRAVENOUS at 03:36

## 2019-03-27 RX ADMIN — ISOSORBIDE MONONITRATE 30 MG: 30 TABLET ORAL at 22:48

## 2019-03-27 RX ADMIN — METHYLPREDNISOLONE SODIUM SUCCINATE 40 MG: 40 INJECTION, POWDER, FOR SOLUTION INTRAMUSCULAR; INTRAVENOUS at 12:01

## 2019-03-27 RX ADMIN — DOCUSATE SODIUM 200 MG: 100 CAPSULE, LIQUID FILLED ORAL at 09:10

## 2019-03-27 ASSESSMENT — PAIN SCALES - GENERAL
PAINLEVEL_OUTOF10: 0

## 2019-03-27 NOTE — PROGRESS NOTES
Rehab                             ? Psych                             ? SNF                             ? [place]                             ? Other-    Chief Complaint:   Chief Complaint   Patient presents with    Shortness of Breath    Cough       Hospital Course: Patient was seen, examined and the medical chart was reviewed thoroughly today. In summary, 80 y. o.female admitted overnight for Acute hypoxic res. Failure and ACOPDE. Subjective (past 24 hours):   feeling much improved today. No CP. Pt denies worsening leg swelling, if anything she reports swelling is coming down.        Medications:  Reviewed    Infusion Medications    dextrose       Scheduled Medications    acetaminophen  650 mg Oral 4x Daily    ascorbic acid  500 mg Oral Daily    bumetanide  2 mg Oral Daily    clopidogrel  75 mg Oral Daily    docusate sodium  200 mg Oral Daily    ferrous sulfate  325 mg Oral BID    hydrALAZINE  75 mg Oral TID    insulin lispro  12 Units Subcutaneous BID WC    insulin glargine  22 Units Subcutaneous Nightly    isosorbide mononitrate  60 mg Oral Daily    isosorbide mononitrate  30 mg Oral Nightly    metoprolol succinate  75 mg Oral Daily    therapeutic multivitamin-minerals  1 tablet Oral QAM    ondansetron  4 mg Oral BID    pantoprazole  40 mg Oral BID AC    pravastatin  40 mg Oral QPM    lactobacillus  1 capsule Oral Daily    vitamin B-12  1,000 mcg Oral Daily    ipratropium-albuterol  1 ampule Inhalation Q4H    methylPREDNISolone  40 mg Intravenous Q8H    sodium chloride flush  10 mL Intravenous 2 times per day    insulin lispro  0-6 Units Subcutaneous TID WC    insulin lispro  0-3 Units Subcutaneous Nightly    warfarin (COUMADIN) daily dosing (placeholder)   Other RX Placeholder    [START ON 3/28/2019] levofloxacin  750 mg Oral Q48H     PRN Meds: bisacodyl, guaiFENesin, sodium chloride flush, magnesium hydroxide, ondansetron, glucose, dextrose, glucagon (rDNA), dextrose      Intake/Output Summary (Last 24 hours) at 3/27/2019 1800  Last data filed at 3/27/2019 1348  Gross per 24 hour   Intake 1651.51 ml   Output 1100 ml   Net 551.51 ml       Diet:  DIET CARB CONTROL; No Added Salt (3-4 GM)    Exam:  BP (!) 185/76   Pulse 81   Temp 98 °F (36.7 °C) (Oral)   Resp 18   Ht 5' 6\" (1.676 m)   Wt 260 lb 7 oz (118.1 kg)   SpO2 97%   BMI 42.04 kg/m²     General appearance: A&O x3, Not ill or toxic, in no apparent distress  HEENT:  ADAMA  EOM intact. Neck: Supple, with full range of motion. No jugular venous distention. Trachea midline. Respiratory:   ecreased A/E bilat with no adventitious sounds   Cardiovascular:  normal S1/S2 with no murmurs/gallops  Abdomen: Soft, non-tender, non-distended, no rigidity or peritoneal signs  Musculoskeletal: NL symmetrical A/PROM bilat U/L extremities   Skin: No rashes. ++ bilat. pitting edema  Neurologic:  CN II-XII intact. NL symmetrical reflexes. NL gait and stance. NL Cerebellar exam. Power 5/5 all muscle groups U/L extremities. Toes downgoing  Capillary Refill: Brisk,< 3 seconds   Peripheral Pulses: +2 palpable, equal bilaterally         Labs:   Recent Labs     03/26/19 2143   WBC 7.5   HGB 8.9*   HCT 29.3*        Recent Labs     03/26/19 2143      K 5.4*      CO2 26   BUN 51*   CREATININE 2.5*   CALCIUM 9.2     Recent Labs     03/26/19 2143   AST 22   ALT 16   BILIDIR <0.2   BILITOT 0.3   ALKPHOS 108     Recent Labs     03/26/19 2143 03/27/19  0532   INR 3.43* 3.64*     No results for input(s): Cathi Nguyen in the last 72 hours. Urinalysis:      Lab Results   Component Value Date    NITRU NEGATIVE 03/26/2019    WBCUA 0-2 03/26/2019    BACTERIA NONE 03/26/2019    RBCUA 3-5 03/26/2019    BLOODU NEGATIVE 03/26/2019    SPECGRAV 1.019 03/26/2019    GLUCOSEU NEGATIVE 06/12/2018       Radiology:  Xr Chest Portable    Result Date: 3/26/2019  PROCEDURE: XR CHEST PORTABLE CLINICAL INFORMATION: sob.  COMPARISON: December 3, 2018 TECHNIQUE: AP upright view of the chest. FINDINGS: There are stable cardiomegaly changes and visualized bipolar pacemaker. Lung volumes remain diminished with coarse interstitial markings. Chronic changes including mild fibrosis is not excluded. Minimal venous congestion is not excluded. The skeleton appear stable. Stable appearing chest findings compared to prior study. Reinitiation of cardiomegaly and visualized pacemaker. Lung volumes remain diminished. Chronic changes are considered. Mild venous congestion is not excluded **This report has been created using voice recognition software. It may contain minor errors which are inherent in voice recognition technology. ** Final report electronically signed by Dr. Dalia Mills on 3/26/2019 10:34 PM      Diet: DIET CARB CONTROL; No Added Salt (3-4 GM)    DVT prophylaxis: ? Lovenox                                 ? SCDs                                 ? SQ Heparin                                 ? Encourage ambulation           ?  Already on Anticoagulation       Code Status: DNR-CCA      Active Hospital Problems    Diagnosis Date Noted    Acute systolic congestive heart failure (HCC) [I50.21]      Priority: High    COPD exacerbation (HCC) [J44.1]      Priority: High    Acute on chronic congestive heart failure (HCC) [I50.9]      Priority: High    SHAILA (acute kidney injury) ([facility] [N17.9] 03/27/2019    Anemia, chronic disease [D63.8] 03/06/2019    CKD (chronic kidney disease), stage IV (HCC) [N18.4] 03/06/2019    Bilateral lower extremity edema [R60.0] 10/17/2017    GERD (gastroesophageal reflux disease) [K21.9]     Osteoarthritis [M19.90]     CAD (coronary artery disease) [I25.10] 05/22/2014    Cardiomyopathy, nonischemic ([facility] [I42.8]     Hypertension [I10]     Hyperlipidemia [E78.5]     DM2 (diabetes mellitus, type 2) ([facility] [E11.9] 01/01/1998           Patient was updated about the treatment plan, all the questions and concerns were addressed.         Electronically signed by Chance Hooper MD on 3/27/2019 at 6:00 PM

## 2019-03-27 NOTE — ED TRIAGE NOTES
Presents to ED for cough and shortness of breath states that she has history of copd states that she also has history of chf. Patient alert and oriented to person place and time at this time will monitor Torito Gallegos MD at bedside for patient evaluation.

## 2019-03-27 NOTE — PROGRESS NOTES
Spoke to Dr. Lois Alejo who states we can check her pacemaker. Before I was even able to enter the order or call the representative, a pacer representative showed up on the unit to evaluate her pacemaker/ICD.

## 2019-03-27 NOTE — PLAN OF CARE
and family. Patient and family verbalize understanding of the plan of care and contribute to goal setting.

## 2019-03-27 NOTE — CARE COORDINATION
3/27/19, 11:46 AM      Gabrielle Moyer       Admitted from: ED 3/26/2019/ 2128 Hospital day: 0   Location: -16/016-A Reason for admit: COPD exacerbation ([facility] [J44.1] Status: IP  Admit order signed?: yes  PMH:  has a past medical history of CAD (coronary artery disease), Cancer ([facility] Cardiomyopathy, nonischemic ([facility] CHF (congestive heart failure) ([facility] CKD (chronic kidney disease) stage 3, GFR 30-59 ml/min ([facility]), Congenital heart disease, COPD exacerbation ([facility] 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 ([facility] DM2 (diabetes mellitus, type 2) ([facility] Ex-smoker, GERD (gastroesophageal reflux disease), Heart failure with preserved ejection fraction ([facility] His of Heparin induced thrombocytopenia, History of breast cancer, History of CVA (cerebrovascular accident), History of pulmonary embolism, Hyperlipidemia, Hypertension, Iron deficiency anemia, LBBB (left bundle branch block), Osteoarthritis, Paget's disease of bone, [employer] [place] BiV ICD, and Vitamin D deficiency.   Procedure: none  Pertinent abnormal Imaging:none  Medications:  Scheduled Meds:   acetaminophen  650 mg Oral 4x Daily    ascorbic acid  500 mg Oral Daily    bumetanide  2 mg Oral Daily    clopidogrel  75 mg Oral Daily    docusate sodium  200 mg Oral Daily    ferrous sulfate  325 mg Oral BID    hydrALAZINE  75 mg Oral TID    insulin lispro  12 Units Subcutaneous BID WC    insulin glargine  22 Units Subcutaneous Nightly    isosorbide mononitrate  60 mg Oral Daily    isosorbide mononitrate  30 mg Oral Nightly    metoprolol succinate  75 mg Oral Daily    therapeutic multivitamin-minerals  1 tablet Oral QAM    ondansetron  4 mg Oral BID    pantoprazole  40 mg Oral BID AC    pravastatin  40 mg Oral QPM    lactobacillus  1 capsule Oral Daily    vitamin B-12  1,000 mcg Oral Daily    ipratropium-albuterol  1 ampule Inhalation Q4H    methylPREDNISolone  40 mg Intravenous Q8H    sodium chloride flush  10 mL Intravenous 2 times per day    insulin lispro  0-6 Units Subcutaneous TID WC    insulin lispro  0-3 Units Subcutaneous Nightly    warfarin (COUMADIN) daily dosing (placeholder)   Other RX Placeholder    [START ON 3/28/2019] levofloxacin  750 mg Oral Q48H     Continuous Infusions:   dextrose        Pertinent Info/Orders/Treatment Plan:  Steroids continued, elevated BNP, H 8.9; monitor. Cardiology following for CHF  Diet: DIET CARB CONTROL; No Added Salt (3-4 GM)   Smoking status:  reports that she quit smoking about 67 years ago. Her smoking use included cigarettes. She has a 1.00 pack-year smoking history. She has never used smokeless tobacco.   PCP: Pa Garcia MD  Readmission: none  Readmission Risk Score: 32%    Discharge Planning  Current Residence:  Nursing Home  Living Arrangements:  Alone   Support Systems:  Family Members  Current Services PTA:     Potential Assistance Needed:  N/A  Potential Assistance Purchasing Medications:  No  Does patient want to participate in local refill/ meds to beds program?  No  Type of Home Care Services:  None  Patient expects to be discharged to:   [address] [place] [address]  Expected Discharge date:  03/29/19  Follow Up Appointment: Best Day/ Time: Monday AM    Discharge Plan: plans return [address] [facility], has nebulizer, PT/OT following     Evaluation: yes

## 2019-03-27 NOTE — CONSULTS
Coumadin. SOCIAL HISTORY:  No tobacco.  Denies alcohol. FAMILY HISTORY:  Noncontributory. PHYSICAL EXAMINATION:  VITAL SIGNS:  Showed a blood pressure of 130/70, heart rate of 70. GENERAL APPEARANCE:  A pleasant lady, elderly, in no acute distress. EYES AND EARS:  No discharge. NECK:  No JVD. No bruits. No masses. LUNGS:  Decreased air entry. No crackles. No wheezes. HEART:  Normal S1 and S2. Systolic murmur grade 2/6. ABDOMEN:  Soft, nontender. Positive bowel sounds. No organomegaly. EXTREMITIES:  No significant edema. NEUROLOGIC:  Remains grossly intact. Awake and alert. No focal  deficits. PSYCHIATRIC:  No evidence of active psychosis. SKIN:  No rashes. LABORATORY DATA:  Labs shows sodium 142, potassium 5.4, BUN 51,  creatinine 2.5. White count 7.5, hemoglobin 8.9, hematocrit 29.3, and  platelets 996. DIAGNOSTIC DATA:  EKG showed sinus rhythm. Nonspecific ST-T-wave  changes. IMPRESSION:  This is a very unfortunate complex lady with a complicated  history. The patient comes in with very unclear symptoms, could be  fluid overload, could be deconditioning, and could be some pulmonary  element. PLAN:  At this point, the patient is being treated medically, seems to  be doing okay. I will continue with conservative therapy, medical  treatment, and follow up with Dr. Guanaco Cheng as an outpatient. Thank you for allowing me to participate in the care of this patient.         Arlyss Bumpers, M.D.    D: 03/27/2019 13:54:12       T: 03/27/2019 15:21:50     [email or web address]  Job#: [id number]     Doc#: [id number]    CC:

## 2019-03-27 NOTE — ED NOTES
Patient resting in bed call light in reach states no needs at this time no acute distress noted respirations easy and unlabored at this time        Nydia Gaines, RN  03/26/19 6186

## 2019-03-27 NOTE — ED NOTES
Bed: 006A  Expected date:   Expected time:   Means of arrival: [facility]  Comments:     Neal Gregg RN  03/26/19 2128

## 2019-03-27 NOTE — PLAN OF CARE
Problem: RESPIRATORY  Goal: Lung sounds clear or within normal limits for patient  Outcome: Ongoing  Note:   Treatment will be continued as ordered to improve aeration. Lungs clear throughout at this time. normal...

## 2019-03-27 NOTE — ED NOTES
Patient resting in bed lights dimmed for comfort at this time call light in reach no needs expressed call light within reach      Nydia Gaines RN  03/27/19 0022

## 2019-03-27 NOTE — ED NOTES
Patient transported to [facility]  by cart in stable condition. Patient monitored on cardiac telemetry. Patient on 2 LPM O2 via nasal canula. IV  line is patent.         Eli Lay, EMT-P  03/27/19 0731

## 2019-03-27 NOTE — PLAN OF CARE
Offered Home Health COPD or Pulmonary Rehab to patient? From [facility]  Zone management tool for COPD Diagnosis given to the patient as an education reference. Patient verbalizes understanding that the care team will be referencing this tool throughout their hospital stay and again on discharge. Nurse Danish West notified of the reference tool being received by the patient. Zone management tool for CHF Diagnosis given to the patient as an education reference. Patient verbalizes understanding that the care team will be referencing this tool throughout their hospital stay and again on discharge. Nurse Danish West notified of the reference tool being received by the patient.

## 2019-03-27 NOTE — DISCHARGE INSTR - COC
Continuity of Care Form    Patient Name: Violette Gomez   :  1934  MRN:  [id number]    Admit date:  3/26/2019  Discharge date:  3/30/2019    Code Status Order: DNR-CCA   Advance Directives:   [facility] Documentation     Date/Time Healthcare Directive Type of Healthcare Directive Copy in [address] Agent's Name Healthcare Agent's Phone Number    19 0030  Yes, patient has an advance directive for healthcare treatment  Durable power of  for health care  --  Adult siblings  -- Pateints sister who is - states she needs to update  --          Admitting Physician:  Concepcion Looney DO  PCP: Matt Bernal MD    Discharging Nurse: [facility] Unit/Room#: 4K-16/016-A  Discharging Unit Phone Number: [phone]    Emergency Contact:   Extended Emergency Contact Information  Primary Emergency Contact: Juan J [address] Phone: [phone]  Relation: Niece/Nephew  Secondary Emergency Contact: [address] [place] [address] Phone: [phone]  Mobile Phone: [phone]  Relation: Brother/Sister    Past Surgical History:  Past Surgical History:   Procedure Laterality Date    BREAST SURGERY      s/p right mastectomy  and left mastectomy     CARDIAC CATHETERIZATION  2010    Hemodynamics w pulmonary hypertension, systemic hypertension and no sats gradient difference. No aortic stenosis. No mitral stenosis. Coronaries; mild disease of the LAD not more than 40%. LV; severe LV dysfunction and EF 30-35%.  CARDIAC CATHETERIZATION  2007    Mild disease of small distal LAD (approximately  50% stenosis) angiographically normal CA. Mild to moderate LV systolic dysfunction. EF 35%. Mildly elevated LV end diastolic pressure. No significant MR. Systemic hypertension.     CARDIAC DEFIBRILLATOR PLACEMENT  2011    CARDIOVASCULAR STRESS TEST  03/23/10 EF 30%  Severe LV Dys    COLONOSCOPY      Dr. Ariadna Kimball  03/22/10    EF 45%. LV mildly dilated. Systolic function mildly reducted. No regional wall motion abnormalities. Wall thickness mildly increased. LA mildly dilated. MV mild annular calification. Mild TR. Ventricular septum tickness mildly increased.  ENDOSCOPY, COLON, DIAGNOSTIC      EYE SURGERY      bilateral cataracts    EYE SURGERY Left 9/2014    laser surgery for retinopathy?     FOOT SURGERY Left 1/12/2016    OTHER SURGICAL HISTORY  10/18/2018    CardioMEMS    PACEMAKER PLACEMENT      [place] EGD BALLOON DILATION ESOPHAGUS <30 MM DIAM N/A 9/18/2017    EGD ESOPHAGOGASTRODUODENOSCOPY DILATATION performed by Maria Eugenia Rebollar MD at [address] [place] [address] ESOPHAGOGASTRODUODENOSCOPY TRANSORAL DIAGNOSTIC  9/18/2017    EGD ESOPHAGOGASTRODUODENOSCOPY performed by Maria Eugenia Rebollar MD at [facility]       Immunization History:   Immunization History   Administered Date(s) Administered    Influenza Vaccine, unspecified formulation 10/31/2016    Influenza Virus Vaccine 10/03/2013, 10/09/2014, 12/24/2015    Pneumococcal 13-valent Conjugate ([id number]) 12/14/2016    Pneumococcal Polysaccharide ([id number]) 06/20/2017    Tdap (Boostrix, Adacel) 09/03/2018       Active Problems:  Patient Active Problem List   Diagnosis Code    Cardiomyopathy, nonischemic ([facility]) I42.8    Hypertension I10    LBBB (left bundle branch block) I44.7    Hyperlipidemia E78.5    [facility] Grant BiV ICD Z95.810    CKD (chronic kidney disease) stage 3, GFR 30-59 ml/min ([facility]) N18.3    Ex-smoker A69.591    Mild carotid artery disease ([facility]) I77.9    CAD (coronary artery disease) I25.10    Hyperkalemia E87.5    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 ([facility] E11.41    DM2 (diabetes mellitus, type 2) ([facility]) E11.9    GERD (gastroesophageal reflux disease) K21.9    Heart failure with preserved ejection fraction ([facility]) I50.30    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 (cerebrovascular accident) Z80.78    History of pulmonary embolism Z86.711    Iron deficiency anemia D50.9    Osteoarthritis M19.90    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    Vitamin D deficiency E55.9    Bilateral lower extremity edema R60.0    CHF (congestive heart failure), NYHA class I, acute on chronic, combined ([facility]) I50.43    COPD exacerbation (HCC) J44.1    Acute renal failure superimposed on chronic kidney disease (HCC) N17.9, N18.9    Acute on chronic congestive heart failure (HCC) I50.9    Other fluid overload (CODE) E87.79    CHF (congestive heart failure), NYHA class III, chronic, diastolic ([facility]) Z63.28    SHAILA (acute kidney injury) ([facility]) N17.9    SHAILA (acute kidney injury) ([facility] N17.9    Hyperkalemia E87.5    Contusion of right shoulder S40.011A    Contusion of left knee S80. 30KC    Folliculitis D53.8    Anemia, chronic disease D63.8    CKD (chronic kidney disease), stage IV ([facility]) N18.4    SHAILA (acute kidney injury) ([facility] N17.9       Isolation/Infection:   Isolation          Contact        Patient Infection Status     Infection Encounter Level?  Onset Date Added Added By Resolved Resolved By Review Date    MRSA No  09/06/18 MRSA by PCR       Scalp 9/2018  Ange 3/2019          Nurse Assessment:  Last Vital Signs: BP (!) 155/68   Pulse 101   Temp 98.6 °F (37 °C) (Oral)   Resp 18   Ht 5' 6\" (1.676 m)   Wt 260 lb 7 oz (118.1 kg)   SpO2 97%   BMI 42.04 kg/m²     Last documented pain score (0-10 scale): Pain Level: 0  Last Weight:   Wt Readings from Last 1 Encounters:   03/27/19 260 lb 7 oz (118.1 kg)     Mental Status:  oriented, alert, coherent, logical, thought processes intact and able to concentrate and follow conversation    IV Access:  - None    Nursing Mobility/ADLs:  Walking   Assisted  Transfer  Assisted  Bathing  Assisted  Dressing  Assisted  Toileting  Assisted  Feeding Independent  Med Admin  Assisted  Med Delivery   whole    Wound Care Documentation and Therapy:  Wound 09/05/18 Coccyx DTI (Active)   Number of days: 203        Elimination:  Continence:   · Bowel: Yes  · Bladder: Yes  Urinary Catheter: None   Colostomy/Ileostomy/Ileal Conduit: No       Date of Last BM: 3/29/2019    Intake/Output Summary (Last 24 hours) at 3/27/2019 1018  Last data filed at 3/27/2019 0328  Gross per 24 hour   Intake 371.51 ml   Output 800 ml   Net -428.49 ml     I/O last 3 completed shifts: In: 371.5 [P.O.:250; I.V.:121.5]  Out: 800 [Urine:800]    Safety Concerns: At Risk for Falls    Impairments/Disabilities:      None    Nutrition Therapy:  Current Nutrition Therapy:   - Oral Diet:  Carb Control 5 carbs/meal (2000kcals/day) and Low Sodium (3-4gm)    Routes of Feeding: Oral  Liquids: Thin Liquids  Daily Fluid Restriction: no  Last Modified Barium Swallow with Video (Video Swallowing Test): not done    Treatments at the Time of Hospital Discharge:   Respiratory Treatments: as needed  Oxygen Therapy:  is not on home oxygen therapy.   Ventilator:    - No ventilator support    Rehab Therapies: Physical Therapy  Weight Bearing Status/Restrictions: No weight bearing restirctions  Other Medical Equipment (for information only, NOT a DME order):  walker  Other Treatments: NA    Patient's personal belongings (please select all that are sent with patient):  Glasses, undergarments, pants, shoes, shirt    RN SIGNATURE:  Electronically signed by Anderson Treviño RN on 3/30/19 at 10:41 AM    CASE MANAGEMENT/SOCIAL WORK SECTION    Inpatient Status Date:  03/27/2019    Readmission Risk Assessment Score:  Readmission Risk              Risk of Unplanned Readmission:        32           Discharging to Facility/ Agency   · Name:  [facility]  · Address:  [facility] ABDIASBERNA GAMINO [facility], [address] [place] [address]  · Phone:  [phone]  · Fax:  [phone]    Dialysis Facility (if applicable) · Name:  · Address:  · Dialysis Schedule:  · Phone:  · Fax:    / signature: Electronically signed by BARBARA Pearce on 3/27/19 at 10:21 AM    PHYSICIAN SECTION    Prognosis: Good    Condition at Discharge: Stable    Rehab Potential (if transferring to Rehab): Good    Recommended Labs or Other Treatments After Discharge: N/A    Physician Certification: I certify the above information and transfer of Dhaval Smith  is necessary for the continuing treatment of the diagnosis listed and that she requires [facility] for greater 30 days.      Update Admission H&P: No change in H&P    PHYSICIAN SIGNATURE:  Electronically signed by Darwin Chavez MD on 3/30/19 at 8:47 AM

## 2019-03-27 NOTE — ED PROVIDER NOTES
[facility]  eMERGENCY dEPARTMENT eNCOUnter          [address] [facility]       Chief Complaint   Patient presents with    Shortness of Breath    Cough       Nurses Notes reviewed and I agree except as noted in the HPI. HISTORY OF PRESENT ILLNESS    Phani Veronica is a 80 y.o. female who presents to the Emergency Department from a nursing home for the evaluation of shortness of breath and a cough. The patient states that her shortness of breath started earlier this morning with out known injury or cause. The patient states that she had similar symptoms two days ago which spontaneously improved. The patient was placed on 3L O2/min via nasal canula en route. The patient reports that she does not usually require supplemental O2 and does not wear O2 at night. The patient is a former smoker of cigarettes who quit smoking approximately 60 years ago. The patient admits leg swelling which is chronic and has actually improved recently. The patient denies any chest pain, fever, chills or any other signs or symptoms at this time. The patient has a history of a bilateral mastectomy, a pacemaker placement, and HTN. The patient denies any history of Atrial fibrillation. Location/Symptom: shortness of breath  Timing/Onset: earlier this morning  Context/Setting: at rest at the nursing home  Quality: conversational dyspnea  Duration: continuous  Modifying Factors: placed on 3L O2/min via nasal canula en route  Severity: moderate    The HPI was provided by the patient. REVIEW OF SYSTEMS     Review of Systems   Constitutional: Positive for fatigue. Negative for chills and fever. HENT: Positive for congestion. Eyes: Negative for discharge. Respiratory: Positive for shortness of breath. Cardiovascular: Positive for leg swelling. Negative for chest pain. Gastrointestinal: Negative for abdominal pain and blood in stool. Genitourinary: Negative for dysuria and hematuria.         Some chronic renal insufficiency   Musculoskeletal: Negative for joint swelling. Skin: Positive for color change. Hyperemic skin on the legs   Neurological: Negative for speech difficulty and headaches. Hematological:        On plavix and coumadin   Psychiatric/Behavioral: Negative for confusion. PAST MEDICAL HISTORY    has a past medical history of CAD (coronary artery disease), Cancer ([facility] Cardiomy[facility], [facility]ischemic [facility] CHF (congestive heart failure) ([facility] CKD (chronic kidney disease) stage 3, GFR 30-59 ml/min ([facility]), Congenital heart disease, COPD exacerbation ([facility] 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 ([facility] DM2 (diabetes mellitus, type 2) ([facility] Ex-smoker, GERD (gastroesophageal reflux disease), Heart failure with preserved ejection fraction ([facility] His of Heparin induced thrombocytopenia, History of breast cancer, History of CVA (cerebrovascular accident), History of pulmonary embolism, Hyperlipidemia, Hypertension, Iron deficiency anemia, LBBB (left bundle branch block), Osteoarthritis, Paget's disease of bone, [place] BiV ICD, and Vitamin D deficiency. SURGICAL HISTORY      has a past surgical history that includes doppler echocardiography (03/22/10); cardiovascular stress test (03/23/10); pacemaker placement; eye surgery; Colonoscopy; Eye surgery (Left, 9/2014); Foot surgery (Left, 1/12/2016); Breast surgery; Cardiac catheterization (11-); Cardiac catheterization (05-); Cardiac defibrillator placement (02/02/2011); pr egd balloon dilation esophagus <30 mm diam (N/A, 9/18/2017); pr esophagogastroduodenoscopy transoral diagnostic (9/18/2017); Endoscopy, colon, diagnostic; and other surgical history (10/18/2018).     CURRENT MEDICATIONS       Previous Medications    ACETAMINOPHEN (TYLENOL) 325 MG TABLET    Take 650 mg by mouth 4 times daily     ARTIFICIAL TEAR OP    Apply to eye    ASCORBIC ACID (VITAMIN C) 500 MG TABLET    Take 500 mg by mouth daily BISACODYL (DULCOLAX) 10 MG SUPPOSITORY    Place 10 mg rectally daily as needed for Constipation    BUMETANIDE (BUMEX) 1 MG TABLET    Take 2 mg by mouth daily    CLOPIDOGREL (PLAVIX) 75 MG TABLET    Take 75 mg by mouth daily    DOCUSATE SODIUM (COLACE) 100 MG CAPSULE    Take 200 mg by mouth daily    FERROUS SULFATE (JAMIR-RAJANI) 325 (65 FE) MG TABLET    Take 1 tablet by mouth 2 times daily    GUAIFENESIN (MUCINEX) 600 MG EXTENDED RELEASE TABLET    Take 1,200 mg by mouth 2 times daily    GUAIFENESIN (ROBITUSSIN) 100 MG/5ML SYRUP    Take 200 mg by mouth every 4 hours as needed for Cough    HYDRALAZINE (APRESOLINE) 25 MG TABLET    Take 3 tablets by mouth 3 times daily    INSULIN ASPART (NOVOLOG) 100 UNIT/ML INJECTION VIAL    Inject 12 Units into the skin 2 times daily (with meals)    INSULIN GLARGINE (LANTUS) 100 UNIT/ML INJECTION VIAL    Inject 22 Units into the skin nightly    IPRATROPIUM-ALBUTEROL (DUONEB) 0.5-2.5 (3) MG/3ML SOLN NEBULIZER SOLUTION    Inhale 3 mLs into the lungs every 4 hours (while awake)    ISOSORBIDE MONONITRATE (IMDUR) 30 MG EXTENDED RELEASE TABLET    Take 1 tablet by mouth nightly Take 60 mg AM, 30 mg PM    ISOSORBIDE MONONITRATE (IMDUR) 60 MG EXTENDED RELEASE TABLET    Take 60 mg by mouth daily    MAGNESIUM HYDROXIDE (MILK OF MAGNESIA) 400 MG/5ML SUSPENSION    Take 30 mLs by mouth daily as needed for Constipation    METOPROLOL SUCCINATE (TOPROL XL) 50 MG EXTENDED RELEASE TABLET    Take 75 mg by mouth daily     MULTIPLE VITAMINS-MINERALS (MULTIVITAL) TABS    Take 1 tablet by mouth every morning    MULTIPLE VITAMINS-MINERALS (OCUVITE EXTRA PO)    Take 1 tablet by mouth daily     NITROGLYCERIN (NITROSTAT) 0.4 MG SL TABLET    up to max of 3 total doses. If no relief after 1 dose, call 911.     ONDANSETRON (ZOFRAN) 4 MG TABLET    Take 4 mg by mouth 2 times daily     PANTOPRAZOLE (PROTONIX) 40 MG TABLET    Take 40 mg by mouth 2 times daily     PRAVASTATIN (PRAVACHOL) 40 MG TABLET    Take 40 mg by mouth every evening Indications: Blood Cholesterol Abnormal     PROBIOTIC PRODUCT (PROBIOTIC-10) CAPS    Take 1 capsule by mouth    VITAMIN B-12 (CYANOCOBALAMIN) 1000 MCG TABLET    Take 1,000 mcg by mouth daily    WARFARIN (COUMADIN) 3 MG TABLET    Take by mouth daily       ALLERGIES     has No Known Allergies. FAMILY HISTORY      indicated that her mother is . She indicated that her father is . She indicated that both of her sisters are . She indicated that both of her brothers are . She indicated that her maternal grandmother is . She indicated that her maternal grandfather is . She indicated that her paternal grandmother is . She indicated that her paternal grandfather is . family history includes Cancer in her brother, sister, and sister; Diabetes in her father and mother; Heart Disease in her brother and mother; Pacemaker in her mother; Stroke in her mother. SOCIAL HISTORY      reports that she quit smoking about 67 years ago. Her smoking use included cigarettes. She has a 1.00 pack-year smoking history. She has never used smokeless tobacco. She reports that she does not drink alcohol or use drugs. PHYSICAL EXAM     INITIAL VITALS:  weight is 255 lb (115.7 kg). Her temperature is 99 °F (37.2 °C). Her blood pressure is 155/93 (abnormal) and her pulse is 93. Her respiration is 20 and oxygen saturation is 94%. Physical Exam   Constitutional: She is oriented to person, place, and time. She appears well-developed and well-nourished. GCS 15   HENT:   Head: Normocephalic and atraumatic. Eyes: Pupils are equal, round, and reactive to light. EOM are normal. No scleral icterus. Neck: Normal range of motion. Neck supple. No JVD present. Cardiovascular: Normal rate, regular rhythm and intact distal pulses. Paced rhythm   Pulmonary/Chest:   Diminished breath sounds throughout the chest   Abdominal: Soft.  Bowel sounds are normal. She exhibits creatinine 2.5 elevated compared to previous values    Hemoglobin 8.9    Diameter was slightly prolonged at 3.43    Flu swabs were negative    Lactic acid normal    Procalcitonin 0.16    Given Bumex 2 mg IV    Admit          CRITICAL CARE:   none     CONSULTS:  Dr. Cody Louis:  none     FINAL IMPRESSION      1. Acute systolic congestive heart failure ([facility]    2. SHAILA (acute kidney injury) ([facility]    3. Elevated troponin          DISPOSITION/PLAN   Admit      PATIENT REFERRED TO:  No follow-up provider specified. DISCHARGE MEDICATIONS:  New Prescriptions    No medications on file       (Please note that portions of this note were completed with a voice recognition program.  Efforts weremade to edit the dictations but occasionally words are mis-transcribed.)    Scribe:  Smiley Leong 3/26/19 9:37 PM Scribing for and in thepresence of Bessy Jordan MD.    Signed by: Dora Hutchins, 03/26/19 11:19 PM    Provider:  I personally performed the services described in the documentation, reviewed and edited the documentation which was dictated to the scribe in my presence, and it accurately records my words andactions.     Bessy Jordan MD 3/26/19 11:19 PM            Bessy Jordan MD  03/26/19 3651

## 2019-03-27 NOTE — H&P
History & Physical        Patient: Lora Gonzales  YOB: 1934    MRN: [id number]     Acct: [de-identified]    PCP: Ariane Banuelos MD    Date of Admission: 3/26/2019    Date of Service: Pt seen/examined on 3/27/2019      Chief Complaint:   Chief Complaint   Patient presents with    Shortness of Breath    Cough           History Of Present Illness:      80 y.o. female who presented to [address] [place] [address] with complaints of shortness of breath. Patient states she lives at [facility] and has had increasing shortness of breath and cough through the day. Patient has similar episode 2 days ago, but improved. She states she does not wear oxygen usually, but is requiring O2 use now. Patient denies chest pain. Denies N/V/D/C. Admits to history of CHF, but denies increased swelling. She admits to actually losing weight due to decreased edema.       Past Medical History:          Diagnosis Date    CAD (coronary artery disease)     nonobstructive    Cancer ([address] 2007    breast cancer    Cardiomyopathy, nonischemic ([address]     Dr. Ousmane Morgan CHF (congestive heart failure) ([facility]     CKD (chronic kidney disease) stage 3, GFR 30-59 ml/min ([facility])     Dr. Luiza Albright Congenital heart disease     COPD exacerbation ([address]     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 ([address]     DM2 (diabetes mellitus, type 2) ([address] 1998    with CKD3, R foot ulcer and hx of prior ulcerations and PVD    Ex-smoker 10/2/2012    GERD (gastroesophageal reflux disease)     Heart failure with preserved ejection fraction ([address]     Dr. Ousmane Morgan His of Heparin induced thrombocytopenia     History of breast cancer     right 1982 s/p mastectomy and chemo, left 2007 s/p mastectomy    History of CVA (cerebrovascular accident)     History of pulmonary embolism 05/2014    on [address] (coumadin)    Hyperlipidemia     Hypertension     pulmonary    Iron deficiency anemia     Dr. Tyron Resendez did EGD and colonoscopy 2011 - Medication Sig Start Date End Date Taking? Authorizing Provider   isosorbide mononitrate (IMDUR) 30 MG extended release tablet Take 1 tablet by mouth nightly Take 60 mg AM, 30 mg PM 3/26/19   ARELI Arrington CNP   ARTIFICIAL TEAR OP Apply to eye    Historical Provider, MD   guaiFENesin (MUCINEX) 600 MG extended release tablet Take 1,200 mg by mouth 2 times daily    Historical Provider, MD   warfarin (COUMADIN) 3 MG tablet Take by mouth daily    Historical Provider, MD   bumetanide (BUMEX) 1 MG tablet Take 2 mg by mouth daily    Historical Provider, MD   metoprolol succinate (TOPROL XL) 50 MG extended release tablet Take 75 mg by mouth daily     Historical Provider, MD   Probiotic Product (PROBIOTIC-10) CAPS Take 1 capsule by mouth    Historical Provider, MD   nitroGLYCERIN (NITROSTAT) 0.4 MG SL tablet up to max of 3 total doses.  If no relief after 1 dose, call 911. 10/18/18   Juan Manuel Gruber MD   ascorbic acid (VITAMIN C) 500 MG tablet Take 500 mg by mouth daily     Historical Provider, MD   docusate sodium (COLACE) 100 MG capsule Take 200 mg by mouth daily    Historical Provider, MD   isosorbide mononitrate (IMDUR) 60 MG extended release tablet Take 60 mg by mouth daily    Historical Provider, MD   Multiple Vitamins-Minerals (MULTIVITAL) TABS Take 1 tablet by mouth every morning    Historical Provider, MD   ferrous sulfate (JAMIR-RAJANI) 325 (65 Fe) MG tablet Take 1 tablet by mouth 2 times daily 7/9/18   ARELI Johns CNP   clopidogrel (PLAVIX) 75 MG tablet Take 75 mg by mouth daily    Historical Provider, MD   vitamin B-12 (CYANOCOBALAMIN) 1000 MCG tablet Take 1,000 mcg by mouth daily    Historical Provider, MD   ondansetron (ZOFRAN) 4 MG tablet Take 4 mg by mouth 2 times daily     Historical Provider, MD   pantoprazole (PROTONIX) 40 MG tablet Take 40 mg by mouth 2 times daily     Historical Provider, MD   ipratropium-albuterol (DUONEB) 0.5-2.5 (3) MG/3ML SOLN nebulizer solution Inhale 3 mLs into the lungs every 4 hours (while awake) 6/19/18   Gia Caraballo MD   hydrALAZINE (APRESOLINE) 25 MG tablet Take 3 tablets by mouth 3 times daily 6/19/18   ARELI Oconnor CNP   insulin aspart (NOVOLOG) 100 UNIT/ML injection vial Inject 12 Units into the skin 2 times daily (with meals)    Historical Provider, MD   insulin glargine (LANTUS) 100 UNIT/ML injection vial Inject 22 Units into the skin nightly    Historical Provider, MD   guaiFENesin (ROBITUSSIN) 100 MG/5ML syrup Take 200 mg by mouth every 4 hours as needed for Cough    Historical Provider, MD   Multiple Vitamins-Minerals (OCUVITE EXTRA PO) Take 1 tablet by mouth daily     Historical Provider, MD   bisacodyl (DULCOLAX) 10 MG suppository Place 10 mg rectally daily as needed for Constipation    Historical Provider, MD   magnesium hydroxide (MILK OF MAGNESIA) 400 MG/5ML suspension Take 30 mLs by mouth daily as needed for Constipation    Historical Provider, MD   pravastatin (PRAVACHOL) 40 MG tablet Take 40 mg by mouth every evening Indications: Blood Cholesterol Abnormal  5/8/16   Historical Provider, MD   acetaminophen (TYLENOL) 325 MG tablet Take 650 mg by mouth 4 times daily     Historical Provider, MD       Allergies:  Patient has no known allergies. Social History:      The patient currently lives at [facility] [address]:   reports that she quit smoking about 67 years ago. Her smoking use included cigarettes. She has a 1.00 pack-year smoking history. She has never used smokeless tobacco.  ETOH:   reports that she does not drink alcohol.       Family History:      Reviewed in detail and positive as follows:        Problem Relation Age of Onset    Diabetes Mother    Clint Flax Pacemaker Mother     Stroke Mother     Heart Disease Mother     Diabetes Father     Cancer Sister     Cancer Brother     Cancer Sister     Heart Disease Brother         cardiomegaly       Diet:  DIET CARB CONTROL; No Added Salt (3-4 GM)    REVIEW OF SYSTEMS:   Pertinent positives as noted in the HPI. All other systems reviewed and negative. PHYSICAL EXAM:    BP (!) 141/53   Pulse 102   Temp 99 °F (37.2 °C)   Resp 18   Wt 255 lb (115.7 kg)   SpO2 97%   BMI 41.16 kg/m²     General appearance:  Noted apparent distress, appears stated age and cooperative. HEENT:  Normal cephalic, atraumatic without obvious deformity. Pupils equal, round, and reactive to light. Extra ocular muscles intact. Conjunctivae/corneas clear. Neck: Supple, with full range of motion. No jugular venous distention. Trachea midline. Respiratory:  Increased respiratory effort. Decreased bases bilaterally  Cardiovascular:  Regular rate and rhythm with normal S1/S2 without rubs or gallops. Abdomen: Soft, non-tender, non-distended with normal bowel sounds. Musculoskeletal:  No clubbing, no cyanosis  Full range of motion without deformity. Pedal edema bilaterally. Skin: Skin color, texture, turgor normal.  No rashes or lesions. Neurologic:  Neurovascularly intact without any focal sensory/motor deficits. Cranial nerves: II-XII intact, grossly non-focal.  Psychiatric:  Alert and oriented, thought content appropriate, normal insight  Capillary Refill: Brisk,< 3 seconds   Peripheral Pulses: +2 palpable, equal bilaterally       Labs:     Recent Labs     03/26/19 2143   WBC 7.5   HGB 8.9*   HCT 29.3*        Recent Labs     03/26/19 2143      K 5.4*      CO2 26   BUN 51*   CREATININE 2.5*   CALCIUM 9.2     Recent Labs     03/26/19 2143   AST 22   ALT 16   BILIDIR <0.2   BILITOT 0.3   ALKPHOS 108     Recent Labs     03/26/19 2143   INR 3.43*     No results for input(s): Justinchelsea Hasyonathan in the last 72 hours.     Urinalysis:      Lab Results   Component Value Date    NITRU NEGATIVE 03/26/2019    WBCUA 0-2 03/26/2019    BACTERIA NONE 03/26/2019    RBCUA 3-5 03/26/2019    BLOODU NEGATIVE 03/26/2019    SPECGRAV 1.019 03/26/2019    GLUCOSEU NEGATIVE 06/12/2018       Radiology:         XR CHEST PORTABLE   Final Result   Stable appearing chest findings compared to prior study. Reinitiation of cardiomegaly and visualized pacemaker. Lung volumes remain diminished. Chronic changes are considered. Mild venous congestion is not excluded      **This report has been created using voice recognition software. It may contain minor errors which are inherent in voice recognition technology. **      Final report electronically signed by Dr. Gretta Lara on 3/26/2019 10:34 PM               DVT prophylaxis: [x] Lovenox                                 [] SCDs                                 [] SQ Heparin                                 [] Encourage ambulation           [] Already on Anticoagulation    Code Status: Full, awaiting confirmation from ECF      PT/OT Eval Status: Encouraged, if warranted    Disposition:    [] Home       [] TCU       [] Rehab       [] Psych       [] SNF       [x] [place]       [] Other-    ASSESSMENT:    Active Hospital Problems    Diagnosis Date Noted    COPD exacerbation ([facility] [J44.1]      Priority: High    Acute on chronic congestive heart failure ([facility] [I50.9]      Priority: High    SHAILA (acute kidney injury) ([facility] [N17.9] 03/27/2019     Priority: Medium    Anemia, chronic disease [D63.8] 03/06/2019    CKD [facility]ronic [facility] [N18.4] 03/06/2019    Bilateral lower extremity edema [R60.0] 10/17/2017    GERD (gastroesophageal reflux disease) [K21.9]     Osteoarthritis [M19.90]     CAD (coronary artery disease) [I25.10] 05/22/2014    Cardiomyopathy, nonischemic ([facility] [I42.8]     Hypertension [I10]     Hyperlipidemia [E78.5]     DM2 (diabetes mellitus, type 2) ([facility] [E11.9] 01/01/1998       PLAN:    Admit to Telemetry Unit  Diet ADA  Analgesics PRN  Antiemetics PRN  DVT prophylaxis  PT/OT encouraged, if warranted  IS  Warfarin, pharmacy to dose  AM labs  Duonebs Q4  Steroid therapy  Troponin trend  Strict I/Os  Daily weights  BS and [place]  Cardiology consult, apprec chart recs  Will need to re-evaluate home medications in morning  Continue to monitor closely          Thank you Macey Morrell MD for the opportunity to be involved in this patient's care.     Electronically signed by Melyssa Flannery DO on 3/27/2019 at 12:46 AM

## 2019-03-28 LAB
ALBUMIN SERPL-MCNC: 3.3 G/DL (ref 3.5–5.1)
ANION GAP SERPL CALCULATED.3IONS-SCNC: 14 MEQ/L (ref 8–16)
BUN BLDV-MCNC: 59 MG/DL (ref 7–22)
CALCIUM SERPL-MCNC: 9.3 MG/DL (ref 8.5–10.5)
CHLORIDE BLD-SCNC: 96 MEQ/L (ref 98–111)
CO2: 23 MEQ/L (ref 23–33)
CREAT SERPL-MCNC: 2.6 MG/DL (ref 0.4–1.2)
ERYTHROCYTE [DISTWIDTH] IN BLOOD BY AUTOMATED COUNT: 13.9 % (ref 11.5–14.5)
ERYTHROCYTE [DISTWIDTH] IN BLOOD BY AUTOMATED COUNT: 46 FL (ref 35–45)
GFR SERPL CREATININE-BSD FRML MDRD: 17 ML/MIN/1.73M2
GLUCOSE BLD-MCNC: 209 MG/DL (ref 70–108)
GLUCOSE BLD-MCNC: 210 MG/DL (ref 70–108)
GLUCOSE BLD-MCNC: 223 MG/DL (ref 70–108)
GLUCOSE BLD-MCNC: 230 MG/DL (ref 70–108)
GLUCOSE BLD-MCNC: 265 MG/DL (ref 70–108)
HCT VFR BLD CALC: 26.8 % (ref 37–47)
HEMOGLOBIN: 8.3 GM/DL (ref 12–16)
INR BLD: 2.29 (ref 0.85–1.13)
LACTIC ACID: 0.6 MMOL/L (ref 0.5–2.2)
MCH RBC QN AUTO: 27.9 PG (ref 26–33)
MCHC RBC AUTO-ENTMCNC: 31 GM/DL (ref 32.2–35.5)
MCV RBC AUTO: 90.2 FL (ref 81–99)
ORGANISM: ABNORMAL
PHOSPHORUS: 3.3 MG/DL (ref 2.4–4.7)
PLATELET # BLD: 156 THOU/MM3 (ref 130–400)
PMV BLD AUTO: 11.2 FL (ref 9.4–12.4)
POTASSIUM SERPL-SCNC: 5.7 MEQ/L (ref 3.5–5.2)
RBC # BLD: 2.97 MILL/MM3 (ref 4.2–5.4)
SODIUM BLD-SCNC: 133 MEQ/L (ref 135–145)
URINE CULTURE, ROUTINE: ABNORMAL
WBC # BLD: 5 THOU/MM3 (ref 4.8–10.8)

## 2019-03-28 PROCEDURE — 97166 OT EVAL MOD COMPLEX 45 MIN: CPT

## 2019-03-28 PROCEDURE — 82948 REAGENT STRIP/BLOOD GLUCOSE: CPT

## 2019-03-28 PROCEDURE — 97162 PT EVAL MOD COMPLEX 30 MIN: CPT

## 2019-03-28 PROCEDURE — 80069 RENAL FUNCTION PANEL: CPT

## 2019-03-28 PROCEDURE — 85027 COMPLETE CBC AUTOMATED: CPT

## 2019-03-28 PROCEDURE — 2060000000 HC ICU INTERMEDIATE R&B

## 2019-03-28 PROCEDURE — 97110 THERAPEUTIC EXERCISES: CPT

## 2019-03-28 PROCEDURE — 6370000000 HC RX 637 (ALT 250 FOR IP): Performed by: HOSPITALIST

## 2019-03-28 PROCEDURE — 83605 ASSAY OF LACTIC ACID: CPT

## 2019-03-28 PROCEDURE — 99232 SBSQ HOSP IP/OBS MODERATE 35: CPT | Performed by: PHYSICIAN ASSISTANT

## 2019-03-28 PROCEDURE — 6370000000 HC RX 637 (ALT 250 FOR IP): Performed by: PHARMACIST

## 2019-03-28 PROCEDURE — 36415 COLL VENOUS BLD VENIPUNCTURE: CPT

## 2019-03-28 PROCEDURE — 93005 ELECTROCARDIOGRAM TRACING: CPT | Performed by: INTERNAL MEDICINE

## 2019-03-28 PROCEDURE — 6360000002 HC RX W HCPCS: Performed by: INTERNAL MEDICINE

## 2019-03-28 PROCEDURE — 6370000000 HC RX 637 (ALT 250 FOR IP): Performed by: INTERNAL MEDICINE

## 2019-03-28 PROCEDURE — 94640 AIRWAY INHALATION TREATMENT: CPT

## 2019-03-28 PROCEDURE — 2700000000 HC OXYGEN THERAPY PER DAY

## 2019-03-28 PROCEDURE — 85610 PROTHROMBIN TIME: CPT

## 2019-03-28 PROCEDURE — 99232 SBSQ HOSP IP/OBS MODERATE 35: CPT | Performed by: HOSPITALIST

## 2019-03-28 PROCEDURE — 2580000003 HC RX 258: Performed by: INTERNAL MEDICINE

## 2019-03-28 RX ORDER — IPRATROPIUM BROMIDE AND ALBUTEROL SULFATE 2.5; .5 MG/3ML; MG/3ML
1 SOLUTION RESPIRATORY (INHALATION) EVERY 4 HOURS PRN
Status: DISCONTINUED | OUTPATIENT
Start: 2019-03-28 | End: 2019-03-30 | Stop reason: HOSPADM

## 2019-03-28 RX ORDER — WARFARIN SODIUM 3 MG/1
3 TABLET ORAL ONCE
Status: COMPLETED | OUTPATIENT
Start: 2019-03-28 | End: 2019-03-28

## 2019-03-28 RX ORDER — METOPROLOL TARTRATE 5 MG/5ML
5 INJECTION INTRAVENOUS EVERY 6 HOURS PRN
Status: DISCONTINUED | OUTPATIENT
Start: 2019-03-28 | End: 2019-03-30 | Stop reason: HOSPADM

## 2019-03-28 RX ORDER — PREDNISONE 20 MG/1
40 TABLET ORAL DAILY
Status: DISCONTINUED | OUTPATIENT
Start: 2019-03-28 | End: 2019-03-29

## 2019-03-28 RX ADMIN — METHYLPREDNISOLONE SODIUM SUCCINATE 40 MG: 40 INJECTION, POWDER, FOR SOLUTION INTRAMUSCULAR; INTRAVENOUS at 03:32

## 2019-03-28 RX ADMIN — PREDNISONE 40 MG: 20 TABLET ORAL at 09:42

## 2019-03-28 RX ADMIN — PANTOPRAZOLE SODIUM 40 MG: 40 TABLET, DELAYED RELEASE ORAL at 06:26

## 2019-03-28 RX ADMIN — ACETAMINOPHEN 650 MG: 325 TABLET ORAL at 20:40

## 2019-03-28 RX ADMIN — DOCUSATE SODIUM 200 MG: 100 CAPSULE, LIQUID FILLED ORAL at 09:32

## 2019-03-28 RX ADMIN — Medication 500 MG: at 09:32

## 2019-03-28 RX ADMIN — METOPROLOL SUCCINATE 75 MG: 50 TABLET, FILM COATED, EXTENDED RELEASE ORAL at 09:31

## 2019-03-28 RX ADMIN — PANTOPRAZOLE SODIUM 40 MG: 40 TABLET, DELAYED RELEASE ORAL at 17:11

## 2019-03-28 RX ADMIN — LEVOFLOXACIN 750 MG: 750 TABLET, FILM COATED ORAL at 23:27

## 2019-03-28 RX ADMIN — ISOSORBIDE MONONITRATE 30 MG: 30 TABLET ORAL at 20:37

## 2019-03-28 RX ADMIN — PRAVASTATIN SODIUM 40 MG: 40 TABLET ORAL at 17:08

## 2019-03-28 RX ADMIN — IPRATROPIUM BROMIDE AND ALBUTEROL SULFATE 1 AMPULE: .5; 3 SOLUTION RESPIRATORY (INHALATION) at 09:36

## 2019-03-28 RX ADMIN — BUMETANIDE 2 MG: 1 TABLET ORAL at 09:30

## 2019-03-28 RX ADMIN — MULTIPLE VITAMINS W/ MINERALS TAB 1 TABLET: TAB at 09:31

## 2019-03-28 RX ADMIN — IPRATROPIUM BROMIDE AND ALBUTEROL SULFATE 1 AMPULE: .5; 3 SOLUTION RESPIRATORY (INHALATION) at 05:21

## 2019-03-28 RX ADMIN — INSULIN LISPRO 3 UNITS: 100 INJECTION, SOLUTION INTRAVENOUS; SUBCUTANEOUS at 12:07

## 2019-03-28 RX ADMIN — INSULIN LISPRO 6 UNITS: 100 INJECTION, SOLUTION INTRAVENOUS; SUBCUTANEOUS at 17:03

## 2019-03-28 RX ADMIN — IPRATROPIUM BROMIDE AND ALBUTEROL SULFATE 1 AMPULE: .5; 3 SOLUTION RESPIRATORY (INHALATION) at 00:29

## 2019-03-28 RX ADMIN — Medication 10 ML: at 13:25

## 2019-03-28 RX ADMIN — HYDRALAZINE HYDROCHLORIDE 75 MG: 50 TABLET, FILM COATED ORAL at 09:31

## 2019-03-28 RX ADMIN — Medication 1000 MCG: at 09:32

## 2019-03-28 RX ADMIN — ACETAMINOPHEN 650 MG: 325 TABLET ORAL at 13:24

## 2019-03-28 RX ADMIN — INSULIN LISPRO 3 UNITS: 100 INJECTION, SOLUTION INTRAVENOUS; SUBCUTANEOUS at 21:06

## 2019-03-28 RX ADMIN — HYDRALAZINE HYDROCHLORIDE 75 MG: 50 TABLET, FILM COATED ORAL at 20:37

## 2019-03-28 RX ADMIN — CLOPIDOGREL BISULFATE 75 MG: 75 TABLET ORAL at 09:30

## 2019-03-28 RX ADMIN — ONDANSETRON HYDROCHLORIDE 4 MG: 4 TABLET, FILM COATED ORAL at 09:30

## 2019-03-28 RX ADMIN — INSULIN GLARGINE 22 UNITS: 100 INJECTION, SOLUTION SUBCUTANEOUS at 21:06

## 2019-03-28 RX ADMIN — FERROUS SULFATE TAB 325 MG (65 MG ELEMENTAL FE) 325 MG: 325 (65 FE) TAB at 20:37

## 2019-03-28 RX ADMIN — ACETAMINOPHEN 650 MG: 325 TABLET ORAL at 09:31

## 2019-03-28 RX ADMIN — Medication 10 ML: at 20:38

## 2019-03-28 RX ADMIN — INSULIN LISPRO 2 UNITS: 100 INJECTION, SOLUTION INTRAVENOUS; SUBCUTANEOUS at 09:08

## 2019-03-28 RX ADMIN — INSULIN LISPRO 12 UNITS: 100 INJECTION, SOLUTION INTRAVENOUS; SUBCUTANEOUS at 09:09

## 2019-03-28 RX ADMIN — ACETAMINOPHEN 650 MG: 325 TABLET ORAL at 17:08

## 2019-03-28 RX ADMIN — FERROUS SULFATE TAB 325 MG (65 MG ELEMENTAL FE) 325 MG: 325 (65 FE) TAB at 09:30

## 2019-03-28 RX ADMIN — WARFARIN SODIUM 3 MG: 3 TABLET ORAL at 17:08

## 2019-03-28 RX ADMIN — Medication 1 CAPSULE: at 09:31

## 2019-03-28 RX ADMIN — ISOSORBIDE MONONITRATE 60 MG: 60 TABLET ORAL at 09:31

## 2019-03-28 RX ADMIN — HYDRALAZINE HYDROCHLORIDE 75 MG: 50 TABLET, FILM COATED ORAL at 13:25

## 2019-03-28 ASSESSMENT — PAIN SCALES - GENERAL
PAINLEVEL_OUTOF10: 0

## 2019-03-28 NOTE — PROGRESS NOTES
Pt sitting up in chair reading a book, call light within reach. Reported off to primary RN, Catalina Alvarenga.  Syeda Peterson [facility] SURGICAL [place]

## 2019-03-28 NOTE — PROGRESS NOTES
Clinical Pharmacy Note    Warfarin consult follow-up    Recent Labs     03/28/19  0517   INR 2.29*     Recent Labs     03/26/19  2143 03/28/19  0517   HGB 8.9* 8.3*   HCT 29.3* 26.8*    156       Significant Drug-Drug Interactions:  New warfarin drug-drug interactions: none  Discontinued drug-drug interactions: none  Current warfarin drug-drug interactions: levofloxacin (NEW), methylprednisolone IV, clopidogrel (HM)        Date INR Warfarin Dose   3/27/2019 3.64 No coumadin     3/28/2019  2.29  3 mg                                        Notes:                     Daily PT/INR until stable within therapeutic range.      Nano Montez, PharmD, BCPS   3/28/2019  3:55 PM

## 2019-03-28 NOTE — PROGRESS NOTES
Hospitalist Progress Note    Patient: Kaylan Grajeda      Unit/Bed:4K-16/016-A    YOB: 1934    MRN: [id number]       Acct: [de-identified]     PCP: Christina Tobar MD    Date of Admission: 3/26/2019    Active Hospital Problems    Diagnosis Date Noted    Acute systolic congestive heart failure (HCC) [I50.21]      Priority: High    COPD exacerbation (HCC) [J44.1]      Priority: High    Acute on chronic congestive heart failure (HCC) [I50.9]      Priority: High    [facility] [N17.9] 03/27/2019    Anemia, chronic disease [D63.8] 03/06/2019    CKD (chronic kidney disease), stage IV (HCC) [N18.4] 03/06/2019    Bilateral lower extremity edema [R60.0] 10/17/2017    GERD (gastroesophageal reflux disease) [K21.9]     Osteoarthritis [M19.90]     CAD (coronary artery disease) [I25.10] 05/22/2014    Cardiomyopathy, nonischemic (HCC) [I42.8]     Hypertension [I10]     Hyperlipidemia [E78.5]     DM2 (diabetes mellitus, type 2) ([facility] [E11.9] 01/01/1998       Assessment/Plan:    - Acute hypoxic res. Failure: on 2 lpm with SaO2 97%. Rn aware to wean off    3/28: resolved. On ambient air now with Sao2 at 95%    - ACOPDE: on inhalers, IV steroid and IV levoquin which switched to renally-dosed PO. Will also switch to Po prednisone tomorrow    - chronic systolic CHF: euvolemic. Given IV bumex 2 mg once in ED. Now back on home bumex PO 2 mg QD.    - Rise in trops: trending down. Likely demand-ischemia. Already seen by cardiology which suggested continuing current medical treatment and OP cardio F/U    - CKD stage IV: stable at baseline    - DM on MDI: well-controlled, last HbA1C 4.9 on feb 6, 2019. If anything concerned for hypoglycemia. Aggressive medical mangement. BS well-controlled. CCM. OP endocrine/PCP follow-up    3/28: BS in 200s likely driven by Prednisone. Changed SSI#3        - Morbid obseity    - Chronic SARAH: Hb 8.9. on Iron. Stable.     - Hx of PE: on Warfarin      Expected discharge date: Ping Alves for discharge just awaiting for precert approval at this point         Disposition:      ? Home                             ? TCU                             ? Rehab                             ? Psych                             ? SNF                             ? [place]                             ? Other-    Chief Complaint:   Chief Complaint   Patient presents with    Shortness of Breath    Cough       Hospital Course: Patient was seen, examined and the medical chart was reviewed thoroughly today. In summary, 80 y. o.female admitted overnight for Acute hypoxic res. Failure and ACOPDE. Subjective (past 24 hours):   feeling much improved today. No CP. Pt denies worsening leg swelling, if anything she reports swelling is coming down. 3/28: feels very well.  Ready for discharge back to NH          Medications:  Reviewed    Infusion Medications    dextrose       Scheduled Medications    predniSONE  40 mg Oral Daily    acetaminophen  650 mg Oral 4x Daily    ascorbic acid  500 mg Oral Daily    bumetanide  2 mg Oral Daily    clopidogrel  75 mg Oral Daily    docusate sodium  200 mg Oral Daily    ferrous sulfate  325 mg Oral BID    hydrALAZINE  75 mg Oral TID    insulin lispro  12 Units Subcutaneous BID WC    insulin glargine  22 Units Subcutaneous Nightly    isosorbide mononitrate  60 mg Oral Daily    isosorbide mononitrate  30 mg Oral Nightly    metoprolol succinate  75 mg Oral Daily    therapeutic multivitamin-minerals  1 tablet Oral QAM    ondansetron  4 mg Oral BID    pantoprazole  40 mg Oral BID AC    pravastatin  40 mg Oral QPM    lactobacillus  1 capsule Oral Daily    vitamin B-12  1,000 mcg Oral Daily    sodium chloride flush  10 mL Intravenous 2 times per day    insulin lispro  0-6 Units Subcutaneous TID WC    insulin lispro  0-3 Units Subcutaneous Nightly    warfarin (COUMADIN) daily dosing (placeholder)   Other RX Placeholder    levofloxacin  750 mg Oral Q48H     PRN Meds: ipratropium-albuterol, bisacodyl, guaiFENesin, sodium chloride flush, magnesium hydroxide, ondansetron, glucose, dextrose, glucagon (rDNA), dextrose      Intake/Output Summary (Last 24 hours) at 3/28/2019 1521  Last data filed at 3/28/2019 1330  Gross per 24 hour   Intake 2320 ml   Output 475 ml   Net 1845 ml       Diet:  DIET CARB CONTROL; No Added Salt (3-4 GM)    Exam:  BP (!) 152/66   Pulse 79   Temp 97.8 °F (36.6 °C) (Oral)   Resp 18   Ht 5' 6\" (1.676 m)   Wt 253 lb 12.8 oz (115.1 kg)   SpO2 95%   BMI 40.96 kg/m²     General appearance: A&O x3, Not ill or toxic, in no apparent distress  HEENT:  ADAMA  EOM intact. Neck: Supple, with full range of motion. No jugular venous distention. Trachea midline. Respiratory:   ecreased A/E bilat with no adventitious sounds   Cardiovascular:  normal S1/S2 with no murmurs/gallops  Abdomen: Soft, non-tender, non-distended, no rigidity or peritoneal signs  Musculoskeletal: NL symmetrical A/PROM bilat U/L extremities   Skin: No rashes. ++ bilat. pitting edema  Neurologic:  CN II-XII intact. NL symmetrical reflexes. NL gait and stance. NL Cerebellar exam. Power 5/5 all muscle groups U/L extremities. Toes downgoing  Capillary Refill: Brisk,< 3 seconds   Peripheral Pulses: +2 palpable, equal bilaterally         Labs:   Recent Labs     03/26/19 2143 03/28/19 0517   WBC 7.5 5.0   HGB 8.9* 8.3*   HCT 29.3* 26.8*    156     Recent Labs     03/26/19 2143 03/28/19 0517    133*   K 5.4* 5.7*    96*   CO2 26 23   BUN 51* 59*   CREATININE 2.5* 2.6*   CALCIUM 9.2 9.3   PHOS  --  3.3     Recent Labs     03/26/19 2143   AST 22   ALT 16   BILIDIR <0.2   BILITOT 0.3   ALKPHOS 108     Recent Labs     03/26/19 2143 03/27/19  0532 03/28/19  0517   INR 3.43* 3.64* 2.29*     No results for input(s): Jarocho Dawkins in the last 72 hours.     Urinalysis:      Lab Results   Component Value Date    NITRU NEGATIVE 03/26/2019 WBCUA 0-2 03/26/2019    BACTERIA NONE 03/26/2019    RBCUA 3-5 03/26/2019    BLOODU NEGATIVE 03/26/2019    SPECGRAV 1.019 03/26/2019    GLUCOSEU NEGATIVE 06/12/2018       Radiology:  Xr Chest Portable    Result Date: 3/26/2019  PROCEDURE: XR CHEST PORTABLE CLINICAL INFORMATION: sob. COMPARISON: December 3, 2018 TECHNIQUE: AP upright view of the chest. FINDINGS: There are stable cardiomegaly changes and visualized bipolar pacemaker. Lung volumes remain diminished with coarse interstitial markings. Chronic changes including mild fibrosis is not excluded. Minimal venous congestion is not excluded. The skeleton appear stable. Stable appearing chest findings compared to prior study. Reinitiation of cardiomegaly and visualized pacemaker. Lung volumes remain diminished. Chronic changes are considered. Mild venous congestion is not excluded **This report has been created using voice recognition software. It may contain minor errors which are inherent in voice recognition technology. ** Final report electronically signed by Dr. Pricilla Neville on 3/26/2019 10:34 PM      Diet: DIET CARB CONTROL; No Added Salt (3-4 GM)    DVT prophylaxis: ? Lovenox                                 ? SCDs                                 ? SQ Heparin                                 ? Encourage ambulation           ?  Already on Anticoagulation       Code Status: DNR-CCA      Active Hospital Problems    Diagnosis Date Noted    Acute systolic congestive heart failure (HCC) [I50.21]      Priority: High    COPD exacerbation (HCC) [J44.1]      Priority: High    Acute on chronic congestive heart failure (HCC) [I50.9]      Priority: High    [facility]AILA (acute kidney injury) ([facility] [N17.9] 03/27/2019    Anemia, chronic disease [D63.8] 03/06/2019    CKD (chronic kidney disease), stage IV (HCC) [N18.4] 03/06/2019    Bilateral lower extremity edema [R60.0] 10/17/2017    GERD (gastroesophageal reflux disease) [K21.9]     Osteoarthritis [M19.90]     CAD

## 2019-03-28 NOTE — PROGRESS NOTES
Cardiology Progress Note      Patient: Todd Vasquez  YOB: 1934  MRN: [id number]   Acct: [de-identified]  Admit Date:  3/26/2019  Primary Cardiologist: Edwin Fox MD  Seen by dr Liu Mail    Reason for consult: chf exacerbation  hpi per dr Liu Mail \"This is an 80-year-old patient, who is  unfortunately not the best historian, who has a history of underlying  cardiomyopathy and cardiorenal syndrome, who usually follows with Dr. Doristine Ganser, as well as with the [facility]. The patient comes in  with symptoms of worsening shortness of breath, some lower extremity  edema, symptoms that are suggestive of possible fluid overload. The  patient has a history of biventricular ICD and has been managed  medically. The patient denies any active chest discomfort. The patient  is quite restricted and lives at a nursing home\"    Subjective (Events in last 24 hours): pt awake and alert. NAD. No cp or sob. Pt states she feels good today.   Sob has resolved      Objective:   /62   Pulse 94   Temp 98.1 °F (36.7 °C) (Oral)   Resp 17   Ht 5' 6\" (1.676 m)   Wt 253 lb 12.8 oz (115.1 kg)   SpO2 95%   BMI 40.96 kg/m²        TELEMETRY: paced    Physical Exam:  General Appearance: alert and oriented to person, place and time, in no acute distress  Cardiovascular: normal rate, regular rhythm, normal S1 and S2, no murmurs, rubs, clicks, or gallops, distal pulses intact, no carotid bruits, no JVD  Pulmonary/Chest: clear to auscultation bilaterally- no wheezes, rales or rhonchi, normal air movement, no respiratory distress  Abdomen: soft, non-tender, non-distended, normal bowel sounds, no masses Extremities: no cyanosis, clubbing or edema, pulse   Skin: warm and dry, no rash or erythema  Head: normocephalic and atraumatic  Eyes: pupils equal, round, and reactive to light  Neck: supple and non-tender without mass, no thyromegaly   Musculoskeletal: normal range of motion, no joint swelling, deformity or tenderness  Neurological: alert, oriented, normal speech, no focal findings or movement disorder noted    Medications:    predniSONE  40 mg Oral Daily    acetaminophen  650 mg Oral 4x Daily    ascorbic acid  500 mg Oral Daily    bumetanide  2 mg Oral Daily    clopidogrel  75 mg Oral Daily    docusate sodium  200 mg Oral Daily    ferrous sulfate  325 mg Oral BID    hydrALAZINE  75 mg Oral TID    insulin lispro  12 Units Subcutaneous BID WC    insulin glargine  22 Units Subcutaneous Nightly    isosorbide mononitrate  60 mg Oral Daily    isosorbide mononitrate  30 mg Oral Nightly    metoprolol succinate  75 mg Oral Daily    therapeutic multivitamin-minerals  1 tablet Oral QAM    ondansetron  4 mg Oral BID    pantoprazole  40 mg Oral BID AC    pravastatin  40 mg Oral QPM    lactobacillus  1 capsule Oral Daily    vitamin B-12  1,000 mcg Oral Daily    sodium chloride flush  10 mL Intravenous 2 times per day    insulin lispro  0-6 Units Subcutaneous TID WC    insulin lispro  0-3 Units Subcutaneous Nightly    warfarin (COUMADIN) daily dosing (placeholder)   Other RX Placeholder    levofloxacin  750 mg Oral Q48H      dextrose         ipratropium-albuterol 1 ampule Q4H PRN   bisacodyl 10 mg Daily PRN   guaiFENesin 200 mg Q4H PRN   sodium chloride flush 10 mL PRN   magnesium hydroxide 30 mL Daily PRN   ondansetron 4 mg Q6H PRN   glucose 15 g PRN   dextrose 12.5 g PRN   glucagon (rDNA) 1 mg PRN   dextrose 100 mL/hr PRN       Lab Data:    Cardiac Enzymes:  No results for input(s): CKTOTAL, CKMB, CKMBINDEX, TROPONINI in the last 72 hours.     CBC:   Lab Results   Component Value Date    WBC 5.0 03/28/2019    RBC 2.97 03/28/2019    HGB 8.3 03/28/2019    HCT 26.8 03/28/2019     03/28/2019       CMP:  Lab Results   Component Value Date     03/28/2019    K 5.7 03/28/2019    K 4.8 10/18/2018    CL 96 03/28/2019    CO2 23 03/28/2019    BUN 59 03/28/2019    CREATININE 2.6 03/28/2019    AGRATIO 1.4 10/23/2014    LABGLOM 17 03/28/2019    GLUCOSE 210 03/28/2019    GLUCOSE 316 11/09/2016    CALCIUM 9.3 03/28/2019       Hepatic Function Panel:  Lab Results   Component Value Date    ALKPHOS 108 03/26/2019    ALT 16 03/26/2019    AST 22 03/26/2019    PROT 6.7 03/26/2019    BILITOT 0.3 03/26/2019    BILIDIR <0.2 03/26/2019    LABALBU 3.3 03/28/2019       Magnesium:    Lab Results   Component Value Date    MG 1.7 09/06/2018       PT/INR:    Lab Results   Component Value Date    PROTIME 24.2 11/21/2017    PROTIME 11.1 09/18/2017    INR 2.29 03/28/2019       HgBA1c:    Lab Results   Component Value Date    LABA1C 4.9 02/06/2019       FLP:  Lab Results   Component Value Date    TRIG 124 10/30/2018    HDL 28 10/30/2018    LDLCALC 34 10/30/2018       TSH:    Lab Results   Component Value Date    TSH 2.650 06/13/2018         Assessment:    COPD exacerbation - treatment per attending  Chronic diastolic CHF - appears compensated  Ef 55-60 per echo 6/13/18  Hx severe LVD s/p BiV ICD  S/p cardiomems 10/18/18  Nonobstructive CAD per cath 12/2016 - 50% distal LAD dz, patent LM/RCA/LCx  PVD - hx left SFA PTA  HTN  DM  CKD  Hx PE - on coumadin  Morbid obesity  Anemia - attending to follow    Plan:  · Cont plavix/statin/BB/imdur/hydralazine/bumex  · No ace/arb due to CKD  · No further ischemic w/up at this time  · Will see prn  · F/up dr Jia France 2-4 weeks         Electronically signed by Chase Corcoran PA-C on 3/28/2019 at 11:12 AM

## 2019-03-28 NOTE — PROGRESS NOTES
Pt resting in chair reading a book, call light within reach. Denies any c/o at this time.  SN Jessica Bloomfield SURGICAL [place]

## 2019-03-28 NOTE — CARE COORDINATION
3/28/19, 2:26 PM    DISCHARGE BARRIERS      SW received a call from Roxy Rasmussen with ECF, she is sending the pre-cert in now.

## 2019-03-28 NOTE — PROGRESS NOTES
Head to toe assessment completed. Pt A&OX3, no c/o at this time, speech clear and appropriate. Pt sitting up in chair. Upper extremities warm, dry and pale, sensation present, no numbness or tingling noted. Hand grasp moderate and equal bilaterally. Skin turgor and capillary refill <3 sec bilaterally. Apical pulse 94 irregular rate and rhythm. Lung sounds clear throughout, diminished in lower lobes. Bowel sounds active X4, no masses or tenderness felt upon palpation. Abdomen soft and round. Lower extremities warm, dry, and red, sensation present, no numbness or tingling noted. 2+ edema to lower legs and feet bilaterally. Lower legs red and hard. Pedal push and pull moderate bilaterally. Posterior tibialis and dorsalis pedis 1+ present and equal bilaterally. Call light within reach.  SN Jeffrey Poland SURGICAL [place]

## 2019-03-28 NOTE — PLAN OF CARE
Problem: Falls - Risk of:  Goal: Will remain free from falls  Description  Will remain free from falls  Outcome: Ongoing     Problem: Falls - Risk of:  Goal: Absence of physical injury  Description  Absence of physical injury  Outcome: Ongoing     Problem: DISCHARGE BARRIERS  Goal: Patient's continuum of care needs are met  Outcome: Ongoing     Problem: HEMODYNAMIC STATUS  Goal: Patient has stable vital signs and fluid balance  Outcome: Ongoing

## 2019-03-28 NOTE — PROGRESS NOTES
Another student and I assisted pt to the bathroom. Pt used walker and gait belt, pt tolerated well, gait steady.  Francine Iglesias, [facility] SURGICAL [place]

## 2019-03-28 NOTE — PROGRESS NOTES
[address] [facility] PHYSICAL THERAPY  EVALUATION  [facility] ICU STEPDOWN TELEMETRY 4K - 4K-16/016-A    Time In: 0710  Time Out: 9879  Timed Code Treatment Minutes: 8 Minutes  Minutes: 24          Date: 3/28/2019  Patient Name: Vesta Oviedo,  Gender:  female        MRN: [id number]  : 1934  (80 y.o.)      Referring Practitioner: Dr. Jozef Quinonez  Diagnosis: COPD exacerbation  Additional Pertinent Hx: admit with above diagnosis, CHF, SHAILA on CKD     Past Medical History:   Diagnosis Date    CAD (coronary artery disease)     nonobstructive    Cancer ([facility]     breast cancer    Cardiomy[facility], [facility]ischemic [facility]     Dr. Alexander Link CHF (congestive heart failure) ([facility]     CKD (chronic kidney disease) stage 3, GFR 30-59 ml/min ([facility])     Dr. Ofelia Feliciano Congenital heart disease     COPD exacerbation [facility]     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 ([facility]     DM2 (diabetes mellitus, type 2) ([facility] 1998    with CKD3, R foot ulcer and hx of prior ulcerations and PVD    Ex-smoker 10/2/2012    GERD (gastroesophageal reflux disease)     Heart failure with preserved ejection fraction ([facility]     Dr. Alexander Link His of Heparin induced thrombocytopenia     History of breast cancer     right  s/p mastectomy and chemo, left  s/p mastectomy    History of CVA (cerebrovascular accident)     History of pulmonary embolism 2014    on [address] (coumadin)    Hyperlipidemia     Hypertension     pulmonary    Iron deficiency anemia     Dr. 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 No aortic stenosis. No mitral stenosis. Coronaries; mild disease of the LAD not more than 40%. LV; severe LV dysfunction and EF 30-35%.  CARDIAC CATHETERIZATION  05-    Mild disease of small distal LAD (approximately  50% stenosis) angiographically normal CA. Mild to moderate LV systolic dysfunction. EF 35%. Mildly elevated LV end diastolic pressure. No significant MR. Systemic hypertension.  CARDIAC DEFIBRILLATOR PLACEMENT  02/02/2011    CARDIOVASCULAR STRESS TEST  03/23/10    EF 30%  Severe LV Dys    COLONOSCOPY      Dr. Lindsay Arango  03/22/10    EF 45%. LV mildly dilated. Systolic function mildly reducted. No regional wall motion abnormalities. Wall thickness mildly increased. LA mildly dilated. MV mild annular calification. Mild TR. Ventricular septum tickness mildly increased.  ENDOSCOPY, COLON, DIAGNOSTIC      EYE SURGERY      bilateral cataracts    EYE SURGERY Left 9/2014    laser surgery for retinopathy?  FOOT SURGERY Left 1/12/2016    OTHER SURGICAL HISTORY  10/18/2018    CardioMEMS    PACEMAKER PLACEMENT      WI EGD BALLOON DILATION ESOPHAGUS <30 MM DIAM N/A 9/18/2017    EGD ESOPHAGOGASTRODUODENOSCOPY DILATATION performed by Heydi Crockett MD at [facility]    WI ESOPHAGOGASTRODUODENOSCOPY TRANSORAL DIAGNOSTIC  9/18/2017    EGD ESOPHAGOGASTRODUODENOSCOPY performed by Heydi Crockett MD at [facility]       Restrictions/Precautions:  General Precautions, Fall Risk, Contact Precautions(MRSA)    Subjective:  Chart Reviewed: Yes  Patient assessed for rehabilitation services?: Yes  Family / Caregiver Present: No  Subjective: pleasant and cooperative    General:       Vision: Impaired  Vision Exceptions: Wears glasses at all times    Hearing: Within functional limits         Pain:  Denies.   Pain Assessment  Pain Level: 0       Social/Functional History:    Lives With: Alone  Type of Home: fair, pt with generalized weakness and deconditioning, dec balance, heart monitor, inc assist for safe mobility with use of RW, recommend cont PT to inc pt functional mobility  Prognosis: Good    Clinical Presentation: Moderate - Evolving with Changing Characteristics:   pt with generalized weakness and deconditioning, dec balance, heart monitor, inc assist for safe mobility with use of RW, recommend cont PT     Decision Making: High Complexitybased on patient assessment and decision making process of determining plan of care and establishing reasonable expectations for measurable functional outcomes    REQUIRES PT FOLLOW UP: Yes    Discharge Recommendations:  Discharge Recommendations: Continue to assess pending progress, ECF with PT, Patient would benefit from continued therapy after discharge    Patient Education:  Patient Education: POC    Equipment Recommendations:  Equipment Needed: No    Safety:  Type of devices: All fall risk precautions in place, Left in chair, Gait belt, Call light within reach, Patient at risk for falls, Chair alarm in place, Nurse notified    Plan:  Times per week: 3-5X GM  Times per day: Daily  Specific instructions for Next Treatment: therex and mobility    Goals:  Patient goals : plans return to SNF  Short term goals  Time Frame for Short term goals: 2 weeks  Short term goal 1: bed mobility with CGA to get in/out of bed  Short term goal 2: transfer with CGA to get in/out of chairs  Short term goal 3: amb 10'x1 with RW and CGA to walk safely to bathroom  Long term goals  Time Frame for Long term goals : no LTGs set secondary to short ELOS    Evaluation Complexity: Based on the findings of patient history, examination, clinical presentation, and decision making during this evaluation, the evaluation of Iva Reynaga  is of medium complexity.             AM-PAC Inpatient Mobility without Stair Climbing Raw Score : 13  AM-PAC Inpatient without Stair Climbing T-Scale Score : 38.96  Mobility Inpatient CMS 0-100% Score: 58.44  Mobility Inpatient without Stair CMS G-Code Modifier : CK

## 2019-03-28 NOTE — PROGRESS NOTES
required 2 attempts before able to complete; minimal cues for technique)  Stand to sit: Contact guard assistance(to bedside chair)    Balance  Sitting Balance: Stand by assistance(sitting unsupported in chair)  Standing Balance: Contact guard assistance     Time: X30 seconds  Activity: prep for mobility     Functional Mobility  Functional - Mobility Device: Rolling Walker  Activity: Other(to/from door of rom)  Assist Level: Contact guard assistance  Functional Mobility Comments: Slower pace, minimal cues for upright posture and keeping RW close. No LOB. Type of ROM/Therapeutic Exercise  Type of ROM/Therapeutic Exercise: AROM  Comment: Pt completed BUE /BLE AROM theraband exercises while seated in bedside chair supine in bed. Pt completed 1 set X25 reps in all planes with short rest breaks in between variations. Minimal cues required for quality of movement. Prolonged hold at end range for UE exercises. Exercises completed to increase overall strength/endurance needed for ADLs and transfers. Activity Tolerance:  Activity Tolerance: Patient Tolerated treatment well    Treatment Initiated:  BUE/BLE exercsies    Assessment:  Assessment: Pt presents requiring increased assistance for ADLs, transfers, and mobility compared to PLOF. Pt will continue to benefit from OT services to increase strength/endurance for improved independence with these tasks to facilitate return to PLOF. Performance deficits / Impairments: Decreased functional mobility , Decreased ADL status, Decreased strength, Decreased endurance, Decreased balance  Prognosis: Fair    Clinical Decision Making: Clinical Decision making was of Moderate Complexity as the result of analysis of data from a detailed assessment, a consideration of several treatment options, the presence of comorbidities affecting the plan of care and the need for minimal to moderate modifications or assistance required to complete the evaluation.     Discharge

## 2019-03-29 LAB
EKG ATRIAL RATE: 0 BPM
EKG Q-T INTERVAL: 142 MS
EKG QRS DURATION: 146 MS
EKG QTC CALCULATION (BAZETT): 268 MS
EKG R AXIS: -14 DEGREES
EKG T AXIS: 0 DEGREES
EKG VENTRICULAR RATE: 214 BPM
GLUCOSE BLD-MCNC: 152 MG/DL (ref 70–108)
GLUCOSE BLD-MCNC: 157 MG/DL (ref 70–108)
GLUCOSE BLD-MCNC: 228 MG/DL (ref 70–108)
GLUCOSE BLD-MCNC: 255 MG/DL (ref 70–108)
INR BLD: 1.92 (ref 0.85–1.13)
MRSA SCREEN: NORMAL

## 2019-03-29 PROCEDURE — 85610 PROTHROMBIN TIME: CPT

## 2019-03-29 PROCEDURE — 36415 COLL VENOUS BLD VENIPUNCTURE: CPT

## 2019-03-29 PROCEDURE — 2580000003 HC RX 258: Performed by: INTERNAL MEDICINE

## 2019-03-29 PROCEDURE — 93010 ELECTROCARDIOGRAM REPORT: CPT | Performed by: INTERNAL MEDICINE

## 2019-03-29 PROCEDURE — 82948 REAGENT STRIP/BLOOD GLUCOSE: CPT

## 2019-03-29 PROCEDURE — 2500000003 HC RX 250 WO HCPCS: Performed by: PHYSICIAN ASSISTANT

## 2019-03-29 PROCEDURE — 99233 SBSQ HOSP IP/OBS HIGH 50: CPT | Performed by: HOSPITALIST

## 2019-03-29 PROCEDURE — 6370000000 HC RX 637 (ALT 250 FOR IP): Performed by: INTERNAL MEDICINE

## 2019-03-29 PROCEDURE — 2060000000 HC ICU INTERMEDIATE R&B

## 2019-03-29 PROCEDURE — 6370000000 HC RX 637 (ALT 250 FOR IP): Performed by: PHARMACIST

## 2019-03-29 PROCEDURE — 6370000000 HC RX 637 (ALT 250 FOR IP): Performed by: HOSPITALIST

## 2019-03-29 RX ORDER — WARFARIN SODIUM 3 MG/1
3 TABLET ORAL ONCE
Status: COMPLETED | OUTPATIENT
Start: 2019-03-29 | End: 2019-03-29

## 2019-03-29 RX ORDER — HYDRALAZINE HYDROCHLORIDE 50 MG/1
100 TABLET, FILM COATED ORAL 3 TIMES DAILY
Status: DISCONTINUED | OUTPATIENT
Start: 2019-03-29 | End: 2019-03-30 | Stop reason: HOSPADM

## 2019-03-29 RX ORDER — METOPROLOL SUCCINATE 100 MG/1
100 TABLET, EXTENDED RELEASE ORAL DAILY
Status: DISCONTINUED | OUTPATIENT
Start: 2019-03-30 | End: 2019-03-30 | Stop reason: HOSPADM

## 2019-03-29 RX ADMIN — FERROUS SULFATE TAB 325 MG (65 MG ELEMENTAL FE) 325 MG: 325 (65 FE) TAB at 07:59

## 2019-03-29 RX ADMIN — INSULIN LISPRO 3 UNITS: 100 INJECTION, SOLUTION INTRAVENOUS; SUBCUTANEOUS at 08:05

## 2019-03-29 RX ADMIN — INSULIN LISPRO 3 UNITS: 100 INJECTION, SOLUTION INTRAVENOUS; SUBCUTANEOUS at 12:05

## 2019-03-29 RX ADMIN — HYDRALAZINE HYDROCHLORIDE 75 MG: 50 TABLET, FILM COATED ORAL at 14:36

## 2019-03-29 RX ADMIN — METOPROLOL SUCCINATE 75 MG: 50 TABLET, FILM COATED, EXTENDED RELEASE ORAL at 07:58

## 2019-03-29 RX ADMIN — BUMETANIDE 2 MG: 1 TABLET ORAL at 07:58

## 2019-03-29 RX ADMIN — HYDRALAZINE HYDROCHLORIDE 75 MG: 50 TABLET, FILM COATED ORAL at 07:59

## 2019-03-29 RX ADMIN — MULTIPLE VITAMINS W/ MINERALS TAB 1 TABLET: TAB at 07:58

## 2019-03-29 RX ADMIN — PANTOPRAZOLE SODIUM 40 MG: 40 TABLET, DELAYED RELEASE ORAL at 17:08

## 2019-03-29 RX ADMIN — ONDANSETRON HYDROCHLORIDE 4 MG: 4 TABLET, FILM COATED ORAL at 21:48

## 2019-03-29 RX ADMIN — CLOPIDOGREL BISULFATE 75 MG: 75 TABLET ORAL at 07:58

## 2019-03-29 RX ADMIN — INSULIN LISPRO 5 UNITS: 100 INJECTION, SOLUTION INTRAVENOUS; SUBCUTANEOUS at 21:52

## 2019-03-29 RX ADMIN — INSULIN GLARGINE 22 UNITS: 100 INJECTION, SOLUTION SUBCUTANEOUS at 21:52

## 2019-03-29 RX ADMIN — Medication 10 ML: at 08:05

## 2019-03-29 RX ADMIN — PANTOPRAZOLE SODIUM 40 MG: 40 TABLET, DELAYED RELEASE ORAL at 06:41

## 2019-03-29 RX ADMIN — PRAVASTATIN SODIUM 40 MG: 40 TABLET ORAL at 17:09

## 2019-03-29 RX ADMIN — Medication 500 MG: at 07:58

## 2019-03-29 RX ADMIN — DOCUSATE SODIUM 200 MG: 100 CAPSULE, LIQUID FILLED ORAL at 08:02

## 2019-03-29 RX ADMIN — HYDRALAZINE HYDROCHLORIDE 100 MG: 50 TABLET, FILM COATED ORAL at 21:48

## 2019-03-29 RX ADMIN — Medication 1 CAPSULE: at 07:58

## 2019-03-29 RX ADMIN — ONDANSETRON HYDROCHLORIDE 4 MG: 4 TABLET, FILM COATED ORAL at 08:08

## 2019-03-29 RX ADMIN — Medication 1000 MCG: at 07:58

## 2019-03-29 RX ADMIN — METOPROLOL TARTRATE 5 MG: 5 INJECTION INTRAVENOUS at 00:00

## 2019-03-29 RX ADMIN — ACETAMINOPHEN 650 MG: 325 TABLET ORAL at 07:59

## 2019-03-29 RX ADMIN — WARFARIN SODIUM 3 MG: 3 TABLET ORAL at 17:09

## 2019-03-29 RX ADMIN — INSULIN LISPRO 6 UNITS: 100 INJECTION, SOLUTION INTRAVENOUS; SUBCUTANEOUS at 17:11

## 2019-03-29 RX ADMIN — ISOSORBIDE MONONITRATE 60 MG: 60 TABLET ORAL at 07:58

## 2019-03-29 RX ADMIN — ISOSORBIDE MONONITRATE 30 MG: 30 TABLET ORAL at 21:48

## 2019-03-29 RX ADMIN — METOPROLOL TARTRATE 5 MG: 5 INJECTION INTRAVENOUS at 14:38

## 2019-03-29 RX ADMIN — ACETAMINOPHEN 650 MG: 325 TABLET ORAL at 14:36

## 2019-03-29 RX ADMIN — ACETAMINOPHEN 650 MG: 325 TABLET ORAL at 17:08

## 2019-03-29 RX ADMIN — ACETAMINOPHEN 650 MG: 325 TABLET ORAL at 21:48

## 2019-03-29 RX ADMIN — FERROUS SULFATE TAB 325 MG (65 MG ELEMENTAL FE) 325 MG: 325 (65 FE) TAB at 21:48

## 2019-03-29 RX ADMIN — PREDNISONE 40 MG: 20 TABLET ORAL at 07:58

## 2019-03-29 RX ADMIN — Medication 10 ML: at 22:01

## 2019-03-29 ASSESSMENT — PAIN SCALES - GENERAL
PAINLEVEL_OUTOF10: 0

## 2019-03-29 NOTE — FLOWSHEET NOTE
03/28/19 2340   Provider Notification   Reason for Communication New orders   Provider Name darnell Capellan   Provider Notification Physician Assistant   Method of Communication Secure Message   Response See orders   Notification Time [phone]- Pt BP has been running high, At 2030 she was 174/71 and I gave 75mg Hydralazine PO and Imdur 30mg extended release. This last BP was 182/80 automatic and recheck manual was 180/80. Would you like to order anything else? Thank you! IV lopressor order was placed     0129- I gave IV Lopressor and rechecked BP in an hour. Pt is still maintaining BP in the 180s    Cleviprex order placed     0210- I went to hang the cleviprex and rechecked her BP, she is now 166/68 would you like me to proceed with the drip or hold off for now?     Orders given to hold off on drip

## 2019-03-29 NOTE — PROGRESS NOTES
Clinical Pharmacy Note    Warfarin consult follow-up    Recent Labs     03/29/19  0531   INR 1.92*     Recent Labs     03/26/19  2143 03/28/19  0517   HGB 8.9* 8.3*   HCT 29.3* 26.8*    156       Significant Drug-Drug Interactions:  New warfarin drug-drug interactions: prednisone  Discontinued drug-drug interactions: methylprednisolone   Current warfarin drug-drug interactions: levofloxacin (NEW), clopidogrel (HM), prednisone (NEW)        Date INR Warfarin Dose   3/27/2019 3.64 No coumadin     3/28/2019  2.29  3 mg     3/29/2019  1.92  3 mg                       Notes:                     Daily PT/INR until stable within therapeutic range.      Lyle Adams, PharmD, BCPS   3/29/2019  7:45 AM

## 2019-03-29 NOTE — CARE COORDINATION
3/29/19, 3:07 PM  Elsie Guerrero day: 2  Location: [facility]16/016- Reason for admit: COPD exacerbation ([facility] [J44.1]   On [email or web address], BP up to 180-200s today, medications adjusted. IMM updated. Discharge plan: Return to [facility].

## 2019-03-29 NOTE — FLOWSHEET NOTE
03/29/19 1424   Encounter Summary   Services provided to: Patient   Referral/Consult From: [facility] members   Place [facility]  [place] [facility].[email or web address], inactive at this time   [employer] No   Continue Visiting Yes  (3/29/19 continue support)   Complexity of Encounter Moderate   Length of Encounter 15 minutes   Spiritual/Anglican   Type Spiritual support   Assessment Calm; Approachable;Coping   Intervention Active listening;Nurtured hope;Discussed illness/injury and it's impact   Outcome Expressed gratitude;Engaged in conversation;Coping; Rachana John is a 80year old female. She is sitting in her recliner chair while talking on the phone as I entered the room. There were no family members present during this visit with Marsha Prater. Marsha Prater shared with me that she came in the hospital because she could not breathe on Tuesday. She said her blood pressure went up and is still high. She has experienced multiple deaths in her family. Patent is receiving support from her nieces. She also loss her  several years ago. Patient lives in a nursing facility at this time. Patient is hopeful that she will have the blood sugar level under control and be able to go back home to her nursing facility.  provided emotional support and offered encouragement.   will follow up to talk with patient about what kind of goal she is setting for when she is discharged from the hospital.

## 2019-03-29 NOTE — PROGRESS NOTES
Hospitalist Progress Note    Patient: Tammie Capone      Unit/Bed:4K-16/016-A    YOB: 1934    MRN: [id number]       Acct: [de-identified]     PCP: Luis Antonio Puentes MD    Date of Admission: 3/26/2019    Active Hospital Problems    Diagnosis Date Noted    Acute systolic congestive heart failure (HCC) [I50.21]      Priority: High    COPD exacerbation (HCC) [J44.1]      Priority: High    Acute on chronic congestive heart failure (HCC) [I50.9]      Priority: High    SHAILA (acute kidney injury) ([facility] [N17.9] 03/27/2019    Anemia, chronic disease [D63.8] 03/06/2019    CKD (chronic kidney disease), stage IV (HCC) [N18.4] 03/06/2019    Bilateral lower extremity edema [R60.0] 10/17/2017    GERD (gastroesophageal reflux disease) [K21.9]     Osteoarthritis [M19.90]     CAD (coronary artery disease) [I25.10] 05/22/2014    Cardiomyopathy, nonischemic (HCC) [I42.8]     Hypertension [I10]     Hyperlipidemia [E78.5]     DM2 (diabetes mellitus, type 2) ([facility] [E11.9] 01/01/1998       Assessment/Plan:      -  Acute hypoxic res. Failure: on 2 lpm with SaO2 97%. Rn aware to wean off    3/28: resolved. On ambient air now with Sao2 at 95%    - ACOPDE: on inhalers, IV steroid and IV levoquin which switched to renally-dosed PO. Will also switch to PO prednisone tomorrow    - Uncontrolled BP: likely worsened by Prednisone which will stop today as resp status returned to baseline. Also, noted started on drip overnight which we stopped. Increased BB to 100 from 75 and Hydralazine to 100 TID from 75. Will have to keep pt overnight to monitor BP with plan d/c tomorrow if BP well controlled       - chronic systolic CHF: euvolemic. Given IV bumex 2 mg once in ED. Now back on home bumex PO 2 mg QD.    - Rise in trops: trending down. Likely demand-ischemia.  Already seen by cardiology which suggested continuing current medical treatment and OP cardio F/U    - CKD stage IV: stable at baseline    - DM on MDI: well-controlled, last HbA1C 4.9 on feb 6, 2019. If anything concerned for hypoglycemia. Aggressive medical mangement. BS well-controlled. CCM. OP endocrine/PCP follow-up    3/28: BS in 200s likely driven by Prednisone. Changed SSI#3        - Morbid obseity    - Chronic SARAH: Hb 8.9. on Iron. Stable. - Hx of PE: on Warfarin      Expected discharge date: discharge delayed d/t unexpected acute uncontrolled HTN     Disposition:      ? Home                             ? TCU                             ? Rehab                             ? Psych                             ? SNF                             ? [place]                             ? Other-    Chief Complaint:   Chief Complaint   Patient presents with    Shortness of Breath    Cough       Hospital Course: Patient was seen, examined and the medical chart was reviewed thoroughly today. In summary, 80 y. o.female admitted overnight for Acute hypoxic res. Failure and ACOPDE. Subjective (past 24 hours):   feeling much improved today. No CP. Pt denies worsening leg swelling, if anything she reports swelling is coming down. 3/28: feels very well. Ready for discharge back to [facility] [place]    3/29: No complaints. No CP/SOB.           Medications:  Reviewed    Infusion Medications    dextrose       Scheduled Medications    warfarin  3 mg Oral Once    [START ON 3/30/2019] metoprolol succinate  100 mg Oral Daily    hydrALAZINE  100 mg Oral TID    predniSONE  40 mg Oral Daily    insulin lispro  0-18 Units Subcutaneous TID WC    insulin lispro  0-9 Units Subcutaneous Nightly    acetaminophen  650 mg Oral 4x Daily    ascorbic acid  500 mg Oral Daily    bumetanide  2 mg Oral Daily    clopidogrel  75 mg Oral Daily    docusate sodium  200 mg Oral Daily    ferrous sulfate  325 mg Oral BID    insulin glargine  22 Units Subcutaneous Nightly    isosorbide mononitrate  60 mg Oral Daily    isosorbide mononitrate  30 mg Oral Nightly    therapeutic multivitamin-minerals  1 tablet Oral QAM    ondansetron  4 mg Oral BID    pantoprazole  40 mg Oral BID AC    pravastatin  40 mg Oral QPM    lactobacillus  1 capsule Oral Daily    vitamin B-12  1,000 mcg Oral Daily    sodium chloride flush  10 mL Intravenous 2 times per day    warfarin (COUMADIN) daily dosing (placeholder)   Other RX Placeholder    levofloxacin  750 mg Oral Q48H     PRN Meds: ipratropium-albuterol, metoprolol, bisacodyl, guaiFENesin, sodium chloride flush, magnesium hydroxide, ondansetron, glucose, dextrose, glucagon (rDNA), dextrose      Intake/Output Summary (Last 24 hours) at 3/29/2019 1505  Last data filed at 3/29/2019 1428  Gross per 24 hour   Intake 1340 ml   Output 950 ml   Net 390 ml       Diet:  DIET CARB CONTROL; No Added Salt (3-4 GM)    Exam:  BP (!) 206/76   Pulse 71   Temp 97.7 °F (36.5 °C) (Oral)   Resp 16   Ht 5' 6\" (1.676 m)   Wt 253 lb 9.6 oz (115 kg)   SpO2 91%   BMI 40.93 kg/m²     General appearance: A&O x3, Not ill or toxic, in no apparent distress  HEENT:  ADAMA  EOM intact. Neck: Supple, with full range of motion. No jugular venous distention. Trachea midline. Respiratory:   ecreased A/E bilat with no adventitious sounds   Cardiovascular:  normal S1/S2 with no murmurs/gallops  Abdomen: Soft, non-tender, non-distended, no rigidity or peritoneal signs  Musculoskeletal: NL symmetrical A/PROM bilat U/L extremities   Skin: No rashes. ++ bilat. pitting edema  Neurologic:  CN II-XII intact. NL symmetrical reflexes. NL gait and stance. NL Cerebellar exam. Power 5/5 all muscle groups U/L extremities.  Toes downgoing  Capillary Refill: Brisk,< 3 seconds   Peripheral Pulses: +2 palpable, equal bilaterally         Labs:   Recent Labs     03/26/19 2143 03/28/19 0517   WBC 7.5 5.0   HGB 8.9* 8.3*   HCT 29.3* 26.8*    156     Recent Labs     03/26/19 2143 03/28/19 0517    133*   K 5.4* 5.7*    96*   CO2 26 23   BUN 51* 59*   CREATININE 2.5* 2.6* CALCIUM 9.2 9.3   PHOS  --  3.3     Recent Labs     03/26/19  2143   AST 22   ALT 16   BILIDIR <0.2   BILITOT 0.3   ALKPHOS 108     Recent Labs     03/27/19  0532 03/28/19  0517 03/29/19  0531   INR 3.64* 2.29* 1.92*     No results for input(s): Caryn Hinds in the last 72 hours. Urinalysis:      Lab Results   Component Value Date    NITRU NEGATIVE 03/26/2019    WBCUA 0-2 03/26/2019    BACTERIA NONE 03/26/2019    RBCUA 3-5 03/26/2019    BLOODU NEGATIVE 03/26/2019    SPECGRAV 1.019 03/26/2019    GLUCOSEU NEGATIVE 06/12/2018       Radiology:  Xr Chest Portable    Result Date: 3/26/2019  PROCEDURE: XR CHEST PORTABLE CLINICAL INFORMATION: sob. COMPARISON: December 3, 2018 TECHNIQUE: AP upright view of the chest. FINDINGS: There are stable cardiomegaly changes and visualized bipolar pacemaker. Lung volumes remain diminished with coarse interstitial markings. Chronic changes including mild fibrosis is not excluded. Minimal venous congestion is not excluded. The skeleton appear stable. Stable appearing chest findings compared to prior study. Reinitiation of cardiomegaly and visualized pacemaker. Lung volumes remain diminished. Chronic changes are considered. Mild venous congestion is not excluded **This report has been created using voice recognition software. It may contain minor errors which are inherent in voice recognition technology. ** Final report electronically signed by Dr. Carter Ritchie on 3/26/2019 10:34 PM      Diet: DIET CARB CONTROL; No Added Salt (3-4 GM)    DVT prophylaxis: ? Lovenox                                 ? SCDs                                 ? SQ Heparin                                 ? Encourage ambulation           ?  Already on Anticoagulation       Code Status: DNR-CCA      Active Hospital Problems    Diagnosis Date Noted    Acute systolic congestive heart failure (HCC) [I50.21]      Priority: High    COPD exacerbation (HCC) [J44.1]      Priority: High    Acute on chronic congestive heart failure (HCC) [I50.9]      Priority: High    [facility] [N17.9] 03/27/2019    Anemia, chronic disease [D63.8] 03/06/2019    CKD (chronic kidney disease), stage IV (HCC) [N18.4] 03/06/2019    Bilateral lower extremity edema [R60.0] 10/17/2017    GERD (gastroesophageal reflux disease) [K21.9]     Osteoarthritis [M19.90]     CAD (coronary artery disease) [I25.10] 05/22/2014    Cardi[facility]ischemic [facility] [I42.8]     Hypertension [I10]     Hyperlipidemia [E78.5]     DM2 (diabetes mellitus, type 2) ([facility] [E11.9] 01/01/1998           Patient was updated about the treatment plan, all the questions and concerns were addressed.         Electronically signed by Bari Ramsey MD on 3/29/2019 at 3:05 PM

## 2019-03-29 NOTE — PLAN OF CARE
Problem: Falls - Risk of:  Goal: Will remain free from falls  Description  Will remain free from falls  3/28/2019 2307 by Ana Mares RN  Outcome: Met This Shift  Note:   Pt remains free from falls this shift. Bed/chair alarm on, call light and personal items within reach. Pt is up to bathroom with help from staff       Problem: DISCHARGE BARRIERS  Goal: Patient's continuum of care needs are met  3/28/2019 2307 by Ana Mares RN  Outcome: Ongoing  Note:   Pt plans to return to [facility] possibly tomorrow. Waiting for precert to be accepted      Problem: OXYGENATION/RESPIRATORY FUNCTION  Goal: Patient will maintain patent airway  Outcome: Ongoing  Note:   Pt weaned to room air, tolerating well. O2 sat above 90%     Problem: OXYGENATION/RESPIRATORY FUNCTION  Goal: Patient will achieve/maintain normal respiratory rate/effort  Description  Respiratory rate and effort will be within normal limits for the patient  Outcome: Ongoing  Note:   Pt has normal respiratory rate and effort. States that she thinks her breathing is getting easier       Problem: HEMODYNAMIC STATUS  Goal: Patient has stable vital signs and fluid balance  3/28/2019 2307 by Ana Mares RN  Outcome: Ongoing  Note:   BP elevated, Po meds given this evening, will continue to monitor      Problem: ACTIVITY INTOLERANCE/IMPAIRED MOBILITY  Goal: Mobility/activity is maintained at optimum level for patient  Outcome: Ongoing  Note:   Pt up with walker and gait belt with help from staff. Problem:  Activity Intolerance:  Goal: Ability to tolerate increased activity will improve  Description  Ability to tolerate increased activity will improve  Outcome: Ongoing     Problem: RESPIRATORY  Goal: Lung sounds clear or within normal limits for patient  3/28/2019 2307 by Ana Mares RN  Outcome: Ongoing  Note:   Pt lung sound clear, diminished in the bases      Problem: Risk for Impaired Skin Integrity  Goal: Tissue integrity - skin and mucous membranes  Description  Structural intactness and normal physiological function of skin and  mucous membranes. Outcome: Ongoing  Note:   Pt up in chair this evening, stands to reposition. Waffle cushion on chair     Care plan reviewed with patient. Patient verbalizes understanding of the plan of care and contribute to goal setting.

## 2019-03-29 NOTE — CARE COORDINATION
3/29/19, 3:28 PM    Discharge plan discussed by  and . Discharge plan reviewed with patient/ family. Patient/ family verbalize understanding of discharge plan and are in agreement with plan. Understanding was demonstrated using the teach back method. Services After Discharge  Services At/After Discharge: Nursing Services, Skilled Therapy, In ambulette(Patito Paige)   IMM Letter  IMM Letter date given[de-identified] 03/29/19  IMM Letter time given[de-identified] 80     Spoke with facility and they stated that pre-cert has come. Maurice Brown aware for possible discharge tomorrow. Ambulette form and weekend discharge form placed on the chart. RN is aware that pre-cert is back and information on the chart.

## 2019-03-30 VITALS
OXYGEN SATURATION: 95 % | BODY MASS INDEX: 40.48 KG/M2 | RESPIRATION RATE: 20 BRPM | HEIGHT: 66 IN | SYSTOLIC BLOOD PRESSURE: 152 MMHG | HEART RATE: 82 BPM | TEMPERATURE: 97.7 F | WEIGHT: 251.9 LBS | DIASTOLIC BLOOD PRESSURE: 66 MMHG

## 2019-03-30 LAB
GLUCOSE BLD-MCNC: 175 MG/DL (ref 70–108)
GLUCOSE BLD-MCNC: 180 MG/DL (ref 70–108)
INR BLD: 1.96 (ref 0.85–1.13)

## 2019-03-30 PROCEDURE — 6370000000 HC RX 637 (ALT 250 FOR IP): Performed by: HOSPITALIST

## 2019-03-30 PROCEDURE — 36415 COLL VENOUS BLD VENIPUNCTURE: CPT

## 2019-03-30 PROCEDURE — 99233 SBSQ HOSP IP/OBS HIGH 50: CPT | Performed by: HOSPITALIST

## 2019-03-30 PROCEDURE — 6370000000 HC RX 637 (ALT 250 FOR IP): Performed by: INTERNAL MEDICINE

## 2019-03-30 PROCEDURE — 85610 PROTHROMBIN TIME: CPT

## 2019-03-30 PROCEDURE — 82948 REAGENT STRIP/BLOOD GLUCOSE: CPT

## 2019-03-30 RX ORDER — LEVOFLOXACIN 750 MG/1
750 TABLET ORAL
Qty: 2 TABLET | Refills: 0 | Status: SHIPPED | OUTPATIENT
Start: 2019-03-30 | End: 2019-04-03

## 2019-03-30 RX ORDER — HYDRALAZINE HYDROCHLORIDE 100 MG/1
100 TABLET, FILM COATED ORAL 3 TIMES DAILY
Qty: 90 TABLET | Refills: 1 | Status: SHIPPED | OUTPATIENT
Start: 2019-03-30 | End: 2019-04-11

## 2019-03-30 RX ORDER — METOPROLOL SUCCINATE 100 MG/1
100 TABLET, EXTENDED RELEASE ORAL DAILY
Qty: 30 TABLET | Refills: 1 | Status: ON HOLD | OUTPATIENT
Start: 2019-03-30 | End: 2020-01-01 | Stop reason: SDUPTHER

## 2019-03-30 RX ADMIN — METOPROLOL SUCCINATE 100 MG: 100 TABLET, FILM COATED, EXTENDED RELEASE ORAL at 09:28

## 2019-03-30 RX ADMIN — DOCUSATE SODIUM 200 MG: 100 CAPSULE, LIQUID FILLED ORAL at 09:28

## 2019-03-30 RX ADMIN — Medication 1 CAPSULE: at 09:29

## 2019-03-30 RX ADMIN — INSULIN LISPRO 3 UNITS: 100 INJECTION, SOLUTION INTRAVENOUS; SUBCUTANEOUS at 13:00

## 2019-03-30 RX ADMIN — PANTOPRAZOLE SODIUM 40 MG: 40 TABLET, DELAYED RELEASE ORAL at 05:24

## 2019-03-30 RX ADMIN — ISOSORBIDE MONONITRATE 60 MG: 60 TABLET ORAL at 09:29

## 2019-03-30 RX ADMIN — ACETAMINOPHEN 650 MG: 325 TABLET ORAL at 13:00

## 2019-03-30 RX ADMIN — FERROUS SULFATE TAB 325 MG (65 MG ELEMENTAL FE) 325 MG: 325 (65 FE) TAB at 09:29

## 2019-03-30 RX ADMIN — CLOPIDOGREL BISULFATE 75 MG: 75 TABLET ORAL at 09:28

## 2019-03-30 RX ADMIN — BUMETANIDE 2 MG: 1 TABLET ORAL at 09:29

## 2019-03-30 RX ADMIN — ONDANSETRON HYDROCHLORIDE 4 MG: 4 TABLET, FILM COATED ORAL at 09:28

## 2019-03-30 RX ADMIN — Medication 500 MG: at 09:29

## 2019-03-30 RX ADMIN — HYDRALAZINE HYDROCHLORIDE 100 MG: 50 TABLET, FILM COATED ORAL at 14:24

## 2019-03-30 RX ADMIN — HYDRALAZINE HYDROCHLORIDE 100 MG: 50 TABLET, FILM COATED ORAL at 09:29

## 2019-03-30 RX ADMIN — ACETAMINOPHEN 650 MG: 325 TABLET ORAL at 09:28

## 2019-03-30 RX ADMIN — MULTIPLE VITAMINS W/ MINERALS TAB 1 TABLET: TAB at 09:28

## 2019-03-30 RX ADMIN — Medication 1000 MCG: at 09:29

## 2019-03-30 RX ADMIN — INSULIN LISPRO 3 UNITS: 100 INJECTION, SOLUTION INTRAVENOUS; SUBCUTANEOUS at 09:29

## 2019-03-30 ASSESSMENT — PAIN SCALES - GENERAL
PAINLEVEL_OUTOF10: 0
PAINLEVEL_OUTOF10: 0

## 2019-03-30 NOTE — DISCHARGE SUMMARY
Hospital Medicine Discharge Summary      Patient Identification:   Phani Veronica   : 1934  MRN: [id number]   Account: [de-identified]      Patient's PCP: Jc Tee MD    Admit Date: 3/26/2019     Discharge Date:   3/30/2019    Admitting Physician: Ric Ann DO     Discharge Physician: Dorothy Peña MD     Discharge Diagnoses: Active Hospital Problems    Diagnosis Date Noted    Acute systolic congestive heart failure (HCC) [I50.21]      Priority: High    COPD exacerbation (HCC) [J44.1]      Priority: High    Acute on chronic congestive heart failure (HCC) [I50.9]      Priority: High    SHAILA (acute kidney injury) ([facility] [N17.9] 2019    Anemia, chronic disease [D63.8] 2019    CKD (chronic kidney disease), stage IV (HCC) [N18.4] 2019    Bilateral lower extremity edema [R60.0] 10/17/2017    GERD (gastroesophageal reflux disease) [K21.9]     Osteoarthritis [M19.90]     CAD (coronary artery disease) [I25.10] 2014    Cardiomyopathy, nonischemic (HCC) [I42.8]     Hypertension [I10]     Hyperlipidemia [E78.5]     DM[facility]diabetes mellitus, type 2) ([facility] [E11.9] 1998       The patient was seen and examined on day of discharge and this discharge summary is in conjunction with any daily progress note from day of discharge. Hospital Course: Phani Veronica is a 80 y.o. female admitted to [address] [place] [address] on 3/26/2019 for acute hypoxic res. Failure and ACOPDE. She responded well to medical management and was discharged home in stable medical condition.               Assessment/Plan:      -  Acute hypoxic res. Failure: on 2 lpm with SaO2 97%. Rn aware to wean off     3/28: resolved. On ambient air now with Sao2 at 95%  3/30: Resolved     - ACOPDE: on inhalers, IV steroid and IV levoquin which switched to renally-dosed PO. Will also switch to PO prednisone tomorrow    3/30: Resolved. Will continue w/ levofloxacin for 4 more days.  F/U w/ Pulmonology     - Uncontrolled BP: likely worsened by Prednisone which will stop today as resp status returned to baseline. Also, noted started on drip overnight which we stopped. Increased BB to 100 from 75 and Hydralazine to 100 TID from 75. Will have to keep pt overnight to monitor BP with plan d/c tomorrow if BP well controlled     3/30: BP well controlled with changes made. Explained changes to pt. To F/U w/ PCP re: need for dose adjustment based on dairy BP recording.        - chronic systolic CHF: euvolemic. Given IV bumex 2 mg once in ED. Now back on home bumex PO 2 mg QD.     - Rise in trops: trending down. Likely demand-ischemia. Already seen by cardiology which suggested continuing current medical treatment and OP cardio F/U     - CKD stage IV: stable at baseline     - DM on MDI: well-controlled, last HbA1C 4.9 on feb 6, 2019. If anything concerned for hypoglycemia. Aggressive medical mangement. BS well-controlled. CCM. OP endocrine/PCP follow-up     3/28: BS in 200s likely driven by Prednisone. Changed SSI#3     3/30: back on home regimen. Pt to F/U w/ PCP     - Morbid obseity     - Chronic SARAH: Hb 8.9. on Iron. Stable.     - Hx of PE: on Warfarin. 3/30: INR 1.96 yesterday. Back on home dose of warfarin. Will need INR check in 1-2 days        Exam:     Vitals:  Vitals:    03/29/19 2332 03/30/19 0410 03/30/19 0436 03/30/19 0521   BP: (!) 169/70 137/64     Pulse: 103 81 81    Resp: 20 20 20    Temp: 97.9 °F (36.6 °C) 98.1 °F (36.7 °C)     TempSrc: Oral Oral     SpO2: 90% 90%     Weight:    251 lb 14.4 oz (114.3 kg)   Height:         Weight: Weight: 251 lb 14.4 oz (114.3 kg)     24 hour intake/output:    Intake/Output Summary (Last 24 hours) at 3/30/2019 0848  Last data filed at 3/30/2019 0521  Gross per 24 hour   Intake 540 ml   Output 1250 ml   Net -710 ml           General appearance: A&O x3, Not ill or toxic, in no apparent distress  HEENT:  ADAMA  EOM intact. Neck: Supple, with full range of motion.  No jugular Patient Instructions:    Discharge lab work: CBC, BMP in one week INR in 1-2 days  Activity: activity as tolerated  Diet: DIET CARB CONTROL; No Added Salt (3-4 GM)      Follow-up visits:   Pa Garcia MD  [facility] [id number]  [address]  [address]  [phone]        Rhona Shukla, APRN - CNP  [facility]      April 11, 2019 @ 8am 2nd floor hospital  CHF clinic         Discharge Medications:      [facility] Medication Instructions NRP:[id number]    Printed on:03/30/19 7105   Medication Information                      acetaminophen (TYLENOL) 325 MG tablet  Take 650 mg by mouth 4 times daily              ARTIFICIAL TEAR OP  Instill one drop into each eye as needed             ascorbic acid (VITAMIN C) 500 MG tablet  Take 500 mg by mouth 2 times daily              bisacodyl (DULCOLAX) 10 MG suppository  Place 10 mg rectally daily as needed for Constipation             bumetanide (BUMEX) 1 MG tablet  Take 2 mg by mouth daily             clopidogrel (PLAVIX) 75 MG tablet  Take 75 mg by mouth every evening              docusate sodium (COLACE) 100 MG capsule  Take 200 mg by mouth daily             ferrous sulfate (JAMIR-RAJANI) 325 (65 Fe) MG tablet  Take 1 tablet by mouth 2 times daily             guaiFENesin (ROBITUSSIN) 100 MG/5ML syrup  Take 200 mg by mouth every 4 hours as needed for Cough             hydrALAZINE (APRESOLINE) 100 MG tablet  Take 1 tablet by mouth 3 times daily             insulin aspart (NOVOLOG) 100 UNIT/ML injection vial  Inject 12 Units into the skin 2 times daily (with meals)             insulin glargine (LANTUS) 100 UNIT/ML injection vial  Inject 22 Units into the skin nightly             ipratropium-albuterol (DUONEB) 0.5-2.5 (3) MG/3ML SOLN nebulizer solution  Inhale 1 vial into the lungs every 6 hours as needed for Shortness of

## 2019-03-30 NOTE — PROGRESS NOTES
Report called to nurse Giovana Mims at [facility]. Updated on patient's  time of 1530. AVS and last dose [facility] faxed to [phone].

## 2019-04-01 ENCOUNTER — TELEPHONE (OUTPATIENT)
Dept: NEPHROLOGY | Age: 84
End: 2019-04-01
Payer: MEDICARE

## 2019-04-01 DIAGNOSIS — N18.4 CKD (CHRONIC KIDNEY DISEASE), STAGE IV (HCC): ICD-10-CM

## 2019-04-01 DIAGNOSIS — D63.8 ANEMIA, CHRONIC DISEASE: Primary | ICD-10-CM

## 2019-04-01 DIAGNOSIS — D50.8 IRON DEFICIENCY ANEMIA DUE TO DIETARY CAUSES: ICD-10-CM

## 2019-04-01 LAB
BLOOD CULTURE, ROUTINE: NORMAL
BLOOD CULTURE, ROUTINE: NORMAL

## 2019-04-01 PROCEDURE — 99490 CHRNC CARE MGMT STAFF 1ST 20: CPT | Performed by: NURSE PRACTITIONER

## 2019-04-02 LAB
BUN BLDV-MCNC: 68 MG/DL
CALCIUM SERPL-MCNC: NORMAL MG/DL
CHLORIDE BLD-SCNC: 104 MMOL/L
CO2: 25 MMOL/L
CREAT SERPL-MCNC: 2.3 MG/DL
FERRITIN: 473 NG/ML (ref 9–150)
GFR CALCULATED: 24
GLUCOSE BLD-MCNC: 112 MG/DL
HCT VFR BLD CALC: 30.3 % (ref 36–46)
HEMOGLOBIN: 9.8 G/DL (ref 12–16)
IRON SATURATION: 15
IRON: 34
POTASSIUM SERPL-SCNC: 4.7 MMOL/L
SODIUM BLD-SCNC: 138 MMOL/L

## 2019-04-03 ENCOUNTER — TELEPHONE (OUTPATIENT)
Dept: NEPHROLOGY | Age: 84
End: 2019-04-03

## 2019-04-03 LAB
BASOPHILS ABSOLUTE: ABNORMAL /ΜL
BASOPHILS RELATIVE PERCENT: ABNORMAL %
BUN BLDV-MCNC: 77 MG/DL
CALCIUM SERPL-MCNC: 9.1 MG/DL
CHLORIDE BLD-SCNC: 107 MMOL/L
CO2: 26 MMOL/L
CREAT SERPL-MCNC: 2.6 MG/DL
EOSINOPHILS ABSOLUTE: ABNORMAL /ΜL
EOSINOPHILS RELATIVE PERCENT: ABNORMAL %
GFR CALCULATED: 17
GLUCOSE BLD-MCNC: 35 MG/DL
HCT VFR BLD CALC: 30.1 % (ref 36–46)
HEMOGLOBIN: 9.7 G/DL (ref 12–16)
LYMPHOCYTES ABSOLUTE: ABNORMAL /ΜL
LYMPHOCYTES RELATIVE PERCENT: ABNORMAL %
MCH RBC QN AUTO: ABNORMAL PG
MCHC RBC AUTO-ENTMCNC: ABNORMAL G/DL
MCV RBC AUTO: ABNORMAL FL
MONOCYTES ABSOLUTE: ABNORMAL /ΜL
MONOCYTES RELATIVE PERCENT: ABNORMAL %
NEUTROPHILS ABSOLUTE: ABNORMAL /ΜL
NEUTROPHILS RELATIVE PERCENT: ABNORMAL %
PLATELET # BLD: 215 K/ΜL
PMV BLD AUTO: ABNORMAL FL
POTASSIUM SERPL-SCNC: 4.6 MMOL/L
RBC # BLD: 3.48 10^6/ΜL
SODIUM BLD-SCNC: 142 MMOL/L
WBC # BLD: 7.3 10^3/ML

## 2019-04-03 RX ORDER — SODIUM CHLORIDE 9 MG/ML
INJECTION, SOLUTION INTRAVENOUS CONTINUOUS
Status: CANCELLED | OUTPATIENT
Start: 2019-04-04

## 2019-04-03 RX ORDER — EPINEPHRINE 1 MG/ML
0.3 INJECTION, SOLUTION, CONCENTRATE INTRAVENOUS PRN
Status: CANCELLED | OUTPATIENT
Start: 2019-04-04

## 2019-04-03 RX ORDER — SODIUM CHLORIDE 0.9 % (FLUSH) 0.9 %
10 SYRINGE (ML) INJECTION PRN
Status: CANCELLED | OUTPATIENT
Start: 2019-04-04

## 2019-04-03 RX ORDER — 0.9 % SODIUM CHLORIDE 0.9 %
10 VIAL (ML) INJECTION ONCE
Status: CANCELLED | OUTPATIENT
Start: 2019-04-04

## 2019-04-03 RX ORDER — HEPARIN SODIUM (PORCINE) LOCK FLUSH IV SOLN 100 UNIT/ML 100 UNIT/ML
500 SOLUTION INTRAVENOUS PRN
Status: CANCELLED | OUTPATIENT
Start: 2019-04-04

## 2019-04-03 RX ORDER — METHYLPREDNISOLONE SODIUM SUCCINATE 125 MG/2ML
125 INJECTION, POWDER, LYOPHILIZED, FOR SOLUTION INTRAMUSCULAR; INTRAVENOUS ONCE
Status: CANCELLED | OUTPATIENT
Start: 2019-04-04

## 2019-04-03 RX ORDER — SODIUM CHLORIDE 0.9 % (FLUSH) 0.9 %
5 SYRINGE (ML) INJECTION PRN
Status: CANCELLED | OUTPATIENT
Start: 2019-04-04

## 2019-04-03 RX ORDER — DIPHENHYDRAMINE HYDROCHLORIDE 50 MG/ML
50 INJECTION INTRAMUSCULAR; INTRAVENOUS ONCE
Status: CANCELLED | OUTPATIENT
Start: 2019-04-04

## 2019-04-03 NOTE — TELEPHONE ENCOUNTER
5 mins  Noted that central scheduling scheduled Injectafer for April 4th and April 10th at noon. Call placed back to [facility] to update on date change for transportation arrangements. Verbalized understanding.

## 2019-04-03 NOTE — TELEPHONE ENCOUNTER
10 minutes  Call placed to [employer]. Cancelled scheduled Aransep and scheduled Injectafer for 4/04 and 4/11 at noon. Also notified [facility] of change. Spoke to Lady Cast and she verbalized understanding.

## 2019-04-04 ENCOUNTER — OFFICE VISIT (OUTPATIENT)
Dept: NEPHROLOGY | Age: 84
End: 2019-04-04
Payer: MEDICARE

## 2019-04-04 ENCOUNTER — HOSPITAL ENCOUNTER (OUTPATIENT)
Dept: NURSING | Age: 84
Discharge: HOME | End: 2019-04-04
Payer: MEDICARE

## 2019-04-04 ENCOUNTER — HOSPITAL ENCOUNTER (OUTPATIENT)
Dept: NURSING | Age: 84
End: 2019-04-04
Payer: MEDICARE

## 2019-04-04 VITALS
BODY MASS INDEX: 40.98 KG/M2 | HEIGHT: 66 IN | HEART RATE: 89 BPM | OXYGEN SATURATION: 98 % | DIASTOLIC BLOOD PRESSURE: 77 MMHG | WEIGHT: 255 LBS | SYSTOLIC BLOOD PRESSURE: 185 MMHG | RESPIRATION RATE: 18 BRPM

## 2019-04-04 VITALS
RESPIRATION RATE: 16 BRPM | OXYGEN SATURATION: 98 % | HEART RATE: 77 BPM | SYSTOLIC BLOOD PRESSURE: 142 MMHG | TEMPERATURE: 97.1 F | DIASTOLIC BLOOD PRESSURE: 63 MMHG

## 2019-04-04 DIAGNOSIS — N18.4 CKD (CHRONIC KIDNEY DISEASE), STAGE IV (HCC): Primary | ICD-10-CM

## 2019-04-04 DIAGNOSIS — N18.4 CKD (CHRONIC KIDNEY DISEASE), STAGE IV (HCC): ICD-10-CM

## 2019-04-04 DIAGNOSIS — D63.8 ANEMIA, CHRONIC DISEASE: Primary | ICD-10-CM

## 2019-04-04 DIAGNOSIS — D50.8 IRON DEFICIENCY ANEMIA SECONDARY TO INADEQUATE DIETARY IRON INTAKE: ICD-10-CM

## 2019-04-04 PROCEDURE — 2709999900 HC NON-CHARGEABLE SUPPLY

## 2019-04-04 PROCEDURE — 99213 OFFICE O/P EST LOW 20 MIN: CPT | Performed by: INTERNAL MEDICINE

## 2019-04-04 PROCEDURE — G8598 ASA/ANTIPLAT THER USED: HCPCS | Performed by: INTERNAL MEDICINE

## 2019-04-04 PROCEDURE — 6360000002 HC RX W HCPCS: Performed by: NURSE PRACTITIONER

## 2019-04-04 PROCEDURE — G8417 CALC BMI ABV UP PARAM F/U: HCPCS | Performed by: INTERNAL MEDICINE

## 2019-04-04 PROCEDURE — 96365 THER/PROPH/DIAG IV INF INIT: CPT

## 2019-04-04 PROCEDURE — 1111F DSCHRG MED/CURRENT MED MERGE: CPT | Performed by: INTERNAL MEDICINE

## 2019-04-04 PROCEDURE — 4040F PNEUMOC VAC/ADMIN/RCVD: CPT | Performed by: INTERNAL MEDICINE

## 2019-04-04 PROCEDURE — G8399 PT W/DXA RESULTS DOCUMENT: HCPCS | Performed by: INTERNAL MEDICINE

## 2019-04-04 PROCEDURE — 1036F TOBACCO NON-USER: CPT | Performed by: INTERNAL MEDICINE

## 2019-04-04 PROCEDURE — G8427 DOCREV CUR MEDS BY ELIG CLIN: HCPCS | Performed by: INTERNAL MEDICINE

## 2019-04-04 PROCEDURE — 1123F ACP DISCUSS/DSCN MKR DOCD: CPT | Performed by: INTERNAL MEDICINE

## 2019-04-04 PROCEDURE — 2580000003 HC RX 258: Performed by: NURSE PRACTITIONER

## 2019-04-04 PROCEDURE — 1090F PRES/ABSN URINE INCON ASSESS: CPT | Performed by: INTERNAL MEDICINE

## 2019-04-04 RX ORDER — HEPARIN SODIUM (PORCINE) LOCK FLUSH IV SOLN 100 UNIT/ML 100 UNIT/ML
500 SOLUTION INTRAVENOUS PRN
Status: CANCELLED | OUTPATIENT
Start: 2019-04-11

## 2019-04-04 RX ORDER — SODIUM CHLORIDE 9 MG/ML
INJECTION, SOLUTION INTRAVENOUS CONTINUOUS
Status: CANCELLED | OUTPATIENT
Start: 2019-04-11

## 2019-04-04 RX ORDER — SODIUM CHLORIDE 9 MG/ML
INJECTION, SOLUTION INTRAVENOUS CONTINUOUS
Status: DISCONTINUED | OUTPATIENT
Start: 2019-04-04 | End: 2019-04-05 | Stop reason: HOSPADM

## 2019-04-04 RX ORDER — DIPHENHYDRAMINE HYDROCHLORIDE 50 MG/ML
50 INJECTION INTRAMUSCULAR; INTRAVENOUS ONCE
Status: CANCELLED | OUTPATIENT
Start: 2019-04-11

## 2019-04-04 RX ORDER — METHYLPREDNISOLONE SODIUM SUCCINATE 125 MG/2ML
125 INJECTION, POWDER, LYOPHILIZED, FOR SOLUTION INTRAMUSCULAR; INTRAVENOUS ONCE
Status: CANCELLED | OUTPATIENT
Start: 2019-04-11

## 2019-04-04 RX ORDER — SODIUM CHLORIDE 0.9 % (FLUSH) 0.9 %
5 SYRINGE (ML) INJECTION PRN
Status: CANCELLED | OUTPATIENT
Start: 2019-04-11

## 2019-04-04 RX ORDER — 0.9 % SODIUM CHLORIDE 0.9 %
10 VIAL (ML) INJECTION ONCE
Status: CANCELLED | OUTPATIENT
Start: 2019-04-11

## 2019-04-04 RX ORDER — SODIUM CHLORIDE 0.9 % (FLUSH) 0.9 %
10 SYRINGE (ML) INJECTION PRN
Status: CANCELLED | OUTPATIENT
Start: 2019-04-11

## 2019-04-04 RX ADMIN — FERRIC CARBOXYMALTOSE INJECTION 750 MG: 50 INJECTION, SOLUTION INTRAVENOUS at 12:53

## 2019-04-04 RX ADMIN — SODIUM CHLORIDE: 9 INJECTION, SOLUTION INTRAVENOUS at 12:53

## 2019-04-04 ASSESSMENT — PAIN - FUNCTIONAL ASSESSMENT: PAIN_FUNCTIONAL_ASSESSMENT: 0-10

## 2019-04-04 NOTE — PROGRESS NOTES
[facility]    [address]  [address], [address]  [phone]       Progress Note    4/4/2019 3:27 PM    Pt Name:    Dixie Yepez:    1934  Primary Care Physician:  David Ferrari MD       Chief Complaint:   Chief Complaint   Patient presents with    Chronic Kidney Disease     iii    Diabetes    Hypertension        History of Chief Complaint: CKD stage IV from DM, Aging, HTN     Subjective:  I last saw the patient in clinic 01/10/19. I follow the patient for Chronic Kidney disease stage IV. Since our last visit the patient has been hospitalized at [facility] for SOB. The patient is sleeping well at night with 1-2 times per night nocturia. The patient has a good appetite and is remaining active. The patient denied N/V/C/D/SOB/CP. Last six eGFR readings:  Lab Results   Component Value Date    LABGLOM 17 04/03/2019    LABGLOM 24 04/02/2019    LABGLOM 17 03/28/2019    LABGLOM 18 03/26/2019    LABGLOM 21 02/06/2019    LABGLOM 20 01/07/2019    LABGLOM 18 11/07/2018    LABGLOM 19 10/30/2018    LABGLOM 17 10/18/2018    LABGLOM 18 09/06/2018    LABGLOM 15 09/05/2018    LABGLOM 16 09/04/2018          Objective:  VITALS:  BP (!) 185/77 (Site: Right Lower Arm, Position: Sitting, Cuff Size: Small Adult)   Pulse 89   Resp 18   Ht 5' 6\" (1.676 m)   Wt 255 lb (115.7 kg) Comment: verbal  SpO2 98%   BMI 41.16 kg/m²   Weight:   Wt Readings from Last 3 Encounters:   04/04/19 255 lb (115.7 kg)   03/30/19 251 lb 14.4 oz (114.3 kg)   01/23/19 254 lb (115.2 kg)     Body mass index is 41.16 kg/m². Physical examination    General:  Alert and cooperative with exam  HEENT:  Normocephalic. No lesions nor rashes. ADAMA. EOMI  Neck:   No JVD and no bruits. Thyroid gland is normal  Lungs:  Breathing easily. No rales nor rhonchi. No cough nor sputum production. Heart[de-identified]             RRR. No murmurs nor rubs. PMI is not enlarged nor displaced. Abdomen:  Soft and non tender.  Bowel sounds are active in all four quadrants. Extremities:  2+ edema  Neurologic:  CN II-XII are intact. No deficits noted. Muscle strength and tone are equal throughout. Skin:                Warm and dry with no rashes. Muscles:         Hand  and leg strength are equal and strong bilaterally.      Lab Data      CBC:   Lab Results   Component Value Date    WBC 7.3 04/03/2019    HGB 9.7 (A) 04/03/2019    HCT 30.1 (A) 04/03/2019    MCV 90.2 03/28/2019     04/03/2019     BMP:    Lab Results   Component Value Date     04/03/2019     04/02/2019     (L) 03/28/2019    K 4.6 04/03/2019    K 4.7 04/02/2019    K 5.7 (H) 03/28/2019     04/03/2019     04/02/2019    CL 96 (L) 03/28/2019    CO2 26 04/03/2019    CO2 25 04/02/2019    CO2 23 03/28/2019    BUN 77 04/03/2019    BUN 68 04/02/2019    BUN 59 (H) 03/28/2019    CREATININE 2.6 04/03/2019    CREATININE 2.3 04/02/2019    CREATININE 2.6 (H) 03/28/2019    GLUCOSE 35 04/03/2019    GLUCOSE 112 04/02/2019    GLUCOSE 210 (H) 03/28/2019      Hepatic:   Lab Results   Component Value Date    AST 22 03/26/2019    AST 12 10/30/2018    AST 13 09/04/2018    ALT 16 03/26/2019    ALT 8 10/30/2018    ALT 8 (L) 09/04/2018    BILITOT 0.3 03/26/2019    BILITOT 0.2 10/30/2018    BILITOT <0.2 (L) 09/04/2018    ALKPHOS 108 03/26/2019    ALKPHOS 72 10/30/2018    ALKPHOS 71 09/04/2018     BNP:   Lab Results   Component Value Date    BNP 46.0 05/20/2013    .5 (H) 02/08/2013     Lipids:   Lab Results   Component Value Date    CHOL 87 10/30/2018    HDL 28 (A) 10/30/2018     INR:   Lab Results   Component Value Date    INR 1.96 (H) 03/30/2019    INR 1.92 (H) 03/29/2019    INR 2.29 (H) 03/28/2019     URINE:   Lab Results   Component Value Date    NAUR 50 09/05/2018    PROTUR < 4.0 09/05/2018     Lab Results   Component Value Date    NITRU NEGATIVE 03/26/2019    COLORU YELLOW 03/26/2019    PHUR 5.0 03/26/2019    LABCAST NONE SEEN 03/26/2019    LABCAST NONE SEEN 03/26/2019    WBCUA 0-2 03/26/2019    RBCUA 3-5 03/26/2019    MUCUS NONE SEEN 06/24/2012    YEAST NONE SEEN 03/26/2019    BACTERIA NONE 03/26/2019    CLARITYU Clear 04/02/2014    SPECGRAV 1.019 03/26/2019    LEUKOCYTESUR SMALL 03/26/2019    UROBILINOGEN 0.2 03/26/2019    BILIRUBINUR NEGATIVE 03/26/2019    BLOODU NEGATIVE 03/26/2019    GLUCOSEU NEGATIVE 06/12/2018    KETUA NEGATIVE 03/26/2019    AMORPHOUS URATES 06/24/2012      Microalbumen/Creatinine ratio:  No components found for: RUCREAT        Medications:    Current Outpatient Medications   Medication Sig Dispense Refill    hydrALAZINE (APRESOLINE) 100 MG tablet Take 1 tablet by mouth 3 times daily 90 tablet 1    metoprolol succinate (TOPROL XL) 100 MG extended release tablet Take 1 tablet by mouth daily Hold if SBP less than 100 and HR less than 50 bpm 30 tablet 1    ipratropium-albuterol (DUONEB) 0.5-2.5 (3) MG/3ML SOLN nebulizer solution Inhale 1 vial into the lungs every 6 hours as needed for Shortness of Breath      isosorbide mononitrate (IMDUR) 60 MG extended release tablet Take 60 mg by mouth daily      isosorbide mononitrate (IMDUR) 30 MG extended release tablet Take 1 tablet by mouth nightly Take 60 mg AM, 30 mg PM 30 tablet 3    ARTIFICIAL TEAR OP Instill one drop into each eye as needed      warfarin (COUMADIN) 3 MG tablet Take 3 mg by mouth daily       bumetanide (BUMEX) 1 MG tablet Take 2 mg by mouth daily      Probiotic Product (PROBIOTIC-10) CAPS Take 1 capsule by mouth daily       nitroGLYCERIN (NITROSTAT) 0.4 MG SL tablet up to max of 3 total doses.  If no relief after 1 dose, call 911. 25 tablet 3    ascorbic acid (VITAMIN C) 500 MG tablet Take 500 mg by mouth 2 times daily       docusate sodium (COLACE) 100 MG capsule Take 200 mg by mouth daily      ferrous sulfate (JAMIR-RAJANI) 325 (65 Fe) MG tablet Take 1 tablet by mouth 2 times daily 60 tablet 3    clopidogrel (PLAVIX) 75 MG tablet Take 75 mg by mouth every evening       vitamin B-12 (CYANOCOBALAMIN) 1000 MCG tablet Take 1,000 mcg by mouth daily      ondansetron (ZOFRAN) 4 MG tablet Take 4 mg by mouth 2 times daily       pantoprazole (PROTONIX) 40 MG tablet Take 40 mg by mouth 2 times daily       insulin aspart (NOVOLOG) 100 UNIT/ML injection vial Inject 12 Units into the skin 2 times daily (with meals)      insulin glargine (LANTUS) 100 UNIT/ML injection vial Inject 22 Units into the skin nightly      guaiFENesin (ROBITUSSIN) 100 MG/5ML syrup Take 200 mg by mouth every 4 hours as needed for Cough      Multiple Vitamins-Minerals (OCUVITE EXTRA PO) Take 1 tablet by mouth daily       bisacodyl (DULCOLAX) 10 MG suppository Place 10 mg rectally daily as needed for Constipation      magnesium hydroxide (MILK OF MAGNESIA) 400 MG/5ML suspension Take 30 mLs by mouth daily as needed for Constipation      pravastatin (PRAVACHOL) 40 MG tablet Take 40 mg by mouth every evening Indications: Blood Cholesterol Abnormal       acetaminophen (TYLENOL) 325 MG tablet Take 650 mg by mouth 4 times daily        No current facility-administered medications for this visit. Facility-Administered Medications Ordered in Other Visits   Medication Dose Route Frequency Provider Last Rate Last Dose    0.9 % sodium chloride infusion   Intravenous Continuous ARELI Cisneros - CNP   Stopped at 04/04/19 1310           IMPRESSIONS:        Kidney disease:  Anemia:  Bone and mineral metabolism:       PLAN:  1. We discussed the eGFR today. 2. We will continue all current medications without changes. 3. We will see the patient back in 2 months.               _________________________________  Inocente Antonio.  Barry Zimmerman DO  [facility]      SERENA Moon MD

## 2019-04-04 NOTE — PROGRESS NOTES
1230 bedside report received from John Torres, [address] __m__ Safety:       (Environmental)  Serena Iba to environment   Ensure ID band is correct and in place/ allergy band as needed   Assess for fall risk   Initiate fall precautions as applicable (fall band, side rails, etc.)   Call light within reach   Bed in low position/ wheels locked    _m___ Pain:        Assess pain level and characteristics   Administer analgesics as ordered   Assess effectiveness of pain management and report to MD as needed    __m__ Knowledge Deficit:   Assess baseline knowledge   Provide teaching at level of understanding   Provide teaching via preferred learning method   Evaluate teaching effectiveness    _m___ Hemodynamic/Respiratory Status:       (Pre and Post Procedure Monitoring)   Assess/Monitor vital signs and LOC   Assess Baseline SpO2 prior to any sedation   Obtain weight/height   Assess vital signs/ LOC until patient meets discharge criteria   Monitor procedure site and notify MD of any issues      1320 WRITTEN DISCHARGE INSTRUCTIONS GIVEN TO PT-VERBALIZES UNDERSTANDING  1330   PT DISCHARGED PER WHEEL CHAIR IN SATISFACTORY CONDITION

## 2019-04-04 NOTE — PROGRESS NOTES
1225 Patient arrived to OPN vis wheelchair for injectafer infusion  PT RIGHTS AND RESPONSIBILITIES OFFERED TO PT.

## 2019-04-05 ENCOUNTER — TELEPHONE (OUTPATIENT)
Dept: CARDIOTHORACIC SURGERY | Age: 84
End: 2019-04-05

## 2019-04-05 NOTE — TELEPHONE ENCOUNTER
Called nursing home to inform staff of patient appt with Dr. Chidi Gomes. Informed Romain PRIEST that pt is scheduled on April 11th at 11:00 a.m. [address]. He verbalized understanding.

## 2019-04-09 ENCOUNTER — HOSPITAL ENCOUNTER (OUTPATIENT)
Dept: INTERVENTIONAL RADIOLOGY/VASCULAR | Age: 84
Discharge: HOME | End: 2019-04-09
Payer: MEDICARE

## 2019-04-09 DIAGNOSIS — N18.4 CKD (CHRONIC KIDNEY DISEASE), STAGE IV (HCC): ICD-10-CM

## 2019-04-09 PROCEDURE — 93971 EXTREMITY STUDY: CPT

## 2019-04-10 ENCOUNTER — TELEPHONE (OUTPATIENT)
Dept: CARDIOTHORACIC SURGERY | Age: 84
End: 2019-04-10

## 2019-04-10 NOTE — TELEPHONE ENCOUNTER
Left message with Nj Salinas at [facility] that patient appt for tomororw is cancelled. States pt nurse is unable to take phone call at this time and will call office back to go over reschedule.

## 2019-04-11 ENCOUNTER — OFFICE VISIT (OUTPATIENT)
Dept: CARDIOLOGY CLINIC | Age: 84
End: 2019-04-11
Payer: MEDICARE

## 2019-04-11 ENCOUNTER — HOSPITAL ENCOUNTER (OUTPATIENT)
Dept: NURSING | Age: 84
Discharge: HOME | End: 2019-04-11
Payer: MEDICARE

## 2019-04-11 VITALS
BODY MASS INDEX: 40.98 KG/M2 | HEART RATE: 77 BPM | SYSTOLIC BLOOD PRESSURE: 134 MMHG | HEIGHT: 66 IN | WEIGHT: 255 LBS | DIASTOLIC BLOOD PRESSURE: 70 MMHG | OXYGEN SATURATION: 95 %

## 2019-04-11 VITALS
SYSTOLIC BLOOD PRESSURE: 209 MMHG | TEMPERATURE: 97.2 F | DIASTOLIC BLOOD PRESSURE: 85 MMHG | OXYGEN SATURATION: 95 % | HEART RATE: 68 BPM | RESPIRATION RATE: 18 BRPM

## 2019-04-11 DIAGNOSIS — N18.4 CKD (CHRONIC KIDNEY DISEASE), STAGE IV (HCC): ICD-10-CM

## 2019-04-11 DIAGNOSIS — I50.32 CHF (CONGESTIVE HEART FAILURE), NYHA CLASS II, CHRONIC, DIASTOLIC (HCC): Primary | ICD-10-CM

## 2019-04-11 DIAGNOSIS — D50.8 IRON DEFICIENCY ANEMIA SECONDARY TO INADEQUATE DIETARY IRON INTAKE: ICD-10-CM

## 2019-04-11 DIAGNOSIS — D63.8 ANEMIA, CHRONIC DISEASE: Primary | ICD-10-CM

## 2019-04-11 PROCEDURE — 99213 OFFICE O/P EST LOW 20 MIN: CPT | Performed by: NURSE PRACTITIONER

## 2019-04-11 PROCEDURE — G8417 CALC BMI ABV UP PARAM F/U: HCPCS | Performed by: NURSE PRACTITIONER

## 2019-04-11 PROCEDURE — G8399 PT W/DXA RESULTS DOCUMENT: HCPCS | Performed by: NURSE PRACTITIONER

## 2019-04-11 PROCEDURE — 2580000003 HC RX 258: Performed by: NURSE PRACTITIONER

## 2019-04-11 PROCEDURE — 1123F ACP DISCUSS/DSCN MKR DOCD: CPT | Performed by: NURSE PRACTITIONER

## 2019-04-11 PROCEDURE — G8428 CUR MEDS NOT DOCUMENT: HCPCS | Performed by: NURSE PRACTITIONER

## 2019-04-11 PROCEDURE — G8598 ASA/ANTIPLAT THER USED: HCPCS | Performed by: NURSE PRACTITIONER

## 2019-04-11 PROCEDURE — 1111F DSCHRG MED/CURRENT MED MERGE: CPT | Performed by: NURSE PRACTITIONER

## 2019-04-11 PROCEDURE — 1090F PRES/ABSN URINE INCON ASSESS: CPT | Performed by: NURSE PRACTITIONER

## 2019-04-11 PROCEDURE — 4040F PNEUMOC VAC/ADMIN/RCVD: CPT | Performed by: NURSE PRACTITIONER

## 2019-04-11 PROCEDURE — 6360000002 HC RX W HCPCS: Performed by: NURSE PRACTITIONER

## 2019-04-11 PROCEDURE — 96365 THER/PROPH/DIAG IV INF INIT: CPT

## 2019-04-11 PROCEDURE — 1036F TOBACCO NON-USER: CPT | Performed by: NURSE PRACTITIONER

## 2019-04-11 PROCEDURE — 2709999900 HC NON-CHARGEABLE SUPPLY

## 2019-04-11 RX ORDER — SODIUM CHLORIDE 9 MG/ML
INJECTION, SOLUTION INTRAVENOUS CONTINUOUS
Status: CANCELLED | OUTPATIENT
Start: 2019-04-11

## 2019-04-11 RX ORDER — SODIUM CHLORIDE 0.9 % (FLUSH) 0.9 %
10 SYRINGE (ML) INJECTION PRN
Status: CANCELLED | OUTPATIENT
Start: 2019-04-11

## 2019-04-11 RX ORDER — SODIUM CHLORIDE 0.9 % (FLUSH) 0.9 %
10 SYRINGE (ML) INJECTION PRN
Status: DISCONTINUED | OUTPATIENT
Start: 2019-04-11 | End: 2019-04-12 | Stop reason: HOSPADM

## 2019-04-11 RX ORDER — METHYLPREDNISOLONE SODIUM SUCCINATE 125 MG/2ML
125 INJECTION, POWDER, LYOPHILIZED, FOR SOLUTION INTRAMUSCULAR; INTRAVENOUS ONCE
Status: CANCELLED | OUTPATIENT
Start: 2019-04-11

## 2019-04-11 RX ORDER — SODIUM CHLORIDE 9 MG/ML
INJECTION, SOLUTION INTRAVENOUS CONTINUOUS
Status: ACTIVE | OUTPATIENT
Start: 2019-04-11 | End: 2019-04-11

## 2019-04-11 RX ORDER — 0.9 % SODIUM CHLORIDE 0.9 %
10 VIAL (ML) INJECTION ONCE
Status: CANCELLED | OUTPATIENT
Start: 2019-04-11

## 2019-04-11 RX ORDER — HEPARIN SODIUM (PORCINE) LOCK FLUSH IV SOLN 100 UNIT/ML 100 UNIT/ML
500 SOLUTION INTRAVENOUS PRN
Status: CANCELLED | OUTPATIENT
Start: 2019-04-11

## 2019-04-11 RX ORDER — DIPHENHYDRAMINE HYDROCHLORIDE 50 MG/ML
50 INJECTION INTRAMUSCULAR; INTRAVENOUS ONCE
Status: CANCELLED | OUTPATIENT
Start: 2019-04-11

## 2019-04-11 RX ORDER — SODIUM CHLORIDE 0.9 % (FLUSH) 0.9 %
5 SYRINGE (ML) INJECTION PRN
Status: CANCELLED | OUTPATIENT
Start: 2019-04-11

## 2019-04-11 RX ORDER — HYDRALAZINE HYDROCHLORIDE 100 MG/1
50 TABLET, FILM COATED ORAL 3 TIMES DAILY
Status: ON HOLD | COMMUNITY
End: 2020-01-01 | Stop reason: HOSPADM

## 2019-04-11 RX ORDER — ISOSORBIDE DINITRATE 20 MG/1
20 TABLET ORAL 3 TIMES DAILY
Qty: 90 TABLET | Refills: 3
Start: 2019-04-11 | End: 2019-08-20

## 2019-04-11 RX ADMIN — FERRIC CARBOXYMALTOSE INJECTION 750 MG: 50 INJECTION, SOLUTION INTRAVENOUS at 10:14

## 2019-04-11 RX ADMIN — SODIUM CHLORIDE: 9 INJECTION, SOLUTION INTRAVENOUS at 09:42

## 2019-04-11 RX ADMIN — Medication 10 ML: at 09:41

## 2019-04-11 ASSESSMENT — ENCOUNTER SYMPTOMS
COUGH: 0
ABDOMINAL DISTENTION: 0
COLOR CHANGE: 0
NAUSEA: 0
ABDOMINAL PAIN: 0
CHEST TIGHTNESS: 0
WHEEZING: 0
APNEA: 0
SHORTNESS OF BREATH: 1

## 2019-04-11 ASSESSMENT — PAIN - FUNCTIONAL ASSESSMENT: PAIN_FUNCTIONAL_ASSESSMENT: 0-10

## 2019-04-11 NOTE — PROGRESS NOTES
4780 Alert female admitted for injectafer procedure reviewed with pt verbalized understanding. Pt rights and responsibilities offered to pt to read.     _met___ Safety:       (Environmental)   [place] to environment   Ensure ID band is correct and in place/ allergy band as needed   Assess for fall risk   Initiate fall precautions as applicable (fall band, side rails, etc.)   Call light within reach   Bed in low position/ wheels locked    met____ Pain:        Assess pain level and characteristics   Administer analgesics as ordered   Assess effectiveness of pain management and report to MD as needed    met____ Knowledge Deficit:   Assess baseline knowledge   Provide teaching at level of understanding   Provide teaching via preferred learning method   Evaluate teaching effectiveness    __

## 2019-04-11 NOTE — PROGRESS NOTES
1310 Medication completed patient tolerated well.   1320 Patient discharged to home in stable condition via wheelchair with lacp    _m___ Safety:       (Environmental)   [place] to environment   Ensure ID band is correct and in place/ allergy band as needed   Assess for fall risk   Initiate fall precautions as applicable (fall band, side rails, etc.)   Call light within reach   Bed in low position/ wheels locked    _m___ Pain:        Assess pain level and characteristics   Administer analgesics as ordered   Assess effectiveness of pain management and report to MD as needed    __m__ Knowledge Deficit:   Assess baseline knowledge   Provide teaching at level of understanding   Provide teaching via preferred learning method   Evaluate teaching effectiveness    _m___ Hemodynamic/Respiratory Status:       (Pre and Post Procedure Monitoring)   Assess/Monitor vital signs and LOC   Assess Baseline SpO2 prior to any sedation   Obtain weight/height   Assess vital signs/ LOC until patient meets discharge criteria   Monitor procedure site and notify MD of any issues

## 2019-04-11 NOTE — PROGRESS NOTES
[facility]       Visit Date: 4/11/2019  Cardiologist:  Dr. Edgard Hernandez  Primary Care Physician: Dr. Matt Bernal MD    Violette Gomez is a 80 y.o. female who presents today for:  Chief Complaint   Patient presents with    Congestive Heart Failure       HPI:   Violette Gomez is a 80 y.o. female who presents to the office for a follow up visit in the heart failure clinic. Hx HTN, ICD, CKD, DM, CVA, COPD on home Oxygen prn, CardioMEMS implant 10/18/18 with PAD 15-20. She resides at [facility]. She can perform ADLs without SOB or fatigue. She walks with a walker. Her legs are wrapped with stretched out ACE wrap, loosely as they are falling down. Her legs are swollen today. SOB with activity is at her baseline. She sleeps in a chair. Patient has:  Last hospital admission related to Heart Failure:   Mar 2018 (although CardioMEMS did not show an increase in PAD)  Chest Pain: no  Worsening SOB/orthopnea/PND: no  Edema: yes  Any extra diuretic use: no  Weight gain: no  Compliant checking home weight: yes  Fatigue: no  Abdominal bloating: no  Appetite: good  Difficulty sleeping: no  Cough: occasional  Compliant checking blood pressure: yes  Any refills on CHF medications needed at this time: no        Past Medical History:   Diagnosis Date    CAD (coronary artery disease)     nonobstructive    Cancer ([facility] 2007    breast cancer    [facility], [facility]     Dr. Stefani Kramer CHF (congestive heart failure) ([facility])     CKD (chronic kidney disease) stage 3, GFR 30-59 ml/min ([facility])     Dr. Alysa Cruz    Congenital heart disease     [facility]     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 ([facility]     DM2 (diabetes mellitus, type 2) ([facility] 1998    with CKD3, R foot ulcer and hx of prior ulcerations and PVD    Ex-smoker 10/2/2012    GERD (gastroesophageal reflux disease)     Heart failure with preserved ejection fraction [facility]     Dr. Braxton Ram    His of Heparin N/A 2017    EGD ESOPHAGOGASTRODUODENOSCOPY DILATATION performed by Odilon Acevedo MD at [address] [facility] ESOPHAGOGASTRODUODENOSCOPY TRANSORAL DIAGNOSTIC  2017    EGD ESOPHAGOGASTRODUODENOSCOPY performed by Odilon Acevedo MD at [facility]     Family History   Problem Relation Age of Onset    Diabetes Mother     Pacemaker Mother     Stroke Mother     Heart Disease Mother     Diabetes Father     Cancer Sister     Cancer Brother     Cancer Sister     Heart Disease Brother         cardiomegaly     Social History     Tobacco Use    Smoking status: Former Smoker     Packs/day: 0.50     Years: 2.00     Pack years: 1.00     Types: Cigarettes     Last attempt to quit: 1952     Years since quittin.3    Smokeless tobacco: Never Used   Substance Use Topics    Alcohol use: No     Alcohol/week: 0.0 oz     Current Outpatient Medications   Medication Sig Dispense Refill    hydrALAZINE (APRESOLINE) 100 MG tablet Take 200 mg by mouth 3 times daily      isosorbide dinitrate (ISORDIL) 20 MG tablet Take 1 tablet by mouth 3 times daily 90 tablet 3    metoprolol succinate (TOPROL XL) 100 MG extended release tablet Take 1 tablet by mouth daily Hold if SBP less than 100 and HR less than 50 bpm 30 tablet 1    ipratropium-albuterol (DUONEB) 0.5-2.5 (3) MG/3ML SOLN nebulizer solution Inhale 1 vial into the lungs every 6 hours as needed for Shortness of Breath      ARTIFICIAL TEAR OP Instill one drop into each eye as needed      bumetanide (BUMEX) 1 MG tablet Take 2 mg by mouth daily      Probiotic Product (PROBIOTIC-10) CAPS Take 1 capsule by mouth daily       nitroGLYCERIN (NITROSTAT) 0.4 MG SL tablet up to max of 3 total doses.  If no relief after 1 dose, call 911. 25 tablet 3    ascorbic acid (VITAMIN C) 500 MG tablet Take 500 mg by mouth 2 times daily       docusate sodium (COLACE) 100 MG capsule Take 200 mg by mouth daily      ferrous sulfate (JAMIR-RAJANI) 325 (65 Fe) MG tablet Take 1 tablet by mouth 2 times daily 60 tablet 3    clopidogrel (PLAVIX) 75 MG tablet Take 75 mg by mouth every evening       vitamin B-12 (CYANOCOBALAMIN) 1000 MCG tablet Take 1,000 mcg by mouth daily      ondansetron (ZOFRAN) 4 MG tablet Take 4 mg by mouth 2 times daily       pantoprazole (PROTONIX) 40 MG tablet Take 40 mg by mouth 2 times daily       insulin aspart (NOVOLOG) 100 UNIT/ML injection vial Inject 12 Units into the skin 2 times daily (with meals)      insulin glargine (LANTUS) 100 UNIT/ML injection vial Inject 22 Units into the skin nightly      guaiFENesin (ROBITUSSIN) 100 MG/5ML syrup Take 200 mg by mouth every 4 hours as needed for Cough      Multiple Vitamins-Minerals (OCUVITE EXTRA PO) Take 1 tablet by mouth daily       bisacodyl (DULCOLAX) 10 MG suppository Place 10 mg rectally daily as needed for Constipation      magnesium hydroxide (MILK OF MAGNESIA) 400 MG/5ML suspension Take 30 mLs by mouth daily as needed for Constipation      pravastatin (PRAVACHOL) 40 MG tablet Take 40 mg by mouth every evening Indications: Blood Cholesterol Abnormal       acetaminophen (TYLENOL) 325 MG tablet Take 650 mg by mouth 4 times daily       warfarin (COUMADIN) 3 MG tablet Take 3 mg by mouth daily        No current facility-administered medications for this visit. Facility-Administered Medications Ordered in Other Visits   Medication Dose Route Frequency Provider Last Rate Last Dose    0.9 % sodium chloride infusion   Intravenous Continuous Franco Larve, APRN - CNP        ferric carboxymaltose (INJECTAFER) 750 mg in sodium chloride 0.9 % 250 mL IVPB  750 mg Intravenous Once Franco Larve, APRN - CNP        sodium chloride flush 0.9 % injection 10 mL  10 mL Intravenous PRN Franco Larve, APRN - CNP         No Known Allergies    SUBJECTIVE:   Review of Systems   Constitutional: Negative for activity change, appetite change, fatigue and fever. HENT: Negative for congestion.     Respiratory: Positive for shortness of breath (with activity ). Negative for apnea, cough, chest tightness and wheezing. Cardiovascular: Negative for chest pain, palpitations and leg swelling. Gastrointestinal: Negative for abdominal distention, abdominal pain and nausea. Genitourinary: Negative for difficulty urinating and dysuria. Musculoskeletal: Positive for gait problem (walker or WC). Negative for arthralgias. Skin: Negative for color change. Neurological: Negative for dizziness, numbness and headaches. Psychiatric/Behavioral: Negative for agitation, confusion and sleep disturbance. The patient is not nervous/anxious. OBJECTIVE:   Today's Vitals:  /70   Pulse 77   Ht 5' 6\" (1.676 m)   Wt 255 lb (115.7 kg)   SpO2 95%   BMI 41.16 kg/m²     Physical Exam   Constitutional: She is oriented to person, place, and time. She appears well-developed and well-nourished. HENT:   Head: Normocephalic and atraumatic. Eyes: Pupils are equal, round, and reactive to light. Neck: Normal range of motion. No JVD present. Cardiovascular: Normal rate and normal heart sounds. No murmur heard. +ICD   Pulmonary/Chest: Effort normal. No respiratory distress. She has no rales. Abdominal: Soft. She exhibits no distension. There is no tenderness. Musculoskeletal: She exhibits edema (1+ LE (legs are wrapped with ACE)). She exhibits no tenderness. Neurological: She is alert and oriented to person, place, and time. Skin: Skin is warm and dry. Psychiatric: She has a normal mood and affect. Her behavior is normal.   Vitals reviewed. Wt Readings from Last 3 Encounters:   04/11/19 255 lb (115.7 kg)   04/04/19 255 lb (115.7 kg)   03/30/19 251 lb 14.4 oz (114.3 kg)     BP Readings from Last 3 Encounters:   04/11/19 (!) 166/75   04/11/19 134/70   04/04/19 (!) 142/63     Pulse Readings from Last 3 Encounters:   04/11/19 67   04/11/19 77   04/04/19 77     Body mass index is 41.16 kg/m².     ECHO: reviewed:  BNP:   Lab Results   Component Value Date    BNP 46.0 05/20/2013    PROBNP 6431.0 (H) 03/26/2019     CBC:   Lab Results   Component Value Date    WBC 7.3 04/03/2019    RBC 3.48 04/03/2019    HGB 9.7 04/03/2019    HCT 30.1 04/03/2019     04/03/2019     CMP:    Lab Results   Component Value Date     04/03/2019    K 4.6 04/03/2019    K 4.8 10/18/2018     04/03/2019    CO2 26 04/03/2019    BUN 77 04/03/2019    CREATININE 2.6 04/03/2019    AGRATIO 1.4 10/23/2014    LABGLOM 17 04/03/2019    LABGLOM 17 03/28/2019    GLUCOSE 35 04/03/2019    GLUCOSE 316 11/09/2016    CALCIUM 9.1 04/03/2019     Hepatic Function Panel:    Lab Results   Component Value Date    ALKPHOS 108 03/26/2019    ALT 16 03/26/2019    AST 22 03/26/2019    PROT 6.7 03/26/2019    BILITOT 0.3 03/26/2019    BILIDIR <0.2 03/26/2019    LABALBU 3.3 03/28/2019     Magnesium:    Lab Results   Component Value Date    MG 1.7 09/06/2018     PT/INR:    Lab Results   Component Value Date    PROTIME 24.2 11/21/2017    PROTIME 11.1 09/18/2017    INR 1.96 03/30/2019     Lipids:    Lab Results   Component Value Date    TRIG 124 10/30/2018    HDL 28 10/30/2018    LDLCALC 34 10/30/2018       ASSESSMENT AND PLAN:   The patient's condition/symptoms are Stable: No clinical evidence of fluid overload today. Continue current medical regimen without changes at present time.      Diagnosis Orders   1. CHF (congestive heart failure), NYHA class II, chronic, diastolic (HCC)         Plan:  · Stop Imdur 30 mg nightly and start Isosorbide 20 mg tid, extra Bumex 2 mg today d/t increased LE edema then continue Bumex 2 mg daily, Hydralazine 200 mg tid, Toprol 100 mg daily, on Plavix. HF Zones reviewed. Legs were rewrapped by me today and order placed for new ACE wraps.   · Daily weights  · Fluid restriction of 2 Liters per day  · Limit sodium in diet to around 7506-7239 mg/day  · Monitor BP  · Activity as tolerated     Patient was instructed to call the Heart Failure Clinic for changes in the following symptoms:   Weight gain of 3 pounds in 1 day or 5 pounds in 1 week  Increased shortness of breath  Shortness of breath while laying down  Cough  Chest pain  Swelling in feet, ankles or legs  Tenderness or bloating in the abdomen  Fatigue   Decreased appetite or nausea   Confusion      Return in about 2 months (around 6/11/2019). or sooner if needed     Patient given educational materials - see patient instructions. We discussed the importance of weighing oneself and recording daily. We also discussed the importance of a lowsodium diet, higher sodium foods to avoid and better low sodium food options. Discussed use, benefit, and side effects of prescribed medications. All patient questions answered. Patient verbalizes understanding of plan of care using teach back method, and is agreeable to the treatment plan.        Electronicallysigned by ARELI Valles CNP on 4/11/2019 at 9:30 AM

## 2019-04-11 NOTE — PROGRESS NOTES
[facility] [address] Dis[facility] instructions given to patient verbalized understanding.   1043 [facility] called for transport  1107 Patient discharged to home in stable condition via [facility]

## 2019-04-11 NOTE — PATIENT INSTRUCTIONS
Continue:  · Continue current medications  · Daily weights and record  · Fluid restriction of 2 Liters per day  · Limit sodium in diet to around 4346-6639 mg/day  · Monitor BP  · Activity as tolerated     Call the Heart Failure Clinic for any of the following symptoms: [phone]  Weight gain of 3 pounds in 1 day or 5 pounds in 1 week  Increased shortness of breath  Shortness of breath while laying down  Cough  Chest pain  Swelling in feet, ankles or legs  Tenderness or bloating in the abdomen  Fatigue   Decreased appetite or nausea   Confusion

## 2019-04-15 ENCOUNTER — OFFICE VISIT (OUTPATIENT)
Dept: CARDIOLOGY CLINIC | Age: 84
End: 2019-04-15
Payer: MEDICARE

## 2019-04-15 VITALS
WEIGHT: 255 LBS | HEART RATE: 74 BPM | SYSTOLIC BLOOD PRESSURE: 134 MMHG | DIASTOLIC BLOOD PRESSURE: 64 MMHG | BODY MASS INDEX: 41.16 KG/M2

## 2019-04-15 DIAGNOSIS — I10 ESSENTIAL HYPERTENSION: ICD-10-CM

## 2019-04-15 DIAGNOSIS — I42.8 CARDIOMYOPATHY, NONISCHEMIC (HCC): Chronic | ICD-10-CM

## 2019-04-15 DIAGNOSIS — Z98.890 S/P CARDIAC CATH: ICD-10-CM

## 2019-04-15 DIAGNOSIS — E78.2 MIXED HYPERLIPIDEMIA: Chronic | ICD-10-CM

## 2019-04-15 DIAGNOSIS — I44.7 LBBB (LEFT BUNDLE BRANCH BLOCK): Chronic | ICD-10-CM

## 2019-04-15 DIAGNOSIS — I50.32 CHF (CONGESTIVE HEART FAILURE), NYHA CLASS III, CHRONIC, DIASTOLIC (HCC): Primary | ICD-10-CM

## 2019-04-15 PROCEDURE — G8598 ASA/ANTIPLAT THER USED: HCPCS | Performed by: INTERNAL MEDICINE

## 2019-04-15 PROCEDURE — 1123F ACP DISCUSS/DSCN MKR DOCD: CPT | Performed by: INTERNAL MEDICINE

## 2019-04-15 PROCEDURE — 1111F DSCHRG MED/CURRENT MED MERGE: CPT | Performed by: INTERNAL MEDICINE

## 2019-04-15 PROCEDURE — 1090F PRES/ABSN URINE INCON ASSESS: CPT | Performed by: INTERNAL MEDICINE

## 2019-04-15 PROCEDURE — 4040F PNEUMOC VAC/ADMIN/RCVD: CPT | Performed by: INTERNAL MEDICINE

## 2019-04-15 PROCEDURE — 99214 OFFICE O/P EST MOD 30 MIN: CPT | Performed by: INTERNAL MEDICINE

## 2019-04-15 PROCEDURE — G8427 DOCREV CUR MEDS BY ELIG CLIN: HCPCS | Performed by: INTERNAL MEDICINE

## 2019-04-15 PROCEDURE — 1036F TOBACCO NON-USER: CPT | Performed by: INTERNAL MEDICINE

## 2019-04-15 PROCEDURE — G8399 PT W/DXA RESULTS DOCUMENT: HCPCS | Performed by: INTERNAL MEDICINE

## 2019-04-15 PROCEDURE — G8417 CALC BMI ABV UP PARAM F/U: HCPCS | Performed by: INTERNAL MEDICINE

## 2019-04-15 NOTE — PROGRESS NOTES
No chief complaint on file.   former pat of dr. Iam Casas  pt with PMH of severe LV dysfunction, s/p Biv - ICD insertion, dyslipidemia, HTN, DM, CKD      6 months F/u    Denied cp, sob, palpitations , dizziness or edema    Record reviewed    NO wt changes      Patient Active Problem List   Diagnosis    Cardiomyopathy, nonischemic ([address]    Hypertension    LBBB (left bundle branch block)    Hyperlipidemia    [facility] BiV ICD    CKD (chronic kidney disease) stage 3, GFR 30-59 ml/min ([facility])    Ex-smoker    Mild carotid artery disease ([address]    CAD (coronary artery disease)    Hyperkalemia    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 ([address]    DM2 (diabetes mellitus, type 2) ([address]    GERD (gastroesophageal reflux disease)    Heart failure with preserved ejection fraction ([address]    His of Heparin induced thrombocytopenia    History of breast cancer    History of CVA (cerebrovascular accident)    History of pulmonary embolism    Iron deficiency anemia    Osteoarthritis    Paget's disease of bone    Vitamin D deficiency    Bilateral lower extremity edema    CHF (congestive heart failure), NYHA class I, acute on chronic, combined ([address]    COPD exacerbation (HCC)    Acute renal failure superimposed on chronic kidney disease ([address]    Acute on chronic congestive heart failure (HCC)    Other fluid overload (CODE)    CHF (congestive heart failure), NYHA class III, chronic, diastolic ([address]    SHAILA (acute kidney injury) ([address]    SHAILA (acute kidney injury) ([address]    Hyperkalemia    Contusion of right shoulder    Contusion of left knee    Folliculitis    Anemia, chronic disease    CKD (chronic kidney disease), stage IV ([address]    SHAILA (acute kidney injury) ([address]    Acute systolic congestive heart failure ([address]       Past Surgical History:   Procedure Laterality Date    BREAST SURGERY      s/p right mastectomy 1980's and left mastectomy 2007    CARDIAC CATHETERIZATION  11- Hemodynamics w pulmonary hypertension, systemic hypertension and no sats gradient difference. No aortic stenosis. No mitral stenosis. Coronaries; mild disease of the LAD not more than 40%. LV; severe LV dysfunction and EF 30-35%.  CARDIAC CATHETERIZATION  05-    Mild disease of small distal LAD (approximately  50% stenosis) angiographically normal CA. Mild to moderate LV systolic dysfunction. EF 35%. Mildly elevated LV end diastolic pressure. No significant MR. Systemic hypertension.  CARDIAC DEFIBRILLATOR PLACEMENT  02/02/2011    CARDIOVASCULAR STRESS TEST  03/23/10    EF 30%  Severe LV Dys    COLONOSCOPY      Dr. Margarita Arias  03/22/10    EF 45%. LV mildly dilated. Systolic function mildly reducted. No regional wall motion abnormalities. Wall thickness mildly increased. LA mildly dilated. MV mild annular calification. Mild TR. Ventricular septum tickness mildly increased.  ENDOSCOPY, COLON, DIAGNOSTIC      EYE SURGERY      bilateral cataracts    EYE SURGERY Left 9/2014    laser surgery for retinopathy?  FOOT SURGERY Left 1/12/2016    OTHER SURGICAL HISTORY  10/18/2018    CardioMEMS    PACEMAKER PLACEMENT      ID EGD BALLOON DILATION ESOPHAGUS <30 MM DIAM N/A 9/18/2017    EGD ESOPHAGOGASTRODUODENOSCOPY DILATATION performed by Alex Petty MD at [facility]    ID ESOPHAGOGASTRODUODENOSCOPY TRANSORAL DIAGNOSTIC  9/18/2017    EGD ESOPHAGOGASTRODUODENOSCOPY performed by Alex Petty MD at [facility]       No Known Allergies     Family History   Problem Relation Age of Onset    Diabetes Mother    Araseli Larch Pacemaker Mother     Stroke Mother     Heart Disease Mother     Diabetes Father     Cancer Sister     Cancer Brother     Cancer Sister     Heart Disease Brother         cardiomegaly        Social History     Socioeconomic History    Marital status:       Spouse name: Not on file    Number of children: 0    Years of education: Not on file    Highest education level: Not on file   Occupational History    Occupation: Retired    Social Needs    Financial resource strain: Not on file    Food insecurity:     Worry: Not on file     Inability: Not on file   "[employer] needs:     Medical: Not on file     Non-medical: Not on file   Tobacco Use    Smoking status: Former Smoker     Packs/day: 0.50     Years: 2.00     Pack years: 1.00     Types: Cigarettes     Last attempt to quit: 1952     Years since quittin.3    Smokeless tobacco: Never Used   Substance and Sexual Activity    Alcohol use: No     Alcohol/week: 0.0 oz    Drug use: No    Sexual activity: Never   Lifestyle    Physical activity:     Days per week: Not on file     Minutes per session: Not on file    Stress: Not on file   Relationships    Social connections:     Talks on phone: Not on file     Gets together: Not on file     Attends [employer] service: Not on file     Active member of club or organization: Not on file     Attends meetings of clubs or organizations: Not on file     Relationship status: Not on file    Intimate partner violence:     Fear of current or ex partner: Not on file     Emotionally abused: Not on file     Physically abused: Not on file     Forced sexual activity: Not on file   Other Topics Concern    Not on file   Social History Narrative    Not on file       Current Outpatient Medications   Medication Sig Dispense Refill    hydrALAZINE (APRESOLINE) 100 MG tablet Take 200 mg by mouth 3 times daily      isosorbide dinitrate (ISORDIL) 20 MG tablet Take 1 tablet by mouth 3 times daily 90 tablet 3    metoprolol succinate (TOPROL XL) 100 MG extended release tablet Take 1 tablet by mouth daily Hold if SBP less than 100 and HR less than 50 bpm 30 tablet 1    ipratropium-albuterol (DUONEB) 0.5-2.5 (3) MG/3ML SOLN nebulizer solution Inhale 1 vial into the lungs every 6 hours as needed for Shortness of Breath      ARTIFICIAL TEAR OP Instill one drop into each eye as needed      warfarin (COUMADIN) 3 MG tablet Take 3 mg by mouth daily       bumetanide (BUMEX) 1 MG tablet Take 2 mg by mouth daily      Probiotic Product (PROBIOTIC-10) CAPS Take 1 capsule by mouth daily       nitroGLYCERIN (NITROSTAT) 0.4 MG SL tablet up to max of 3 total doses. If no relief after 1 dose, call 911. 25 tablet 3    ascorbic acid (VITAMIN C) 500 MG tablet Take 500 mg by mouth 2 times daily       docusate sodium (COLACE) 100 MG capsule Take 200 mg by mouth daily      ferrous sulfate (JAMIR-RAJANI) 325 (65 Fe) MG tablet Take 1 tablet by mouth 2 times daily 60 tablet 3    clopidogrel (PLAVIX) 75 MG tablet Take 75 mg by mouth every evening       vitamin B-12 (CYANOCOBALAMIN) 1000 MCG tablet Take 1,000 mcg by mouth daily      ondansetron (ZOFRAN) 4 MG tablet Take 4 mg by mouth 2 times daily       pantoprazole (PROTONIX) 40 MG tablet Take 40 mg by mouth 2 times daily       insulin aspart (NOVOLOG) 100 UNIT/ML injection vial Inject 12 Units into the skin 2 times daily (with meals)      insulin glargine (LANTUS) 100 UNIT/ML injection vial Inject 22 Units into the skin nightly      guaiFENesin (ROBITUSSIN) 100 MG/5ML syrup Take 200 mg by mouth every 4 hours as needed for Cough      Multiple Vitamins-Minerals (OCUVITE EXTRA PO) Take 1 tablet by mouth daily       bisacodyl (DULCOLAX) 10 MG suppository Place 10 mg rectally daily as needed for Constipation      magnesium hydroxide (MILK OF MAGNESIA) 400 MG/5ML suspension Take 30 mLs by mouth daily as needed for Constipation      pravastatin (PRAVACHOL) 40 MG tablet Take 40 mg by mouth every evening Indications: Blood Cholesterol Abnormal       acetaminophen (TYLENOL) 325 MG tablet Take 650 mg by mouth 4 times daily        No current facility-administered medications for this visit.         Review of Systems -     General ROS: negative  Psychological ROS: negative  Hematological and Lymphatic ROS: No history of blood Function Panel:    Lab Results   Component Value Date    ALKPHOS 108 03/26/2019    ALT 16 03/26/2019    AST 22 03/26/2019    PROT 6.7 03/26/2019    BILITOT 0.3 03/26/2019    BILIDIR <0.2 03/26/2019    LABALBU 3.3 03/28/2019     Magnesium:    Lab Results   Component Value Date    MG 1.7 09/06/2018     Warfarin PT/INR:  No components found for: PTPATWAR, PTINRWAR  HgBA1c:    Lab Results   Component Value Date    LABA1C 4.9 02/06/2019     FLP:    Lab Results   Component Value Date    TRIG 124 10/30/2018    HDL 28 10/30/2018    LDLCALC 34 10/30/2018     TSH:    Lab Results   Component Value Date    TSH 2.650 06/13/2018       EKG Biv paced rhythm    Assessment     Diagnosis Orders   1. CHF (congestive heart failure), NYHA class III, chronic, diastolic ([address]     2. Essential hypertension     3. S/P cardiac cath     4. LBBB (left bundle branch block)     5. Mixed hyperlipidemia     6. Cardiomyopathy, nonischemic ([facility]             Dr. Lester Haq office Visit DX       NYHA class II Diastolic heart failure. Continue Nitrate/Diuretic/BB. EF - Normal  S/P BiV - ICD insertion. Normal EF     Non-obstructive Coronary artery disease, seems to be stable. Denies angina or change in breathing pattern. Continue Plavix/Statin/Nitrate/BB/norvasc.     Hypertension, on medical treatment.      No venous insufficiency.     Hyperlipidemia: on statin, followed periodically. Patient need periodic lipid and liver profile.     Diabetes mellitus: followed by family physician. Patient needs very tight control to minimize cardiac risk.     Mild carotid artery ds   History of PE  On coumadin      Peripheral vascular disease, No significant claudication and no obvious TIA or stroke symptoms  S/P left SFA PTA  As per IR. Plan     The meds and labs reviewed    Continue the current treatment and with constant vigilance to changes in symptoms and also any potential side effects.   Return for care or seek medical attention immediately if symptoms got worse and/or develop new symptoms. Congestive heart failure: no evidence of fluid overload today, no recent hospitalization for CHF  Had Cardiomimes  F/U CHF clinic  \  CKD under F/u nephrology    Hypertension, on medical treatment. Seems to be under good control. Patient is compliant with medical treatment.      Record reviewed      RTC in 6 months      Deysi [facility]

## 2019-04-16 ENCOUNTER — TELEPHONE (OUTPATIENT)
Dept: NEPHROLOGY | Age: 84
End: 2019-04-16

## 2019-04-16 NOTE — TELEPHONE ENCOUNTER
Per Phone, Discussed Dialysis Options of Hemodialysis or Peritoneal dialysis. Information regarding both provided. Pt likes Peritoneal dialysis option due to convenience. Does not like to \"go out in the cold weather. \"   Will cancel Appt with Dr Pepe Gaming for AV fistula placement  Please cancel appt and call extended care facility to notify of appt cancellation

## 2019-04-16 NOTE — TELEPHONE ENCOUNTER
I called and cancelled the 5/20 appointment with Dr.Matos Jonas-I also informed CEM Henry at [facility] about this cancellation also

## 2019-04-29 DIAGNOSIS — I50.32 CHF (CONGESTIVE HEART FAILURE), NYHA CLASS III, CHRONIC, DIASTOLIC (HCC): Primary | ICD-10-CM

## 2019-04-29 PROCEDURE — 93264 REM MNTR WRLS P-ART PRS SNR: CPT | Performed by: NURSE PRACTITIONER

## 2019-05-06 ENCOUNTER — TELEPHONE (OUTPATIENT)
Dept: CARDIOLOGY CLINIC | Age: 84
End: 2019-05-06

## 2019-05-06 NOTE — TELEPHONE ENCOUNTER
Pt has gained 5 pounds in a week. 5/1/19 255  5/2/19 256  5/3/19 258  5/4/19 258  5/5/19 259  5/6/19 260    No SOB, no cough, no CP, no swelling increase. On Bumex 2 mg daily    Please advise.

## 2019-05-15 ENCOUNTER — TELEPHONE (OUTPATIENT)
Dept: CARDIOLOGY CLINIC | Age: 84
End: 2019-05-15

## 2019-05-15 NOTE — TELEPHONE ENCOUNTER
Nurse called and pt was 258 yesterday and now 261. No SOB, no swelling, no cough.  Tuesdays they go shopping and out to eat, she's afraid she may have had too much sodium   On bumex 2 mg daily      please advise

## 2019-05-21 NOTE — TELEPHONE ENCOUNTER
Weight is down 3 pounds  No HF symptoms  Agree with house PCP for headaches  Continue to monitor for HF symptoms  Thanks

## 2019-05-21 NOTE — TELEPHONE ENCOUNTER
Nurse left message after office hours  Pt wt has been stable at 261  Denies any CHF symptoms but does have c/o headaches    Left message for nurse to call office back

## 2019-05-30 DIAGNOSIS — I50.32 CHF (CONGESTIVE HEART FAILURE), NYHA CLASS III, CHRONIC, DIASTOLIC (HCC): Primary | ICD-10-CM

## 2019-05-30 PROCEDURE — 93264 REM MNTR WRLS P-ART PRS SNR: CPT | Performed by: NURSE PRACTITIONER

## 2019-06-03 ENCOUNTER — TELEPHONE (OUTPATIENT)
Dept: CARDIOLOGY CLINIC | Age: 84
End: 2019-06-03

## 2019-06-03 NOTE — TELEPHONE ENCOUNTER
Yes correction 250  Spoke with Keyana at nursing home   They will call tomorrow with wt's and assessment

## 2019-06-03 NOTE — TELEPHONE ENCOUNTER
Nurse left voicemail on Sunday 6-2+patient wt up 263.8  No SOB has BAMBI but not any more than normal.   Wt's in clinic run between 150 and 155  Called back to NH and nurse unavailable at this time. And no wt in computer for today yet. Any changes or recommendations?   Has appointment with Dr. Coco Wright 6-6 and you 6-11

## 2019-06-03 NOTE — TELEPHONE ENCOUNTER
I assume her clinic weights are 250-251 (not 150-151)?   Her CardioMEMS is stable  No new orders at this time  Call tomorrow for weight

## 2019-06-04 LAB
FERRITIN: 580 NG/ML (ref 9–150)
HCT VFR BLD CALC: 30.5 % (ref 36–46)
HEMOGLOBIN: 9.7 G/DL (ref 12–16)
IRON SATURATION: 22
IRON: 45
TOTAL IRON BINDING CAPACITY: 207

## 2019-06-06 ENCOUNTER — TELEPHONE (OUTPATIENT)
Dept: NEPHROLOGY | Age: 84
End: 2019-06-06

## 2019-06-06 ENCOUNTER — OFFICE VISIT (OUTPATIENT)
Dept: NEPHROLOGY | Age: 84
End: 2019-06-06
Payer: MEDICARE

## 2019-06-06 VITALS
HEART RATE: 77 BPM | OXYGEN SATURATION: 97 % | HEIGHT: 66 IN | BODY MASS INDEX: 42.27 KG/M2 | DIASTOLIC BLOOD PRESSURE: 71 MMHG | WEIGHT: 263 LBS | RESPIRATION RATE: 18 BRPM | SYSTOLIC BLOOD PRESSURE: 161 MMHG

## 2019-06-06 DIAGNOSIS — N18.4 CKD (CHRONIC KIDNEY DISEASE), STAGE IV (HCC): Primary | ICD-10-CM

## 2019-06-06 PROCEDURE — G8598 ASA/ANTIPLAT THER USED: HCPCS | Performed by: INTERNAL MEDICINE

## 2019-06-06 PROCEDURE — G8427 DOCREV CUR MEDS BY ELIG CLIN: HCPCS | Performed by: INTERNAL MEDICINE

## 2019-06-06 PROCEDURE — 99213 OFFICE O/P EST LOW 20 MIN: CPT | Performed by: INTERNAL MEDICINE

## 2019-06-06 PROCEDURE — 1036F TOBACCO NON-USER: CPT | Performed by: INTERNAL MEDICINE

## 2019-06-06 PROCEDURE — G8399 PT W/DXA RESULTS DOCUMENT: HCPCS | Performed by: INTERNAL MEDICINE

## 2019-06-06 PROCEDURE — 1090F PRES/ABSN URINE INCON ASSESS: CPT | Performed by: INTERNAL MEDICINE

## 2019-06-06 PROCEDURE — 4040F PNEUMOC VAC/ADMIN/RCVD: CPT | Performed by: INTERNAL MEDICINE

## 2019-06-06 PROCEDURE — 1123F ACP DISCUSS/DSCN MKR DOCD: CPT | Performed by: INTERNAL MEDICINE

## 2019-06-06 PROCEDURE — G8417 CALC BMI ABV UP PARAM F/U: HCPCS | Performed by: INTERNAL MEDICINE

## 2019-06-06 NOTE — PROGRESS NOTES
[facility]    [facility]  [address], [address]  [phone]       Progress Note    6/6/2019 3:13 PM    Pt Name:    Harry Donato:    1934  Primary Care Physician:  Shahab Hampton MD       Chief Complaint:   Chief Complaint   Patient presents with    Chronic Kidney Disease     iv    Hypertension    Diabetes        History of Chief Complaint: CKD stage IV from DM, Aging, HTN     Subjective:  I last saw the patient in clinic 04/04/19. I follow the patient for Chronic Kidney disease stage IV. Since our last visit the patient has not been hospitalized. The patient is sleeping well at night with 1-2 times per night nocturia. The patient has a good appetite and is remaining active. The patient denied N/V/C/D/SOB/CP. Her kidney function blood tests were not performed. She is being fluid restricted. Last six eGFR readings:  Lab Results   Component Value Date    LABGLOM 17 04/03/2019    LABGLOM 24 04/02/2019    LABGLOM 17 03/28/2019    LABGLOM 18 03/26/2019    LABGLOM 21 02/06/2019    LABGLOM 20 01/07/2019    LABGLOM 18 11/07/2018    LABGLOM 19 10/30/2018    LABGLOM 17 10/18/2018    LABGLOM 18 09/06/2018    LABGLOM 15 09/05/2018    LABGLOM 16 09/04/2018          Objective:  VITALS:  BP (!) 161/71 (Site: Right Lower Arm, Position: Sitting, Cuff Size: Small Adult)   Pulse 77   Resp 18   Ht 5' 6\" (1.676 m)   Wt 263 lb (119.3 kg)   SpO2 97%   BMI 42.45 kg/m²   Weight:   Wt Readings from Last 3 Encounters:   06/06/19 263 lb (119.3 kg)   04/15/19 255 lb (115.7 kg)   04/11/19 255 lb (115.7 kg)     Body mass index is 42.45 kg/m². Physical examination    General:  Alert and cooperative with exam  HEENT:  Normocephalic. No lesions nor rashes. ADAMA. EOMI  Neck:   No JVD and no bruits. Thyroid gland is normal  Lungs:  Breathing easily. No rales nor rhonchi. No cough nor sputum production. Heart[de-identified]            RRR. No murmurs nor rubs.  PMI is not enlarged nor displaced  Abdomen:  Soft and non tender. Bowel sounds are active in all four quadrants. Extremities:  1+ edema  Neurologic:  CN II-XII are intact. No deficits noted. Muscle strength and tone are equal throughout. Skin:                Warm and dry with no rashes. Muscles:         Hand  and leg strength are equal and strong bilaterally.      Lab Data      CBC:   Lab Results   Component Value Date    WBC 7.3 04/03/2019    HGB 9.7 (A) 06/04/2019    HCT 30.5 (A) 06/04/2019    MCV 90.2 03/28/2019     04/03/2019     BMP:    Lab Results   Component Value Date     04/03/2019     04/02/2019     (L) 03/28/2019    K 4.6 04/03/2019    K 4.7 04/02/2019    K 5.7 (H) 03/28/2019     04/03/2019     04/02/2019    CL 96 (L) 03/28/2019    CO2 26 04/03/2019    CO2 25 04/02/2019    CO2 23 03/28/2019    BUN 77 04/03/2019    BUN 68 04/02/2019    BUN 59 (H) 03/28/2019    CREATININE 2.6 04/03/2019    CREATININE 2.3 04/02/2019    CREATININE 2.6 (H) 03/28/2019    GLUCOSE 35 04/03/2019    GLUCOSE 112 04/02/2019    GLUCOSE 210 (H) 03/28/2019      Hepatic:   Lab Results   Component Value Date    AST 22 03/26/2019    AST 12 10/30/2018    AST 13 09/04/2018    ALT 16 03/26/2019    ALT 8 10/30/2018    ALT 8 (L) 09/04/2018    BILITOT 0.3 03/26/2019    BILITOT 0.2 10/30/2018    BILITOT <0.2 (L) 09/04/2018    ALKPHOS 108 03/26/2019    ALKPHOS 72 10/30/2018    ALKPHOS 71 09/04/2018     BNP:   Lab Results   Component Value Date    BNP 46.0 05/20/2013    .5 (H) 02/08/2013     Lipids:   Lab Results   Component Value Date    CHOL 87 10/30/2018    HDL 28 (A) 10/30/2018     INR:   Lab Results   Component Value Date    INR 1.96 (H) 03/30/2019    INR 1.92 (H) 03/29/2019    INR 2.29 (H) 03/28/2019     URINE:   Lab Results   Component Value Date    NAUR 50 09/05/2018    PROTUR < 4.0 09/05/2018     Lab Results   Component Value Date    NITRU NEGATIVE 03/26/2019    COLORU YELLOW 03/26/2019    PHUR 5.0 03/26/2019 LABCAST NONE SEEN 03/26/2019    LABCAST NONE SEEN 03/26/2019    WBCUA 0-2 03/26/2019    RBCUA 3-5 03/26/2019    MUCUS NONE SEEN 06/24/2012    YEAST NONE SEEN 03/26/2019    BACTERIA NONE 03/26/2019    CLARITYU Clear 04/02/2014    SPECGRAV 1.019 03/26/2019    LEUKOCYTESUR SMALL 03/26/2019    UROBILINOGEN 0.2 03/26/2019    BILIRUBINUR NEGATIVE 03/26/2019    BLOODU NEGATIVE 03/26/2019    GLUCOSEU NEGATIVE 06/12/2018    KETUA NEGATIVE 03/26/2019    AMORPHOUS URATES 06/24/2012      Microalbumen/Creatinine ratio:  No components found for: RUCREAT        Medications:    Current Outpatient Medications   Medication Sig Dispense Refill    hydrALAZINE (APRESOLINE) 100 MG tablet Take 200 mg by mouth 3 times daily      isosorbide dinitrate (ISORDIL) 20 MG tablet Take 1 tablet by mouth 3 times daily 90 tablet 3    metoprolol succinate (TOPROL XL) 100 MG extended release tablet Take 1 tablet by mouth daily Hold if SBP less than 100 and HR less than 50 bpm 30 tablet 1    ipratropium-albuterol (DUONEB) 0.5-2.5 (3) MG/3ML SOLN nebulizer solution Inhale 1 vial into the lungs every 6 hours as needed for Shortness of Breath      ARTIFICIAL TEAR OP Instill one drop into each eye as needed      warfarin (COUMADIN) 3 MG tablet Take 3 mg by mouth daily       bumetanide (BUMEX) 1 MG tablet Take 2 mg by mouth daily      Probiotic Product (PROBIOTIC-10) CAPS Take 1 capsule by mouth daily       nitroGLYCERIN (NITROSTAT) 0.4 MG SL tablet up to max of 3 total doses.  If no relief after 1 dose, call 911. 25 tablet 3    ascorbic acid (VITAMIN C) 500 MG tablet Take 500 mg by mouth 2 times daily       docusate sodium (COLACE) 100 MG capsule Take 200 mg by mouth daily      ferrous sulfate (JAMIR-RAJANI) 325 (65 Fe) MG tablet Take 1 tablet by mouth 2 times daily 60 tablet 3    clopidogrel (PLAVIX) 75 MG tablet Take 75 mg by mouth every evening       vitamin B-12 (CYANOCOBALAMIN) 1000 MCG tablet Take 1,000 mcg by mouth daily      ondansetron (ZOFRAN) 4 MG tablet Take 4 mg by mouth 2 times daily       insulin aspart (NOVOLOG) 100 UNIT/ML injection vial Inject 12 Units into the skin 2 times daily (with meals)      insulin glargine (LANTUS) 100 UNIT/ML injection vial Inject 22 Units into the skin nightly      guaiFENesin (ROBITUSSIN) 100 MG/5ML syrup Take 200 mg by mouth every 4 hours as needed for Cough      Multiple Vitamins-Minerals (OCUVITE EXTRA PO) Take 1 tablet by mouth daily       bisacodyl (DULCOLAX) 10 MG suppository Place 10 mg rectally daily as needed for Constipation      magnesium hydroxide (MILK OF MAGNESIA) 400 MG/5ML suspension Take 30 mLs by mouth daily as needed for Constipation      pravastatin (PRAVACHOL) 40 MG tablet Take 40 mg by mouth every evening Indications: Blood Cholesterol Abnormal       acetaminophen (TYLENOL) 325 MG tablet Take 650 mg by mouth 4 times daily        No current facility-administered medications for this visit. IMPRESSIONS:      Kidney disease: CKD stage IV  Anemia: stable. However, nay need KRISTINE SOON. Iron stores are acceptable. Bone and mineral metabolism:       PLAN:  1. We discussed the eGFR today. 2. We will continue all current medications without changes. 3. Will repeat kidney function tests. 4. We will see the patient back in 1 months.               _________________________________  Urszula Jones.  Oley Sacks, DO  [facility]      CC  Christina Tobar MD

## 2019-06-06 NOTE — TELEPHONE ENCOUNTER
Chronic Care Management    Jac Umana is a 80 y. o.oldfemale Is followed for Chronic Care Management for kidney disease,   Dahlia Enter most recent   Lab Results   Component Value Date    CREATININE 2.6 04/03/2019    LABGLOM 17 04/03/2019    LABGLOM 17 03/28/2019   . Immunization History   Administered Date(s) Administered    Influenza Vaccine, unspecified formulation 10/31/2016    Influenza Virus Vaccine 10/03/2013, 10/09/2014, 12/24/2015    Pneumococcal 13-valent Conjugate ([id number]) 12/14/2016    Pneumococcal Polysaccharide ([id number]) 06/20/2017    Tdap (Boostrix, Adacel) 09/03/2018       Diagnostic Data:    BMP:  Lab Results   Component Value Date     04/03/2019    K 4.6 04/03/2019    K 4.8 10/18/2018     04/03/2019    CO2 26 04/03/2019    BUN 77 04/03/2019    CREATININE 2.6 04/03/2019    LABGLOM 17 04/03/2019    LABGLOM 17 03/28/2019    GLUCOSE 35 04/03/2019    GLUCOSE 316 11/09/2016       Lab Results   Component Value Date    HGB 9.7 06/04/2019    HGB 9.7 04/03/2019    HGB 9.8 04/02/2019    HGB 8.3 03/28/2019    HGB 8.9 03/26/2019      Lab Results   Component Value Date    FERRITIN 580 06/04/2019    IRON 45 06/04/2019    LABIRON 22 06/04/2019    [id number] 1625 03/17/2017    FOLATE > 20.0 03/17/2017     No results found for: RETICABS     11/14/18 1/7/19 2/25/19 3/5/19 3.28.19 4/3/19 6/4/19     Hgb Goal 10-11 g/dl 10.0 10.1 9.9 9.7 8.3 9.7 9.7     Aranesp - - - 40 mcg 3.13.19  - - 60 mcg                 T sat   24 24 15  22     Ferritin   362 310 473  580     Iron     34  45     Retic count            B12 1625 on 3/17/17           Folate >20.0 on 3/17/17                       Injectafer     750 mg 4/4, 4/11       Venofer            PO iron                             Assessment:    Diagnosis Orders   1. Anemia, chronic disease and Iron Deficiency Aranesp 60 mcg x 1.   Recheck H&H next month per Dr Josy Morales order    Stool occult blood neg    Please send previously order for Hep B S Ab

## 2019-06-11 ENCOUNTER — TELEPHONE (OUTPATIENT)
Dept: CARDIOLOGY CLINIC | Age: 84
End: 2019-06-11

## 2019-06-11 ENCOUNTER — OFFICE VISIT (OUTPATIENT)
Dept: CARDIOLOGY CLINIC | Age: 84
End: 2019-06-11
Payer: MEDICARE

## 2019-06-11 VITALS
HEIGHT: 66 IN | OXYGEN SATURATION: 91 % | DIASTOLIC BLOOD PRESSURE: 70 MMHG | BODY MASS INDEX: 42.27 KG/M2 | WEIGHT: 263 LBS | HEART RATE: 63 BPM | SYSTOLIC BLOOD PRESSURE: 136 MMHG

## 2019-06-11 DIAGNOSIS — I50.32 CHF (CONGESTIVE HEART FAILURE), NYHA CLASS II, CHRONIC, DIASTOLIC (HCC): Primary | ICD-10-CM

## 2019-06-11 PROCEDURE — 4040F PNEUMOC VAC/ADMIN/RCVD: CPT | Performed by: NURSE PRACTITIONER

## 2019-06-11 PROCEDURE — G8427 DOCREV CUR MEDS BY ELIG CLIN: HCPCS | Performed by: NURSE PRACTITIONER

## 2019-06-11 PROCEDURE — 1090F PRES/ABSN URINE INCON ASSESS: CPT | Performed by: NURSE PRACTITIONER

## 2019-06-11 PROCEDURE — 1123F ACP DISCUSS/DSCN MKR DOCD: CPT | Performed by: NURSE PRACTITIONER

## 2019-06-11 PROCEDURE — G8399 PT W/DXA RESULTS DOCUMENT: HCPCS | Performed by: NURSE PRACTITIONER

## 2019-06-11 PROCEDURE — 99213 OFFICE O/P EST LOW 20 MIN: CPT | Performed by: NURSE PRACTITIONER

## 2019-06-11 PROCEDURE — G8417 CALC BMI ABV UP PARAM F/U: HCPCS | Performed by: NURSE PRACTITIONER

## 2019-06-11 PROCEDURE — 1036F TOBACCO NON-USER: CPT | Performed by: NURSE PRACTITIONER

## 2019-06-11 PROCEDURE — G8598 ASA/ANTIPLAT THER USED: HCPCS | Performed by: NURSE PRACTITIONER

## 2019-06-11 RX ORDER — PANTOPRAZOLE SODIUM 40 MG/1
40 TABLET, DELAYED RELEASE ORAL 2 TIMES DAILY
COMMUNITY

## 2019-06-11 ASSESSMENT — ENCOUNTER SYMPTOMS
COLOR CHANGE: 0
COUGH: 0
WHEEZING: 0
CHEST TIGHTNESS: 0
ABDOMINAL PAIN: 0
ABDOMINAL DISTENTION: 0
APNEA: 0
NAUSEA: 0
SHORTNESS OF BREATH: 1

## 2019-06-11 NOTE — PROGRESS NOTES
N/A 2017    EGD ESOPHAGOGASTRODUODENOSCOPY DILATATION performed by Gisela Casas MD at [address] [facility] ESOPHAGOGASTRODUODENOSCOPY TRANSORAL DIAGNOSTIC  2017    EGD ESOPHAGOGASTRODUODENOSCOPY performed by Gisela Casas MD at [facility]     Family History   Problem Relation Age of Onset    Diabetes Mother     Pacemaker Mother     Stroke Mother     Heart Disease Mother     Diabetes Father     Cancer Sister     Cancer Brother     Cancer Sister     Heart Disease Brother         cardiomegaly     Social History     Tobacco Use    Smoking status: Former Smoker     Packs/day: 0.50     Years: 2.00     Pack years: 1.00     Types: Cigarettes     Last attempt to quit: 1952     Years since quittin.4    Smokeless tobacco: Never Used   Substance Use Topics    Alcohol use: No     Alcohol/week: 0.0 oz     Current Outpatient Medications   Medication Sig Dispense Refill    pantoprazole (PROTONIX) 40 MG tablet Take 40 mg by mouth 2 times daily      hydrALAZINE (APRESOLINE) 100 MG tablet Take 100 mg by mouth 3 times daily       isosorbide dinitrate (ISORDIL) 20 MG tablet Take 1 tablet by mouth 3 times daily 90 tablet 3    metoprolol succinate (TOPROL XL) 100 MG extended release tablet Take 1 tablet by mouth daily Hold if SBP less than 100 and HR less than 50 bpm 30 tablet 1    ipratropium-albuterol (DUONEB) 0.5-2.5 (3) MG/3ML SOLN nebulizer solution Inhale 1 vial into the lungs every 6 hours as needed for Shortness of Breath      ARTIFICIAL TEAR OP Instill one drop into each eye as needed      warfarin (COUMADIN) 3 MG tablet Take 3 mg by mouth daily       bumetanide (BUMEX) 1 MG tablet Take 2 mg by mouth daily      Probiotic Product (PROBIOTIC-10) CAPS Take 1 capsule by mouth daily       nitroGLYCERIN (NITROSTAT) 0.4 MG SL tablet up to max of 3 total doses.  If no relief after 1 dose, call 911. 25 tablet 3    ascorbic acid (VITAMIN C) 500 MG tablet Take 500 mg by mouth 2 times daily       docusate sodium (COLACE) 100 MG capsule Take 200 mg by mouth daily      ferrous sulfate (JAMIR-RAJANI) 325 (65 Fe) MG tablet Take 1 tablet by mouth 2 times daily 60 tablet 3    clopidogrel (PLAVIX) 75 MG tablet Take 75 mg by mouth every evening       vitamin B-12 (CYANOCOBALAMIN) 1000 MCG tablet Take 1,000 mcg by mouth daily      ondansetron (ZOFRAN) 4 MG tablet Take 4 mg by mouth 2 times daily       insulin aspart (NOVOLOG) 100 UNIT/ML injection vial Inject 12 Units into the skin 2 times daily (with meals)      insulin glargine (LANTUS) 100 UNIT/ML injection vial Inject 22 Units into the skin nightly      guaiFENesin (ROBITUSSIN) 100 MG/5ML syrup Take 200 mg by mouth every 4 hours as needed for Cough      Multiple Vitamins-Minerals (OCUVITE EXTRA PO) Take 1 tablet by mouth daily       bisacodyl (DULCOLAX) 10 MG suppository Place 10 mg rectally daily as needed for Constipation      magnesium hydroxide (MILK OF MAGNESIA) 400 MG/5ML suspension Take 30 mLs by mouth daily as needed for Constipation      pravastatin (PRAVACHOL) 40 MG tablet Take 40 mg by mouth every evening Indications: Blood Cholesterol Abnormal       acetaminophen (TYLENOL) 325 MG tablet Take 650 mg by mouth 4 times daily        No current facility-administered medications for this visit. No Known Allergies    SUBJECTIVE:   Review of Systems   Constitutional: Negative for activity change, appetite change, fatigue and fever. HENT: Negative for congestion. Respiratory: Positive for shortness of breath. Negative for apnea, cough, chest tightness and wheezing. Cardiovascular: Positive for leg swelling. Negative for chest pain and palpitations. Gastrointestinal: Negative for abdominal distention, abdominal pain and nausea. Genitourinary: Negative for difficulty urinating and dysuria. Musculoskeletal: Positive for gait problem (WC, walker). Negative for arthralgias. Skin: Negative for color change.    Neurological: mmhg.      Signature      ----------------------------------------------------------------   Electronically signed by Flori Buckley MD (Interpreting   LAURELXXS) on 06/13/2018 at 09:23 PM   ----------------------------------------------------------------      Findings      Mitral Valve   The mitral valve structure was normal with normal leaflet separation.   DOPPLER: The transmitral velocity was within the normal range with no   evidence for mitral stenosis. There was trivial mitral regurgitation.      Aortic Valve   The aortic valve was trileaflet with normal thickness and cuspal   separation. DOPPLER: Transaortic velocity was within the normal range with   no evidence of aortic stenosis. There was no evidence of aortic   regurgitation.      Tricuspid Valve   The tricuspid valve structure was normal with normal leaflet separation.   DOPPLER: There was no evidence of tricuspid stenosis. There was mild   tricuspid regurgitation.      Pulmonic Valve   Pulmonary artery systolic pressure calculated from tricuspid regurgitation   jet is 35 mmhg.      Left Atrium   Left atrial size was normal.      Left Ventricle   Normal left ventricle size and systolic function. Ejection fraction was   estimated at 55-60%.  There were no regional left ventricular wall motion   abnormalities and wall thickness was within normal limits.      Right Atrium   Right atrial size was normal.      Right Ventricle   The right ventricular size was normal.      Aorta / Great Vessels   Aortic root dimension within normal limits.       Results reviewed:  BNP:   Lab Results   Component Value Date    BNP 46.0 05/20/2013    PROBNP 6431.0 (H) 03/26/2019     CBC:   Lab Results   Component Value Date    WBC 7.3 04/03/2019    RBC 3.48 04/03/2019    HGB 9.7 06/04/2019    HCT 30.5 06/04/2019     04/03/2019     CMP:    Lab Results   Component Value Date     04/03/2019    K 4.6 04/03/2019    K 4.8 10/18/2018     04/03/2019    CO2 26 04/03/2019    BUN 77 04/03/2019    CREATININE 2.6 04/03/2019    AGRATIO 1.4 10/23/2014    LABGLOM 17 04/03/2019    LABGLOM 17 03/28/2019    GLUCOSE 35 04/03/2019    GLUCOSE 316 11/09/2016    CALCIUM 9.1 04/03/2019     Hepatic Function Panel:    Lab Results   Component Value Date    ALKPHOS 108 03/26/2019    ALT 16 03/26/2019    AST 22 03/26/2019    PROT 6.7 03/26/2019    BILITOT 0.3 03/26/2019    BILIDIR <0.2 03/26/2019    LABALBU 3.3 03/28/2019     Magnesium:    Lab Results   Component Value Date    MG 1.7 09/06/2018     PT/INR:    Lab Results   Component Value Date    PROTIME 24.2 11/21/2017    PROTIME 11.1 09/18/2017    INR 1.96 03/30/2019     Lipids:    Lab Results   Component Value Date    TRIG 124 10/30/2018    HDL 28 10/30/2018    LDLCALC 34 10/30/2018       ASSESSMENT AND PLAN:   The patient's condition/symptoms are Stable      Diagnosis Orders   1. CHF (congestive heart failure), NYHA class II, chronic, diastolic (HCC)         Plan:  · Give an extra Bumex 2 mg today. Tomorrow give one dose of Metolazone 5 mg with Potassium 20 mEq 1 hour prior to Bumex. Continue Bumex 2 mg daily, Toprol 100 mg daily, Hydralazine 100 mg tid, Isosorbide 20 mg TID, on Plavix. HF Zones reviewed. ACE wraps to LE. · Daily weights  · Fluid restriction of 2 Liters per day  · Limit sodium in diet to around 0031-1565 mg/day  · Monitor BP  · Activity as tolerated     Patient was instructed to call the [place] [facility] for changes in the following symptoms:   Weight gain of 3 pounds in 1 day or 5 pounds in 1 week  Increased shortness of breath  Shortness of breath while laying down  Cough  Chest pain  Swelling in feet, ankles or legs  Tenderness or bloating in the abdomen  Fatigue   Decreased appetite or nausea   Confusion      Return in about 3 months (around 9/11/2019). or sooner if needed     Patient given educational materials - see patient instructions. We discussed the importance of weighing oneself and recording daily.  We also discussed the importance of a lowsodium diet, higher sodium foods to avoid and better low sodium food options. Discussed use, benefit, and side effects of prescribed medications. All patient questions answered. Patient verbalizes understanding of plan of care using teach back method, and is agreeable to the treatment plan.        Electronicallysigned by ARELI Ward CNP on 6/11/2019 at 11:32 AM

## 2019-06-11 NOTE — TELEPHONE ENCOUNTER
A nurse from [address] just called and stated Dr Judi Rojas called himself last week to increase pt fluid restrictions to 2.5 liters daily. They didn't want to change his order. She stated to have us call back if any concerns with this. Please advise.

## 2019-06-12 ENCOUNTER — TELEPHONE (OUTPATIENT)
Dept: NEPHROLOGY | Age: 84
End: 2019-06-12

## 2019-06-18 ENCOUNTER — TELEPHONE (OUTPATIENT)
Dept: NEPHROLOGY | Age: 84
End: 2019-06-18

## 2019-06-18 NOTE — TELEPHONE ENCOUNTER
Ele Garcia called to let you know Radha's medication arnaesp got a denial.  It is scanned under the media tab if you would like to read it. April is wondering what you would like to do next?     She can be reached at [phone]

## 2019-06-19 NOTE — TELEPHONE ENCOUNTER
5   Minutes  The therapy plan still shows incomplete.  Once the pa has been authorized I will get this scheduled

## 2019-06-29 DIAGNOSIS — I50.32 CHF (CONGESTIVE HEART FAILURE), NYHA CLASS II, CHRONIC, DIASTOLIC (HCC): Primary | ICD-10-CM

## 2019-06-29 PROCEDURE — 93264 REM MNTR WRLS P-ART PRS SNR: CPT | Performed by: NURSE PRACTITIONER

## 2019-07-01 ENCOUNTER — TELEPHONE (OUTPATIENT)
Dept: NEPHROLOGY | Age: 84
End: 2019-07-01

## 2019-07-01 DIAGNOSIS — N18.4 CKD (CHRONIC KIDNEY DISEASE), STAGE IV (HCC): ICD-10-CM

## 2019-07-01 DIAGNOSIS — D63.8 ANEMIA, CHRONIC DISEASE: Primary | ICD-10-CM

## 2019-07-01 DIAGNOSIS — D50.8 IRON DEFICIENCY ANEMIA DUE TO DIETARY CAUSES: ICD-10-CM

## 2019-07-01 LAB
BASOPHILS ABSOLUTE: ABNORMAL /ΜL
BASOPHILS RELATIVE PERCENT: ABNORMAL %
BUN BLDV-MCNC: 42 MG/DL
CALCIUM SERPL-MCNC: 9.2 MG/DL
CHLORIDE BLD-SCNC: 106 MMOL/L
CO2: 27 MMOL/L
CREAT SERPL-MCNC: 2 MG/DL
EOSINOPHILS ABSOLUTE: ABNORMAL /ΜL
EOSINOPHILS RELATIVE PERCENT: ABNORMAL %
GFR CALCULATED: 24
GLUCOSE BLD-MCNC: 172 MG/DL
HCT VFR BLD CALC: 30.7 % (ref 36–46)
HEMOGLOBIN: 9.7 G/DL (ref 12–16)
LYMPHOCYTES ABSOLUTE: ABNORMAL /ΜL
LYMPHOCYTES RELATIVE PERCENT: ABNORMAL %
MCH RBC QN AUTO: ABNORMAL PG
MCHC RBC AUTO-ENTMCNC: ABNORMAL G/DL
MCV RBC AUTO: ABNORMAL FL
MONOCYTES ABSOLUTE: ABNORMAL /ΜL
MONOCYTES RELATIVE PERCENT: ABNORMAL %
NEUTROPHILS ABSOLUTE: ABNORMAL /ΜL
NEUTROPHILS RELATIVE PERCENT: ABNORMAL %
PLATELET # BLD: 174 K/ΜL
PMV BLD AUTO: ABNORMAL FL
POTASSIUM SERPL-SCNC: 4.8 MMOL/L
RBC # BLD: 3.42 10^6/ΜL
SODIUM BLD-SCNC: 140 MMOL/L
WBC # BLD: 6.4 10^3/ML

## 2019-07-01 NOTE — TELEPHONE ENCOUNTER
Call placed to scheduling. This order for Retacrit has been pending review since 6/19/19. Stephennina  asked some questions concerning the usage of this medication and then stated she would have to contact someone further and would be back in touch with us.

## 2019-07-02 ENCOUNTER — TELEPHONE (OUTPATIENT)
Dept: CARDIOLOGY CLINIC | Age: 84
End: 2019-07-02

## 2019-07-02 DIAGNOSIS — I50.32 CHF (CONGESTIVE HEART FAILURE), NYHA CLASS III, CHRONIC, DIASTOLIC (HCC): Primary | ICD-10-CM

## 2019-07-03 ENCOUNTER — PROCEDURE VISIT (OUTPATIENT)
Dept: CARDIOLOGY CLINIC | Age: 84
End: 2019-07-03
Payer: MEDICARE

## 2019-07-03 DIAGNOSIS — Z95.810 BIVENTRICULAR IMPLANTABLE CARDIOVERTER-DEFIBRILLATOR IN SITU: Primary | ICD-10-CM

## 2019-07-03 PROCEDURE — 93296 REM INTERROG EVL PM/IDS: CPT | Performed by: INTERNAL MEDICINE

## 2019-07-03 PROCEDURE — 93295 DEV INTERROG REMOTE 1/2/MLT: CPT | Performed by: INTERNAL MEDICINE

## 2019-07-08 ENCOUNTER — TELEPHONE (OUTPATIENT)
Dept: NEPHROLOGY | Age: 84
End: 2019-07-08

## 2019-07-08 ENCOUNTER — HOSPITAL ENCOUNTER (OUTPATIENT)
Dept: NURSING | Age: 84
Discharge: HOME | End: 2019-07-08
Payer: MEDICARE

## 2019-07-08 VITALS
TEMPERATURE: 97.4 F | RESPIRATION RATE: 18 BRPM | OXYGEN SATURATION: 94 % | HEART RATE: 78 BPM | SYSTOLIC BLOOD PRESSURE: 174 MMHG | DIASTOLIC BLOOD PRESSURE: 72 MMHG

## 2019-07-08 DIAGNOSIS — N18.4 CKD (CHRONIC KIDNEY DISEASE), STAGE IV (HCC): ICD-10-CM

## 2019-07-08 DIAGNOSIS — D50.8 IRON DEFICIENCY ANEMIA SECONDARY TO INADEQUATE DIETARY IRON INTAKE: ICD-10-CM

## 2019-07-08 DIAGNOSIS — D63.8 ANEMIA, CHRONIC DISEASE: Primary | ICD-10-CM

## 2019-07-08 PROCEDURE — 96372 THER/PROPH/DIAG INJ SC/IM: CPT

## 2019-07-08 PROCEDURE — 6360000002 HC RX W HCPCS: Performed by: NURSE PRACTITIONER

## 2019-07-08 RX ADMIN — EPOETIN ALFA-EPBX 10000 UNITS: 10000 INJECTION, SOLUTION INTRAVENOUS; SUBCUTANEOUS at 14:32

## 2019-07-08 ASSESSMENT — PAIN - FUNCTIONAL ASSESSMENT: PAIN_FUNCTIONAL_ASSESSMENT: 0-10

## 2019-07-08 NOTE — PROGRESS NOTES
1434 tolerated injection well. Home instructions to pt verbalized understanding.   1436 Discharged per wheelchair stable to [place] [facility] with LACP

## 2019-07-08 NOTE — PROGRESS NOTES
Jayro Doe FOR INJECTION PROCEDURE REVIEWED WITH PT VERBALIZED UNDERSTANDING. Pt rights and responsibilities offered to pt to read.     _MET___ Safety:       (Environmental)   [place] to environment   Ensure ID band is correct and in place/ allergy band as needed   Assess for fall risk   Initiate fall precautions as applicable (fall band, side rails, etc.)   Call light within reach   Bed in low position/ wheels locked    __MET__ Pain:        Assess pain level and characteristics   Administer analgesics as ordered   Assess effectiveness of pain management and report to MD as needed    __MET__ Knowledge Deficit:   Assess baseline knowledge   Provide teaching at level of understanding   Provide teaching via preferred learning method   Evaluate teaching effectiveness

## 2019-07-11 ENCOUNTER — OFFICE VISIT (OUTPATIENT)
Dept: NEPHROLOGY | Age: 84
End: 2019-07-11
Payer: MEDICARE

## 2019-07-11 VITALS
DIASTOLIC BLOOD PRESSURE: 53 MMHG | RESPIRATION RATE: 18 BRPM | SYSTOLIC BLOOD PRESSURE: 142 MMHG | OXYGEN SATURATION: 99 % | HEART RATE: 75 BPM

## 2019-07-11 DIAGNOSIS — N18.4 CKD (CHRONIC KIDNEY DISEASE), STAGE IV (HCC): Primary | ICD-10-CM

## 2019-07-11 PROCEDURE — G8427 DOCREV CUR MEDS BY ELIG CLIN: HCPCS | Performed by: INTERNAL MEDICINE

## 2019-07-11 PROCEDURE — 1036F TOBACCO NON-USER: CPT | Performed by: INTERNAL MEDICINE

## 2019-07-11 PROCEDURE — G8598 ASA/ANTIPLAT THER USED: HCPCS | Performed by: INTERNAL MEDICINE

## 2019-07-11 PROCEDURE — 4040F PNEUMOC VAC/ADMIN/RCVD: CPT | Performed by: INTERNAL MEDICINE

## 2019-07-11 PROCEDURE — G8417 CALC BMI ABV UP PARAM F/U: HCPCS | Performed by: INTERNAL MEDICINE

## 2019-07-11 PROCEDURE — 1123F ACP DISCUSS/DSCN MKR DOCD: CPT | Performed by: INTERNAL MEDICINE

## 2019-07-11 PROCEDURE — 1090F PRES/ABSN URINE INCON ASSESS: CPT | Performed by: INTERNAL MEDICINE

## 2019-07-11 PROCEDURE — 99213 OFFICE O/P EST LOW 20 MIN: CPT | Performed by: INTERNAL MEDICINE

## 2019-07-11 PROCEDURE — G8399 PT W/DXA RESULTS DOCUMENT: HCPCS | Performed by: INTERNAL MEDICINE

## 2019-07-11 NOTE — PROGRESS NOTES
[facility]    [address]  [address], [address]  [phone]       Progress Note    7/11/2019 1:32 PM    Pt Name:    Tami Llamas:    1934  Primary Care Physician:  Lexie Nevarez MD       Chief Complaint:   Chief Complaint   Patient presents with    Follow-up     CKD IV     Diabetes    Hypertension        History of Chief Complaint: CKD stage IV from DM, Aging, HTN     Subjective:  I last saw the patient in clinic 06/06/19. She did not have her blood test done at her last visit. I follow the patient for Chronic Kidney disease stage IV. Since our last visit the patient has not been hospitalized. The patient is sleeping well at night with 1-2 times per night nocturia. The patient has a good appetite and is remaining active. The patient denied N/V/C/D/SOB/CP. Her eGFR has improved. Last six eGFR readings:  Lab Results   Component Value Date    LABGLOM 24 07/01/2019    LABGLOM 17 04/03/2019    LABGLOM 24 04/02/2019    LABGLOM 17 03/28/2019    LABGLOM 18 03/26/2019    LABGLOM 21 02/06/2019    LABGLOM 20 01/07/2019    LABGLOM 18 11/07/2018    LABGLOM 17 10/18/2018    LABGLOM 18 09/06/2018    LABGLOM 15 09/05/2018    LABGLOM 16 09/04/2018          Objective:  VITALS:  BP (!) 142/53 (Site: Left Upper Arm, Position: Sitting, Cuff Size: Large Adult)   Pulse 75   Resp 18   SpO2 99%   Weight:   Wt Readings from Last 3 Encounters:   06/11/19 263 lb (119.3 kg)   06/06/19 263 lb (119.3 kg)   04/15/19 255 lb (115.7 kg)     There is no height or weight on file to calculate BMI. Physical examination    General:  Alert and cooperative with exam  HEENT:  Normocephalic. No lesions nor rashes. ADAMA. EOMI  Neck:   No JVD and no bruits. Thyroid gland is normal  Lungs:  Breathing easily. No rales nor rhonchi. No cough nor sputum production. Heart[de-identified]            Normocephalic. No lesions nor rashes. ADAMA. EOMI  Abdomen:  Soft and non tender.  Bowel sounds are  vitamin B-12 (CYANOCOBALAMIN) 1000 MCG tablet Take 1,000 mcg by mouth daily      ondansetron (ZOFRAN) 4 MG tablet Take 4 mg by mouth 2 times daily       insulin aspart (NOVOLOG) 100 UNIT/ML injection vial Inject 12 Units into the skin 2 times daily (with meals)      insulin glargine (LANTUS) 100 UNIT/ML injection vial Inject 22 Units into the skin nightly      guaiFENesin (ROBITUSSIN) 100 MG/5ML syrup Take 200 mg by mouth every 4 hours as needed for Cough      Multiple Vitamins-Minerals (OCUVITE EXTRA PO) Take 1 tablet by mouth daily       bisacodyl (DULCOLAX) 10 MG suppository Place 10 mg rectally daily as needed for Constipation      magnesium hydroxide (MILK OF MAGNESIA) 400 MG/5ML suspension Take 30 mLs by mouth daily as needed for Constipation      pravastatin (PRAVACHOL) 40 MG tablet Take 40 mg by mouth every evening Indications: Blood Cholesterol Abnormal       acetaminophen (TYLENOL) 325 MG tablet Take 650 mg by mouth 4 times daily        No current facility-administered medications for this visit. IMPRESSIONS:        Kidney disease: CKD stage IV from DM HTN  Anemia: may need KRISTINE. Will check iron stores  Bone and mineral metabolism: stable       PLAN:  1. We discussed the eGFR today. 2. We will continue all current medications without changes. 3. She has chosen hemodialysis as her dialysis mode  4. Checking iron stores for anemia. 5. Upper extremity vein mapping. 6. See Dr. Christi Wright for dialysis access creation  7. We will see the patient back in 1.5 months.             _________________________________  Allison President.  Lani Wolff, DO  [facility]      CC  Lexie Nevarez MD

## 2019-07-15 ENCOUNTER — TELEPHONE (OUTPATIENT)
Dept: NEPHROLOGY | Age: 84
End: 2019-07-15

## 2019-07-16 ENCOUNTER — TELEPHONE (OUTPATIENT)
Dept: CARDIOLOGY CLINIC | Age: 84
End: 2019-07-16

## 2019-07-29 DIAGNOSIS — I50.32 CHF (CONGESTIVE HEART FAILURE), NYHA CLASS III, CHRONIC, DIASTOLIC (HCC): Primary | ICD-10-CM

## 2019-07-29 PROCEDURE — 93264 REM MNTR WRLS P-ART PRS SNR: CPT | Performed by: NURSE PRACTITIONER

## 2019-08-06 LAB
FERRITIN: 416 NG/ML (ref 9–150)
HCT VFR BLD CALC: 31.9 % (ref 36–46)
HEMOGLOBIN: 10.1 G/DL (ref 12–16)
IRON SATURATION: 24
IRON: 45
TOTAL IRON BINDING CAPACITY: 190

## 2019-08-07 ENCOUNTER — TELEPHONE (OUTPATIENT)
Dept: NEPHROLOGY | Age: 84
End: 2019-08-07

## 2019-08-07 DIAGNOSIS — D63.8 ANEMIA, CHRONIC DISEASE: Primary | ICD-10-CM

## 2019-08-07 DIAGNOSIS — D50.8 IRON DEFICIENCY ANEMIA DUE TO DIETARY CAUSES: ICD-10-CM

## 2019-08-07 DIAGNOSIS — N18.4 CKD (CHRONIC KIDNEY DISEASE), STAGE IV (HCC): ICD-10-CM

## 2019-08-07 LAB
BUN BLDV-MCNC: 32 MG/DL
CALCIUM SERPL-MCNC: 9.2 MG/DL
CHLORIDE BLD-SCNC: 107 MMOL/L
CO2: 29 MMOL/L
CREAT SERPL-MCNC: 1.8 MG/DL
GFR CALCULATED: 27
GLUCOSE BLD-MCNC: 36 MG/DL
POTASSIUM SERPL-SCNC: 4.8 MMOL/L
SODIUM BLD-SCNC: 141 MMOL/L

## 2019-08-07 NOTE — TELEPHONE ENCOUNTER
Chronic Care Management    Lora Titus is a 80 y. o.oldfemale Is followed for Chronic Care Management for kidney disease,   Aj Ayala most recent   Lab Results   Component Value Date    CREATININE 2.0 07/01/2019    LABGLOM 24 07/01/2019    LABGLOM 17 03/28/2019   . Immunization History   Administered Date(s) Administered    Influenza Vaccine, unspecified formulation 10/31/2016    Influenza Virus Vaccine 10/03/2013, 10/09/2014, 12/24/2015    Pneumococcal Conjugate 13-valent ([id number]) 12/14/2016    Pneumococcal Polysaccharide ([id number]) 06/20/2017    Tdap (Boostrix, Adacel) 09/03/2018       Diagnostic Data:    BMP:  Lab Results   Component Value Date     07/01/2019    K 4.8 07/01/2019    K 4.8 10/18/2018     07/01/2019    CO2 27 07/01/2019    BUN 42 07/01/2019    CREATININE 2.0 07/01/2019    LABGLOM 24 07/01/2019    LABGLOM 17 03/28/2019    GLUCOSE 172 07/01/2019    GLUCOSE 316 11/09/2016       Lab Results   Component Value Date    HGB 10.1 08/06/2019    HGB 9.7 07/01/2019    HGB 9.7 06/04/2019    HGB 9.7 04/03/2019    HGB 9.8 04/02/2019      Lab Results   Component Value Date    FERRITIN 416 08/06/2019    IRON 45 08/06/2019    LABIRON 24 08/06/2019    [id number] 1625 03/17/2017    FOLATE > 20.0 03/17/2017     No results found for: RETICABS     11/14/18 1/7/19 2/25/19 3/5/19 3.28.19 4/3/19 6/4/19 7/1/19 8/6/19   Hgb Goal 10-11 g/dl 10.0 10.1 9.9 9.7 8.3 9.7 9.7 9.7 10.1   Aranesp - - - 40 mcg 3.13.19  - -  Epogen 10,000 units 7/8 -               T sat   24 24 15  22  24   Ferritin   362 310 473  580  416   Iron     34  45  24   Retic count            B12 1625 on 3/17/17           Folate >20.0 on 3/17/17                       Injectafer     750 mg 4/4, 4/11       Venofer            PO iron                             Assessment:    Diagnosis Orders   1. Anemia, chronic disease and Iron Deficiency Hgb at goal.  Recheck H&H next month per Dr Contreras Drafts order     2.  Iron deficiency anemia due

## 2019-08-08 ENCOUNTER — TELEPHONE (OUTPATIENT)
Dept: CARDIOLOGY CLINIC | Age: 84
End: 2019-08-08

## 2019-08-15 ENCOUNTER — OFFICE VISIT (OUTPATIENT)
Dept: CARDIOLOGY CLINIC | Age: 84
End: 2019-08-15
Payer: MEDICARE

## 2019-08-15 VITALS
HEIGHT: 66 IN | WEIGHT: 268 LBS | HEART RATE: 71 BPM | DIASTOLIC BLOOD PRESSURE: 60 MMHG | OXYGEN SATURATION: 95 % | BODY MASS INDEX: 43.07 KG/M2 | SYSTOLIC BLOOD PRESSURE: 134 MMHG

## 2019-08-15 DIAGNOSIS — I50.33 CHF (CONGESTIVE HEART FAILURE), NYHA CLASS III, ACUTE ON CHRONIC, DIASTOLIC (HCC): Primary | ICD-10-CM

## 2019-08-15 PROCEDURE — 1123F ACP DISCUSS/DSCN MKR DOCD: CPT | Performed by: NURSE PRACTITIONER

## 2019-08-15 PROCEDURE — G8598 ASA/ANTIPLAT THER USED: HCPCS | Performed by: NURSE PRACTITIONER

## 2019-08-15 PROCEDURE — G8427 DOCREV CUR MEDS BY ELIG CLIN: HCPCS | Performed by: NURSE PRACTITIONER

## 2019-08-15 PROCEDURE — G8399 PT W/DXA RESULTS DOCUMENT: HCPCS | Performed by: NURSE PRACTITIONER

## 2019-08-15 PROCEDURE — 1036F TOBACCO NON-USER: CPT | Performed by: NURSE PRACTITIONER

## 2019-08-15 PROCEDURE — 4040F PNEUMOC VAC/ADMIN/RCVD: CPT | Performed by: NURSE PRACTITIONER

## 2019-08-15 PROCEDURE — 99215 OFFICE O/P EST HI 40 MIN: CPT | Performed by: NURSE PRACTITIONER

## 2019-08-15 PROCEDURE — 36415 COLL VENOUS BLD VENIPUNCTURE: CPT | Performed by: NURSE PRACTITIONER

## 2019-08-15 PROCEDURE — G8417 CALC BMI ABV UP PARAM F/U: HCPCS | Performed by: NURSE PRACTITIONER

## 2019-08-15 PROCEDURE — 96374 THER/PROPH/DIAG INJ IV PUSH: CPT | Performed by: NURSE PRACTITIONER

## 2019-08-15 PROCEDURE — 1090F PRES/ABSN URINE INCON ASSESS: CPT | Performed by: NURSE PRACTITIONER

## 2019-08-15 RX ORDER — POTASSIUM CHLORIDE 20 MEQ/1
40 TABLET, EXTENDED RELEASE ORAL ONCE
Status: COMPLETED | OUTPATIENT
Start: 2019-08-15 | End: 2019-08-15

## 2019-08-15 RX ORDER — FUROSEMIDE 10 MG/ML
60 INJECTION INTRAMUSCULAR; INTRAVENOUS ONCE
Status: COMPLETED | OUTPATIENT
Start: 2019-08-15 | End: 2019-08-15

## 2019-08-15 RX ORDER — GUAIFENESIN 600 MG/1
1200 TABLET, EXTENDED RELEASE ORAL 2 TIMES DAILY
COMMUNITY
End: 2019-01-01

## 2019-08-15 RX ADMIN — FUROSEMIDE 60 MG: 10 INJECTION INTRAMUSCULAR; INTRAVENOUS at 11:46

## 2019-08-15 RX ADMIN — POTASSIUM CHLORIDE 40 MEQ: 20 TABLET, EXTENDED RELEASE ORAL at 11:47

## 2019-08-15 ASSESSMENT — ENCOUNTER SYMPTOMS
WHEEZING: 0
NAUSEA: 0
ABDOMINAL DISTENTION: 0
ABDOMINAL PAIN: 0
CHEST TIGHTNESS: 0
COLOR CHANGE: 0
SHORTNESS OF BREATH: 1
APNEA: 0
COUGH: 0

## 2019-08-15 NOTE — PROGRESS NOTES
 His of Heparin induced thrombocytopenia     History of breast cancer     right 1982 s/p mastectomy and chemo, left 2007 s/p mastectomy    History of CVA (cerebrovascular accident)     History of pulmonary embolism 05/2014    on [address] (coumadin)    Hyperlipidemia     Hypertension     pulmonary    Iron deficiency anemia     Dr. 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 No aortic stenosis. No mitral stenosis. Coronaries; mild disease of the LAD not more than 40%. LV; severe LV dysfunction and EF 30-35%.  CARDIAC CATHETERIZATION  05-    Mild disease of small distal LAD (approximately  50% stenosis) angiographically normal CA. Mild to moderate LV systolic dysfunction. EF 35%. Mildly elevated LV end diastolic pressure. No significant MR. Systemic hypertension.  CARDIAC DEFIBRILLATOR PLACEMENT  02/02/2011    CARDIOVASCULAR STRESS TEST  03/23/10    EF 30%  Severe LV Dys    COLONOSCOPY      Dr. Karime Noonan  03/22/10    EF 45%. LV mildly dilated. Systolic function mildly reducted. No regional wall motion abnormalities. Wall thickness mildly increased. LA mildly dilated. MV mild annular calification. Mild TR. Ventricular septum tickness mildly increased.  ENDOSCOPY, COLON, DIAGNOSTIC      EYE SURGERY      bilateral cataracts    EYE SURGERY Left 9/2014    laser surgery for retinopathy?     FOOT SURGERY Left 1/12/2016    OTHER SURGICAL HISTORY  10/18/2018    CardioMEMS    PACEMAKER PLACEMENT      SC EGD BALLOON DILATION ESOPHAGUS <30 MM DIAM N/A 2017    EGD ESOPHAGOGASTRODUODENOSCOPY DILATATION performed by Tamir Burgess MD at [address] [place] [address] ESOPHAGOGASTRODUODENOSCOPY TRANSORAL DIAGNOSTIC  2017    EGD ESOPHAGOGASTRODUODENOSCOPY performed by Tamir Burgess MD at [facility]     Family History   Problem Relation Age of Onset    Diabetes Mother     Pacemaker Mother     Stroke Mother     Heart Disease Mother     Diabetes Father     Cancer Sister     Cancer Brother     Cancer Sister     Heart Disease Brother         cardiomegaly     Social History     Tobacco Use    Smoking status: Former Smoker     Packs/day: 0.50     Years: 2.00     Pack years: 1.00     Types: Cigarettes     Last attempt to quit: 1952     Years since quittin.6    Smokeless tobacco: Never Used   Substance Use Topics    Alcohol use: No     Alcohol/week: 0.0 standard drinks     Current Outpatient Medications   Medication Sig Dispense Refill    guaiFENesin (MUCINEX) 600 MG extended release tablet Take 1,200 mg by mouth 2 times daily      epoetin schuyler-epbx (RETACRIT) 89386 UNIT/ML SOLN injection Inject into the skin once a week       pantoprazole (PROTONIX) 40 MG tablet Take 40 mg by mouth 2 times daily      hydrALAZINE (APRESOLINE) 100 MG tablet Take 100 mg by mouth 3 times daily       isosorbide dinitrate (ISORDIL) 20 MG tablet Take 1 tablet by mouth 3 times daily 90 tablet 3    metoprolol succinate (TOPROL XL) 100 MG extended release tablet Take 1 tablet by mouth daily Hold if SBP less than 100 and HR less than 50 bpm 30 tablet 1    ipratropium-albuterol (DUONEB) 0.5-2.5 (3) MG/3ML SOLN nebulizer solution Inhale 1 vial into the lungs every 6 hours as needed for Shortness of Breath      ARTIFICIAL TEAR OP Instill one drop into each eye as needed      warfarin (COUMADIN) 3 MG tablet Take 3 mg by mouth daily       bumetanide (BUMEX) 1 MG tablet Take 2 mg by mouth daily      Probiotic Product (PROBIOTIC-10) CAPS

## 2019-08-19 LAB
BUN BLDV-MCNC: 34 MG/DL
CALCIUM SERPL-MCNC: 9.3 MG/DL
CHLORIDE BLD-SCNC: 103 MMOL/L
CO2: 32 MMOL/L
CREAT SERPL-MCNC: 1.8 MG/DL
GFR CALCULATED: 27
GLUCOSE BLD-MCNC: 159 MG/DL
POTASSIUM SERPL-SCNC: 5 MMOL/L
SODIUM BLD-SCNC: 140 MMOL/L

## 2019-08-20 ENCOUNTER — TELEPHONE (OUTPATIENT)
Dept: CARDIOLOGY CLINIC | Age: 84
End: 2019-08-20

## 2019-08-20 RX ORDER — ISOSORBIDE DINITRATE 20 MG/1
30 TABLET ORAL 3 TIMES DAILY
Qty: 90 TABLET | Refills: 3
Start: 2019-08-20 | End: 2019-09-10

## 2019-08-20 NOTE — TELEPHONE ENCOUNTER
Update from [facility] faxed sheet with current BP and weight.    8/15    266 pounds  8/16 157/57 152/80  264 pounds  8/17 148/77 145/73 147/69 265 pounds  8/18 149/73 152/71  264.6 pounds  8/19    264 pounds  She was 268 at her last appointment on 8/15  She got IV lasix and takes bumex 2 mg daily

## 2019-08-27 PROBLEM — E87.5 HYPERKALEMIA: Status: RESOLVED | Noted: 2018-09-03 | Resolved: 2019-08-27

## 2019-08-27 PROBLEM — N17.9 AKI (ACUTE KIDNEY INJURY) (HCC): Status: RESOLVED | Noted: 2019-03-27 | Resolved: 2019-08-27

## 2019-08-27 PROBLEM — N17.9 AKI (ACUTE KIDNEY INJURY) (HCC): Status: RESOLVED | Noted: 2018-09-03 | Resolved: 2019-08-27

## 2019-08-27 PROBLEM — L73.9 FOLLICULITIS: Status: RESOLVED | Noted: 2018-09-03 | Resolved: 2019-08-27

## 2019-09-02 LAB
BASOPHILS ABSOLUTE: ABNORMAL /ΜL
BASOPHILS RELATIVE PERCENT: ABNORMAL %
EOSINOPHILS ABSOLUTE: ABNORMAL /ΜL
EOSINOPHILS RELATIVE PERCENT: ABNORMAL %
HCT VFR BLD CALC: 36.8 % (ref 36–46)
HEMOGLOBIN: 11.4 G/DL (ref 12–16)
IRON: 40
LYMPHOCYTES ABSOLUTE: ABNORMAL /ΜL
LYMPHOCYTES RELATIVE PERCENT: ABNORMAL %
MCH RBC QN AUTO: ABNORMAL PG
MCHC RBC AUTO-ENTMCNC: ABNORMAL G/DL
MCV RBC AUTO: ABNORMAL FL
MONOCYTES ABSOLUTE: ABNORMAL /ΜL
MONOCYTES RELATIVE PERCENT: ABNORMAL %
NEUTROPHILS ABSOLUTE: ABNORMAL /ΜL
NEUTROPHILS RELATIVE PERCENT: ABNORMAL %
PLATELET # BLD: 200 K/ΜL
PMV BLD AUTO: ABNORMAL FL
RBC # BLD: 4.2 10^6/ΜL
WBC # BLD: 6.1 10^3/ML

## 2019-09-03 ENCOUNTER — OFFICE VISIT (OUTPATIENT)
Dept: NEPHROLOGY | Age: 84
End: 2019-09-03
Payer: MEDICARE

## 2019-09-03 VITALS
HEIGHT: 66 IN | HEART RATE: 58 BPM | OXYGEN SATURATION: 94 % | DIASTOLIC BLOOD PRESSURE: 69 MMHG | BODY MASS INDEX: 43.26 KG/M2 | SYSTOLIC BLOOD PRESSURE: 148 MMHG

## 2019-09-03 DIAGNOSIS — N18.4 CKD (CHRONIC KIDNEY DISEASE), STAGE IV (HCC): Primary | ICD-10-CM

## 2019-09-03 DIAGNOSIS — I50.33 CHF (CONGESTIVE HEART FAILURE), NYHA CLASS III, ACUTE ON CHRONIC, DIASTOLIC (HCC): Primary | ICD-10-CM

## 2019-09-03 DIAGNOSIS — D50.8 IRON DEFICIENCY ANEMIA DUE TO DIETARY CAUSES: ICD-10-CM

## 2019-09-03 DIAGNOSIS — D63.8 ANEMIA, CHRONIC DISEASE: ICD-10-CM

## 2019-09-03 DIAGNOSIS — Z11.59 NEED FOR HEPATITIS B SCREENING TEST: ICD-10-CM

## 2019-09-03 PROCEDURE — 99213 OFFICE O/P EST LOW 20 MIN: CPT | Performed by: NURSE PRACTITIONER

## 2019-09-03 PROCEDURE — 1123F ACP DISCUSS/DSCN MKR DOCD: CPT | Performed by: NURSE PRACTITIONER

## 2019-09-03 PROCEDURE — 4040F PNEUMOC VAC/ADMIN/RCVD: CPT | Performed by: NURSE PRACTITIONER

## 2019-09-03 PROCEDURE — 1036F TOBACCO NON-USER: CPT | Performed by: NURSE PRACTITIONER

## 2019-09-03 PROCEDURE — G8598 ASA/ANTIPLAT THER USED: HCPCS | Performed by: NURSE PRACTITIONER

## 2019-09-03 PROCEDURE — 1090F PRES/ABSN URINE INCON ASSESS: CPT | Performed by: NURSE PRACTITIONER

## 2019-09-03 PROCEDURE — G8427 DOCREV CUR MEDS BY ELIG CLIN: HCPCS | Performed by: NURSE PRACTITIONER

## 2019-09-03 PROCEDURE — G8399 PT W/DXA RESULTS DOCUMENT: HCPCS | Performed by: NURSE PRACTITIONER

## 2019-09-03 PROCEDURE — 93264 REM MNTR WRLS P-ART PRS SNR: CPT | Performed by: NURSE PRACTITIONER

## 2019-09-03 PROCEDURE — G8417 CALC BMI ABV UP PARAM F/U: HCPCS | Performed by: NURSE PRACTITIONER

## 2019-09-03 NOTE — PROGRESS NOTES
RETICABS    Immunization:  Immunization History   Administered Date(s) Administered    Influenza Vaccine, unspecified formulation 10/31/2016    Influenza Virus Vaccine 10/03/2013, 10/09/2014, 12/24/2015    Influenza Whole 10/03/2013    Pneumococcal Conjugate 13-valent ([id number]) 12/14/2016    Pneumococcal Polysaccharide ([id number]) 06/20/2017    Tdap (Boostrix, Adacel) 09/03/2018                      Assessment:    Diagnosis Orders   1. CKD (chronic kidney disease), stage IV ([facility] [address]     2. Anemia, chronic disease     3. 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 Need for hepatitis B screening test          1. Chronic Kidney Disease Stage IV :   -Discussed Chronic Kidney Disease and how to protect the kidneys. Advised to avoid nephrotoxins such as NSAIDs and IV contrast.    -In depth education provided concerning Chronic Kidney Disease, progression to End Stage Renal Disease, Dialysis options and advantages and disadvantages of each.   -All questions answered. Pt Choice for Dialysis: Peritoneal Dialysis     2. Anemia of chronic disease:   -Discussed mechanism of anemia and iron deficicency in Chronic Kidney Disease. Recent Epogen dose. Hgb 10.1 within goal. Repeat H&H in one month  -Hgb at goal. Will Monitor iron stores and Hgb &Hct periodically.   -Will give Iron supplement and/or Erythropoietin stimulating agent as appropriate for goal Hgb 10-11 g/dL. -Discussed anemia management that includes non face to face (phone) visits with possible associated co-pay, dependent on insurance plan    3. Bone and Mineral Disease:   -Education provided regarding Bone and Mineral Disease and the imbalance between Ca, PTH, Phos, Vit D, Bicarbonate and Medication Management. 4.   Need for Hepatitis B screening test   -Check Hep B Ag and Ab     Printed information regarding the above provided.   Return to clinic in 12 months  Patient advised to call office for questions, concerns, persistent, changing or

## 2019-09-04 LAB
BASOPHILS ABSOLUTE: ABNORMAL /ΜL
BASOPHILS RELATIVE PERCENT: ABNORMAL %
BUN BLDV-MCNC: 40 MG/DL
BUN BLDV-MCNC: 42 MG/DL
CALCIUM SERPL-MCNC: 8.7 MG/DL
CALCIUM SERPL-MCNC: 9.2 MG/DL
CHLORIDE BLD-SCNC: 105 MMOL/L
CHLORIDE BLD-SCNC: 106 MMOL/L
CO2: 27 MMOL/L
CO2: 27 MMOL/L
CREAT SERPL-MCNC: 2 MG/DL
CREAT SERPL-MCNC: 2.1 MG/DL
EOSINOPHILS ABSOLUTE: ABNORMAL /ΜL
EOSINOPHILS RELATIVE PERCENT: ABNORMAL %
GFR CALCULATED: 22
GFR CALCULATED: 24
GLUCOSE BLD-MCNC: 90 MG/DL
GLUCOSE BLD-MCNC: 91 MG/DL
HCT VFR BLD CALC: 33.8 % (ref 36–46)
HCT VFR BLD CALC: 37.4 % (ref 36–46)
HEMOGLOBIN: 10.5 G/DL (ref 12–16)
HEMOGLOBIN: 11.7 G/DL (ref 12–16)
LYMPHOCYTES ABSOLUTE: ABNORMAL /ΜL
LYMPHOCYTES RELATIVE PERCENT: ABNORMAL %
MCH RBC QN AUTO: ABNORMAL PG
MCHC RBC AUTO-ENTMCNC: ABNORMAL G/DL
MCV RBC AUTO: ABNORMAL FL
MONOCYTES ABSOLUTE: ABNORMAL /ΜL
MONOCYTES RELATIVE PERCENT: ABNORMAL %
NEUTROPHILS ABSOLUTE: ABNORMAL /ΜL
NEUTROPHILS RELATIVE PERCENT: ABNORMAL %
PLATELET # BLD: 196 K/ΜL
PMV BLD AUTO: ABNORMAL FL
POTASSIUM SERPL-SCNC: 4.5 MMOL/L
POTASSIUM SERPL-SCNC: 4.6 MMOL/L
RBC # BLD: 4.22 10^6/ΜL
SODIUM BLD-SCNC: 141 MMOL/L
SODIUM BLD-SCNC: 145 MMOL/L
WBC # BLD: 5.6 10^3/ML

## 2019-09-09 ENCOUNTER — TELEPHONE (OUTPATIENT)
Dept: NEPHROLOGY | Age: 84
End: 2019-09-09

## 2019-09-10 ENCOUNTER — OFFICE VISIT (OUTPATIENT)
Dept: CARDIOLOGY CLINIC | Age: 84
End: 2019-09-10
Payer: MEDICARE

## 2019-09-10 VITALS
OXYGEN SATURATION: 91 % | BODY MASS INDEX: 42.13 KG/M2 | SYSTOLIC BLOOD PRESSURE: 140 MMHG | WEIGHT: 261 LBS | DIASTOLIC BLOOD PRESSURE: 70 MMHG | HEART RATE: 65 BPM

## 2019-09-10 DIAGNOSIS — I50.32 CHF (CONGESTIVE HEART FAILURE), NYHA CLASS II, CHRONIC, DIASTOLIC (HCC): Primary | ICD-10-CM

## 2019-09-10 PROCEDURE — 1123F ACP DISCUSS/DSCN MKR DOCD: CPT | Performed by: NURSE PRACTITIONER

## 2019-09-10 PROCEDURE — 99213 OFFICE O/P EST LOW 20 MIN: CPT | Performed by: NURSE PRACTITIONER

## 2019-09-10 PROCEDURE — G8598 ASA/ANTIPLAT THER USED: HCPCS | Performed by: NURSE PRACTITIONER

## 2019-09-10 PROCEDURE — 1036F TOBACCO NON-USER: CPT | Performed by: NURSE PRACTITIONER

## 2019-09-10 PROCEDURE — G8399 PT W/DXA RESULTS DOCUMENT: HCPCS | Performed by: NURSE PRACTITIONER

## 2019-09-10 PROCEDURE — G8417 CALC BMI ABV UP PARAM F/U: HCPCS | Performed by: NURSE PRACTITIONER

## 2019-09-10 PROCEDURE — 1090F PRES/ABSN URINE INCON ASSESS: CPT | Performed by: NURSE PRACTITIONER

## 2019-09-10 PROCEDURE — 4040F PNEUMOC VAC/ADMIN/RCVD: CPT | Performed by: NURSE PRACTITIONER

## 2019-09-10 PROCEDURE — G8427 DOCREV CUR MEDS BY ELIG CLIN: HCPCS | Performed by: NURSE PRACTITIONER

## 2019-09-10 RX ORDER — ISOSORBIDE DINITRATE 20 MG/1
40 TABLET ORAL 3 TIMES DAILY
Qty: 90 TABLET | Refills: 3
Start: 2019-09-10 | End: 2019-09-25 | Stop reason: DRUGHIGH

## 2019-09-10 ASSESSMENT — ENCOUNTER SYMPTOMS
CHEST TIGHTNESS: 0
APNEA: 0
ABDOMINAL DISTENTION: 0
NAUSEA: 0
SHORTNESS OF BREATH: 1
COUGH: 0
COLOR CHANGE: 0
WHEEZING: 0
ABDOMINAL PAIN: 0

## 2019-09-10 NOTE — PROGRESS NOTES
Darnell Kam His of Heparin induced thrombocytopenia     History of breast cancer     right 1982 s/p mastectomy and chemo, left 2007 s/p mastectomy    History of CVA (cerebrovascular accident)     History of pulmonary embolism 05/2014    on [facility] (coumadin)    Hyperlipidemia     Hypertension     pulmonary    Iron deficiency anemia     Dr. 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 No aortic stenosis. No mitral stenosis. Coronaries; mild disease of the LAD not more than 40%. LV; severe LV dysfunction and EF 30-35%.  CARDIAC CATHETERIZATION  05-    Mild disease of small distal LAD (approximately  50% stenosis) angiographically normal CA. Mild to moderate LV systolic dysfunction. EF 35%. Mildly elevated LV end diastolic pressure. No significant MR. Systemic hypertension.  CARDIAC DEFIBRILLATOR PLACEMENT  02/02/2011    CARDIOVASCULAR STRESS TEST  03/23/10    EF 30%  Severe LV Dys    COLONOSCOPY      Dr. Jeanne Middleton  03/22/10    EF 45%. LV mildly dilated. Systolic function mildly reducted. No regional wall motion abnormalities. Wall thickness mildly increased. LA mildly dilated. MV mild annular calification. Mild TR. Ventricular septum tickness mildly increased.  ENDOSCOPY, COLON, DIAGNOSTIC      EYE SURGERY      bilateral cataracts    EYE SURGERY Left 9/2014    laser surgery for retinopathy?     FOOT SURGERY Left 1/12/2016    OTHER SURGICAL HISTORY  10/18/2018    CardioMEMS    PACEMAKER PLACEMENT      [place] EGD BALLOON regurgitation.  Juliana Markham was mild tricuspid regurgitation.   Pulmonary artery systolic pressure calculated from tricuspid regurgitation   jet is 35 mmhg.      Signature      ----------------------------------------------------------------   Electronically signed by Harshal Hernandez MD (Interpreting   physician) on 06/13/2018 at 09:23 PM   ----------------------------------------------------------------      Findings      Mitral Valve   The mitral valve structure was normal with normal leaflet separation.   DOPPLER: The transmitral velocity was within the normal range with no   evidence for mitral stenosis. There was trivial mitral regurgitation.      Aortic Valve   The aortic valve was trileaflet with normal thickness and cuspal   separation. DOPPLER: Transaortic velocity was within the normal range with   no evidence of aortic stenosis. There was no evidence of aortic   regurgitation.      Tricuspid Valve   The tricuspid valve structure was normal with normal leaflet separation.   DOPPLER: There was no evidence of tricuspid stenosis. There was mild   tricuspid regurgitation.      Pulmonic Valve   Pulmonary artery systolic pressure calculated from tricuspid regurgitation   jet is 35 mmhg.      Left Atrium   Left atrial size was normal.      Left Ventricle   Normal left ventricle size and systolic function. Ejection fraction was   estimated at 55-60%.  There were no regional left ventricular wall motion   abnormalities and wall thickness was within normal limits.      Right Atrium   Right atrial size was normal.      Right Ventricle   The right ventricular size was normal.      Aorta / Great Vessels   Aortic root dimension within normal limits.       Results reviewed:  BNP:   Lab Results   Component Value Date    BNP 46.0 05/20/2013    PROBNP 6431.0 (H) 03/26/2019     CBC:   Lab Results   Component Value Date    WBC 5.6 09/04/2019    RBC 4.22 09/04/2019    HGB 10.5 09/04/2019    HGB 11.7 09/04/2019    HCT 33.8 09/04/2019 shortness of breath  Shortness of breath while laying down  Cough  Chest pain  Swelling in feet, ankles or legs  Tenderness or bloating in the abdomen  Fatigue   Decreased appetite or nausea   Confusion      Return in about 3 months (around 12/10/2019). or sooner if needed     Patient given educational materials - see patient instructions. We discussed the importance of weighing oneself and recording daily. We also discussed the importance of a lowsodium diet, higher sodium foods to avoid and better low sodium food options. Discussed use, benefit, and side effects of prescribed medications. All patient questions answered. Patient verbalizes understanding of plan of care using teach back method, and is agreeable to the treatment plan.        Electronicallysigned by ARELI Ford CNP on 9/10/2019 at 8:49 AM

## 2019-09-19 LAB
BUN BLDV-MCNC: 47 MG/DL
CALCIUM SERPL-MCNC: 9.2 MG/DL
CHLORIDE BLD-SCNC: 106 MMOL/L
CO2: 27 MMOL/L
CREAT SERPL-MCNC: 2.2 MG/DL
GFR CALCULATED: 21
GLUCOSE BLD-MCNC: 126 MG/DL
POTASSIUM SERPL-SCNC: 4.7 MMOL/L
SODIUM BLD-SCNC: 139 MMOL/L

## 2019-09-25 ENCOUNTER — OFFICE VISIT (OUTPATIENT)
Dept: NEPHROLOGY | Age: 84
End: 2019-09-25
Payer: MEDICARE

## 2019-09-25 VITALS
OXYGEN SATURATION: 94 % | DIASTOLIC BLOOD PRESSURE: 61 MMHG | BODY MASS INDEX: 42.27 KG/M2 | WEIGHT: 263 LBS | HEART RATE: 66 BPM | HEIGHT: 66 IN | SYSTOLIC BLOOD PRESSURE: 143 MMHG | RESPIRATION RATE: 18 BRPM

## 2019-09-25 DIAGNOSIS — N18.4 CKD (CHRONIC KIDNEY DISEASE), STAGE IV (HCC): Primary | ICD-10-CM

## 2019-09-25 PROCEDURE — G8427 DOCREV CUR MEDS BY ELIG CLIN: HCPCS | Performed by: INTERNAL MEDICINE

## 2019-09-25 PROCEDURE — 4040F PNEUMOC VAC/ADMIN/RCVD: CPT | Performed by: INTERNAL MEDICINE

## 2019-09-25 PROCEDURE — G8399 PT W/DXA RESULTS DOCUMENT: HCPCS | Performed by: INTERNAL MEDICINE

## 2019-09-25 PROCEDURE — 1123F ACP DISCUSS/DSCN MKR DOCD: CPT | Performed by: INTERNAL MEDICINE

## 2019-09-25 PROCEDURE — G8598 ASA/ANTIPLAT THER USED: HCPCS | Performed by: INTERNAL MEDICINE

## 2019-09-25 PROCEDURE — 99213 OFFICE O/P EST LOW 20 MIN: CPT | Performed by: INTERNAL MEDICINE

## 2019-09-25 PROCEDURE — 1036F TOBACCO NON-USER: CPT | Performed by: INTERNAL MEDICINE

## 2019-09-25 PROCEDURE — G8417 CALC BMI ABV UP PARAM F/U: HCPCS | Performed by: INTERNAL MEDICINE

## 2019-09-25 PROCEDURE — 1090F PRES/ABSN URINE INCON ASSESS: CPT | Performed by: INTERNAL MEDICINE

## 2019-09-25 RX ORDER — ISOSORBIDE DINITRATE 30 MG/1
30 TABLET ORAL 3 TIMES DAILY
COMMUNITY
End: 2019-01-01

## 2019-10-01 LAB
FERRITIN: 326 NG/ML (ref 9–150)
HCT VFR BLD CALC: 37.7 % (ref 36–46)
HEMOGLOBIN: 11.5 G/DL (ref 12–16)
TOTAL IRON BINDING CAPACITY: 167

## 2019-10-02 ENCOUNTER — TELEPHONE (OUTPATIENT)
Dept: CARDIOLOGY CLINIC | Age: 84
End: 2019-10-02

## 2019-10-07 DIAGNOSIS — I50.33 CHF (CONGESTIVE HEART FAILURE), NYHA CLASS III, ACUTE ON CHRONIC, DIASTOLIC (HCC): Primary | ICD-10-CM

## 2019-10-07 LAB
BUN BLDV-MCNC: 46 MG/DL
CALCIUM SERPL-MCNC: 8.9 MG/DL
CHLORIDE BLD-SCNC: 107 MMOL/L
CHOLESTEROL, TOTAL: 81 MG/DL
CHOLESTEROL/HDL RATIO: NORMAL
CO2: 30 MMOL/L
CREAT SERPL-MCNC: 2 MG/DL
GFR CALCULATED: 24
GLUCOSE BLD-MCNC: 120 MG/DL
HDLC SERPL-MCNC: NORMAL MG/DL (ref 35–70)
LDL CHOLESTEROL CALCULATED: NORMAL MG/DL (ref 0–160)
POTASSIUM SERPL-SCNC: 4.8 MMOL/L
SODIUM BLD-SCNC: 141 MMOL/L
TRIGL SERPL-MCNC: 81 MG/DL
VLDLC SERPL CALC-MCNC: NORMAL MG/DL

## 2019-10-07 PROCEDURE — 93264 REM MNTR WRLS P-ART PRS SNR: CPT | Performed by: NURSE PRACTITIONER

## 2019-10-09 ENCOUNTER — PROCEDURE VISIT (OUTPATIENT)
Dept: CARDIOLOGY CLINIC | Age: 84
End: 2019-10-09
Payer: MEDICARE

## 2019-10-09 DIAGNOSIS — Z95.810 BIVENTRICULAR IMPLANTABLE CARDIOVERTER-DEFIBRILLATOR IN SITU: Primary | ICD-10-CM

## 2019-10-09 PROCEDURE — 93296 REM INTERROG EVL PM/IDS: CPT | Performed by: INTERNAL MEDICINE

## 2019-10-09 PROCEDURE — 93295 DEV INTERROG REMOTE 1/2/MLT: CPT | Performed by: INTERNAL MEDICINE

## 2019-10-15 ENCOUNTER — TELEPHONE (OUTPATIENT)
Dept: CARDIOLOGY CLINIC | Age: 84
End: 2019-10-15

## 2019-10-29 ENCOUNTER — OFFICE VISIT (OUTPATIENT)
Dept: CARDIOLOGY CLINIC | Age: 84
End: 2019-10-29
Payer: MEDICARE

## 2019-10-29 VITALS
HEART RATE: 62 BPM | BODY MASS INDEX: 42.45 KG/M2 | DIASTOLIC BLOOD PRESSURE: 60 MMHG | SYSTOLIC BLOOD PRESSURE: 134 MMHG | HEIGHT: 66 IN

## 2019-10-29 DIAGNOSIS — E78.2 MIXED HYPERLIPIDEMIA: Chronic | ICD-10-CM

## 2019-10-29 DIAGNOSIS — I50.32 CHF (CONGESTIVE HEART FAILURE), NYHA CLASS III, CHRONIC, DIASTOLIC (HCC): Primary | ICD-10-CM

## 2019-10-29 DIAGNOSIS — I42.8 CARDIOMYOPATHY, NONISCHEMIC (HCC): Chronic | ICD-10-CM

## 2019-10-29 DIAGNOSIS — I10 ESSENTIAL HYPERTENSION: Chronic | ICD-10-CM

## 2019-10-29 DIAGNOSIS — I44.7 LBBB (LEFT BUNDLE BRANCH BLOCK): Chronic | ICD-10-CM

## 2019-10-29 DIAGNOSIS — Z95.810 BIVENTRICULAR IMPLANTABLE CARDIOVERTER-DEFIBRILLATOR IN SITU: Chronic | ICD-10-CM

## 2019-10-29 PROBLEM — I50.43 CHF (CONGESTIVE HEART FAILURE), NYHA CLASS I, ACUTE ON CHRONIC, COMBINED (HCC): Status: RESOLVED | Noted: 2018-06-12 | Resolved: 2019-10-29

## 2019-10-29 PROCEDURE — G8484 FLU IMMUNIZE NO ADMIN: HCPCS | Performed by: INTERNAL MEDICINE

## 2019-10-29 PROCEDURE — 1090F PRES/ABSN URINE INCON ASSESS: CPT | Performed by: INTERNAL MEDICINE

## 2019-10-29 PROCEDURE — 4040F PNEUMOC VAC/ADMIN/RCVD: CPT | Performed by: INTERNAL MEDICINE

## 2019-10-29 PROCEDURE — G8417 CALC BMI ABV UP PARAM F/U: HCPCS | Performed by: INTERNAL MEDICINE

## 2019-10-29 PROCEDURE — G8427 DOCREV CUR MEDS BY ELIG CLIN: HCPCS | Performed by: INTERNAL MEDICINE

## 2019-10-29 PROCEDURE — G8598 ASA/ANTIPLAT THER USED: HCPCS | Performed by: INTERNAL MEDICINE

## 2019-10-29 PROCEDURE — G8399 PT W/DXA RESULTS DOCUMENT: HCPCS | Performed by: INTERNAL MEDICINE

## 2019-10-29 PROCEDURE — 99214 OFFICE O/P EST MOD 30 MIN: CPT | Performed by: INTERNAL MEDICINE

## 2019-10-29 PROCEDURE — 1036F TOBACCO NON-USER: CPT | Performed by: INTERNAL MEDICINE

## 2019-10-29 PROCEDURE — 1123F ACP DISCUSS/DSCN MKR DOCD: CPT | Performed by: INTERNAL MEDICINE

## 2019-10-29 RX ORDER — HYDROCODONE BITARTRATE AND ACETAMINOPHEN 5; 325 MG/1; MG/1
1 TABLET ORAL EVERY 6 HOURS PRN
Status: ON HOLD | COMMUNITY
End: 2019-01-01 | Stop reason: SDUPTHER

## 2019-11-12 PROBLEM — N18.9 ANEMIA, CHRONIC RENAL FAILURE: Status: ACTIVE | Noted: 2019-01-01

## 2019-11-12 PROBLEM — D63.1 ANEMIA, CHRONIC RENAL FAILURE: Status: ACTIVE | Noted: 2019-01-01

## 2019-11-18 NOTE — PROGRESS NOTES
[address] [facility]1 INJECTAFER  PER WHEELCHAIR PROCEDURE REVIEWED WITH PT VERBALIZED UNDERSTANDING. USING NASAL OXYGEN. Pt rights and responsibilities offered to pt to read.     _MET___ Safety:       (Environmental)   [place] to environment   Ensure ID band is correct and in place/ allergy band as needed   Assess for fall risk   Initiate fall precautions as applicable (fall band, side rails, etc.)   Call light within reach   Bed in low position/ wheels locked    __MET__ Pain:        Assess pain level and characteristics   Administer analgesics as ordered   Assess effectiveness of pain management and report to MD as needed    __MET__ Knowledge Deficit:   Assess baseline knowledge   Provide teaching at level of understanding   Provide teaching via preferred learning method   Evaluate teaching effectiveness    ___MET_ Hemodynamic/Respiratory Status:       (Pre and Post Procedure Monitoring)   Assess/Monitor vital signs and LOC      Obtain weight/height   Assess vital signs/ LOC until patient meets discharge criteria   Monitor procedure site and notify MD of any issues    ____

## 2019-11-25 NOTE — PROGRESS NOTES
2057 Patient arrived to [facility] ambulatory for injectafer infusion  PT RIGHTS AND RESPONSIBILITIES OFFERED TO PT.  7100 Discharge instructions given to patient discharged to nursing home in stable condition in care of transport from home    _m___ Safety:       (Environmental)  Earlean Deep to environment   Ensure ID band is correct and in place/ allergy band as needed   Assess for fall risk   Initiate fall precautions as applicable (fall band, side rails, etc.)   Call light within reach   Bed in low position/ wheels locked    _m___ Pain:        Assess pain level and characteristics   Administer analgesics as ordered   Assess effectiveness of pain management and report to MD as needed    m____ Knowledge Deficit:   Assess baseline knowledge   Provide teaching at level of understanding   Provide teaching via preferred learning method   Evaluate teaching effectiveness    _m___ Hemodynamic/Respiratory Status:       (Pre and Post Procedure Monitoring)   Assess/Monitor vital signs and LOC   Assess Baseline SpO2 prior to any sedation   Obtain weight/height   Assess vital signs/ LOC until patient meets discharge criteria   Monitor procedure site and notify MD of any issues

## 2019-12-09 PROBLEM — I50.41 ACUTE COMBINED SYSTOLIC AND DIASTOLIC CHF, NYHA CLASS 1 (HCC): Status: ACTIVE | Noted: 2019-01-01

## 2019-12-09 PROBLEM — E87.70 FLUID OVERLOAD: Status: ACTIVE | Noted: 2019-01-01

## 2020-01-01 ENCOUNTER — HOSPITAL ENCOUNTER (INPATIENT)
Age: 85
LOS: 1 days | Discharge: NURSING, REHAB OR LONG-TERM CARE | DRG: 640 | End: 2020-05-21
Attending: EMERGENCY MEDICINE | Admitting: FAMILY MEDICINE
Payer: MEDICARE

## 2020-01-01 ENCOUNTER — TELEPHONE (OUTPATIENT)
Dept: NEPHROLOGY | Age: 85
End: 2020-01-01

## 2020-01-01 ENCOUNTER — HOSPITAL ENCOUNTER (INPATIENT)
Age: 85
LOS: 6 days | Discharge: NURSING, REHAB OR LONG-TERM CARE | DRG: 252 | End: 2020-04-13
Attending: EMERGENCY MEDICINE | Admitting: INTERNAL MEDICINE
Payer: MEDICARE

## 2020-01-01 ENCOUNTER — APPOINTMENT (OUTPATIENT)
Dept: GENERAL RADIOLOGY | Age: 85
DRG: 871 | End: 2020-01-01
Payer: MEDICARE

## 2020-01-01 ENCOUNTER — HOSPITAL ENCOUNTER (INPATIENT)
Age: 85
LOS: 4 days | Discharge: NURSING, REHAB OR LONG-TERM CARE | DRG: 813 | End: 2020-04-29
Attending: INTERNAL MEDICINE | Admitting: INTERNAL MEDICINE
Payer: MEDICARE

## 2020-01-01 ENCOUNTER — TELEPHONE (OUTPATIENT)
Dept: CARDIOLOGY CLINIC | Age: 85
End: 2020-01-01

## 2020-01-01 ENCOUNTER — PROCEDURE VISIT (OUTPATIENT)
Dept: CARDIOLOGY CLINIC | Age: 85
End: 2020-01-01
Payer: MEDICARE

## 2020-01-01 ENCOUNTER — HOSPITAL ENCOUNTER (OUTPATIENT)
Dept: INTERVENTIONAL RADIOLOGY/VASCULAR | Age: 85
Discharge: HOME | End: 2020-02-28
Payer: MEDICARE

## 2020-01-01 ENCOUNTER — OFFICE VISIT (OUTPATIENT)
Dept: CARDIOLOGY CLINIC | Age: 85
End: 2020-01-01
Payer: MEDICARE

## 2020-01-01 ENCOUNTER — APPOINTMENT (OUTPATIENT)
Dept: ULTRASOUND IMAGING | Age: 85
DRG: 813 | End: 2020-01-01
Payer: MEDICARE

## 2020-01-01 ENCOUNTER — APPOINTMENT (OUTPATIENT)
Dept: ULTRASOUND IMAGING | Age: 85
DRG: 871 | End: 2020-01-01
Payer: MEDICARE

## 2020-01-01 ENCOUNTER — APPOINTMENT (OUTPATIENT)
Dept: CT IMAGING | Age: 85
DRG: 871 | End: 2020-01-01
Payer: MEDICARE

## 2020-01-01 ENCOUNTER — HOSPITAL ENCOUNTER (OUTPATIENT)
Age: 85
Setting detail: OBSERVATION
Discharge: OTHER INSTITUTION | End: 2020-08-06
Attending: EMERGENCY MEDICINE | Admitting: INTERNAL MEDICINE
Payer: MEDICARE

## 2020-01-01 ENCOUNTER — APPOINTMENT (OUTPATIENT)
Dept: GENERAL RADIOLOGY | Age: 85
DRG: 314 | End: 2020-01-01
Payer: MEDICARE

## 2020-01-01 ENCOUNTER — APPOINTMENT (OUTPATIENT)
Dept: CT IMAGING | Age: 85
End: 2020-01-01
Payer: MEDICARE

## 2020-01-01 ENCOUNTER — HOSPITAL ENCOUNTER (INPATIENT)
Age: 85
LOS: 9 days | Discharge: NURSING, REHAB OR LONG-TERM CARE | DRG: 871 | End: 2020-11-04
Attending: FAMILY MEDICINE | Admitting: FAMILY MEDICINE
Payer: MEDICARE

## 2020-01-01 ENCOUNTER — APPOINTMENT (OUTPATIENT)
Dept: CT IMAGING | Age: 85
DRG: 640 | End: 2020-01-01
Payer: MEDICARE

## 2020-01-01 ENCOUNTER — APPOINTMENT (OUTPATIENT)
Dept: CT IMAGING | Age: 85
DRG: 314 | End: 2020-01-01
Payer: MEDICARE

## 2020-01-01 ENCOUNTER — HOSPITAL ENCOUNTER (EMERGENCY)
Age: 85
Discharge: HOME | DRG: 813 | End: 2020-04-24
Attending: EMERGENCY MEDICINE
Payer: MEDICARE

## 2020-01-01 ENCOUNTER — HOSPITAL ENCOUNTER (INPATIENT)
Age: 85
LOS: 13 days | Discharge: HOME | DRG: 871 | End: 2020-06-15
Attending: INTERNAL MEDICINE | Admitting: INTERNAL MEDICINE
Payer: MEDICARE

## 2020-01-01 ENCOUNTER — APPOINTMENT (OUTPATIENT)
Dept: INTERVENTIONAL RADIOLOGY/VASCULAR | Age: 85
DRG: 871 | End: 2020-01-01
Payer: MEDICARE

## 2020-01-01 ENCOUNTER — APPOINTMENT (OUTPATIENT)
Dept: GENERAL RADIOLOGY | Age: 85
DRG: 252 | End: 2020-01-01
Payer: MEDICARE

## 2020-01-01 ENCOUNTER — NURSE ONLY (OUTPATIENT)
Dept: CARDIOLOGY CLINIC | Age: 85
End: 2020-01-01
Payer: MEDICARE

## 2020-01-01 ENCOUNTER — HOSPITAL ENCOUNTER (EMERGENCY)
Age: 85
Discharge: NURSING, REHAB OR LONG-TERM CARE | End: 2020-02-09
Attending: EMERGENCY MEDICINE
Payer: MEDICARE

## 2020-01-01 ENCOUNTER — HOSPITAL ENCOUNTER (INPATIENT)
Age: 85
LOS: 5 days | Discharge: NURSING, REHAB OR LONG-TERM CARE | DRG: 314 | End: 2020-10-12
Attending: EMERGENCY MEDICINE | Admitting: FAMILY MEDICINE
Payer: MEDICARE

## 2020-01-01 ENCOUNTER — APPOINTMENT (OUTPATIENT)
Dept: GENERAL RADIOLOGY | Age: 85
DRG: 640 | End: 2020-01-01
Payer: MEDICARE

## 2020-01-01 ENCOUNTER — APPOINTMENT (OUTPATIENT)
Dept: INTERVENTIONAL RADIOLOGY/VASCULAR | Age: 85
DRG: 252 | End: 2020-01-01
Payer: MEDICARE

## 2020-01-01 ENCOUNTER — APPOINTMENT (OUTPATIENT)
Dept: GENERAL RADIOLOGY | Age: 85
End: 2020-01-01
Payer: MEDICARE

## 2020-01-01 VITALS
OXYGEN SATURATION: 98 % | RESPIRATION RATE: 18 BRPM | SYSTOLIC BLOOD PRESSURE: 182 MMHG | TEMPERATURE: 97.4 F | HEART RATE: 79 BPM | DIASTOLIC BLOOD PRESSURE: 69 MMHG

## 2020-01-01 VITALS
HEART RATE: 98 BPM | RESPIRATION RATE: 20 BRPM | DIASTOLIC BLOOD PRESSURE: 51 MMHG | SYSTOLIC BLOOD PRESSURE: 123 MMHG | BODY MASS INDEX: 39.86 KG/M2 | HEIGHT: 66 IN | OXYGEN SATURATION: 96 % | TEMPERATURE: 98.3 F | WEIGHT: 248 LBS

## 2020-01-01 VITALS
BODY MASS INDEX: 38.74 KG/M2 | WEIGHT: 240 LBS | TEMPERATURE: 98 F | OXYGEN SATURATION: 98 % | RESPIRATION RATE: 20 BRPM | HEART RATE: 95 BPM | SYSTOLIC BLOOD PRESSURE: 124 MMHG | DIASTOLIC BLOOD PRESSURE: 44 MMHG

## 2020-01-01 VITALS
DIASTOLIC BLOOD PRESSURE: 34 MMHG | HEART RATE: 102 BPM | SYSTOLIC BLOOD PRESSURE: 68 MMHG | OXYGEN SATURATION: 99 % | HEIGHT: 66 IN | RESPIRATION RATE: 23 BRPM | BODY MASS INDEX: 38.47 KG/M2 | TEMPERATURE: 97.8 F | WEIGHT: 239.4 LBS

## 2020-01-01 VITALS
BODY MASS INDEX: 39.89 KG/M2 | DIASTOLIC BLOOD PRESSURE: 66 MMHG | RESPIRATION RATE: 18 BRPM | TEMPERATURE: 97.7 F | SYSTOLIC BLOOD PRESSURE: 149 MMHG | WEIGHT: 248.2 LBS | HEIGHT: 66 IN | OXYGEN SATURATION: 95 % | HEART RATE: 75 BPM

## 2020-01-01 VITALS
DIASTOLIC BLOOD PRESSURE: 72 MMHG | BODY MASS INDEX: 38.81 KG/M2 | HEIGHT: 66 IN | SYSTOLIC BLOOD PRESSURE: 138 MMHG | WEIGHT: 241.5 LBS | HEART RATE: 76 BPM

## 2020-01-01 VITALS
BODY MASS INDEX: 38.57 KG/M2 | HEART RATE: 81 BPM | RESPIRATION RATE: 16 BRPM | WEIGHT: 240 LBS | SYSTOLIC BLOOD PRESSURE: 124 MMHG | DIASTOLIC BLOOD PRESSURE: 60 MMHG | HEIGHT: 66 IN | OXYGEN SATURATION: 95 % | TEMPERATURE: 97.3 F

## 2020-01-01 VITALS
SYSTOLIC BLOOD PRESSURE: 125 MMHG | BODY MASS INDEX: 38.46 KG/M2 | TEMPERATURE: 98 F | HEIGHT: 66 IN | OXYGEN SATURATION: 99 % | DIASTOLIC BLOOD PRESSURE: 62 MMHG | HEART RATE: 86 BPM | RESPIRATION RATE: 17 BRPM | WEIGHT: 239.3 LBS

## 2020-01-01 VITALS — DIASTOLIC BLOOD PRESSURE: 56 MMHG | OXYGEN SATURATION: 97 % | HEART RATE: 88 BPM | SYSTOLIC BLOOD PRESSURE: 102 MMHG

## 2020-01-01 VITALS
HEART RATE: 92 BPM | OXYGEN SATURATION: 98 % | HEIGHT: 66 IN | TEMPERATURE: 97.5 F | BODY MASS INDEX: 36.34 KG/M2 | SYSTOLIC BLOOD PRESSURE: 110 MMHG | RESPIRATION RATE: 17 BRPM | DIASTOLIC BLOOD PRESSURE: 54 MMHG | WEIGHT: 226.13 LBS

## 2020-01-01 VITALS
TEMPERATURE: 98.2 F | BODY MASS INDEX: 35.18 KG/M2 | SYSTOLIC BLOOD PRESSURE: 108 MMHG | OXYGEN SATURATION: 97 % | HEART RATE: 90 BPM | RESPIRATION RATE: 16 BRPM | DIASTOLIC BLOOD PRESSURE: 51 MMHG | WEIGHT: 218.9 LBS | HEIGHT: 66 IN

## 2020-01-01 VITALS
HEART RATE: 97 BPM | RESPIRATION RATE: 19 BRPM | TEMPERATURE: 94.6 F | SYSTOLIC BLOOD PRESSURE: 135 MMHG | DIASTOLIC BLOOD PRESSURE: 64 MMHG | OXYGEN SATURATION: 97 %

## 2020-01-01 DIAGNOSIS — T45.515A WARFARIN-INDUCED COAGULOPATHY (HCC): ICD-10-CM

## 2020-01-01 DIAGNOSIS — Z86.711 HISTORY OF PULMONARY EMBOLUS (PE): ICD-10-CM

## 2020-01-01 DIAGNOSIS — R42 DIZZINESS: Primary | ICD-10-CM

## 2020-01-01 DIAGNOSIS — R42 LIGHTHEADEDNESS: ICD-10-CM

## 2020-01-01 DIAGNOSIS — D68.32 WARFARIN-INDUCED COAGULOPATHY (HCC): ICD-10-CM

## 2020-01-01 DIAGNOSIS — N93.9 ABNORMAL VAGINAL BLEEDING: Primary | ICD-10-CM

## 2020-01-01 LAB
ABO: NORMAL
ACINETOBACTER BAUMANNII FILM ARRAY: NOT DETECTED
ACINETOBACTER BAUMANNII FILM ARRAY: NOT DETECTED
ACTIVATED PROTEIN C RESISTANCE: 3.23
ALBUMIN SERPL-MCNC: 1.7 G/DL (ref 3.5–5.1)
ALBUMIN SERPL-MCNC: 1.9 G/DL (ref 3.5–5.1)
ALBUMIN SERPL-MCNC: 1.9 G/DL (ref 3.5–5.1)
ALBUMIN SERPL-MCNC: 2.3 G/DL (ref 3.5–5.1)
ALBUMIN SERPL-MCNC: 2.3 G/DL (ref 3.5–5.1)
ALBUMIN SERPL-MCNC: 2.7 G/DL (ref 3.5–5.1)
ALBUMIN SERPL-MCNC: 2.7 G/DL (ref 3.5–5.1)
ALBUMIN SERPL-MCNC: 2.8 G/DL (ref 3.5–5.1)
ALBUMIN SERPL-MCNC: 2.8 G/DL (ref 3.5–5.1)
ALBUMIN SERPL-MCNC: 2.9 G/DL (ref 3.5–5.1)
ALBUMIN SERPL-MCNC: 3.3 G/DL (ref 3.5–5.1)
ALLEN TEST: POSITIVE
ALP BLD-CCNC: 100 U/L (ref 38–126)
ALP BLD-CCNC: 101 U/L (ref 38–126)
ALP BLD-CCNC: 104 U/L (ref 38–126)
ALP BLD-CCNC: 104 U/L (ref 38–126)
ALP BLD-CCNC: 109 U/L (ref 38–126)
ALP BLD-CCNC: 113 U/L (ref 38–126)
ALP BLD-CCNC: 114 U/L (ref 38–126)
ALP BLD-CCNC: 117 U/L (ref 38–126)
ALP BLD-CCNC: 117 U/L (ref 38–126)
ALP BLD-CCNC: 148 U/L (ref 38–126)
ALP BLD-CCNC: 96 U/L (ref 38–126)
ALT SERPL-CCNC: 13 U/L (ref 11–66)
ALT SERPL-CCNC: 6 U/L (ref 11–66)
ALT SERPL-CCNC: 7 U/L (ref 11–66)
ALT SERPL-CCNC: 8 U/L (ref 11–66)
ALT SERPL-CCNC: 8 U/L (ref 11–66)
ALT SERPL-CCNC: 9 U/L (ref 11–66)
ALT SERPL-CCNC: < 5 U/L (ref 11–66)
ALT SERPL-CCNC: < 5 U/L (ref 11–66)
AMMONIA: 18 UMOL/L (ref 11–60)
AMMONIA: 66 UMOL/L (ref 11–60)
AMORPHOUS: ABNORMAL
ANION GAP SERPL CALCULATED.3IONS-SCNC: 11 MEQ/L (ref 8–16)
ANION GAP SERPL CALCULATED.3IONS-SCNC: 11 MEQ/L (ref 8–16)
ANION GAP SERPL CALCULATED.3IONS-SCNC: 12 MEQ/L (ref 8–16)
ANION GAP SERPL CALCULATED.3IONS-SCNC: 13 MEQ/L (ref 8–16)
ANION GAP SERPL CALCULATED.3IONS-SCNC: 14 MEQ/L (ref 8–16)
ANION GAP SERPL CALCULATED.3IONS-SCNC: 15 MEQ/L (ref 8–16)
ANION GAP SERPL CALCULATED.3IONS-SCNC: 16 MEQ/L (ref 8–16)
ANION GAP SERPL CALCULATED.3IONS-SCNC: 17 MEQ/L (ref 8–16)
ANION GAP SERPL CALCULATED.3IONS-SCNC: 18 MEQ/L (ref 8–16)
ANION GAP SERPL CALCULATED.3IONS-SCNC: 21 MEQ/L (ref 8–16)
ANION GAP SERPL CALCULATED.3IONS-SCNC: 23 MEQ/L (ref 8–16)
ANTIBODY SCREEN: NORMAL
APTT: 24.1 SECONDS (ref 22–38)
APTT: 29.5 SECONDS (ref 22–38)
APTT: 37.6 SECONDS (ref 22–38)
APTT: 53.2 SECONDS (ref 22–38)
AST SERPL-CCNC: 10 U/L (ref 5–40)
AST SERPL-CCNC: 10 U/L (ref 5–40)
AST SERPL-CCNC: 12 U/L (ref 5–40)
AST SERPL-CCNC: 13 U/L (ref 5–40)
AST SERPL-CCNC: 14 U/L (ref 5–40)
AST SERPL-CCNC: 14 U/L (ref 5–40)
AST SERPL-CCNC: 17 U/L (ref 5–40)
AST SERPL-CCNC: 17 U/L (ref 5–40)
AST SERPL-CCNC: 21 U/L (ref 5–40)
AVERAGE GLUCOSE: 132 MG/DL (ref 70–126)
BACTERIA: ABNORMAL /HPF
BACTERIA: ABNORMAL /HPF
BASE EXCESS (CALCULATED): -1.8 MMOL/L (ref -2.5–2.5)
BASOPHILIA: ABNORMAL
BASOPHILS # BLD: 0.1 %
BASOPHILS # BLD: 0.2 %
BASOPHILS # BLD: 0.3 %
BASOPHILS # BLD: 0.4 %
BASOPHILS # BLD: 0.5 %
BASOPHILS # BLD: 0.6 %
BASOPHILS ABSOLUTE: 0 THOU/MM3 (ref 0–0.1)
BASOPHILS ABSOLUTE: 0.1 THOU/MM3 (ref 0–0.1)
BASOPHILS ABSOLUTE: ABNORMAL
BASOPHILS RELATIVE PERCENT: ABNORMAL
BILIRUB SERPL-MCNC: 0.2 MG/DL (ref 0.3–1.2)
BILIRUB SERPL-MCNC: 0.3 MG/DL (ref 0.3–1.2)
BILIRUB SERPL-MCNC: 0.3 MG/DL (ref 0.3–1.2)
BILIRUBIN DIRECT: < 0.2 MG/DL (ref 0–0.3)
BILIRUBIN URINE: NEGATIVE
BILIRUBIN URINE: NEGATIVE
BLOOD CULTURE, ROUTINE: ABNORMAL
BLOOD CULTURE, ROUTINE: NORMAL
BLOOD, URINE: ABNORMAL
BLOOD, URINE: ABNORMAL
BODY FLUID CULTURE, STERILE: ABNORMAL
BODY FLUID CULTURE, STERILE: ABNORMAL
BODY FLUID CULTURE, STERILE: NORMAL
BODY FLUID RBC: < 2000 /CUMM
BOTTLE TYPE: NORMAL
BOTTLE TYPE: NORMAL
BUN BLDV-MCNC: 20 MG/DL (ref 7–22)
BUN BLDV-MCNC: 21 MG/DL (ref 7–22)
BUN BLDV-MCNC: 21 MG/DL (ref 7–22)
BUN BLDV-MCNC: 23 MG/DL (ref 7–22)
BUN BLDV-MCNC: 24 MG/DL (ref 7–22)
BUN BLDV-MCNC: 26 MG/DL
BUN BLDV-MCNC: 28 MG/DL
BUN BLDV-MCNC: 28 MG/DL (ref 7–22)
BUN BLDV-MCNC: 29 MG/DL (ref 7–22)
BUN BLDV-MCNC: 31 MG/DL (ref 7–22)
BUN BLDV-MCNC: 31 MG/DL (ref 7–22)
BUN BLDV-MCNC: 32 MG/DL (ref 7–22)
BUN BLDV-MCNC: 32 MG/DL (ref 7–22)
BUN BLDV-MCNC: 33 MG/DL (ref 7–22)
BUN BLDV-MCNC: 34 MG/DL (ref 7–22)
BUN BLDV-MCNC: 34 MG/DL (ref 7–22)
BUN BLDV-MCNC: 36 MG/DL (ref 7–22)
BUN BLDV-MCNC: 37 MG/DL (ref 7–22)
BUN BLDV-MCNC: 38 MG/DL (ref 7–22)
BUN BLDV-MCNC: 38 MG/DL (ref 7–22)
BUN BLDV-MCNC: 39 MG/DL (ref 7–22)
BUN BLDV-MCNC: 40 MG/DL (ref 7–22)
BUN BLDV-MCNC: 40 MG/DL (ref 7–22)
BUN BLDV-MCNC: 41 MG/DL (ref 7–22)
BUN BLDV-MCNC: 48 MG/DL (ref 7–22)
BUN BLDV-MCNC: 48 MG/DL (ref 7–22)
BUN BLDV-MCNC: 50 MG/DL (ref 7–22)
BUN BLDV-MCNC: 53 MG/DL (ref 7–22)
BUN BLDV-MCNC: 68 MG/DL (ref 7–22)
BUN BLDV-MCNC: 76 MG/DL (ref 7–22)
CALCIUM SERPL-MCNC: 7.4 MG/DL (ref 8.5–10.5)
CALCIUM SERPL-MCNC: 7.4 MG/DL (ref 8.5–10.5)
CALCIUM SERPL-MCNC: 7.5 MG/DL (ref 8.5–10.5)
CALCIUM SERPL-MCNC: 7.5 MG/DL (ref 8.5–10.5)
CALCIUM SERPL-MCNC: 7.7 MG/DL (ref 8.5–10.5)
CALCIUM SERPL-MCNC: 7.7 MG/DL (ref 8.5–10.5)
CALCIUM SERPL-MCNC: 8 MG/DL (ref 8.5–10.5)
CALCIUM SERPL-MCNC: 8 MG/DL (ref 8.5–10.5)
CALCIUM SERPL-MCNC: 8.1 MG/DL (ref 8.5–10.5)
CALCIUM SERPL-MCNC: 8.2 MG/DL (ref 8.5–10.5)
CALCIUM SERPL-MCNC: 8.3 MG/DL
CALCIUM SERPL-MCNC: 8.3 MG/DL (ref 8.5–10.5)
CALCIUM SERPL-MCNC: 8.4 MG/DL (ref 8.5–10.5)
CALCIUM SERPL-MCNC: 8.6 MG/DL (ref 8.5–10.5)
CALCIUM SERPL-MCNC: 8.7 MG/DL (ref 8.5–10.5)
CALCIUM SERPL-MCNC: 8.8 MG/DL (ref 8.5–10.5)
CALCIUM SERPL-MCNC: 9 MG/DL
CALCIUM SERPL-MCNC: 9 MG/DL (ref 8.5–10.5)
CALCIUM SERPL-MCNC: 9.1 MG/DL (ref 8.5–10.5)
CALCIUM SERPL-MCNC: 9.2 MG/DL (ref 8.5–10.5)
CALCIUM SERPL-MCNC: 9.3 MG/DL (ref 8.5–10.5)
CALCIUM SERPL-MCNC: 9.4 MG/DL (ref 8.5–10.5)
CALCIUM SERPL-MCNC: 9.4 MG/DL (ref 8.5–10.5)
CALCIUM SERPL-MCNC: 9.6 MG/DL (ref 8.5–10.5)
CANDIDA ALBICANS FILM ARRAY: NOT DETECTED
CANDIDA ALBICANS FILM ARRAY: NOT DETECTED
CANDIDA GLABRATA FILM ARRAY: NOT DETECTED
CANDIDA GLABRATA FILM ARRAY: NOT DETECTED
CANDIDA KRUSEI FILM ARRAY: NOT DETECTED
CANDIDA KRUSEI FILM ARRAY: NOT DETECTED
CANDIDA PARAPSILOSIS FILM ARRAY: NOT DETECTED
CANDIDA PARAPSILOSIS FILM ARRAY: NOT DETECTED
CANDIDA TROPICALIS FILM ARRAY: NOT DETECTED
CANDIDA TROPICALIS FILM ARRAY: NOT DETECTED
CARBAPENEM RESITANT FILM ARRAY: NORMAL
CARBAPENEM RESITANT FILM ARRAY: NORMAL
CASTS 2: ABNORMAL /LPF
CASTS 2: ABNORMAL /LPF
CASTS UA: ABNORMAL /LPF
CASTS UA: ABNORMAL /LPF
CHARACTER, BODY FLUID: CLEAR
CHARACTER, URINE: ABNORMAL
CHARACTER, URINE: ABNORMAL
CHLORIDE BLD-SCNC: 100 MMOL/L
CHLORIDE BLD-SCNC: 102 MEQ/L (ref 98–111)
CHLORIDE BLD-SCNC: 89 MEQ/L (ref 98–111)
CHLORIDE BLD-SCNC: 90 MEQ/L (ref 98–111)
CHLORIDE BLD-SCNC: 90 MMOL/L
CHLORIDE BLD-SCNC: 91 MEQ/L (ref 98–111)
CHLORIDE BLD-SCNC: 92 MEQ/L (ref 98–111)
CHLORIDE BLD-SCNC: 93 MEQ/L (ref 98–111)
CHLORIDE BLD-SCNC: 94 MEQ/L (ref 98–111)
CHLORIDE BLD-SCNC: 95 MEQ/L (ref 98–111)
CHLORIDE BLD-SCNC: 96 MEQ/L (ref 98–111)
CHLORIDE BLD-SCNC: 97 MEQ/L (ref 98–111)
CHLORIDE BLD-SCNC: 98 MEQ/L (ref 98–111)
CHLORIDE BLD-SCNC: 98 MEQ/L (ref 98–111)
CHLORIDE BLD-SCNC: 99 MEQ/L (ref 98–111)
CHLORIDE BLD-SCNC: 99 MEQ/L (ref 98–111)
CO2: 17 MEQ/L (ref 23–33)
CO2: 19 MEQ/L (ref 23–33)
CO2: 20 MEQ/L (ref 23–33)
CO2: 21 MEQ/L (ref 23–33)
CO2: 22 MEQ/L (ref 23–33)
CO2: 23 MEQ/L (ref 23–33)
CO2: 24 MEQ/L (ref 23–33)
CO2: 24 MMOL/L
CO2: 25 MEQ/L (ref 23–33)
CO2: 26 MEQ/L (ref 23–33)
CO2: 27 MEQ/L (ref 23–33)
CO2: 28 MEQ/L (ref 23–33)
CO2: 30 MMOL/L
COLLECTED BY:: ABNORMAL
COLOR: COLORLESS
COLOR: NORMAL
COLOR: YELLOW
COLOR: YELLOW
CREAT SERPL-MCNC: 2.4 MG/DL (ref 0.4–1.2)
CREAT SERPL-MCNC: 3.4 MG/DL
CREAT SERPL-MCNC: 4.8 MG/DL (ref 0.4–1.2)
CREAT SERPL-MCNC: 4.8 MG/DL (ref 0.4–1.2)
CREAT SERPL-MCNC: 4.9 MG/DL (ref 0.4–1.2)
CREAT SERPL-MCNC: 5.2 MG/DL (ref 0.4–1.2)
CREAT SERPL-MCNC: 5.3 MG/DL (ref 0.4–1.2)
CREAT SERPL-MCNC: 5.6 MG/DL (ref 0.4–1.2)
CREAT SERPL-MCNC: 5.6 MG/DL (ref 0.4–1.2)
CREAT SERPL-MCNC: 5.7 MG/DL (ref 0.4–1.2)
CREAT SERPL-MCNC: 6 MG/DL (ref 0.4–1.2)
CREAT SERPL-MCNC: 6 MG/DL (ref 0.4–1.2)
CREAT SERPL-MCNC: 6.1 MG/DL (ref 0.4–1.2)
CREAT SERPL-MCNC: 6.2 MG/DL (ref 0.4–1.2)
CREAT SERPL-MCNC: 6.2 MG/DL (ref 0.4–1.2)
CREAT SERPL-MCNC: 6.3 MG/DL (ref 0.4–1.2)
CREAT SERPL-MCNC: 6.4 MG/DL (ref 0.4–1.2)
CREAT SERPL-MCNC: 6.5 MG/DL
CREAT SERPL-MCNC: 6.5 MG/DL (ref 0.4–1.2)
CREAT SERPL-MCNC: 6.6 MG/DL (ref 0.4–1.2)
CREAT SERPL-MCNC: 6.6 MG/DL (ref 0.4–1.2)
CREAT SERPL-MCNC: 6.7 MG/DL (ref 0.4–1.2)
CREAT SERPL-MCNC: 6.8 MG/DL (ref 0.4–1.2)
CREAT SERPL-MCNC: 6.9 MG/DL (ref 0.4–1.2)
CREAT SERPL-MCNC: 6.9 MG/DL (ref 0.4–1.2)
CREAT SERPL-MCNC: 7 MG/DL (ref 0.4–1.2)
CREAT SERPL-MCNC: 7.2 MG/DL (ref 0.4–1.2)
CREAT SERPL-MCNC: 7.4 MG/DL (ref 0.4–1.2)
CREAT SERPL-MCNC: 7.6 MG/DL (ref 0.4–1.2)
CREAT SERPL-MCNC: 7.7 MG/DL (ref 0.4–1.2)
CREAT SERPL-MCNC: 7.8 MG/DL (ref 0.4–1.2)
CREAT SERPL-MCNC: 7.8 MG/DL (ref 0.4–1.2)
CREAT SERPL-MCNC: 8.2 MG/DL (ref 0.4–1.2)
CREAT SERPL-MCNC: 8.2 MG/DL (ref 0.4–1.2)
CREAT SERPL-MCNC: 8.5 MG/DL (ref 0.4–1.2)
CREAT SERPL-MCNC: 8.7 MG/DL (ref 0.4–1.2)
CREAT SERPL-MCNC: 9.7 MG/DL (ref 0.4–1.2)
CRYSTALS, UA: ABNORMAL
CRYSTALS, UA: ABNORMAL
DEVICE: ABNORMAL
EKG ATRIAL RATE: 101 BPM
EKG ATRIAL RATE: 104 BPM
EKG ATRIAL RATE: 108 BPM
EKG ATRIAL RATE: 109 BPM
EKG ATRIAL RATE: 117 BPM
EKG ATRIAL RATE: 41 BPM
EKG ATRIAL RATE: 68 BPM
EKG ATRIAL RATE: 69 BPM
EKG ATRIAL RATE: 69 BPM
EKG ATRIAL RATE: 83 BPM
EKG ATRIAL RATE: 87 BPM
EKG ATRIAL RATE: 91 BPM
EKG ATRIAL RATE: 92 BPM
EKG ATRIAL RATE: 96 BPM
EKG ATRIAL RATE: 99 BPM
EKG P AXIS: 110 DEGREES
EKG P AXIS: 14 DEGREES
EKG P AXIS: 32 DEGREES
EKG P AXIS: 48 DEGREES
EKG P AXIS: 56 DEGREES
EKG P AXIS: 57 DEGREES
EKG P AXIS: 60 DEGREES
EKG P AXIS: 62 DEGREES
EKG P AXIS: 78 DEGREES
EKG P AXIS: 94 DEGREES
EKG P-R INTERVAL: 122 MS
EKG P-R INTERVAL: 128 MS
EKG P-R INTERVAL: 130 MS
EKG P-R INTERVAL: 134 MS
EKG P-R INTERVAL: 134 MS
EKG P-R INTERVAL: 136 MS
EKG P-R INTERVAL: 140 MS
EKG P-R INTERVAL: 142 MS
EKG P-R INTERVAL: 142 MS
EKG P-R INTERVAL: 148 MS
EKG P-R INTERVAL: 152 MS
EKG P-R INTERVAL: 152 MS
EKG Q-T INTERVAL: 388 MS
EKG Q-T INTERVAL: 394 MS
EKG Q-T INTERVAL: 398 MS
EKG Q-T INTERVAL: 398 MS
EKG Q-T INTERVAL: 404 MS
EKG Q-T INTERVAL: 406 MS
EKG Q-T INTERVAL: 422 MS
EKG Q-T INTERVAL: 426 MS
EKG Q-T INTERVAL: 448 MS
EKG Q-T INTERVAL: 462 MS
EKG Q-T INTERVAL: 468 MS
EKG Q-T INTERVAL: 476 MS
EKG Q-T INTERVAL: 490 MS
EKG Q-T INTERVAL: 498 MS
EKG Q-T INTERVAL: 502 MS
EKG QRS DURATION: 122 MS
EKG QRS DURATION: 128 MS
EKG QRS DURATION: 136 MS
EKG QRS DURATION: 150 MS
EKG QRS DURATION: 156 MS
EKG QRS DURATION: 164 MS
EKG QRS DURATION: 164 MS
EKG QRS DURATION: 180 MS
EKG QRS DURATION: 184 MS
EKG QRS DURATION: 186 MS
EKG QRS DURATION: 192 MS
EKG QRS DURATION: 192 MS
EKG QRS DURATION: 194 MS
EKG QRS DURATION: 196 MS
EKG QRS DURATION: 198 MS
EKG QTC CALCULATION (BAZETT): 521 MS
EKG QTC CALCULATION (BAZETT): 522 MS
EKG QTC CALCULATION (BAZETT): 523 MS
EKG QTC CALCULATION (BAZETT): 525 MS
EKG QTC CALCULATION (BAZETT): 529 MS
EKG QTC CALCULATION (BAZETT): 533 MS
EKG QTC CALCULATION (BAZETT): 533 MS
EKG QTC CALCULATION (BAZETT): 537 MS
EKG QTC CALCULATION (BAZETT): 549 MS
EKG QTC CALCULATION (BAZETT): 557 MS
EKG QTC CALCULATION (BAZETT): 558 MS
EKG QTC CALCULATION (BAZETT): 563 MS
EKG QTC CALCULATION (BAZETT): 571 MS
EKG QTC CALCULATION (BAZETT): 585 MS
EKG QTC CALCULATION (BAZETT): 607 MS
EKG R AXIS: -106 DEGREES
EKG R AXIS: -108 DEGREES
EKG R AXIS: -108 DEGREES
EKG R AXIS: -113 DEGREES
EKG R AXIS: -116 DEGREES
EKG R AXIS: -117 DEGREES
EKG R AXIS: -119 DEGREES
EKG R AXIS: -125 DEGREES
EKG R AXIS: -126 DEGREES
EKG R AXIS: -130 DEGREES
EKG R AXIS: -141 DEGREES
EKG R AXIS: -143 DEGREES
EKG R AXIS: -157 DEGREES
EKG R AXIS: -93 DEGREES
EKG R AXIS: 7 DEGREES
EKG T AXIS: -173 DEGREES
EKG T AXIS: 33 DEGREES
EKG T AXIS: 39 DEGREES
EKG T AXIS: 44 DEGREES
EKG T AXIS: 46 DEGREES
EKG T AXIS: 48 DEGREES
EKG T AXIS: 50 DEGREES
EKG T AXIS: 59 DEGREES
EKG T AXIS: 61 DEGREES
EKG T AXIS: 61 DEGREES
EKG T AXIS: 65 DEGREES
EKG T AXIS: 69 DEGREES
EKG T AXIS: 74 DEGREES
EKG T AXIS: 82 DEGREES
EKG T AXIS: 94 DEGREES
EKG VENTRICULAR RATE: 104 BPM
EKG VENTRICULAR RATE: 108 BPM
EKG VENTRICULAR RATE: 109 BPM
EKG VENTRICULAR RATE: 115 BPM
EKG VENTRICULAR RATE: 117 BPM
EKG VENTRICULAR RATE: 122 BPM
EKG VENTRICULAR RATE: 68 BPM
EKG VENTRICULAR RATE: 69 BPM
EKG VENTRICULAR RATE: 69 BPM
EKG VENTRICULAR RATE: 87 BPM
EKG VENTRICULAR RATE: 91 BPM
EKG VENTRICULAR RATE: 92 BPM
EKG VENTRICULAR RATE: 93 BPM
EKG VENTRICULAR RATE: 96 BPM
EKG VENTRICULAR RATE: 99 BPM
ENTERBACTER CLOACAE FILM ARRAY: NOT DETECTED
ENTERBACTER CLOACAE FILM ARRAY: NOT DETECTED
ENTERBACTERIACEAE FILM ARRAY: NOT DETECTED
ENTERBACTERIACEAE FILM ARRAY: NOT DETECTED
ENTEROCOCCUS FILM ARRAY: NOT DETECTED
ENTEROCOCCUS FILM ARRAY: NOT DETECTED
EOSINOPHIL # BLD: 0.2 %
EOSINOPHIL # BLD: 0.2 %
EOSINOPHIL # BLD: 0.6 %
EOSINOPHIL # BLD: 0.7 %
EOSINOPHIL # BLD: 0.8 %
EOSINOPHIL # BLD: 0.8 %
EOSINOPHIL # BLD: 1.4 %
EOSINOPHIL # BLD: 1.8 %
EOSINOPHIL # BLD: 1.8 %
EOSINOPHIL # BLD: 2 %
EOSINOPHIL # BLD: 2.1 %
EOSINOPHIL # BLD: 2.2 %
EOSINOPHIL # BLD: 2.4 %
EOSINOPHIL # BLD: 2.8 %
EOSINOPHIL # BLD: 2.9 %
EOSINOPHIL # BLD: 3.3 %
EOSINOPHIL # BLD: 3.4 %
EOSINOPHIL # BLD: 3.7 %
EOSINOPHIL # BLD: 3.8 %
EOSINOPHIL # BLD: 4.1 %
EOSINOPHIL # BLD: 4.2 %
EOSINOPHIL # BLD: 4.4 %
EOSINOPHIL # BLD: 4.6 %
EOSINOPHIL # BLD: 5.3 %
EOSINOPHIL # BLD: 5.3 %
EOSINOPHIL # BLD: 5.5 %
EOSINOPHILS ABSOLUTE: 0 THOU/MM3 (ref 0–0.4)
EOSINOPHILS ABSOLUTE: 0 THOU/MM3 (ref 0–0.4)
EOSINOPHILS ABSOLUTE: 0.1 THOU/MM3 (ref 0–0.4)
EOSINOPHILS ABSOLUTE: 0.2 THOU/MM3 (ref 0–0.4)
EOSINOPHILS ABSOLUTE: 0.2 THOU/MM3 (ref 0–0.4)
EOSINOPHILS ABSOLUTE: 0.3 THOU/MM3 (ref 0–0.4)
EOSINOPHILS ABSOLUTE: 0.4 THOU/MM3 (ref 0–0.4)
EOSINOPHILS ABSOLUTE: 0.5 THOU/MM3 (ref 0–0.4)
EOSINOPHILS ABSOLUTE: ABNORMAL
EOSINOPHILS RELATIVE PERCENT: ABNORMAL
EPITHELIAL CELLS, UA: ABNORMAL /HPF
EPITHELIAL CELLS, UA: ABNORMAL /HPF
ERYTHROCYTE [DISTWIDTH] IN BLOOD BY AUTOMATED COUNT: 12.7 % (ref 11.5–14.5)
ERYTHROCYTE [DISTWIDTH] IN BLOOD BY AUTOMATED COUNT: 12.7 % (ref 11.5–14.5)
ERYTHROCYTE [DISTWIDTH] IN BLOOD BY AUTOMATED COUNT: 12.8 % (ref 11.5–14.5)
ERYTHROCYTE [DISTWIDTH] IN BLOOD BY AUTOMATED COUNT: 13.3 % (ref 11.5–14.5)
ERYTHROCYTE [DISTWIDTH] IN BLOOD BY AUTOMATED COUNT: 13.5 % (ref 11.5–14.5)
ERYTHROCYTE [DISTWIDTH] IN BLOOD BY AUTOMATED COUNT: 13.6 % (ref 11.5–14.5)
ERYTHROCYTE [DISTWIDTH] IN BLOOD BY AUTOMATED COUNT: 13.6 % (ref 11.5–14.5)
ERYTHROCYTE [DISTWIDTH] IN BLOOD BY AUTOMATED COUNT: 13.8 % (ref 11.5–14.5)
ERYTHROCYTE [DISTWIDTH] IN BLOOD BY AUTOMATED COUNT: 13.9 % (ref 11.5–14.5)
ERYTHROCYTE [DISTWIDTH] IN BLOOD BY AUTOMATED COUNT: 14 % (ref 11.5–14.5)
ERYTHROCYTE [DISTWIDTH] IN BLOOD BY AUTOMATED COUNT: 14.1 % (ref 11.5–14.5)
ERYTHROCYTE [DISTWIDTH] IN BLOOD BY AUTOMATED COUNT: 14.3 % (ref 11.5–14.5)
ERYTHROCYTE [DISTWIDTH] IN BLOOD BY AUTOMATED COUNT: 14.4 % (ref 11.5–14.5)
ERYTHROCYTE [DISTWIDTH] IN BLOOD BY AUTOMATED COUNT: 14.6 % (ref 11.5–14.5)
ERYTHROCYTE [DISTWIDTH] IN BLOOD BY AUTOMATED COUNT: 14.8 % (ref 11.5–14.5)
ERYTHROCYTE [DISTWIDTH] IN BLOOD BY AUTOMATED COUNT: 14.8 % (ref 11.5–14.5)
ERYTHROCYTE [DISTWIDTH] IN BLOOD BY AUTOMATED COUNT: 15 % (ref 11.5–14.5)
ERYTHROCYTE [DISTWIDTH] IN BLOOD BY AUTOMATED COUNT: 15.8 % (ref 11.5–14.5)
ERYTHROCYTE [DISTWIDTH] IN BLOOD BY AUTOMATED COUNT: 15.8 % (ref 11.5–14.5)
ERYTHROCYTE [DISTWIDTH] IN BLOOD BY AUTOMATED COUNT: 15.9 % (ref 11.5–14.5)
ERYTHROCYTE [DISTWIDTH] IN BLOOD BY AUTOMATED COUNT: 16 % (ref 11.5–14.5)
ERYTHROCYTE [DISTWIDTH] IN BLOOD BY AUTOMATED COUNT: 16 % (ref 11.5–14.5)
ERYTHROCYTE [DISTWIDTH] IN BLOOD BY AUTOMATED COUNT: 16.2 % (ref 11.5–14.5)
ERYTHROCYTE [DISTWIDTH] IN BLOOD BY AUTOMATED COUNT: 16.4 % (ref 11.5–14.5)
ERYTHROCYTE [DISTWIDTH] IN BLOOD BY AUTOMATED COUNT: 16.4 % (ref 11.5–14.5)
ERYTHROCYTE [DISTWIDTH] IN BLOOD BY AUTOMATED COUNT: 16.5 % (ref 11.5–14.5)
ERYTHROCYTE [DISTWIDTH] IN BLOOD BY AUTOMATED COUNT: 16.9 % (ref 11.5–14.5)
ERYTHROCYTE [DISTWIDTH] IN BLOOD BY AUTOMATED COUNT: 43.4 FL (ref 35–45)
ERYTHROCYTE [DISTWIDTH] IN BLOOD BY AUTOMATED COUNT: 44.3 FL (ref 35–45)
ERYTHROCYTE [DISTWIDTH] IN BLOOD BY AUTOMATED COUNT: 44.4 FL (ref 35–45)
ERYTHROCYTE [DISTWIDTH] IN BLOOD BY AUTOMATED COUNT: 44.6 FL (ref 35–45)
ERYTHROCYTE [DISTWIDTH] IN BLOOD BY AUTOMATED COUNT: 45.9 FL (ref 35–45)
ERYTHROCYTE [DISTWIDTH] IN BLOOD BY AUTOMATED COUNT: 46.5 FL (ref 35–45)
ERYTHROCYTE [DISTWIDTH] IN BLOOD BY AUTOMATED COUNT: 46.6 FL (ref 35–45)
ERYTHROCYTE [DISTWIDTH] IN BLOOD BY AUTOMATED COUNT: 46.9 FL (ref 35–45)
ERYTHROCYTE [DISTWIDTH] IN BLOOD BY AUTOMATED COUNT: 47.4 FL (ref 35–45)
ERYTHROCYTE [DISTWIDTH] IN BLOOD BY AUTOMATED COUNT: 47.8 FL (ref 35–45)
ERYTHROCYTE [DISTWIDTH] IN BLOOD BY AUTOMATED COUNT: 48 FL (ref 35–45)
ERYTHROCYTE [DISTWIDTH] IN BLOOD BY AUTOMATED COUNT: 48.9 FL (ref 35–45)
ERYTHROCYTE [DISTWIDTH] IN BLOOD BY AUTOMATED COUNT: 49.3 FL (ref 35–45)
ERYTHROCYTE [DISTWIDTH] IN BLOOD BY AUTOMATED COUNT: 49.4 FL (ref 35–45)
ERYTHROCYTE [DISTWIDTH] IN BLOOD BY AUTOMATED COUNT: 49.8 FL (ref 35–45)
ERYTHROCYTE [DISTWIDTH] IN BLOOD BY AUTOMATED COUNT: 50.2 FL (ref 35–45)
ERYTHROCYTE [DISTWIDTH] IN BLOOD BY AUTOMATED COUNT: 50.4 FL (ref 35–45)
ERYTHROCYTE [DISTWIDTH] IN BLOOD BY AUTOMATED COUNT: 50.5 FL (ref 35–45)
ERYTHROCYTE [DISTWIDTH] IN BLOOD BY AUTOMATED COUNT: 50.8 FL (ref 35–45)
ERYTHROCYTE [DISTWIDTH] IN BLOOD BY AUTOMATED COUNT: 51 FL (ref 35–45)
ERYTHROCYTE [DISTWIDTH] IN BLOOD BY AUTOMATED COUNT: 51.8 FL (ref 35–45)
ERYTHROCYTE [DISTWIDTH] IN BLOOD BY AUTOMATED COUNT: 52 FL (ref 35–45)
ERYTHROCYTE [DISTWIDTH] IN BLOOD BY AUTOMATED COUNT: 52.5 FL (ref 35–45)
ERYTHROCYTE [DISTWIDTH] IN BLOOD BY AUTOMATED COUNT: 52.6 FL (ref 35–45)
ERYTHROCYTE [DISTWIDTH] IN BLOOD BY AUTOMATED COUNT: 52.7 FL (ref 35–45)
ERYTHROCYTE [DISTWIDTH] IN BLOOD BY AUTOMATED COUNT: 52.9 FL (ref 35–45)
ERYTHROCYTE [DISTWIDTH] IN BLOOD BY AUTOMATED COUNT: 53.1 FL (ref 35–45)
ERYTHROCYTE [DISTWIDTH] IN BLOOD BY AUTOMATED COUNT: 53.6 FL (ref 35–45)
ERYTHROCYTE [DISTWIDTH] IN BLOOD BY AUTOMATED COUNT: 53.7 FL (ref 35–45)
ERYTHROCYTE [DISTWIDTH] IN BLOOD BY AUTOMATED COUNT: 53.9 FL (ref 35–45)
ERYTHROCYTE [DISTWIDTH] IN BLOOD BY AUTOMATED COUNT: 54.7 FL (ref 35–45)
ERYTHROCYTE [DISTWIDTH] IN BLOOD BY AUTOMATED COUNT: 54.7 FL (ref 35–45)
ESCHERICHIA COLI FILM ARRAY: NOT DETECTED
ESCHERICHIA COLI FILM ARRAY: NOT DETECTED
ETHYL ALCOHOL, SERUM: < 0.01 %
FOLATE: 9 NG/ML (ref 4.8–24.2)
GFR CALCULATED: 13
GFR CALCULATED: 6
GFR SERPL CREATININE-BSD FRML MDRD: 19 ML/MIN/1.73M2
GFR SERPL CREATININE-BSD FRML MDRD: 4 ML/MIN/1.73M2
GFR SERPL CREATININE-BSD FRML MDRD: 5 ML/MIN/1.73M2
GFR SERPL CREATININE-BSD FRML MDRD: 6 ML/MIN/1.73M2
GFR SERPL CREATININE-BSD FRML MDRD: 7 ML/MIN/1.73M2
GFR SERPL CREATININE-BSD FRML MDRD: 8 ML/MIN/1.73M2
GFR SERPL CREATININE-BSD FRML MDRD: 9 ML/MIN/1.73M2
GFR SERPL CREATININE-BSD FRML MDRD: 9 ML/MIN/1.73M2
GLUCOSE BLD-MCNC: 100 MG/DL (ref 70–108)
GLUCOSE BLD-MCNC: 101 MG/DL (ref 70–108)
GLUCOSE BLD-MCNC: 101 MG/DL (ref 70–108)
GLUCOSE BLD-MCNC: 102 MG/DL (ref 70–108)
GLUCOSE BLD-MCNC: 102 MG/DL (ref 70–108)
GLUCOSE BLD-MCNC: 105 MG/DL (ref 70–108)
GLUCOSE BLD-MCNC: 105 MG/DL (ref 70–108)
GLUCOSE BLD-MCNC: 106 MG/DL (ref 70–108)
GLUCOSE BLD-MCNC: 106 MG/DL (ref 70–108)
GLUCOSE BLD-MCNC: 107 MG/DL (ref 70–108)
GLUCOSE BLD-MCNC: 109 MG/DL (ref 70–108)
GLUCOSE BLD-MCNC: 110 MG/DL (ref 70–108)
GLUCOSE BLD-MCNC: 111 MG/DL (ref 70–108)
GLUCOSE BLD-MCNC: 113 MG/DL (ref 70–108)
GLUCOSE BLD-MCNC: 114 MG/DL (ref 70–108)
GLUCOSE BLD-MCNC: 114 MG/DL (ref 70–108)
GLUCOSE BLD-MCNC: 115 MG/DL (ref 70–108)
GLUCOSE BLD-MCNC: 116 MG/DL (ref 70–108)
GLUCOSE BLD-MCNC: 117 MG/DL (ref 70–108)
GLUCOSE BLD-MCNC: 120 MG/DL (ref 70–108)
GLUCOSE BLD-MCNC: 120 MG/DL (ref 70–108)
GLUCOSE BLD-MCNC: 121 MG/DL (ref 70–108)
GLUCOSE BLD-MCNC: 122 MG/DL (ref 70–108)
GLUCOSE BLD-MCNC: 123 MG/DL (ref 70–108)
GLUCOSE BLD-MCNC: 123 MG/DL (ref 70–108)
GLUCOSE BLD-MCNC: 124 MG/DL (ref 70–108)
GLUCOSE BLD-MCNC: 125 MG/DL (ref 70–108)
GLUCOSE BLD-MCNC: 125 MG/DL (ref 70–108)
GLUCOSE BLD-MCNC: 126 MG/DL (ref 70–108)
GLUCOSE BLD-MCNC: 126 MG/DL (ref 70–108)
GLUCOSE BLD-MCNC: 127 MG/DL (ref 70–108)
GLUCOSE BLD-MCNC: 128 MG/DL (ref 70–108)
GLUCOSE BLD-MCNC: 129 MG/DL (ref 70–108)
GLUCOSE BLD-MCNC: 130 MG/DL (ref 70–108)
GLUCOSE BLD-MCNC: 131 MG/DL (ref 70–108)
GLUCOSE BLD-MCNC: 132 MG/DL (ref 70–108)
GLUCOSE BLD-MCNC: 133 MG/DL (ref 70–108)
GLUCOSE BLD-MCNC: 134 MG/DL (ref 70–108)
GLUCOSE BLD-MCNC: 135 MG/DL (ref 70–108)
GLUCOSE BLD-MCNC: 136 MG/DL (ref 70–108)
GLUCOSE BLD-MCNC: 136 MG/DL (ref 70–108)
GLUCOSE BLD-MCNC: 137 MG/DL (ref 70–108)
GLUCOSE BLD-MCNC: 138 MG/DL
GLUCOSE BLD-MCNC: 138 MG/DL (ref 70–108)
GLUCOSE BLD-MCNC: 139 MG/DL (ref 70–108)
GLUCOSE BLD-MCNC: 140 MG/DL (ref 70–108)
GLUCOSE BLD-MCNC: 141 MG/DL (ref 70–108)
GLUCOSE BLD-MCNC: 141 MG/DL (ref 70–108)
GLUCOSE BLD-MCNC: 142 MG/DL (ref 70–108)
GLUCOSE BLD-MCNC: 142 MG/DL (ref 70–108)
GLUCOSE BLD-MCNC: 143 MG/DL (ref 70–108)
GLUCOSE BLD-MCNC: 144 MG/DL (ref 70–108)
GLUCOSE BLD-MCNC: 145 MG/DL (ref 70–108)
GLUCOSE BLD-MCNC: 145 MG/DL (ref 70–108)
GLUCOSE BLD-MCNC: 146 MG/DL (ref 70–108)
GLUCOSE BLD-MCNC: 146 MG/DL (ref 70–108)
GLUCOSE BLD-MCNC: 148 MG/DL (ref 70–108)
GLUCOSE BLD-MCNC: 150 MG/DL (ref 70–108)
GLUCOSE BLD-MCNC: 151 MG/DL (ref 70–108)
GLUCOSE BLD-MCNC: 153 MG/DL (ref 70–108)
GLUCOSE BLD-MCNC: 153 MG/DL (ref 70–108)
GLUCOSE BLD-MCNC: 154 MG/DL (ref 70–108)
GLUCOSE BLD-MCNC: 154 MG/DL (ref 70–108)
GLUCOSE BLD-MCNC: 155 MG/DL (ref 70–108)
GLUCOSE BLD-MCNC: 156 MG/DL (ref 70–108)
GLUCOSE BLD-MCNC: 156 MG/DL (ref 70–108)
GLUCOSE BLD-MCNC: 157 MG/DL (ref 70–108)
GLUCOSE BLD-MCNC: 157 MG/DL (ref 70–108)
GLUCOSE BLD-MCNC: 158 MG/DL (ref 70–108)
GLUCOSE BLD-MCNC: 160 MG/DL (ref 70–108)
GLUCOSE BLD-MCNC: 161 MG/DL (ref 70–108)
GLUCOSE BLD-MCNC: 162 MG/DL (ref 70–108)
GLUCOSE BLD-MCNC: 163 MG/DL (ref 70–108)
GLUCOSE BLD-MCNC: 164 MG/DL (ref 70–108)
GLUCOSE BLD-MCNC: 165 MG/DL (ref 70–108)
GLUCOSE BLD-MCNC: 165 MG/DL (ref 70–108)
GLUCOSE BLD-MCNC: 168 MG/DL (ref 70–108)
GLUCOSE BLD-MCNC: 169 MG/DL (ref 70–108)
GLUCOSE BLD-MCNC: 172 MG/DL (ref 70–108)
GLUCOSE BLD-MCNC: 172 MG/DL (ref 70–108)
GLUCOSE BLD-MCNC: 173 MG/DL (ref 70–108)
GLUCOSE BLD-MCNC: 173 MG/DL (ref 70–108)
GLUCOSE BLD-MCNC: 174 MG/DL (ref 70–108)
GLUCOSE BLD-MCNC: 175 MG/DL (ref 70–108)
GLUCOSE BLD-MCNC: 176 MG/DL (ref 70–108)
GLUCOSE BLD-MCNC: 176 MG/DL (ref 70–108)
GLUCOSE BLD-MCNC: 177 MG/DL (ref 70–108)
GLUCOSE BLD-MCNC: 179 MG/DL (ref 70–108)
GLUCOSE BLD-MCNC: 179 MG/DL (ref 70–108)
GLUCOSE BLD-MCNC: 180 MG/DL (ref 70–108)
GLUCOSE BLD-MCNC: 180 MG/DL (ref 70–108)
GLUCOSE BLD-MCNC: 183 MG/DL (ref 70–108)
GLUCOSE BLD-MCNC: 184 MG/DL (ref 70–108)
GLUCOSE BLD-MCNC: 184 MG/DL (ref 70–108)
GLUCOSE BLD-MCNC: 185 MG/DL (ref 70–108)
GLUCOSE BLD-MCNC: 189 MG/DL (ref 70–108)
GLUCOSE BLD-MCNC: 189 MG/DL (ref 70–108)
GLUCOSE BLD-MCNC: 191 MG/DL (ref 70–108)
GLUCOSE BLD-MCNC: 192 MG/DL (ref 70–108)
GLUCOSE BLD-MCNC: 192 MG/DL (ref 70–108)
GLUCOSE BLD-MCNC: 193 MG/DL (ref 70–108)
GLUCOSE BLD-MCNC: 193 MG/DL (ref 70–108)
GLUCOSE BLD-MCNC: 196 MG/DL (ref 70–108)
GLUCOSE BLD-MCNC: 196 MG/DL (ref 70–108)
GLUCOSE BLD-MCNC: 197 MG/DL (ref 70–108)
GLUCOSE BLD-MCNC: 200 MG/DL (ref 70–108)
GLUCOSE BLD-MCNC: 202 MG/DL (ref 70–108)
GLUCOSE BLD-MCNC: 203 MG/DL (ref 70–108)
GLUCOSE BLD-MCNC: 204 MG/DL (ref 70–108)
GLUCOSE BLD-MCNC: 206 MG/DL (ref 70–108)
GLUCOSE BLD-MCNC: 211 MG/DL (ref 70–108)
GLUCOSE BLD-MCNC: 216 MG/DL (ref 70–108)
GLUCOSE BLD-MCNC: 218 MG/DL (ref 70–108)
GLUCOSE BLD-MCNC: 219 MG/DL (ref 70–108)
GLUCOSE BLD-MCNC: 220 MG/DL (ref 70–108)
GLUCOSE BLD-MCNC: 224 MG/DL (ref 70–108)
GLUCOSE BLD-MCNC: 233 MG/DL (ref 70–108)
GLUCOSE BLD-MCNC: 234 MG/DL (ref 70–108)
GLUCOSE BLD-MCNC: 260 MG/DL (ref 70–108)
GLUCOSE BLD-MCNC: 262 MG/DL (ref 70–108)
GLUCOSE BLD-MCNC: 287 MG/DL (ref 70–108)
GLUCOSE BLD-MCNC: 62 MG/DL (ref 70–108)
GLUCOSE BLD-MCNC: 66 MG/DL (ref 70–108)
GLUCOSE BLD-MCNC: 67 MG/DL (ref 70–108)
GLUCOSE BLD-MCNC: 68 MG/DL (ref 70–108)
GLUCOSE BLD-MCNC: 72 MG/DL (ref 70–108)
GLUCOSE BLD-MCNC: 73 MG/DL
GLUCOSE BLD-MCNC: 73 MG/DL (ref 70–108)
GLUCOSE BLD-MCNC: 74 MG/DL (ref 70–108)
GLUCOSE BLD-MCNC: 74 MG/DL (ref 70–108)
GLUCOSE BLD-MCNC: 77 MG/DL (ref 70–108)
GLUCOSE BLD-MCNC: 83 MG/DL (ref 70–108)
GLUCOSE BLD-MCNC: 83 MG/DL (ref 70–108)
GLUCOSE BLD-MCNC: 85 MG/DL (ref 70–108)
GLUCOSE BLD-MCNC: 90 MG/DL (ref 70–108)
GLUCOSE BLD-MCNC: 92 MG/DL (ref 70–108)
GLUCOSE BLD-MCNC: 93 MG/DL (ref 70–108)
GLUCOSE BLD-MCNC: 94 MG/DL (ref 70–108)
GLUCOSE BLD-MCNC: 94 MG/DL (ref 70–108)
GLUCOSE BLD-MCNC: 95 MG/DL (ref 70–108)
GLUCOSE BLD-MCNC: 96 MG/DL (ref 70–108)
GLUCOSE BLD-MCNC: 96 MG/DL (ref 70–108)
GLUCOSE BLD-MCNC: 98 MG/DL (ref 70–108)
GLUCOSE BLD-MCNC: 98 MG/DL (ref 70–108)
GLUCOSE URINE: NEGATIVE MG/DL
GLUCOSE URINE: NEGATIVE MG/DL
GRAM STAIN RESULT: NORMAL
HAEMOPHILUS INFLUENZA FILM ARRAY: NOT DETECTED
HAEMOPHILUS INFLUENZA FILM ARRAY: NOT DETECTED
HBA1C MFR BLD: 6.4 % (ref 4.4–6.4)
HCO3: 22 MMOL/L (ref 23–28)
HCT VFR BLD CALC: 25.9 % (ref 37–47)
HCT VFR BLD CALC: 26.9 % (ref 37–47)
HCT VFR BLD CALC: 27.2 % (ref 37–47)
HCT VFR BLD CALC: 27.4 % (ref 37–47)
HCT VFR BLD CALC: 27.8 % (ref 37–47)
HCT VFR BLD CALC: 27.9 % (ref 37–47)
HCT VFR BLD CALC: 28.2 % (ref 37–47)
HCT VFR BLD CALC: 28.3 % (ref 37–47)
HCT VFR BLD CALC: 28.5 % (ref 37–47)
HCT VFR BLD CALC: 28.7 % (ref 37–47)
HCT VFR BLD CALC: 28.9 % (ref 37–47)
HCT VFR BLD CALC: 29 % (ref 37–47)
HCT VFR BLD CALC: 29.1 % (ref 37–47)
HCT VFR BLD CALC: 29.1 % (ref 37–47)
HCT VFR BLD CALC: 29.3 % (ref 37–47)
HCT VFR BLD CALC: 29.4 % (ref 37–47)
HCT VFR BLD CALC: 29.7 % (ref 37–47)
HCT VFR BLD CALC: 29.8 % (ref 37–47)
HCT VFR BLD CALC: 29.9 % (ref 37–47)
HCT VFR BLD CALC: 30 % (ref 37–47)
HCT VFR BLD CALC: 30 % (ref 37–47)
HCT VFR BLD CALC: 30.1 % (ref 37–47)
HCT VFR BLD CALC: 30.4 % (ref 37–47)
HCT VFR BLD CALC: 31.1 % (ref 37–47)
HCT VFR BLD CALC: 32.1 % (ref 37–47)
HCT VFR BLD CALC: 32.4 % (ref 37–47)
HCT VFR BLD CALC: 32.5 % (ref 37–47)
HCT VFR BLD CALC: 32.7 % (ref 37–47)
HCT VFR BLD CALC: 33.1 % (ref 37–47)
HCT VFR BLD CALC: 33.5 % (ref 37–47)
HCT VFR BLD CALC: 33.7 % (ref 37–47)
HCT VFR BLD CALC: 33.8 % (ref 36–46)
HCT VFR BLD CALC: 34.7 % (ref 37–47)
HCT VFR BLD CALC: 34.8 % (ref 36–46)
HCT VFR BLD CALC: 35 % (ref 37–47)
HCT VFR BLD CALC: 35.3 % (ref 37–47)
HCT VFR BLD CALC: 35.5 % (ref 37–47)
HCT VFR BLD CALC: 36.2 % (ref 37–47)
HCT VFR BLD CALC: 36.4 % (ref 37–47)
HCT VFR BLD CALC: 38.3 % (ref 37–47)
HEMOGLOBIN: 10.1 GM/DL (ref 12–16)
HEMOGLOBIN: 10.2 GM/DL (ref 12–16)
HEMOGLOBIN: 10.2 GM/DL (ref 12–16)
HEMOGLOBIN: 10.3 GM/DL (ref 12–16)
HEMOGLOBIN: 10.4 GM/DL (ref 12–16)
HEMOGLOBIN: 10.6 G/DL (ref 12–16)
HEMOGLOBIN: 10.7 GM/DL (ref 12–16)
HEMOGLOBIN: 10.7 GM/DL (ref 12–16)
HEMOGLOBIN: 10.8 G/DL (ref 12–16)
HEMOGLOBIN: 10.9 GM/DL (ref 12–16)
HEMOGLOBIN: 10.9 GM/DL (ref 12–16)
HEMOGLOBIN: 11.4 GM/DL (ref 12–16)
HEMOGLOBIN: 11.5 GM/DL (ref 12–16)
HEMOGLOBIN: 11.6 GM/DL (ref 12–16)
HEMOGLOBIN: 11.6 GM/DL (ref 12–16)
HEMOGLOBIN: 8.4 GM/DL (ref 12–16)
HEMOGLOBIN: 8.6 GM/DL (ref 12–16)
HEMOGLOBIN: 8.7 GM/DL (ref 12–16)
HEMOGLOBIN: 8.8 GM/DL (ref 12–16)
HEMOGLOBIN: 8.9 GM/DL (ref 12–16)
HEMOGLOBIN: 9 GM/DL (ref 12–16)
HEMOGLOBIN: 9 GM/DL (ref 12–16)
HEMOGLOBIN: 9.1 GM/DL (ref 12–16)
HEMOGLOBIN: 9.1 GM/DL (ref 12–16)
HEMOGLOBIN: 9.2 GM/DL (ref 12–16)
HEMOGLOBIN: 9.2 GM/DL (ref 12–16)
HEMOGLOBIN: 9.3 GM/DL (ref 12–16)
HEMOGLOBIN: 9.5 GM/DL (ref 12–16)
HEMOGLOBIN: 9.5 GM/DL (ref 12–16)
HEMOGLOBIN: 9.6 GM/DL (ref 12–16)
HEMOGLOBIN: 9.9 GM/DL (ref 12–16)
HYPOCHROMIA: PRESENT
IMMATURE GRANS (ABS): 0.04 THOU/MM3 (ref 0–0.07)
IMMATURE GRANS (ABS): 0.05 THOU/MM3 (ref 0–0.07)
IMMATURE GRANS (ABS): 0.06 THOU/MM3 (ref 0–0.07)
IMMATURE GRANS (ABS): 0.06 THOU/MM3 (ref 0–0.07)
IMMATURE GRANS (ABS): 0.08 THOU/MM3 (ref 0–0.07)
IMMATURE GRANS (ABS): 0.12 THOU/MM3 (ref 0–0.07)
IMMATURE GRANS (ABS): 0.12 THOU/MM3 (ref 0–0.07)
IMMATURE GRANS (ABS): 0.14 THOU/MM3 (ref 0–0.07)
IMMATURE GRANS (ABS): 0.15 THOU/MM3 (ref 0–0.07)
IMMATURE GRANS (ABS): 0.16 THOU/MM3 (ref 0–0.07)
IMMATURE GRANS (ABS): 0.16 THOU/MM3 (ref 0–0.07)
IMMATURE GRANS (ABS): 0.17 THOU/MM3 (ref 0–0.07)
IMMATURE GRANS (ABS): 0.17 THOU/MM3 (ref 0–0.07)
IMMATURE GRANS (ABS): 0.18 THOU/MM3 (ref 0–0.07)
IMMATURE GRANS (ABS): 0.19 THOU/MM3 (ref 0–0.07)
IMMATURE GRANS (ABS): 0.2 THOU/MM3 (ref 0–0.07)
IMMATURE GRANS (ABS): 0.2 THOU/MM3 (ref 0–0.07)
IMMATURE GRANS (ABS): 0.21 THOU/MM3 (ref 0–0.07)
IMMATURE GRANS (ABS): 0.22 THOU/MM3 (ref 0–0.07)
IMMATURE GRANS (ABS): 0.23 THOU/MM3 (ref 0–0.07)
IMMATURE GRANS (ABS): 0.24 THOU/MM3 (ref 0–0.07)
IMMATURE GRANS (ABS): 0.31 THOU/MM3 (ref 0–0.07)
IMMATURE GRANULOCYTES: 0.5 %
IMMATURE GRANULOCYTES: 0.6 %
IMMATURE GRANULOCYTES: 0.6 %
IMMATURE GRANULOCYTES: 0.8 %
IMMATURE GRANULOCYTES: 0.9 %
IMMATURE GRANULOCYTES: 1.1 %
IMMATURE GRANULOCYTES: 1.1 %
IMMATURE GRANULOCYTES: 1.2 %
IMMATURE GRANULOCYTES: 1.3 %
IMMATURE GRANULOCYTES: 1.4 %
IMMATURE GRANULOCYTES: 1.5 %
IMMATURE GRANULOCYTES: 1.6 %
IMMATURE GRANULOCYTES: 1.6 %
IMMATURE GRANULOCYTES: 1.7 %
IMMATURE GRANULOCYTES: 1.7 %
IMMATURE GRANULOCYTES: 1.9 %
IMMATURE GRANULOCYTES: 2 %
IMMATURE GRANULOCYTES: 2.1 %
IMMATURE GRANULOCYTES: 2.4 %
INR BLD: 0.94 (ref 0.85–1.13)
INR BLD: 0.94 (ref 0.85–1.13)
INR BLD: 0.95 (ref 0.85–1.13)
INR BLD: 1.02 (ref 0.85–1.13)
INR BLD: 1.09 (ref 0.85–1.13)
INR BLD: 1.24 (ref 0.85–1.13)
INR BLD: 1.52 (ref 0.85–1.13)
INR BLD: 1.77 (ref 0.85–1.13)
INR BLD: 1.8 (ref 0.85–1.13)
INR BLD: 1.91 (ref 0.85–1.13)
INR BLD: 2.1 (ref 0.85–1.13)
INR BLD: 2.17 (ref 0.85–1.13)
INR BLD: 2.36 (ref 0.85–1.13)
INR BLD: 2.45 (ref 0.85–1.13)
INR BLD: 2.92 (ref 0.85–1.13)
INR BLD: 3.23 (ref 0.85–1.13)
IRON SATURATION: 64 % (ref 20–50)
IRON: 69 UG/DL (ref 50–170)
KETONES, URINE: ABNORMAL
KETONES, URINE: NEGATIVE
KLEBSIELLA OXYTOCA FILM ARRAY: NOT DETECTED
KLEBSIELLA OXYTOCA FILM ARRAY: NOT DETECTED
KLEBSIELLA PNEUMONIAE FILM ARRAY: NOT DETECTED
KLEBSIELLA PNEUMONIAE FILM ARRAY: NOT DETECTED
LACTIC ACID, SEPSIS: 2.7 MMOL/L (ref 0.5–1.9)
LACTIC ACID, SEPSIS: 3.4 MMOL/L (ref 0.5–1.9)
LACTIC ACID: 1.6 MMOL/L (ref 0.5–2.2)
LACTIC ACID: 2 MMOL/L (ref 0.5–2.2)
LACTIC ACID: 2.1 MMOL/L (ref 0.5–2.2)
LACTIC ACID: 2.4 MMOL/L (ref 0.5–2.2)
LACTIC ACID: 2.4 MMOL/L (ref 0.5–2.2)
LACTIC ACID: 2.6 MMOL/L (ref 0.5–2.2)
LACTIC ACID: 2.6 MMOL/L (ref 0.5–2.2)
LACTIC ACID: 2.7 MMOL/L (ref 0.5–2.2)
LACTIC ACID: 2.9 MMOL/L (ref 0.5–2.2)
LACTIC ACID: 3 MMOL/L (ref 0.5–2.2)
LACTIC ACID: 3.3 MMOL/L (ref 0.5–2.2)
LACTIC ACID: 3.4 MMOL/L (ref 0.5–2.2)
LD, FLUID: 16 U/L
LEUKOCYTE ESTERASE, URINE: ABNORMAL
LEUKOCYTE ESTERASE, URINE: ABNORMAL
LIPASE: 17.9 U/L (ref 5.6–51.3)
LISTERIA MONOCYTOGENES FILM ARRAY: NOT DETECTED
LISTERIA MONOCYTOGENES FILM ARRAY: NOT DETECTED
LV EF: 48 %
LVEF MODALITY: NORMAL
LYMPHOCYTES # BLD: 11 %
LYMPHOCYTES # BLD: 11.5 %
LYMPHOCYTES # BLD: 13.7 %
LYMPHOCYTES # BLD: 15.2 %
LYMPHOCYTES # BLD: 15.3 %
LYMPHOCYTES # BLD: 15.8 %
LYMPHOCYTES # BLD: 16.4 %
LYMPHOCYTES # BLD: 16.6 %
LYMPHOCYTES # BLD: 18.3 %
LYMPHOCYTES # BLD: 18.5 %
LYMPHOCYTES # BLD: 19.2 %
LYMPHOCYTES # BLD: 20.8 %
LYMPHOCYTES # BLD: 21.4 %
LYMPHOCYTES # BLD: 23.7 %
LYMPHOCYTES # BLD: 24.2 %
LYMPHOCYTES # BLD: 24.4 %
LYMPHOCYTES # BLD: 24.9 %
LYMPHOCYTES # BLD: 25.4 %
LYMPHOCYTES # BLD: 25.6 %
LYMPHOCYTES # BLD: 26.6 %
LYMPHOCYTES # BLD: 26.7 %
LYMPHOCYTES # BLD: 27.3 %
LYMPHOCYTES # BLD: 27.9 %
LYMPHOCYTES # BLD: 29.3 %
LYMPHOCYTES # BLD: 30.3 %
LYMPHOCYTES # BLD: 31.2 %
LYMPHOCYTES # BLD: 35.9 %
LYMPHOCYTES # BLD: 38.7 %
LYMPHOCYTES ABSOLUTE: 1.2 THOU/MM3 (ref 1–4.8)
LYMPHOCYTES ABSOLUTE: 1.5 THOU/MM3 (ref 1–4.8)
LYMPHOCYTES ABSOLUTE: 1.5 THOU/MM3 (ref 1–4.8)
LYMPHOCYTES ABSOLUTE: 1.8 THOU/MM3 (ref 1–4.8)
LYMPHOCYTES ABSOLUTE: 2 THOU/MM3 (ref 1–4.8)
LYMPHOCYTES ABSOLUTE: 2.1 THOU/MM3 (ref 1–4.8)
LYMPHOCYTES ABSOLUTE: 2.3 THOU/MM3 (ref 1–4.8)
LYMPHOCYTES ABSOLUTE: 2.4 THOU/MM3 (ref 1–4.8)
LYMPHOCYTES ABSOLUTE: 2.4 THOU/MM3 (ref 1–4.8)
LYMPHOCYTES ABSOLUTE: 2.5 THOU/MM3 (ref 1–4.8)
LYMPHOCYTES ABSOLUTE: 2.6 THOU/MM3 (ref 1–4.8)
LYMPHOCYTES ABSOLUTE: 2.6 THOU/MM3 (ref 1–4.8)
LYMPHOCYTES ABSOLUTE: 2.7 THOU/MM3 (ref 1–4.8)
LYMPHOCYTES ABSOLUTE: 2.7 THOU/MM3 (ref 1–4.8)
LYMPHOCYTES ABSOLUTE: 2.8 THOU/MM3 (ref 1–4.8)
LYMPHOCYTES ABSOLUTE: 2.9 THOU/MM3 (ref 1–4.8)
LYMPHOCYTES ABSOLUTE: 3.1 THOU/MM3 (ref 1–4.8)
LYMPHOCYTES ABSOLUTE: 3.3 THOU/MM3 (ref 1–4.8)
LYMPHOCYTES ABSOLUTE: 3.8 THOU/MM3 (ref 1–4.8)
LYMPHOCYTES ABSOLUTE: 4.3 THOU/MM3 (ref 1–4.8)
LYMPHOCYTES ABSOLUTE: 4.6 THOU/MM3 (ref 1–4.8)
LYMPHOCYTES ABSOLUTE: 5 THOU/MM3 (ref 1–4.8)
LYMPHOCYTES ABSOLUTE: ABNORMAL
LYMPHOCYTES RELATIVE PERCENT: ABNORMAL
MAGNESIUM: 1.3 MG/DL (ref 1.6–2.4)
MAGNESIUM: 1.4 MG/DL (ref 1.6–2.4)
MAGNESIUM: 1.4 MG/DL (ref 1.6–2.4)
MAGNESIUM: 1.5 MG/DL (ref 1.6–2.4)
MAGNESIUM: 1.5 MG/DL (ref 1.6–2.4)
MAGNESIUM: 1.7 MG/DL (ref 1.6–2.4)
MAGNESIUM: 1.8 MG/DL (ref 1.6–2.4)
MAGNESIUM: 1.8 MG/DL (ref 1.6–2.4)
MAGNESIUM: 1.9 MG/DL (ref 1.6–2.4)
MAGNESIUM: 1.9 MG/DL (ref 1.6–2.4)
MAGNESIUM: 2.2 MG/DL (ref 1.6–2.4)
MCH RBC QN AUTO: 28.4 PG (ref 26–33)
MCH RBC QN AUTO: 28.6 PG (ref 26–33)
MCH RBC QN AUTO: 28.7 PG (ref 26–33)
MCH RBC QN AUTO: 28.9 PG (ref 26–33)
MCH RBC QN AUTO: 28.9 PG (ref 26–33)
MCH RBC QN AUTO: 29.2 PG (ref 26–33)
MCH RBC QN AUTO: 29.3 PG (ref 26–33)
MCH RBC QN AUTO: 29.5 PG (ref 26–33)
MCH RBC QN AUTO: 29.5 PG (ref 26–33)
MCH RBC QN AUTO: 29.7 PG (ref 26–33)
MCH RBC QN AUTO: 29.8 PG (ref 26–33)
MCH RBC QN AUTO: 29.8 PG (ref 26–33)
MCH RBC QN AUTO: 30 PG (ref 26–33)
MCH RBC QN AUTO: 30.1 PG (ref 26–33)
MCH RBC QN AUTO: 30.2 PG (ref 26–33)
MCH RBC QN AUTO: 30.3 PG (ref 26–33)
MCH RBC QN AUTO: 30.4 PG (ref 26–33)
MCH RBC QN AUTO: 30.4 PG (ref 26–33)
MCH RBC QN AUTO: 30.5 PG (ref 26–33)
MCH RBC QN AUTO: 30.7 PG (ref 26–33)
MCH RBC QN AUTO: 30.8 PG (ref 26–33)
MCH RBC QN AUTO: 30.9 PG (ref 26–33)
MCH RBC QN AUTO: 31.1 PG (ref 26–33)
MCH RBC QN AUTO: 31.2 PG (ref 26–33)
MCH RBC QN AUTO: ABNORMAL PG
MCHC RBC AUTO-ENTMCNC: 27.9 GM/DL (ref 32.2–35.5)
MCHC RBC AUTO-ENTMCNC: 29.7 GM/DL (ref 32.2–35.5)
MCHC RBC AUTO-ENTMCNC: 30.3 GM/DL (ref 32.2–35.5)
MCHC RBC AUTO-ENTMCNC: 30.4 GM/DL (ref 32.2–35.5)
MCHC RBC AUTO-ENTMCNC: 30.5 GM/DL (ref 32.2–35.5)
MCHC RBC AUTO-ENTMCNC: 30.5 GM/DL (ref 32.2–35.5)
MCHC RBC AUTO-ENTMCNC: 30.7 GM/DL (ref 32.2–35.5)
MCHC RBC AUTO-ENTMCNC: 30.9 GM/DL (ref 32.2–35.5)
MCHC RBC AUTO-ENTMCNC: 30.9 GM/DL (ref 32.2–35.5)
MCHC RBC AUTO-ENTMCNC: 31 GM/DL (ref 32.2–35.5)
MCHC RBC AUTO-ENTMCNC: 31.1 GM/DL (ref 32.2–35.5)
MCHC RBC AUTO-ENTMCNC: 31.1 GM/DL (ref 32.2–35.5)
MCHC RBC AUTO-ENTMCNC: 31.2 GM/DL (ref 32.2–35.5)
MCHC RBC AUTO-ENTMCNC: 31.3 GM/DL (ref 32.2–35.5)
MCHC RBC AUTO-ENTMCNC: 31.4 GM/DL (ref 32.2–35.5)
MCHC RBC AUTO-ENTMCNC: 31.5 GM/DL (ref 32.2–35.5)
MCHC RBC AUTO-ENTMCNC: 31.6 GM/DL (ref 32.2–35.5)
MCHC RBC AUTO-ENTMCNC: 31.8 GM/DL (ref 32.2–35.5)
MCHC RBC AUTO-ENTMCNC: 31.8 GM/DL (ref 32.2–35.5)
MCHC RBC AUTO-ENTMCNC: 31.9 GM/DL (ref 32.2–35.5)
MCHC RBC AUTO-ENTMCNC: 32.1 GM/DL (ref 32.2–35.5)
MCHC RBC AUTO-ENTMCNC: 32.1 GM/DL (ref 32.2–35.5)
MCHC RBC AUTO-ENTMCNC: 32.4 GM/DL (ref 32.2–35.5)
MCHC RBC AUTO-ENTMCNC: 32.6 GM/DL (ref 32.2–35.5)
MCHC RBC AUTO-ENTMCNC: 32.6 GM/DL (ref 32.2–35.5)
MCHC RBC AUTO-ENTMCNC: 32.7 GM/DL (ref 32.2–35.5)
MCHC RBC AUTO-ENTMCNC: 33 GM/DL (ref 32.2–35.5)
MCHC RBC AUTO-ENTMCNC: 33.1 GM/DL (ref 32.2–35.5)
MCHC RBC AUTO-ENTMCNC: 33.2 GM/DL (ref 32.2–35.5)
MCHC RBC AUTO-ENTMCNC: 33.2 GM/DL (ref 32.2–35.5)
MCHC RBC AUTO-ENTMCNC: 33.6 GM/DL (ref 32.2–35.5)
MCHC RBC AUTO-ENTMCNC: 33.7 GM/DL (ref 32.2–35.5)
MCHC RBC AUTO-ENTMCNC: ABNORMAL G/DL
MCV RBC AUTO: 100.4 FL (ref 81–99)
MCV RBC AUTO: 106.5 FL (ref 81–99)
MCV RBC AUTO: 89.3 FL (ref 81–99)
MCV RBC AUTO: 90.7 FL (ref 81–99)
MCV RBC AUTO: 91.2 FL (ref 81–99)
MCV RBC AUTO: 91.3 FL (ref 81–99)
MCV RBC AUTO: 91.5 FL (ref 81–99)
MCV RBC AUTO: 91.5 FL (ref 81–99)
MCV RBC AUTO: 91.8 FL (ref 81–99)
MCV RBC AUTO: 91.9 FL (ref 81–99)
MCV RBC AUTO: 92.1 FL (ref 81–99)
MCV RBC AUTO: 93 FL (ref 81–99)
MCV RBC AUTO: 93.2 FL (ref 81–99)
MCV RBC AUTO: 93.8 FL (ref 81–99)
MCV RBC AUTO: 94 FL (ref 81–99)
MCV RBC AUTO: 94.2 FL (ref 81–99)
MCV RBC AUTO: 94.3 FL (ref 81–99)
MCV RBC AUTO: 94.3 FL (ref 81–99)
MCV RBC AUTO: 94.8 FL (ref 81–99)
MCV RBC AUTO: 94.9 FL (ref 81–99)
MCV RBC AUTO: 95.3 FL (ref 81–99)
MCV RBC AUTO: 95.4 FL (ref 81–99)
MCV RBC AUTO: 95.5 FL (ref 81–99)
MCV RBC AUTO: 95.8 FL (ref 81–99)
MCV RBC AUTO: 95.9 FL (ref 81–99)
MCV RBC AUTO: 96 FL (ref 81–99)
MCV RBC AUTO: 96.3 FL (ref 81–99)
MCV RBC AUTO: 97.1 FL (ref 81–99)
MCV RBC AUTO: 97.2 FL (ref 81–99)
MCV RBC AUTO: 97.6 FL (ref 81–99)
MCV RBC AUTO: 97.6 FL (ref 81–99)
MCV RBC AUTO: 98 FL (ref 81–99)
MCV RBC AUTO: 99.1 FL (ref 81–99)
MCV RBC AUTO: 99.3 FL (ref 81–99)
MCV RBC AUTO: ABNORMAL FL
METHICILLIN RESISTANT FILM ARRAY: NORMAL
METHICILLIN RESISTANT FILM ARRAY: NORMAL
MISCELLANEOUS 2: ABNORMAL
MISCELLANEOUS 2: ABNORMAL
MONOCYTES # BLD: 10 %
MONOCYTES # BLD: 10.1 %
MONOCYTES # BLD: 10.1 %
MONOCYTES # BLD: 10.2 %
MONOCYTES # BLD: 10.5 %
MONOCYTES # BLD: 10.9 %
MONOCYTES # BLD: 11.1 %
MONOCYTES # BLD: 11.6 %
MONOCYTES # BLD: 11.9 %
MONOCYTES # BLD: 4.4 %
MONOCYTES # BLD: 6.4 %
MONOCYTES # BLD: 7 %
MONOCYTES # BLD: 7 %
MONOCYTES # BLD: 7.2 %
MONOCYTES # BLD: 7.5 %
MONOCYTES # BLD: 8.2 %
MONOCYTES # BLD: 8.3 %
MONOCYTES # BLD: 8.4 %
MONOCYTES # BLD: 8.9 %
MONOCYTES # BLD: 8.9 %
MONOCYTES # BLD: 9 %
MONOCYTES # BLD: 9.1 %
MONOCYTES # BLD: 9.2 %
MONOCYTES # BLD: 9.5 %
MONOCYTES # BLD: 9.7 %
MONOCYTES # BLD: 9.9 %
MONOCYTES ABSOLUTE: 0.6 THOU/MM3 (ref 0.4–1.3)
MONOCYTES ABSOLUTE: 0.7 THOU/MM3 (ref 0.4–1.3)
MONOCYTES ABSOLUTE: 0.8 THOU/MM3 (ref 0.4–1.3)
MONOCYTES ABSOLUTE: 0.9 THOU/MM3 (ref 0.4–1.3)
MONOCYTES ABSOLUTE: 1 THOU/MM3 (ref 0.4–1.3)
MONOCYTES ABSOLUTE: 1.1 THOU/MM3 (ref 0.4–1.3)
MONOCYTES ABSOLUTE: 1.2 THOU/MM3 (ref 0.4–1.3)
MONOCYTES ABSOLUTE: 1.3 THOU/MM3 (ref 0.4–1.3)
MONOCYTES ABSOLUTE: 1.4 THOU/MM3 (ref 0.4–1.3)
MONOCYTES ABSOLUTE: 1.4 THOU/MM3 (ref 0.4–1.3)
MONOCYTES ABSOLUTE: ABNORMAL
MONOCYTES RELATIVE PERCENT: ABNORMAL
MONONUCLEAR CELLS BODY FLUID: 52.2 %
MONONUCLEAR CELLS BODY FLUID: 80.2 %
MONONUCLEAR CELLS BODY FLUID: 81.8 %
MONONUCLEAR CELLS BODY FLUID: 84.2 %
MRSA SCREEN RT-PCR: NEGATIVE
MRSA SCREEN RT-PCR: POSITIVE
MRSA SCREEN: NORMAL
NEISSERIA MENIGITIDIS FILM ARRAY: NOT DETECTED
NEISSERIA MENIGITIDIS FILM ARRAY: NOT DETECTED
NEUTROPHILS ABSOLUTE: ABNORMAL
NEUTROPHILS RELATIVE PERCENT: ABNORMAL
NITRITE, URINE: NEGATIVE
NITRITE, URINE: NEGATIVE
NUCLEATED RED BLOOD CELLS: 0 /100 WBC
O2 SATURATION: 94 %
ORGANISM: ABNORMAL
OSMOLALITY CALCULATION: 269.4 MOSMOL/KG (ref 275–300)
OSMOLALITY CALCULATION: 270.5 MOSMOL/KG (ref 275–300)
OSMOLALITY CALCULATION: 274.2 MOSMOL/KG (ref 275–300)
OSMOLALITY CALCULATION: 276.1 MOSMOL/KG (ref 275–300)
OSMOLALITY CALCULATION: 276.7 MOSMOL/KG (ref 275–300)
OSMOLALITY CALCULATION: 279.8 MOSMOL/KG (ref 275–300)
OSMOLALITY CALCULATION: 279.9 MOSMOL/KG (ref 275–300)
OSMOLALITY CALCULATION: 282.1 MOSMOL/KG (ref 275–300)
OSMOLALITY CALCULATION: 283.2 MOSMOL/KG (ref 275–300)
OSMOLALITY CALCULATION: 286.3 MOSMOL/KG (ref 275–300)
OSMOLALITY CALCULATION: 297.4 MOSMOL/KG (ref 275–300)
PATHOLOGIST REVIEW: NORMAL
PCO2: 31 MMHG (ref 35–45)
PH BLOOD GAS: 7.45 (ref 7.35–7.45)
PH UA: 5 (ref 5–9)
PH UA: 5 (ref 5–9)
PHOSPHORUS: 2.8 MG/DL (ref 2.4–4.7)
PHOSPHORUS: 3.3 MG/DL (ref 2.4–4.7)
PHOSPHORUS: 3.7 MG/DL (ref 2.4–4.7)
PLATELET # BLD: 152 THOU/MM3 (ref 130–400)
PLATELET # BLD: 162 THOU/MM3 (ref 130–400)
PLATELET # BLD: 167 K/ΜL
PLATELET # BLD: 170 THOU/MM3 (ref 130–400)
PLATELET # BLD: 180 THOU/MM3 (ref 130–400)
PLATELET # BLD: 181 THOU/MM3 (ref 130–400)
PLATELET # BLD: 183 THOU/MM3 (ref 130–400)
PLATELET # BLD: 185 THOU/MM3 (ref 130–400)
PLATELET # BLD: 187 THOU/MM3 (ref 130–400)
PLATELET # BLD: 194 THOU/MM3 (ref 130–400)
PLATELET # BLD: 199 THOU/MM3 (ref 130–400)
PLATELET # BLD: 200 THOU/MM3 (ref 130–400)
PLATELET # BLD: 205 THOU/MM3 (ref 130–400)
PLATELET # BLD: 209 THOU/MM3 (ref 130–400)
PLATELET # BLD: 210 THOU/MM3 (ref 130–400)
PLATELET # BLD: 210 THOU/MM3 (ref 130–400)
PLATELET # BLD: 211 THOU/MM3 (ref 130–400)
PLATELET # BLD: 218 THOU/MM3 (ref 130–400)
PLATELET # BLD: 219 THOU/MM3 (ref 130–400)
PLATELET # BLD: 224 THOU/MM3 (ref 130–400)
PLATELET # BLD: 224 THOU/MM3 (ref 130–400)
PLATELET # BLD: 228 THOU/MM3 (ref 130–400)
PLATELET # BLD: 229 THOU/MM3 (ref 130–400)
PLATELET # BLD: 231 THOU/MM3 (ref 130–400)
PLATELET # BLD: 233 THOU/MM3 (ref 130–400)
PLATELET # BLD: 234 THOU/MM3 (ref 130–400)
PLATELET # BLD: 234 THOU/MM3 (ref 130–400)
PLATELET # BLD: 238 THOU/MM3 (ref 130–400)
PLATELET # BLD: 243 THOU/MM3 (ref 130–400)
PLATELET # BLD: 245 THOU/MM3 (ref 130–400)
PLATELET # BLD: 250 THOU/MM3 (ref 130–400)
PLATELET # BLD: 262 THOU/MM3 (ref 130–400)
PLATELET # BLD: 267 THOU/MM3 (ref 130–400)
PLATELET # BLD: 309 THOU/MM3 (ref 130–400)
PLATELET # BLD: 311 THOU/MM3 (ref 130–400)
PLATELET ESTIMATE: ADEQUATE
PMV BLD AUTO: 10.4 FL (ref 9.4–12.4)
PMV BLD AUTO: 10.5 FL (ref 9.4–12.4)
PMV BLD AUTO: 10.7 FL (ref 9.4–12.4)
PMV BLD AUTO: 10.9 FL (ref 9.4–12.4)
PMV BLD AUTO: 11 FL (ref 9.4–12.4)
PMV BLD AUTO: 11.1 FL (ref 9.4–12.4)
PMV BLD AUTO: 11.2 FL (ref 9.4–12.4)
PMV BLD AUTO: 11.2 FL (ref 9.4–12.4)
PMV BLD AUTO: 11.3 FL (ref 9.4–12.4)
PMV BLD AUTO: 11.4 FL (ref 9.4–12.4)
PMV BLD AUTO: 11.5 FL (ref 9.4–12.4)
PMV BLD AUTO: 11.6 FL (ref 9.4–12.4)
PMV BLD AUTO: 11.7 FL (ref 9.4–12.4)
PMV BLD AUTO: 11.8 FL (ref 9.4–12.4)
PMV BLD AUTO: 11.8 FL (ref 9.4–12.4)
PMV BLD AUTO: 11.9 FL (ref 9.4–12.4)
PMV BLD AUTO: ABNORMAL FL
PO2: 67 MMHG (ref 71–104)
POLYMORPHONUCLEAR CELLS BODY FLUID: 15.8 %
POLYMORPHONUCLEAR CELLS BODY FLUID: 18.2 %
POLYMORPHONUCLEAR CELLS BODY FLUID: 19.8 %
POLYMORPHONUCLEAR CELLS BODY FLUID: 47.8 %
POTASSIUM (K+): 3.4
POTASSIUM REFLEX MAGNESIUM: 3.6 MEQ/L (ref 3.5–5.2)
POTASSIUM REFLEX MAGNESIUM: 3.7 MEQ/L (ref 3.5–5.2)
POTASSIUM REFLEX MAGNESIUM: 3.8 MEQ/L (ref 3.5–5.2)
POTASSIUM REFLEX MAGNESIUM: 4 MEQ/L (ref 3.5–5.2)
POTASSIUM REFLEX MAGNESIUM: 4.1 MEQ/L (ref 3.5–5.2)
POTASSIUM REFLEX MAGNESIUM: 4.1 MEQ/L (ref 3.5–5.2)
POTASSIUM REFLEX MAGNESIUM: 4.3 MEQ/L (ref 3.5–5.2)
POTASSIUM REFLEX MAGNESIUM: 4.3 MEQ/L (ref 3.5–5.2)
POTASSIUM REFLEX MAGNESIUM: 4.4 MEQ/L (ref 3.5–5.2)
POTASSIUM REFLEX MAGNESIUM: 4.5 MEQ/L (ref 3.5–5.2)
POTASSIUM REFLEX MAGNESIUM: 4.8 MEQ/L (ref 3.5–5.2)
POTASSIUM REFLEX MAGNESIUM: 4.9 MEQ/L (ref 3.5–5.2)
POTASSIUM SERPL-SCNC: 2.9 MMOL/L
POTASSIUM SERPL-SCNC: 3.3 MEQ/L (ref 3.5–5.2)
POTASSIUM SERPL-SCNC: 3.3 MEQ/L (ref 3.5–5.2)
POTASSIUM SERPL-SCNC: 3.4 MEQ/L (ref 3.5–5.2)
POTASSIUM SERPL-SCNC: 3.4 MEQ/L (ref 3.5–5.2)
POTASSIUM SERPL-SCNC: 3.5 MEQ/L (ref 3.5–5.2)
POTASSIUM SERPL-SCNC: 3.6 MEQ/L (ref 3.5–5.2)
POTASSIUM SERPL-SCNC: 3.6 MEQ/L (ref 3.5–5.2)
POTASSIUM SERPL-SCNC: 3.7 MEQ/L (ref 3.5–5.2)
POTASSIUM SERPL-SCNC: 3.8 MEQ/L (ref 3.5–5.2)
POTASSIUM SERPL-SCNC: 3.8 MEQ/L (ref 3.5–5.2)
POTASSIUM SERPL-SCNC: 3.9 MEQ/L (ref 3.5–5.2)
POTASSIUM SERPL-SCNC: 4.1 MEQ/L (ref 3.5–5.2)
POTASSIUM SERPL-SCNC: 4.2 MEQ/L (ref 3.5–5.2)
POTASSIUM SERPL-SCNC: 4.3 MEQ/L (ref 3.5–5.2)
POTASSIUM SERPL-SCNC: 4.4 MEQ/L (ref 3.5–5.2)
POTASSIUM SERPL-SCNC: 4.5 MEQ/L (ref 3.5–5.2)
POTASSIUM SERPL-SCNC: 4.6 MEQ/L (ref 3.5–5.2)
POTASSIUM SERPL-SCNC: 4.6 MEQ/L (ref 3.5–5.2)
POTASSIUM SERPL-SCNC: 4.7 MEQ/L (ref 3.5–5.2)
POTASSIUM SERPL-SCNC: 4.8 MEQ/L (ref 3.5–5.2)
POTASSIUM SERPL-SCNC: 4.8 MEQ/L (ref 3.5–5.2)
POTASSIUM SERPL-SCNC: 5.5 MMOL/L
PREALBUMIN: 10.7 MG/DL (ref 20–40)
PRO-BNP: 5479 PG/ML (ref 0–1800)
PRO-BNP: 8651 PG/ML (ref 0–1800)
PRO-BNP: ABNORMAL PG/ML (ref 0–1800)
PROCALCITONIN: 0.33 NG/ML (ref 0.01–0.09)
PROCALCITONIN: 0.38 NG/ML (ref 0.01–0.09)
PROCALCITONIN: 0.41 NG/ML (ref 0.01–0.09)
PROCALCITONIN: 0.45 NG/ML (ref 0.01–0.09)
PROLACTIN: 23.3 NG/ML
PROTEIN FLUID: < 0.2 GM/DL
PROTEIN UA: 100
PROTEIN UA: 100
PROTEUS FILM ARRAY: NOT DETECTED
PROTEUS FILM ARRAY: NOT DETECTED
PSEUDOMONAS AERUGINOSA FILM ARRAY: NOT DETECTED
PSEUDOMONAS AERUGINOSA FILM ARRAY: NOT DETECTED
RBC # BLD: 2.82 MILL/MM3 (ref 4.2–5.4)
RBC # BLD: 2.82 MILL/MM3 (ref 4.2–5.4)
RBC # BLD: 2.86 MILL/MM3 (ref 4.2–5.4)
RBC # BLD: 2.9 MILL/MM3 (ref 4.2–5.4)
RBC # BLD: 2.92 MILL/MM3 (ref 4.2–5.4)
RBC # BLD: 2.96 MILL/MM3 (ref 4.2–5.4)
RBC # BLD: 2.97 MILL/MM3 (ref 4.2–5.4)
RBC # BLD: 2.98 MILL/MM3 (ref 4.2–5.4)
RBC # BLD: 2.99 MILL/MM3 (ref 4.2–5.4)
RBC # BLD: 2.99 MILL/MM3 (ref 4.2–5.4)
RBC # BLD: 3 MILL/MM3 (ref 4.2–5.4)
RBC # BLD: 3.05 MILL/MM3 (ref 4.2–5.4)
RBC # BLD: 3.07 MILL/MM3 (ref 4.2–5.4)
RBC # BLD: 3.08 MILL/MM3 (ref 4.2–5.4)
RBC # BLD: 3.09 MILL/MM3 (ref 4.2–5.4)
RBC # BLD: 3.1 MILL/MM3 (ref 4.2–5.4)
RBC # BLD: 3.16 MILL/MM3 (ref 4.2–5.4)
RBC # BLD: 3.18 MILL/MM3 (ref 4.2–5.4)
RBC # BLD: 3.19 MILL/MM3 (ref 4.2–5.4)
RBC # BLD: 3.2 MILL/MM3 (ref 4.2–5.4)
RBC # BLD: 3.2 MILL/MM3 (ref 4.2–5.4)
RBC # BLD: 3.28 MILL/MM3 (ref 4.2–5.4)
RBC # BLD: 3.28 MILL/MM3 (ref 4.2–5.4)
RBC # BLD: 3.35 MILL/MM3 (ref 4.2–5.4)
RBC # BLD: 3.4 MILL/MM3 (ref 4.2–5.4)
RBC # BLD: 3.44 MILL/MM3 (ref 4.2–5.4)
RBC # BLD: 3.51 MILL/MM3 (ref 4.2–5.4)
RBC # BLD: 3.56 MILL/MM3 (ref 4.2–5.4)
RBC # BLD: 3.56 MILL/MM3 (ref 4.2–5.4)
RBC # BLD: 3.57 MILL/MM3 (ref 4.2–5.4)
RBC # BLD: 3.69 MILL/MM3 (ref 4.2–5.4)
RBC # BLD: 3.71 MILL/MM3 (ref 4.2–5.4)
RBC # BLD: 3.72 MILL/MM3 (ref 4.2–5.4)
RBC # BLD: 3.77 10^6/ΜL
RBC # BLD: 3.81 MILL/MM3 (ref 4.2–5.4)
RBC # BLD: 3.84 MILL/MM3 (ref 4.2–5.4)
RBC # BLD: 4.01 MILL/MM3 (ref 4.2–5.4)
RBC URINE: ABNORMAL /HPF
RBC URINE: ABNORMAL /HPF
REASON FOR REJECTION: NORMAL
REASON FOR REJECTION: NORMAL
REJECTED TEST: NORMAL
REJECTED TEST: NORMAL
RENAL EPITHELIAL, UA: ABNORMAL
RENAL EPITHELIAL, UA: ABNORMAL
RH FACTOR: NORMAL
SARS-COV-2, NAAT: NOT DETECTED
SCAN OF BLOOD SMEAR: NORMAL
SEG NEUTROPHILS: 44.8 %
SEG NEUTROPHILS: 51.2 %
SEG NEUTROPHILS: 54.6 %
SEG NEUTROPHILS: 55.2 %
SEG NEUTROPHILS: 55.3 %
SEG NEUTROPHILS: 56.2 %
SEG NEUTROPHILS: 56.5 %
SEG NEUTROPHILS: 57.2 %
SEG NEUTROPHILS: 57.6 %
SEG NEUTROPHILS: 58.9 %
SEG NEUTROPHILS: 59.6 %
SEG NEUTROPHILS: 60.7 %
SEG NEUTROPHILS: 61.7 %
SEG NEUTROPHILS: 62 %
SEG NEUTROPHILS: 63.2 %
SEG NEUTROPHILS: 63.7 %
SEG NEUTROPHILS: 63.9 %
SEG NEUTROPHILS: 65.3 %
SEG NEUTROPHILS: 68 %
SEG NEUTROPHILS: 68.9 %
SEG NEUTROPHILS: 70.6 %
SEG NEUTROPHILS: 71.7 %
SEG NEUTROPHILS: 72.4 %
SEG NEUTROPHILS: 73.2 %
SEG NEUTROPHILS: 73.9 %
SEG NEUTROPHILS: 76.6 %
SEG NEUTROPHILS: 80 %
SEG NEUTROPHILS: 82.2 %
SEGMENTED NEUTROPHILS ABSOLUTE COUNT: 10.3 THOU/MM3 (ref 1.8–7.7)
SEGMENTED NEUTROPHILS ABSOLUTE COUNT: 10.6 THOU/MM3 (ref 1.8–7.7)
SEGMENTED NEUTROPHILS ABSOLUTE COUNT: 10.7 THOU/MM3 (ref 1.8–7.7)
SEGMENTED NEUTROPHILS ABSOLUTE COUNT: 11.3 THOU/MM3 (ref 1.8–7.7)
SEGMENTED NEUTROPHILS ABSOLUTE COUNT: 12.8 THOU/MM3 (ref 1.8–7.7)
SEGMENTED NEUTROPHILS ABSOLUTE COUNT: 4.2 THOU/MM3 (ref 1.8–7.7)
SEGMENTED NEUTROPHILS ABSOLUTE COUNT: 4.4 THOU/MM3 (ref 1.8–7.7)
SEGMENTED NEUTROPHILS ABSOLUTE COUNT: 4.9 THOU/MM3 (ref 1.8–7.7)
SEGMENTED NEUTROPHILS ABSOLUTE COUNT: 5.2 THOU/MM3 (ref 1.8–7.7)
SEGMENTED NEUTROPHILS ABSOLUTE COUNT: 5.4 THOU/MM3 (ref 1.8–7.7)
SEGMENTED NEUTROPHILS ABSOLUTE COUNT: 5.8 THOU/MM3 (ref 1.8–7.7)
SEGMENTED NEUTROPHILS ABSOLUTE COUNT: 5.9 THOU/MM3 (ref 1.8–7.7)
SEGMENTED NEUTROPHILS ABSOLUTE COUNT: 6 THOU/MM3 (ref 1.8–7.7)
SEGMENTED NEUTROPHILS ABSOLUTE COUNT: 6.9 THOU/MM3 (ref 1.8–7.7)
SEGMENTED NEUTROPHILS ABSOLUTE COUNT: 7 THOU/MM3 (ref 1.8–7.7)
SEGMENTED NEUTROPHILS ABSOLUTE COUNT: 7.1 THOU/MM3 (ref 1.8–7.7)
SEGMENTED NEUTROPHILS ABSOLUTE COUNT: 7.1 THOU/MM3 (ref 1.8–7.7)
SEGMENTED NEUTROPHILS ABSOLUTE COUNT: 7.2 THOU/MM3 (ref 1.8–7.7)
SEGMENTED NEUTROPHILS ABSOLUTE COUNT: 7.5 THOU/MM3 (ref 1.8–7.7)
SEGMENTED NEUTROPHILS ABSOLUTE COUNT: 7.7 THOU/MM3 (ref 1.8–7.7)
SEGMENTED NEUTROPHILS ABSOLUTE COUNT: 8 THOU/MM3 (ref 1.8–7.7)
SEGMENTED NEUTROPHILS ABSOLUTE COUNT: 8.1 THOU/MM3 (ref 1.8–7.7)
SEGMENTED NEUTROPHILS ABSOLUTE COUNT: 8.2 THOU/MM3 (ref 1.8–7.7)
SEGMENTED NEUTROPHILS ABSOLUTE COUNT: 9.3 THOU/MM3 (ref 1.8–7.7)
SEGMENTED NEUTROPHILS ABSOLUTE COUNT: 9.6 THOU/MM3 (ref 1.8–7.7)
SEGMENTED NEUTROPHILS ABSOLUTE COUNT: 9.7 THOU/MM3 (ref 1.8–7.7)
SERRATIA MARCESCENS FILM ARRAY: NOT DETECTED
SERRATIA MARCESCENS FILM ARRAY: NOT DETECTED
SODIUM BLD-SCNC: 122 MEQ/L (ref 135–145)
SODIUM BLD-SCNC: 127 MEQ/L (ref 135–145)
SODIUM BLD-SCNC: 128 MEQ/L (ref 135–145)
SODIUM BLD-SCNC: 128 MEQ/L (ref 135–145)
SODIUM BLD-SCNC: 129 MEQ/L (ref 135–145)
SODIUM BLD-SCNC: 130 MEQ/L (ref 135–145)
SODIUM BLD-SCNC: 131 MEQ/L (ref 135–145)
SODIUM BLD-SCNC: 132 MEQ/L (ref 135–145)
SODIUM BLD-SCNC: 132 MMOL/L
SODIUM BLD-SCNC: 133 MEQ/L (ref 135–145)
SODIUM BLD-SCNC: 134 MEQ/L (ref 135–145)
SODIUM BLD-SCNC: 135 MMOL/L
SODIUM BLD-SCNC: 136 MEQ/L (ref 135–145)
SODIUM BLD-SCNC: 137 MEQ/L (ref 135–145)
SODIUM BLD-SCNC: 138 MEQ/L (ref 135–145)
SODIUM BLD-SCNC: 139 MEQ/L (ref 135–145)
SOURCE OF BLOOD CULTURE: NORMAL
SOURCE OF BLOOD CULTURE: NORMAL
SOURCE, BLOOD GAS: ABNORMAL
SPECIFIC GRAVITY, URINE: 1.01 (ref 1–1.03)
SPECIFIC GRAVITY, URINE: 1.02 (ref 1–1.03)
SPECIMEN: NORMAL
STAPH AUREUS FILM ARRAY: NOT DETECTED
STAPH AUREUS FILM ARRAY: NOT DETECTED
STAPHYLOCOCCUS FILM ARRAY: NOT DETECTED
STAPHYLOCOCCUS FILM ARRAY: NOT DETECTED
STREP AGALACTIAE FILM ARRAY: NOT DETECTED
STREP AGALACTIAE FILM ARRAY: NOT DETECTED
STREP PNEUMONIAE FILM ARRAY: NOT DETECTED
STREP PNEUMONIAE FILM ARRAY: NOT DETECTED
STREP PYOCGENES FILM ARRAY: NOT DETECTED
STREP PYOCGENES FILM ARRAY: NOT DETECTED
STREPTOCOCCUS FILM ARRAY: NOT DETECTED
STREPTOCOCCUS FILM ARRAY: NOT DETECTED
T3 TOTAL: 73 NG/DL (ref 72–181)
T4 FREE: 0.94 NG/DL (ref 0.93–1.76)
T4 FREE: 1 NG/DL (ref 0.93–1.76)
T4 FREE: 1.08 NG/DL (ref 0.93–1.76)
TOTAL IRON BINDING CAPACITY: 107 UG/DL (ref 171–450)
TOTAL NUCLEATED CELLS BODY FLUID: 140 /CUMM (ref 0–500)
TOTAL NUCLEATED CELLS BODY FLUID: 19 /CUMM (ref 0–500)
TOTAL NUCLEATED CELLS BODY FLUID: 215 /CUMM (ref 0–500)
TOTAL NUCLEATED CELLS BODY FLUID: 58 /CUMM (ref 0–500)
TOTAL PROTEIN: 4.7 G/DL (ref 6.1–8)
TOTAL PROTEIN: 5.1 G/DL (ref 6.1–8)
TOTAL PROTEIN: 5.2 G/DL (ref 6.1–8)
TOTAL PROTEIN: 5.2 G/DL (ref 6.1–8)
TOTAL PROTEIN: 5.5 G/DL (ref 6.1–8)
TOTAL PROTEIN: 6 G/DL (ref 6.1–8)
TOTAL PROTEIN: 6 G/DL (ref 6.1–8)
TOTAL PROTEIN: 6.1 G/DL (ref 6.1–8)
TOTAL PROTEIN: 6.2 G/DL (ref 6.1–8)
TOTAL PROTEIN: 6.2 G/DL (ref 6.1–8)
TOTAL PROTEIN: 6.8 G/DL (ref 6.1–8)
TOTAL VOLUME RECEIVED BODY FLUID: 100 ML
TOTAL VOLUME RECEIVED BODY FLUID: 128 ML
TOTAL VOLUME RECEIVED BODY FLUID: 40 ML
TOTAL VOLUME RECEIVED BODY FLUID: NORMAL ML
TROPONIN T: 0.1 NG/ML
TROPONIN T: 0.11 NG/ML
TROPONIN T: 0.11 NG/ML
TROPONIN T: 0.12 NG/ML
TROPONIN T: 0.12 NG/ML
TROPONIN T: 0.13 NG/ML
TROPONIN T: 0.14 NG/ML
TROPONIN T: 0.15 NG/ML
TROPONIN T: 0.16 NG/ML
TROPONIN T: 0.17 NG/ML
TSH SERPL DL<=0.05 MIU/L-ACNC: 15.43 UIU/ML (ref 0.4–4.2)
TSH SERPL DL<=0.05 MIU/L-ACNC: 4.31 UIU/ML (ref 0.4–4.2)
TSH SERPL DL<=0.05 MIU/L-ACNC: 4.55 UIU/ML (ref 0.4–4.2)
TSH SERPL DL<=0.05 MIU/L-ACNC: 4.63 UIU/ML (ref 0.4–4.2)
TSH SERPL DL<=0.05 MIU/L-ACNC: 7.55 UIU/ML (ref 0.4–4.2)
URINE CULTURE REFLEX: ABNORMAL
URINE CULTURE REFLEX: ABNORMAL
UROBILINOGEN, URINE: 0.2 EU/DL (ref 0–1)
UROBILINOGEN, URINE: 1 EU/DL (ref 0–1)
VANCOMYCIN RANDOM: 10.4 UG/ML (ref 0.1–39.9)
VANCOMYCIN RESISTANT ENTEROCOCCUS: NEGATIVE
VANCOMYCIN RESISTANT ENTEROCOCCUS: NEGATIVE
VANCOMYCIN RESISTANT FILM ARRAY: NORMAL
VANCOMYCIN RESISTANT FILM ARRAY: NORMAL
VITAMIN B-12: 1046 PG/ML (ref 211–911)
WBC # BLD: 10.2 THOU/MM3 (ref 4.8–10.8)
WBC # BLD: 10.3 THOU/MM3 (ref 4.8–10.8)
WBC # BLD: 10.3 THOU/MM3 (ref 4.8–10.8)
WBC # BLD: 10.5 THOU/MM3 (ref 4.8–10.8)
WBC # BLD: 10.6 THOU/MM3 (ref 4.8–10.8)
WBC # BLD: 11.4 THOU/MM3 (ref 4.8–10.8)
WBC # BLD: 11.5 THOU/MM3 (ref 4.8–10.8)
WBC # BLD: 11.6 THOU/MM3 (ref 4.8–10.8)
WBC # BLD: 11.6 THOU/MM3 (ref 4.8–10.8)
WBC # BLD: 11.8 THOU/MM3 (ref 4.8–10.8)
WBC # BLD: 11.8 THOU/MM3 (ref 4.8–10.8)
WBC # BLD: 12.6 THOU/MM3 (ref 4.8–10.8)
WBC # BLD: 12.9 THOU/MM3 (ref 4.8–10.8)
WBC # BLD: 12.9 THOU/MM3 (ref 4.8–10.8)
WBC # BLD: 13 THOU/MM3 (ref 4.8–10.8)
WBC # BLD: 13 THOU/MM3 (ref 4.8–10.8)
WBC # BLD: 13.1 THOU/MM3 (ref 4.8–10.8)
WBC # BLD: 13.3 THOU/MM3 (ref 4.8–10.8)
WBC # BLD: 13.9 THOU/MM3 (ref 4.8–10.8)
WBC # BLD: 14.1 THOU/MM3 (ref 4.8–10.8)
WBC # BLD: 14.1 THOU/MM3 (ref 4.8–10.8)
WBC # BLD: 14.8 THOU/MM3 (ref 4.8–10.8)
WBC # BLD: 14.9 THOU/MM3 (ref 4.8–10.8)
WBC # BLD: 15.1 THOU/MM3 (ref 4.8–10.8)
WBC # BLD: 16 THOU/MM3 (ref 4.8–10.8)
WBC # BLD: 7.3 10^3/ML
WBC # BLD: 7.5 THOU/MM3 (ref 4.8–10.8)
WBC # BLD: 7.6 THOU/MM3 (ref 4.8–10.8)
WBC # BLD: 7.9 THOU/MM3 (ref 4.8–10.8)
WBC # BLD: 8.2 THOU/MM3 (ref 4.8–10.8)
WBC # BLD: 8.3 THOU/MM3 (ref 4.8–10.8)
WBC # BLD: 8.6 THOU/MM3 (ref 4.8–10.8)
WBC # BLD: 8.7 THOU/MM3 (ref 4.8–10.8)
WBC # BLD: 9.4 THOU/MM3 (ref 4.8–10.8)
WBC # BLD: 9.5 THOU/MM3 (ref 4.8–10.8)
WBC # BLD: 9.5 THOU/MM3 (ref 4.8–10.8)
WBC # BLD: 9.8 THOU/MM3 (ref 4.8–10.8)
WBC UA: > 200 /HPF
WBC UA: ABNORMAL /HPF
YEAST: ABNORMAL
YEAST: ABNORMAL

## 2020-01-01 PROCEDURE — 6370000000 HC RX 637 (ALT 250 FOR IP): Performed by: INTERNAL MEDICINE

## 2020-01-01 PROCEDURE — 87186 SC STD MICRODIL/AGAR DIL: CPT

## 2020-01-01 PROCEDURE — 2580000003 HC RX 258: Performed by: INTERNAL MEDICINE

## 2020-01-01 PROCEDURE — 80048 BASIC METABOLIC PNL TOTAL CA: CPT

## 2020-01-01 PROCEDURE — 82948 REAGENT STRIP/BLOOD GLUCOSE: CPT

## 2020-01-01 PROCEDURE — 6370000000 HC RX 637 (ALT 250 FOR IP): Performed by: OBSTETRICS & GYNECOLOGY

## 2020-01-01 PROCEDURE — 6370000000 HC RX 637 (ALT 250 FOR IP): Performed by: NURSE PRACTITIONER

## 2020-01-01 PROCEDURE — 76856 US EXAM PELVIC COMPLETE: CPT

## 2020-01-01 PROCEDURE — 2580000003 HC RX 258: Performed by: FAMILY MEDICINE

## 2020-01-01 PROCEDURE — 97110 THERAPEUTIC EXERCISES: CPT

## 2020-01-01 PROCEDURE — 80202 ASSAY OF VANCOMYCIN: CPT

## 2020-01-01 PROCEDURE — 90945 DIALYSIS ONE EVALUATION: CPT | Performed by: INTERNAL MEDICINE

## 2020-01-01 PROCEDURE — 83735 ASSAY OF MAGNESIUM: CPT

## 2020-01-01 PROCEDURE — 99221 1ST HOSP IP/OBS SF/LOW 40: CPT | Performed by: INTERNAL MEDICINE

## 2020-01-01 PROCEDURE — 84100 ASSAY OF PHOSPHORUS: CPT

## 2020-01-01 PROCEDURE — 99238 HOSP IP/OBS DSCHRG MGMT 30/<: CPT | Performed by: INTERNAL MEDICINE

## 2020-01-01 PROCEDURE — 90945 DIALYSIS ONE EVALUATION: CPT | Performed by: NURSE PRACTITIONER

## 2020-01-01 PROCEDURE — 51701 INSERT BLADDER CATHETER: CPT

## 2020-01-01 PROCEDURE — 93010 ELECTROCARDIOGRAM REPORT: CPT | Performed by: INTERNAL MEDICINE

## 2020-01-01 PROCEDURE — 4040F PNEUMOC VAC/ADMIN/RCVD: CPT | Performed by: NURSE PRACTITIONER

## 2020-01-01 PROCEDURE — 85027 COMPLETE CBC AUTOMATED: CPT

## 2020-01-01 PROCEDURE — 87205 SMEAR GRAM STAIN: CPT

## 2020-01-01 PROCEDURE — 6360000002 HC RX W HCPCS: Performed by: INTERNAL MEDICINE

## 2020-01-01 PROCEDURE — 71275 CT ANGIOGRAPHY CHEST: CPT

## 2020-01-01 PROCEDURE — 6360000002 HC RX W HCPCS: Performed by: STUDENT IN AN ORGANIZED HEALTH CARE EDUCATION/TRAINING PROGRAM

## 2020-01-01 PROCEDURE — 87075 CULTR BACTERIA EXCEPT BLOOD: CPT

## 2020-01-01 PROCEDURE — 85025 COMPLETE CBC W/AUTO DIFF WBC: CPT

## 2020-01-01 PROCEDURE — 36415 COLL VENOUS BLD VENIPUNCTURE: CPT

## 2020-01-01 PROCEDURE — 80053 COMPREHEN METABOLIC PANEL: CPT

## 2020-01-01 PROCEDURE — 1036F TOBACCO NON-USER: CPT | Performed by: INTERNAL MEDICINE

## 2020-01-01 PROCEDURE — 99233 SBSQ HOSP IP/OBS HIGH 50: CPT | Performed by: HOSPITALIST

## 2020-01-01 PROCEDURE — 2580000003 HC RX 258: Performed by: EMERGENCY MEDICINE

## 2020-01-01 PROCEDURE — 1200000003 HC TELEMETRY R&B

## 2020-01-01 PROCEDURE — 84157 ASSAY OF PROTEIN OTHER: CPT

## 2020-01-01 PROCEDURE — 87801 DETECT AGNT MULT DNA AMPLI: CPT

## 2020-01-01 PROCEDURE — 93005 ELECTROCARDIOGRAM TRACING: CPT | Performed by: EMERGENCY MEDICINE

## 2020-01-01 PROCEDURE — 2580000003 HC RX 258: Performed by: STUDENT IN AN ORGANIZED HEALTH CARE EDUCATION/TRAINING PROGRAM

## 2020-01-01 PROCEDURE — 97166 OT EVAL MOD COMPLEX 45 MIN: CPT

## 2020-01-01 PROCEDURE — 6360000002 HC RX W HCPCS: Performed by: FAMILY MEDICINE

## 2020-01-01 PROCEDURE — 6370000000 HC RX 637 (ALT 250 FOR IP): Performed by: SURGERY

## 2020-01-01 PROCEDURE — 99233 SBSQ HOSP IP/OBS HIGH 50: CPT | Performed by: FAMILY MEDICINE

## 2020-01-01 PROCEDURE — G8417 CALC BMI ABV UP PARAM F/U: HCPCS | Performed by: INTERNAL MEDICINE

## 2020-01-01 PROCEDURE — 85018 HEMOGLOBIN: CPT

## 2020-01-01 PROCEDURE — 2700000000 HC OXYGEN THERAPY PER DAY

## 2020-01-01 PROCEDURE — 74018 RADEX ABDOMEN 1 VIEW: CPT

## 2020-01-01 PROCEDURE — 1123F ACP DISCUSS/DSCN MKR DOCD: CPT | Performed by: NURSE PRACTITIONER

## 2020-01-01 PROCEDURE — 87070 CULTURE OTHR SPECIMN AEROBIC: CPT

## 2020-01-01 PROCEDURE — G0378 HOSPITAL OBSERVATION PER HR: HCPCS

## 2020-01-01 PROCEDURE — 99233 SBSQ HOSP IP/OBS HIGH 50: CPT | Performed by: INTERNAL MEDICINE

## 2020-01-01 PROCEDURE — 97530 THERAPEUTIC ACTIVITIES: CPT

## 2020-01-01 PROCEDURE — 2060000000 HC ICU INTERMEDIATE R&B

## 2020-01-01 PROCEDURE — 96365 THER/PROPH/DIAG IV INF INIT: CPT

## 2020-01-01 PROCEDURE — 94760 N-INVAS EAR/PLS OXIMETRY 1: CPT

## 2020-01-01 PROCEDURE — 84484 ASSAY OF TROPONIN QUANT: CPT

## 2020-01-01 PROCEDURE — 82140 ASSAY OF AMMONIA: CPT

## 2020-01-01 PROCEDURE — G8428 CUR MEDS NOT DOCUMENT: HCPCS | Performed by: INTERNAL MEDICINE

## 2020-01-01 PROCEDURE — 97535 SELF CARE MNGMENT TRAINING: CPT

## 2020-01-01 PROCEDURE — 99285 EMERGENCY DEPT VISIT HI MDM: CPT

## 2020-01-01 PROCEDURE — 84145 PROCALCITONIN (PCT): CPT

## 2020-01-01 PROCEDURE — G2066 INTER DEVC REMOTE 30D: HCPCS | Performed by: INTERNAL MEDICINE

## 2020-01-01 PROCEDURE — 99239 HOSP IP/OBS DSCHRG MGMT >30: CPT | Performed by: INTERNAL MEDICINE

## 2020-01-01 PROCEDURE — U0002 COVID-19 LAB TEST NON-CDC: HCPCS

## 2020-01-01 PROCEDURE — 94761 N-INVAS EAR/PLS OXIMETRY MLT: CPT

## 2020-01-01 PROCEDURE — 6360000002 HC RX W HCPCS: Performed by: EMERGENCY MEDICINE

## 2020-01-01 PROCEDURE — 6370000000 HC RX 637 (ALT 250 FOR IP): Performed by: FAMILY MEDICINE

## 2020-01-01 PROCEDURE — 1090F PRES/ABSN URINE INCON ASSESS: CPT | Performed by: NURSE PRACTITIONER

## 2020-01-01 PROCEDURE — 85610 PROTHROMBIN TIME: CPT

## 2020-01-01 PROCEDURE — 2580000003 HC RX 258: Performed by: NURSE PRACTITIONER

## 2020-01-01 PROCEDURE — 87040 BLOOD CULTURE FOR BACTERIA: CPT

## 2020-01-01 PROCEDURE — 93284 PRGRMG EVAL IMPLANTABLE DFB: CPT | Performed by: INTERNAL MEDICINE

## 2020-01-01 PROCEDURE — 84443 ASSAY THYROID STIM HORMONE: CPT

## 2020-01-01 PROCEDURE — G0480 DRUG TEST DEF 1-7 CLASSES: HCPCS

## 2020-01-01 PROCEDURE — 1036F TOBACCO NON-USER: CPT | Performed by: NURSE PRACTITIONER

## 2020-01-01 PROCEDURE — 82803 BLOOD GASES ANY COMBINATION: CPT

## 2020-01-01 PROCEDURE — 83605 ASSAY OF LACTIC ACID: CPT

## 2020-01-01 PROCEDURE — P9612 CATHETERIZE FOR URINE SPEC: HCPCS

## 2020-01-01 PROCEDURE — 6370000000 HC RX 637 (ALT 250 FOR IP): Performed by: PHYSICIAN ASSISTANT

## 2020-01-01 PROCEDURE — 36592 COLLECT BLOOD FROM PICC: CPT

## 2020-01-01 PROCEDURE — A4722 DIALYS SOL FLD VOL > 1999CC: HCPCS | Performed by: FAMILY MEDICINE

## 2020-01-01 PROCEDURE — 90945 DIALYSIS ONE EVALUATION: CPT

## 2020-01-01 PROCEDURE — 97163 PT EVAL HIGH COMPLEX 45 MIN: CPT

## 2020-01-01 PROCEDURE — 99232 SBSQ HOSP IP/OBS MODERATE 35: CPT | Performed by: NURSE PRACTITIONER

## 2020-01-01 PROCEDURE — 51798 US URINE CAPACITY MEASURE: CPT

## 2020-01-01 PROCEDURE — 2500000003 HC RX 250 WO HCPCS

## 2020-01-01 PROCEDURE — 96361 HYDRATE IV INFUSION ADD-ON: CPT

## 2020-01-01 PROCEDURE — 36011 PLACE CATHETER IN VEIN: CPT | Performed by: FAMILY MEDICINE

## 2020-01-01 PROCEDURE — 99284 EMERGENCY DEPT VISIT MOD MDM: CPT

## 2020-01-01 PROCEDURE — A4722 DIALYS SOL FLD VOL > 1999CC: HCPCS | Performed by: INTERNAL MEDICINE

## 2020-01-01 PROCEDURE — C1894 INTRO/SHEATH, NON-LASER: HCPCS

## 2020-01-01 PROCEDURE — 6370000000 HC RX 637 (ALT 250 FOR IP): Performed by: EMERGENCY MEDICINE

## 2020-01-01 PROCEDURE — 85730 THROMBOPLASTIN TIME PARTIAL: CPT

## 2020-01-01 PROCEDURE — 85307 ASSAY ACTIVATED PROTEIN C: CPT

## 2020-01-01 PROCEDURE — 96374 THER/PROPH/DIAG INJ IV PUSH: CPT

## 2020-01-01 PROCEDURE — 83880 ASSAY OF NATRIURETIC PEPTIDE: CPT

## 2020-01-01 PROCEDURE — 6360000002 HC RX W HCPCS: Performed by: PHYSICIAN ASSISTANT

## 2020-01-01 PROCEDURE — 49418 INSERT TUN IP CATH PERC: CPT | Performed by: RADIOLOGY

## 2020-01-01 PROCEDURE — 6360000002 HC RX W HCPCS: Performed by: HOSPITALIST

## 2020-01-01 PROCEDURE — 99223 1ST HOSP IP/OBS HIGH 75: CPT | Performed by: INTERNAL MEDICINE

## 2020-01-01 PROCEDURE — 99225 PR SBSQ OBSERVATION CARE/DAY 25 MINUTES: CPT | Performed by: NURSE PRACTITIONER

## 2020-01-01 PROCEDURE — 99220 PR INITIAL OBSERVATION CARE/DAY 70 MINUTES: CPT | Performed by: INTERNAL MEDICINE

## 2020-01-01 PROCEDURE — 93880 EXTRACRANIAL BILAT STUDY: CPT

## 2020-01-01 PROCEDURE — 99221 1ST HOSP IP/OBS SF/LOW 40: CPT | Performed by: NURSE PRACTITIONER

## 2020-01-01 PROCEDURE — 93307 TTE W/O DOPPLER COMPLETE: CPT

## 2020-01-01 PROCEDURE — 85014 HEMATOCRIT: CPT

## 2020-01-01 PROCEDURE — 6370000000 HC RX 637 (ALT 250 FOR IP): Performed by: STUDENT IN AN ORGANIZED HEALTH CARE EDUCATION/TRAINING PROGRAM

## 2020-01-01 PROCEDURE — 97162 PT EVAL MOD COMPLEX 30 MIN: CPT

## 2020-01-01 PROCEDURE — 71045 X-RAY EXAM CHEST 1 VIEW: CPT

## 2020-01-01 PROCEDURE — 99232 SBSQ HOSP IP/OBS MODERATE 35: CPT | Performed by: INTERNAL MEDICINE

## 2020-01-01 PROCEDURE — C1750 CATH, HEMODIALYSIS,LONG-TERM: HCPCS

## 2020-01-01 PROCEDURE — 1123F ACP DISCUSS/DSCN MKR DOCD: CPT | Performed by: INTERNAL MEDICINE

## 2020-01-01 PROCEDURE — 96366 THER/PROPH/DIAG IV INF ADDON: CPT

## 2020-01-01 PROCEDURE — 74176 CT ABD & PELVIS W/O CONTRAST: CPT

## 2020-01-01 PROCEDURE — 96375 TX/PRO/DX INJ NEW DRUG ADDON: CPT

## 2020-01-01 PROCEDURE — 70450 CT HEAD/BRAIN W/O DYE: CPT

## 2020-01-01 PROCEDURE — 93005 ELECTROCARDIOGRAM TRACING: CPT | Performed by: PHYSICIAN ASSISTANT

## 2020-01-01 PROCEDURE — 6360000004 HC RX CONTRAST MEDICATION: Performed by: RADIOLOGY

## 2020-01-01 PROCEDURE — 84480 ASSAY TRIIODOTHYRONINE (T3): CPT

## 2020-01-01 PROCEDURE — 93264 REM MNTR WRLS P-ART PRS SNR: CPT | Performed by: NURSE PRACTITIONER

## 2020-01-01 PROCEDURE — 99231 SBSQ HOSP IP/OBS SF/LOW 25: CPT | Performed by: INTERNAL MEDICINE

## 2020-01-01 PROCEDURE — 99225 PR SBSQ OBSERVATION CARE/DAY 25 MINUTES: CPT | Performed by: INTERNAL MEDICINE

## 2020-01-01 PROCEDURE — 87500 VANOMYCIN DNA AMP PROBE: CPT

## 2020-01-01 PROCEDURE — 93306 TTE W/DOPPLER COMPLETE: CPT

## 2020-01-01 PROCEDURE — 81001 URINALYSIS AUTO W/SCOPE: CPT

## 2020-01-01 PROCEDURE — 86850 RBC ANTIBODY SCREEN: CPT

## 2020-01-01 PROCEDURE — 99222 1ST HOSP IP/OBS MODERATE 55: CPT | Performed by: INTERNAL MEDICINE

## 2020-01-01 PROCEDURE — 36600 WITHDRAWAL OF ARTERIAL BLOOD: CPT

## 2020-01-01 PROCEDURE — 99232 SBSQ HOSP IP/OBS MODERATE 35: CPT | Performed by: PHYSICIAN ASSISTANT

## 2020-01-01 PROCEDURE — 6360000004 HC RX CONTRAST MEDICATION: Performed by: NURSE PRACTITIONER

## 2020-01-01 PROCEDURE — 87641 MR-STAPH DNA AMP PROBE: CPT

## 2020-01-01 PROCEDURE — 1200000000 HC SEMI PRIVATE

## 2020-01-01 PROCEDURE — 87077 CULTURE AEROBIC IDENTIFY: CPT

## 2020-01-01 PROCEDURE — 87086 URINE CULTURE/COLONY COUNT: CPT

## 2020-01-01 PROCEDURE — G8399 PT W/DXA RESULTS DOCUMENT: HCPCS | Performed by: INTERNAL MEDICINE

## 2020-01-01 PROCEDURE — 2709999900 HC NON-CHARGEABLE SUPPLY

## 2020-01-01 PROCEDURE — 3E1M39Z IRRIGATION OF PERITONEAL CAVITY USING DIALYSATE, PERCUTANEOUS APPROACH: ICD-10-PCS | Performed by: FAMILY MEDICINE

## 2020-01-01 PROCEDURE — 99223 1ST HOSP IP/OBS HIGH 75: CPT | Performed by: PHYSICIAN ASSISTANT

## 2020-01-01 PROCEDURE — 93005 ELECTROCARDIOGRAM TRACING: CPT | Performed by: FAMILY MEDICINE

## 2020-01-01 PROCEDURE — 90935 HEMODIALYSIS ONE EVALUATION: CPT | Performed by: INTERNAL MEDICINE

## 2020-01-01 PROCEDURE — 84132 ASSAY OF SERUM POTASSIUM: CPT

## 2020-01-01 PROCEDURE — 93296 REM INTERROG EVL PM/IDS: CPT | Performed by: INTERNAL MEDICINE

## 2020-01-01 PROCEDURE — 2580000003 HC RX 258: Performed by: PHYSICIAN ASSISTANT

## 2020-01-01 PROCEDURE — 3E1M39Z IRRIGATION OF PERITONEAL CAVITY USING DIALYSATE, PERCUTANEOUS APPROACH: ICD-10-PCS | Performed by: INTERNAL MEDICINE

## 2020-01-01 PROCEDURE — 93297 REM INTERROG DEV EVAL ICPMS: CPT | Performed by: INTERNAL MEDICINE

## 2020-01-01 PROCEDURE — 6360000002 HC RX W HCPCS

## 2020-01-01 PROCEDURE — 99223 1ST HOSP IP/OBS HIGH 75: CPT | Performed by: FAMILY MEDICINE

## 2020-01-01 PROCEDURE — 89050 BODY FLUID CELL COUNT: CPT

## 2020-01-01 PROCEDURE — 83036 HEMOGLOBIN GLYCOSYLATED A1C: CPT

## 2020-01-01 PROCEDURE — 99283 EMERGENCY DEPT VISIT LOW MDM: CPT

## 2020-01-01 PROCEDURE — 99239 HOSP IP/OBS DSCHRG MGMT >30: CPT | Performed by: HOSPITALIST

## 2020-01-01 PROCEDURE — 05PY03Z REMOVAL OF INFUSION DEVICE FROM UPPER VEIN, OPEN APPROACH: ICD-10-PCS | Performed by: INTERNAL MEDICINE

## 2020-01-01 PROCEDURE — 83540 ASSAY OF IRON: CPT

## 2020-01-01 PROCEDURE — 2500000003 HC RX 250 WO HCPCS: Performed by: RADIOLOGY

## 2020-01-01 PROCEDURE — 6370000000 HC RX 637 (ALT 250 FOR IP): Performed by: HOSPITALIST

## 2020-01-01 PROCEDURE — 80076 HEPATIC FUNCTION PANEL: CPT

## 2020-01-01 PROCEDURE — 93005 ELECTROCARDIOGRAM TRACING: CPT | Performed by: STUDENT IN AN ORGANIZED HEALTH CARE EDUCATION/TRAINING PROGRAM

## 2020-01-01 PROCEDURE — 84439 ASSAY OF FREE THYROXINE: CPT

## 2020-01-01 PROCEDURE — 93005 ELECTROCARDIOGRAM TRACING: CPT | Performed by: INTERNAL MEDICINE

## 2020-01-01 PROCEDURE — 84134 ASSAY OF PREALBUMIN: CPT

## 2020-01-01 PROCEDURE — 82746 ASSAY OF FOLIC ACID SERUM: CPT

## 2020-01-01 PROCEDURE — 87147 CULTURE TYPE IMMUNOLOGIC: CPT

## 2020-01-01 PROCEDURE — 83550 IRON BINDING TEST: CPT

## 2020-01-01 PROCEDURE — 99223 1ST HOSP IP/OBS HIGH 75: CPT | Performed by: NURSE PRACTITIONER

## 2020-01-01 PROCEDURE — 97167 OT EVAL HIGH COMPLEX 60 MIN: CPT

## 2020-01-01 PROCEDURE — 1090F PRES/ABSN URINE INCON ASSESS: CPT | Performed by: INTERNAL MEDICINE

## 2020-01-01 PROCEDURE — 1111F DSCHRG MED/CURRENT MED MERGE: CPT | Performed by: NURSE PRACTITIONER

## 2020-01-01 PROCEDURE — 99232 SBSQ HOSP IP/OBS MODERATE 35: CPT | Performed by: FAMILY MEDICINE

## 2020-01-01 PROCEDURE — 99213 OFFICE O/P EST LOW 20 MIN: CPT | Performed by: NURSE PRACTITIONER

## 2020-01-01 PROCEDURE — 86901 BLOOD TYPING SEROLOGIC RH(D): CPT

## 2020-01-01 PROCEDURE — 2580000003 HC RX 258: Performed by: RADIOLOGY

## 2020-01-01 PROCEDURE — 82607 VITAMIN B-12: CPT

## 2020-01-01 PROCEDURE — 2500000003 HC RX 250 WO HCPCS: Performed by: NURSE PRACTITIONER

## 2020-01-01 PROCEDURE — 93295 DEV INTERROG REMOTE 1/2/MLT: CPT | Performed by: INTERNAL MEDICINE

## 2020-01-01 PROCEDURE — 99215 OFFICE O/P EST HI 40 MIN: CPT | Performed by: INTERNAL MEDICINE

## 2020-01-01 PROCEDURE — 2500000003 HC RX 250 WO HCPCS: Performed by: INTERNAL MEDICINE

## 2020-01-01 PROCEDURE — 76830 TRANSVAGINAL US NON-OB: CPT

## 2020-01-01 PROCEDURE — 99222 1ST HOSP IP/OBS MODERATE 55: CPT | Performed by: SURGERY

## 2020-01-01 PROCEDURE — 6370000000 HC RX 637 (ALT 250 FOR IP)

## 2020-01-01 PROCEDURE — C9113 INJ PANTOPRAZOLE SODIUM, VIA: HCPCS | Performed by: EMERGENCY MEDICINE

## 2020-01-01 PROCEDURE — 82248 BILIRUBIN DIRECT: CPT

## 2020-01-01 PROCEDURE — 36556 INSERT NON-TUNNEL CV CATH: CPT

## 2020-01-01 PROCEDURE — 84146 ASSAY OF PROLACTIN: CPT

## 2020-01-01 PROCEDURE — 76705 ECHO EXAM OF ABDOMEN: CPT

## 2020-01-01 PROCEDURE — 99214 OFFICE O/P EST MOD 30 MIN: CPT | Performed by: INTERNAL MEDICINE

## 2020-01-01 PROCEDURE — 87081 CULTURE SCREEN ONLY: CPT

## 2020-01-01 PROCEDURE — G8417 CALC BMI ABV UP PARAM F/U: HCPCS | Performed by: NURSE PRACTITIONER

## 2020-01-01 PROCEDURE — 83690 ASSAY OF LIPASE: CPT

## 2020-01-01 PROCEDURE — 1111F DSCHRG MED/CURRENT MED MERGE: CPT | Performed by: INTERNAL MEDICINE

## 2020-01-01 PROCEDURE — C1769 GUIDE WIRE: HCPCS

## 2020-01-01 PROCEDURE — 93005 ELECTROCARDIOGRAM TRACING: CPT | Performed by: NURSE PRACTITIONER

## 2020-01-01 PROCEDURE — 86900 BLOOD TYPING SEROLOGIC ABO: CPT

## 2020-01-01 PROCEDURE — 4040F PNEUMOC VAC/ADMIN/RCVD: CPT | Performed by: INTERNAL MEDICINE

## 2020-01-01 PROCEDURE — 99214 OFFICE O/P EST MOD 30 MIN: CPT | Performed by: PHYSICIAN ASSISTANT

## 2020-01-01 PROCEDURE — G8427 DOCREV CUR MEDS BY ELIG CLIN: HCPCS | Performed by: NURSE PRACTITIONER

## 2020-01-01 PROCEDURE — 99239 HOSP IP/OBS DSCHRG MGMT >30: CPT | Performed by: PHYSICIAN ASSISTANT

## 2020-01-01 PROCEDURE — 6360000002 HC RX W HCPCS: Performed by: NURSE PRACTITIONER

## 2020-01-01 PROCEDURE — 83615 LACTATE (LD) (LDH) ENZYME: CPT

## 2020-01-01 PROCEDURE — 05HM33Z INSERTION OF INFUSION DEVICE INTO RIGHT INTERNAL JUGULAR VEIN, PERCUTANEOUS APPROACH: ICD-10-PCS | Performed by: FAMILY MEDICINE

## 2020-01-01 PROCEDURE — 99232 SBSQ HOSP IP/OBS MODERATE 35: CPT | Performed by: SURGERY

## 2020-01-01 PROCEDURE — 36589 REMOVAL TUNNELED CV CATH: CPT | Performed by: RADIOLOGY

## 2020-01-01 PROCEDURE — 93290 INTERROG DEV EVAL ICPMS IP: CPT | Performed by: NURSE PRACTITIONER

## 2020-01-01 PROCEDURE — G8484 FLU IMMUNIZE NO ADMIN: HCPCS | Performed by: INTERNAL MEDICINE

## 2020-01-01 RX ORDER — INSULIN GLARGINE 100 [IU]/ML
16 INJECTION, SOLUTION SUBCUTANEOUS NIGHTLY
Qty: 1 VIAL | Refills: 3 | DISCHARGE
Start: 2020-01-01

## 2020-01-01 RX ORDER — CALCITRIOL 0.25 UG/1
0.25 CAPSULE, LIQUID FILLED ORAL DAILY
COMMUNITY
End: 2020-01-01

## 2020-01-01 RX ORDER — SODIUM CHLORIDE, SODIUM LACTATE, CALCIUM CHLORIDE, MAGNESIUM CHLORIDE AND DEXTROSE 1.5; 538; 448; 18.3; 5.08 G/100ML; MG/100ML; MG/100ML; MG/100ML; MG/100ML
2000 INJECTION, SOLUTION INTRAPERITONEAL EVERY 6 HOURS
Status: DISCONTINUED | OUTPATIENT
Start: 2020-01-01 | End: 2020-01-01 | Stop reason: HOSPADM

## 2020-01-01 RX ORDER — LACTOBACILLUS RHAMNOSUS GG 10B CELL
1 CAPSULE ORAL DAILY
Status: DISCONTINUED | OUTPATIENT
Start: 2020-01-01 | End: 2020-01-01 | Stop reason: HOSPADM

## 2020-01-01 RX ORDER — DOCUSATE SODIUM 100 MG/1
200 CAPSULE, LIQUID FILLED ORAL DAILY
Status: DISCONTINUED | OUTPATIENT
Start: 2020-01-01 | End: 2020-01-01 | Stop reason: HOSPADM

## 2020-01-01 RX ORDER — POLYETHYLENE GLYCOL 3350 17 G/17G
17 POWDER, FOR SOLUTION ORAL DAILY
Status: DISCONTINUED | OUTPATIENT
Start: 2020-01-01 | End: 2020-01-01 | Stop reason: HOSPADM

## 2020-01-01 RX ORDER — SEVELAMER CARBONATE 800 MG/1
800 TABLET, FILM COATED ORAL
Status: DISCONTINUED | OUTPATIENT
Start: 2020-01-01 | End: 2020-01-01 | Stop reason: SDUPTHER

## 2020-01-01 RX ORDER — ONDANSETRON 2 MG/ML
4 INJECTION INTRAMUSCULAR; INTRAVENOUS EVERY 6 HOURS PRN
Status: DISCONTINUED | OUTPATIENT
Start: 2020-01-01 | End: 2020-01-01 | Stop reason: HOSPADM

## 2020-01-01 RX ORDER — DEXTROSE MONOHYDRATE 50 MG/ML
100 INJECTION, SOLUTION INTRAVENOUS PRN
Status: DISCONTINUED | OUTPATIENT
Start: 2020-01-01 | End: 2020-01-01 | Stop reason: HOSPADM

## 2020-01-01 RX ORDER — ASCORBIC ACID 500 MG
500 TABLET ORAL 2 TIMES DAILY
Status: DISCONTINUED | OUTPATIENT
Start: 2020-01-01 | End: 2020-01-01 | Stop reason: HOSPADM

## 2020-01-01 RX ORDER — 0.9 % SODIUM CHLORIDE 0.9 %
1000 INTRAVENOUS SOLUTION INTRAVENOUS ONCE
Status: COMPLETED | OUTPATIENT
Start: 2020-01-01 | End: 2020-01-01

## 2020-01-01 RX ORDER — PANTOPRAZOLE SODIUM 40 MG/1
40 TABLET, DELAYED RELEASE ORAL 2 TIMES DAILY
Status: DISCONTINUED | OUTPATIENT
Start: 2020-01-01 | End: 2020-01-01 | Stop reason: HOSPADM

## 2020-01-01 RX ORDER — PANTOPRAZOLE SODIUM 40 MG/10ML
40 INJECTION, POWDER, LYOPHILIZED, FOR SOLUTION INTRAVENOUS ONCE
Status: COMPLETED | OUTPATIENT
Start: 2020-01-01 | End: 2020-01-01

## 2020-01-01 RX ORDER — DEXTROSE MONOHYDRATE 25 G/50ML
12.5 INJECTION, SOLUTION INTRAVENOUS PRN
Status: DISCONTINUED | OUTPATIENT
Start: 2020-01-01 | End: 2020-01-01 | Stop reason: HOSPADM

## 2020-01-01 RX ORDER — WARFARIN SODIUM 3 MG/1
3 TABLET ORAL
Status: COMPLETED | OUTPATIENT
Start: 2020-01-01 | End: 2020-01-01

## 2020-01-01 RX ORDER — MIDAZOLAM HYDROCHLORIDE 1 MG/ML
1 INJECTION INTRAMUSCULAR; INTRAVENOUS ONCE
Status: COMPLETED | OUTPATIENT
Start: 2020-01-01 | End: 2020-01-01

## 2020-01-01 RX ORDER — ONDANSETRON 4 MG/1
4 TABLET, FILM COATED ORAL EVERY 8 HOURS PRN
DISCHARGE
Start: 2020-01-01

## 2020-01-01 RX ORDER — ACETAMINOPHEN 325 MG/1
650 TABLET ORAL EVERY 6 HOURS PRN
Status: DISCONTINUED | OUTPATIENT
Start: 2020-01-01 | End: 2020-01-01 | Stop reason: HOSPADM

## 2020-01-01 RX ORDER — CLOPIDOGREL BISULFATE 75 MG/1
75 TABLET ORAL EVERY EVENING
Status: DISCONTINUED | OUTPATIENT
Start: 2020-01-01 | End: 2020-01-01 | Stop reason: HOSPADM

## 2020-01-01 RX ORDER — GUAIFENESIN 100 MG/5ML
200 SOLUTION ORAL EVERY 4 HOURS PRN
Status: DISCONTINUED | OUTPATIENT
Start: 2020-01-01 | End: 2020-01-01 | Stop reason: HOSPADM

## 2020-01-01 RX ORDER — MIDODRINE HYDROCHLORIDE 10 MG/1
10 TABLET ORAL
Status: DISCONTINUED | OUTPATIENT
Start: 2020-01-01 | End: 2020-01-01 | Stop reason: HOSPADM

## 2020-01-01 RX ORDER — DOCUSATE SODIUM 100 MG/1
100 CAPSULE, LIQUID FILLED ORAL DAILY
Status: DISCONTINUED | OUTPATIENT
Start: 2020-01-01 | End: 2020-01-01 | Stop reason: HOSPADM

## 2020-01-01 RX ORDER — PRAVASTATIN SODIUM 40 MG
40 TABLET ORAL EVERY EVENING
Status: DISCONTINUED | OUTPATIENT
Start: 2020-01-01 | End: 2020-01-01 | Stop reason: HOSPADM

## 2020-01-01 RX ORDER — BUMETANIDE 1 MG/1
2 TABLET ORAL 3 TIMES DAILY
Status: DISCONTINUED | OUTPATIENT
Start: 2020-01-01 | End: 2020-01-01

## 2020-01-01 RX ORDER — LANOLIN ALCOHOL/MO/W.PET/CERES
1000 CREAM (GRAM) TOPICAL EVERY EVENING
Status: DISCONTINUED | OUTPATIENT
Start: 2020-01-01 | End: 2020-01-01 | Stop reason: HOSPADM

## 2020-01-01 RX ORDER — POTASSIUM CHLORIDE 20 MEQ/1
20 TABLET, EXTENDED RELEASE ORAL 2 TIMES DAILY WITH MEALS
Status: DISCONTINUED | OUTPATIENT
Start: 2020-01-01 | End: 2020-01-01 | Stop reason: HOSPADM

## 2020-01-01 RX ORDER — METOPROLOL SUCCINATE 50 MG/1
50 TABLET, EXTENDED RELEASE ORAL NIGHTLY
Status: DISCONTINUED | OUTPATIENT
Start: 2020-01-01 | End: 2020-01-01 | Stop reason: HOSPADM

## 2020-01-01 RX ORDER — FENTANYL CITRATE 50 UG/ML
INJECTION, SOLUTION INTRAMUSCULAR; INTRAVENOUS
Status: DISCONTINUED
Start: 2020-01-01 | End: 2020-01-01 | Stop reason: WASHOUT

## 2020-01-01 RX ORDER — LANOLIN ALCOHOL/MO/W.PET/CERES
1000 CREAM (GRAM) TOPICAL DAILY
Status: DISCONTINUED | OUTPATIENT
Start: 2020-01-01 | End: 2020-01-01 | Stop reason: HOSPADM

## 2020-01-01 RX ORDER — METOPROLOL SUCCINATE 50 MG/1
50 TABLET, EXTENDED RELEASE ORAL DAILY
Status: ON HOLD | COMMUNITY
End: 2020-01-01

## 2020-01-01 RX ORDER — METOPROLOL SUCCINATE 100 MG/1
100 TABLET, EXTENDED RELEASE ORAL DAILY
Status: DISCONTINUED | OUTPATIENT
Start: 2020-01-01 | End: 2020-01-01

## 2020-01-01 RX ORDER — 0.9 % SODIUM CHLORIDE 0.9 %
500 INTRAVENOUS SOLUTION INTRAVENOUS ONCE
Status: DISCONTINUED | OUTPATIENT
Start: 2020-01-01 | End: 2020-01-01 | Stop reason: HOSPADM

## 2020-01-01 RX ORDER — 0.9 % SODIUM CHLORIDE 0.9 %
250 INTRAVENOUS SOLUTION INTRAVENOUS ONCE
Status: COMPLETED | OUTPATIENT
Start: 2020-01-01 | End: 2020-01-01

## 2020-01-01 RX ORDER — HYDROCODONE BITARTRATE AND ACETAMINOPHEN 5; 325 MG/1; MG/1
1 TABLET ORAL EVERY 6 HOURS PRN
Status: DISCONTINUED | OUTPATIENT
Start: 2020-01-01 | End: 2020-01-01 | Stop reason: HOSPADM

## 2020-01-01 RX ORDER — SODIUM CHLORIDE 0.9 % (FLUSH) 0.9 %
10 SYRINGE (ML) INJECTION PRN
Status: DISCONTINUED | OUTPATIENT
Start: 2020-01-01 | End: 2020-01-01 | Stop reason: HOSPADM

## 2020-01-01 RX ORDER — BUPIVACAINE HYDROCHLORIDE 2.5 MG/ML
10 INJECTION, SOLUTION EPIDURAL; INFILTRATION; INTRACAUDAL ONCE
Status: COMPLETED | OUTPATIENT
Start: 2020-01-01 | End: 2020-01-01

## 2020-01-01 RX ORDER — SEVELAMER CARBONATE 800 MG/1
800 TABLET, FILM COATED ORAL
Status: DISCONTINUED | OUTPATIENT
Start: 2020-01-01 | End: 2020-01-01 | Stop reason: HOSPADM

## 2020-01-01 RX ORDER — MIDODRINE HYDROCHLORIDE 5 MG/1
15 TABLET ORAL
Qty: 270 TABLET | Refills: 3 | Status: SHIPPED | OUTPATIENT
Start: 2020-01-01 | End: 2021-03-04

## 2020-01-01 RX ORDER — POLYETHYLENE GLYCOL 3350 17 G/17G
17 POWDER, FOR SOLUTION ORAL 2 TIMES DAILY
Status: DISCONTINUED | OUTPATIENT
Start: 2020-01-01 | End: 2020-01-01 | Stop reason: HOSPADM

## 2020-01-01 RX ORDER — PROMETHAZINE HYDROCHLORIDE 25 MG/1
12.5 TABLET ORAL EVERY 6 HOURS PRN
Status: DISCONTINUED | OUTPATIENT
Start: 2020-01-01 | End: 2020-01-01 | Stop reason: HOSPADM

## 2020-01-01 RX ORDER — MIDODRINE HYDROCHLORIDE 2.5 MG/1
2.5 TABLET ORAL
Status: DISCONTINUED | OUTPATIENT
Start: 2020-01-01 | End: 2020-01-01

## 2020-01-01 RX ORDER — MIDODRINE HYDROCHLORIDE 10 MG/1
10 TABLET ORAL
Status: DISCONTINUED | OUTPATIENT
Start: 2020-01-01 | End: 2020-01-01

## 2020-01-01 RX ORDER — SODIUM CHLORIDE, SODIUM LACTATE, CALCIUM CHLORIDE, MAGNESIUM CHLORIDE AND DEXTROSE 2.5; 538; 448; 18.3; 5.08 G/100ML; MG/100ML; MG/100ML; MG/100ML; MG/100ML
1800 INJECTION, SOLUTION INTRAPERITONEAL EVERY 6 HOURS
Status: DISCONTINUED | OUTPATIENT
Start: 2020-01-01 | End: 2020-01-01 | Stop reason: HOSPADM

## 2020-01-01 RX ORDER — INSULIN GLARGINE 100 [IU]/ML
16 INJECTION, SOLUTION SUBCUTANEOUS NIGHTLY
Status: ON HOLD | COMMUNITY
End: 2020-01-01 | Stop reason: HOSPADM

## 2020-01-01 RX ORDER — ASPIRIN 81 MG/1
81 TABLET ORAL DAILY
Qty: 30 TABLET | Refills: 3 | Status: SHIPPED | OUTPATIENT
Start: 2020-01-01

## 2020-01-01 RX ORDER — SODIUM CHLORIDE, SODIUM LACTATE, CALCIUM CHLORIDE, MAGNESIUM CHLORIDE AND DEXTROSE 1.5; 538; 448; 18.3; 5.08 G/100ML; MG/100ML; MG/100ML; MG/100ML; MG/100ML
2000 INJECTION, SOLUTION INTRAPERITONEAL EVERY 6 HOURS
Refills: 0 | Status: ON HOLD | DISCHARGE
Start: 2020-01-01 | End: 2020-01-01 | Stop reason: DRUGHIGH

## 2020-01-01 RX ORDER — GLYCERIN/MALTODEXTRIN
30 LIQUID (ML) ORAL
Status: DISCONTINUED | OUTPATIENT
Start: 2020-01-01 | End: 2020-01-01 | Stop reason: RX

## 2020-01-01 RX ORDER — METOPROLOL SUCCINATE 25 MG/1
25 TABLET, EXTENDED RELEASE ORAL NIGHTLY
Status: DISCONTINUED | OUTPATIENT
Start: 2020-01-01 | End: 2020-01-01

## 2020-01-01 RX ORDER — MIDODRINE HYDROCHLORIDE 10 MG/1
10 TABLET ORAL ONCE
Status: COMPLETED | OUTPATIENT
Start: 2020-01-01 | End: 2020-01-01

## 2020-01-01 RX ORDER — NITROGLYCERIN 0.4 MG/1
0.4 TABLET SUBLINGUAL EVERY 5 MIN PRN
Status: DISCONTINUED | OUTPATIENT
Start: 2020-01-01 | End: 2020-01-01 | Stop reason: HOSPADM

## 2020-01-01 RX ORDER — FOLIC ACID/VIT B COMPLEX AND C 5 MG
1 TABLET ORAL DAILY
Status: DISCONTINUED | OUTPATIENT
Start: 2020-01-01 | End: 2020-01-01 | Stop reason: HOSPADM

## 2020-01-01 RX ORDER — SODIUM CHLORIDE 0.9 % (FLUSH) 0.9 %
10 SYRINGE (ML) INJECTION EVERY 12 HOURS SCHEDULED
Status: DISCONTINUED | OUTPATIENT
Start: 2020-01-01 | End: 2020-01-01 | Stop reason: HOSPADM

## 2020-01-01 RX ORDER — GLYCERIN/MALTODEXTRIN
30 LIQUID (ML) ORAL
Status: DISCONTINUED | OUTPATIENT
Start: 2020-01-01 | End: 2020-01-01 | Stop reason: CLARIF

## 2020-01-01 RX ORDER — METOPROLOL SUCCINATE 50 MG/1
50 TABLET, EXTENDED RELEASE ORAL DAILY
Status: DISCONTINUED | OUTPATIENT
Start: 2020-01-01 | End: 2020-01-01

## 2020-01-01 RX ORDER — 0.9 % SODIUM CHLORIDE 0.9 %
500 INTRAVENOUS SOLUTION INTRAVENOUS ONCE
Status: COMPLETED | OUTPATIENT
Start: 2020-01-01 | End: 2020-01-01

## 2020-01-01 RX ORDER — LEVOTHYROXINE SODIUM 0.03 MG/1
25 TABLET ORAL DAILY
Status: DISCONTINUED | OUTPATIENT
Start: 2020-01-01 | End: 2020-01-01 | Stop reason: HOSPADM

## 2020-01-01 RX ORDER — WARFARIN SODIUM 2 MG/1
2 TABLET ORAL
Status: COMPLETED | OUTPATIENT
Start: 2020-01-01 | End: 2020-01-01

## 2020-01-01 RX ORDER — GENTAMICIN SULFATE 1 MG/G
CREAM TOPICAL DAILY
Status: DISCONTINUED | OUTPATIENT
Start: 2020-01-01 | End: 2020-01-01 | Stop reason: HOSPADM

## 2020-01-01 RX ORDER — HYDRALAZINE HYDROCHLORIDE 25 MG/1
25 TABLET, FILM COATED ORAL 3 TIMES DAILY
Status: DISCONTINUED | OUTPATIENT
Start: 2020-01-01 | End: 2020-01-01

## 2020-01-01 RX ORDER — METOPROLOL SUCCINATE 50 MG/1
50 TABLET, EXTENDED RELEASE ORAL DAILY
Status: DISCONTINUED | OUTPATIENT
Start: 2020-01-01 | End: 2020-01-01 | Stop reason: HOSPADM

## 2020-01-01 RX ORDER — DROXIDOPA 200 MG/1
200 CAPSULE ORAL 3 TIMES DAILY
Qty: 90 CAPSULE | Refills: 5 | Status: SHIPPED | OUTPATIENT
Start: 2020-01-01 | End: 2020-12-04

## 2020-01-01 RX ORDER — SODIUM CHLORIDE, SODIUM LACTATE, CALCIUM CHLORIDE, MAGNESIUM CHLORIDE AND DEXTROSE 1.5; 538; 448; 18.3; 5.08 G/100ML; MG/100ML; MG/100ML; MG/100ML; MG/100ML
1500 INJECTION, SOLUTION INTRAPERITONEAL EVERY 6 HOURS
Status: DISCONTINUED | OUTPATIENT
Start: 2020-01-01 | End: 2020-01-01

## 2020-01-01 RX ORDER — CALCIUM CARBONATE 300MG(750)
1 TABLET,CHEWABLE ORAL DAILY
COMMUNITY

## 2020-01-01 RX ORDER — LACTULOSE 20 G/30ML
SOLUTION ORAL
Status: ON HOLD | COMMUNITY
End: 2020-01-01 | Stop reason: HOSPADM

## 2020-01-01 RX ORDER — ACETAMINOPHEN 325 MG/1
650 TABLET ORAL 4 TIMES DAILY
Status: DISCONTINUED | OUTPATIENT
Start: 2020-01-01 | End: 2020-01-01 | Stop reason: HOSPADM

## 2020-01-01 RX ORDER — POTASSIUM CHLORIDE 20 MEQ/1
40 TABLET, EXTENDED RELEASE ORAL ONCE
Status: COMPLETED | OUTPATIENT
Start: 2020-01-01 | End: 2020-01-01

## 2020-01-01 RX ORDER — ONDANSETRON 2 MG/ML
4 INJECTION INTRAMUSCULAR; INTRAVENOUS ONCE
Status: COMPLETED | OUTPATIENT
Start: 2020-01-01 | End: 2020-01-01

## 2020-01-01 RX ORDER — MEGESTROL ACETATE 40 MG/1
40 TABLET ORAL 2 TIMES DAILY
Status: DISCONTINUED | OUTPATIENT
Start: 2020-01-01 | End: 2020-01-01 | Stop reason: HOSPADM

## 2020-01-01 RX ORDER — MORPHINE SULFATE 2 MG/ML
2 INJECTION, SOLUTION INTRAMUSCULAR; INTRAVENOUS EVERY 4 HOURS PRN
Status: DISCONTINUED | OUTPATIENT
Start: 2020-01-01 | End: 2020-01-01

## 2020-01-01 RX ORDER — ONDANSETRON 4 MG/1
4 TABLET, FILM COATED ORAL 2 TIMES DAILY
Status: DISCONTINUED | OUTPATIENT
Start: 2020-01-01 | End: 2020-01-01 | Stop reason: HOSPADM

## 2020-01-01 RX ORDER — SODIUM CHLORIDE, SODIUM LACTATE, CALCIUM CHLORIDE, MAGNESIUM CHLORIDE AND DEXTROSE 2.5; 538; 448; 18.3; 5.08 G/100ML; MG/100ML; MG/100ML; MG/100ML; MG/100ML
1500 INJECTION, SOLUTION INTRAPERITONEAL EVERY 6 HOURS
Status: DISCONTINUED | OUTPATIENT
Start: 2020-01-01 | End: 2020-01-01

## 2020-01-01 RX ORDER — INSULIN GLARGINE 100 [IU]/ML
16 INJECTION, SOLUTION SUBCUTANEOUS NIGHTLY
Status: DISCONTINUED | OUTPATIENT
Start: 2020-01-01 | End: 2020-01-01 | Stop reason: HOSPADM

## 2020-01-01 RX ORDER — SODIUM CHLORIDE 450 MG/100ML
INJECTION, SOLUTION INTRAVENOUS CONTINUOUS
Status: CANCELLED | OUTPATIENT
Start: 2020-01-01

## 2020-01-01 RX ORDER — AMLODIPINE BESYLATE 5 MG/1
5 TABLET ORAL NIGHTLY
Qty: 30 TABLET | Refills: 1 | Status: ON HOLD | OUTPATIENT
Start: 2020-01-01 | End: 2020-01-01

## 2020-01-01 RX ORDER — FERROUS SULFATE 325(65) MG
325 TABLET ORAL 2 TIMES DAILY
Status: DISCONTINUED | OUTPATIENT
Start: 2020-01-01 | End: 2020-01-01 | Stop reason: HOSPADM

## 2020-01-01 RX ORDER — POTASSIUM CHLORIDE 20 MEQ/1
20 TABLET, EXTENDED RELEASE ORAL 2 TIMES DAILY
Status: DISCONTINUED | OUTPATIENT
Start: 2020-01-01 | End: 2020-01-01 | Stop reason: HOSPADM

## 2020-01-01 RX ORDER — POLYETHYLENE GLYCOL 3350 17 G/17G
17 POWDER, FOR SOLUTION ORAL EVERY EVENING
COMMUNITY

## 2020-01-01 RX ORDER — ACETAMINOPHEN 650 MG/1
650 SUPPOSITORY RECTAL EVERY 6 HOURS PRN
Status: DISCONTINUED | OUTPATIENT
Start: 2020-01-01 | End: 2020-01-01 | Stop reason: HOSPADM

## 2020-01-01 RX ORDER — INSULIN GLARGINE 100 [IU]/ML
10 INJECTION, SOLUTION SUBCUTANEOUS NIGHTLY
Qty: 1 VIAL | Refills: 1 | Status: SHIPPED | OUTPATIENT
Start: 2020-01-01 | End: 2020-01-01

## 2020-01-01 RX ORDER — INSULIN GLARGINE 100 [IU]/ML
10 INJECTION, SOLUTION SUBCUTANEOUS NIGHTLY
Status: DISCONTINUED | OUTPATIENT
Start: 2020-01-01 | End: 2020-01-01 | Stop reason: HOSPADM

## 2020-01-01 RX ORDER — SEVELAMER CARBONATE 800 MG/1
800 TABLET, FILM COATED ORAL
Status: DISCONTINUED | OUTPATIENT
Start: 2020-01-01 | End: 2020-01-01

## 2020-01-01 RX ORDER — METOPROLOL SUCCINATE 50 MG/1
50 TABLET, EXTENDED RELEASE ORAL DAILY
Qty: 30 TABLET | Refills: 0 | Status: ON HOLD | DISCHARGE
Start: 2020-01-01 | End: 2020-01-01 | Stop reason: SDUPTHER

## 2020-01-01 RX ORDER — PRAVASTATIN SODIUM 40 MG
40 TABLET ORAL EVERY EVENING
Status: DISCONTINUED | OUTPATIENT
Start: 2020-01-01 | End: 2020-01-01

## 2020-01-01 RX ORDER — CALCIUM POLYCARBOPHIL 625 MG 625 MG/1
625 TABLET ORAL 3 TIMES DAILY
Status: DISCONTINUED | OUTPATIENT
Start: 2020-01-01 | End: 2020-01-01 | Stop reason: HOSPADM

## 2020-01-01 RX ORDER — SODIUM CHLORIDE, SODIUM LACTATE, CALCIUM CHLORIDE, MAGNESIUM CHLORIDE AND DEXTROSE 1.5; 538; 448; 18.3; 5.08 G/100ML; MG/100ML; MG/100ML; MG/100ML; MG/100ML
2000 INJECTION, SOLUTION INTRAPERITONEAL EVERY 8 HOURS
Status: DISCONTINUED | OUTPATIENT
Start: 2020-01-01 | End: 2020-01-01

## 2020-01-01 RX ORDER — PRAVASTATIN SODIUM 40 MG
40 TABLET ORAL NIGHTLY
Status: DISCONTINUED | OUTPATIENT
Start: 2020-01-01 | End: 2020-01-01 | Stop reason: HOSPADM

## 2020-01-01 RX ORDER — SODIUM CHLORIDE 9 MG/ML
INJECTION, SOLUTION INTRAVENOUS CONTINUOUS
Status: DISCONTINUED | OUTPATIENT
Start: 2020-01-01 | End: 2020-01-01 | Stop reason: DRUGHIGH

## 2020-01-01 RX ORDER — CIPROFLOXACIN 2 MG/ML
400 INJECTION, SOLUTION INTRAVENOUS EVERY 24 HOURS
Status: DISCONTINUED | OUTPATIENT
Start: 2020-01-01 | End: 2020-01-01

## 2020-01-01 RX ORDER — ISOSORBIDE DINITRATE 20 MG/1
40 TABLET ORAL 3 TIMES DAILY
Status: DISCONTINUED | OUTPATIENT
Start: 2020-01-01 | End: 2020-01-01

## 2020-01-01 RX ORDER — SEVELAMER CARBONATE 800 MG/1
1 TABLET, FILM COATED ORAL
COMMUNITY

## 2020-01-01 RX ORDER — BUMETANIDE 1 MG/1
2 TABLET ORAL 3 TIMES DAILY
Status: ON HOLD | COMMUNITY
End: 2020-01-01

## 2020-01-01 RX ORDER — POLYETHYLENE GLYCOL 3350 17 G/17G
17 POWDER, FOR SOLUTION ORAL DAILY PRN
Status: DISCONTINUED | OUTPATIENT
Start: 2020-01-01 | End: 2020-01-01

## 2020-01-01 RX ORDER — 0.9 % SODIUM CHLORIDE 0.9 %
500 INTRAVENOUS SOLUTION INTRAVENOUS ONCE
Status: DISCONTINUED | OUTPATIENT
Start: 2020-01-01 | End: 2020-01-01

## 2020-01-01 RX ORDER — NICOTINE POLACRILEX 4 MG
15 LOZENGE BUCCAL PRN
Status: DISCONTINUED | OUTPATIENT
Start: 2020-01-01 | End: 2020-01-01 | Stop reason: HOSPADM

## 2020-01-01 RX ORDER — POLYETHYLENE GLYCOL 3350 17 G/17G
17 POWDER, FOR SOLUTION ORAL DAILY PRN
Status: DISCONTINUED | OUTPATIENT
Start: 2020-01-01 | End: 2020-01-01 | Stop reason: HOSPADM

## 2020-01-01 RX ORDER — POTASSIUM CHLORIDE 20 MEQ/1
20 TABLET, EXTENDED RELEASE ORAL ONCE
Status: COMPLETED | OUTPATIENT
Start: 2020-01-01 | End: 2020-01-01

## 2020-01-01 RX ORDER — ASPIRIN 81 MG/1
81 TABLET ORAL DAILY
Status: DISCONTINUED | OUTPATIENT
Start: 2020-01-01 | End: 2020-01-01 | Stop reason: HOSPADM

## 2020-01-01 RX ORDER — LEVOTHYROXINE SODIUM 0.03 MG/1
25 TABLET ORAL DAILY
Qty: 30 TABLET | Refills: 3 | Status: SHIPPED | OUTPATIENT
Start: 2020-01-01

## 2020-01-01 RX ORDER — VITAMIN B COMPLEX
1000 TABLET ORAL DAILY
Status: DISCONTINUED | OUTPATIENT
Start: 2020-01-01 | End: 2020-01-01 | Stop reason: HOSPADM

## 2020-01-01 RX ORDER — SODIUM CHLORIDE, SODIUM LACTATE, CALCIUM CHLORIDE, MAGNESIUM CHLORIDE AND DEXTROSE 1.5; 538; 448; 18.3; 5.08 G/100ML; MG/100ML; MG/100ML; MG/100ML; MG/100ML
2000 INJECTION, SOLUTION INTRAPERITONEAL EVERY 6 HOURS
Status: DISCONTINUED | OUTPATIENT
Start: 2020-01-01 | End: 2020-01-01

## 2020-01-01 RX ORDER — SEVELAMER CARBONATE 800 MG/1
1 TABLET, FILM COATED ORAL
Status: ON HOLD | COMMUNITY
End: 2020-01-01 | Stop reason: HOSPADM

## 2020-01-01 RX ORDER — CIPROFLOXACIN 250 MG/1
250 TABLET, FILM COATED ORAL
Status: DISCONTINUED | OUTPATIENT
Start: 2020-01-01 | End: 2020-01-01 | Stop reason: HOSPADM

## 2020-01-01 RX ORDER — BISACODYL 10 MG
10 SUPPOSITORY, RECTAL RECTAL DAILY PRN
Status: DISCONTINUED | OUTPATIENT
Start: 2020-01-01 | End: 2020-01-01 | Stop reason: HOSPADM

## 2020-01-01 RX ORDER — AMLODIPINE BESYLATE 5 MG/1
5 TABLET ORAL NIGHTLY
Status: DISCONTINUED | OUTPATIENT
Start: 2020-01-01 | End: 2020-01-01

## 2020-01-01 RX ORDER — FENTANYL CITRATE 50 UG/ML
50 INJECTION, SOLUTION INTRAMUSCULAR; INTRAVENOUS
Status: DISCONTINUED | OUTPATIENT
Start: 2020-01-01 | End: 2020-01-01

## 2020-01-01 RX ORDER — METOPROLOL SUCCINATE 50 MG/1
50 TABLET, EXTENDED RELEASE ORAL ONCE
Status: COMPLETED | OUTPATIENT
Start: 2020-01-01 | End: 2020-01-01

## 2020-01-01 RX ORDER — SODIUM CHLORIDE 450 MG/100ML
INJECTION, SOLUTION INTRAVENOUS CONTINUOUS
Status: DISCONTINUED | OUTPATIENT
Start: 2020-01-01 | End: 2020-01-01 | Stop reason: HOSPADM

## 2020-01-01 RX ORDER — DROXIDOPA 200 MG/1
200 CAPSULE ORAL 3 TIMES DAILY
Status: DISCONTINUED | OUTPATIENT
Start: 2020-01-01 | End: 2020-01-01 | Stop reason: HOSPADM

## 2020-01-01 RX ORDER — POLYETHYLENE GLYCOL 3350 17 G/17G
17 POWDER, FOR SOLUTION ORAL 2 TIMES DAILY
Status: DISCONTINUED | OUTPATIENT
Start: 2020-01-01 | End: 2020-01-01

## 2020-01-01 RX ORDER — AMLODIPINE BESYLATE 5 MG/1
5 TABLET ORAL NIGHTLY
Status: DISCONTINUED | OUTPATIENT
Start: 2020-01-01 | End: 2020-01-01 | Stop reason: HOSPADM

## 2020-01-01 RX ORDER — SODIUM CHLORIDE 9 MG/ML
INJECTION, SOLUTION INTRAVENOUS CONTINUOUS
Status: DISCONTINUED | OUTPATIENT
Start: 2020-01-01 | End: 2020-01-01 | Stop reason: HOSPADM

## 2020-01-01 RX ORDER — FOLIC ACID/VIT B COMPLEX AND C 0.8 MG
TABLET ORAL
COMMUNITY

## 2020-01-01 RX ORDER — CEFTRIAXONE 1 G/1
INJECTION, POWDER, FOR SOLUTION INTRAMUSCULAR; INTRAVENOUS
Status: DISPENSED
Start: 2020-01-01 | End: 2020-01-01

## 2020-01-01 RX ORDER — SODIUM CHLORIDE 9 MG/ML
10 INJECTION INTRAVENOUS DAILY
Status: DISCONTINUED | OUTPATIENT
Start: 2020-01-01 | End: 2020-01-01 | Stop reason: ALTCHOICE

## 2020-01-01 RX ORDER — M-VIT,TX,IRON,MINS/CALC/FOLIC 27MG-0.4MG
1 TABLET ORAL DAILY
Status: DISCONTINUED | OUTPATIENT
Start: 2020-01-01 | End: 2020-01-01 | Stop reason: HOSPADM

## 2020-01-01 RX ORDER — IPRATROPIUM BROMIDE AND ALBUTEROL SULFATE 2.5; .5 MG/3ML; MG/3ML
1 SOLUTION RESPIRATORY (INHALATION) EVERY 6 HOURS PRN
Status: DISCONTINUED | OUTPATIENT
Start: 2020-01-01 | End: 2020-01-01 | Stop reason: HOSPADM

## 2020-01-01 RX ORDER — GLYCERIN/MALTODEXTRIN
30 LIQUID (ML) ORAL
COMMUNITY

## 2020-01-01 RX ORDER — CLINDAMYCIN PHOSPHATE 600 MG/50ML
600 INJECTION INTRAVENOUS
Status: COMPLETED | OUTPATIENT
Start: 2020-01-01 | End: 2020-01-01

## 2020-01-01 RX ORDER — DOPAMINE HYDROCHLORIDE 160 MG/100ML
2.5 INJECTION, SOLUTION INTRAVENOUS CONTINUOUS
Status: DISCONTINUED | OUTPATIENT
Start: 2020-01-01 | End: 2020-01-01

## 2020-01-01 RX ORDER — INSULIN GLARGINE 100 [IU]/ML
16 INJECTION, SOLUTION SUBCUTANEOUS NIGHTLY
Status: ON HOLD | COMMUNITY
End: 2020-01-01 | Stop reason: SDUPTHER

## 2020-01-01 RX ORDER — ALPRAZOLAM 0.25 MG/1
0.25 TABLET ORAL EVERY 4 HOURS PRN
Status: ON HOLD | COMMUNITY
End: 2020-01-01 | Stop reason: HOSPADM

## 2020-01-01 RX ORDER — HYDRALAZINE HYDROCHLORIDE 50 MG/1
50 TABLET, FILM COATED ORAL 3 TIMES DAILY
COMMUNITY
End: 2020-01-01

## 2020-01-01 RX ORDER — SODIUM CHLORIDE, SODIUM LACTATE, CALCIUM CHLORIDE, MAGNESIUM CHLORIDE AND DEXTROSE 2.5; 538; 448; 18.3; 5.08 G/100ML; MG/100ML; MG/100ML; MG/100ML; MG/100ML
2000 INJECTION, SOLUTION INTRAPERITONEAL 4 TIMES DAILY
Status: DISCONTINUED | OUTPATIENT
Start: 2020-01-01 | End: 2020-01-01 | Stop reason: HOSPADM

## 2020-01-01 RX ORDER — CALCITRIOL 0.25 UG/1
0.25 CAPSULE, LIQUID FILLED ORAL DAILY
Status: DISCONTINUED | OUTPATIENT
Start: 2020-01-01 | End: 2020-01-01

## 2020-01-01 RX ORDER — FENTANYL CITRATE 50 UG/ML
25 INJECTION, SOLUTION INTRAMUSCULAR; INTRAVENOUS ONCE
Status: COMPLETED | OUTPATIENT
Start: 2020-01-01 | End: 2020-01-01

## 2020-01-01 RX ORDER — DROXIDOPA 100 MG/1
100 CAPSULE ORAL 3 TIMES DAILY
Status: DISCONTINUED | OUTPATIENT
Start: 2020-01-01 | End: 2020-01-01

## 2020-01-01 RX ORDER — SODIUM CHLORIDE, SODIUM LACTATE, CALCIUM CHLORIDE, MAGNESIUM CHLORIDE AND DEXTROSE 1.5; 538; 448; 18.3; 5.08 G/100ML; MG/100ML; MG/100ML; MG/100ML; MG/100ML
2000 INJECTION, SOLUTION INTRAPERITONEAL ONCE
Status: DISCONTINUED | OUTPATIENT
Start: 2020-01-01 | End: 2020-01-01

## 2020-01-01 RX ORDER — METOPROLOL SUCCINATE 100 MG/1
100 TABLET, EXTENDED RELEASE ORAL DAILY
Status: DISCONTINUED | OUTPATIENT
Start: 2020-01-01 | End: 2020-01-01 | Stop reason: HOSPADM

## 2020-01-01 RX ORDER — METOPROLOL SUCCINATE 100 MG/1
100 TABLET, EXTENDED RELEASE ORAL DAILY
Qty: 30 TABLET | Refills: 0 | Status: ON HOLD | DISCHARGE
Start: 2020-01-01 | End: 2020-01-01

## 2020-01-01 RX ORDER — LACTOBACILLUS RHAMNOSUS GG 10B CELL
1 CAPSULE ORAL DAILY
DISCHARGE
Start: 2020-01-01

## 2020-01-01 RX ORDER — MUPIROCIN CALCIUM 20 MG/G
CREAM TOPICAL DAILY
Status: DISCONTINUED | OUTPATIENT
Start: 2020-01-01 | End: 2020-01-01 | Stop reason: HOSPADM

## 2020-01-01 RX ORDER — POLYETHYLENE GLYCOL 1450
17 POWDER (GRAM) MISCELLANEOUS 2 TIMES DAILY
Status: ON HOLD | COMMUNITY
End: 2020-01-01

## 2020-01-01 RX ORDER — CIPROFLOXACIN 500 MG/1
500 TABLET, FILM COATED ORAL NIGHTLY
Status: ON HOLD | COMMUNITY
Start: 2020-01-01 | End: 2020-01-01 | Stop reason: ALTCHOICE

## 2020-01-01 RX ORDER — CIPROFLOXACIN 250 MG/1
250 TABLET, FILM COATED ORAL 2 TIMES DAILY
Qty: 10 TABLET | Refills: 0 | DISCHARGE
Start: 2020-01-01 | End: 2020-01-01

## 2020-01-01 RX ORDER — POTASSIUM CHLORIDE 20 MEQ/1
40 TABLET, EXTENDED RELEASE ORAL ONCE
Status: DISCONTINUED | OUTPATIENT
Start: 2020-01-01 | End: 2020-01-01

## 2020-01-01 RX ORDER — SODIUM CHLORIDE 1000 MG
2 TABLET, SOLUBLE MISCELLANEOUS 2 TIMES DAILY WITH MEALS
Status: DISCONTINUED | OUTPATIENT
Start: 2020-01-01 | End: 2020-01-01 | Stop reason: HOSPADM

## 2020-01-01 RX ORDER — GLYCERIN/MALTODEXTRIN
30 LIQUID (ML) ORAL DAILY
Status: DISCONTINUED | OUTPATIENT
Start: 2020-01-01 | End: 2020-01-01 | Stop reason: RX

## 2020-01-01 RX ORDER — 0.9 % SODIUM CHLORIDE 0.9 %
2000 INTRAVENOUS SOLUTION INTRAVENOUS ONCE
Status: COMPLETED | OUTPATIENT
Start: 2020-01-01 | End: 2020-01-01

## 2020-01-01 RX ORDER — BUPIVACAINE HYDROCHLORIDE 2.5 MG/ML
10 INJECTION, SOLUTION EPIDURAL; INFILTRATION; INTRACAUDAL ONCE
Status: CANCELLED | OUTPATIENT
Start: 2020-01-01 | End: 2020-01-01

## 2020-01-01 RX ORDER — ONDANSETRON 4 MG/1
4 TABLET, ORALLY DISINTEGRATING ORAL EVERY 8 HOURS PRN
Status: DISCONTINUED | OUTPATIENT
Start: 2020-01-01 | End: 2020-01-01 | Stop reason: HOSPADM

## 2020-01-01 RX ORDER — MEGESTROL ACETATE 40 MG/1
40 TABLET ORAL 2 TIMES DAILY
Qty: 30 TABLET | Refills: 3 | DISCHARGE
Start: 2020-01-01

## 2020-01-01 RX ORDER — HYDRALAZINE HYDROCHLORIDE 50 MG/1
50 TABLET, FILM COATED ORAL 3 TIMES DAILY
Status: DISCONTINUED | OUTPATIENT
Start: 2020-01-01 | End: 2020-01-01

## 2020-01-01 RX ORDER — MIDAZOLAM HYDROCHLORIDE 1 MG/ML
1 INJECTION INTRAMUSCULAR; INTRAVENOUS ONCE
Status: CANCELLED | OUTPATIENT
Start: 2020-01-01 | End: 2020-01-01

## 2020-01-01 RX ORDER — GENTAMICIN SULFATE 1 MG/G
CREAM TOPICAL
COMMUNITY

## 2020-01-01 RX ORDER — POLYETHYLENE GLYCOL 1450
17 POWDER (GRAM) MISCELLANEOUS 2 TIMES DAILY
Status: ON HOLD | COMMUNITY
End: 2020-01-01 | Stop reason: HOSPADM

## 2020-01-01 RX ORDER — CALCIUM POLYCARBOPHIL 625 MG 625 MG/1
625 TABLET ORAL 3 TIMES DAILY
COMMUNITY

## 2020-01-01 RX ORDER — ONDANSETRON 4 MG/1
4 TABLET, FILM COATED ORAL EVERY 8 HOURS PRN
Status: DISCONTINUED | OUTPATIENT
Start: 2020-01-01 | End: 2020-01-01 | Stop reason: HOSPADM

## 2020-01-01 RX ORDER — HYDRALAZINE HYDROCHLORIDE 25 MG/1
25 TABLET, FILM COATED ORAL 3 TIMES DAILY
Status: ON HOLD | COMMUNITY
End: 2020-01-01 | Stop reason: HOSPADM

## 2020-01-01 RX ORDER — ISOSORBIDE DINITRATE 40 MG/1
40 TABLET ORAL 3 TIMES DAILY
Status: ON HOLD | COMMUNITY
End: 2020-01-01

## 2020-01-01 RX ORDER — METOCLOPRAMIDE 10 MG/1
5 TABLET ORAL
Status: DISCONTINUED | OUTPATIENT
Start: 2020-01-01 | End: 2020-01-01 | Stop reason: HOSPADM

## 2020-01-01 RX ORDER — ACETAMINOPHEN 325 MG/1
650 TABLET ORAL 4 TIMES DAILY PRN
Status: DISCONTINUED | OUTPATIENT
Start: 2020-01-01 | End: 2020-01-01 | Stop reason: HOSPADM

## 2020-01-01 RX ORDER — MAGNESIUM SULFATE IN WATER 40 MG/ML
2 INJECTION, SOLUTION INTRAVENOUS ONCE
Status: COMPLETED | OUTPATIENT
Start: 2020-01-01 | End: 2020-01-01

## 2020-01-01 RX ORDER — MIDODRINE HYDROCHLORIDE 10 MG/1
10 TABLET ORAL
Qty: 90 TABLET | Refills: 1 | Status: ON HOLD | DISCHARGE
Start: 2020-01-01 | End: 2020-01-01

## 2020-01-01 RX ORDER — MUPIROCIN CALCIUM 20 MG/G
CREAM TOPICAL DAILY
Status: ON HOLD | COMMUNITY
End: 2020-01-01 | Stop reason: ALTCHOICE

## 2020-01-01 RX ORDER — POTASSIUM CHLORIDE 20 MEQ/1
20 TABLET, EXTENDED RELEASE ORAL 2 TIMES DAILY
COMMUNITY

## 2020-01-01 RX ORDER — MIDODRINE HYDROCHLORIDE 5 MG/1
15 TABLET ORAL
Status: ON HOLD | COMMUNITY
End: 2020-01-01 | Stop reason: HOSPADM

## 2020-01-01 RX ORDER — GUAIFENESIN 100 MG/5ML
200 SOLUTION ORAL EVERY 4 HOURS PRN
Status: ON HOLD | COMMUNITY
End: 2020-01-01 | Stop reason: HOSPADM

## 2020-01-01 RX ORDER — DROXIDOPA 100 MG/1
200 CAPSULE ORAL 3 TIMES DAILY
Status: DISCONTINUED | OUTPATIENT
Start: 2020-01-01 | End: 2020-01-01 | Stop reason: SDUPTHER

## 2020-01-01 RX ADMIN — PRAVASTATIN SODIUM 40 MG: 40 TABLET ORAL at 20:04

## 2020-01-01 RX ADMIN — GENTAMICIN SULFATE: 1 CREAM TOPICAL at 08:15

## 2020-01-01 RX ADMIN — MEGESTROL ACETATE 40 MG: 40 TABLET ORAL at 08:53

## 2020-01-01 RX ADMIN — PANTOPRAZOLE SODIUM 40 MG: 40 TABLET, DELAYED RELEASE ORAL at 19:39

## 2020-01-01 RX ADMIN — MIDODRINE HYDROCHLORIDE 15 MG: 10 TABLET ORAL at 17:52

## 2020-01-01 RX ADMIN — ACETAMINOPHEN 650 MG: 325 TABLET ORAL at 08:51

## 2020-01-01 RX ADMIN — MEGESTROL ACETATE 40 MG: 40 TABLET ORAL at 21:12

## 2020-01-01 RX ADMIN — POTASSIUM CHLORIDE 20 MEQ: 1500 TABLET, EXTENDED RELEASE ORAL at 21:25

## 2020-01-01 RX ADMIN — SODIUM CHLORIDE 250 ML: 9 INJECTION, SOLUTION INTRAVENOUS at 15:51

## 2020-01-01 RX ADMIN — MIDODRINE HYDROCHLORIDE 15 MG: 10 TABLET ORAL at 08:14

## 2020-01-01 RX ADMIN — ONDANSETRON 4 MG: 2 INJECTION INTRAMUSCULAR; INTRAVENOUS at 11:00

## 2020-01-01 RX ADMIN — CLOPIDOGREL BISULFATE 75 MG: 75 TABLET ORAL at 17:23

## 2020-01-01 RX ADMIN — LEVOTHYROXINE SODIUM 25 MCG: 25 TABLET ORAL at 05:07

## 2020-01-01 RX ADMIN — ACETAMINOPHEN 650 MG: 325 TABLET ORAL at 17:19

## 2020-01-01 RX ADMIN — DROXIDOPA 200 MG: 200 CAPSULE ORAL at 20:04

## 2020-01-01 RX ADMIN — PANTOPRAZOLE SODIUM 40 MG: 40 TABLET, DELAYED RELEASE ORAL at 22:11

## 2020-01-01 RX ADMIN — Medication 1000 MCG: at 17:09

## 2020-01-01 RX ADMIN — MIDODRINE HYDROCHLORIDE 10 MG: 10 TABLET ORAL at 12:21

## 2020-01-01 RX ADMIN — DOCUSATE SODIUM 100 MG: 100 CAPSULE, LIQUID FILLED ORAL at 09:18

## 2020-01-01 RX ADMIN — FERROUS SULFATE TAB 325 MG (65 MG ELEMENTAL FE) 325 MG: 325 (65 FE) TAB at 09:11

## 2020-01-01 RX ADMIN — DROXIDOPA 100 MG: 100 CAPSULE ORAL at 09:34

## 2020-01-01 RX ADMIN — ACETAMINOPHEN 650 MG: 325 TABLET ORAL at 13:51

## 2020-01-01 RX ADMIN — ONDANSETRON HYDROCHLORIDE 4 MG: 4 TABLET, FILM COATED ORAL at 13:34

## 2020-01-01 RX ADMIN — INSULIN LISPRO 1 UNITS: 100 INJECTION, SOLUTION INTRAVENOUS; SUBCUTANEOUS at 20:46

## 2020-01-01 RX ADMIN — SEVELAMER CARBONATE 800 MG: 800 TABLET, FILM COATED ORAL at 09:12

## 2020-01-01 RX ADMIN — VITAMIN D, TAB 1000IU (100/BT) 1000 UNITS: 25 TAB at 08:08

## 2020-01-01 RX ADMIN — PANTOPRAZOLE SODIUM 40 MG: 40 TABLET, DELAYED RELEASE ORAL at 09:44

## 2020-01-01 RX ADMIN — Medication 1.5 MG: at 16:37

## 2020-01-01 RX ADMIN — SEVELAMER CARBONATE 800 MG: 800 TABLET, FILM COATED ORAL at 09:29

## 2020-01-01 RX ADMIN — POLYETHYLENE GLYCOL 3350 17 G: 17 POWDER, FOR SOLUTION ORAL at 21:33

## 2020-01-01 RX ADMIN — ACETAMINOPHEN 650 MG: 325 TABLET ORAL at 10:15

## 2020-01-01 RX ADMIN — SEVELAMER CARBONATE 800 MG: 800 TABLET, FILM COATED ORAL at 12:07

## 2020-01-01 RX ADMIN — DOCUSATE SODIUM 200 MG: 100 CAPSULE, LIQUID FILLED ORAL at 08:52

## 2020-01-01 RX ADMIN — PRAVASTATIN SODIUM 40 MG: 40 TABLET ORAL at 16:36

## 2020-01-01 RX ADMIN — SEVELAMER CARBONATE 800 MG: 800 TABLET, FILM COATED ORAL at 08:37

## 2020-01-01 RX ADMIN — ASPIRIN 81 MG: 81 TABLET ORAL at 09:43

## 2020-01-01 RX ADMIN — MEGESTROL ACETATE 40 MG: 40 TABLET ORAL at 20:57

## 2020-01-01 RX ADMIN — Medication 1 MG: at 11:29

## 2020-01-01 RX ADMIN — ACETAMINOPHEN 650 MG: 325 TABLET ORAL at 13:05

## 2020-01-01 RX ADMIN — PANTOPRAZOLE SODIUM 40 MG: 40 TABLET, DELAYED RELEASE ORAL at 08:34

## 2020-01-01 RX ADMIN — MEGESTROL ACETATE 40 MG: 40 TABLET ORAL at 08:14

## 2020-01-01 RX ADMIN — SODIUM CHLORIDE, PRESERVATIVE FREE 10 ML: 5 INJECTION INTRAVENOUS at 08:21

## 2020-01-01 RX ADMIN — POTASSIUM CHLORIDE 20 MEQ: 1500 TABLET, EXTENDED RELEASE ORAL at 13:11

## 2020-01-01 RX ADMIN — POLYETHYLENE GLYCOL 3350 17 G: 17 POWDER, FOR SOLUTION ORAL at 22:01

## 2020-01-01 RX ADMIN — SEVELAMER CARBONATE 800 MG: 800 TABLET, FILM COATED ORAL at 17:00

## 2020-01-01 RX ADMIN — SODIUM CHLORIDE, PRESERVATIVE FREE 10 ML: 5 INJECTION INTRAVENOUS at 08:52

## 2020-01-01 RX ADMIN — MULTIPLE VITAMINS W/ MINERALS TAB 1 TABLET: TAB at 10:43

## 2020-01-01 RX ADMIN — CEFEPIME HYDROCHLORIDE 1 G: 1 INJECTION, POWDER, FOR SOLUTION INTRAMUSCULAR; INTRAVENOUS at 13:05

## 2020-01-01 RX ADMIN — METOPROLOL SUCCINATE 50 MG: 50 TABLET, FILM COATED, EXTENDED RELEASE ORAL at 09:40

## 2020-01-01 RX ADMIN — SEVELAMER CARBONATE 800 MG: 800 TABLET, FILM COATED ORAL at 12:42

## 2020-01-01 RX ADMIN — SODIUM CHLORIDE, SODIUM LACTATE, CALCIUM CHLORIDE, MAGNESIUM CHLORIDE AND DEXTROSE 1800 ML: 2.5; 538; 448; 18.3; 5.08 INJECTION, SOLUTION INTRAPERITONEAL at 14:44

## 2020-01-01 RX ADMIN — DOCUSATE SODIUM 200 MG: 100 CAPSULE, LIQUID FILLED ORAL at 09:11

## 2020-01-01 RX ADMIN — ASPIRIN 81 MG: 81 TABLET ORAL at 08:37

## 2020-01-01 RX ADMIN — DOCUSATE SODIUM 200 MG: 100 CAPSULE, LIQUID FILLED ORAL at 09:00

## 2020-01-01 RX ADMIN — AMLODIPINE BESYLATE 5 MG: 5 TABLET ORAL at 01:31

## 2020-01-01 RX ADMIN — CALCIUM POLYCARBOPHIL 625 MG: 625 TABLET, FILM COATED ORAL at 21:33

## 2020-01-01 RX ADMIN — ACETAMINOPHEN 650 MG: 325 TABLET ORAL at 13:35

## 2020-01-01 RX ADMIN — MIDODRINE HYDROCHLORIDE 10 MG: 10 TABLET ORAL at 08:03

## 2020-01-01 RX ADMIN — CALCIUM POLYCARBOPHIL 625 MG: 625 TABLET, FILM COATED ORAL at 09:16

## 2020-01-01 RX ADMIN — MIDODRINE HYDROCHLORIDE 10 MG: 10 TABLET ORAL at 17:00

## 2020-01-01 RX ADMIN — PANTOPRAZOLE SODIUM 40 MG: 40 TABLET, DELAYED RELEASE ORAL at 22:05

## 2020-01-01 RX ADMIN — SODIUM CHLORIDE, PRESERVATIVE FREE 10 ML: 5 INJECTION INTRAVENOUS at 21:50

## 2020-01-01 RX ADMIN — PANTOPRAZOLE SODIUM 40 MG: 40 TABLET, DELAYED RELEASE ORAL at 08:14

## 2020-01-01 RX ADMIN — LEVOTHYROXINE SODIUM 25 MCG: 25 TABLET ORAL at 06:00

## 2020-01-01 RX ADMIN — METOCLOPRAMIDE 5 MG: 10 TABLET ORAL at 05:37

## 2020-01-01 RX ADMIN — ASPIRIN 81 MG: 81 TABLET ORAL at 08:21

## 2020-01-01 RX ADMIN — MIDODRINE HYDROCHLORIDE 10 MG: 10 TABLET ORAL at 11:16

## 2020-01-01 RX ADMIN — HEPARIN SODIUM: 1000 INJECTION INTRAVENOUS; SUBCUTANEOUS at 18:13

## 2020-01-01 RX ADMIN — ASPIRIN 81 MG: 81 TABLET ORAL at 08:02

## 2020-01-01 RX ADMIN — SEVELAMER CARBONATE 800 MG: 800 TABLET, FILM COATED ORAL at 16:48

## 2020-01-01 RX ADMIN — ACETAMINOPHEN 650 MG: 325 TABLET ORAL at 08:44

## 2020-01-01 RX ADMIN — POTASSIUM CHLORIDE 20 MEQ: 1500 TABLET, EXTENDED RELEASE ORAL at 08:02

## 2020-01-01 RX ADMIN — OXYCODONE HYDROCHLORIDE AND ACETAMINOPHEN 500 MG: 500 TABLET ORAL at 21:33

## 2020-01-01 RX ADMIN — ACETAMINOPHEN 650 MG: 325 TABLET ORAL at 14:56

## 2020-01-01 RX ADMIN — PRAVASTATIN SODIUM 40 MG: 40 TABLET ORAL at 17:03

## 2020-01-01 RX ADMIN — MIDODRINE HYDROCHLORIDE 2.5 MG: 2.5 TABLET ORAL at 09:55

## 2020-01-01 RX ADMIN — ASPIRIN 81 MG: 81 TABLET ORAL at 08:07

## 2020-01-01 RX ADMIN — ACETAMINOPHEN 650 MG: 325 TABLET ORAL at 11:09

## 2020-01-01 RX ADMIN — DROXIDOPA 200 MG: 200 CAPSULE ORAL at 20:49

## 2020-01-01 RX ADMIN — SODIUM CHLORIDE, SODIUM LACTATE, CALCIUM CHLORIDE, MAGNESIUM CHLORIDE AND DEXTROSE 1800 ML: 2.5; 538; 448; 18.3; 5.08 INJECTION, SOLUTION INTRAPERITONEAL at 08:53

## 2020-01-01 RX ADMIN — CLOPIDOGREL BISULFATE 75 MG: 75 TABLET ORAL at 17:51

## 2020-01-01 RX ADMIN — ACETAMINOPHEN 650 MG: 325 TABLET ORAL at 12:49

## 2020-01-01 RX ADMIN — HEPARIN SODIUM: 1000 INJECTION INTRAVENOUS; SUBCUTANEOUS at 21:03

## 2020-01-01 RX ADMIN — CALCIUM POLYCARBOPHIL 625 MG: 625 TABLET, FILM COATED ORAL at 16:32

## 2020-01-01 RX ADMIN — MEGESTROL ACETATE 40 MG: 40 TABLET ORAL at 09:12

## 2020-01-01 RX ADMIN — ACETAMINOPHEN 650 MG: 325 TABLET ORAL at 12:15

## 2020-01-01 RX ADMIN — SODIUM CHLORIDE, PRESERVATIVE FREE 10 ML: 5 INJECTION INTRAVENOUS at 09:37

## 2020-01-01 RX ADMIN — Medication 10 ML: at 20:46

## 2020-01-01 RX ADMIN — MEGESTROL ACETATE 40 MG: 40 TABLET ORAL at 20:46

## 2020-01-01 RX ADMIN — MICONAZOLE NITRATE: 20 POWDER TOPICAL at 14:10

## 2020-01-01 RX ADMIN — PANTOPRAZOLE SODIUM 40 MG: 40 TABLET, DELAYED RELEASE ORAL at 08:35

## 2020-01-01 RX ADMIN — MAGNESIUM SULFATE HEPTAHYDRATE 1 G: 500 INJECTION, SOLUTION INTRAMUSCULAR; INTRAVENOUS at 09:17

## 2020-01-01 RX ADMIN — MIDODRINE HYDROCHLORIDE 10 MG: 10 TABLET ORAL at 17:19

## 2020-01-01 RX ADMIN — SEVELAMER CARBONATE 800 MG: 800 TABLET, FILM COATED ORAL at 08:51

## 2020-01-01 RX ADMIN — LEVOTHYROXINE SODIUM 25 MCG: 25 TABLET ORAL at 05:00

## 2020-01-01 RX ADMIN — ACETAMINOPHEN 650 MG: 325 TABLET ORAL at 17:46

## 2020-01-01 RX ADMIN — PRAVASTATIN SODIUM 40 MG: 40 TABLET ORAL at 17:36

## 2020-01-01 RX ADMIN — OXYCODONE HYDROCHLORIDE AND ACETAMINOPHEN 500 MG: 500 TABLET ORAL at 20:49

## 2020-01-01 RX ADMIN — DOCUSATE SODIUM 100 MG: 100 CAPSULE, LIQUID FILLED ORAL at 09:39

## 2020-01-01 RX ADMIN — LEVOTHYROXINE SODIUM 25 MCG: 25 TABLET ORAL at 06:09

## 2020-01-01 RX ADMIN — SODIUM CHLORIDE, SODIUM LACTATE, CALCIUM CHLORIDE, MAGNESIUM CHLORIDE AND DEXTROSE 2000 ML: 1.5; 538; 448; 18.3; 5.08 INJECTION, SOLUTION INTRAPERITONEAL at 00:58

## 2020-01-01 RX ADMIN — SEVELAMER CARBONATE 800 MG: 800 TABLET, FILM COATED ORAL at 10:23

## 2020-01-01 RX ADMIN — SEVELAMER CARBONATE 800 MG: 800 TABLET, FILM COATED ORAL at 11:54

## 2020-01-01 RX ADMIN — METOPROLOL SUCCINATE 50 MG: 50 TABLET, FILM COATED, EXTENDED RELEASE ORAL at 08:45

## 2020-01-01 RX ADMIN — CLOPIDOGREL BISULFATE 75 MG: 75 TABLET ORAL at 17:44

## 2020-01-01 RX ADMIN — ACETAMINOPHEN 650 MG: 325 TABLET ORAL at 03:13

## 2020-01-01 RX ADMIN — MEGESTROL ACETATE 40 MG: 40 TABLET ORAL at 11:51

## 2020-01-01 RX ADMIN — SODIUM CHLORIDE, SODIUM LACTATE, CALCIUM CHLORIDE, MAGNESIUM CHLORIDE AND DEXTROSE 2000 ML: 1.5; 538; 448; 18.3; 5.08 INJECTION, SOLUTION INTRAPERITONEAL at 23:34

## 2020-01-01 RX ADMIN — FERROUS SULFATE TAB 325 MG (65 MG ELEMENTAL FE) 325 MG: 325 (65 FE) TAB at 08:33

## 2020-01-01 RX ADMIN — MICONAZOLE NITRATE: 20 POWDER TOPICAL at 09:39

## 2020-01-01 RX ADMIN — PANTOPRAZOLE SODIUM 40 MG: 40 TABLET, DELAYED RELEASE ORAL at 07:52

## 2020-01-01 RX ADMIN — PRAVASTATIN SODIUM 40 MG: 40 TABLET ORAL at 22:14

## 2020-01-01 RX ADMIN — MIDAZOLAM HYDROCHLORIDE 1 MG: 1 INJECTION INTRAMUSCULAR; INTRAVENOUS at 11:29

## 2020-01-01 RX ADMIN — SODIUM CHLORIDE 50000 UNITS: 0.9 IRRIGANT IRRIGATION at 12:11

## 2020-01-01 RX ADMIN — SODIUM CHLORIDE, SODIUM LACTATE, CALCIUM CHLORIDE, MAGNESIUM CHLORIDE AND DEXTROSE 2000 ML: 1.5; 538; 448; 18.3; 5.08 INJECTION, SOLUTION INTRAPERITONEAL at 04:04

## 2020-01-01 RX ADMIN — SODIUM CHLORIDE, SODIUM LACTATE, CALCIUM CHLORIDE, MAGNESIUM CHLORIDE AND DEXTROSE 1800 ML: 2.5; 538; 448; 18.3; 5.08 INJECTION, SOLUTION INTRAPERITONEAL at 20:49

## 2020-01-01 RX ADMIN — ONDANSETRON HYDROCHLORIDE 4 MG: 4 TABLET, FILM COATED ORAL at 19:40

## 2020-01-01 RX ADMIN — SEVELAMER CARBONATE 800 MG: 800 TABLET, FILM COATED ORAL at 09:40

## 2020-01-01 RX ADMIN — MIDODRINE HYDROCHLORIDE 10 MG: 10 TABLET ORAL at 12:48

## 2020-01-01 RX ADMIN — SEVELAMER CARBONATE 800 MG: 800 TABLET, FILM COATED ORAL at 08:03

## 2020-01-01 RX ADMIN — CLOPIDOGREL BISULFATE 75 MG: 75 TABLET ORAL at 17:19

## 2020-01-01 RX ADMIN — LEVOTHYROXINE SODIUM 25 MCG: 25 TABLET ORAL at 05:22

## 2020-01-01 RX ADMIN — MIDODRINE HYDROCHLORIDE 15 MG: 10 TABLET ORAL at 16:53

## 2020-01-01 RX ADMIN — PANTOPRAZOLE SODIUM 40 MG: 40 TABLET, DELAYED RELEASE ORAL at 20:04

## 2020-01-01 RX ADMIN — SODIUM CHLORIDE, SODIUM LACTATE, CALCIUM CHLORIDE, MAGNESIUM CHLORIDE AND DEXTROSE 2000 ML: 1.5; 538; 448; 18.3; 5.08 INJECTION, SOLUTION INTRAPERITONEAL at 13:19

## 2020-01-01 RX ADMIN — PANTOPRAZOLE SODIUM 40 MG: 40 TABLET, DELAYED RELEASE ORAL at 22:04

## 2020-01-01 RX ADMIN — PANTOPRAZOLE SODIUM 40 MG: 40 TABLET, DELAYED RELEASE ORAL at 20:22

## 2020-01-01 RX ADMIN — WARFARIN SODIUM 2 MG: 2 TABLET ORAL at 13:31

## 2020-01-01 RX ADMIN — PANTOPRAZOLE SODIUM 40 MG: 40 TABLET, DELAYED RELEASE ORAL at 10:14

## 2020-01-01 RX ADMIN — SEVELAMER CARBONATE 800 MG: 800 TABLET, FILM COATED ORAL at 17:33

## 2020-01-01 RX ADMIN — CLOPIDOGREL BISULFATE 75 MG: 75 TABLET ORAL at 16:53

## 2020-01-01 RX ADMIN — PRAVASTATIN SODIUM 40 MG: 40 TABLET ORAL at 17:32

## 2020-01-01 RX ADMIN — SODIUM CHLORIDE, SODIUM LACTATE, CALCIUM CHLORIDE, MAGNESIUM CHLORIDE AND DEXTROSE 1500 ML: 1.5; 538; 448; 18.3; 5.08 INJECTION, SOLUTION INTRAPERITONEAL at 03:05

## 2020-01-01 RX ADMIN — PANTOPRAZOLE SODIUM 40 MG: 40 TABLET, DELAYED RELEASE ORAL at 22:10

## 2020-01-01 RX ADMIN — MIDODRINE HYDROCHLORIDE 10 MG: 10 TABLET ORAL at 16:48

## 2020-01-01 RX ADMIN — PANTOPRAZOLE SODIUM 40 MG: 40 TABLET, DELAYED RELEASE ORAL at 08:33

## 2020-01-01 RX ADMIN — MULTIPLE VITAMINS W/ MINERALS TAB 1 TABLET: TAB at 08:54

## 2020-01-01 RX ADMIN — PANTOPRAZOLE SODIUM 40 MG: 40 TABLET, DELAYED RELEASE ORAL at 21:13

## 2020-01-01 RX ADMIN — Medication 1000 MCG: at 00:59

## 2020-01-01 RX ADMIN — POTASSIUM CHLORIDE 20 MEQ: 1500 TABLET, EXTENDED RELEASE ORAL at 08:33

## 2020-01-01 RX ADMIN — INSULIN GLARGINE 16 UNITS: 100 INJECTION, SOLUTION SUBCUTANEOUS at 21:17

## 2020-01-01 RX ADMIN — CALCIUM POLYCARBOPHIL 625 MG: 625 TABLET, FILM COATED ORAL at 14:26

## 2020-01-01 RX ADMIN — PRAVASTATIN SODIUM 40 MG: 40 TABLET ORAL at 18:21

## 2020-01-01 RX ADMIN — CEFEPIME HYDROCHLORIDE 1 G: 1 INJECTION, POWDER, FOR SOLUTION INTRAMUSCULAR; INTRAVENOUS at 12:07

## 2020-01-01 RX ADMIN — MICONAZOLE NITRATE: 20 POWDER TOPICAL at 08:10

## 2020-01-01 RX ADMIN — CLOPIDOGREL BISULFATE 75 MG: 75 TABLET ORAL at 01:02

## 2020-01-01 RX ADMIN — MEGESTROL ACETATE 40 MG: 40 TABLET ORAL at 21:50

## 2020-01-01 RX ADMIN — PANTOPRAZOLE SODIUM 40 MG: 40 TABLET, DELAYED RELEASE ORAL at 20:17

## 2020-01-01 RX ADMIN — OXYCODONE HYDROCHLORIDE AND ACETAMINOPHEN 500 MG: 500 TABLET ORAL at 08:45

## 2020-01-01 RX ADMIN — Medication 500 MG: at 21:52

## 2020-01-01 RX ADMIN — Medication 1 TABLET: at 08:07

## 2020-01-01 RX ADMIN — Medication 1 CAPSULE: at 08:10

## 2020-01-01 RX ADMIN — SEVELAMER CARBONATE 800 MG: 800 TABLET, FILM COATED ORAL at 11:21

## 2020-01-01 RX ADMIN — SODIUM CHLORIDE, PRESERVATIVE FREE 10 ML: 5 INJECTION INTRAVENOUS at 08:23

## 2020-01-01 RX ADMIN — ACETAMINOPHEN 650 MG: 325 TABLET ORAL at 08:02

## 2020-01-01 RX ADMIN — SODIUM CHLORIDE, PRESERVATIVE FREE 10 ML: 5 INJECTION INTRAVENOUS at 08:45

## 2020-01-01 RX ADMIN — SODIUM CHLORIDE, SODIUM LACTATE, CALCIUM CHLORIDE, MAGNESIUM CHLORIDE AND DEXTROSE 2000 ML: 1.5; 538; 448; 18.3; 5.08 INJECTION, SOLUTION INTRAPERITONEAL at 14:52

## 2020-01-01 RX ADMIN — MEGESTROL ACETATE 40 MG: 40 TABLET ORAL at 08:33

## 2020-01-01 RX ADMIN — LEVOTHYROXINE SODIUM 25 MCG: 25 TABLET ORAL at 03:30

## 2020-01-01 RX ADMIN — OXYCODONE HYDROCHLORIDE AND ACETAMINOPHEN 500 MG: 500 TABLET ORAL at 08:28

## 2020-01-01 RX ADMIN — ACETAMINOPHEN 650 MG: 325 TABLET ORAL at 09:43

## 2020-01-01 RX ADMIN — LEVOTHYROXINE SODIUM 25 MCG: 25 TABLET ORAL at 05:12

## 2020-01-01 RX ADMIN — ACETAMINOPHEN 650 MG: 325 TABLET ORAL at 17:31

## 2020-01-01 RX ADMIN — Medication 500 MG: at 08:27

## 2020-01-01 RX ADMIN — CALCIUM POLYCARBOPHIL 625 MG: 625 TABLET, FILM COATED ORAL at 14:07

## 2020-01-01 RX ADMIN — SODIUM CHLORIDE, SODIUM LACTATE, CALCIUM CHLORIDE, MAGNESIUM CHLORIDE AND DEXTROSE 2000 ML: 1.5; 538; 448; 18.3; 5.08 INJECTION, SOLUTION INTRAPERITONEAL at 17:21

## 2020-01-01 RX ADMIN — MEGESTROL ACETATE 40 MG: 40 TABLET ORAL at 08:07

## 2020-01-01 RX ADMIN — SODIUM CHLORIDE, SODIUM LACTATE, CALCIUM CHLORIDE, MAGNESIUM CHLORIDE AND DEXTROSE 2000 ML: 1.5; 538; 448; 18.3; 5.08 INJECTION, SOLUTION INTRAPERITONEAL at 22:28

## 2020-01-01 RX ADMIN — DROXIDOPA 200 MG: 200 CAPSULE ORAL at 11:13

## 2020-01-01 RX ADMIN — GENTAMICIN SULFATE: 1 CREAM TOPICAL at 08:16

## 2020-01-01 RX ADMIN — ACETAMINOPHEN 650 MG: 325 TABLET ORAL at 21:21

## 2020-01-01 RX ADMIN — VANCOMYCIN HYDROCHLORIDE 1000 MG: 1 INJECTION, POWDER, LYOPHILIZED, FOR SOLUTION INTRAVENOUS at 20:40

## 2020-01-01 RX ADMIN — Medication 500 MG: at 22:04

## 2020-01-01 RX ADMIN — MIDODRINE HYDROCHLORIDE 15 MG: 10 TABLET ORAL at 08:21

## 2020-01-01 RX ADMIN — SEVELAMER CARBONATE 800 MG: 800 TABLET, FILM COATED ORAL at 08:35

## 2020-01-01 RX ADMIN — INSULIN LISPRO 2 UNITS: 100 INJECTION, SOLUTION INTRAVENOUS; SUBCUTANEOUS at 12:15

## 2020-01-01 RX ADMIN — SEVELAMER CARBONATE 800 MG: 800 TABLET, FILM COATED ORAL at 08:23

## 2020-01-01 RX ADMIN — SODIUM CHLORIDE, SODIUM LACTATE, CALCIUM CHLORIDE, MAGNESIUM CHLORIDE AND DEXTROSE 1500 ML: 1.5; 538; 448; 18.3; 5.08 INJECTION, SOLUTION INTRAPERITONEAL at 05:46

## 2020-01-01 RX ADMIN — BUMETANIDE 2 MG: 1 TABLET ORAL at 00:37

## 2020-01-01 RX ADMIN — CALCIUM POLYCARBOPHIL 625 MG: 625 TABLET, FILM COATED ORAL at 09:11

## 2020-01-01 RX ADMIN — SODIUM CHLORIDE 1000 ML: 9 INJECTION, SOLUTION INTRAVENOUS at 20:18

## 2020-01-01 RX ADMIN — CLOPIDOGREL BISULFATE 75 MG: 75 TABLET ORAL at 22:14

## 2020-01-01 RX ADMIN — SODIUM CHLORIDE, PRESERVATIVE FREE 10 ML: 5 INJECTION INTRAVENOUS at 21:33

## 2020-01-01 RX ADMIN — INSULIN GLARGINE 16 UNITS: 100 INJECTION, SOLUTION SUBCUTANEOUS at 21:34

## 2020-01-01 RX ADMIN — SODIUM CHLORIDE, PRESERVATIVE FREE 10 ML: 5 INJECTION INTRAVENOUS at 20:24

## 2020-01-01 RX ADMIN — SODIUM CHLORIDE 250 ML: 9 INJECTION, SOLUTION INTRAVENOUS at 08:52

## 2020-01-01 RX ADMIN — CEFEPIME HYDROCHLORIDE 1 G: 1 INJECTION, POWDER, FOR SOLUTION INTRAMUSCULAR; INTRAVENOUS at 12:08

## 2020-01-01 RX ADMIN — ACETAMINOPHEN 650 MG: 325 TABLET ORAL at 22:26

## 2020-01-01 RX ADMIN — SODIUM CHLORIDE, SODIUM LACTATE, CALCIUM CHLORIDE, MAGNESIUM CHLORIDE AND DEXTROSE 1800 ML: 2.5; 538; 448; 18.3; 5.08 INJECTION, SOLUTION INTRAPERITONEAL at 20:19

## 2020-01-01 RX ADMIN — CLOPIDOGREL BISULFATE 75 MG: 75 TABLET ORAL at 16:31

## 2020-01-01 RX ADMIN — ONDANSETRON HYDROCHLORIDE 4 MG: 4 TABLET, FILM COATED ORAL at 20:17

## 2020-01-01 RX ADMIN — DOCUSATE SODIUM 100 MG: 100 CAPSULE, LIQUID FILLED ORAL at 08:02

## 2020-01-01 RX ADMIN — SEVELAMER CARBONATE 800 MG: 800 TABLET, FILM COATED ORAL at 16:31

## 2020-01-01 RX ADMIN — METOPROLOL SUCCINATE 50 MG: 50 TABLET, EXTENDED RELEASE ORAL at 20:22

## 2020-01-01 RX ADMIN — SODIUM CHLORIDE, SODIUM LACTATE, CALCIUM CHLORIDE, MAGNESIUM CHLORIDE AND DEXTROSE 2000 ML: 1.5; 538; 448; 18.3; 5.08 INJECTION, SOLUTION INTRAPERITONEAL at 17:13

## 2020-01-01 RX ADMIN — PANTOPRAZOLE SODIUM 40 MG: 40 TABLET, DELAYED RELEASE ORAL at 07:25

## 2020-01-01 RX ADMIN — DEXTROSE MONOHYDRATE 1 G: 5 INJECTION INTRAVENOUS at 21:46

## 2020-01-01 RX ADMIN — Medication 500 MG: at 09:26

## 2020-01-01 RX ADMIN — SODIUM CHLORIDE, SODIUM LACTATE, CALCIUM CHLORIDE, MAGNESIUM CHLORIDE AND DEXTROSE 2000 ML: 1.5; 538; 448; 18.3; 5.08 INJECTION, SOLUTION INTRAPERITONEAL at 23:37

## 2020-01-01 RX ADMIN — DOCUSATE SODIUM 100 MG: 100 CAPSULE, LIQUID FILLED ORAL at 09:29

## 2020-01-01 RX ADMIN — VANCOMYCIN HYDROCHLORIDE 1500 MG: 5 INJECTION, POWDER, LYOPHILIZED, FOR SOLUTION INTRAVENOUS at 17:12

## 2020-01-01 RX ADMIN — MEGESTROL ACETATE 40 MG: 40 TABLET ORAL at 09:43

## 2020-01-01 RX ADMIN — METOPROLOL SUCCINATE 100 MG: 100 TABLET, FILM COATED, EXTENDED RELEASE ORAL at 10:15

## 2020-01-01 RX ADMIN — SODIUM CHLORIDE, SODIUM LACTATE, CALCIUM CHLORIDE, MAGNESIUM CHLORIDE AND DEXTROSE 2000 ML: 1.5; 538; 448; 18.3; 5.08 INJECTION, SOLUTION INTRAPERITONEAL at 17:25

## 2020-01-01 RX ADMIN — ACETAMINOPHEN 650 MG: 325 TABLET ORAL at 20:44

## 2020-01-01 RX ADMIN — DOCUSATE SODIUM 200 MG: 100 CAPSULE, LIQUID FILLED ORAL at 10:14

## 2020-01-01 RX ADMIN — ONDANSETRON 4 MG: 2 INJECTION INTRAMUSCULAR; INTRAVENOUS at 16:15

## 2020-01-01 RX ADMIN — MEGESTROL ACETATE 40 MG: 40 TABLET ORAL at 07:26

## 2020-01-01 RX ADMIN — POTASSIUM CHLORIDE 20 MEQ: 1500 TABLET, EXTENDED RELEASE ORAL at 18:23

## 2020-01-01 RX ADMIN — HEPARIN SODIUM: 1000 INJECTION INTRAVENOUS; SUBCUTANEOUS at 17:29

## 2020-01-01 RX ADMIN — MIDODRINE HYDROCHLORIDE 10 MG: 10 TABLET ORAL at 12:04

## 2020-01-01 RX ADMIN — SODIUM CHLORIDE: 9 INJECTION, SOLUTION INTRAVENOUS at 06:28

## 2020-01-01 RX ADMIN — MEGESTROL ACETATE 40 MG: 40 TABLET ORAL at 09:24

## 2020-01-01 RX ADMIN — CEFTRIAXONE SODIUM 1 G: 1 INJECTION, POWDER, FOR SOLUTION INTRAMUSCULAR; INTRAVENOUS at 21:34

## 2020-01-01 RX ADMIN — PRAVASTATIN SODIUM 40 MG: 40 TABLET ORAL at 17:18

## 2020-01-01 RX ADMIN — SEVELAMER CARBONATE 800 MG: 800 TABLET, FILM COATED ORAL at 12:32

## 2020-01-01 RX ADMIN — SODIUM CHLORIDE, PRESERVATIVE FREE 10 ML: 5 INJECTION INTRAVENOUS at 08:54

## 2020-01-01 RX ADMIN — PANTOPRAZOLE SODIUM 40 MG: 40 TABLET, DELAYED RELEASE ORAL at 21:24

## 2020-01-01 RX ADMIN — MIDODRINE HYDROCHLORIDE 10 MG: 10 TABLET ORAL at 12:07

## 2020-01-01 RX ADMIN — PRAVASTATIN SODIUM 40 MG: 40 TABLET ORAL at 17:38

## 2020-01-01 RX ADMIN — POTASSIUM CHLORIDE 20 MEQ: 1500 TABLET, EXTENDED RELEASE ORAL at 08:44

## 2020-01-01 RX ADMIN — DOCUSATE SODIUM 100 MG: 100 CAPSULE, LIQUID FILLED ORAL at 09:40

## 2020-01-01 RX ADMIN — DARBEPOETIN ALFA 100 MCG: 100 INJECTION, SOLUTION INTRAVENOUS; SUBCUTANEOUS at 10:19

## 2020-01-01 RX ADMIN — Medication 500 MG: at 22:01

## 2020-01-01 RX ADMIN — MIDODRINE HYDROCHLORIDE 15 MG: 10 TABLET ORAL at 13:09

## 2020-01-01 RX ADMIN — FENTANYL CITRATE 25 MCG: 50 INJECTION, SOLUTION INTRAMUSCULAR; INTRAVENOUS at 14:34

## 2020-01-01 RX ADMIN — SODIUM CHLORIDE, SODIUM LACTATE, CALCIUM CHLORIDE, MAGNESIUM CHLORIDE AND DEXTROSE 2000 ML: 1.5; 538; 448; 18.3; 5.08 INJECTION, SOLUTION INTRAPERITONEAL at 05:53

## 2020-01-01 RX ADMIN — SODIUM CHLORIDE, SODIUM LACTATE, CALCIUM CHLORIDE, MAGNESIUM CHLORIDE AND DEXTROSE 2000 ML: 1.5; 538; 448; 18.3; 5.08 INJECTION, SOLUTION INTRAPERITONEAL at 09:00

## 2020-01-01 RX ADMIN — Medication 1000 MCG: at 08:44

## 2020-01-01 RX ADMIN — ASPIRIN 81 MG: 81 TABLET ORAL at 08:33

## 2020-01-01 RX ADMIN — Medication 1000 MCG: at 09:26

## 2020-01-01 RX ADMIN — METOPROLOL SUCCINATE 50 MG: 50 TABLET, FILM COATED, EXTENDED RELEASE ORAL at 07:56

## 2020-01-01 RX ADMIN — DOCUSATE SODIUM 200 MG: 100 CAPSULE, LIQUID FILLED ORAL at 10:23

## 2020-01-01 RX ADMIN — OXYCODONE HYDROCHLORIDE AND ACETAMINOPHEN 500 MG: 500 TABLET ORAL at 08:35

## 2020-01-01 RX ADMIN — MIDODRINE HYDROCHLORIDE 15 MG: 10 TABLET ORAL at 17:12

## 2020-01-01 RX ADMIN — SEVELAMER CARBONATE 800 MG: 800 TABLET, FILM COATED ORAL at 16:23

## 2020-01-01 RX ADMIN — POTASSIUM CHLORIDE 20 MEQ: 1500 TABLET, EXTENDED RELEASE ORAL at 19:56

## 2020-01-01 RX ADMIN — MEGESTROL ACETATE 40 MG: 40 TABLET ORAL at 20:50

## 2020-01-01 RX ADMIN — ACETAMINOPHEN 650 MG: 325 TABLET ORAL at 22:07

## 2020-01-01 RX ADMIN — SODIUM CHLORIDE, SODIUM LACTATE, CALCIUM CHLORIDE, MAGNESIUM CHLORIDE AND DEXTROSE 1800 ML: 2.5; 538; 448; 18.3; 5.08 INJECTION, SOLUTION INTRAPERITONEAL at 15:09

## 2020-01-01 RX ADMIN — MEGESTROL ACETATE 40 MG: 40 TABLET ORAL at 08:45

## 2020-01-01 RX ADMIN — METOPROLOL SUCCINATE 50 MG: 50 TABLET, FILM COATED, EXTENDED RELEASE ORAL at 21:43

## 2020-01-01 RX ADMIN — ACETAMINOPHEN 650 MG: 325 TABLET ORAL at 12:08

## 2020-01-01 RX ADMIN — METOPROLOL SUCCINATE 50 MG: 50 TABLET, EXTENDED RELEASE ORAL at 10:44

## 2020-01-01 RX ADMIN — SEVELAMER CARBONATE 800 MG: 800 TABLET, FILM COATED ORAL at 16:20

## 2020-01-01 RX ADMIN — Medication 1 TABLET: at 08:14

## 2020-01-01 RX ADMIN — EPOETIN ALFA-EPBX 10000 UNITS: 10000 INJECTION, SOLUTION INTRAVENOUS; SUBCUTANEOUS at 10:54

## 2020-01-01 RX ADMIN — PRAVASTATIN SODIUM 40 MG: 40 TABLET ORAL at 17:19

## 2020-01-01 RX ADMIN — MEGESTROL ACETATE 40 MG: 40 TABLET ORAL at 20:36

## 2020-01-01 RX ADMIN — ACETAMINOPHEN 650 MG: 325 TABLET ORAL at 08:22

## 2020-01-01 RX ADMIN — ACETAMINOPHEN 650 MG: 325 TABLET ORAL at 05:35

## 2020-01-01 RX ADMIN — MIDODRINE HYDROCHLORIDE 15 MG: 10 TABLET ORAL at 09:39

## 2020-01-01 RX ADMIN — OXYCODONE HYDROCHLORIDE AND ACETAMINOPHEN 500 MG: 500 TABLET ORAL at 21:17

## 2020-01-01 RX ADMIN — ACETAMINOPHEN 650 MG: 325 TABLET ORAL at 20:17

## 2020-01-01 RX ADMIN — DOCUSATE SODIUM 100 MG: 100 CAPSULE, LIQUID FILLED ORAL at 07:53

## 2020-01-01 RX ADMIN — POLYETHYLENE GLYCOL 3350 17 G: 17 POWDER, FOR SOLUTION ORAL at 08:42

## 2020-01-01 RX ADMIN — PRAVASTATIN SODIUM 40 MG: 40 TABLET ORAL at 17:05

## 2020-01-01 RX ADMIN — PANTOPRAZOLE SODIUM 40 MG: 40 TABLET, DELAYED RELEASE ORAL at 10:43

## 2020-01-01 RX ADMIN — MEGESTROL ACETATE 40 MG: 40 TABLET ORAL at 20:48

## 2020-01-01 RX ADMIN — PANTOPRAZOLE SODIUM 40 MG: 40 TABLET, DELAYED RELEASE ORAL at 09:39

## 2020-01-01 RX ADMIN — METOPROLOL SUCCINATE 25 MG: 25 TABLET, EXTENDED RELEASE ORAL at 21:58

## 2020-01-01 RX ADMIN — CEFEPIME HYDROCHLORIDE 1 G: 1 INJECTION, POWDER, FOR SOLUTION INTRAMUSCULAR; INTRAVENOUS at 12:33

## 2020-01-01 RX ADMIN — ASPIRIN 81 MG: 81 TABLET ORAL at 09:40

## 2020-01-01 RX ADMIN — Medication 1000 MCG: at 16:31

## 2020-01-01 RX ADMIN — POTASSIUM CHLORIDE 20 MEQ: 1500 TABLET, EXTENDED RELEASE ORAL at 08:45

## 2020-01-01 RX ADMIN — POTASSIUM CHLORIDE 20 MEQ: 1500 TABLET, EXTENDED RELEASE ORAL at 08:21

## 2020-01-01 RX ADMIN — Medication 500 MG: at 10:44

## 2020-01-01 RX ADMIN — MIDODRINE HYDROCHLORIDE 15 MG: 10 TABLET ORAL at 11:54

## 2020-01-01 RX ADMIN — MIDODRINE HYDROCHLORIDE 10 MG: 10 TABLET ORAL at 08:33

## 2020-01-01 RX ADMIN — ACETAMINOPHEN 650 MG: 325 TABLET ORAL at 08:33

## 2020-01-01 RX ADMIN — SODIUM CHLORIDE, SODIUM LACTATE, CALCIUM CHLORIDE, MAGNESIUM CHLORIDE AND DEXTROSE 1800 ML: 2.5; 538; 448; 18.3; 5.08 INJECTION, SOLUTION INTRAPERITONEAL at 09:32

## 2020-01-01 RX ADMIN — ACETAMINOPHEN 650 MG: 325 TABLET ORAL at 15:49

## 2020-01-01 RX ADMIN — MIDODRINE HYDROCHLORIDE 15 MG: 10 TABLET ORAL at 12:01

## 2020-01-01 RX ADMIN — OXYCODONE HYDROCHLORIDE AND ACETAMINOPHEN 500 MG: 500 TABLET ORAL at 09:39

## 2020-01-01 RX ADMIN — CALCIUM POLYCARBOPHIL 625 MG: 625 TABLET, FILM COATED ORAL at 21:46

## 2020-01-01 RX ADMIN — DOCUSATE SODIUM 100 MG: 100 CAPSULE, LIQUID FILLED ORAL at 08:31

## 2020-01-01 RX ADMIN — SEVELAMER CARBONATE 800 MG: 800 TABLET, FILM COATED ORAL at 16:26

## 2020-01-01 RX ADMIN — Medication 500 MG: at 08:54

## 2020-01-01 RX ADMIN — PANTOPRAZOLE SODIUM 40 MG: 40 TABLET, DELAYED RELEASE ORAL at 08:42

## 2020-01-01 RX ADMIN — Medication 10 ML: at 08:09

## 2020-01-01 RX ADMIN — ONDANSETRON 4 MG: 2 INJECTION INTRAMUSCULAR; INTRAVENOUS at 15:49

## 2020-01-01 RX ADMIN — PANTOPRAZOLE SODIUM 40 MG: 40 TABLET, DELAYED RELEASE ORAL at 21:21

## 2020-01-01 RX ADMIN — ONDANSETRON 4 MG: 2 INJECTION INTRAMUSCULAR; INTRAVENOUS at 21:20

## 2020-01-01 RX ADMIN — SEVELAMER CARBONATE 800 MG: 800 TABLET, FILM COATED ORAL at 12:00

## 2020-01-01 RX ADMIN — LEVOTHYROXINE SODIUM 25 MCG: 25 TABLET ORAL at 03:51

## 2020-01-01 RX ADMIN — Medication 10 ML: at 08:33

## 2020-01-01 RX ADMIN — VITAMIN D, TAB 1000IU (100/BT) 1000 UNITS: 25 TAB at 09:12

## 2020-01-01 RX ADMIN — SODIUM CHLORIDE 1000 ML: 9 INJECTION, SOLUTION INTRAVENOUS at 16:05

## 2020-01-01 RX ADMIN — SEVELAMER CARBONATE 800 MG: 800 TABLET, FILM COATED ORAL at 12:04

## 2020-01-01 RX ADMIN — LEVOTHYROXINE SODIUM 25 MCG: 25 TABLET ORAL at 07:03

## 2020-01-01 RX ADMIN — Medication 1 CAPSULE: at 08:33

## 2020-01-01 RX ADMIN — DOCUSATE SODIUM 200 MG: 100 CAPSULE, LIQUID FILLED ORAL at 08:33

## 2020-01-01 RX ADMIN — SEVELAMER CARBONATE 800 MG: 800 TABLET, FILM COATED ORAL at 17:46

## 2020-01-01 RX ADMIN — MEGESTROL ACETATE 40 MG: 40 TABLET ORAL at 07:53

## 2020-01-01 RX ADMIN — SEVELAMER CARBONATE 800 MG: 800 TABLET, FILM COATED ORAL at 11:39

## 2020-01-01 RX ADMIN — SODIUM CHLORIDE 1000 ML: 9 INJECTION, SOLUTION INTRAVENOUS at 03:55

## 2020-01-01 RX ADMIN — SODIUM CHLORIDE, SODIUM LACTATE, CALCIUM CHLORIDE, MAGNESIUM CHLORIDE AND DEXTROSE 1800 ML: 2.5; 538; 448; 18.3; 5.08 INJECTION, SOLUTION INTRAPERITONEAL at 14:12

## 2020-01-01 RX ADMIN — METOCLOPRAMIDE 5 MG: 10 TABLET ORAL at 11:21

## 2020-01-01 RX ADMIN — MIDODRINE HYDROCHLORIDE 15 MG: 10 TABLET ORAL at 17:41

## 2020-01-01 RX ADMIN — MEGESTROL ACETATE 40 MG: 40 TABLET ORAL at 08:23

## 2020-01-01 RX ADMIN — SODIUM CHLORIDE, PRESERVATIVE FREE 10 ML: 5 INJECTION INTRAVENOUS at 20:50

## 2020-01-01 RX ADMIN — PIPERACILLIN AND TAZOBACTAM 2.25 G: 2; .25 INJECTION, POWDER, LYOPHILIZED, FOR SOLUTION INTRAVENOUS at 09:37

## 2020-01-01 RX ADMIN — Medication 1 MG: at 11:48

## 2020-01-01 RX ADMIN — MEGESTROL ACETATE 40 MG: 40 TABLET ORAL at 19:56

## 2020-01-01 RX ADMIN — PANTOPRAZOLE SODIUM 40 MG: 40 TABLET, DELAYED RELEASE ORAL at 20:36

## 2020-01-01 RX ADMIN — ASPIRIN 81 MG: 81 TABLET ORAL at 09:18

## 2020-01-01 RX ADMIN — PANTOPRAZOLE SODIUM 40 MG: 40 TABLET, DELAYED RELEASE ORAL at 20:47

## 2020-01-01 RX ADMIN — MICONAZOLE NITRATE: 20 POWDER TOPICAL at 08:55

## 2020-01-01 RX ADMIN — MEGESTROL ACETATE 40 MG: 40 TABLET ORAL at 08:21

## 2020-01-01 RX ADMIN — PIPERACILLIN AND TAZOBACTAM 2.25 G: 2; .25 INJECTION, POWDER, LYOPHILIZED, FOR SOLUTION INTRAVENOUS at 16:59

## 2020-01-01 RX ADMIN — SODIUM CHLORIDE, SODIUM LACTATE, CALCIUM CHLORIDE, MAGNESIUM CHLORIDE AND DEXTROSE 1500 ML: 1.5; 538; 448; 18.3; 5.08 INJECTION, SOLUTION INTRAPERITONEAL at 15:27

## 2020-01-01 RX ADMIN — CALCIUM POLYCARBOPHIL 625 MG: 625 TABLET, FILM COATED ORAL at 10:23

## 2020-01-01 RX ADMIN — Medication 1000 MCG: at 17:51

## 2020-01-01 RX ADMIN — SODIUM CHLORIDE 1000 ML: 9 INJECTION, SOLUTION INTRAVENOUS at 23:24

## 2020-01-01 RX ADMIN — DOCUSATE SODIUM 100 MG: 100 CAPSULE, LIQUID FILLED ORAL at 08:37

## 2020-01-01 RX ADMIN — SODIUM CHLORIDE, SODIUM LACTATE, CALCIUM CHLORIDE, MAGNESIUM CHLORIDE AND DEXTROSE 1500 ML: 1.5; 538; 448; 18.3; 5.08 INJECTION, SOLUTION INTRAPERITONEAL at 20:58

## 2020-01-01 RX ADMIN — PANTOPRAZOLE SODIUM 40 MG: 40 TABLET, DELAYED RELEASE ORAL at 09:11

## 2020-01-01 RX ADMIN — MIDODRINE HYDROCHLORIDE 10 MG: 10 TABLET ORAL at 11:32

## 2020-01-01 RX ADMIN — DOCUSATE SODIUM 100 MG: 100 CAPSULE, LIQUID FILLED ORAL at 09:11

## 2020-01-01 RX ADMIN — SODIUM CHLORIDE, PRESERVATIVE FREE 10 ML: 5 INJECTION INTRAVENOUS at 20:49

## 2020-01-01 RX ADMIN — PRAVASTATIN SODIUM 40 MG: 40 TABLET ORAL at 18:22

## 2020-01-01 RX ADMIN — MEGESTROL ACETATE 40 MG: 40 TABLET ORAL at 09:40

## 2020-01-01 RX ADMIN — POTASSIUM CHLORIDE 20 MEQ: 1500 TABLET, EXTENDED RELEASE ORAL at 09:40

## 2020-01-01 RX ADMIN — DARBEPOETIN ALFA 100 MCG: 100 INJECTION, SOLUTION INTRAVENOUS; SUBCUTANEOUS at 14:19

## 2020-01-01 RX ADMIN — SODIUM CHLORIDE, SODIUM LACTATE, CALCIUM CHLORIDE, MAGNESIUM CHLORIDE AND DEXTROSE 2000 ML: 1.5; 538; 448; 18.3; 5.08 INJECTION, SOLUTION INTRAPERITONEAL at 17:23

## 2020-01-01 RX ADMIN — Medication 500 MG: at 21:21

## 2020-01-01 RX ADMIN — MEGESTROL ACETATE 40 MG: 40 TABLET ORAL at 22:07

## 2020-01-01 RX ADMIN — Medication 10 ML: at 08:46

## 2020-01-01 RX ADMIN — Medication 1 CAPSULE: at 09:03

## 2020-01-01 RX ADMIN — POLYETHYLENE GLYCOL 3350 17 G: 17 POWDER, FOR SOLUTION ORAL at 08:45

## 2020-01-01 RX ADMIN — PRAVASTATIN SODIUM 40 MG: 40 TABLET ORAL at 21:25

## 2020-01-01 RX ADMIN — SEVELAMER CARBONATE 800 MG: 800 TABLET, FILM COATED ORAL at 09:34

## 2020-01-01 RX ADMIN — ACETAMINOPHEN 650 MG: 325 TABLET ORAL at 18:12

## 2020-01-01 RX ADMIN — SODIUM CHLORIDE, SODIUM LACTATE, CALCIUM CHLORIDE, MAGNESIUM CHLORIDE AND DEXTROSE 1800 ML: 2.5; 538; 448; 18.3; 5.08 INJECTION, SOLUTION INTRAPERITONEAL at 14:30

## 2020-01-01 RX ADMIN — SODIUM CHLORIDE: 4.5 INJECTION, SOLUTION INTRAVENOUS at 10:41

## 2020-01-01 RX ADMIN — SODIUM CHLORIDE, SODIUM LACTATE, CALCIUM CHLORIDE, MAGNESIUM CHLORIDE AND DEXTROSE 2000 ML: 1.5; 538; 448; 18.3; 5.08 INJECTION, SOLUTION INTRAPERITONEAL at 09:28

## 2020-01-01 RX ADMIN — PRAVASTATIN SODIUM 40 MG: 40 TABLET ORAL at 23:11

## 2020-01-01 RX ADMIN — MEGESTROL ACETATE 40 MG: 40 TABLET ORAL at 22:01

## 2020-01-01 RX ADMIN — Medication 10 ML: at 10:46

## 2020-01-01 RX ADMIN — SODIUM CHLORIDE, PRESERVATIVE FREE 10 ML: 5 INJECTION INTRAVENOUS at 09:47

## 2020-01-01 RX ADMIN — ONDANSETRON 4 MG: 2 INJECTION INTRAMUSCULAR; INTRAVENOUS at 09:20

## 2020-01-01 RX ADMIN — CEFEPIME HYDROCHLORIDE 1 G: 1 INJECTION, POWDER, FOR SOLUTION INTRAMUSCULAR; INTRAVENOUS at 12:03

## 2020-01-01 RX ADMIN — SODIUM CHLORIDE, SODIUM LACTATE, CALCIUM CHLORIDE, MAGNESIUM CHLORIDE AND DEXTROSE 2000 ML: 2.5; 538; 448; 18.3; 5.08 INJECTION, SOLUTION INTRAPERITONEAL at 16:19

## 2020-01-01 RX ADMIN — INSULIN GLARGINE 16 UNITS: 100 INJECTION, SOLUTION SUBCUTANEOUS at 21:36

## 2020-01-01 RX ADMIN — SODIUM CHLORIDE, PRESERVATIVE FREE 10 ML: 5 INJECTION INTRAVENOUS at 08:03

## 2020-01-01 RX ADMIN — SODIUM CHLORIDE, SODIUM LACTATE, CALCIUM CHLORIDE, MAGNESIUM CHLORIDE AND DEXTROSE 2000 ML: 1.5; 538; 448; 18.3; 5.08 INJECTION, SOLUTION INTRAPERITONEAL at 08:49

## 2020-01-01 RX ADMIN — SEVELAMER CARBONATE 800 MG: 800 TABLET, FILM COATED ORAL at 17:19

## 2020-01-01 RX ADMIN — Medication 500 MG: at 20:51

## 2020-01-01 RX ADMIN — LEVOTHYROXINE SODIUM 25 MCG: 25 TABLET ORAL at 09:50

## 2020-01-01 RX ADMIN — INSULIN GLARGINE 16 UNITS: 100 INJECTION, SOLUTION SUBCUTANEOUS at 20:35

## 2020-01-01 RX ADMIN — SEVELAMER CARBONATE 800 MG: 800 TABLET, FILM COATED ORAL at 12:08

## 2020-01-01 RX ADMIN — TRIMETHOBENZAMIDE HYDROCHLORIDE 200 MG: 100 INJECTION INTRAMUSCULAR at 22:11

## 2020-01-01 RX ADMIN — POTASSIUM CHLORIDE 20 MEQ: 1500 TABLET, EXTENDED RELEASE ORAL at 09:24

## 2020-01-01 RX ADMIN — SODIUM CHLORIDE, SODIUM LACTATE, CALCIUM CHLORIDE, MAGNESIUM CHLORIDE AND DEXTROSE 2000 ML: 1.5; 538; 448; 18.3; 5.08 INJECTION, SOLUTION INTRAPERITONEAL at 09:17

## 2020-01-01 RX ADMIN — MIDODRINE HYDROCHLORIDE 10 MG: 10 TABLET ORAL at 12:32

## 2020-01-01 RX ADMIN — INSULIN GLARGINE 16 UNITS: 100 INJECTION, SOLUTION SUBCUTANEOUS at 21:25

## 2020-01-01 RX ADMIN — Medication 1000 MCG: at 18:22

## 2020-01-01 RX ADMIN — Medication 1000 MCG: at 17:12

## 2020-01-01 RX ADMIN — PANTOPRAZOLE SODIUM 40 MG: 40 TABLET, DELAYED RELEASE ORAL at 19:56

## 2020-01-01 RX ADMIN — ASPIRIN 81 MG: 81 TABLET ORAL at 07:53

## 2020-01-01 RX ADMIN — SODIUM CHLORIDE, SODIUM LACTATE, CALCIUM CHLORIDE, MAGNESIUM CHLORIDE AND DEXTROSE 2000 ML: 1.5; 538; 448; 18.3; 5.08 INJECTION, SOLUTION INTRAPERITONEAL at 14:32

## 2020-01-01 RX ADMIN — METOPROLOL SUCCINATE 100 MG: 100 TABLET, FILM COATED, EXTENDED RELEASE ORAL at 08:44

## 2020-01-01 RX ADMIN — PANTOPRAZOLE SODIUM 40 MG: 40 INJECTION, POWDER, FOR SOLUTION INTRAVENOUS at 03:39

## 2020-01-01 RX ADMIN — ASPIRIN 81 MG: 81 TABLET ORAL at 07:26

## 2020-01-01 RX ADMIN — SODIUM CHLORIDE, PRESERVATIVE FREE 10 ML: 5 INJECTION INTRAVENOUS at 08:15

## 2020-01-01 RX ADMIN — INSULIN LISPRO 4 UNITS: 100 INJECTION, SOLUTION INTRAVENOUS; SUBCUTANEOUS at 17:32

## 2020-01-01 RX ADMIN — Medication 500 MG: at 08:08

## 2020-01-01 RX ADMIN — LEVOTHYROXINE SODIUM 25 MCG: 25 TABLET ORAL at 06:52

## 2020-01-01 RX ADMIN — MEGESTROL ACETATE 40 MG: 40 TABLET ORAL at 20:45

## 2020-01-01 RX ADMIN — PRAVASTATIN SODIUM 40 MG: 40 TABLET ORAL at 01:01

## 2020-01-01 RX ADMIN — SODIUM CHLORIDE, PRESERVATIVE FREE 10 ML: 5 INJECTION INTRAVENOUS at 08:39

## 2020-01-01 RX ADMIN — PANTOPRAZOLE SODIUM 40 MG: 40 TABLET, DELAYED RELEASE ORAL at 21:46

## 2020-01-01 RX ADMIN — CALCIUM POLYCARBOPHIL 625 MG: 625 TABLET, FILM COATED ORAL at 20:04

## 2020-01-01 RX ADMIN — OXYCODONE HYDROCHLORIDE AND ACETAMINOPHEN 500 MG: 500 TABLET ORAL at 08:21

## 2020-01-01 RX ADMIN — METOPROLOL SUCCINATE 100 MG: 100 TABLET, FILM COATED, EXTENDED RELEASE ORAL at 15:20

## 2020-01-01 RX ADMIN — CLOPIDOGREL BISULFATE 75 MG: 75 TABLET ORAL at 17:18

## 2020-01-01 RX ADMIN — SODIUM CHLORIDE, SODIUM LACTATE, CALCIUM CHLORIDE, MAGNESIUM CHLORIDE AND DEXTROSE 2000 ML: 1.5; 538; 448; 18.3; 5.08 INJECTION, SOLUTION INTRAPERITONEAL at 01:02

## 2020-01-01 RX ADMIN — Medication 1 TABLET: at 07:26

## 2020-01-01 RX ADMIN — SEVELAMER CARBONATE 800 MG: 800 TABLET, FILM COATED ORAL at 00:59

## 2020-01-01 RX ADMIN — CALCIUM POLYCARBOPHIL 625 MG: 625 TABLET, FILM COATED ORAL at 14:31

## 2020-01-01 RX ADMIN — DOCUSATE SODIUM 100 MG: 100 CAPSULE, LIQUID FILLED ORAL at 08:23

## 2020-01-01 RX ADMIN — Medication 1 CAPSULE: at 07:54

## 2020-01-01 RX ADMIN — FERROUS SULFATE TAB 325 MG (65 MG ELEMENTAL FE) 325 MG: 325 (65 FE) TAB at 21:21

## 2020-01-01 RX ADMIN — Medication 10 ML: at 21:24

## 2020-01-01 RX ADMIN — MIDODRINE HYDROCHLORIDE 15 MG: 10 TABLET ORAL at 12:50

## 2020-01-01 RX ADMIN — SODIUM CHLORIDE, PRESERVATIVE FREE 10 ML: 5 INJECTION INTRAVENOUS at 20:44

## 2020-01-01 RX ADMIN — SEVELAMER CARBONATE 800 MG: 800 TABLET, FILM COATED ORAL at 17:23

## 2020-01-01 RX ADMIN — MIDODRINE HYDROCHLORIDE 10 MG: 10 TABLET ORAL at 12:08

## 2020-01-01 RX ADMIN — POTASSIUM CHLORIDE 20 MEQ: 1500 TABLET, EXTENDED RELEASE ORAL at 08:23

## 2020-01-01 RX ADMIN — METOPROLOL SUCCINATE 100 MG: 100 TABLET, FILM COATED, EXTENDED RELEASE ORAL at 21:52

## 2020-01-01 RX ADMIN — SODIUM CHLORIDE, PRESERVATIVE FREE 10 ML: 5 INJECTION INTRAVENOUS at 01:01

## 2020-01-01 RX ADMIN — Medication 1000 MCG: at 09:02

## 2020-01-01 RX ADMIN — ACETAMINOPHEN 650 MG: 325 TABLET ORAL at 12:05

## 2020-01-01 RX ADMIN — MEGESTROL ACETATE 40 MG: 40 TABLET ORAL at 20:54

## 2020-01-01 RX ADMIN — PRAVASTATIN SODIUM 40 MG: 40 TABLET ORAL at 21:52

## 2020-01-01 RX ADMIN — SODIUM CHLORIDE, SODIUM LACTATE, CALCIUM CHLORIDE, MAGNESIUM CHLORIDE AND DEXTROSE 2000 ML: 1.5; 538; 448; 18.3; 5.08 INJECTION, SOLUTION INTRAPERITONEAL at 17:18

## 2020-01-01 RX ADMIN — CLOPIDOGREL BISULFATE 75 MG: 75 TABLET ORAL at 18:23

## 2020-01-01 RX ADMIN — MIDODRINE HYDROCHLORIDE 10 MG: 10 TABLET ORAL at 12:33

## 2020-01-01 RX ADMIN — SEVELAMER CARBONATE 800 MG: 800 TABLET, FILM COATED ORAL at 11:57

## 2020-01-01 RX ADMIN — FERROUS SULFATE TAB 325 MG (65 MG ELEMENTAL FE) 325 MG: 325 (65 FE) TAB at 20:17

## 2020-01-01 RX ADMIN — SODIUM CHLORIDE, SODIUM LACTATE, CALCIUM CHLORIDE, MAGNESIUM CHLORIDE AND DEXTROSE 2000 ML: 2.5; 538; 448; 18.3; 5.08 INJECTION, SOLUTION INTRAPERITONEAL at 08:56

## 2020-01-01 RX ADMIN — Medication 500 MG: at 08:33

## 2020-01-01 RX ADMIN — PANTOPRAZOLE SODIUM 40 MG: 40 TABLET, DELAYED RELEASE ORAL at 21:50

## 2020-01-01 RX ADMIN — Medication 1000 MCG: at 16:52

## 2020-01-01 RX ADMIN — LEVOTHYROXINE SODIUM 25 MCG: 25 TABLET ORAL at 06:12

## 2020-01-01 RX ADMIN — MIDODRINE HYDROCHLORIDE 10 MG: 10 TABLET ORAL at 08:45

## 2020-01-01 RX ADMIN — INSULIN GLARGINE 10 UNITS: 100 INJECTION, SOLUTION SUBCUTANEOUS at 22:06

## 2020-01-01 RX ADMIN — SODIUM CHLORIDE, SODIUM LACTATE, CALCIUM CHLORIDE, MAGNESIUM CHLORIDE AND DEXTROSE 1800 ML: 2.5; 538; 448; 18.3; 5.08 INJECTION, SOLUTION INTRAPERITONEAL at 14:31

## 2020-01-01 RX ADMIN — PANTOPRAZOLE SODIUM 40 MG: 40 TABLET, DELAYED RELEASE ORAL at 09:17

## 2020-01-01 RX ADMIN — SODIUM CHLORIDE, SODIUM LACTATE, CALCIUM CHLORIDE, MAGNESIUM CHLORIDE AND DEXTROSE 1800 ML: 2.5; 538; 448; 18.3; 5.08 INJECTION, SOLUTION INTRAPERITONEAL at 08:57

## 2020-01-01 RX ADMIN — Medication 1 CAPSULE: at 09:40

## 2020-01-01 RX ADMIN — CEFAZOLIN 2 G: 10 INJECTION, POWDER, FOR SOLUTION INTRAVENOUS at 10:41

## 2020-01-01 RX ADMIN — Medication 0.5 MG: at 17:31

## 2020-01-01 RX ADMIN — SODIUM CHLORIDE, PRESERVATIVE FREE 10 ML: 5 INJECTION INTRAVENOUS at 09:46

## 2020-01-01 RX ADMIN — ACETAMINOPHEN 650 MG: 325 TABLET ORAL at 08:35

## 2020-01-01 RX ADMIN — PANTOPRAZOLE SODIUM 40 MG: 40 TABLET, DELAYED RELEASE ORAL at 08:53

## 2020-01-01 RX ADMIN — ACETAMINOPHEN 650 MG: 325 TABLET ORAL at 12:07

## 2020-01-01 RX ADMIN — MEGESTROL ACETATE 40 MG: 40 TABLET ORAL at 21:17

## 2020-01-01 RX ADMIN — INSULIN GLARGINE 16 UNITS: 100 INJECTION, SOLUTION SUBCUTANEOUS at 20:09

## 2020-01-01 RX ADMIN — SEVELAMER CARBONATE 800 MG: 800 TABLET, FILM COATED ORAL at 07:53

## 2020-01-01 RX ADMIN — MIDODRINE HYDROCHLORIDE 10 MG: 10 TABLET ORAL at 05:15

## 2020-01-01 RX ADMIN — OXYCODONE HYDROCHLORIDE AND ACETAMINOPHEN 500 MG: 500 TABLET ORAL at 08:14

## 2020-01-01 RX ADMIN — ASPIRIN 81 MG: 81 TABLET ORAL at 08:42

## 2020-01-01 RX ADMIN — MICONAZOLE NITRATE: 20 POWDER TOPICAL at 21:25

## 2020-01-01 RX ADMIN — MEGESTROL ACETATE 40 MG: 40 TABLET ORAL at 09:30

## 2020-01-01 RX ADMIN — MICONAZOLE NITRATE: 20 POWDER TOPICAL at 21:18

## 2020-01-01 RX ADMIN — ACETAMINOPHEN 650 MG: 325 TABLET ORAL at 00:38

## 2020-01-01 RX ADMIN — MEGESTROL ACETATE 40 MG: 40 TABLET ORAL at 20:03

## 2020-01-01 RX ADMIN — SODIUM CHLORIDE, PRESERVATIVE FREE 10 ML: 5 INJECTION INTRAVENOUS at 21:41

## 2020-01-01 RX ADMIN — MEGESTROL ACETATE 40 MG: 40 TABLET ORAL at 08:28

## 2020-01-01 RX ADMIN — DEXTROSE MONOHYDRATE 1 G: 5 INJECTION INTRAVENOUS at 00:35

## 2020-01-01 RX ADMIN — ONDANSETRON HYDROCHLORIDE 4 MG: 4 TABLET, FILM COATED ORAL at 08:29

## 2020-01-01 RX ADMIN — MEGESTROL ACETATE 40 MG: 40 TABLET ORAL at 09:39

## 2020-01-01 RX ADMIN — SODIUM CHLORIDE, SODIUM LACTATE, CALCIUM CHLORIDE, MAGNESIUM CHLORIDE AND DEXTROSE 2000 ML: 1.5; 538; 448; 18.3; 5.08 INJECTION, SOLUTION INTRAPERITONEAL at 00:00

## 2020-01-01 RX ADMIN — WARFARIN SODIUM 3 MG: 3 TABLET ORAL at 17:33

## 2020-01-01 RX ADMIN — ASPIRIN 81 MG: 81 TABLET ORAL at 08:35

## 2020-01-01 RX ADMIN — ACETAMINOPHEN 650 MG: 325 TABLET ORAL at 09:24

## 2020-01-01 RX ADMIN — BUPIVACAINE HYDROCHLORIDE 25 MG: 2.5 INJECTION, SOLUTION EPIDURAL; INFILTRATION; INTRACAUDAL at 11:57

## 2020-01-01 RX ADMIN — PANTOPRAZOLE SODIUM 40 MG: 40 TABLET, DELAYED RELEASE ORAL at 20:57

## 2020-01-01 RX ADMIN — METOPROLOL SUCCINATE 100 MG: 100 TABLET, FILM COATED, EXTENDED RELEASE ORAL at 08:27

## 2020-01-01 RX ADMIN — MIDODRINE HYDROCHLORIDE 15 MG: 10 TABLET ORAL at 07:25

## 2020-01-01 RX ADMIN — Medication 1000 MCG: at 17:36

## 2020-01-01 RX ADMIN — POTASSIUM CHLORIDE 20 MEQ: 1500 TABLET, EXTENDED RELEASE ORAL at 17:20

## 2020-01-01 RX ADMIN — CLOPIDOGREL BISULFATE 75 MG: 75 TABLET ORAL at 20:16

## 2020-01-01 RX ADMIN — Medication 1000 MCG: at 10:44

## 2020-01-01 RX ADMIN — SODIUM CHLORIDE, SODIUM LACTATE, CALCIUM CHLORIDE, MAGNESIUM CHLORIDE AND DEXTROSE 2000 ML: 1.5; 538; 448; 18.3; 5.08 INJECTION, SOLUTION INTRAPERITONEAL at 11:52

## 2020-01-01 RX ADMIN — SODIUM CHLORIDE, PRESERVATIVE FREE 10 ML: 5 INJECTION INTRAVENOUS at 21:12

## 2020-01-01 RX ADMIN — SODIUM CHLORIDE, SODIUM LACTATE, CALCIUM CHLORIDE, MAGNESIUM CHLORIDE AND DEXTROSE 1500 ML: 1.5; 538; 448; 18.3; 5.08 INJECTION, SOLUTION INTRAPERITONEAL at 03:21

## 2020-01-01 RX ADMIN — INSULIN LISPRO 1 UNITS: 100 INJECTION, SOLUTION INTRAVENOUS; SUBCUTANEOUS at 16:33

## 2020-01-01 RX ADMIN — TRIMETHOBENZAMIDE HYDROCHLORIDE 200 MG: 100 INJECTION INTRAMUSCULAR at 17:52

## 2020-01-01 RX ADMIN — SEVELAMER CARBONATE 800 MG: 800 TABLET, FILM COATED ORAL at 17:03

## 2020-01-01 RX ADMIN — PRAVASTATIN SODIUM 40 MG: 40 TABLET ORAL at 16:45

## 2020-01-01 RX ADMIN — ACETAMINOPHEN 650 MG: 325 TABLET ORAL at 16:48

## 2020-01-01 RX ADMIN — CALCIUM POLYCARBOPHIL 625 MG: 625 TABLET, FILM COATED ORAL at 08:28

## 2020-01-01 RX ADMIN — MEGESTROL ACETATE 40 MG: 40 TABLET ORAL at 22:05

## 2020-01-01 RX ADMIN — ONDANSETRON 4 MG: 2 INJECTION INTRAMUSCULAR; INTRAVENOUS at 14:57

## 2020-01-01 RX ADMIN — MICONAZOLE NITRATE: 20 POWDER TOPICAL at 09:40

## 2020-01-01 RX ADMIN — MICONAZOLE NITRATE: 20 POWDER TOPICAL at 08:45

## 2020-01-01 RX ADMIN — SODIUM CHLORIDE, SODIUM LACTATE, CALCIUM CHLORIDE, MAGNESIUM CHLORIDE AND DEXTROSE 2000 ML: 2.5; 538; 448; 18.3; 5.08 INJECTION, SOLUTION INTRAPERITONEAL at 13:01

## 2020-01-01 RX ADMIN — SODIUM CHLORIDE, SODIUM LACTATE, CALCIUM CHLORIDE, MAGNESIUM CHLORIDE AND DEXTROSE 2000 ML: 1.5; 538; 448; 18.3; 5.08 INJECTION, SOLUTION INTRAPERITONEAL at 11:03

## 2020-01-01 RX ADMIN — SEVELAMER CARBONATE 800 MG: 800 TABLET, FILM COATED ORAL at 17:44

## 2020-01-01 RX ADMIN — WARFARIN SODIUM 3 MG: 3 TABLET ORAL at 17:32

## 2020-01-01 RX ADMIN — MEGESTROL ACETATE 40 MG: 40 TABLET ORAL at 11:13

## 2020-01-01 RX ADMIN — SEVELAMER CARBONATE 800 MG: 800 TABLET, FILM COATED ORAL at 08:20

## 2020-01-01 RX ADMIN — MIDODRINE HYDROCHLORIDE 10 MG: 10 TABLET ORAL at 08:42

## 2020-01-01 RX ADMIN — MIDODRINE HYDROCHLORIDE 15 MG: 10 TABLET ORAL at 17:36

## 2020-01-01 RX ADMIN — MEGESTROL ACETATE 40 MG: 40 TABLET ORAL at 22:15

## 2020-01-01 RX ADMIN — DROXIDOPA 200 MG: 200 CAPSULE ORAL at 20:48

## 2020-01-01 RX ADMIN — MICONAZOLE NITRATE: 20 POWDER TOPICAL at 20:04

## 2020-01-01 RX ADMIN — Medication 10 ML: at 09:12

## 2020-01-01 RX ADMIN — ACETAMINOPHEN 650 MG: 325 TABLET ORAL at 20:52

## 2020-01-01 RX ADMIN — VANCOMYCIN HYDROCHLORIDE: 1 INJECTION, POWDER, LYOPHILIZED, FOR SOLUTION INTRAVENOUS at 05:37

## 2020-01-01 RX ADMIN — SODIUM CHLORIDE, PRESERVATIVE FREE 10 ML: 5 INJECTION INTRAVENOUS at 08:35

## 2020-01-01 RX ADMIN — ACETAMINOPHEN 650 MG: 325 TABLET ORAL at 17:30

## 2020-01-01 RX ADMIN — PANTOPRAZOLE SODIUM 40 MG: 40 TABLET, DELAYED RELEASE ORAL at 08:54

## 2020-01-01 RX ADMIN — MEGESTROL ACETATE 40 MG: 40 TABLET ORAL at 21:25

## 2020-01-01 RX ADMIN — WARFARIN SODIUM 2 MG: 2 TABLET ORAL at 17:51

## 2020-01-01 RX ADMIN — POLYETHYLENE GLYCOL 3350 17 G: 17 POWDER, FOR SOLUTION ORAL at 10:23

## 2020-01-01 RX ADMIN — PANTOPRAZOLE SODIUM 40 MG: 40 TABLET, DELAYED RELEASE ORAL at 20:59

## 2020-01-01 RX ADMIN — SEVELAMER CARBONATE 800 MG: 800 TABLET, FILM COATED ORAL at 08:42

## 2020-01-01 RX ADMIN — SODIUM CHLORIDE, PRESERVATIVE FREE 10 ML: 5 INJECTION INTRAVENOUS at 07:57

## 2020-01-01 RX ADMIN — SODIUM CHLORIDE 2000 ML: 9 INJECTION, SOLUTION INTRAVENOUS at 01:21

## 2020-01-01 RX ADMIN — PANTOPRAZOLE SODIUM 40 MG: 40 TABLET, DELAYED RELEASE ORAL at 10:23

## 2020-01-01 RX ADMIN — SEVELAMER CARBONATE 800 MG: 800 TABLET, FILM COATED ORAL at 08:09

## 2020-01-01 RX ADMIN — MEGESTROL ACETATE 40 MG: 40 TABLET ORAL at 20:49

## 2020-01-01 RX ADMIN — Medication 1 CAPSULE: at 08:43

## 2020-01-01 RX ADMIN — MIDODRINE HYDROCHLORIDE 15 MG: 10 TABLET ORAL at 11:13

## 2020-01-01 RX ADMIN — ONDANSETRON HYDROCHLORIDE 4 MG: 4 TABLET, FILM COATED ORAL at 08:44

## 2020-01-01 RX ADMIN — SODIUM CHLORIDE, SODIUM LACTATE, CALCIUM CHLORIDE, MAGNESIUM CHLORIDE AND DEXTROSE 1500 ML: 2.5; 538; 448; 18.3; 5.08 INJECTION, SOLUTION INTRAPERITONEAL at 02:30

## 2020-01-01 RX ADMIN — Medication 1000 MCG: at 17:27

## 2020-01-01 RX ADMIN — MIDODRINE HYDROCHLORIDE 10 MG: 10 TABLET ORAL at 09:30

## 2020-01-01 RX ADMIN — MIDODRINE HYDROCHLORIDE 15 MG: 10 TABLET ORAL at 13:00

## 2020-01-01 RX ADMIN — SODIUM CHLORIDE, PRESERVATIVE FREE 10 ML: 5 INJECTION INTRAVENOUS at 09:19

## 2020-01-01 RX ADMIN — POTASSIUM CHLORIDE 20 MEQ: 1500 TABLET, EXTENDED RELEASE ORAL at 17:19

## 2020-01-01 RX ADMIN — SODIUM CHLORIDE, PRESERVATIVE FREE 10 ML: 5 INJECTION INTRAVENOUS at 09:00

## 2020-01-01 RX ADMIN — ACETAMINOPHEN 650 MG: 325 TABLET ORAL at 09:00

## 2020-01-01 RX ADMIN — MEGESTROL ACETATE 40 MG: 40 TABLET ORAL at 08:37

## 2020-01-01 RX ADMIN — SEVELAMER CARBONATE 800 MG: 800 TABLET, FILM COATED ORAL at 17:36

## 2020-01-01 RX ADMIN — Medication 1 CAPSULE: at 10:15

## 2020-01-01 RX ADMIN — POTASSIUM CHLORIDE 20 MEQ: 1500 TABLET, EXTENDED RELEASE ORAL at 09:39

## 2020-01-01 RX ADMIN — MEGESTROL ACETATE 40 MG: 40 TABLET ORAL at 21:13

## 2020-01-01 RX ADMIN — SODIUM CHLORIDE, SODIUM LACTATE, CALCIUM CHLORIDE, MAGNESIUM CHLORIDE AND DEXTROSE 2000 ML: 1.5; 538; 448; 18.3; 5.08 INJECTION, SOLUTION INTRAPERITONEAL at 21:38

## 2020-01-01 RX ADMIN — SODIUM CHLORIDE, SODIUM LACTATE, CALCIUM CHLORIDE, MAGNESIUM CHLORIDE AND DEXTROSE 2000 ML: 1.5; 538; 448; 18.3; 5.08 INJECTION, SOLUTION INTRAPERITONEAL at 00:20

## 2020-01-01 RX ADMIN — ACETAMINOPHEN 650 MG: 325 TABLET ORAL at 17:44

## 2020-01-01 RX ADMIN — SEVELAMER CARBONATE 800 MG: 800 TABLET, FILM COATED ORAL at 17:50

## 2020-01-01 RX ADMIN — MIDODRINE HYDROCHLORIDE 15 MG: 10 TABLET ORAL at 17:08

## 2020-01-01 RX ADMIN — MIDODRINE HYDROCHLORIDE 15 MG: 10 TABLET ORAL at 09:40

## 2020-01-01 RX ADMIN — Medication 10 ML: at 20:52

## 2020-01-01 RX ADMIN — ONDANSETRON HYDROCHLORIDE 4 MG: 4 TABLET, FILM COATED ORAL at 09:00

## 2020-01-01 RX ADMIN — PANTOPRAZOLE SODIUM 40 MG: 40 TABLET, DELAYED RELEASE ORAL at 20:48

## 2020-01-01 RX ADMIN — IOHEXOL 35 ML: 240 INJECTION, SOLUTION INTRATHECAL; INTRAVASCULAR; INTRAVENOUS; ORAL at 12:46

## 2020-01-01 RX ADMIN — SODIUM CHLORIDE, SODIUM LACTATE, CALCIUM CHLORIDE, MAGNESIUM CHLORIDE AND DEXTROSE 1500 ML: 1.5; 538; 448; 18.3; 5.08 INJECTION, SOLUTION INTRAPERITONEAL at 09:30

## 2020-01-01 RX ADMIN — PANTOPRAZOLE SODIUM 40 MG: 40 TABLET, DELAYED RELEASE ORAL at 08:23

## 2020-01-01 RX ADMIN — PRAVASTATIN SODIUM 40 MG: 40 TABLET ORAL at 19:56

## 2020-01-01 RX ADMIN — MIDODRINE HYDROCHLORIDE 10 MG: 10 TABLET ORAL at 12:12

## 2020-01-01 RX ADMIN — PROMETHAZINE HYDROCHLORIDE 12.5 MG: 25 TABLET ORAL at 23:08

## 2020-01-01 RX ADMIN — Medication 500 MG: at 19:39

## 2020-01-01 RX ADMIN — PANTOPRAZOLE SODIUM 40 MG: 40 TABLET, DELAYED RELEASE ORAL at 08:27

## 2020-01-01 RX ADMIN — MIDODRINE HYDROCHLORIDE 15 MG: 10 TABLET ORAL at 09:20

## 2020-01-01 RX ADMIN — SEVELAMER CARBONATE 800 MG: 800 TABLET, FILM COATED ORAL at 18:22

## 2020-01-01 RX ADMIN — LEVOTHYROXINE SODIUM 25 MCG: 25 TABLET ORAL at 06:06

## 2020-01-01 RX ADMIN — ACETAMINOPHEN 650 MG: 325 TABLET ORAL at 20:49

## 2020-01-01 RX ADMIN — SEVELAMER CARBONATE 800 MG: 800 TABLET, FILM COATED ORAL at 12:48

## 2020-01-01 RX ADMIN — ACETAMINOPHEN 650 MG: 325 TABLET ORAL at 18:24

## 2020-01-01 RX ADMIN — MIDODRINE HYDROCHLORIDE 10 MG: 10 TABLET ORAL at 16:23

## 2020-01-01 RX ADMIN — Medication 1 CAPSULE: at 08:46

## 2020-01-01 RX ADMIN — INSULIN GLARGINE 10 UNITS: 100 INJECTION, SOLUTION SUBCUTANEOUS at 21:39

## 2020-01-01 RX ADMIN — POTASSIUM CHLORIDE 20 MEQ: 1500 TABLET, EXTENDED RELEASE ORAL at 17:05

## 2020-01-01 RX ADMIN — Medication 1 CAPSULE: at 08:26

## 2020-01-01 RX ADMIN — SEVELAMER CARBONATE 800 MG: 800 TABLET, FILM COATED ORAL at 18:14

## 2020-01-01 RX ADMIN — MIDODRINE HYDROCHLORIDE 15 MG: 10 TABLET ORAL at 06:36

## 2020-01-01 RX ADMIN — SODIUM CHLORIDE, PRESERVATIVE FREE 10 ML: 5 INJECTION INTRAVENOUS at 21:20

## 2020-01-01 RX ADMIN — METOPROLOL SUCCINATE 50 MG: 50 TABLET, FILM COATED, EXTENDED RELEASE ORAL at 14:38

## 2020-01-01 RX ADMIN — POTASSIUM CHLORIDE 20 MEQ: 1500 TABLET, EXTENDED RELEASE ORAL at 21:17

## 2020-01-01 RX ADMIN — MIDODRINE HYDROCHLORIDE 15 MG: 10 TABLET ORAL at 09:34

## 2020-01-01 RX ADMIN — PRAVASTATIN SODIUM 40 MG: 40 TABLET ORAL at 21:33

## 2020-01-01 RX ADMIN — Medication 1 TABLET: at 09:39

## 2020-01-01 RX ADMIN — LEVOTHYROXINE SODIUM 25 MCG: 25 TABLET ORAL at 05:13

## 2020-01-01 RX ADMIN — SODIUM CHLORIDE, SODIUM LACTATE, CALCIUM CHLORIDE, MAGNESIUM CHLORIDE AND DEXTROSE 2000 ML: 1.5; 538; 448; 18.3; 5.08 INJECTION, SOLUTION INTRAPERITONEAL at 17:53

## 2020-01-01 RX ADMIN — MIDODRINE HYDROCHLORIDE 15 MG: 10 TABLET ORAL at 16:55

## 2020-01-01 RX ADMIN — DEXTROSE MONOHYDRATE 1 G: 5 INJECTION INTRAVENOUS at 21:02

## 2020-01-01 RX ADMIN — SODIUM CHLORIDE, SODIUM LACTATE, CALCIUM CHLORIDE, MAGNESIUM CHLORIDE AND DEXTROSE 2000 ML: 1.5; 538; 448; 18.3; 5.08 INJECTION, SOLUTION INTRAPERITONEAL at 17:26

## 2020-01-01 RX ADMIN — CALCIUM POLYCARBOPHIL 625 MG: 625 TABLET, FILM COATED ORAL at 08:52

## 2020-01-01 RX ADMIN — MULTIPLE VITAMINS W/ MINERALS TAB 1 TABLET: TAB at 09:26

## 2020-01-01 RX ADMIN — ONDANSETRON 4 MG: 2 INJECTION INTRAMUSCULAR; INTRAVENOUS at 08:35

## 2020-01-01 RX ADMIN — Medication 1000 MCG: at 17:06

## 2020-01-01 RX ADMIN — SODIUM CHLORIDE, SODIUM LACTATE, CALCIUM CHLORIDE, MAGNESIUM CHLORIDE AND DEXTROSE 1500 ML: 1.5; 538; 448; 18.3; 5.08 INJECTION, SOLUTION INTRAPERITONEAL at 21:50

## 2020-01-01 RX ADMIN — SEVELAMER CARBONATE 800 MG: 800 TABLET, FILM COATED ORAL at 12:12

## 2020-01-01 RX ADMIN — OXYCODONE HYDROCHLORIDE AND ACETAMINOPHEN 500 MG: 500 TABLET ORAL at 22:15

## 2020-01-01 RX ADMIN — MEGESTROL ACETATE 40 MG: 40 TABLET ORAL at 08:08

## 2020-01-01 RX ADMIN — ASPIRIN 81 MG: 81 TABLET ORAL at 09:24

## 2020-01-01 RX ADMIN — PRAVASTATIN SODIUM 40 MG: 40 TABLET ORAL at 17:44

## 2020-01-01 RX ADMIN — SODIUM CHLORIDE, SODIUM LACTATE, CALCIUM CHLORIDE, MAGNESIUM CHLORIDE AND DEXTROSE 2000 ML: 1.5; 538; 448; 18.3; 5.08 INJECTION, SOLUTION INTRAPERITONEAL at 15:57

## 2020-01-01 RX ADMIN — OXYCODONE HYDROCHLORIDE AND ACETAMINOPHEN 500 MG: 500 TABLET ORAL at 20:04

## 2020-01-01 RX ADMIN — MIDODRINE HYDROCHLORIDE 15 MG: 10 TABLET ORAL at 18:20

## 2020-01-01 RX ADMIN — SODIUM CHLORIDE, SODIUM LACTATE, CALCIUM CHLORIDE, MAGNESIUM CHLORIDE AND DEXTROSE 1500 ML: 2.5; 538; 448; 18.3; 5.08 INJECTION, SOLUTION INTRAPERITONEAL at 08:24

## 2020-01-01 RX ADMIN — DROXIDOPA 200 MG: 200 CAPSULE ORAL at 14:21

## 2020-01-01 RX ADMIN — PANTOPRAZOLE SODIUM 40 MG: 40 TABLET, DELAYED RELEASE ORAL at 20:49

## 2020-01-01 RX ADMIN — MIDODRINE HYDROCHLORIDE 10 MG: 10 TABLET ORAL at 08:52

## 2020-01-01 RX ADMIN — SODIUM CHLORIDE, SODIUM LACTATE, CALCIUM CHLORIDE, MAGNESIUM CHLORIDE AND DEXTROSE 1500 ML: 1.5; 538; 448; 18.3; 5.08 INJECTION, SOLUTION INTRAPERITONEAL at 14:57

## 2020-01-01 RX ADMIN — MIDODRINE HYDROCHLORIDE 10 MG: 10 TABLET ORAL at 16:35

## 2020-01-01 RX ADMIN — Medication 500 MG: at 20:17

## 2020-01-01 RX ADMIN — PANTOPRAZOLE SODIUM 40 MG: 40 TABLET, DELAYED RELEASE ORAL at 21:32

## 2020-01-01 RX ADMIN — CLOPIDOGREL BISULFATE 75 MG: 75 TABLET ORAL at 17:09

## 2020-01-01 RX ADMIN — POLYETHYLENE GLYCOL 3350 17 G: 17 POWDER, FOR SOLUTION ORAL at 22:11

## 2020-01-01 RX ADMIN — VITAMIN D, TAB 1000IU (100/BT) 1000 UNITS: 25 TAB at 09:26

## 2020-01-01 RX ADMIN — INSULIN GLARGINE 10 UNITS: 100 INJECTION, SOLUTION SUBCUTANEOUS at 00:01

## 2020-01-01 RX ADMIN — SEVELAMER CARBONATE 800 MG: 800 TABLET, FILM COATED ORAL at 08:39

## 2020-01-01 RX ADMIN — SEVELAMER CARBONATE 800 MG: 800 TABLET, FILM COATED ORAL at 10:43

## 2020-01-01 RX ADMIN — CLOPIDOGREL BISULFATE 75 MG: 75 TABLET ORAL at 17:27

## 2020-01-01 RX ADMIN — ASPIRIN 81 MG: 81 TABLET ORAL at 08:55

## 2020-01-01 RX ADMIN — MIDODRINE HYDROCHLORIDE 15 MG: 10 TABLET ORAL at 11:18

## 2020-01-01 RX ADMIN — LIDOCAINE HYDROCHLORIDE: 20 SOLUTION ORAL; TOPICAL at 14:05

## 2020-01-01 RX ADMIN — MEGESTROL ACETATE 40 MG: 40 TABLET ORAL at 20:44

## 2020-01-01 RX ADMIN — SODIUM CHLORIDE, PRESERVATIVE FREE 10 ML: 5 INJECTION INTRAVENOUS at 20:09

## 2020-01-01 RX ADMIN — PANTOPRAZOLE SODIUM 40 MG: 40 TABLET, DELAYED RELEASE ORAL at 21:12

## 2020-01-01 RX ADMIN — DOCUSATE SODIUM 100 MG: 100 CAPSULE, LIQUID FILLED ORAL at 08:55

## 2020-01-01 RX ADMIN — CLOPIDOGREL BISULFATE 75 MG: 75 TABLET ORAL at 16:48

## 2020-01-01 RX ADMIN — PANTOPRAZOLE SODIUM 40 MG: 40 TABLET, DELAYED RELEASE ORAL at 07:53

## 2020-01-01 RX ADMIN — DROXIDOPA 200 MG: 200 CAPSULE ORAL at 17:12

## 2020-01-01 RX ADMIN — Medication 1000 MCG: at 08:54

## 2020-01-01 RX ADMIN — PANTOPRAZOLE SODIUM 40 MG: 40 TABLET, DELAYED RELEASE ORAL at 08:21

## 2020-01-01 RX ADMIN — MIDODRINE HYDROCHLORIDE 15 MG: 10 TABLET ORAL at 16:26

## 2020-01-01 RX ADMIN — ACETAMINOPHEN 650 MG: 325 TABLET ORAL at 17:33

## 2020-01-01 RX ADMIN — CLOPIDOGREL BISULFATE 75 MG: 75 TABLET ORAL at 17:00

## 2020-01-01 RX ADMIN — PANTOPRAZOLE SODIUM 40 MG: 40 TABLET, DELAYED RELEASE ORAL at 08:07

## 2020-01-01 RX ADMIN — MIDODRINE HYDROCHLORIDE 10 MG: 10 TABLET ORAL at 07:54

## 2020-01-01 RX ADMIN — POLYETHYLENE GLYCOL 3350 17 G: 17 POWDER, FOR SOLUTION ORAL at 20:47

## 2020-01-01 RX ADMIN — MEGESTROL ACETATE 40 MG: 40 TABLET ORAL at 21:34

## 2020-01-01 RX ADMIN — Medication 10 ML: at 22:14

## 2020-01-01 RX ADMIN — PANTOPRAZOLE SODIUM 40 MG: 40 TABLET, DELAYED RELEASE ORAL at 20:45

## 2020-01-01 RX ADMIN — Medication 1 CAPSULE: at 08:32

## 2020-01-01 RX ADMIN — LEVOTHYROXINE SODIUM 25 MCG: 25 TABLET ORAL at 05:59

## 2020-01-01 RX ADMIN — SODIUM CHLORIDE, SODIUM LACTATE, CALCIUM CHLORIDE, MAGNESIUM CHLORIDE AND DEXTROSE 1500 ML: 1.5; 538; 448; 18.3; 5.08 INJECTION, SOLUTION INTRAPERITONEAL at 23:49

## 2020-01-01 RX ADMIN — SODIUM CHLORIDE, SODIUM LACTATE, CALCIUM CHLORIDE, MAGNESIUM CHLORIDE AND DEXTROSE 2000 ML: 1.5; 538; 448; 18.3; 5.08 INJECTION, SOLUTION INTRAPERITONEAL at 09:23

## 2020-01-01 RX ADMIN — FERROUS SULFATE TAB 325 MG (65 MG ELEMENTAL FE) 325 MG: 325 (65 FE) TAB at 10:15

## 2020-01-01 RX ADMIN — Medication 10 ML: at 08:55

## 2020-01-01 RX ADMIN — ASPIRIN 81 MG: 81 TABLET ORAL at 09:44

## 2020-01-01 RX ADMIN — LEVOTHYROXINE SODIUM 25 MCG: 25 TABLET ORAL at 05:47

## 2020-01-01 RX ADMIN — OXYCODONE HYDROCHLORIDE AND ACETAMINOPHEN 500 MG: 500 TABLET ORAL at 20:36

## 2020-01-01 RX ADMIN — CLOPIDOGREL BISULFATE 75 MG: 75 TABLET ORAL at 16:25

## 2020-01-01 RX ADMIN — CLOPIDOGREL BISULFATE 75 MG: 75 TABLET ORAL at 17:46

## 2020-01-01 RX ADMIN — FERROUS SULFATE TAB 325 MG (65 MG ELEMENTAL FE) 325 MG: 325 (65 FE) TAB at 08:26

## 2020-01-01 RX ADMIN — Medication 1 CAPSULE: at 08:54

## 2020-01-01 RX ADMIN — OXYCODONE HYDROCHLORIDE AND ACETAMINOPHEN 500 MG: 500 TABLET ORAL at 09:11

## 2020-01-01 RX ADMIN — DOCUSATE SODIUM 200 MG: 100 CAPSULE, LIQUID FILLED ORAL at 16:36

## 2020-01-01 RX ADMIN — DOCUSATE SODIUM 200 MG: 100 CAPSULE, LIQUID FILLED ORAL at 08:48

## 2020-01-01 RX ADMIN — INSULIN LISPRO 1 UNITS: 100 INJECTION, SOLUTION INTRAVENOUS; SUBCUTANEOUS at 11:30

## 2020-01-01 RX ADMIN — OXYCODONE HYDROCHLORIDE AND ACETAMINOPHEN 500 MG: 500 TABLET ORAL at 09:34

## 2020-01-01 RX ADMIN — DOCUSATE SODIUM 200 MG: 100 CAPSULE, LIQUID FILLED ORAL at 10:43

## 2020-01-01 RX ADMIN — ACETAMINOPHEN 650 MG: 325 TABLET ORAL at 16:45

## 2020-01-01 RX ADMIN — SODIUM CHLORIDE 250 ML: 9 INJECTION, SOLUTION INTRAVENOUS at 14:34

## 2020-01-01 RX ADMIN — PANTOPRAZOLE SODIUM 40 MG: 40 TABLET, DELAYED RELEASE ORAL at 09:34

## 2020-01-01 RX ADMIN — PRAVASTATIN SODIUM 40 MG: 40 TABLET ORAL at 17:51

## 2020-01-01 RX ADMIN — OXYCODONE HYDROCHLORIDE AND ACETAMINOPHEN 500 MG: 500 TABLET ORAL at 08:07

## 2020-01-01 RX ADMIN — SODIUM CHLORIDE, SODIUM LACTATE, CALCIUM CHLORIDE, MAGNESIUM CHLORIDE AND DEXTROSE 1800 ML: 2.5; 538; 448; 18.3; 5.08 INJECTION, SOLUTION INTRAPERITONEAL at 03:45

## 2020-01-01 RX ADMIN — LEVOTHYROXINE SODIUM 25 MCG: 25 TABLET ORAL at 04:10

## 2020-01-01 RX ADMIN — SEVELAMER CARBONATE 800 MG: 800 TABLET, FILM COATED ORAL at 09:11

## 2020-01-01 RX ADMIN — MICONAZOLE NITRATE: 20 POWDER TOPICAL at 09:11

## 2020-01-01 RX ADMIN — CLOPIDOGREL BISULFATE 75 MG: 75 TABLET ORAL at 16:38

## 2020-01-01 RX ADMIN — SODIUM CHLORIDE, SODIUM LACTATE, CALCIUM CHLORIDE, MAGNESIUM CHLORIDE AND DEXTROSE 2000 ML: 1.5; 538; 448; 18.3; 5.08 INJECTION, SOLUTION INTRAPERITONEAL at 11:57

## 2020-01-01 RX ADMIN — OXYCODONE HYDROCHLORIDE AND ACETAMINOPHEN 500 MG: 500 TABLET ORAL at 09:29

## 2020-01-01 RX ADMIN — MIDODRINE HYDROCHLORIDE 10 MG: 10 TABLET ORAL at 11:21

## 2020-01-01 RX ADMIN — MEGESTROL ACETATE 40 MG: 40 TABLET ORAL at 08:51

## 2020-01-01 RX ADMIN — POTASSIUM CHLORIDE 20 MEQ: 1500 TABLET, EXTENDED RELEASE ORAL at 16:45

## 2020-01-01 RX ADMIN — MIDODRINE HYDROCHLORIDE 15 MG: 10 TABLET ORAL at 11:09

## 2020-01-01 RX ADMIN — SODIUM CHLORIDE, SODIUM LACTATE, CALCIUM CHLORIDE, MAGNESIUM CHLORIDE AND DEXTROSE 1800 ML: 2.5; 538; 448; 18.3; 5.08 INJECTION, SOLUTION INTRAPERITONEAL at 03:00

## 2020-01-01 RX ADMIN — SODIUM CHLORIDE, SODIUM LACTATE, CALCIUM CHLORIDE, MAGNESIUM CHLORIDE AND DEXTROSE 1500 ML: 2.5; 538; 448; 18.3; 5.08 INJECTION, SOLUTION INTRAPERITONEAL at 17:46

## 2020-01-01 RX ADMIN — METOPROLOL SUCCINATE 25 MG: 25 TABLET, EXTENDED RELEASE ORAL at 21:49

## 2020-01-01 RX ADMIN — MIDODRINE HYDROCHLORIDE 15 MG: 10 TABLET ORAL at 08:35

## 2020-01-01 RX ADMIN — Medication 1 CAPSULE: at 09:30

## 2020-01-01 RX ADMIN — SEVELAMER CARBONATE 800 MG: 800 TABLET, FILM COATED ORAL at 09:26

## 2020-01-01 RX ADMIN — MIDODRINE HYDROCHLORIDE 10 MG: 10 TABLET ORAL at 17:46

## 2020-01-01 RX ADMIN — MEGESTROL ACETATE 40 MG: 40 TABLET ORAL at 08:03

## 2020-01-01 RX ADMIN — ONDANSETRON HYDROCHLORIDE 4 MG: 4 TABLET, FILM COATED ORAL at 08:33

## 2020-01-01 RX ADMIN — MICONAZOLE NITRATE: 20 POWDER TOPICAL at 21:33

## 2020-01-01 RX ADMIN — Medication 1000 MCG: at 10:14

## 2020-01-01 RX ADMIN — PRAVASTATIN SODIUM 40 MG: 40 TABLET ORAL at 18:12

## 2020-01-01 RX ADMIN — OXYCODONE HYDROCHLORIDE AND ACETAMINOPHEN 500 MG: 500 TABLET ORAL at 23:10

## 2020-01-01 RX ADMIN — CALCIUM POLYCARBOPHIL 625 MG: 625 TABLET, FILM COATED ORAL at 21:17

## 2020-01-01 RX ADMIN — SODIUM CHLORIDE, PRESERVATIVE FREE 10 ML: 5 INJECTION INTRAVENOUS at 22:14

## 2020-01-01 RX ADMIN — PANTOPRAZOLE SODIUM 40 MG: 40 TABLET, DELAYED RELEASE ORAL at 20:46

## 2020-01-01 RX ADMIN — METOPROLOL SUCCINATE 50 MG: 50 TABLET, FILM COATED, EXTENDED RELEASE ORAL at 09:22

## 2020-01-01 RX ADMIN — PANTOPRAZOLE SODIUM 40 MG: 40 TABLET, DELAYED RELEASE ORAL at 22:01

## 2020-01-01 RX ADMIN — SODIUM CHLORIDE, SODIUM LACTATE, CALCIUM CHLORIDE, MAGNESIUM CHLORIDE AND DEXTROSE 1800 ML: 2.5; 538; 448; 18.3; 5.08 INJECTION, SOLUTION INTRAPERITONEAL at 03:29

## 2020-01-01 RX ADMIN — ACETAMINOPHEN 650 MG: 325 TABLET ORAL at 19:56

## 2020-01-01 RX ADMIN — PRAVASTATIN SODIUM 40 MG: 40 TABLET ORAL at 17:20

## 2020-01-01 RX ADMIN — SODIUM CHLORIDE, SODIUM LACTATE, CALCIUM CHLORIDE, MAGNESIUM CHLORIDE AND DEXTROSE 2000 ML: 1.5; 538; 448; 18.3; 5.08 INJECTION, SOLUTION INTRAPERITONEAL at 14:24

## 2020-01-01 RX ADMIN — MEGESTROL ACETATE 40 MG: 40 TABLET ORAL at 21:02

## 2020-01-01 RX ADMIN — Medication 1000 MCG: at 16:59

## 2020-01-01 RX ADMIN — SODIUM CHLORIDE, SODIUM LACTATE, CALCIUM CHLORIDE, MAGNESIUM CHLORIDE AND DEXTROSE 1800 ML: 2.5; 538; 448; 18.3; 5.08 INJECTION, SOLUTION INTRAPERITONEAL at 20:50

## 2020-01-01 RX ADMIN — MICONAZOLE NITRATE: 20 POWDER TOPICAL at 09:19

## 2020-01-01 RX ADMIN — ONDANSETRON HYDROCHLORIDE 4 MG: 4 TABLET, FILM COATED ORAL at 22:05

## 2020-01-01 RX ADMIN — METOCLOPRAMIDE 5 MG: 10 TABLET ORAL at 17:36

## 2020-01-01 RX ADMIN — POLYETHYLENE GLYCOL 3350 17 G: 17 POWDER, FOR SOLUTION ORAL at 20:46

## 2020-01-01 RX ADMIN — CALCIUM POLYCARBOPHIL 625 MG: 625 TABLET, FILM COATED ORAL at 09:34

## 2020-01-01 RX ADMIN — SEVELAMER CARBONATE 800 MG: 800 TABLET, FILM COATED ORAL at 13:10

## 2020-01-01 RX ADMIN — MIDODRINE HYDROCHLORIDE 10 MG: 10 TABLET ORAL at 09:11

## 2020-01-01 RX ADMIN — INSULIN LISPRO 1 UNITS: 100 INJECTION, SOLUTION INTRAVENOUS; SUBCUTANEOUS at 21:53

## 2020-01-01 RX ADMIN — HYDRALAZINE HYDROCHLORIDE 50 MG: 50 TABLET, FILM COATED ORAL at 00:37

## 2020-01-01 RX ADMIN — Medication 1000 MCG: at 09:11

## 2020-01-01 RX ADMIN — ACETAMINOPHEN 650 MG: 325 TABLET ORAL at 08:55

## 2020-01-01 RX ADMIN — CLINDAMYCIN PHOSPHATE 600 MG: 600 INJECTION, SOLUTION INTRAVENOUS at 14:48

## 2020-01-01 RX ADMIN — CEFTRIAXONE SODIUM 1 G: 1 INJECTION, POWDER, FOR SOLUTION INTRAMUSCULAR; INTRAVENOUS at 22:37

## 2020-01-01 RX ADMIN — ACETAMINOPHEN 650 MG: 325 TABLET ORAL at 21:13

## 2020-01-01 RX ADMIN — ACETAMINOPHEN 650 MG: 325 TABLET ORAL at 21:20

## 2020-01-01 RX ADMIN — OXYCODONE HYDROCHLORIDE AND ACETAMINOPHEN 500 MG: 500 TABLET ORAL at 07:25

## 2020-01-01 RX ADMIN — INSULIN GLARGINE 10 UNITS: 100 INJECTION, SOLUTION SUBCUTANEOUS at 21:42

## 2020-01-01 RX ADMIN — PANTOPRAZOLE SODIUM 40 MG: 40 TABLET, DELAYED RELEASE ORAL at 09:29

## 2020-01-01 RX ADMIN — SEVELAMER CARBONATE 800 MG: 800 TABLET, FILM COATED ORAL at 08:54

## 2020-01-01 RX ADMIN — ACETAMINOPHEN 650 MG: 325 TABLET ORAL at 15:20

## 2020-01-01 RX ADMIN — INSULIN LISPRO 2 UNITS: 100 INJECTION, SOLUTION INTRAVENOUS; SUBCUTANEOUS at 12:02

## 2020-01-01 RX ADMIN — SODIUM CHLORIDE, SODIUM LACTATE, CALCIUM CHLORIDE, MAGNESIUM CHLORIDE AND DEXTROSE 1500 ML: 1.5; 538; 448; 18.3; 5.08 INJECTION, SOLUTION INTRAPERITONEAL at 15:17

## 2020-01-01 RX ADMIN — MIDODRINE HYDROCHLORIDE 10 MG: 10 TABLET ORAL at 08:39

## 2020-01-01 RX ADMIN — OXYCODONE HYDROCHLORIDE AND ACETAMINOPHEN 500 MG: 500 TABLET ORAL at 07:57

## 2020-01-01 RX ADMIN — ACETAMINOPHEN 650 MG: 325 TABLET ORAL at 14:55

## 2020-01-01 RX ADMIN — PANTOPRAZOLE SODIUM 40 MG: 40 TABLET, DELAYED RELEASE ORAL at 09:26

## 2020-01-01 RX ADMIN — ASPIRIN 81 MG: 81 TABLET ORAL at 09:11

## 2020-01-01 RX ADMIN — DROXIDOPA 200 MG: 200 CAPSULE ORAL at 08:14

## 2020-01-01 RX ADMIN — MIDODRINE HYDROCHLORIDE 15 MG: 10 TABLET ORAL at 12:42

## 2020-01-01 RX ADMIN — SODIUM CHLORIDE, PRESERVATIVE FREE 10 ML: 5 INJECTION INTRAVENOUS at 21:58

## 2020-01-01 RX ADMIN — Medication 1 TABLET: at 09:34

## 2020-01-01 RX ADMIN — SODIUM CHLORIDE 1000 ML: 9 INJECTION, SOLUTION INTRAVENOUS at 22:08

## 2020-01-01 RX ADMIN — CIPROFLOXACIN 250 MG: 250 TABLET, FILM COATED ORAL at 11:41

## 2020-01-01 RX ADMIN — VITAMIN D, TAB 1000IU (100/BT) 1000 UNITS: 25 TAB at 10:44

## 2020-01-01 RX ADMIN — INSULIN LISPRO 1 UNITS: 100 INJECTION, SOLUTION INTRAVENOUS; SUBCUTANEOUS at 21:01

## 2020-01-01 RX ADMIN — POLYETHYLENE GLYCOL 3350 17 G: 17 POWDER, FOR SOLUTION ORAL at 08:51

## 2020-01-01 RX ADMIN — Medication 1 CAPSULE: at 09:11

## 2020-01-01 RX ADMIN — CLOPIDOGREL BISULFATE 75 MG: 75 TABLET ORAL at 20:04

## 2020-01-01 RX ADMIN — PIPERACILLIN AND TAZOBACTAM 2.25 G: 2; .25 INJECTION, POWDER, LYOPHILIZED, FOR SOLUTION INTRAVENOUS at 08:14

## 2020-01-01 RX ADMIN — HEPARIN SODIUM: 1000 INJECTION INTRAVENOUS; SUBCUTANEOUS at 03:02

## 2020-01-01 RX ADMIN — CLOPIDOGREL BISULFATE 75 MG: 75 TABLET ORAL at 17:39

## 2020-01-01 RX ADMIN — MEGESTROL ACETATE 40 MG: 40 TABLET ORAL at 21:58

## 2020-01-01 RX ADMIN — Medication 1000 MCG: at 16:32

## 2020-01-01 RX ADMIN — FERROUS SULFATE TAB 325 MG (65 MG ELEMENTAL FE) 325 MG: 325 (65 FE) TAB at 19:39

## 2020-01-01 RX ADMIN — METOPROLOL SUCCINATE 25 MG: 25 TABLET, EXTENDED RELEASE ORAL at 21:26

## 2020-01-01 RX ADMIN — PANTOPRAZOLE SODIUM 40 MG: 40 TABLET, DELAYED RELEASE ORAL at 01:02

## 2020-01-01 RX ADMIN — SODIUM CHLORIDE, SODIUM LACTATE, CALCIUM CHLORIDE, MAGNESIUM CHLORIDE AND DEXTROSE 2000 ML: 2.5; 538; 448; 18.3; 5.08 INJECTION, SOLUTION INTRAPERITONEAL at 10:40

## 2020-01-01 RX ADMIN — METOPROLOL SUCCINATE 50 MG: 50 TABLET, EXTENDED RELEASE ORAL at 08:54

## 2020-01-01 RX ADMIN — MAGNESIUM SULFATE HEPTAHYDRATE 1 G: 500 INJECTION, SOLUTION INTRAMUSCULAR; INTRAVENOUS at 09:46

## 2020-01-01 RX ADMIN — PANTOPRAZOLE SODIUM 40 MG: 40 TABLET, DELAYED RELEASE ORAL at 21:43

## 2020-01-01 RX ADMIN — MIDODRINE HYDROCHLORIDE 10 MG: 10 TABLET ORAL at 11:40

## 2020-01-01 RX ADMIN — SODIUM CHLORIDE, SODIUM LACTATE, CALCIUM CHLORIDE, MAGNESIUM CHLORIDE AND DEXTROSE 1500 ML: 1.5; 538; 448; 18.3; 5.08 INJECTION, SOLUTION INTRAPERITONEAL at 21:14

## 2020-01-01 RX ADMIN — CALCIUM POLYCARBOPHIL 625 MG: 625 TABLET, FILM COATED ORAL at 09:12

## 2020-01-01 RX ADMIN — MIDODRINE HYDROCHLORIDE 10 MG: 10 TABLET ORAL at 08:56

## 2020-01-01 RX ADMIN — GENTAMICIN SULFATE: 1 CREAM TOPICAL at 08:35

## 2020-01-01 RX ADMIN — POTASSIUM CHLORIDE 20 MEQ: 1500 TABLET, EXTENDED RELEASE ORAL at 08:56

## 2020-01-01 RX ADMIN — SODIUM CHLORIDE, PRESERVATIVE FREE 10 ML: 5 INJECTION INTRAVENOUS at 07:25

## 2020-01-01 RX ADMIN — SEVELAMER CARBONATE 800 MG: 800 TABLET, FILM COATED ORAL at 17:06

## 2020-01-01 RX ADMIN — VITAMIN D, TAB 1000IU (100/BT) 1000 UNITS: 25 TAB at 08:54

## 2020-01-01 RX ADMIN — PRAVASTATIN SODIUM 40 MG: 40 TABLET ORAL at 21:17

## 2020-01-01 RX ADMIN — MEGESTROL ACETATE 40 MG: 40 TABLET ORAL at 01:01

## 2020-01-01 RX ADMIN — METOPROLOL SUCCINATE 50 MG: 50 TABLET, FILM COATED, EXTENDED RELEASE ORAL at 09:12

## 2020-01-01 RX ADMIN — SODIUM CHLORIDE, SODIUM LACTATE, CALCIUM CHLORIDE, MAGNESIUM CHLORIDE AND DEXTROSE 1800 ML: 2.5; 538; 448; 18.3; 5.08 INJECTION, SOLUTION INTRAPERITONEAL at 21:14

## 2020-01-01 RX ADMIN — POLYETHYLENE GLYCOL 3350 17 G: 17 POWDER, FOR SOLUTION ORAL at 21:51

## 2020-01-01 RX ADMIN — SODIUM CHLORIDE, SODIUM LACTATE, CALCIUM CHLORIDE, MAGNESIUM CHLORIDE AND DEXTROSE 2000 ML: 2.5; 538; 448; 18.3; 5.08 INJECTION, SOLUTION INTRAPERITONEAL at 17:30

## 2020-01-01 RX ADMIN — SODIUM CHLORIDE, PRESERVATIVE FREE 10 ML: 5 INJECTION INTRAVENOUS at 22:04

## 2020-01-01 RX ADMIN — INSULIN GLARGINE 10 UNITS: 100 INJECTION, SOLUTION SUBCUTANEOUS at 20:46

## 2020-01-01 RX ADMIN — POTASSIUM CHLORIDE 20 MEQ: 1500 TABLET, EXTENDED RELEASE ORAL at 16:49

## 2020-01-01 RX ADMIN — INSULIN GLARGINE 10 UNITS: 100 INJECTION, SOLUTION SUBCUTANEOUS at 20:17

## 2020-01-01 RX ADMIN — SODIUM CHLORIDE, SODIUM LACTATE, CALCIUM CHLORIDE, MAGNESIUM CHLORIDE AND DEXTROSE 2000 ML: 2.5; 538; 448; 18.3; 5.08 INJECTION, SOLUTION INTRAPERITONEAL at 20:59

## 2020-01-01 RX ADMIN — PRAVASTATIN SODIUM 40 MG: 40 TABLET ORAL at 17:31

## 2020-01-01 RX ADMIN — MEGESTROL ACETATE 40 MG: 40 TABLET ORAL at 23:11

## 2020-01-01 RX ADMIN — METOCLOPRAMIDE 5 MG: 10 TABLET ORAL at 16:30

## 2020-01-01 RX ADMIN — FERROUS SULFATE TAB 325 MG (65 MG ELEMENTAL FE) 325 MG: 325 (65 FE) TAB at 08:44

## 2020-01-01 RX ADMIN — METOPROLOL SUCCINATE 50 MG: 50 TABLET, FILM COATED, EXTENDED RELEASE ORAL at 09:11

## 2020-01-01 RX ADMIN — MEGESTROL ACETATE 40 MG: 40 TABLET ORAL at 21:46

## 2020-01-01 RX ADMIN — MEGESTROL ACETATE 40 MG: 40 TABLET ORAL at 08:35

## 2020-01-01 RX ADMIN — LEVOTHYROXINE SODIUM 25 MCG: 25 TABLET ORAL at 06:50

## 2020-01-01 RX ADMIN — SODIUM CHLORIDE, SODIUM LACTATE, CALCIUM CHLORIDE, MAGNESIUM CHLORIDE AND DEXTROSE 2000 ML: 1.5; 538; 448; 18.3; 5.08 INJECTION, SOLUTION INTRAPERITONEAL at 09:04

## 2020-01-01 RX ADMIN — VITAMIN D, TAB 1000IU (100/BT) 1000 UNITS: 25 TAB at 08:52

## 2020-01-01 RX ADMIN — POTASSIUM CHLORIDE 20 MEQ: 1500 TABLET, EXTENDED RELEASE ORAL at 08:42

## 2020-01-01 RX ADMIN — ASPIRIN 81 MG: 81 TABLET ORAL at 09:29

## 2020-01-01 RX ADMIN — INSULIN GLARGINE 10 UNITS: 100 INJECTION, SOLUTION SUBCUTANEOUS at 22:07

## 2020-01-01 RX ADMIN — LEVOTHYROXINE SODIUM 25 MCG: 25 TABLET ORAL at 07:25

## 2020-01-01 RX ADMIN — OXYCODONE HYDROCHLORIDE AND ACETAMINOPHEN 500 MG: 500 TABLET ORAL at 09:40

## 2020-01-01 RX ADMIN — CLOPIDOGREL BISULFATE 75 MG: 75 TABLET ORAL at 16:45

## 2020-01-01 RX ADMIN — ACETAMINOPHEN 650 MG: 325 TABLET ORAL at 21:49

## 2020-01-01 RX ADMIN — SODIUM CHLORIDE: 9 INJECTION, SOLUTION INTRAVENOUS at 01:07

## 2020-01-01 RX ADMIN — CEFEPIME HYDROCHLORIDE 1 G: 1 INJECTION, POWDER, FOR SOLUTION INTRAMUSCULAR; INTRAVENOUS at 14:55

## 2020-01-01 RX ADMIN — METOPROLOL SUCCINATE 50 MG: 50 TABLET, EXTENDED RELEASE ORAL at 08:09

## 2020-01-01 RX ADMIN — PANTOPRAZOLE SODIUM 40 MG: 40 TABLET, DELAYED RELEASE ORAL at 20:44

## 2020-01-01 RX ADMIN — CALCIUM POLYCARBOPHIL 625 MG: 625 TABLET, FILM COATED ORAL at 21:02

## 2020-01-01 RX ADMIN — ACETAMINOPHEN 650 MG: 325 TABLET ORAL at 01:01

## 2020-01-01 RX ADMIN — POTASSIUM CHLORIDE 40 MEQ: 1500 TABLET, EXTENDED RELEASE ORAL at 12:01

## 2020-01-01 RX ADMIN — OXYCODONE HYDROCHLORIDE AND ACETAMINOPHEN 500 MG: 500 TABLET ORAL at 11:13

## 2020-01-01 RX ADMIN — DOCUSATE SODIUM 100 MG: 100 CAPSULE, LIQUID FILLED ORAL at 08:21

## 2020-01-01 RX ADMIN — Medication 1 CAPSULE: at 09:21

## 2020-01-01 RX ADMIN — GENTAMICIN SULFATE: 1 CREAM TOPICAL at 09:46

## 2020-01-01 RX ADMIN — SODIUM CHLORIDE, SODIUM LACTATE, CALCIUM CHLORIDE, MAGNESIUM CHLORIDE AND DEXTROSE 2000 ML: 1.5; 538; 448; 18.3; 5.08 INJECTION, SOLUTION INTRAPERITONEAL at 22:13

## 2020-01-01 RX ADMIN — PANTOPRAZOLE SODIUM 40 MG: 40 TABLET, DELAYED RELEASE ORAL at 09:43

## 2020-01-01 RX ADMIN — SODIUM CHLORIDE, SODIUM LACTATE, CALCIUM CHLORIDE, MAGNESIUM CHLORIDE AND DEXTROSE 2000 ML: 1.5; 538; 448; 18.3; 5.08 INJECTION, SOLUTION INTRAPERITONEAL at 05:44

## 2020-01-01 RX ADMIN — Medication 10 ML: at 14:51

## 2020-01-01 RX ADMIN — ONDANSETRON HYDROCHLORIDE 4 MG: 4 TABLET, FILM COATED ORAL at 21:21

## 2020-01-01 RX ADMIN — POTASSIUM CHLORIDE 20 MEQ: 1500 TABLET, EXTENDED RELEASE ORAL at 20:04

## 2020-01-01 RX ADMIN — POTASSIUM CHLORIDE 20 MEQ: 1500 TABLET, EXTENDED RELEASE ORAL at 09:29

## 2020-01-01 RX ADMIN — LEVOTHYROXINE SODIUM 25 MCG: 25 TABLET ORAL at 06:26

## 2020-01-01 RX ADMIN — ACETAMINOPHEN 650 MG: 325 TABLET ORAL at 21:12

## 2020-01-01 RX ADMIN — PANTOPRAZOLE SODIUM 40 MG: 40 TABLET, DELAYED RELEASE ORAL at 09:13

## 2020-01-01 RX ADMIN — DOPAMINE HYDROCHLORIDE 2.5 MCG/KG/MIN: 160 INJECTION, SOLUTION INTRAVENOUS at 09:47

## 2020-01-01 RX ADMIN — CLOPIDOGREL BISULFATE 75 MG: 75 TABLET ORAL at 22:11

## 2020-01-01 RX ADMIN — CLOPIDOGREL BISULFATE 75 MG: 75 TABLET ORAL at 17:04

## 2020-01-01 RX ADMIN — ONDANSETRON 4 MG: 2 INJECTION INTRAMUSCULAR; INTRAVENOUS at 10:12

## 2020-01-01 RX ADMIN — METOPROLOL SUCCINATE 50 MG: 50 TABLET, EXTENDED RELEASE ORAL at 21:13

## 2020-01-01 RX ADMIN — SEVELAMER CARBONATE 800 MG: 800 TABLET, FILM COATED ORAL at 16:50

## 2020-01-01 RX ADMIN — MIDODRINE HYDROCHLORIDE 10 MG: 10 TABLET ORAL at 09:44

## 2020-01-01 RX ADMIN — INSULIN GLARGINE 10 UNITS: 100 INJECTION, SOLUTION SUBCUTANEOUS at 21:54

## 2020-01-01 RX ADMIN — PRAVASTATIN SODIUM 40 MG: 40 TABLET ORAL at 16:48

## 2020-01-01 RX ADMIN — SODIUM CHLORIDE, PRESERVATIVE FREE 10 ML: 5 INJECTION INTRAVENOUS at 09:12

## 2020-01-01 RX ADMIN — Medication 500 MG: at 09:02

## 2020-01-01 RX ADMIN — INSULIN LISPRO 2 UNITS: 100 INJECTION, SOLUTION INTRAVENOUS; SUBCUTANEOUS at 11:50

## 2020-01-01 RX ADMIN — DOCUSATE SODIUM 100 MG: 100 CAPSULE, LIQUID FILLED ORAL at 08:42

## 2020-01-01 RX ADMIN — VITAMIN D, TAB 1000IU (100/BT) 1000 UNITS: 25 TAB at 07:54

## 2020-01-01 RX ADMIN — MIDAZOLAM HYDROCHLORIDE 1 MG: 1 INJECTION INTRAMUSCULAR; INTRAVENOUS at 11:48

## 2020-01-01 RX ADMIN — Medication 500 MG: at 09:11

## 2020-01-01 RX ADMIN — ASPIRIN 81 MG: 81 TABLET ORAL at 09:34

## 2020-01-01 RX ADMIN — ACETAMINOPHEN 650 MG: 325 TABLET ORAL at 20:57

## 2020-01-01 RX ADMIN — CALCIUM POLYCARBOPHIL 625 MG: 625 TABLET, FILM COATED ORAL at 23:10

## 2020-01-01 RX ADMIN — MIDODRINE HYDROCHLORIDE 10 MG: 10 TABLET ORAL at 22:57

## 2020-01-01 RX ADMIN — SODIUM CHLORIDE, SODIUM LACTATE, CALCIUM CHLORIDE, MAGNESIUM CHLORIDE AND DEXTROSE 2000 ML: 2.5; 538; 448; 18.3; 5.08 INJECTION, SOLUTION INTRAPERITONEAL at 14:43

## 2020-01-01 RX ADMIN — CLOPIDOGREL BISULFATE 75 MG: 75 TABLET ORAL at 17:12

## 2020-01-01 RX ADMIN — Medication 500 MG: at 20:45

## 2020-01-01 RX ADMIN — INSULIN LISPRO 1 UNITS: 100 INJECTION, SOLUTION INTRAVENOUS; SUBCUTANEOUS at 17:23

## 2020-01-01 RX ADMIN — SODIUM CHLORIDE 1000 ML: 9 INJECTION, SOLUTION INTRAVENOUS at 20:28

## 2020-01-01 RX ADMIN — DROXIDOPA 200 MG: 200 CAPSULE ORAL at 09:39

## 2020-01-01 RX ADMIN — CLOPIDOGREL BISULFATE 75 MG: 75 TABLET ORAL at 17:06

## 2020-01-01 RX ADMIN — SODIUM CHLORIDE, SODIUM LACTATE, CALCIUM CHLORIDE, MAGNESIUM CHLORIDE AND DEXTROSE 2000 ML: 1.5; 538; 448; 18.3; 5.08 INJECTION, SOLUTION INTRAPERITONEAL at 11:13

## 2020-01-01 RX ADMIN — INSULIN GLARGINE 10 UNITS: 100 INJECTION, SOLUTION SUBCUTANEOUS at 21:02

## 2020-01-01 RX ADMIN — METOCLOPRAMIDE 5 MG: 10 TABLET ORAL at 06:36

## 2020-01-01 RX ADMIN — INSULIN LISPRO 1 UNITS: 100 INJECTION, SOLUTION INTRAVENOUS; SUBCUTANEOUS at 07:54

## 2020-01-01 RX ADMIN — TRIMETHOBENZAMIDE HYDROCHLORIDE 200 MG: 100 INJECTION INTRAMUSCULAR at 17:59

## 2020-01-01 RX ADMIN — ACETAMINOPHEN 650 MG: 325 TABLET ORAL at 20:22

## 2020-01-01 RX ADMIN — MIDODRINE HYDROCHLORIDE 15 MG: 10 TABLET ORAL at 11:57

## 2020-01-01 RX ADMIN — MEGESTROL ACETATE 40 MG: 40 TABLET ORAL at 08:42

## 2020-01-01 RX ADMIN — PANTOPRAZOLE SODIUM 40 MG: 40 TABLET, DELAYED RELEASE ORAL at 09:00

## 2020-01-01 RX ADMIN — PANTOPRAZOLE SODIUM 40 MG: 40 TABLET, DELAYED RELEASE ORAL at 21:25

## 2020-01-01 RX ADMIN — INSULIN LISPRO 1 UNITS: 100 INJECTION, SOLUTION INTRAVENOUS; SUBCUTANEOUS at 08:53

## 2020-01-01 RX ADMIN — CEFEPIME HYDROCHLORIDE 1 G: 1 INJECTION, POWDER, FOR SOLUTION INTRAMUSCULAR; INTRAVENOUS at 12:20

## 2020-01-01 RX ADMIN — METOPROLOL SUCCINATE 100 MG: 100 TABLET, FILM COATED, EXTENDED RELEASE ORAL at 09:02

## 2020-01-01 RX ADMIN — ASPIRIN 81 MG: 81 TABLET ORAL at 08:14

## 2020-01-01 RX ADMIN — CALCIUM POLYCARBOPHIL 625 MG: 625 TABLET, FILM COATED ORAL at 12:43

## 2020-01-01 RX ADMIN — PANTOPRAZOLE SODIUM 40 MG: 40 TABLET, DELAYED RELEASE ORAL at 08:41

## 2020-01-01 RX ADMIN — SEVELAMER CARBONATE 800 MG: 800 TABLET, FILM COATED ORAL at 09:44

## 2020-01-01 RX ADMIN — MIDODRINE HYDROCHLORIDE 10 MG: 10 TABLET ORAL at 17:33

## 2020-01-01 RX ADMIN — POLYETHYLENE GLYCOL 3350 17 G: 17 POWDER, FOR SOLUTION ORAL at 20:16

## 2020-01-01 RX ADMIN — ASPIRIN 81 MG: 81 TABLET ORAL at 08:24

## 2020-01-01 RX ADMIN — POTASSIUM CHLORIDE 20 MEQ: 1500 TABLET, EXTENDED RELEASE ORAL at 09:55

## 2020-01-01 RX ADMIN — DOCUSATE SODIUM 100 MG: 100 CAPSULE, LIQUID FILLED ORAL at 08:53

## 2020-01-01 RX ADMIN — CLOPIDOGREL BISULFATE 75 MG: 75 TABLET ORAL at 01:11

## 2020-01-01 RX ADMIN — SODIUM CHLORIDE, PRESERVATIVE FREE 10 ML: 5 INJECTION INTRAVENOUS at 08:14

## 2020-01-01 RX ADMIN — Medication 1000 MCG: at 08:33

## 2020-01-01 RX ADMIN — MIDODRINE HYDROCHLORIDE 15 MG: 10 TABLET ORAL at 11:21

## 2020-01-01 RX ADMIN — ACETAMINOPHEN 650 MG: 325 TABLET ORAL at 13:39

## 2020-01-01 RX ADMIN — MIDODRINE HYDROCHLORIDE 10 MG: 10 TABLET ORAL at 08:21

## 2020-01-01 RX ADMIN — CALCIUM POLYCARBOPHIL 625 MG: 625 TABLET, FILM COATED ORAL at 20:46

## 2020-01-01 RX ADMIN — Medication 500 MG: at 16:40

## 2020-01-01 RX ADMIN — MICONAZOLE NITRATE: 20 POWDER TOPICAL at 07:54

## 2020-01-01 RX ADMIN — METOCLOPRAMIDE 5 MG: 10 TABLET ORAL at 16:56

## 2020-01-01 RX ADMIN — CALCIUM POLYCARBOPHIL 625 MG: 625 TABLET, FILM COATED ORAL at 12:32

## 2020-01-01 RX ADMIN — SODIUM CHLORIDE, SODIUM LACTATE, CALCIUM CHLORIDE, MAGNESIUM CHLORIDE AND DEXTROSE 1800 ML: 2.5; 538; 448; 18.3; 5.08 INJECTION, SOLUTION INTRAPERITONEAL at 02:36

## 2020-01-01 RX ADMIN — CALCIUM POLYCARBOPHIL 625 MG: 625 TABLET, FILM COATED ORAL at 21:25

## 2020-01-01 RX ADMIN — ACETAMINOPHEN 650 MG: 325 TABLET ORAL at 20:59

## 2020-01-01 RX ADMIN — MEGESTROL ACETATE 40 MG: 40 TABLET ORAL at 08:54

## 2020-01-01 RX ADMIN — ONDANSETRON 4 MG: 2 INJECTION INTRAMUSCULAR; INTRAVENOUS at 03:39

## 2020-01-01 RX ADMIN — PANTOPRAZOLE SODIUM 40 MG: 40 TABLET, DELAYED RELEASE ORAL at 21:20

## 2020-01-01 RX ADMIN — POTASSIUM CHLORIDE 20 MEQ: 1500 TABLET, EXTENDED RELEASE ORAL at 09:11

## 2020-01-01 RX ADMIN — SODIUM CHLORIDE, PRESERVATIVE FREE 10 ML: 5 INJECTION INTRAVENOUS at 09:24

## 2020-01-01 RX ADMIN — SODIUM CHLORIDE, PRESERVATIVE FREE 10 ML: 5 INJECTION INTRAVENOUS at 21:28

## 2020-01-01 RX ADMIN — MEGESTROL ACETATE 40 MG: 40 TABLET ORAL at 21:20

## 2020-01-01 RX ADMIN — PANTOPRAZOLE SODIUM 40 MG: 40 TABLET, DELAYED RELEASE ORAL at 21:58

## 2020-01-01 RX ADMIN — PANTOPRAZOLE SODIUM 40 MG: 40 TABLET, DELAYED RELEASE ORAL at 08:46

## 2020-01-01 RX ADMIN — TRIMETHOBENZAMIDE HYDROCHLORIDE 200 MG: 100 INJECTION INTRAMUSCULAR at 22:14

## 2020-01-01 RX ADMIN — CALCIUM POLYCARBOPHIL 625 MG: 625 TABLET, FILM COATED ORAL at 09:40

## 2020-01-01 RX ADMIN — IOPAMIDOL 80 ML: 755 INJECTION, SOLUTION INTRAVENOUS at 09:54

## 2020-01-01 RX ADMIN — MEGESTROL ACETATE 40 MG: 40 TABLET ORAL at 20:22

## 2020-01-01 RX ADMIN — PRAVASTATIN SODIUM 40 MG: 40 TABLET ORAL at 16:59

## 2020-01-01 RX ADMIN — POTASSIUM CHLORIDE 20 MEQ: 1500 TABLET, EXTENDED RELEASE ORAL at 08:52

## 2020-01-01 RX ADMIN — SEVELAMER CARBONATE 800 MG: 800 TABLET, FILM COATED ORAL at 09:24

## 2020-01-01 RX ADMIN — MEGESTROL ACETATE 40 MG: 40 TABLET ORAL at 00:33

## 2020-01-01 RX ADMIN — Medication 1 CAPSULE: at 09:39

## 2020-01-01 RX ADMIN — CLOPIDOGREL BISULFATE 75 MG: 75 TABLET ORAL at 17:33

## 2020-01-01 RX ADMIN — ASPIRIN 81 MG: 81 TABLET ORAL at 08:31

## 2020-01-01 RX ADMIN — POTASSIUM CHLORIDE 20 MEQ: 1500 TABLET, EXTENDED RELEASE ORAL at 17:00

## 2020-01-01 RX ADMIN — Medication 1 CAPSULE: at 09:26

## 2020-01-01 RX ADMIN — SEVELAMER CARBONATE 800 MG: 800 TABLET, FILM COATED ORAL at 08:46

## 2020-01-01 RX ADMIN — ONDANSETRON 4 MG: 4 TABLET, ORALLY DISINTEGRATING ORAL at 04:13

## 2020-01-01 RX ADMIN — INSULIN LISPRO 2 UNITS: 100 INJECTION, SOLUTION INTRAVENOUS; SUBCUTANEOUS at 13:32

## 2020-01-01 RX ADMIN — INSULIN LISPRO 1 UNITS: 100 INJECTION, SOLUTION INTRAVENOUS; SUBCUTANEOUS at 09:37

## 2020-01-01 RX ADMIN — SODIUM CHLORIDE, PRESERVATIVE FREE 10 ML: 5 INJECTION INTRAVENOUS at 19:58

## 2020-01-01 RX ADMIN — CALCIUM POLYCARBOPHIL 625 MG: 625 TABLET, FILM COATED ORAL at 14:23

## 2020-01-01 RX ADMIN — MEGESTROL ACETATE 40 MG: 40 TABLET ORAL at 20:04

## 2020-01-01 RX ADMIN — Medication 1000 MCG: at 16:26

## 2020-01-01 RX ADMIN — MIDODRINE HYDROCHLORIDE 10 MG: 10 TABLET ORAL at 09:24

## 2020-01-01 RX ADMIN — POLYETHYLENE GLYCOL 3350 17 G: 17 POWDER, FOR SOLUTION ORAL at 21:25

## 2020-01-01 RX ADMIN — METOPROLOL SUCCINATE 100 MG: 100 TABLET, FILM COATED, EXTENDED RELEASE ORAL at 08:33

## 2020-01-01 RX ADMIN — ACETAMINOPHEN 650 MG: 325 TABLET ORAL at 08:23

## 2020-01-01 RX ADMIN — FERROUS SULFATE TAB 325 MG (65 MG ELEMENTAL FE) 325 MG: 325 (65 FE) TAB at 09:03

## 2020-01-01 RX ADMIN — MIDODRINE HYDROCHLORIDE 10 MG: 10 TABLET ORAL at 11:54

## 2020-01-01 RX ADMIN — MIDODRINE HYDROCHLORIDE 10 MG: 10 TABLET ORAL at 17:23

## 2020-01-01 RX ADMIN — LEVOTHYROXINE SODIUM 25 MCG: 25 TABLET ORAL at 06:36

## 2020-01-01 RX ADMIN — SEVELAMER CARBONATE 800 MG: 800 TABLET, FILM COATED ORAL at 12:16

## 2020-01-01 RX ADMIN — PANTOPRAZOLE SODIUM 40 MG: 40 TABLET, DELAYED RELEASE ORAL at 08:57

## 2020-01-01 RX ADMIN — SODIUM CHLORIDE, PRESERVATIVE FREE 10 ML: 5 INJECTION INTRAVENOUS at 20:57

## 2020-01-01 RX ADMIN — MEGESTROL ACETATE 40 MG: 40 TABLET ORAL at 09:20

## 2020-01-01 RX ADMIN — SODIUM CHLORIDE, SODIUM LACTATE, CALCIUM CHLORIDE, MAGNESIUM CHLORIDE AND DEXTROSE 1800 ML: 2.5; 538; 448; 18.3; 5.08 INJECTION, SOLUTION INTRAPERITONEAL at 02:49

## 2020-01-01 RX ADMIN — METOPROLOL SUCCINATE 50 MG: 50 TABLET, EXTENDED RELEASE ORAL at 22:04

## 2020-01-01 RX ADMIN — OXYCODONE HYDROCHLORIDE AND ACETAMINOPHEN 500 MG: 500 TABLET ORAL at 22:07

## 2020-01-01 RX ADMIN — HEPARIN SODIUM: 1000 INJECTION INTRAVENOUS; SUBCUTANEOUS at 06:34

## 2020-01-01 RX ADMIN — SODIUM CHLORIDE, SODIUM LACTATE, CALCIUM CHLORIDE, MAGNESIUM CHLORIDE AND DEXTROSE 2000 ML: 1.5; 538; 448; 18.3; 5.08 INJECTION, SOLUTION INTRAPERITONEAL at 11:42

## 2020-01-01 RX ADMIN — MULTIPLE VITAMINS W/ MINERALS TAB 1 TABLET: TAB at 08:09

## 2020-01-01 RX ADMIN — SEVELAMER CARBONATE 800 MG: 800 TABLET, FILM COATED ORAL at 08:21

## 2020-01-01 RX ADMIN — ACETAMINOPHEN 650 MG: 325 TABLET ORAL at 08:53

## 2020-01-01 RX ADMIN — MAGNESIUM SULFATE HEPTAHYDRATE 2 G: 40 INJECTION, SOLUTION INTRAVENOUS at 16:55

## 2020-01-01 RX ADMIN — DEXTROSE MONOHYDRATE 1 G: 5 INJECTION INTRAVENOUS at 20:46

## 2020-01-01 RX ADMIN — POTASSIUM CHLORIDE 20 MEQ: 1500 TABLET, EXTENDED RELEASE ORAL at 08:35

## 2020-01-01 RX ADMIN — SODIUM CHLORIDE, PRESERVATIVE FREE 10 ML: 5 INJECTION INTRAVENOUS at 11:14

## 2020-01-01 RX ADMIN — PANTOPRAZOLE SODIUM 40 MG: 40 TABLET, DELAYED RELEASE ORAL at 11:13

## 2020-01-01 RX ADMIN — SODIUM CHLORIDE, SODIUM LACTATE, CALCIUM CHLORIDE, MAGNESIUM CHLORIDE AND DEXTROSE 1800 ML: 2.5; 538; 448; 18.3; 5.08 INJECTION, SOLUTION INTRAPERITONEAL at 15:37

## 2020-01-01 RX ADMIN — SODIUM CHLORIDE, SODIUM LACTATE, CALCIUM CHLORIDE, MAGNESIUM CHLORIDE AND DEXTROSE 1500 ML: 1.5; 538; 448; 18.3; 5.08 INJECTION, SOLUTION INTRAPERITONEAL at 09:55

## 2020-01-01 RX ADMIN — SODIUM CHLORIDE, SODIUM LACTATE, CALCIUM CHLORIDE, MAGNESIUM CHLORIDE AND DEXTROSE 1800 ML: 2.5; 538; 448; 18.3; 5.08 INJECTION, SOLUTION INTRAPERITONEAL at 21:21

## 2020-01-01 RX ADMIN — METOPROLOL SUCCINATE 50 MG: 50 TABLET, EXTENDED RELEASE ORAL at 21:20

## 2020-01-01 RX ADMIN — INSULIN LISPRO 4 UNITS: 100 INJECTION, SOLUTION INTRAVENOUS; SUBCUTANEOUS at 16:37

## 2020-01-01 RX ADMIN — SODIUM CHLORIDE, SODIUM LACTATE, CALCIUM CHLORIDE, MAGNESIUM CHLORIDE AND DEXTROSE 2000 ML: 1.5; 538; 448; 18.3; 5.08 INJECTION, SOLUTION INTRAPERITONEAL at 05:20

## 2020-01-01 RX ADMIN — METOPROLOL SUCCINATE 50 MG: 50 TABLET, EXTENDED RELEASE ORAL at 20:49

## 2020-01-01 RX ADMIN — LEVOTHYROXINE SODIUM 25 MCG: 25 TABLET ORAL at 06:30

## 2020-01-01 RX ADMIN — ONDANSETRON 4 MG: 4 TABLET, ORALLY DISINTEGRATING ORAL at 17:16

## 2020-01-01 RX ADMIN — ONDANSETRON HYDROCHLORIDE 4 MG: 4 TABLET, FILM COATED ORAL at 09:11

## 2020-01-01 RX ADMIN — ACETAMINOPHEN 650 MG: 325 TABLET ORAL at 19:39

## 2020-01-01 RX ADMIN — SODIUM CHLORIDE, SODIUM LACTATE, CALCIUM CHLORIDE, MAGNESIUM CHLORIDE AND DEXTROSE 1800 ML: 2.5; 538; 448; 18.3; 5.08 INJECTION, SOLUTION INTRAPERITONEAL at 08:34

## 2020-01-01 RX ADMIN — SEVELAMER CARBONATE 800 MG: 800 TABLET, FILM COATED ORAL at 11:23

## 2020-01-01 RX ADMIN — CALCIUM POLYCARBOPHIL 625 MG: 625 TABLET, FILM COATED ORAL at 13:32

## 2020-01-01 RX ADMIN — INSULIN LISPRO 1 UNITS: 100 INJECTION, SOLUTION INTRAVENOUS; SUBCUTANEOUS at 11:54

## 2020-01-01 RX ADMIN — POLYETHYLENE GLYCOL 3350 17 G: 17 POWDER, FOR SOLUTION ORAL at 08:37

## 2020-01-01 RX ADMIN — CALCIUM POLYCARBOPHIL 625 MG: 625 TABLET, FILM COATED ORAL at 07:53

## 2020-01-01 RX ADMIN — SODIUM CHLORIDE, SODIUM LACTATE, CALCIUM CHLORIDE, MAGNESIUM CHLORIDE AND DEXTROSE 1500 ML: 1.5; 538; 448; 18.3; 5.08 INJECTION, SOLUTION INTRAPERITONEAL at 09:35

## 2020-01-01 RX ADMIN — MIDODRINE HYDROCHLORIDE 10 MG: 10 TABLET ORAL at 16:31

## 2020-01-01 RX ADMIN — POTASSIUM CHLORIDE 20 MEQ: 1500 TABLET, EXTENDED RELEASE ORAL at 09:43

## 2020-01-01 RX ADMIN — MEGESTROL ACETATE 40 MG: 40 TABLET ORAL at 08:55

## 2020-01-01 RX ADMIN — ACETAMINOPHEN 650 MG: 325 TABLET ORAL at 16:36

## 2020-01-01 RX ADMIN — SODIUM CHLORIDE, SODIUM LACTATE, CALCIUM CHLORIDE, MAGNESIUM CHLORIDE AND DEXTROSE 2000 ML: 2.5; 538; 448; 18.3; 5.08 INJECTION, SOLUTION INTRAPERITONEAL at 08:14

## 2020-01-01 RX ADMIN — PANTOPRAZOLE SODIUM 40 MG: 40 TABLET, DELAYED RELEASE ORAL at 09:24

## 2020-01-01 RX ADMIN — DOCUSATE SODIUM 200 MG: 100 CAPSULE, LIQUID FILLED ORAL at 08:54

## 2020-01-01 RX ADMIN — ACETAMINOPHEN 650 MG: 325 TABLET ORAL at 08:42

## 2020-01-01 RX ADMIN — PRAVASTATIN SODIUM 40 MG: 40 TABLET ORAL at 20:03

## 2020-01-01 RX ADMIN — Medication 1000 MCG: at 08:08

## 2020-01-01 RX ADMIN — SODIUM CHLORIDE, SODIUM LACTATE, CALCIUM CHLORIDE, MAGNESIUM CHLORIDE AND DEXTROSE 2000 ML: 2.5; 538; 448; 18.3; 5.08 INJECTION, SOLUTION INTRAPERITONEAL at 21:56

## 2020-01-01 RX ADMIN — DOCUSATE SODIUM 200 MG: 100 CAPSULE, LIQUID FILLED ORAL at 08:27

## 2020-01-01 RX ADMIN — LEVOTHYROXINE SODIUM 25 MCG: 25 TABLET ORAL at 05:37

## 2020-01-01 RX ADMIN — MIDODRINE HYDROCHLORIDE 10 MG: 10 TABLET ORAL at 14:55

## 2020-01-01 RX ADMIN — Medication 1 TABLET: at 08:35

## 2020-01-01 RX ADMIN — MEGESTROL ACETATE 40 MG: 40 TABLET ORAL at 09:34

## 2020-01-01 RX ADMIN — POLYETHYLENE GLYCOL 3350 17 G: 17 POWDER, FOR SOLUTION ORAL at 09:26

## 2020-01-01 RX ADMIN — MICONAZOLE NITRATE: 20 POWDER TOPICAL at 22:12

## 2020-01-01 RX ADMIN — POTASSIUM CHLORIDE 20 MEQ: 1500 TABLET, EXTENDED RELEASE ORAL at 17:44

## 2020-01-01 RX ADMIN — OXYCODONE HYDROCHLORIDE AND ACETAMINOPHEN 500 MG: 500 TABLET ORAL at 09:20

## 2020-01-01 RX ADMIN — MEGESTROL ACETATE 40 MG: 40 TABLET ORAL at 10:43

## 2020-01-01 RX ADMIN — PHENOL 1 SPRAY: 1.5 LIQUID ORAL at 01:34

## 2020-01-01 RX ADMIN — ISOSORBIDE DINITRATE 40 MG: 20 TABLET ORAL at 00:37

## 2020-01-01 RX ADMIN — POTASSIUM CHLORIDE 20 MEQ: 1500 TABLET, EXTENDED RELEASE ORAL at 21:24

## 2020-01-01 RX ADMIN — METOPROLOL SUCCINATE 50 MG: 50 TABLET, EXTENDED RELEASE ORAL at 20:44

## 2020-01-01 RX ADMIN — DOCUSATE SODIUM 200 MG: 100 CAPSULE, LIQUID FILLED ORAL at 09:26

## 2020-01-01 RX ADMIN — INSULIN GLARGINE 10 UNITS: 100 INJECTION, SOLUTION SUBCUTANEOUS at 21:22

## 2020-01-01 RX ADMIN — GENTAMICIN SULFATE: 1 CREAM TOPICAL at 08:21

## 2020-01-01 RX ADMIN — SEVELAMER CARBONATE 800 MG: 800 TABLET, FILM COATED ORAL at 07:54

## 2020-01-01 RX ADMIN — SODIUM CHLORIDE, PRESERVATIVE FREE 10 ML: 5 INJECTION INTRAVENOUS at 22:10

## 2020-01-01 RX ADMIN — POLYETHYLENE GLYCOL 3350 17 G: 17 POWDER, FOR SOLUTION ORAL at 21:02

## 2020-01-01 RX ADMIN — OXYCODONE HYDROCHLORIDE AND ACETAMINOPHEN 500 MG: 500 TABLET ORAL at 19:56

## 2020-01-01 RX ADMIN — PIPERACILLIN AND TAZOBACTAM 2.25 G: 2; .25 INJECTION, POWDER, LYOPHILIZED, FOR SOLUTION INTRAVENOUS at 02:23

## 2020-01-01 RX ADMIN — POLYETHYLENE GLYCOL 3350 17 G: 17 POWDER, FOR SOLUTION ORAL at 09:20

## 2020-01-01 RX ADMIN — DROXIDOPA 200 MG: 200 CAPSULE ORAL at 07:26

## 2020-01-01 RX ADMIN — MIDODRINE HYDROCHLORIDE 10 MG: 10 TABLET ORAL at 17:20

## 2020-01-01 RX ADMIN — MORPHINE SULFATE 2 MG: 2 INJECTION, SOLUTION INTRAMUSCULAR; INTRAVENOUS at 19:19

## 2020-01-01 RX ADMIN — PRAVASTATIN SODIUM 40 MG: 40 TABLET ORAL at 17:00

## 2020-01-01 RX ADMIN — Medication 1 TABLET: at 11:13

## 2020-01-01 RX ADMIN — MICONAZOLE NITRATE: 20 POWDER TOPICAL at 09:26

## 2020-01-01 RX ADMIN — MICONAZOLE NITRATE: 20 POWDER TOPICAL at 20:46

## 2020-01-01 RX ADMIN — OXYCODONE HYDROCHLORIDE AND ACETAMINOPHEN 500 MG: 500 TABLET ORAL at 20:48

## 2020-01-01 RX ADMIN — SODIUM CHLORIDE, PRESERVATIVE FREE 10 ML: 5 INJECTION INTRAVENOUS at 08:43

## 2020-01-01 RX ADMIN — SEVELAMER CARBONATE 800 MG: 800 TABLET, FILM COATED ORAL at 16:30

## 2020-01-01 RX ADMIN — ASPIRIN 81 MG: 81 TABLET ORAL at 09:39

## 2020-01-01 RX ADMIN — Medication 1000 MCG: at 17:23

## 2020-01-01 RX ADMIN — SEVELAMER CARBONATE 800 MG: 800 TABLET, FILM COATED ORAL at 08:56

## 2020-01-01 RX ADMIN — SODIUM CHLORIDE, PRESERVATIVE FREE 10 ML: 5 INJECTION INTRAVENOUS at 21:18

## 2020-01-01 RX ADMIN — SEVELAMER CARBONATE 800 MG: 800 TABLET, FILM COATED ORAL at 11:16

## 2020-01-01 RX ADMIN — SEVELAMER CARBONATE 800 MG: 800 TABLET, FILM COATED ORAL at 16:52

## 2020-01-01 RX ADMIN — SODIUM CHLORIDE, PRESERVATIVE FREE 10 ML: 5 INJECTION INTRAVENOUS at 08:49

## 2020-01-01 RX ADMIN — PANTOPRAZOLE SODIUM 40 MG: 40 TABLET, DELAYED RELEASE ORAL at 08:44

## 2020-01-01 RX ADMIN — SODIUM CHLORIDE, PRESERVATIVE FREE 10 ML: 5 INJECTION INTRAVENOUS at 08:33

## 2020-01-01 RX ADMIN — Medication 10 ML: at 09:30

## 2020-01-01 RX ADMIN — LEVOTHYROXINE SODIUM 25 MCG: 25 TABLET ORAL at 05:08

## 2020-01-01 RX ADMIN — PIPERACILLIN AND TAZOBACTAM 2.25 G: 2; .25 INJECTION, POWDER, LYOPHILIZED, FOR SOLUTION INTRAVENOUS at 00:50

## 2020-01-01 RX ADMIN — POLYETHYLENE GLYCOL 3350 17 G: 17 POWDER, FOR SOLUTION ORAL at 08:44

## 2020-01-01 RX ADMIN — CLOPIDOGREL BISULFATE 75 MG: 75 TABLET ORAL at 17:20

## 2020-01-01 RX ADMIN — SODIUM CHLORIDE, SODIUM LACTATE, CALCIUM CHLORIDE, MAGNESIUM CHLORIDE AND DEXTROSE 1800 ML: 2.5; 538; 448; 18.3; 5.08 INJECTION, SOLUTION INTRAPERITONEAL at 08:05

## 2020-01-01 RX ADMIN — CIPROFLOXACIN 400 MG: 2 INJECTION, SOLUTION INTRAVENOUS at 17:18

## 2020-01-01 RX ADMIN — AMLODIPINE BESYLATE 5 MG: 5 TABLET ORAL at 19:39

## 2020-01-01 RX ADMIN — HEPARIN SODIUM: 1000 INJECTION INTRAVENOUS; SUBCUTANEOUS at 12:16

## 2020-01-01 RX ADMIN — ACETAMINOPHEN 650 MG: 325 TABLET ORAL at 07:53

## 2020-01-01 RX ADMIN — ACETAMINOPHEN 650 MG: 325 TABLET ORAL at 21:58

## 2020-01-01 RX ADMIN — DROXIDOPA 200 MG: 200 CAPSULE ORAL at 13:06

## 2020-01-01 RX ADMIN — ACETAMINOPHEN 650 MG: 325 TABLET ORAL at 16:30

## 2020-01-01 RX ADMIN — SODIUM CHLORIDE, SODIUM LACTATE, CALCIUM CHLORIDE, MAGNESIUM CHLORIDE AND DEXTROSE 1500 ML: 1.5; 538; 448; 18.3; 5.08 INJECTION, SOLUTION INTRAPERITONEAL at 15:00

## 2020-01-01 RX ADMIN — PANTOPRAZOLE SODIUM 40 MG: 40 TABLET, DELAYED RELEASE ORAL at 22:14

## 2020-01-01 RX ADMIN — CALCIUM POLYCARBOPHIL 625 MG: 625 TABLET, FILM COATED ORAL at 09:21

## 2020-01-01 RX ADMIN — GENTAMICIN SULFATE: 1 CREAM TOPICAL at 11:13

## 2020-01-01 RX ADMIN — SODIUM CHLORIDE, SODIUM LACTATE, CALCIUM CHLORIDE, MAGNESIUM CHLORIDE AND DEXTROSE 1800 ML: 2.5; 538; 448; 18.3; 5.08 INJECTION, SOLUTION INTRAPERITONEAL at 08:46

## 2020-01-01 RX ADMIN — PANTOPRAZOLE SODIUM 40 MG: 40 TABLET, DELAYED RELEASE ORAL at 20:52

## 2020-01-01 RX ADMIN — SEVELAMER CARBONATE 800 MG: 800 TABLET, FILM COATED ORAL at 16:36

## 2020-01-01 RX ADMIN — POTASSIUM CHLORIDE 20 MEQ: 1500 TABLET, EXTENDED RELEASE ORAL at 09:18

## 2020-01-01 RX ADMIN — CALCIUM POLYCARBOPHIL 625 MG: 625 TABLET, FILM COATED ORAL at 09:30

## 2020-01-01 RX ADMIN — SODIUM CHLORIDE, SODIUM LACTATE, CALCIUM CHLORIDE, MAGNESIUM CHLORIDE AND DEXTROSE 2000 ML: 2.5; 538; 448; 18.3; 5.08 INJECTION, SOLUTION INTRAPERITONEAL at 18:14

## 2020-01-01 RX ADMIN — METOCLOPRAMIDE 5 MG: 10 TABLET ORAL at 11:18

## 2020-01-01 RX ADMIN — SODIUM CHLORIDE, SODIUM LACTATE, CALCIUM CHLORIDE, MAGNESIUM CHLORIDE AND DEXTROSE 2000 ML: 1.5; 538; 448; 18.3; 5.08 INJECTION, SOLUTION INTRAPERITONEAL at 12:12

## 2020-01-01 RX ADMIN — ASPIRIN 81 MG: 81 TABLET ORAL at 08:45

## 2020-01-01 RX ADMIN — MIDODRINE HYDROCHLORIDE 10 MG: 10 TABLET ORAL at 11:41

## 2020-01-01 RX ADMIN — SODIUM CHLORIDE: 9 INJECTION, SOLUTION INTRAVENOUS at 19:00

## 2020-01-01 RX ADMIN — ACETAMINOPHEN 650 MG: 650 SUPPOSITORY RECTAL at 21:22

## 2020-01-01 RX ADMIN — SODIUM CHLORIDE TAB 1 GM 2 G: 1 TAB at 14:29

## 2020-01-01 RX ADMIN — POTASSIUM CHLORIDE 20 MEQ: 1500 TABLET, EXTENDED RELEASE ORAL at 21:32

## 2020-01-01 RX ADMIN — METOPROLOL SUCCINATE 50 MG: 50 TABLET, EXTENDED RELEASE ORAL at 08:44

## 2020-01-01 RX ADMIN — PANTOPRAZOLE SODIUM 40 MG: 40 TABLET, DELAYED RELEASE ORAL at 09:40

## 2020-01-01 RX ADMIN — LEVOTHYROXINE SODIUM 25 MCG: 25 TABLET ORAL at 06:19

## 2020-01-01 RX ADMIN — SODIUM CHLORIDE 1000 ML: 9 INJECTION, SOLUTION INTRAVENOUS at 05:08

## 2020-01-01 RX ADMIN — Medication 1 CAPSULE: at 10:43

## 2020-01-01 RX ADMIN — SODIUM CHLORIDE, SODIUM LACTATE, CALCIUM CHLORIDE, MAGNESIUM CHLORIDE AND DEXTROSE 2000 ML: 1.5; 538; 448; 18.3; 5.08 INJECTION, SOLUTION INTRAPERITONEAL at 05:45

## 2020-01-01 RX ADMIN — AMLODIPINE BESYLATE 5 MG: 5 TABLET ORAL at 21:52

## 2020-01-01 RX ADMIN — SODIUM CHLORIDE, SODIUM LACTATE, CALCIUM CHLORIDE, MAGNESIUM CHLORIDE AND DEXTROSE 1500 ML: 2.5; 538; 448; 18.3; 5.08 INJECTION, SOLUTION INTRAPERITONEAL at 12:05

## 2020-01-01 RX ADMIN — VITAMIN D, TAB 1000IU (100/BT) 1000 UNITS: 25 TAB at 11:51

## 2020-01-01 RX ADMIN — ASPIRIN 81 MG: 81 TABLET ORAL at 11:13

## 2020-01-01 RX ADMIN — POTASSIUM CHLORIDE 20 MEQ: 1500 TABLET, EXTENDED RELEASE ORAL at 07:53

## 2020-01-01 RX ADMIN — SEVELAMER CARBONATE 800 MG: 800 TABLET, FILM COATED ORAL at 11:50

## 2020-01-01 RX ADMIN — POLYETHYLENE GLYCOL 3350 17 G: 17 POWDER, FOR SOLUTION ORAL at 08:53

## 2020-01-01 RX ADMIN — SEVELAMER CARBONATE 800 MG: 800 TABLET, FILM COATED ORAL at 11:32

## 2020-01-01 RX ADMIN — SODIUM CHLORIDE 500 ML: 9 INJECTION, SOLUTION INTRAVENOUS at 16:45

## 2020-01-01 RX ADMIN — SODIUM CHLORIDE, SODIUM LACTATE, CALCIUM CHLORIDE, MAGNESIUM CHLORIDE AND DEXTROSE 1800 ML: 2.5; 538; 448; 18.3; 5.08 INJECTION, SOLUTION INTRAPERITONEAL at 02:45

## 2020-01-01 RX ADMIN — MIDODRINE HYDROCHLORIDE 2.5 MG: 2.5 TABLET ORAL at 12:08

## 2020-01-01 RX ADMIN — SEVELAMER CARBONATE 800 MG: 800 TABLET, FILM COATED ORAL at 11:41

## 2020-01-01 RX ADMIN — SODIUM CHLORIDE, SODIUM LACTATE, CALCIUM CHLORIDE, MAGNESIUM CHLORIDE AND DEXTROSE 2000 ML: 1.5; 538; 448; 18.3; 5.08 INJECTION, SOLUTION INTRAPERITONEAL at 04:20

## 2020-01-01 RX ADMIN — SEVELAMER CARBONATE 800 MG: 800 TABLET, FILM COATED ORAL at 08:45

## 2020-01-01 RX ADMIN — MICONAZOLE NITRATE: 20 POWDER TOPICAL at 19:57

## 2020-01-01 RX ADMIN — PANTOPRAZOLE SODIUM 40 MG: 40 TABLET, DELAYED RELEASE ORAL at 21:17

## 2020-01-01 RX ADMIN — Medication 1 TABLET: at 08:21

## 2020-01-01 RX ADMIN — TRIMETHOBENZAMIDE HYDROCHLORIDE 200 MG: 100 INJECTION INTRAMUSCULAR at 12:57

## 2020-01-01 RX ADMIN — ACETAMINOPHEN 650 MG: 325 TABLET ORAL at 22:05

## 2020-01-01 RX ADMIN — ACETAMINOPHEN 650 MG: 325 TABLET ORAL at 08:34

## 2020-01-01 RX ADMIN — MICONAZOLE NITRATE: 20 POWDER TOPICAL at 20:58

## 2020-01-01 RX ADMIN — INSULIN LISPRO 1 UNITS: 100 INJECTION, SOLUTION INTRAVENOUS; SUBCUTANEOUS at 16:30

## 2020-01-01 RX ADMIN — SEVELAMER CARBONATE 800 MG: 800 TABLET, FILM COATED ORAL at 08:52

## 2020-01-01 RX ADMIN — CALCIUM POLYCARBOPHIL 625 MG: 625 TABLET, FILM COATED ORAL at 08:45

## 2020-01-01 RX ADMIN — CLOPIDOGREL BISULFATE 75 MG: 75 TABLET ORAL at 18:24

## 2020-01-01 RX ADMIN — ACETAMINOPHEN 650 MG: 325 TABLET ORAL at 09:13

## 2020-01-01 RX ADMIN — PRAVASTATIN SODIUM 40 MG: 40 TABLET ORAL at 17:25

## 2020-01-01 RX ADMIN — METOPROLOL TARTRATE 25 MG: 25 TABLET, FILM COATED ORAL at 08:21

## 2020-01-01 RX ADMIN — MEGESTROL ACETATE 40 MG: 40 TABLET ORAL at 09:11

## 2020-01-01 RX ADMIN — PRAVASTATIN SODIUM 40 MG: 40 TABLET ORAL at 17:12

## 2020-01-01 RX ADMIN — GENTAMICIN SULFATE: 1 CREAM TOPICAL at 09:34

## 2020-01-01 RX ADMIN — PIPERACILLIN AND TAZOBACTAM 3.38 G: 3; .375 INJECTION, POWDER, LYOPHILIZED, FOR SOLUTION INTRAVENOUS at 19:55

## 2020-01-01 RX ADMIN — SEVELAMER CARBONATE 800 MG: 800 TABLET, FILM COATED ORAL at 08:53

## 2020-01-01 RX ADMIN — DOCUSATE SODIUM 100 MG: 100 CAPSULE, LIQUID FILLED ORAL at 08:45

## 2020-01-01 RX ADMIN — ONDANSETRON 4 MG: 2 INJECTION INTRAMUSCULAR; INTRAVENOUS at 12:26

## 2020-01-01 RX ADMIN — PANTOPRAZOLE SODIUM 40 MG: 40 TABLET, DELAYED RELEASE ORAL at 21:02

## 2020-01-01 RX ADMIN — MIDODRINE HYDROCHLORIDE 15 MG: 10 TABLET ORAL at 16:59

## 2020-01-01 RX ADMIN — SEVELAMER CARBONATE 800 MG: 800 TABLET, FILM COATED ORAL at 11:29

## 2020-01-01 RX ADMIN — SODIUM CHLORIDE: 9 INJECTION, SOLUTION INTRAVENOUS at 13:50

## 2020-01-01 RX ADMIN — GENTAMICIN SULFATE: 1 CREAM TOPICAL at 07:26

## 2020-01-01 RX ADMIN — SODIUM CHLORIDE, SODIUM LACTATE, CALCIUM CHLORIDE, MAGNESIUM CHLORIDE AND DEXTROSE 2000 ML: 1.5; 538; 448; 18.3; 5.08 INJECTION, SOLUTION INTRAPERITONEAL at 02:55

## 2020-01-01 RX ADMIN — SODIUM CHLORIDE, SODIUM LACTATE, CALCIUM CHLORIDE, MAGNESIUM CHLORIDE AND DEXTROSE 2000 ML: 1.5; 538; 448; 18.3; 5.08 INJECTION, SOLUTION INTRAPERITONEAL at 17:15

## 2020-01-01 RX ADMIN — Medication 500 MG: at 08:44

## 2020-01-01 RX ADMIN — MEGESTROL ACETATE 40 MG: 40 TABLET ORAL at 08:44

## 2020-01-01 RX ADMIN — Medication 1 CAPSULE: at 21:52

## 2020-01-01 RX ADMIN — ONDANSETRON HYDROCHLORIDE 4 MG: 4 TABLET, FILM COATED ORAL at 23:28

## 2020-01-01 RX ADMIN — MIDODRINE HYDROCHLORIDE 15 MG: 10 TABLET ORAL at 16:30

## 2020-01-01 RX ADMIN — SODIUM CHLORIDE 250 ML: 9 INJECTION, SOLUTION INTRAVENOUS at 05:12

## 2020-01-01 RX ADMIN — Medication 500 MG: at 10:14

## 2020-01-01 RX ADMIN — POLYETHYLENE GLYCOL 3350 17 G: 17 POWDER, FOR SOLUTION ORAL at 09:50

## 2020-01-01 RX ADMIN — CALCIUM POLYCARBOPHIL 625 MG: 625 TABLET, FILM COATED ORAL at 14:35

## 2020-01-01 RX ADMIN — SEVELAMER CARBONATE 800 MG: 800 TABLET, FILM COATED ORAL at 11:09

## 2020-01-01 RX ADMIN — SODIUM CHLORIDE, PRESERVATIVE FREE 10 ML: 5 INJECTION INTRAVENOUS at 09:39

## 2020-01-01 RX ADMIN — Medication 10 ML: at 22:11

## 2020-01-01 RX ADMIN — SEVELAMER CARBONATE 800 MG: 800 TABLET, FILM COATED ORAL at 08:28

## 2020-01-01 RX ADMIN — SODIUM CHLORIDE, SODIUM LACTATE, CALCIUM CHLORIDE, MAGNESIUM CHLORIDE AND DEXTROSE 2000 ML: 1.5; 538; 448; 18.3; 5.08 INJECTION, SOLUTION INTRAPERITONEAL at 04:53

## 2020-01-01 RX ADMIN — Medication 1000 MCG: at 08:27

## 2020-01-01 RX ADMIN — OXYCODONE HYDROCHLORIDE AND ACETAMINOPHEN 500 MG: 500 TABLET ORAL at 23:15

## 2020-01-01 RX ADMIN — POTASSIUM CHLORIDE 20 MEQ: 1500 TABLET, EXTENDED RELEASE ORAL at 17:27

## 2020-01-01 RX ADMIN — SODIUM CHLORIDE, SODIUM LACTATE, CALCIUM CHLORIDE, MAGNESIUM CHLORIDE AND DEXTROSE 2000 ML: 1.5; 538; 448; 18.3; 5.08 INJECTION, SOLUTION INTRAPERITONEAL at 11:27

## 2020-01-01 RX ADMIN — CLOPIDOGREL BISULFATE 75 MG: 75 TABLET ORAL at 17:03

## 2020-01-01 RX ADMIN — SODIUM CHLORIDE, SODIUM LACTATE, CALCIUM CHLORIDE, MAGNESIUM CHLORIDE AND DEXTROSE 2000 ML: 1.5; 538; 448; 18.3; 5.08 INJECTION, SOLUTION INTRAPERITONEAL at 00:53

## 2020-01-01 RX ADMIN — SODIUM CHLORIDE, SODIUM LACTATE, CALCIUM CHLORIDE, MAGNESIUM CHLORIDE AND DEXTROSE 2000 ML: 1.5; 538; 448; 18.3; 5.08 INJECTION, SOLUTION INTRAPERITONEAL at 01:26

## 2020-01-01 RX ADMIN — SODIUM CHLORIDE, SODIUM LACTATE, CALCIUM CHLORIDE, MAGNESIUM CHLORIDE AND DEXTROSE 1500 ML: 1.5; 538; 448; 18.3; 5.08 INJECTION, SOLUTION INTRAPERITONEAL at 08:46

## 2020-01-01 RX ADMIN — SODIUM CHLORIDE, SODIUM LACTATE, CALCIUM CHLORIDE, MAGNESIUM CHLORIDE AND DEXTROSE 1500 ML: 1.5; 538; 448; 18.3; 5.08 INJECTION, SOLUTION INTRAPERITONEAL at 03:42

## 2020-01-01 RX ADMIN — POTASSIUM CHLORIDE 20 MEQ: 1500 TABLET, EXTENDED RELEASE ORAL at 08:37

## 2020-01-01 RX ADMIN — CALCIUM POLYCARBOPHIL 625 MG: 625 TABLET, FILM COATED ORAL at 19:56

## 2020-01-01 RX ADMIN — ACETAMINOPHEN 650 MG: 325 TABLET ORAL at 08:27

## 2020-01-01 RX ADMIN — FERROUS SULFATE TAB 325 MG (65 MG ELEMENTAL FE) 325 MG: 325 (65 FE) TAB at 20:51

## 2020-01-01 RX ADMIN — PRAVASTATIN SODIUM 40 MG: 40 TABLET ORAL at 17:46

## 2020-01-01 RX ADMIN — INSULIN GLARGINE 10 UNITS: 100 INJECTION, SOLUTION SUBCUTANEOUS at 21:56

## 2020-01-01 RX ADMIN — GENTAMICIN SULFATE: 1 CREAM TOPICAL at 08:07

## 2020-01-01 RX ADMIN — INSULIN LISPRO 1 UNITS: 100 INJECTION, SOLUTION INTRAVENOUS; SUBCUTANEOUS at 21:43

## 2020-01-01 RX ADMIN — MIDODRINE HYDROCHLORIDE 10 MG: 10 TABLET ORAL at 08:23

## 2020-01-01 RX ADMIN — LEVOTHYROXINE SODIUM 25 MCG: 25 TABLET ORAL at 08:45

## 2020-01-01 RX ADMIN — MEGESTROL ACETATE 40 MG: 40 TABLET ORAL at 09:26

## 2020-01-01 RX ADMIN — SODIUM CHLORIDE, PRESERVATIVE FREE 10 ML: 5 INJECTION INTRAVENOUS at 20:54

## 2020-01-01 RX ADMIN — MIDODRINE HYDROCHLORIDE 10 MG: 10 TABLET ORAL at 08:37

## 2020-01-01 RX ADMIN — PANTOPRAZOLE SODIUM 40 MG: 40 TABLET, DELAYED RELEASE ORAL at 08:02

## 2020-01-01 RX ADMIN — PANTOPRAZOLE SODIUM 40 MG: 40 TABLET, DELAYED RELEASE ORAL at 08:37

## 2020-01-01 RX ADMIN — SEVELAMER CARBONATE 800 MG: 800 TABLET, FILM COATED ORAL at 09:20

## 2020-01-01 RX ADMIN — SODIUM CHLORIDE, PRESERVATIVE FREE 10 ML: 5 INJECTION INTRAVENOUS at 21:16

## 2020-01-01 RX ADMIN — OXYCODONE HYDROCHLORIDE AND ACETAMINOPHEN 500 MG: 500 TABLET ORAL at 20:03

## 2020-01-01 RX ADMIN — CALCIUM POLYCARBOPHIL 625 MG: 625 TABLET, FILM COATED ORAL at 16:29

## 2020-01-01 RX ADMIN — PANTOPRAZOLE SODIUM 40 MG: 40 TABLET, DELAYED RELEASE ORAL at 08:52

## 2020-01-01 RX ADMIN — Medication 1000 MCG: at 20:03

## 2020-01-01 RX ADMIN — SODIUM CHLORIDE, SODIUM LACTATE, CALCIUM CHLORIDE, MAGNESIUM CHLORIDE AND DEXTROSE 2000 ML: 2.5; 538; 448; 18.3; 5.08 INJECTION, SOLUTION INTRAPERITONEAL at 20:43

## 2020-01-01 RX ADMIN — MIDODRINE HYDROCHLORIDE 10 MG: 10 TABLET ORAL at 18:21

## 2020-01-01 RX ADMIN — METOPROLOL SUCCINATE 50 MG: 50 TABLET, EXTENDED RELEASE ORAL at 20:54

## 2020-01-01 RX ADMIN — PIPERACILLIN AND TAZOBACTAM 2.25 G: 2; .25 INJECTION, POWDER, LYOPHILIZED, FOR SOLUTION INTRAVENOUS at 17:04

## 2020-01-01 RX ADMIN — ASPIRIN 81 MG: 81 TABLET ORAL at 08:53

## 2020-01-01 RX ADMIN — SODIUM CHLORIDE, PRESERVATIVE FREE 10 ML: 5 INJECTION INTRAVENOUS at 08:56

## 2020-01-01 RX ADMIN — FERROUS SULFATE TAB 325 MG (65 MG ELEMENTAL FE) 325 MG: 325 (65 FE) TAB at 22:04

## 2020-01-01 RX ADMIN — CALCIUM POLYCARBOPHIL 625 MG: 625 TABLET, FILM COATED ORAL at 21:00

## 2020-01-01 RX ADMIN — SODIUM CHLORIDE 500 ML: 9 INJECTION, SOLUTION INTRAVENOUS at 04:55

## 2020-01-01 RX ADMIN — METOPROLOL SUCCINATE 50 MG: 50 TABLET, FILM COATED, EXTENDED RELEASE ORAL at 23:11

## 2020-01-01 RX ADMIN — CALCIUM POLYCARBOPHIL 625 MG: 625 TABLET, FILM COATED ORAL at 15:49

## 2020-01-01 RX ADMIN — PANTOPRAZOLE SODIUM 40 MG: 40 TABLET, DELAYED RELEASE ORAL at 08:08

## 2020-01-01 RX ADMIN — ACETAMINOPHEN 650 MG: 325 TABLET ORAL at 17:07

## 2020-01-01 RX ADMIN — ACETAMINOPHEN 650 MG: 325 TABLET ORAL at 12:20

## 2020-01-01 ASSESSMENT — PAIN DESCRIPTION - FREQUENCY
FREQUENCY: CONTINUOUS
FREQUENCY: INTERMITTENT
FREQUENCY: CONTINUOUS
FREQUENCY: INTERMITTENT
FREQUENCY: CONTINUOUS
FREQUENCY: CONTINUOUS
FREQUENCY: INTERMITTENT
FREQUENCY: CONTINUOUS

## 2020-01-01 ASSESSMENT — PAIN DESCRIPTION - LOCATION
LOCATION: BUTTOCKS
LOCATION: BUTTOCKS
LOCATION: THROAT
LOCATION: ABDOMEN
LOCATION: ABDOMEN
LOCATION: HAND
LOCATION: BACK
LOCATION: ABDOMEN
LOCATION: LEG
LOCATION: BUTTOCKS

## 2020-01-01 ASSESSMENT — ENCOUNTER SYMPTOMS
BLOOD IN STOOL: 0
EYE DISCHARGE: 0
COUGH: 0
CONSTIPATION: 0
CHEST TIGHTNESS: 0
PHOTOPHOBIA: 0
ABDOMINAL PAIN: 0
ABDOMINAL PAIN: 0
VOMITING: 0
NAUSEA: 0
EYE PAIN: 0
COUGH: 0
COLOR CHANGE: 0
SHORTNESS OF BREATH: 0
VOMITING: 1
EYES NEGATIVE: 1
EYE PAIN: 0
BACK PAIN: 0
NAUSEA: 1
EYE ITCHING: 0
CHEST TIGHTNESS: 0
RHINORRHEA: 0
ABDOMINAL DISTENTION: 0
COUGH: 0
VOICE CHANGE: 0
SINUS PRESSURE: 0
SHORTNESS OF BREATH: 0
ABDOMINAL PAIN: 1
COUGH: 0
NAUSEA: 0
ABDOMINAL PAIN: 0
COUGH: 0
EYE DISCHARGE: 0
PHOTOPHOBIA: 0
ABDOMINAL PAIN: 0
NAUSEA: 0
VOMITING: 0
WHEEZING: 0
CONSTIPATION: 0
NAUSEA: 0
EYE REDNESS: 0
SINUS PRESSURE: 0
DIARRHEA: 0
WHEEZING: 0
COLOR CHANGE: 0
EYE PAIN: 0
COUGH: 0
SORE THROAT: 0
RHINORRHEA: 0
EYE REDNESS: 0
NAUSEA: 1
APNEA: 0
BACK PAIN: 0
DIARRHEA: 0
ABDOMINAL PAIN: 0
EYE ITCHING: 0
CONSTIPATION: 0
NAUSEA: 0
SORE THROAT: 0
ABDOMINAL DISTENTION: 0
CONSTIPATION: 0
BACK PAIN: 0
SHORTNESS OF BREATH: 1
WHEEZING: 0
VOMITING: 0
DIARRHEA: 0
CHEST TIGHTNESS: 0
SHORTNESS OF BREATH: 0
ABDOMINAL DISTENTION: 0
DIARRHEA: 0
CHOKING: 0
RHINORRHEA: 0
PHOTOPHOBIA: 0
SORE THROAT: 0
SHORTNESS OF BREATH: 0
VOMITING: 0
SORE THROAT: 0
DIARRHEA: 0
ABDOMINAL PAIN: 0
SORE THROAT: 0
BACK PAIN: 0
ABDOMINAL PAIN: 1
RHINORRHEA: 0
SHORTNESS OF BREATH: 0
NAUSEA: 0
WHEEZING: 0
STRIDOR: 0
BLOOD IN STOOL: 0
TROUBLE SWALLOWING: 0
DIARRHEA: 0
VOMITING: 0
ABDOMINAL DISTENTION: 0
SHORTNESS OF BREATH: 0
COUGH: 0

## 2020-01-01 ASSESSMENT — PAIN DESCRIPTION - PAIN TYPE
TYPE: ACUTE PAIN
TYPE: CHRONIC PAIN
TYPE: ACUTE PAIN

## 2020-01-01 ASSESSMENT — PAIN SCALES - GENERAL
PAINLEVEL_OUTOF10: 0
PAINLEVEL_OUTOF10: 10
PAINLEVEL_OUTOF10: 10
PAINLEVEL_OUTOF10: 0
PAINLEVEL_OUTOF10: 1
PAINLEVEL_OUTOF10: 0
PAINLEVEL_OUTOF10: 1
PAINLEVEL_OUTOF10: 0
PAINLEVEL_OUTOF10: 0
PAINLEVEL_OUTOF10: 3
PAINLEVEL_OUTOF10: 3
PAINLEVEL_OUTOF10: 0
PAINLEVEL_OUTOF10: 3
PAINLEVEL_OUTOF10: 0
PAINLEVEL_OUTOF10: 8
PAINLEVEL_OUTOF10: 0
PAINLEVEL_OUTOF10: 10
PAINLEVEL_OUTOF10: 0
PAINLEVEL_OUTOF10: 2
PAINLEVEL_OUTOF10: 0
PAINLEVEL_OUTOF10: 1
PAINLEVEL_OUTOF10: 0
PAINLEVEL_OUTOF10: 0
PAINLEVEL_OUTOF10: 3
PAINLEVEL_OUTOF10: 3
PAINLEVEL_OUTOF10: 0
PAINLEVEL_OUTOF10: 1
PAINLEVEL_OUTOF10: 0
PAINLEVEL_OUTOF10: 2
PAINLEVEL_OUTOF10: 0
PAINLEVEL_OUTOF10: 8
PAINLEVEL_OUTOF10: 8
PAINLEVEL_OUTOF10: 3
PAINLEVEL_OUTOF10: 0
PAINLEVEL_OUTOF10: 3
PAINLEVEL_OUTOF10: 3
PAINLEVEL_OUTOF10: 0
PAINLEVEL_OUTOF10: 0
PAINLEVEL_OUTOF10: 1
PAINLEVEL_OUTOF10: 0
PAINLEVEL_OUTOF10: 4
PAINLEVEL_OUTOF10: 0
PAINLEVEL_OUTOF10: 0
PAINLEVEL_OUTOF10: 2
PAINLEVEL_OUTOF10: 0
PAINLEVEL_OUTOF10: 6
PAINLEVEL_OUTOF10: 0
PAINLEVEL_OUTOF10: 3
PAINLEVEL_OUTOF10: 0
PAINLEVEL_OUTOF10: 0
PAINLEVEL_OUTOF10: 4
PAINLEVEL_OUTOF10: 0
PAINLEVEL_OUTOF10: 3
PAINLEVEL_OUTOF10: 0
PAINLEVEL_OUTOF10: 0
PAINLEVEL_OUTOF10: 2
PAINLEVEL_OUTOF10: 0
PAINLEVEL_OUTOF10: 3
PAINLEVEL_OUTOF10: 0
PAINLEVEL_OUTOF10: 5
PAINLEVEL_OUTOF10: 0
PAINLEVEL_OUTOF10: 5
PAINLEVEL_OUTOF10: 0
PAINLEVEL_OUTOF10: 5
PAINLEVEL_OUTOF10: 0
PAINLEVEL_OUTOF10: 0
PAINLEVEL_OUTOF10: 3
PAINLEVEL_OUTOF10: 0
PAINLEVEL_OUTOF10: 3
PAINLEVEL_OUTOF10: 0
PAINLEVEL_OUTOF10: 4
PAINLEVEL_OUTOF10: 0
PAINLEVEL_OUTOF10: 0
PAINLEVEL_OUTOF10: 2
PAINLEVEL_OUTOF10: 0
PAINLEVEL_OUTOF10: 1
PAINLEVEL_OUTOF10: 8
PAINLEVEL_OUTOF10: 5
PAINLEVEL_OUTOF10: 8
PAINLEVEL_OUTOF10: 3
PAINLEVEL_OUTOF10: 0
PAINLEVEL_OUTOF10: 2
PAINLEVEL_OUTOF10: 0
PAINLEVEL_OUTOF10: 8
PAINLEVEL_OUTOF10: 0
PAINLEVEL_OUTOF10: 8
PAINLEVEL_OUTOF10: 0

## 2020-01-01 ASSESSMENT — PAIN DESCRIPTION - DESCRIPTORS
DESCRIPTORS: DISCOMFORT;ACHING
DESCRIPTORS: DISCOMFORT;ACHING
DESCRIPTORS: SHARP
DESCRIPTORS: ACHING
DESCRIPTORS: SORE
DESCRIPTORS: ACHING
DESCRIPTORS: SHARP
DESCRIPTORS: ACHING

## 2020-01-01 ASSESSMENT — PAIN DESCRIPTION - ONSET
ONSET: SUDDEN
ONSET: ON-GOING
ONSET: ON-GOING
ONSET: SUDDEN
ONSET: ON-GOING

## 2020-01-01 ASSESSMENT — PAIN DESCRIPTION - PROGRESSION
CLINICAL_PROGRESSION: NOT CHANGED

## 2020-01-01 ASSESSMENT — PAIN - FUNCTIONAL ASSESSMENT
PAIN_FUNCTIONAL_ASSESSMENT: PREVENTS OR INTERFERES SOME ACTIVE ACTIVITIES AND ADLS
PAIN_FUNCTIONAL_ASSESSMENT: 0-10
PAIN_FUNCTIONAL_ASSESSMENT: ACTIVITIES ARE NOT PREVENTED
PAIN_FUNCTIONAL_ASSESSMENT: PREVENTS OR INTERFERES SOME ACTIVE ACTIVITIES AND ADLS
PAIN_FUNCTIONAL_ASSESSMENT: ACTIVITIES ARE NOT PREVENTED
PAIN_FUNCTIONAL_ASSESSMENT: ACTIVITIES ARE NOT PREVENTED
PAIN_FUNCTIONAL_ASSESSMENT: PREVENTS OR INTERFERES WITH ALL ACTIVE AND SOME PASSIVE ACTIVITIES
PAIN_FUNCTIONAL_ASSESSMENT: ACTIVITIES ARE NOT PREVENTED

## 2020-01-01 ASSESSMENT — PAIN DESCRIPTION - ORIENTATION
ORIENTATION: MID
ORIENTATION: LEFT
ORIENTATION: RIGHT;LEFT
ORIENTATION: MID
ORIENTATION: RIGHT;LEFT
ORIENTATION: RIGHT;LEFT
ORIENTATION: MID
ORIENTATION: MID
ORIENTATION: LEFT
ORIENTATION: RIGHT;LEFT;INNER

## 2020-01-17 NOTE — PROGRESS NOTES
aortic stenosis. No mitral stenosis. Coronaries; mild disease of the LAD not more than 40%. LV; severe LV dysfunction and EF 30-35%.  CARDIAC CATHETERIZATION  05-    Mild disease of small distal LAD (approximately  50% stenosis) angiographically normal CA. Mild to moderate LV systolic dysfunction. EF 35%. Mildly elevated LV end diastolic pressure. No significant MR. Systemic hypertension.  CARDIAC DEFIBRILLATOR PLACEMENT  02/02/2011    CARDIOVASCULAR STRESS TEST  03/23/10    EF 30%  Severe LV Dys    COLONOSCOPY      Dr. Oral Bend  03/22/10    EF 45%. LV mildly dilated. Systolic function mildly reducted. No regional wall motion abnormalities. Wall thickness mildly increased. LA mildly dilated. MV mild annular calification. Mild TR. Ventricular septum tickness mildly increased.  ENDOSCOPY, COLON, DIAGNOSTIC      EYE SURGERY      bilateral cataracts    EYE SURGERY Left 9/2014    laser surgery for retinopathy?  FOOT SURGERY Left 1/12/2016    OTHER SURGICAL HISTORY  10/18/2018    CardioMEMS    PACEMAKER PLACEMENT      [place] EGD BALLOON DILATION ESOPHAGUS <30 MM DIAM N/A 9/18/2017    EGD ESOPHAGOGASTRODUODENOSCOPY DILATATION performed by Slava Sheridan MD at [facility]    [place] ESOPHAGOGASTRODUODENOSCOPY TRANSORAL DIAGNOSTIC  9/18/2017    EGD ESOPHAGOGASTRODUODENOSCOPY performed by Slava Sheridan MD at [address] [facility]       Allergies   Allergen Reactions    Heparin Other (See Comments)     Thrombocytopenia        Family History   Problem Relation Age of Onset    Diabetes Mother    Sherren Pean Pacemaker Mother     Stroke Mother     Heart Disease Mother     Diabetes Father     Cancer Sister     Cancer Brother     Cancer Sister     Heart Disease Brother         cardiomegaly        Social History     Socioeconomic History    Marital status:       Spouse name: Not on file    Number of children: 0    Years of education: Not on file    Highest education injection vial Inject into the skin 2 times daily Inject per sliding scale 0-150 = 0 units; 151-200 = 1 unit; 201-205 = 2 units; 251-300 = 3 units; 301-350 = 4 units; 351-400 = 5 units; 401-450 = 6 units      cyanocobalamin 1000 MCG tablet Take 1,000 mcg by mouth daily      OXYGEN Inhale 4 L into the lungs continuous Keep sats greater than 90%      amLODIPine (NORVASC) 5 MG tablet Take 1 tablet by mouth daily 30 tablet 5    warfarin (COUMADIN) 2 MG tablet Take 2 mg by mouth      isosorbide dinitrate (ISORDIL) 40 MG tablet Take 1 tablet by mouth 3 times daily 90 tablet 2    epoetin schuyler-epbx (RETACRIT) 56669 UNIT/ML SOLN injection Inject into the skin once a week       pantoprazole (PROTONIX) 40 MG tablet Take 40 mg by mouth 2 times daily      hydrALAZINE (APRESOLINE) 100 MG tablet Take 50 mg by mouth 3 times daily       metoprolol succinate (TOPROL XL) 100 MG extended release tablet Take 1 tablet by mouth daily Hold if SBP less than 100 and HR less than 50 bpm 30 tablet 1    ipratropium-albuterol (DUONEB) 0.5-2.5 (3) MG/3ML SOLN nebulizer solution Inhale 1 vial into the lungs every 6 hours as needed for Shortness of Breath      ARTIFICIAL TEAR OP Instill one drop into each eye as needed      Probiotic Product (PROBIOTIC-10) CAPS Take 1 capsule by mouth daily       nitroGLYCERIN (NITROSTAT) 0.4 MG SL tablet up to max of 3 total doses.  If no relief after 1 dose, call 911. 25 tablet 3    ascorbic acid (VITAMIN C) 500 MG tablet Take 500 mg by mouth 2 times daily       docusate sodium (COLACE) 100 MG capsule Take 200 mg by mouth daily      ferrous sulfate (JAMIR-RAJANI) 325 (65 Fe) MG tablet Take 1 tablet by mouth 2 times daily 60 tablet 3    clopidogrel (PLAVIX) 75 MG tablet Take 75 mg by mouth every evening       ondansetron (ZOFRAN) 4 MG tablet Take 4 mg by mouth 2 times daily       insulin aspart (NOVOLOG) 100 UNIT/ML injection vial Inject 12 Units into the skin 2 times daily (with meals)      well    Leg edema on ace wrap        RTC in 6 months      Sara Stephen [facility]

## 2020-01-20 NOTE — TELEPHONE ENCOUNTER
[facility] home called with an update, pt weight is down 3 pounds to 237 today from 240 yesterday. No symptoms of being dry or dehydrated according to nurse. Denies Cough, SOB, or swelling.

## 2020-01-21 NOTE — PROGRESS NOTES
Inappropriate mode switching secondary to either FFRW or TW oversensing. Ask Dayan Irwin to look at the tracings and adjust sensitivity and or decay delay.

## 2020-01-21 NOTE — TELEPHONE ENCOUNTER
I spoke to [facility] at [facility] she said she will make sure we get the labs.

## 2020-01-22 NOTE — TELEPHONE ENCOUNTER
I called [phone] back and let Kassie know we need a H&H ASAP. She said she will let the nurse done and they will fax it to us.

## 2020-02-09 NOTE — ED PROVIDER NOTES
[address] COMPLAINT       Chief Complaint   Patient presents with    Hypoglycemia       Nurses Notes reviewed and I agree except as noted in the HPI. HISTORY OF PRESENT ILLNESS    Sonido Garg is a 80 y.o. female. This patient comes in by squad. Reportedly for altered mental status. She was found to have a blood sugar in the 40s. She has received a dose of D50. She does seem more alert however is still only alert to the year. She is  at least initially very poor historian and is not aware of much for past medical history. I do not see any definite history of dementia in her previous documentation although there is a history of a previous CVA. She appears to be on Coumadin for at least a history of atrial fibrillation. REVIEW OF SYSTEMS         Pt rossly denying any headache chest pain shortness of breath abdominal pain extremity localized weakness or sensory loss . 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     SURGICAL HISTORY      has a past surgical history that includes doppler echocardiography (03/22/10); cardiovascular stress test (03/23/10); pacemaker placement; eye surgery; Colonoscopy; Eye surgery (Left, 9/2014); Foot surgery (Left, 1/12/2016); Breast surgery; Cardiac catheterization (11-); Cardiac catheterization (05-); Cardiac defibrillator placement (02/02/2011); pr egd balloon dilation esophagus <30 mm diam (N/A, 9/18/2017); pr esophagogastroduodenoscopy transoral diagnostic (9/18/2017); Endoscopy, colon, diagnostic; and other surgical history (10/18/2018).     CURRENT MEDICATIONS       Previous Medications    ACETAMINOPHEN (TYLENOL) 325 MG TABLET    Take 650 mg by mouth 4 times daily     AMLODIPINE (NORVASC) 5 MG TABLET    Take 1 tablet by mouth daily    ARTIFICIAL TEAR OP    Instill one drop into each eye as needed    ASCORBIC ACID (VITAMIN C) 500 MG TABLET    Take 500 mg by mouth 2 times daily     BISACODYL (DULCOLAX) 10 MG SUPPOSITORY    Place 10 mg rectally daily as needed for Constipation    BUMETANIDE (BUMEX) 2 MG TABLET    Take 2 mg by mouth 3 times daily    CLOPIDOGREL (PLAVIX) 75 MG TABLET    Take 75 mg by mouth every evening     CYANOCOBALAMIN 1000 MCG TABLET    Take 1,000 mcg by mouth daily    DOCUSATE SODIUM (COLACE) 100 MG CAPSULE    Take 200 mg by mouth daily    EPOETIN JORGE-EPBX (RETACRIT) 13176 UNIT/ML SOLN INJECTION    Inject into the skin once a week     FERROUS SULFATE (JAMIR-RAJANI) 325 (65 FE) MG TABLET    Take 1 tablet by mouth 2 times daily    GUAIFENESIN (ROBITUSSIN) 100 MG/5ML SYRUP    Take 200 mg by mouth every 4 hours as needed for Cough    HYDRALAZINE (APRESOLINE) 100 MG TABLET    Take 50 mg by mouth 3 times daily     INSULIN ASPART (NOVOLOG) 100 UNIT/ML INJECTION VIAL    Inject 12 Units into the skin 2 times daily (with meals)    INSULIN ASPART (NOVOLOG) 100 UNIT/ML INJECTION VIAL    Inject 8 Units into the skin daily    INSULIN ASPART (NOVOLOG) 100 UNIT/ML INJECTION VIAL    Inject into the skin 2 times daily Inject per sliding scale 0-150 = 0 units; 151-200 . family history includes Cancer in her brother, sister, and sister; Diabetes in her father and mother; Heart Disease in her brother and mother; Pacemaker in her mother; Stroke in her mother. SOCIAL HISTORY      reports that she quit smoking about 68 years ago. Her smoking use included cigarettes. She has a 1.00 pack-year smoking history. She has never used smokeless tobacco. She reports that she does not drink alcohol or use drugs. PHYSICAL EXAM     INITIAL VITALS:  axillary temperature is 94.6 °F (34.8 °C). Her blood pressure is 130/75 and her pulse is 98. Her respiration is 19 and oxygen saturation is 98%. Constitutional: Well appearing and non-toxic   Eyes:  Pupils are equal and reactive, extraocular muscles intact   HENT:  Atraumatic appearing  oropharynx moist, no pharyngeal exudates. Neck- normal range of motion,supple   Respiratory:  No wheezing, rhonchi or rales  Cardiovascular: regular,  GI:  Non tender, no rigidity, rebound or guarding  Musculoskeletal:  2/4 distal pulses, no pitting edema  Integument: warm and dry  Neurologic: CN 2-12 grossly intact. Equal strength in the upper lower extremities bilaterally. There is no facial droop. Psychiatric:  Speech and behavior appropriate       DIAGNOSTIC RESULTS     EKG: All EKG's are interpreted by the Emergency Department Physician who either signs or Co-signs this chart in the absence of a cardiologist.  EKG interpreted by me showing paced rhythm at a rate of 69, QRS of 180, QTc of 525, axis of 235.   No acute ischemic changes    RADIOLOGY: non-plain film images(s) such as CT, Ultrasound and MRI are read by the radiologist.  CT the head was interpreted by the radiologist as negative    LABS:   Labs Reviewed   CBC WITH AUTO DIFFERENTIAL - Abnormal; Notable for the following components:       Result Value    RBC 3.56 (*)     Hemoglobin 10.2 (*)     Hematocrit 32.7 (*)     MCHC 31.2 (*)     RDW-CV 15.0 (*)     RDW-SD 50.5 (*)     Immature Grans (Abs) 0.15 (*)     All other components within normal limits   COMPREHENSIVE METABOLIC PANEL W/ REFLEX TO MG FOR LOW K - Abnormal; Notable for the following components:    CREATININE 2.4 (*)     BUN 24 (*)     Alb 2.7 (*)     Total Bilirubin 0.2 (*)     ALT 6 (*)     All other components within normal limits   PROTIME-INR - Abnormal; Notable for the following components:    INR 2.45 (*)     All other components within normal limits   APTT - Abnormal; Notable for the following components:    aPTT 53.2 (*)     All other components within normal limits   GLOMERULAR FILTRATION RATE, ESTIMATED - Abnormal; Notable for the following components:    Est, Glom Filt Rate 19 (*)     All other components within normal limits   ANION GAP   OSMOLALITY   SPECIMEN REJECTION   AMMONIA   URINE RT REFLEX TO CULTURE   POCT GLUCOSE   POCT GLUCOSE       EMERGENCY DEPARTMENT COURSE:   Vitals:    Vitals:    02/09/20 0819 02/09/20 0905 02/09/20 1000 02/09/20 1109   BP: (!) 117/53 129/67 (!) 121/47 130/75   Pulse: 69 68 84 98   Resp: 13 26 23 19   Temp: 94.4 °F (34.7 °C)   94.6 °F (34.8 °C)   TempSrc: Axillary   Axillary   SpO2: 96% 98% 96% 98%       Patient received dextrose and her blood sugar came back up nicely but she was still somewhat persistently confused with a better than earlier and with her history of being anticoagulated I thought we needed to do a CT scan of her head. That is fortunately come back negative. Now eventually her mental status has improved. Her blood sugar appears to be holding. She seems to want to go back to the nursing home. She will be discharged. Creatinine is elevated but better than it has been on previous visits    CRITICAL CARE:   none         FINAL IMPRESSION      1.  Hypoglycemia          DISPOSITION/PLAN   Discharged to [facility]    DISCHARGE MEDICATIONS:  New Prescriptions    No medications on file       (Please note that portions of this note were completed with a voice recognition program. Efforts were made to edit the dictations but occasionally words are mis-transcribed.)    Arely Peoples, DO Arely Peoples,   02/09/20 4244

## 2020-02-09 NOTE — ED NOTES
More warm blankets applied to pt at this time. Updated on POC. Call light within reach. No concerns voiced.       Sana Escobar RN  02/09/20 2932

## 2020-02-09 NOTE — ED NOTES
Pt sitting up in wheelchair eating a peanut butter and jelly sandwich while waiting on family to pick her up at this time.      Ramonita Joseph RN  02/09/20 4944

## 2020-02-09 NOTE — ED NOTES
Bed: 001A  Expected date: 2/9/20  Expected time: 7:57 AM  Means of arrival: [facility]  Comments:     Patricio Eng RN  02/09/20 0962

## 2020-02-20 NOTE — TELEPHONE ENCOUNTER
Clarification hold Plavix 5 days prior not ASA. Please agree to verbal order. Form out for Dr. Raquel Layton to sign. Nathaniel Cohen notified and nurse at nursing home notified.

## 2020-02-28 NOTE — H&P
[facility]  Sedation/Analgesia History & Physical    Pt Name: Dagoberto Trujillo  MRN: [id number]  YOB: 1934  Provider Performing Procedure: Jesus Kaiser MD  Primary Care Physician: Sheri Davis MD    PRE-PROCEDURE   DNR-CCA/DNR-CC []Yes [x]No  Brief History/Pre-Procedure Diagnosis: Renal failure           MEDICAL HISTORY  []CAD/Valve  []Liver Disease  []Lung Disease []Diabetes  []Hypertension []Renal Disease  []Additional information:       has a past medical history of CAD (coronary artery disease), Cancer ([address] Cardiomyopathy, nonischemic ([address] CHF (congestive heart failure) ([address] CKD (chronic kidney disease) stage 3, GFR 30-59 ml/min ([address] Congenital heart disease, COPD exacerbation ([address] 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 ([address] DM2 (diabetes mellitus, type 2) ([address] Ex-smoker, GERD (gastroesophageal reflux disease), Heart failure with preserved ejection fraction ([address] His of Heparin induced thrombocytopenia, History of breast cancer, History of CVA (cerebrovascular accident), History of pulmonary embolism, Hyperlipidemia, Hypertension, Iron deficiency anemia, LBBB (left bundle branch block), Osteoarthritis, Paget's disease of bone, [facility] [place] BiV ICD, and Vitamin D deficiency. SURGICAL HISTORY   has a past surgical history that includes doppler echocardiography (03/22/10); cardiovascular stress test (03/23/10); pacemaker placement; eye surgery; Colonoscopy; Eye surgery (Left, 9/2014); Foot surgery (Left, 1/12/2016); Breast surgery; Cardiac catheterization (11-); Cardiac catheterization (05-); Cardiac defibrillator placement (02/02/2011); pr egd balloon dilation esophagus <30 mm diam (N/A, 9/18/2017); pr esophagogastroduodenoscopy transoral diagnostic (9/18/2017); Endoscopy, colon, diagnostic; and other surgical history (10/18/2018).   Additional information:       ALLERGIES   Allergies as of 02/28/2020 - Review Complete 50,000 Units, Topical, Once, Karan Esposito MD    bupivacaine (PF) (MARCAINE) 0.25 % injection 25 mg, 10 mL, Intra-articular, Once, Karan Esposito MD    HYDROmorphone (DILAUDID) injection 1 mg, 1 mg, Intravenous, Once, Karan Esposito MD    iohexol (OMNIPAQUE 240) injection 50 mL, 50 mL, Other, ONCE PRN, Karan Esposito MD    midazolam (VERSED) injection 1 mg, 1 mg, Intravenous, Once, Karan Esposito MD  Prior to Admission medications    Medication Sig Start Date End Date Taking?  Authorizing Provider   insulin aspart (NOVOLOG) 100 UNIT/ML injection vial Inject 8 Units into the skin daily   Yes Historical Provider, MD   insulin aspart (NOVOLOG) 100 UNIT/ML injection vial Inject into the skin 2 times daily Inject per sliding scale 0-150 = 0 units; 151-200 = 1 unit; 201-205 = 2 units; 251-300 = 3 units; 301-350 = 4 units; 351-400 = 5 units; 401-450 = 6 units   Yes Historical Provider, MD   OXYGEN Inhale 4 L into the lungs continuous Keep sats greater than 90%   Yes Historical Provider, MD   amLODIPine (NORVASC) 5 MG tablet Take 1 tablet by mouth daily 11/22/19  Yes ARELI Arrington CNP   isosorbide dinitrate (ISORDIL) 40 MG tablet Take 1 tablet by mouth 3 times daily 11/13/19  Yes ARELI Arrington CNP   pantoprazole (PROTONIX) 40 MG tablet Take 40 mg by mouth 2 times daily   Yes Historical Provider, MD   hydrALAZINE (APRESOLINE) 100 MG tablet Take 50 mg by mouth 3 times daily    Yes Historical Provider, MD   metoprolol succinate (TOPROL XL) 100 MG extended release tablet Take 1 tablet by mouth daily Hold if SBP less than 100 and HR less than 50 bpm 3/30/19  Yes Nessa Rachel MD   ascorbic acid (VITAMIN C) 500 MG tablet Take 500 mg by mouth 2 times daily    Yes Historical Provider, MD   insulin aspart (NOVOLOG) 100 UNIT/ML injection vial Inject 12 Units into the skin 2 times daily (with meals)   Yes Historical Provider, MD   Multiple Vitamins-Minerals (OCUVITE EXTRA PO) Take 1 tablet by mouth daily Yes Historical Provider, MD   pravastatin (PRAVACHOL) 40 MG tablet Take 40 mg by mouth every evening Indications: Blood Cholesterol Abnormal  5/8/16  Yes Historical Provider, MD   bumetanide (BUMEX) 2 MG tablet Take 2 mg by mouth 3 times daily    Historical Provider, MD   miconazole (MICOTIN) 2 % powder Apply topically 2 times daily. 12/19/19   Phoenix Calvo, DO   sodium chloride (OCEAN, BABY AYR) 0.65 % nasal spray 1 spray by Nasal route as needed for Congestion 12/19/19   Phoenix Calvo, DO   cyanocobalamin 1000 MCG tablet Take 1,000 mcg by mouth daily    Historical Provider, MD   warfarin (COUMADIN) 2 MG tablet Take 2 mg by mouth    Historical Provider, MD   epoetin schuyler-epbx (RETACRIT) 99780 UNIT/ML SOLN injection Inject into the skin once a week     Historical Provider, MD   ipratropium-albuterol (DUONEB) 0.5-2.5 (3) MG/3ML SOLN nebulizer solution Inhale 1 vial into the lungs every 6 hours as needed for Shortness of Breath    Historical Provider, MD   ARTIFICIAL TEAR OP Instill one drop into each eye as needed    Historical Provider, MD   Probiotic Product (PROBIOTIC-10) CAPS Take 1 capsule by mouth daily     Historical Provider, MD   nitroGLYCERIN (NITROSTAT) 0.4 MG SL tablet up to max of 3 total doses.  If no relief after 1 dose, call 911. 10/18/18   Nereida Davis MD   docusate sodium (COLACE) 100 MG capsule Take 200 mg by mouth daily    Historical Provider, MD   ferrous sulfate (JAMIR-RAJANI) 325 (65 Fe) MG tablet Take 1 tablet by mouth 2 times daily 7/9/18   ARELI Guillen CNP   clopidogrel (PLAVIX) 75 MG tablet Take 75 mg by mouth every evening     Historical Provider, MD   ondansetron (ZOFRAN) 4 MG tablet Take 4 mg by mouth 2 times daily     Historical Provider, MD   guaiFENesin (ROBITUSSIN) 100 MG/5ML syrup Take 200 mg by mouth every 4 hours as needed for Cough    Historical Provider, MD   bisacodyl (DULCOLAX) 10 MG suppository Place 10 mg rectally daily as needed for Constipation    Historical Provider, MD   magnesium hydroxide (MILK OF MAGNESIA) 400 MG/5ML suspension Take 30 mLs by mouth daily as needed for Constipation    Historical Provider, MD   acetaminophen (TYLENOL) 325 MG tablet Take 650 mg by mouth 4 times daily     Historical Provider, MD     Additional information:       VITAL SIGNS   Vitals:    02/28/20 1010   BP: (!) 169/71   Pulse: 69   Resp: 20   Temp: 97.4 °F (36.3 °C)   SpO2: 98%       PHYSICAL:   Heart:  [x]Regular rate and rhythm  []Other:    Lungs:  [x]Clear    []Other:    Abdomen: [x]Soft    []Other:    Mental Status: [x]Alert & Oriented  []Other:      PLANNED PROCEDURE   []Biospy []Arteriogram              []Drainage   []Mediport Insertion  []Fistulogram []IV access       []Vertebroplasty / Augmentation  []IVC filter []Dialysis catheter []Biliary drainage  []Other: [x]CAPD Catheter []Nephrostomy Tube / Stent  SEDATION  Planned agent:[x]Midazolam []Meperidine []Sublimaze [x]Dilaudid []Morphine     []Diazepam  []Other:     ASA Classification:  []1 [x]2 []3 []4 []5  Class 1: A normal healthy patient  Class 2: Pt with mild to moderate systemic disease  Class 3: Severe systemic disease or disturbance  Class 4: Severe systemic disorders that are already life threatening. Class 5: Moribund pt with little chances of survival, for more than 24 hours. Mallampati I Airway Classification:   []1 [x]2 []3 []4    [x]Pre-procedure diagnostic studies complete and results available. Comment:    [x]Previous sedation/anesthesia experiences assessed. Comment:    [x]The patient is an appropriate candidate to undergo the planned procedure sedation and anesthesia. (Refer to nursing sedation/analgesia documentation record)  [x]Formulation and discussion of sedation/procedure plan, risks, and expectations with patient and/or responsible adult completed. [x]Patient examined immediately prior to the procedure.  (Refer to nursing sedation/analgesia documentation record)    Yeimi Foy MD  Electronically signed 2/28/2020 at 11:23 AM

## 2020-02-28 NOTE — PROGRESS NOTES
[address] FOR CAPD CATHETER. PROCEDURE REVEWED WITH PT VERBALIZED UNDERSTANDING. Pt rights and responsibilities offered to pt to read. OFF BLOOD THINNERS 5 DAYS. _MET__ Safety:       (Environmental)   [place] to environment   Ensure ID band is correct and in place/ allergy band as needed   Assess for fall risk   Initiate fall precautions as applicable (fall band, side rails, etc.)   Call light within reach   Bed in low position/ wheels locked    _MET___ Pain:        Assess pain level and characteristics   Administer analgesics as ordered   Assess effectiveness of pain management and report to MD as needed    ___MET_ Knowledge Deficit:   Assess baseline knowledge   Provide teaching at level of understanding   Provide teaching via preferred learning method   Evaluate teaching effectiveness    __MET__ Hemodynamic/Respiratory Status:       (Pre and Post Procedure Monitoring)   Assess/Monitor vital signs and LOC   Assess Baseline SpO2 prior to any sedation   Obtain weight/height   Assess vital signs/ LOC until patient meets discharge criteria   Monitor procedure site and notify MD of any issues  [facility]. O2 ON DRESSING DRY AND ABDOMEN SOFT. PT HAS NAUSEA. NO PAIN ICE CHIPS TAKEN  1305 DRESSING DRY DRY HEAVES REMAIN. ICE CHIPS TAKEN.  1320 DRESSING DRY NO PAIN. DRY HEAVES REMAIN.  0160 DRESSING DRY NO PAIN. OCCASIONAL DRY HEAVES.  1400 HOME INSTRUCTIONS REVIEWED WITH [facility] NURSE AND REPORT.  1400 UP WITH HELP.  OCCASIONAL DRY HEAVES  1415 DISCHARGED PER WHEELCHAIR STABLE TO [facility] WITH

## 2020-03-04 NOTE — TELEPHONE ENCOUNTER
I spoke to Carole Monteiro about this and she said that they are sending the results to Dr. Gross Credit phone. I suggested they not do that because we do not have proof of that. I asked for the results to be sent to us. Hemoglobin is 10.1 hematocrit is 31.6 no Aranesp needed at this time.

## 2020-04-07 PROBLEM — R55 NEAR SYNCOPE: Status: ACTIVE | Noted: 2020-01-01

## 2020-04-07 NOTE — ED NOTES
ED to inpatient nurses report    Chief Complaint   Patient presents with    Nausea    Loss of Consciousness      Present to ED from nursing home ([address])  LOC: alert and orientated to name, place, date  Vital signs   Vitals:    04/07/20 0851 04/07/20 1016   BP: (!) 95/50 (!) 115/42   Pulse: 69 67   Resp: 16 16   Temp: 97.8 °F (36.6 °C)    TempSrc: Oral    SpO2: 96% 100%   Weight: 234 lb (106.1 kg)    Height: 5' 6\" (1.676 m)       Oxygen Baseline Room Air    Current needs required Room Air  LDAs:   Peripheral IV 04/07/20 Left Hand (Active)   Site Assessment Clean;Dry; Intact 4/7/2020  9:19 AM   Line Status Blood return noted;Normal saline locked 4/7/2020  9:19 AM   Dressing Status Clean;Dry; Intact 4/7/2020  9:19 AM     Mobility: Independent  Pending ED orders: Obtain Urine  Present condition: Stable    Electronically signed by Rebeca Jensen RN on 4/7/2020 at 10:56 AM       Rebeca Jensen RN  04/07/20 1100

## 2020-04-07 NOTE — DISCHARGE INSTR - COC
Continuity of Care Form    Patient Name: Ofelia Bridges   :  1934  MRN:  [id number]    Admit date:  2020  Discharge date:  2020    Code Status Order: Full Code   Advance Directives:   Advance Care Flowsheet Documentation     Date/Time Healthcare Directive Type of Healthcare Directive Copy in [address] Agent's Name Healthcare Agent's Phone Number    [phone]  Yes, patient has an advance directive for healthcare treatment  Durable power of  for health care;Living will  Yes, copy in chart  Healthcare power of   Rosy Calvo or [place] Other  --          Admitting Physician:  Yogi Wren MD  PCP: Alberto Patel MD    Discharging Nurse: [address] [place] [address] Street Unit/Room#: 3B-35/035-A  Discharging Unit Phone Number: [phone]    Emergency Contact:   Extended Emergency Contact Information  Primary Emergency Contact: Fady Berry           [facility] [address] [facility] Phone: [phone]  Mobile Phone: [phone]  Relation: Niece/Nephew  Secondary Emergency Contact: [facility] [address] [facility] Phone: [phone]  Mobile Phone: [phone]  Relation: Brother/Sister    Past Surgical History:  Past Surgical History:   Procedure Laterality Date    BREAST SURGERY      s/p right mastectomy  and left mastectomy     CARDIAC CATHETERIZATION  2010    Hemodynamics w pulmonary hypertension, systemic hypertension and no sats gradient difference. No aortic stenosis. No mitral stenosis. Coronaries; mild disease of the LAD not more than 40%. LV; severe LV dysfunction and EF 30-35%.  CARDIAC CATHETERIZATION  2007    Mild disease of small distal LAD (approximately  50% stenosis) angiographically normal CA. Mild to moderate LV systolic dysfunction. EF 35%. Mildly elevated LV end diastolic pressure. No significant MR. Systemic hypertension.    [facility] CARDIAC purple (Active)   Number of days: 120        Elimination:  Continence:   · Bowel: Yes  · Bladder: Yes  Urinary Catheter: None   Colostomy/Ileostomy/Ileal Conduit: No       Date of Last BM:04/12/20  No intake or output data in the 24 hours ending 04/07/20 1548  No intake/output data recorded. Safety Concerns: At Risk for Falls    Impairments/Disabilities:      None    Nutrition Therapy:  Current Nutrition Therapy:   - Oral Diet:  Carb Control 3 carbs/meal (1500kcals/day)    Routes of Feeding: Oral  Liquids: No Restrictions  Daily Fluid Restriction: no  Last Modified Barium Swallow with Video (Video Swallowing Test): not done    Treatments at the Time of Hospital Discharge:   Respiratory Treatments: Duoneb see MAR  Oxygen Therapy:  is on oxygen at 1-3 L/min per nasal cannula.   Ventilator:    - No ventilator support    Rehab Therapies:   Weight Bearing Status/Restrictions: No weight bearing restirctions  Other Medical Equipment (for information only, NOT a DME order):  walker  Other Treatments: n/a  Physical Therapy and Occupational Therapy  Patient's personal belongings (please select all that are sent with patient):  Glasses    RN SIGNATURE:  Electronically signed by Debra Reynolds RN on 4/13/20 at 12:26 PM EDT    CASE MANAGEMENT/SOCIAL WORK SECTION    Inpatient Status Date: 04/07/2020    Readmission Risk Assessment Score:  Readmission Risk              Risk of Unplanned Readmission:        35           Discharging to Facility/ Agency   · Name: [facility]   · Address: [address] [place] [address]  · Phone:[phone]  · Fax:[phone]    Dialysis Facility (if applicable)   · Name:  · Address:  · Dialysis Schedule:  · Phone:  · Fax:    / signature: Electronically signed by BARBARA Edouard on 4/7/20 at 3:55 PM EDT    PHYSICIAN SECTION    Prognosis: {Prognosis:[id number]}    Condition at Discharge: Stable    Rehab Potential (if transferring to Rehab):

## 2020-04-07 NOTE — CONSULTS
02/09/2020    LABGLOM 13 01/04/2020    LABGLOM 24 12/19/2019    LABGLOM 14 12/18/2019    LABGLOM 16 12/17/2019    LABGLOM 17 12/16/2019    LABGLOM 21 12/15/2019    LABGLOM 24 12/14/2019    LABGLOM 17 12/13/2019    LABGLOM 12 12/12/2019    LABGLOM 12 12/11/2019    LABGLOM 13 12/11/2019          Compliance with treatment plan: 100%     Allergies and Intolerances:   ALLERGIES: Heparin  MEDICATION INTOLERANCES: none  FOOD ALLERGIES: none  INSECT ALLERGIES: nonde  PLANT ALLERGIES: none     Past Medical History:  Past Medical History:   Diagnosis Date    CAD (coronary artery disease)     nonobstructive    Cancer ([facility] 2007    breast cancer    Cardiomyopathy, nonischemic ([facility]     Dr. Emelina Millan CHF (congestive heart failure) ([facility]     CKD (chronic kidney disease) stage 3, GFR 30-59 ml/min ([facility])     Dr. Juan Pablo Alcantara Congenital heart disease     COPD exacerbation ([facility]     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 ([facility]     DM2 (diabetes mellitus, type 2) ([facility] 1998    with CKD3, R foot ulcer and hx of prior ulcerations and PVD    Ex-smoker 10/2/2012    GERD (gastroesophageal reflux disease)     Heart failure with preserved ejection fraction ([facility]     Dr. Emelina Millan His of Heparin induced thrombocytopenia     History of breast cancer     right 1982 s/p mastectomy and chemo, left 2007 s/p mastectomy    History of CVA (cerebrovascular accident)     History of pulmonary embolism 05/2014    on [address] (coumadin)    Hyperlipidemia     Hypertension     pulmonary    Iron deficiency anemia     Dr. Bi Umanzor did EGD and colonoscopy 2011 - normal/neg w/u per chart    LBBB (left bundle branch block)     Osteoarthritis     Paget's disease of bone 09/2014    left hip    [facility] BiV ICD 11/17/2011    Vitamin D deficiency         Past Surgical History:  Past Surgical History:   Procedure Laterality Date    BREAST SURGERY      s/p right mastectomy 1980's and left mastectomy 2007    CARDIAC Pulse 73   Temp 97.6 °F (36.4 °C) (Oral)   Resp 16   Ht 5' 6\" (1.676 m)   Wt 234 lb (106.1 kg)   SpO2 95%   BMI 37.77 kg/m²       General appearance: alert and cooperative with exam  HEENT: Head: Normocephalic, no lesions, without obvious abnormality. Neck: no adenopathy, no carotid bruit and no JVD  Lungs: clear to auscultation bilaterally  Heart: regular rate and rhythm, S1, S2 normal, no murmur, click, rub or gallop  Abdomen: soft, non-tender; bowel sounds normal; no masses,  no organomegaly. PD exit site looks great   Extremities: extremities normal, atraumatic, no cyanosis or edema  Neurologic: Mental status: Alert, oriented, thought content appropriate  PSY: No evidence of depression. Mood is normal for the patient. Skin: Warm and dry. No unusual lesions or rashes noted. Muscles: Hand  is equal and strong bilaterally. Leg strength is equal and strong. Skeletal: No bony or skeletal abnormalities noted. Admission weight: 234 lb (106.1 kg)  Wt Readings from Last 3 Encounters:   04/07/20 234 lb (106.1 kg)   01/17/20 241 lb 8 oz (109.5 kg)   12/19/19 246 lb 4.8 oz (111.7 kg)     Body mass index is 37.77 kg/m². Clinical Impressions:    1. ESRD from DM, HTN and aging. Peritoneal dialysis dependent. 2. Hypotension with near syncope. Perhaps overdialyzed   3. DM  4. HTN  5. Class III obesity  6. 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 Will hold dialysis tonight and resume low concentration dialysis in the morning. Stefani Serrato DO  [facility].

## 2020-04-07 NOTE — ED PROVIDER NOTES
[address] [address] COMPLAINT       Chief Complaint   Patient presents with    Nausea    Loss of Consciousness       Nurses Notes reviewed and I agree except as noted in the HPI. HISTORY OF PRESENT ILLNESS    Antonio Ontiveros is a 80 y.o. female who presents to the Emergency Department for the evaluation of syncopal event. The patient resides at a extended care facility. Patient has multiple chronic health issues including cardiomyopathy, CAD, type 2 diabetes, congestive heart failure, atrial fibrillation, end-stage renal disease on dialysis, breast cancer. The patient has been receiving hemodialysis. She has a Tesio catheter in her right upper chest.  She has been preparing to begin peritoneal dialysis. Last evening was the first evening of her peritoneal dialysis. The patient was on a cycler throughout the evening and reportedly this morning was up to the bathroom, was nauseated and attempting to vomit. She had a passing out episode. Staff members tried to assist her to the bed and she passed out a second time. They noted that her blood pressure was low, her systolic blood pressure was in the 70s. Staff members were able to help her back to bed and raise her feet up. They activated the EMS who transported the patient to the emergency department. The patient states she does not recall passing out. She remembers going to the bathroom feeling as if she was going to vomit. She states next thing she knows she awoke in the bed. She does not remember how she got there. The patient reports that she was having nausea and dry heaves for the past 3 to 4 days. The symptoms occurred prior to her receiving her peritoneal dialysis treatment last evening. She currently is nauseated, she states that she feels lousy. Denies chest pain, cough or shortness of breath. She feels lightheaded and nauseated. Denies abdominal pain.       The HPI was provided diagnostic (9/18/2017); Endoscopy, colon, diagnostic; and other surgical history (10/18/2018).     CURRENT MEDICATIONS       Previous Medications    ACETAMINOPHEN (TYLENOL) 325 MG TABLET    Take 650 mg by mouth 4 times daily     AMLODIPINE (NORVASC) 5 MG TABLET    Take 1 tablet by mouth daily    ARTIFICIAL TEAR OP    Instill one drop into each eye as needed    ASCORBIC ACID (VITAMIN C) 500 MG TABLET    Take 500 mg by mouth 2 times daily     BISACODYL (DULCOLAX) 10 MG SUPPOSITORY    Place 10 mg rectally daily as needed for Constipation    BUMETANIDE (BUMEX) 2 MG TABLET    Take 2 mg by mouth 3 times daily    CLOPIDOGREL (PLAVIX) 75 MG TABLET    Take 75 mg by mouth every evening     CYANOCOBALAMIN 1000 MCG TABLET    Take 1,000 mcg by mouth daily    DOCUSATE SODIUM (COLACE) 100 MG CAPSULE    Take 200 mg by mouth daily    EPOETIN JORGE-EPBX (RETACRIT) 69785 UNIT/ML SOLN INJECTION    Inject into the skin once a week     FERROUS SULFATE (JAMIR-RAJANI) 325 (65 FE) MG TABLET    Take 1 tablet by mouth 2 times daily    GUAIFENESIN (ROBITUSSIN) 100 MG/5ML SYRUP    Take 200 mg by mouth every 4 hours as needed for Cough    HYDRALAZINE (APRESOLINE) 100 MG TABLET    Take 50 mg by mouth 3 times daily     INSULIN ASPART (NOVOLOG) 100 UNIT/ML INJECTION VIAL    Inject 12 Units into the skin 2 times daily (with meals)    INSULIN ASPART (NOVOLOG) 100 UNIT/ML INJECTION VIAL    Inject 8 Units into the skin daily    INSULIN ASPART (NOVOLOG) 100 UNIT/ML INJECTION VIAL    Inject into the skin 2 times daily Inject per sliding scale 0-150 = 0 units; 151-200 = 1 unit; 201-205 = 2 units; 251-300 = 3 units; 301-350 = 4 units; 351-400 = 5 units; 401-450 = 6 units    IPRATROPIUM-ALBUTEROL (DUONEB) 0.5-2.5 (3) MG/3ML SOLN NEBULIZER SOLUTION    Inhale 1 vial into the lungs every 6 hours as needed for Shortness of Breath    ISOSORBIDE DINITRATE (ISORDIL) 40 MG TABLET    Take 1 tablet by mouth 3 times daily    MAGNESIUM HYDROXIDE (MILK OF MAGNESIA) 400 reports that she does not drink alcohol or use drugs. PHYSICAL EXAM     INITIAL VITALS:  height is 5' 6\" (1.676 m) and weight is 234 lb (106.1 kg). Her oral temperature is 97.8 °F (36.6 °C). Her blood pressure is 115/42 (abnormal) and her pulse is 67. Her respiration is 16 and oxygen saturation is 100%. Physical Exam  Constitutional:       General: She is not in acute distress. Appearance: She is not ill-appearing. HENT:      Head: Normocephalic. Nose: Nose normal.      Mouth/Throat:      Mouth: Mucous membranes are moist.   Eyes:      Pupils: Pupils are equal, round, and reactive to light. Cardiovascular:      Rate and Rhythm: Normal rate and regular rhythm. Pulses: Normal pulses. Heart sounds: Normal heart sounds. Chest:       Abdominal:      General: Bowel sounds are normal.      Comments: CAPD catheter in place   Musculoskeletal:      Right lower leg: Edema (2+) present. Left lower leg: Edema (2+) present. Skin:     General: Skin is warm and dry. Capillary Refill: Capillary refill takes less than 2 seconds. Neurological:      General: No focal deficit present. Mental Status: She is alert. Psychiatric:         Mood and Affect: Mood normal.         Behavior: Behavior normal.          DIFFERENTIAL DIAGNOSIS:   Electrolyte abnormality, dehydration, vasovagal syncope, orthostatic hypotension, sepsis    DIAGNOSTIC RESULTS     EKG: All EKG's are interpreted by the Emergency Department Physician who either signs or Co-signs this chart in the absence of a cardiologist.    Atrial sensed ventricular paced rhythm ventricular rate 70 bpm.  AR interval 134, QRS duration 192, corrected  ms. RADIOLOGY: non-plainfilm images(s) such as CT, Ultrasound and MRI are read by the radiologist.    XR CHEST PORTABLE   Final Result   1. Small right-sided pleural effusion. 2. Bibasilar atelectasis/infiltrate.                **This report has been created using voice recognition software. It may contain minor errors which are inherent in voice recognition technology. **      Final report electronically signed by Dr. Soila Baumgarten on 4/7/2020 9:38 AM          LABS:     Labs Reviewed   CBC WITH AUTO DIFFERENTIAL - Abnormal; Notable for the following components:       Result Value    RBC 4.01 (*)     Hemoglobin 11.6 (*)     MCHC 30.3 (*)     RDW-CV 14.8 (*)     RDW-SD 52.0 (*)     All other components within normal limits   COMPREHENSIVE METABOLIC PANEL - Abnormal; Notable for the following components:    CREATININE 8.2 (*)     BUN 68 (*)     Chloride 92 (*)     Alb 3.3 (*)     ALT 6 (*)     All other components within normal limits   TROPONIN - Abnormal; Notable for the following components:    Troponin T 0.104 (*)     All other components within normal limits   PROTIME-INR - Abnormal; Notable for the following components:    INR 2.36 (*)     All other components within normal limits   TSH WITHOUT REFLEX - Abnormal; Notable for the following components:    TSH 7.550 (*)     All other components within normal limits   ANION GAP - Abnormal; Notable for the following components:    Anion Gap 21.0 (*)     All other components within normal limits   GLOMERULAR FILTRATION RATE, ESTIMATED - Abnormal; Notable for the following components:    Est, Glom Filt Rate 5 (*)     All other components within normal limits   MAGNESIUM   OSMOLALITY   URINE RT REFLEX TO CULTURE       EMERGENCY DEPARTMENT COURSE:   Vitals:    Vitals:    04/07/20 0851 04/07/20 1016   BP: (!) 95/50 (!) 115/42   Pulse: 69 67   Resp: 16 16   Temp: 97.8 °F (36.6 °C)    TempSrc: Oral    SpO2: 96% 100%   Weight: 234 lb (106.1 kg)    Height: 5' 6\" (1.676 m)        9:06 AM EDT: The patient was seen and evaluated. Patient's white blood cell count is 8.2. She is not severely anemic, hemoglobin 11.6, hematocrit 38.3. This is higher than previous levels. Patient's electrolytes were reassuring.   Sodium 139, potassium 4.6, chloride 92, CO2

## 2020-04-07 NOTE — ED TRIAGE NOTES
Patient presents to ER via EMS from [facility] with reports of syncopal episode while sitting on toilet today. Nursing progress note from [facility] reports patient called for assistance saying she was going to faint, in which at that time patient became diaphoretic and not responding. Blood sugar was checked by staff with reading of 188. [address] staff reporting blood pressure of 77/42. Patient was transferred to bed upon arousal. Patient recently started on peritoneal dialysis. Patient at this time only reports nausea. Patient alert and oriented x4. EKG obtained. Telemetry applied.

## 2020-04-07 NOTE — PROGRESS NOTES
Pt admitted to  3B via cart/stretcher. Complaints: syncope. IV 0 infusing into the hand at a rate of 0 mls/ hour with about 0  mls in the bag still. IV site free of s/s of infection or infiltration. Vital signs obtained. Assessment and data collection initiated. Two nurse skin assessment performed by delmis PRIEST and Lisa Eldridge. Oriented to room. Policies and procedures for  explained. delmis PRIEST discussed hourly rounding with patient addressing 5 P's. Fall prevention and safety brochure discussed with patient. Bed alarm on. Call light in reach. The best day to schedule a follow up Dr appointment is:  Monday a.m. Explained patients right to have family, representative or physician notified of their admission. Patient has Declined for physician to be notified. Patient has Declined for family/representative to be notified. All questions answered with no further questions at this time.

## 2020-04-07 NOTE — H&P
dialysis patient was found to have blood pressures in the systolic 41Q when she experienced the dizziness and nearly passed out while she was in the bathroom. No fever no chills no shortness of breath no chest pain. Has been doing okay prior to this episode-no one else is sick around her and she has not traveled anywhere else recently. Of note patient has coronary artery disease, history of ischemic cardiomyopathy and had AICD placed-however the recent echoes have been showing that she has normal EF which likely might have recovered in the past few years. She does have a CardioMEMS .    Currently not in CHF  Looks dehydrated      Past Medical History:        Diagnosis Date    CAD (coronary artery disease)     nonobstructive    Cancer ([address] 2007    breast cancer    Cardiomyopathy, nonischemic ([address]     Dr. Cyndee Scherer CHF (congestive heart failure) [facility]     CKD (chronic kidney disease) stage 3, GFR 30-59 ml/min ([facility])     Dr. Jose Lane Congenital heart disease     COPD exacerbation ([facility]     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 ([address]     DM2 (diabetes mellitus, type 2) ([address] 1998    with CKD3, R foot ulcer and hx of prior ulcerations and PVD    Ex-smoker 10/2/2012    GERD (gastroesophageal reflux disease)     Heart failure with preserved ejection fraction ([address]     Dr. Cyndee Scherer His of Heparin induced thrombocytopenia     History of breast cancer     right 1982 s/p mastectomy and chemo, left 2007 s/p mastectomy    History of CVA (cerebrovascular accident)     History of pulmonary embolism 05/2014    on [facility] (coumadin)    Hyperlipidemia     Hypertension     pulmonary    Iron deficiency anemia     Dr. 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 No aortic stenosis. No mitral stenosis. Coronaries; mild disease of the LAD not more than 40%. LV; severe LV dysfunction and EF 30-35%.  CARDIAC CATHETERIZATION  05-    Mild disease of small distal LAD (approximately  50% stenosis) angiographically normal CA. Mild to moderate LV systolic dysfunction. EF 35%. Mildly elevated LV end diastolic pressure. No significant MR. Systemic hypertension.  CARDIAC DEFIBRILLATOR PLACEMENT  02/02/2011    CARDIOVASCULAR STRESS TEST  03/23/10    EF 30%  Severe LV Dys    COLONOSCOPY      Dr. Rafia Skelton  03/22/10    EF 45%. LV mildly dilated. Systolic function mildly reducted. No regional wall motion abnormalities. Wall thickness mildly increased. LA mildly dilated. MV mild annular calification. Mild TR. Ventricular septum tickness mildly increased.  ENDOSCOPY, COLON, DIAGNOSTIC      EYE SURGERY      bilateral cataracts    EYE SURGERY Left 9/2014    laser surgery for retinopathy?  FOOT SURGERY Left 1/12/2016    OTHER SURGICAL HISTORY  10/18/2018    CardioMEMS    PACEMAKER PLACEMENT      [place] EGD BALLOON DILATION ESOPHAGUS <30 MM DIAM N/A 9/18/2017    EGD ESOPHAGOGASTRODUODENOSCOPY DILATATION performed by Santana Newton MD at [facility]    [place] ESOPHAGOGASTRODUODENOSCOPY TRANSORAL DIAGNOSTIC  9/18/2017    EGD ESOPHAGOGASTRODUODENOSCOPY performed by Santana Newton MD at [email or web address]       Home Medications:   No current facility-administered medications on file prior to encounter. Current Outpatient Medications on File Prior to Encounter   Medication Sig Dispense Refill    miconazole (MICOTIN) 2 % powder Apply topically 2 times daily.  45 g 1    insulin aspart (NOVOLOG) 100 UNIT/ML injection tablet 3    ascorbic acid (VITAMIN C) 500 MG tablet Take 500 mg by mouth 2 times daily       docusate sodium (COLACE) 100 MG capsule Take 200 mg by mouth daily      ferrous sulfate (JAMIR-RAJANI) 325 (65 Fe) MG tablet Take 1 tablet by mouth 2 times daily 60 tablet 3    ondansetron (ZOFRAN) 4 MG tablet Take 4 mg by mouth 2 times daily       insulin aspart (NOVOLOG) 100 UNIT/ML injection vial Inject 12 Units into the skin 2 times daily (with meals)      guaiFENesin (ROBITUSSIN) 100 MG/5ML syrup Take 200 mg by mouth every 4 hours as needed for Cough      bisacodyl (DULCOLAX) 10 MG suppository Place 10 mg rectally daily as needed for Constipation      magnesium hydroxide (MILK OF MAGNESIA) 400 MG/5ML suspension Take 30 mLs by mouth daily as needed for Constipation      acetaminophen (TYLENOL) 325 MG tablet Take 650 mg by mouth 4 times daily          Allergies:    Heparin    Social History:    reports that she quit smoking about 68 years ago. Her smoking use included cigarettes. She has a 1.00 pack-year smoking history. She has never used smokeless tobacco. She reports that she does not drink alcohol or use drugs. Family History:       Problem Relation Age of Onset    Diabetes Mother     Pacemaker Mother     Stroke Mother     Heart Disease Mother     Diabetes Father     Cancer Sister     Cancer Brother     Cancer Sister     Heart Disease Brother         cardiomegaly       Diet:  No diet orders on file    Review of systems:   Pertinent positives as noted in the HPI. All other systems reviewed and negative. PHYSICAL EXAM:  BP (!) 115/42   Pulse 67   Temp 97.8 °F (36.6 °C) (Oral)   Resp 16   Ht 5' 6\" (1.676 m)   Wt 234 lb (106.1 kg)   SpO2 100%   BMI 37.77 kg/m²   General appearance: No apparent distress, appears stated age and cooperative. HEENT: Normal cephalic, atraumatic without obvious deformity. Pupils equal, round, and reactive to light. Extra ocular muscles intact.  Conjunctivae/corneas clear.  Dry oral mucosa  Neck: Supple, with full range of motion. No jugular venous distention. Trachea midline. Respiratory:  Normal respiratory effort. Clear to auscultation, bilaterally without Rales/Wheezes/Rhonchi. Cardiovascular: Regular rate and rhythm with normal S1/S2 without murmurs, rubs or gallops. Abdomen: Soft, non-tender, non-distended with normal bowel sounds. Musculoskeletal:  No clubbing, cyanosis or 1+ edema bilaterally. Skin: Skin color, texture, turgor normal.  No rashes or lesions. Neurologic:  Neurovascularly intact without any focal sensory/motor deficits. Cranial nerves: II-XII intact, grossly non-focal.  Psychiatric: Alert and oriented, thought content appropriate, normal insight  Capillary Refill: Brisk,< 3 seconds   Peripheral Pulses: +2 palpable, equal bilaterally     Labs:   Recent Labs     04/07/20  0921   WBC 8.2   HGB 11.6*   HCT 38.3        Recent Labs     04/07/20 0921      K 4.6   CL 92*   CO2 26   BUN 68*   CREATININE 8.2*   CALCIUM 8.8     Recent Labs     04/07/20  0921   AST 13   ALT 6*   BILITOT 0.3   ALKPHOS 104     Recent Labs     04/07/20 0921   INR 2.36*     No results for input(s): CKTOTAL, TROPONINI in the last 72 hours. Urinalysis:    Lab Results   Component Value Date    NITRU NEGATIVE 12/10/2019    WBCUA 25-50 12/10/2019    BACTERIA NONE SEEN 12/10/2019    RBCUA 15-25 12/10/2019    BLOODU NEGATIVE 12/10/2019    SPECGRAV 1.019 03/26/2019    GLUCOSEU NEGATIVE 12/10/2019       Radiology:   XR CHEST PORTABLE   Final Result   1. Small right-sided pleural effusion. 2. Bibasilar atelectasis/infiltrate. **This report has been created using voice recognition software. It may contain minor errors which are inherent in voice recognition technology. **      Final report electronically signed by Dr. Hari Etienne on 4/7/2020 9:38 AM        Xr Chest Portable    Result Date: 4/7/2020  PROCEDURE: XR CHEST PORTABLE CLINICAL INFORMATION: Syncope, weakness TECHNIQUE: Mobile AP chest radiograph. COMPARISON: Mobile AP chest radiograph 12/16/2019 FINDINGS: A right-sided hemodialysis catheter and left-sided cardiac device are in stable position. Cardiac silhouette is at the upper limits of normal in size. Lung volumes are low. The right costophrenic angle is blunted. Indistinct hazy and reticular opacities are present at the right lung base. There are minimal reticular opacities at the left lung  base. Degenerative changes in the thoracic spine are poorly visualized. 1. Small right-sided pleural effusion. 2. Bibasilar atelectasis/infiltrate. **This report has been created using voice recognition software. It may contain minor errors which are inherent in voice recognition technology. ** Final report electronically signed by Dr. Mart Siemens on 4/7/2020 9:38 AM        EKG:pending    Electronically signed by Gerson Cardozo MD on 4/7/2020 at 11:09 AM

## 2020-04-08 NOTE — PROGRESS NOTES
Consciousness       Hospital Course: Patient was seen, examined and the medical chart was reviewed thoroughly today. In summary, 80 y. o.female admitted overnight for evaluation of near-syncope. Subjective (past 24 hours):   denies CP/SOB/dizzness/presyncope/palpitation/N/V/fever/chills      Medications:  Reviewed    Infusion Medications    dextrose       Scheduled Medications    lactobacillus  1 capsule Oral Daily    pravastatin  40 mg Oral QPM    pantoprazole  40 mg Oral BID    ondansetron  4 mg Oral BID    ferrous sulfate  325 mg Oral BID    cyanocobalamin  1,000 mcg Oral Daily    ascorbic acid  500 mg Oral BID    acetaminophen  650 mg Oral 4x Daily    docusate sodium  200 mg Oral Daily    insulin glargine  10 Units Subcutaneous Nightly    insulin lispro  0-12 Units Subcutaneous TID WC    insulin lispro  0-6 Units Subcutaneous Nightly    sodium chloride flush  10 mL Intravenous 2 times per day    warfarin (COUMADIN) daily dosing (placeholder)   Other RX Placeholder     PRN Meds: nitroGLYCERIN, glucose, dextrose, glucagon (rDNA), dextrose, sodium chloride flush, acetaminophen **OR** acetaminophen, polyethylene glycol, promethazine **OR** ondansetron      Intake/Output Summary (Last 24 hours) at 4/8/2020 0837  Last data filed at 4/8/2020 0315  Gross per 24 hour   Intake 400 ml   Output 0 ml   Net 400 ml       Diet:  DIET CARB CONTROL; Exam:  /60   Pulse 98   Temp 97.6 °F (36.4 °C) (Oral)   Resp 14   Ht 5' 6\" (1.676 m)   Wt 234 lb (106.1 kg)   SpO2 94%   BMI 37.77 kg/m²     General appearance: Obese, A&O x3, Not ill or toxic, in no apparent distress  HEENT:  ADAMA  EOM intact. Neck: Supple, with full range of motion. No jugular venous distention. Trachea midline.   Respiratory:   NL A/E bilat with no adventitious sounds   Cardiovascular:  normal S1/S2 with no murmurs/gallops  Abdomen: Soft, non-tender, non-distended, no rigidity or peritoneal signs  Musculoskeletal: NL

## 2020-04-08 NOTE — CONSULTS
Date    LABGLOM 4 04/08/2020    LABGLOM 5 04/07/2020    LABGLOM 19 02/09/2020    LABGLOM 13 01/04/2020    LABGLOM 24 12/19/2019    LABGLOM 14 12/18/2019    LABGLOM 16 12/17/2019    LABGLOM 17 12/16/2019    LABGLOM 21 12/15/2019    LABGLOM 24 12/14/2019    LABGLOM 17 12/13/2019    LABGLOM 12 12/12/2019    LABGLOM 12 12/11/2019          Compliance with treatment plan: 100%     Allergies and Intolerances:   ALLERGIES: Heparin  MEDICATION INTOLERANCES: none  FOOD ALLERGIES: none  INSECT ALLERGIES: nonde  PLANT ALLERGIES: none     Past Medical History:  Past Medical History:   Diagnosis Date    CAD (coronary artery disease)     nonobstructive    Cancer ([facility] 2007    breast cancer    Cardiomyopathy, nonischemic ([facility]     Dr. Daya Corona CHF (congestive [facility] failure) [facility]     CKD (chronic kidney disease) stage 3, GFR 30-59 ml/min ([facility])     Dr. Travis Congenital heart disease     COPD exacerbation ([facility]     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 ([facility]     DM2 (diabetes mellitus, type 2) ([facility] 1998    with CKD3, R foot ulcer and hx of prior ulcerations and PVD    Ex-smoker 10/2/2012    GERD (gastroesophageal reflux disease)     Heart failure with preserved ejection fraction ([facility]     Dr. Daya Corona His of Heparin induced thrombocytopenia     History of breast cancer     right 1982 s/p mastectomy and chemo, left 2007 s/p mastectomy    History of CVA (cerebrovascular accident)     History of pulmonary embolism 05/2014    on [address] (coumadin)    Hyperlipidemia     Hypertension     pulmonary    Iron deficiency anemia     Dr. 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 No aortic stenosis. No mitral stenosis. Coronaries; mild disease of the LAD not more than 40%. LV; severe LV dysfunction and EF 30-35%.  CARDIAC CATHETERIZATION  05-    Mild disease of small distal LAD (approximately  50% stenosis) angiographically normal CA. Mild to moderate LV systolic dysfunction. EF 35%. Mildly elevated LV end diastolic pressure. No significant MR. Systemic hypertension.  CARDIAC DEFIBRILLATOR PLACEMENT  02/02/2011    CARDIOVASCULAR STRESS TEST  03/23/10    EF 30%  Severe LV Dys    COLONOSCOPY      Dr. Mine Laws  03/22/10    EF 45%. LV mildly dilated. Systolic function mildly reducted. No regional wall motion abnormalities. Wall thickness mildly increased. LA mildly dilated. MV mild annular calification. Mild TR. Ventricular septum tickness mildly increased.  ENDOSCOPY, COLON, DIAGNOSTIC      EYE SURGERY      bilateral cataracts    EYE SURGERY Left 9/2014    laser surgery for retinopathy?  FOOT SURGERY Left 1/12/2016    OTHER SURGICAL HISTORY  10/18/2018    CardioMEMS    PACEMAKER PLACEMENT      AK EGD BALLOON DILATION ESOPHAGUS <30 MM DIAM N/A 9/18/2017    EGD ESOPHAGOGASTRODUODENOSCOPY DILATATION performed by Chanell Fall MD at [facility]    AK ESOPHAGOGASTRODUODENOSCOPY TRANSORAL DIAGNOSTIC  9/18/2017    EGD ESOPHAGOGASTRODUODENOSCOPY performed by Chanell Fall MD at [facility]        Family History:  Family History   Problem Relation Age of Onset    Diabetes Mother    Kezia Mackintosh Pacemaker Mother     Stroke Mother     Heart Disease Mother     Diabetes Father     Cancer Sister     Cancer Brother     Cancer Sister     Heart Disease Brother         cardiomegaly        Social History:  Social History     Socioeconomic History    Marital status:       Spouse 1,000 Units by mouth daily   Yes Historical Provider, MD   ALPRAZolam (XANAX) 0.25 MG tablet Take 0.25 mg by mouth every 4 hours as needed for Anxiety. Yes Historical Provider, MD   bumetanide (BUMEX) 2 MG tablet Take 2 mg by mouth 3 times daily   Yes Historical Provider, MD   miconazole (MICOTIN) 2 % powder Apply topically 2 times daily. 12/19/19  Yes Hui Gallegos,    insulin aspart (NOVOLOG) 100 UNIT/ML injection vial Inject 8 Units into the skin daily With breakfast   Yes Historical Provider, MD   insulin aspart (NOVOLOG) 100 UNIT/ML injection vial Inject into the skin 2 times daily Inject per sliding scale 0-150 = 0 units; 151-200 = 1 unit; 201-205 = 2 units; 251-300 = 3 units; 301-350 = 4 units; 351-400 = 5 units; 401-450 = 6 units   Yes Historical Provider, MD   cyanocobalamin 1000 MCG tablet Take 1,000 mcg by mouth daily   Yes Historical Provider, MD   amLODIPine (NORVASC) 5 MG tablet Take 1 tablet by mouth daily 11/22/19  Yes ARELI Arrington CNP   warfarin (COUMADIN) 2 MG tablet Take 2 mg by mouth As dosed by ECF.    Yes Historical Provider, MD   isosorbide dinitrate (ISORDIL) 40 MG tablet Take 1 tablet by mouth 3 times daily 11/13/19  Yes ARELI Arrington CNP   pantoprazole (PROTONIX) 40 MG tablet Take 40 mg by mouth 2 times daily   Yes Historical Provider, MD   hydrALAZINE (APRESOLINE) 100 MG tablet Take 50 mg by mouth 3 times daily    Yes Historical Provider, MD   metoprolol succinate (TOPROL XL) 100 MG extended release tablet Take 1 tablet by mouth daily Hold if SBP less than 100 and HR less than 50 bpm 3/30/19  Yes Blossom Avila MD   ipratropium-albuterol (DUONEB) 0.5-2.5 (3) MG/3ML SOLN nebulizer solution Inhale 1 vial into the lungs every 6 hours as needed for Shortness of Breath   Yes Historical Provider, MD   ARTIFICIAL TEAR OP Instill one drop into each eye as needed   Yes Historical Provider, MD   Probiotic Product (PROBIOTIC-10) CAPS Take 1 capsule by mouth daily    Yes COLORU, PHUR, LABCAST, WBCUA, RBCUA, MUCUS, TRICHOMONAS, YEAST, BACTERIA, CLARITYU, SPECGRAV, LEUKOCYTESUR, UROBILINOGEN, [place] in the last 72 hours. Invalid input(s): NITRATE, GLUCOSEUKETONESUAMORPHOUS   Sed Rate: No results for input(s): SEDRATE in the last 72 hours. Radiology       Review of Systems:  Source of data: patient    CONSTITUTIONAL  Fever: none, Chills: none, Weight loss: none, Malaise: none, Fatigue: none, Diaphoresis: none, Weakness: none    SKIN  Rash: none, Itch: none, Open sores: none    HENT:  Headache: none, Hearing loss: none, Tinnitus: none, Ear pain: none, Ear discharge: none,   Nasal bleeding: none, Congestion: none, Stridor: none, Sore throat: none. EYES  Blurred vision: none, Double vision: none, Photophobia: none, Eye pain: none, Eye discharge: none, Eye redness: none. CARDIOVASCULAR  Chest pain: none, Arm pain: none, Palpitations: none, Orthopnea: none, Claudication: none,  Leg swelling: none, PND: none. RESPIRATORY  Cough: none, Hemoptysis: none, Sputum production: none, Shortness of breath: none, Wheezing: none    GASTROINTESTINAL  Heartburn: none, Nausea: none, Vomiting: none, Abdominal pain: none, Diarrhea: none,  Constipation: none, Blood in the stool: none, Melena: none, Rebound: none, Rovsing's: none. GENITOURINARY  Dysuria: none, Pyuria: none, Gross hematuria: none, Urgency: none, Flank pain: none, STD: none. MUSCULOSKELETAL  Myalgia: none, Neck pain: none, Thoracic pain: none, Lumbar pain: none, Joint pain: none, Falls: none    ENDOCRINE/HEMATOLOGY/ALLERGIC  Easy bruising: none, Easy bleeding: none, Environmental allergies: none, Polydipsia: none.     NEUROLOGICAL  Dizziness: none, Tingling: none, Numbness: none, Tremor: none, Sensory change: none, Speech change: none, Focal weakness: none, Seizures: none, Loss of consciousness: none,    PSYCHIATRIC  Depression: none, Suicidal ideation: none, Heroin abuse: none, Cocaine abuse: none, Marijuana abuse: none, Hallucinations: none, Anxiety: none, Memory loss: none       Physical Examination:  BP (!) 126/56   Pulse 76   Temp 97.4 °F (36.3 °C) (Axillary)   Resp 16   Ht 5' 6\" (1.676 m)   Wt 234 lb (106.1 kg)   SpO2 97%   BMI 37.77 kg/m²       General appearance: alert and appropriate with exam  HEENT: Head: Normocephalic, no lesions, without obvious abnormality. Neck: no adenopathy, no carotid bruit and no JVD  Lungs: clear to auscultation bilaterally  Heart: regular rate and rhythm, S1, S2 normal, no murmur, click, rub or gallop  Abdomen: soft, non-tender; bowel sounds normal; no masses,  no organomegaly. PD exit site looks great   Extremities: extremities normal, atraumatic, no cyanosis or edema  Neurologic: Mental status: Alert, oriented, thought content appropriate  PSY: No evidence of depression. Mood is normal for the patient. Skin: Warm and dry. No unusual lesions or rashes noted. Muscles: Hand  is equal and strong bilaterally. Leg strength is equal and strong. Skeletal: No bony or skeletal abnormalities noted. Admission weight: 234 lb (106.1 kg)  Wt Readings from Last 3 Encounters:   04/07/20 234 lb (106.1 kg)   01/17/20 241 lb 8 oz (109.5 kg)   12/19/19 246 lb 4.8 oz (111.7 kg)     Body mass index is 37.77 kg/m². Clinical Impressions:    1. ESRD from DM, HTN and aging. Peritoneal dialysis dependent. 2. Hypotension with near syncope. Perhaps overdialyzed   3. DM  4. HTN  5. Class III obesity  6. Anemia from kidney disease     Plan of Care    It is OK with nephrology for the primary care physician to discharge the patient when ready. A follow up visit with nephrology is not necessary as we see hemodialysis patients  Every week while receiving hemodialysis. 1. Ama  2. John Serrato DO  [facility].

## 2020-04-08 NOTE — PROGRESS NOTES
Nutrition Assessment    Type and Reason for Visit: Initial, Positive Nutrition Screen(wounds)    Nutrition Recommendations:   MD to advise if would like renal diet ordered ( PD pt). Suggest weigh pt. Nutrition Assessment:    Pt. nutritionally compromised AEB elevated BUN/Creatinine. At risk for further nutrition compromise r/t increased needs for PD dialysis losses,Near Syncope, ESRD,    and underlying medical condition (Hx: COPD, Type II DM, ). Nutrition recommendations/interventions as per above. Malnutrition Assessment:  · Malnutrition Status: Insufficient data  · Findings of the 6 clinical characteristics of malnutrition (Minimum of 2 out of 6 clinical characteristics is required to make the diagnosis of moderate or severe Protein Calorie Malnutrition based on AND/ASPEN Guidelines):  1. Energy Intake-Greater than 75% of estimated energy requirement, Unable to assess    2. Weight Loss- ,    3. Fat Loss-Unable to assess,    4. Muscle Loss-Unable to assess,    5. Fluid Accumulation-Unable to assess,    6.  Strength-Not measured    Nutrition Risk Level: Moderate    Nutrient Needs:  · Estimated Daily Total Kcal: ~1475kcals (25kcals/kgm IBW 59kgm)  · Estimated Daily Protein (g): 89 grams (1.5 grams protein/kgm IBW 59kgm)  · Estimated Daily Total Fluid (ml/day):  per MD    Nutrition Diagnosis:   · Problem: Altered nutrition-related lab values  · Etiology: related to Renal dysfunction     Signs and symptoms:  as evidenced by Lab values    Objective Information:  · Nutrition-Focused Physical Findings: Pt. called on phone d/t isolation, appears [place] , from ECF, states good appetite. Declines diet texture adjustment. She mentions no teeth.  Labs:(4/8) BUN 76, Creatinine 9.7, Hemoglobin 10.9  · Wound Type: (RN mentions bruised buttocks from bedpan, no other wounds)  · Current Nutrition Therapies:  · Oral Diet Orders: (carb controlled)   · Oral Diet intake: %  · Oral Nutrition Supplement (ONS) Orders:

## 2020-04-09 NOTE — CARE COORDINATION
4/9/20, 9:51 AM EDT    DISCHARGE PLANNING EVALUATION  Called and left a message for Tracie at [facility]. Emelia Xie is ready to be discharged, making sure they are ready to accept. Update 10:59am: Spoke with Tracie at [facility]. She told SW that their policy is that patients be tested for COVID 19 before they are accepted back to their facility. SW made her aware patient does meet the criteria for testing. She asked that SW call their  Trenia Runner [phone]. EASTON called and left a message for Casey Mayers. Update 11:39pm: Spoke with Coral Quezada at [facility]. They are able to do the COVID 19 test at [facility]. They are now telling  that they want to precert Emelia Xie. Spoke with RN Lyn Kline. She told SW that OT has seen Emelia Xie. She will call PT to ask if they can see today.

## 2020-04-09 NOTE — PROGRESS NOTES
balance, on O2, fatigued quickly, inc assist for safe mobility with use of RW, heart monitor, recommend cont PT to inc pt functional mobility  Prognosis: Good    REQUIRES PT FOLLOW UP: Yes    Discharge Recommendations:  Discharge Recommendations: Continue to assess pending progress, ECF with PT, Patient would benefit from continued therapy after discharge    Patient Education:  PT Education: Goals, PT Role, Plan of Care, Functional Mobility Training    Equipment Recommendations:  Equipment Needed: No    Plan:  Times per week: 3-5X GM  Times per day: Daily  Specific instructions for Next Treatment: therex and mobility    Goals:  Patient goals : return to SNF with therapy  Short term goals  Time Frame for Short term goals: by discharge  Short term goal 1: bed mobility with [place]  Short term goal 2: transfer with CGA  Short term goal 3: amb >25'x1 with walker and CGA to walk safely in room  Long term goals  Time Frame for Long term goals : no LTGs set secondary to short ELOS    Following session, patient left in safe position with all fall risk precautions in place.

## 2020-04-09 NOTE — PROGRESS NOTES
Clinical Pharmacy Note    Warfarin consult follow-up    Recent Labs     04/09/20  0345   INR 1.77*     Recent Labs     04/07/20  0921 04/08/20  0336   HGB 11.6* 10.9*   HCT 38.3 34.7*    180       Significant Drug-Drug Interactions:  New warfarin drug-drug interactions: none  Discontinued drug-drug interactions: none  Current warfarin drug-drug interactions: pravastatin       Date INR Warfarin Dose   4/7/2020 2.36 1.5 mg   4/8/2020   2.10 3 mg     4/9/2020  1.77 3 mg                                        Notes:                   Daily PT/INR until stable within therapeutic range.      Earlene Mcclellan, PharmD  4/9/2020  12:13 PM

## 2020-04-09 NOTE — PROGRESS NOTES
and reticular opacities are present at the right lung base. There are minimal reticular opacities at the left lung  base. Degenerative changes in the thoracic spine are poorly visualized. 1. Small right-sided pleural effusion. 2. Bibasilar atelectasis/infiltrate. **This report has been created using voice recognition software. It may contain minor errors which are inherent in voice recognition technology. ** Final report electronically signed by Dr. Sally Vizcarra on 4/7/2020 9:38 AM      Diet: DIET CARB CONTROL;    DVT prophylaxis: [] Lovenox                                 [] SCDs                                 [] SQ Heparin                                 [] Encourage ambulation           [x] Already on Anticoagulation       Code Status: [facility] Problems    Diagnosis Date Noted    Near syncope [R55] 04/07/2020           With RN in room, patient was updated about the treatment plan, all the questions and concerns were addressed.         Electronically signed by Jessica Holley MD on 4/9/2020 at 7:55 AM

## 2020-04-09 NOTE — PROGRESS NOTES
Called and spoke to pt's nurse on 3B. Advised that pt was originally scheduled earlier this week to have tunneled dialysis catheter removed but IR staffed noticed that procedure was cancelled and the pt was rescheduled as an outpatient to have the tunneled catheter removed this Friday. It was then noted that the pt was admitted for syncope. IR staff have been following the pt's hospital stay and reviewed the pt's chart again today, although there has been no indication in the chart that the tunneled dialysis catheter was to be removed while the pt was an inpatient. It was noted that the pt received Coumadin 3mg last evening and the INR today was 1.77. Radiology protocol is for Coumadin to be held 5 days and INR to be less than 1.5. Requested pt's nurse to contact Dr Judith Barger to see if he still wants tunneled dialysis catheter removed while pt is in an inpatient as this will have to be cleared by a physician (IR) to physician (Nephrologist) conversation if exceptions will be made regarding the protocol, otherwise the pt could be scheduled as an outpatient following blood thinner and INR protocol.

## 2020-04-10 NOTE — CARE COORDINATION
4/10/20, 10:54 AM EDT    DISCHARGE PLANNING EVALUATION  Called and spoke with Tracie at [facility] and the patients precert has not come back.

## 2020-04-10 NOTE — PROGRESS NOTES
CLINICAL PHARMACY: DISCHARGE MED RECONCILIATION/REVIEW    [facility]) Select Patient?: No  Total # of Interventions Recommended: 0   -   Total # Interventions Accepted: 0  Intervention Severity:   - Level 1 Intervention Present?: No   - Level 2 #: 0   - Level 3 #: 0   Time Spent (min): 15    Additional Documentation:    Balbina Lang PharmD  4/10/2020  11:28 AM

## 2020-04-10 NOTE — PROGRESS NOTES
Clinical Pharmacy Note    Warfarin consult follow-up    Recent Labs     04/10/20  0328   INR 2.92*     Recent Labs     04/08/20  0336   HGB 10.9*   HCT 34.7*          Significant Drug-Drug Interactions:  New warfarin drug-drug interactions: none  Discontinued drug-drug interactions: none  Current warfarin drug-drug interactions: pravastatin       Date INR Warfarin Dose   4/7/2020 2.36 1.5 mg   4/8/2020   2.10 3 mg     4/9/2020  1.77 3 mg     4/10/2020  2.92 0.5 mg                                Notes:                     Daily PT/INR until stable within therapeutic range.      Nabil Bains, PharmD  4/10/2020  4:42 PM

## 2020-04-10 NOTE — CARE COORDINATION
4/10/20, 4:44 PM EDT    DISCHARGE PLANNING EVALUATION    SW notified by CEM Barrett that [facility] contacted her regarding denial from pre-cert at this time. [id number] number [phone] #[id number]. EASTON provided this information to [address] - Admission at [facility]. Rubina stated that she would speak with administration and contact CEM Vila regarding Patient discharging today and if [id number] is to be completed. CEM Lerma of need for [id number].

## 2020-04-10 NOTE — DISCHARGE SUMMARY
Hospital Medicine Discharge Summary      Patient Identification:   Vincent Canales   : 1934  MRN: [id number]   Account: [de-identified]      Patient's PCP: Feliciano Euceda MD    Admit Date: 2020     Discharge Date:   4/10/2020      Admitting Physician: Jess Clark MD     Discharge Physician: Kunal Guillermo MD     Discharge Diagnoses: Active Hospital Problems    Diagnosis Date Noted    Near syncope [R55] 2020       The patient was seen and examined on day of discharge and this discharge summary is in conjunction with any daily progress note from day of discharge. Hospital Course: Vincent Canales is a 80 y.o. female admitted to [facility] on 2020 for evaluation of near-syncope. Pt responded well to medical management, remained clinically stable and was discharged in stable conditions after cleared by consulting services. Assessment/Plan:    1. Near syncope-likely secondary to orthostatic hypotension d/t significant intravascular volume shift d/t recent switch from IHD to PD; improved w/ IVF. Improved. Will monitor orthostatic VS.nephro following; No further W/U necessary at this time  4/10: no presyncope, negative orthosis. Routine nephro F/U on PD.   2. Hx non-ischemic cardiomyopathy however EF in nromal limits per TTE from Dec 2019; status post AICD  3. Chronically elevated troponin likely secondary to end-stage renal disease: denies CP, EKG negative for new/dyanmic ischemic changes; will monitor clinically  4. History of COPD/ chronic hypoxic respiratory failure--on 3 to 4 L at baseline; No exacerbation; continue home inhalers  5. History of type 2 diabetes- well-controlled on current regimen. CCM.   6. History of benign essential hypertension: if anything hypotensive on arrival;  BP improved; on isosorbide and Metoprol from ohome; resumed BB w/ hold parameters; will monitor closely  : BP fairly well-controlled, will consider palpable, equal bilaterally         Labs: For convenience and continuity at follow-up the following most recent labs are provided:      CBC:    Lab Results   Component Value Date    WBC 8.7 2020    HGB 10.9 2020    HCT 34.7 2020     2020       Renal:    Lab Results   Component Value Date     2020    K 4.8 2020    K 4.8 2020    CL 90 2020    CO2 23 2020    BUN 76 2020    CREATININE 9.7 2020    CALCIUM 8.7 2020    PHOS 3.2 2019         Significant Diagnostic Studies    Radiology:   IR INTERVENTIONAL RADIOLOGY PROCEDURE REQUEST   Final Result   Status post successful tunneled dialysis catheter removal            **This report has been created using voice recognition software. It may contain minor errors which are inherent in voice recognition technology. **      Final report electronically signed by Dr. Flash Dickey on 2020 3:55 PM      XR CHEST PORTABLE   Final Result   1. Small right-sided pleural effusion. 2. Bibasilar atelectasis/infiltrate. **This report has been created using voice recognition software. It may contain minor errors which are inherent in voice recognition technology. **      Final report electronically signed by Dr. Aminata Manuel on 2020 9:38 AM             Consults:     [address] [place] [address]  IP CONSULT TO SOCIAL WORK    Disposition:    [] Home       [] TCU       [] Rehab       [] Psych       [x] SNF       [] [place]       [] Other-    Condition at Discharge: Stable    Code Status:  DNR-CCA     Patient Instructions:    Discharge lab work: routine per nephro  Activity: activity as tolerated  Diet: DIET CARB CONTROL;       Follow-up visits:   MD Shimon Mcguire [address]  [phone]      staff-please s/u w/i 7d         Discharge Medications:      Tiffanie Palmer, [address] [facility] Medication Instructions IV Printed on:04/10/20 0372   Medication Information                      acetaminophen (TYLENOL) 325 MG tablet  Take 650 mg by mouth 4 times daily              ARTIFICIAL TEAR OP  Instill one drop into each eye as needed             ascorbic acid (VITAMIN C) 500 MG tablet  Take 500 mg by mouth 2 times daily              bisacodyl (DULCOLAX) 10 MG suppository  Place 10 mg rectally daily as needed for Constipation             clopidogrel (PLAVIX) 75 MG tablet  Take 75 mg by mouth every evening              cyanocobalamin 1000 MCG tablet  Take 1,000 mcg by mouth daily             docusate sodium (COLACE) 100 MG capsule  Take 200 mg by mouth daily             epoetin schuyler-epbx (RETACRIT) 49196 UNIT/ML SOLN injection  Inject into the skin once a week              guaiFENesin (ROBITUSSIN) 100 MG/5ML syrup  Take 200 mg by mouth every 4 hours as needed for Cough             insulin aspart (NOVOLOG) 100 UNIT/ML injection vial  Inject into the skin 2 times daily Inject per sliding scale 0-150 = 0 units; 151-200 = 1 unit; 201-205 = 2 units; 251-300 = 3 units; 301-350 = 4 units; 351-400 = 5 units; 401-450 = 6 units             insulin glargine (LANTUS) 100 UNIT/ML injection vial  Inject 10 Units into the skin nightly             ipratropium-albuterol (DUONEB) 0.5-2.5 (3) MG/3ML SOLN nebulizer solution  Inhale 1 vial into the lungs every 6 hours as needed for Shortness of Breath             metoprolol succinate (TOPROL XL) 100 MG extended release tablet  Take 1 tablet by mouth daily Hold if SBP less than 100 and HR less than 50 bpm             miconazole (MICOTIN) 2 % powder  Apply topically 2 times daily. Multiple Vitamins-Minerals (OCUVITE EXTRA PO)  Take 1 tablet by mouth daily              nitroGLYCERIN (NITROSTAT) 0.4 MG SL tablet  up to max of 3 total doses. If no relief after 1 dose, call 911.              ondansetron (ZOFRAN) 4 MG tablet  Take 4 mg by mouth 2 times daily              pantoprazole (PROTONIX) 40

## 2020-04-10 NOTE — PROGRESS NOTES
Clinical Pharmacy Note                                               Warfarin Discharge Recommendations      Pt discharged from [facility] today after admission for near syncope    INR today:  Recent Labs     04/10/20  0328   INR 2.92*         Interacting medications at discharge: pravastatin, clopidogrel    INR goal during admission:2-3    Recommendations for discharge:   Date Warfarin Dose   4/10/2020 0.5 mg   4/11/2020 INR      Lowered dose today due to larger increase in INR. Recommend INR be rechecked tomorrow at [facility].      Provider dosing warfarin: ECF Provider    Recheck INR:  4/11/2020     Jordana Wise, PharmD, BCPS   4/10/2020  9:42 AM

## 2020-04-10 NOTE — PROGRESS NOTES
hours) at 4/10/2020 1449  Last data filed at 4/10/2020 1159  Gross per 24 hour   Intake 1000 ml   Output --   Net 1000 ml     Admission weight: 234 lb (106.1 kg)  Wt Readings from Last 3 Encounters:   04/09/20 240 lb (108.9 kg)   01/17/20 241 lb 8 oz (109.5 kg)   12/19/19 246 lb 4.8 oz (111.7 kg)     Body mass index is 38.74 kg/m². Physical examination    General:  Alert and appropriate with exam  HEENT:  Head: Normocephalic, no lesions, without obvious abnormality. Neck:   no adenopathy, no carotid bruit, no JVD, supple, symmetrical, trachea midline and thyroid not enlarged, symmetric, no tenderness/mass/nodules  Lungs:  clear to auscultation bilaterally  Heart:  regular rate and rhythm, S1, S2 normal, no murmur, click, rub or gallop  Abdomen:  soft, non-tender; bowel sounds normal; no masses,  no organomegaly  Extremities:  extremities normal, atraumatic, no cyanosis or edema  Neurologic:  Mental status: Alert, oriented, thought content appropriate  Psy:                 No evidence of depression. Mood is stable. Skin:                Warm and dry. No lesions or rashes noted. Muscles:         Hand  and leg strength are equal and strong bilaterally. Lab Data  CBC:   Recent Labs     04/08/20  0336   WBC 8.7   HGB 10.9*        BMP:  Recent Labs     04/08/20  0336      K 4.8  4.8   CL 90*   CO2 23   BUN 76*   CREATININE 9.7*   GLUCOSE 107   CALCIUM 8.7     TSH:   No results for input(s): TSH in the last 72 hours. HgBa1c: No results for input(s): LABA1C in the last 72 hours. Hepatic:   Recent Labs     04/08/20  0336   LABALBU 2.7*   AST 12   ALT <5*   BILITOT 0.2*   ALKPHOS 100     Troponin: No results for input(s): TROPONINI in the last 72 hours. BNP: No results for input(s): BNP in the last 72 hours. Lipids: No results for input(s): CHOL, HDL in the last 72 hours.     Invalid input(s): LDLCALCU  INR:   Recent Labs     04/08/20  0336 04/09/20  0345 04/10/20  0328   INR 2.10* 1.77* 2.92*            Assessment:    1. Near syncope-resolved  2. ESRD  3. Hypotension with near syncope  4. DM  5. HTN  6. Class III obesity  7. 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 A follow up visit with nephrology is not necessary as we see peritoneal dialysis patients  Monthly. 1. Ama  2. Will arrange for dialysis catheter removal via her dialysis nurse. Nehemiah Serrato,   [facility] SRPS.

## 2020-04-10 NOTE — DISCHARGE INSTR - MEDS
Clinical Pharmacy Note     Warfarin consult follow-up         Recent Labs     04/13/20  0359   INR 1.52*          Date INR Warfarin Dose   4/7/2020 2.36 1.5 mg   4/8/2020   2.10 3 mg     4/9/2020  1.77 3 mg     4/10/2020  2.92 0.5 mg     4/11/2020 3.23  Hold warfarin     4/12/2020 1. 91   2 mg    4/13/2020 1. 52   2 mg          Recommendations for discharge:   Date Warfarin Dose   4/14/20 2 mg   4/15/20 Check INR       Provider dosing warfarin: ECF provider     Recheck INR:  4/15/20

## 2020-04-11 NOTE — PROGRESS NOTES
Hospitalist Progress Note    Patient: Nathaniel Martin      Unit/Bed:3B-35/035-A    YOB: 1934    MRN: [id number]       Acct: [de-identified]     PCP: Beverley Strickland MD    Date of Admission: 4/7/2020    Active Hospital Problems    Diagnosis Date Noted    Near syncope [R55] 04/07/2020       Assessment/Plan:    1. Near syncope-likely secondary to orthostatic hypotension d/t significant intravascular volume shift d/t recent switch from IHD to PD; improved w/ IVF. Improved. Will monitor orthostatic VS.nephro following; No further W/U necessary at this time  4/11: no presyncope/orthosis  2. Hx non-ischemic cardiomyopathy however EF in nromal limits per TTE from Dec 2019; status post AICD  3. Chronically elevated troponin likely secondary to end-stage renal disease: denies CP, EKG negative for new/dyanmic ischemic changes; will monitor clinically  4. History of COPD/ chronic hypoxic respiratory failure--on 3 to 4 L at baseline; No exacerbation; continue home inhalers  5. History of type 2 diabetes- well-controlled on current regimen. CCM. 6. History of benign essential hypertension: if anything hypotensive on arrival;  BP improved; on isosorbide and Metoprol from [facility]; resumed BB w/ hold parameters; will monitor closely  4/11: BP fairly well-controlled, will consider resuming home isosorbide if becomes hypertenstive  7. History of PE-on warfarin ; INR is therapeutic patient has history of HIT and hence cannot be on heparin. 8. Elevated TSH w/  free T4 in lowe NL limits; will start on low-dose synthoird; will need repeat TSH in 6 wks w/ dose adjustment as needed per PCP  9. Hx of HLD: on statin  10. PT/OT to see  11. Obesity with BMO 37.8  12.  Dispo: pt otherwise medically stable for discharhe; learned insurance requested a [id number] last evening despite planned discharged to [facility] which I completed immediately; was informed pt should be ok to return to LTC with request for rehab there if still thought radiograph. COMPARISON: [facility] chest radiograph 12/16/2019 FINDINGS: A right-sided hemodialysis catheter and left-sided cardiac device are in stable position. Cardiac silhouette is at the upper limits of normal in size. Lung volumes are low. The right costophrenic angle is blunted. Indistinct hazy and reticular opacities are present at the right lung base. There are minimal reticular opacities at the left lung  base. Degenerative changes in the thoracic spine are poorly visualized. 1. Small right-sided pleural effusion. 2. Bibasilar atelectasis/infiltrate. **This report has been created using voice recognition software. It may contain minor errors which are inherent in voice recognition technology. ** Final report electronically signed by Dr. Belinda Kowalski on 4/7/2020 9:38 AM      Diet: DIET CARB CONTROL;    DVT prophylaxis: [] Lovenox                                 [] SCDs                                 [] SQ Heparin                                 [] Encourage ambulation           [x] Already on Anticoagulation       Code Status: [facility] Problems    Diagnosis Date Noted    Near syncope [R55] 04/07/2020           With RN in room, patient was updated about the treatment plan, all the questions and concerns were addressed.         Electronically signed by Gemma Stallings MD on 4/11/2020 at 11:05 AM

## 2020-04-11 NOTE — PROGRESS NOTES
Clinical Pharmacy Note    Warfarin consult follow-up    Recent Labs     04/11/20  0312   INR 3.23*     No results for input(s): HGB, HCT, PLT in the last 72 hours. Significant Drug-Drug Interactions:  New warfarin drug-drug interactions: none  Discontinued drug-drug interactions: none  Current warfarin drug-drug interactions: pravastatin       Date INR Warfarin Dose   4/7/2020 2.36 1.5 mg   4/8/2020   2.10 3 mg     4/9/2020  1.77 3 mg     4/10/2020  2.92 0.5 mg     4/11/2020 3.23  Hold warfarin                        Notes:                     Daily PT/INR until stable within therapeutic range.      Mara Samson, PharmD, BCPS   4/11/2020 10:04 AM

## 2020-04-11 NOTE — PROGRESS NOTES
Renal Progress Note    Assessment and Plan:    1. End stage kidney disease on peritoneal dialysis now on hold  2. Diabetes mellitus type 2  3. Deconditioning  4. Hypertension  5.   Normocytic anemia  PLAN:  Recent labs reviewed  Medications reviewed  No changes  Okay to discharge when discharged by the primary service  Discussed with the staff  Dr. Louise Hence will follow in the PD clinic    Patient Active Problem List:     Cardiomyopathy, nonischemic ([facility] [address] now EF 60%     Hypertension     LBBB (left bundle branch block)     Hyperlipidemia     [facility] BiV ICD     CKD (chronic kidney disease) stage 3, GFR 30-59 ml/min ([facility])     Ex-smoker     Mild carotid artery disease ([facility])     CAD (coronary artery disease)     Chronic congestive heart failure ([address]     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 ([facility])     DM2 (diabetes mellitus, type 2) ([facility])     GERD (gastroesophageal reflux disease)     Heart failure with preserved ejection fraction ([facility])     His of Heparin induced thrombocytopenia     History of breast cancer     History of CVA (cerebrovascular accident)     History of pulmonary embolism     Iron deficiency anemia     Osteoarthritis     Paget's disease of bone     Vitamin D deficiency     Bilateral lower extremity edema     CHF (congestive heart failure), NYHA class III, chronic, diastolic ([facility])     Contusion of right shoulder     Contusion of left knee     Anemia, chronic disease     CKD (chronic kidney disease), stage IV ([facility])     Anemia, chronic renal failure     Fluid overload     Acute combined systolic and diastolic CHF, NYHA class 1 ([address]     Anasarca     Near syncope      Subjective:   Admit Date: 4/7/2020    Interval History:   Seen for end stage kidney disease on peritoneal dialysis  Very sleepy this morning  Updated by the staff  No new issues  Stable blood pressure      Medications:   Scheduled Meds:   metoprolol succinate  100 mg Oral Daily    lactobacillus  1 capsule Oral Daily  pravastatin  40 mg Oral QPM    pantoprazole  40 mg Oral BID    ondansetron  4 mg Oral BID    ferrous sulfate  325 mg Oral BID    cyanocobalamin  1,000 mcg Oral Daily    ascorbic acid  500 mg Oral BID    acetaminophen  650 mg Oral 4x Daily    docusate sodium  200 mg Oral Daily    insulin glargine  10 Units Subcutaneous Nightly    insulin lispro  0-12 Units Subcutaneous TID WC    insulin lispro  0-6 Units Subcutaneous Nightly    sodium chloride flush  10 mL Intravenous 2 times per day    warfarin (COUMADIN) daily dosing (placeholder)   Other RX Placeholder     Continuous Infusions:   dextrose         CBC: No results for input(s): WBC, HGB, PLT in the last 72 hours. CMP:  No results for input(s): NA, K, CL, CO2, BUN, CREATININE, GLUCOSE, CALCIUM, LABGLOM in the last 72 hours. Troponin: No results for input(s): TROPONINI in the last 72 hours. BNP: No results for input(s): BNP in the last 72 hours. INR:   Recent Labs     04/09/20  0345 04/10/20  0328 04/11/20  0312   INR 1.77* 2.92* 3.23*     Lipids: No results for input(s): CHOL, LDLDIRECT, TRIG, HDL, AMYLASE, LIPASE in the last 72 hours. Liver: No results for input(s): AST, ALT, ALKPHOS, PROT, LABALBU, BILITOT in the last 72 hours. Invalid input(s): BILDIR  Iron:  No results for input(s): IRONS, FERRITIN in the last 72 hours. Invalid input(s): LABIRONS  IR INTERVENTIONAL RADIOLOGY PROCEDURE REQUEST   Final Result   Status post successful tunneled dialysis catheter removal            **This report has been created using voice recognition software. It may contain minor errors which are inherent in voice recognition technology. **      Final report electronically signed by Dr. Ann-Marie Payton on 4/9/2020 3:55 PM      XR CHEST PORTABLE   Final Result   1. Small right-sided pleural effusion. 2. Bibasilar atelectasis/infiltrate. **This report has been created using voice recognition software.  It may contain minor errors which are

## 2020-04-11 NOTE — PLAN OF CARE
Problem: Falls - Risk of:  Goal: Will remain free from falls  Description: Will remain free from falls  4/9/2020 2211 by Ernie Owens RN  Outcome: Ongoing  Note: Fall wrist band on. Fall sign in place. Gripper socks on. Call light with in reach. Bed alarm on. Problem: DISCHARGE BARRIERS  Goal: Patient's continuum of care needs are met  4/9/2020 2211 by Ernie Owens RN  Outcome: Ongoing  Note: Patient plans to discharge to [facility] when medically stable. Problem: Skin Integrity:  Goal: Absence of new skin breakdown  Description: Absence of new skin breakdown  Outcome: Ongoing  Note: No evidence of new skin breakdown at this time. Will continue to reassess. Problem: Pain:  Goal: Pain level will decrease  Description: Pain level will decrease  4/9/2020 2211 by Ernie Owens RN  Outcome: Ongoing  Note: Patient rates pain 0 on 0-10 pain scale. Will continue to reassess. Problem: HEMODYNAMIC STATUS  Goal: Patient has stable vital signs and fluid balance  4/9/2020 2211 by Ernie Owens RN  Outcome: Ongoing  Note: I/O last 3 completed shifts: In: 200 [P.O.:200]  Out: 150 [Urine:150]  No intake/output data recorded. Will continue to reassess. Problem: Cardiac:  Goal: Ability to maintain vital signs within normal range will improve  Description: Ability to maintain vital signs within normal range will improve  4/9/2020 2211 by Ernie Owens RN  Outcome: Ongoing  Note:   Vitals:    04/09/20 1945   BP: 126/83   Pulse: 71   Resp: 18   Temp: 98.3 °F (36.8 °C)   SpO2: 96%     Will continue to reassess. Problem: Nutrition  Goal: Optimal nutrition therapy  4/9/2020 2211 by Ernie Owens RN  Outcome: Ongoing  Note: Patient on carb control diet. Eating % of meals. Will continue to reassess. Care plan reviewed with patient. Patient verbalize understanding of the plan of care and contribute to goal setting.
Problem: Falls - Risk of:  Goal: Will remain free from falls  Description: Will remain free from falls  Outcome: Ongoing  Note: Hourly rounding continues, bed and chair alarm activated for safety. Problem: DISCHARGE BARRIERS  Goal: Patient's continuum of care needs are met  Outcome: Ongoing  Note: Waiting for precert back to [facility], denies additional needs at this time. Problem: Skin Integrity:  Goal: Will show no infection signs and symptoms  Description: Will show no infection signs and symptoms  Outcome: Ongoing  Note: Buttocks red, cream continues to buttocks. Problem: Pain:  Goal: Pain level will decrease  Description: Pain level will decrease  Outcome: Ongoing  Note: Patient denies pain, will continue to assess. Problem: HEMODYNAMIC STATUS  Goal: Patient has stable vital signs and fluid balance  Outcome: Ongoing  Note: Vitals stable, will continue to assess. Problem: Cardiac:  Goal: Ability to maintain vital signs within normal range will improve  Description: Ability to maintain vital signs within normal range will improve  Outcome: Ongoing     Problem: Nutrition  Goal: Optimal nutrition therapy  Outcome: Ongoing  Note: Patient eating % of meals. Problem: Musculor/Skeletal Functional Status  Goal: Highest potential functional level  Outcome: Ongoing  Note: Patient worked with therapy today, will continue to assist patient to the chair and ambulate. Care plan reviewed with patient and family. Patient and family verbalize understanding of the plan of care and contribute to goal setting.
Problem: Falls - Risk of:  Goal: Will remain free from falls  Description: Will remain free from falls  Outcome: Ongoing  Note: Patient alert and oriented x4. Chair alarm on. Bed wheels locked. Bedside table in reach. Patient up with 2 assist with walker and gait belt. Patient verbalizes and demonstrates the use of the call light. Hourly rounding being completed. Problem: Falls - Risk of:  Goal: Absence of physical injury  Description: Absence of physical injury  Outcome: Ongoing  Note: No physical injury this shift. Problem: DISCHARGE BARRIERS  Goal: Patient's continuum of care needs are met  Outcome: Ongoing  Note: Patients needs are met this shift, plans to go to [place] when appropriate. Problem: Skin Integrity:  Goal: Will show no infection signs and symptoms  Description: Will show no infection signs and symptoms  Outcome: Ongoing  Note: Afebrile this shift, no signs of infection this shift. Problem: Skin Integrity:  Goal: Absence of new skin breakdown  Description: Absence of new skin breakdown  Outcome: Ongoing  Note: No new skin issues noted this shift. Problem: Pain:  Goal: Pain level will decrease  Description: Pain level will decrease  Outcome: Ongoing  Note: Pain Assessment: 0-10  Pain Level: 0   Patient's Stated Pain Goal: No pain   Is pain goal met at this time? Yes              Problem: HEMODYNAMIC STATUS  Goal: Patient has stable vital signs and fluid balance  Outcome: Ongoing  Note: VS stable this shift, will continue to monitor. Problem: Cardiac:  Goal: Ability to maintain vital signs within normal range will improve  Description: Ability to maintain vital signs within normal range will improve  Outcome: Ongoing  Note: VS stable this shift. Problem: Nutrition  Goal: Optimal nutrition therapy  Outcome: Ongoing  Note: Patient on carb control diet, good appetite.       Problem: Musculor/Skeletal Functional Status  Goal: Highest potential functional
Problem: Nutrition  Goal: Optimal nutrition therapy  Outcome: Ongoing   Nutrition Problem: Altered nutrition-related lab values  Intervention: Food and/or Nutrient Delivery: (Diet per MD)  Nutritional Goals: Pt's BUN & Creatinine will improve during LOS. (4/8) BUN 76, Creatinine 9.7
SW consult received, pt is a long-term resident of [address] [place] [address] and will return.
91/74   Pulse: 83   Resp: 17   Temp: 97.5 °F (36.4 °C)   SpO2: 95%     Will continue to monitor. Problem: Cardiac:  Goal: Ability to maintain vital signs within normal range will improve  Description: Ability to maintain vital signs within normal range will improve  Outcome: Ongoing  Note: See above     Problem: Cardiac:  Goal: Cardiovascular alteration will improve  Description: Cardiovascular alteration will improve  Outcome: Ongoing  Note: Closely monitoring BP and map. Care plan reviewed with patient. Patient verbalizes understanding of the plan of care and contribute to goal setting.

## 2020-04-12 NOTE — FLOWSHEET NOTE
04/11/20 2130   Encounter Summary   Services provided to: Patient   Referral/Consult From: [facility] Family members   Continue Visiting Yes  (4/11 P)   Complexity of Encounter Moderate   Length of Encounter 15 minutes   Spiritual Assessment Completed Yes   Spiritual/Rastafari   Type Spiritual support   Assessment Approachable   Intervention Discussed illness/injury and it's impact; Discussed relationship with God;Prayer;Explored feelings, thoughts, concerns   Outcome Expressed gratitude;Engaged in conversation;Receptive;Encouraged     Assessment:  The patient was sitting in the chair and a nurse was checking her vitals. The patient explained that her leg is hurting and for over a week and she has no answers yet. She expressed her frustration and doesn't seem to feel confident in us figuring out the answer.   - This staff offered supportive conversation but the patient was really interested in the conversation. I offered continued support but she didn't feel it was necessary at this time. Plan: Follow-up will be provided if the patient requests. The Spiritual Care team will be notified accordingly.

## 2020-04-12 NOTE — PROGRESS NOTES
informed pt should be ok to return to LTC with request for rehab there if still thought to be appropriate. Personally thought given recent transitioning from IHD to PD and recent changes to antihypertensive regimen both of which likely contributed to pt's admission; she might benefit from a short-term rehab prior to returning to LTC.         Expected discharge date:  TBD (medically stable for discharge; awaiting placement)         Disposition:      [] Home                             [] TCU                             [] Rehab                             [] Psych                             [] SNF                             [] [place]                             [] Other- TBD    Chief Complaint:   Chief Complaint   Patient presents with    Nausea    Loss of Consciousness       Hospital Course: Patient was seen, examined and the medical chart was reviewed thoroughly today. In summary, 80 y. o.female admitted overnight for evaluation of near-syncope.       Subjective (past 24 hours):   denies CP/SOB/dizzness/presyncope/palpitation/N/V/fever/chills      Medications:  Reviewed    Infusion Medications    dextrose       Scheduled Medications    metoprolol succinate  100 mg Oral Daily    lactobacillus  1 capsule Oral Daily    pravastatin  40 mg Oral QPM    pantoprazole  40 mg Oral BID    ondansetron  4 mg Oral BID    ferrous sulfate  325 mg Oral BID    cyanocobalamin  1,000 mcg Oral Daily    ascorbic acid  500 mg Oral BID    acetaminophen  650 mg Oral 4x Daily    docusate sodium  200 mg Oral Daily    insulin glargine  10 Units Subcutaneous Nightly    insulin lispro  0-12 Units Subcutaneous TID WC    insulin lispro  0-6 Units Subcutaneous Nightly    sodium chloride flush  10 mL Intravenous 2 times per day    warfarin (COUMADIN) daily dosing (placeholder)   Other RX Placeholder     PRN Meds: nitroGLYCERIN, glucose, dextrose, glucagon (rDNA), dextrose, sodium chloride flush, acetaminophen

## 2020-04-13 NOTE — CARE COORDINATION
4/13/20, 12:58 PM EDT    Patient goals/plan/ treatment preferences discussed by  and . Patient goals/plan/ treatment preferences reviewed with patient/ family. Patient/ family verbalize understanding of discharge plan and are in agreement with goal/plan/treatment preferences. Understanding was demonstrated using the teach back method. AVS provided by RN at time of discharge, which includes all necessary medical information pertaining to the patients current course of illness, treatment, post-discharge goals of care, and treatment preferences. Services After Discharge  Services At/After Discharge: Nursing Services, Aide Services, In ambulance([facility], [place])   [place] Letter  IMM Letter given to Patient/Family/Significant other/Guardian/POA/by[de-identified]   IMM Letter date given[de-identified] 04/10/20  IMM Letter time given[de-identified] [phone]     Spoke with [facility] and they are ready to accept the patient back under her Medicaid as her pre-cert was denied. [facility] transport set up for 2 pm, [facility] aware. Spoke with Leatha Murrieta and she is aware of discharge and going to call the RN for a medical update. [facility] has phone and fax number for report and to fax AVS. No further needs voiced.

## 2020-04-13 NOTE — DISCHARGE SUMMARY
Notified by RN pt ic accepted. Pt was subsequently discharged back to [facility] in stable medical conditions. The full progress note for today is attached below    Patient: Sanaz Cardenas                            Unit/Bed:[facility]035-A     YOB: 1934     MRN: [id number]                                    Acct: [de-identified]      PCP: Shanda Walters MD     Date of Admission: 4/7/2020          Active Hospital Problems     Diagnosis Date Noted    Near syncope [R55] 04/07/2020         Assessment/Plan:     1. Near syncope-likely secondary to orthostatic hypotension d/t significant intravascular volume shift d/t recent switch from IHD to PD; improved w/ IVF. Improved. Will monitor orthostatic VS.nephro following; No further W/U necessary at this time  4/12: no presyncope/orthosis  2. Hx non-ischemic cardiomyopathy however EF in nromal limits per TTE from Dec 2019; status post AICD  3. Chronically elevated troponin likely secondary to end-stage renal disease: denies CP, EKG negative for new/dyanmic ischemic changes; will monitor clinically  4. History of COPD/ chronic hypoxic respiratory failure--on 3 to 4 L at baseline; No exacerbation; continue home inhalers  5. History of type 2 diabetes- well-controlled on current regimen. CCM. 6. History of benign essential hypertension: if anything hypotensive on arrival;  BP improved; on isosorbide and Metoprol from [facility]; resumed BB w/ hold parameters; will monitor closely  4/11: BP fairly well-controlled, will consider resuming home isosorbide if becomes hypertenstive  4/12: reitntroduced amlodipine 5 qhs for optimized nightly BP. Will reassess response  4/13: nocturnal BP optimized. Amlodipine added to discharge meds. Pt and PCP need to monitor BP with antihypertensive regimen adjustment as needed      7. History of PE-on warfarin ; INR is therapeutic patient has history of HIT and hence cannot be on heparin. . INR 1.52 today; will need repeat INR daily w/ warfarin dose

## 2020-04-13 NOTE — PROGRESS NOTES
Hospitalist Progress Note    Patient: Edwin Paris      Unit/Bed:3B-35/035-A    YOB: 1934    MRN: [id number]       Acct: [de-identified]     PCP: Eriberto Anderson MD    Date of Admission: 4/7/2020    Active Hospital Problems    Diagnosis Date Noted    Near syncope [R55] 04/07/2020       Assessment/Plan:    1. Near syncope-likely secondary to orthostatic hypotension d/t significant intravascular volume shift d/t recent switch from IHD to PD; improved w/ IVF. Improved. Will monitor orthostatic VS.nephro following; No further W/U necessary at this time  4/12: no presyncope/orthosis  2. Hx non-ischemic cardiomyopathy however EF in nromal limits per TTE from Dec 2019; status post AICD  3. Chronically elevated troponin likely secondary to end-stage renal disease: denies CP, EKG negative for new/dyanmic ischemic changes; will monitor clinically  4. History of COPD/ chronic hypoxic respiratory failure--on 3 to 4 L at baseline; No exacerbation; continue home inhalers  5. History of type 2 diabetes- well-controlled on current regimen. CCM. 6. History of benign essential hypertension: if anything hypotensive on arrival;  BP improved; on isosorbide and Metoprol from ohome; resumed BB w/ hold parameters; will monitor closely  4/11: BP fairly well-controlled, will consider resuming home isosorbide if becomes hypertenstive  4/12: reitntroduced amlodipine 5 qhs for optimized nightly BP. Will reassess response  4/13: nocturnal BP optimized. Amlodipine added to discharge meds. Pt and PCP need to monitor BP with antihypertensive regimen adjustment as needed      7. History of PE-on warfarin ; INR is therapeutic patient has history of HIT and hence cannot be on heparin. . INR 1.52 today; will need repeat INR daily w/ warfarin dose adjustment as needed  8. Elevated TSH w/  free T4 in lowe NL limits; will start on low-dose synthoird; will need repeat TSH in 6 wks w/ dose adjustment as needed per PCP  9.  Hx of HLD: on Subcutaneous Nightly    sodium chloride flush  10 mL Intravenous 2 times per day    warfarin (COUMADIN) daily dosing (placeholder)   Other RX Placeholder     PRN Meds: nitroGLYCERIN, glucose, dextrose, glucagon (rDNA), dextrose, sodium chloride flush, acetaminophen **OR** acetaminophen, polyethylene glycol, promethazine **OR** ondansetron      Intake/Output Summary (Last 24 hours) at 4/13/2020 0017  Last data filed at 4/12/2020 1927  Gross per 24 hour   Intake 835 ml   Output 0 ml   Net 835 ml       Diet:  DIET CARB CONTROL; Exam:  BP (!) 119/54   Pulse 89   Temp 97.8 °F (36.6 °C) (Oral)   Resp 16   Ht 5' 6\" (1.676 m)   Wt 240 lb (108.9 kg)   SpO2 98%   BMI 38.74 kg/m²     General appearance: Obese, A&O x3, Not ill or toxic, in no apparent distress  HEENT:  ADAMA  EOM intact. Neck: Supple, with full range of motion. No jugular venous distention. Trachea midline. Respiratory:   NL A/E bilat with no adventitious sounds   Cardiovascular:  normal S1/S2 with no murmurs/gallops  Abdomen: Soft, non-tender, non-distended, no rigidity or peritoneal signs  Musculoskeletal: NL symmetrical A/PROM bilat U/L extremities   Skin: No rashes. No edema  Neurologic:  CN II-XII intact. NL symmetrical reflexes. NL gait and stance. NL Cerebellar exam. Power 5/5 all muscle groups U/L extremities. Toes downgoing  Capillary Refill: Brisk,< 3 seconds   Peripheral Pulses: +2 palpable, equal bilaterally        Labs:   No results for input(s): WBC, HGB, HCT, PLT in the last 72 hours. No results for input(s): NA, K, CL, CO2, BUN, CREATININE, CALCIUM, PHOS in the last 72 hours. Invalid input(s): MAGNES  No results for input(s): AST, ALT, BILIDIR, BILITOT, ALKPHOS in the last 72 hours. Recent Labs     04/10/20  0328 04/11/20  0312 04/12/20  0341   INR 2.92* 3.23* 1.91*     No results for input(s): CKTOTAL, TROPONINI in the last 72 hours.     Urinalysis:      Lab Results   Component Value Date    NITRU NEGATIVE 04/09/2020    WBCUA

## 2020-04-13 NOTE — PROGRESS NOTES
[facility]   [facility]   [address]   [place] [facility]   [phone]   Nephrology Hospital progress note       4/13/2020, 11:50 AM        Pt Name:    Jarocho Bang  MRN:     [id number]     YOB: 1934  Admit Date:    4/7/2020  8:49 AM  Primary Care Physician:  Anika Suarez MD   Primary Nephrologist:          Dr. Naveen Jean Baptiste number  [id number]    ISOLATION: none     Admitting Chief Complaint:   Near passing out     History of Chief Complaint:  The patient is a 80y.o. year old white female who has  Been followed for many months by Dr. Yovani Fontaine for CKD stage G-IV-A2 from DM, HTM and aging. She had an access created for hemodialysis by Dr. Kashif Ernst that has failed. She was started on dialysis via a tunneled hemodialysis catheter and subsequently switched to peritoneal dialysis which has been proceeding well. She was to have the tunneled catheter removed 04/07/2020. She developed hypotension and felt like she was going to pass out. She was not taking blood pressure      Nephrology is treating:   ESRD requiring peritoneal dialysis. Subjective: The patient was relaxing in the bedside chair. She feels improved and back to normal. Peritoneal dialysis is progressing without complication. Inflow and outflow of dialysate remains painless and the dialysate remains clear. Her dialysis catheter was removed. She feels well enough for discharge.      Lab Results   Component Value Date    LABGLOM 4 04/08/2020    LABGLOM 5 04/07/2020    LABGLOM 19 02/09/2020    LABGLOM 13 01/04/2020    LABGLOM 24 12/19/2019    LABGLOM 14 12/18/2019    LABGLOM 16 12/17/2019          Baseline eGFR Peritoneal dialysis patient   Admit eGFR Assume eGFR=15 ml/min   Current eGFR          Objective:    Diet: renal    VITALS:  BP (!) 119/54   Pulse 77   Temp 97.6 °F (36.4 °C) (Oral)   Resp 16   Ht 5' 6\" (1.676 m)   Wt 240 lb (108.9 kg)   SpO2 98%   BMI 38.74 kg/m²   24HR INTAKE/OUTPUT:      Intake/Output Summary (Last 24 hours) at 4/13/2020 1150  Last data filed at 4/13/2020 0324  Gross per 24 hour   Intake 845 ml   Output 30 ml   Net 815 ml     Admission weight: 234 lb (106.1 kg)  Wt Readings from Last 3 Encounters:   04/09/20 240 lb (108.9 kg)   01/17/20 241 lb 8 oz (109.5 kg)   12/19/19 246 lb 4.8 oz (111.7 kg)     Body mass index is 38.74 kg/m². Physical examination    General:  Alert and appropriate with exam  HEENT:  Head: Normocephalic, no lesions, without obvious abnormality. Neck:   no adenopathy, no carotid bruit, no JVD, supple, symmetrical, trachea midline and thyroid not enlarged, symmetric, no tenderness/mass/nodules  Lungs:  clear to auscultation bilaterally  Heart:  regular rate and rhythm, S1, S2 normal, no murmur, click, rub or gallop  Abdomen:  soft, non-tender; bowel sounds normal; no masses,  no organomegaly  Extremities:  extremities normal, atraumatic, no cyanosis or edema  Neurologic:  Mental status: Alert, oriented, thought content appropriate  Psy:                 No evidence of depression. Mood is stable. Skin:                Warm and dry. No lesions or rashes noted. Muscles:         Hand  and leg strength are equal and strong bilaterally. Lab Data  CBC:   No results for input(s): WBC, HGB, PLT in the last 72 hours. BMP:  No results for input(s): NA, K, CL, CO2, BUN, CREATININE, GLUCOSE, CALCIUM, MG, PHOS in the last 72 hours. TSH:   No results for input(s): TSH in the last 72 hours. HgBa1c: No results for input(s): LABA1C in the last 72 hours. Hepatic:   No results for input(s): LABALBU, AST, ALT, ALB, BILITOT, ALKPHOS in the last 72 hours. Troponin: No results for input(s): TROPONINI in the last 72 hours. BNP: No results for input(s): BNP in the last 72 hours. Lipids: No results for input(s): CHOL, HDL in the last 72 hours.     Invalid input(s): LDLCALCU  INR:   Recent Labs     04/11/20  0312 04/12/20  0341 04/13/20  0359   INR 3.23* 1.91* 1.52*

## 2020-04-24 NOTE — ED PROVIDER NOTES
[address]         Pt Name: Maegan Costello  MRN: [id number]  [place] 1934  Date of evaluation: 4/24/2020  Provider: Ousmane Teran MD    CHIEF COMPLAINT       Chief Complaint   Patient presents with    Dizziness       Nurses Notes reviewed and I agree except as noted in the HPI. HISTORY OF PRESENT ILLNESS    Maegan Costello is a 80 y.o. female who presents for evaluation of lightheadedness. She reports she feels like she'll faint. She went to dialysis today. She was admitted here recently for similar complaint following switch to peritoneal dialysis. She denies any pain at this point. She is somewhat confused and therefore difficult to obtain an accurate review of systems. She does complain of abdominal pain but then said normally just lightheaded. She denies vertigo or dizziness to me. No upper rest or symptoms or COVIID diagnoses however she is colonized with MRSA. This HPI was provided by the patient. REVIEW OF SYSTEMS     Review of Systems   Constitutional: Negative for chills, fever and unexpected weight change. HENT: Negative for congestion, ear pain and sore throat. Eyes: Negative for visual disturbance. Respiratory: Negative for cough, shortness of breath and wheezing. Cardiovascular: Negative for chest pain, palpitations and leg swelling. Gastrointestinal: Positive for abdominal pain. Negative for blood in stool, diarrhea, nausea and vomiting. Genitourinary: Negative for dysuria and hematuria. Musculoskeletal: Negative for arthralgias and back pain. Skin: Negative for rash. Allergic/Immunologic: Negative for environmental allergies. Neurological: Positive for weakness and light-headedness. Negative for dizziness, syncope, numbness and headaches. Hematological: Bruises/bleeds easily. Psychiatric/Behavioral: Positive for confusion. Negative for dysphoric mood. The patient is not nervous/anxious. All other systems reviewed and are negative. PAST MEDICAL HISTORY    has a past medical history of CAD (coronary artery disease), Cancer ([facility] Cardiomyopathy, [facility]ischemic [facility] CHF (congestive heart failure) ([facility] CKD (chronic kidney disease) stage 3, GFR 30-59 ml/min ([facility]), Congenital heart disease, COPD exacerbation ([facility] 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 ([facility] DM2 (diabetes mellitus, type 2) ([facility] Ex-smoker, GERD (gastroesophageal reflux disease), Heart failure with preserved ejection fraction ([facility] His of Heparin induced thrombocytopenia, History of breast cancer, History of CVA (cerebrovascular accident), History of pulmonary embolism, Hyperlipidemia, Hypertension, Iron deficiency anemia, LBBB (left bundle branch block), Osteoarthritis, Paget's disease of bone, [facility] BiV ICD, and Vitamin D deficiency. SURGICAL HISTORY      has a past surgical history that includes doppler echocardiography (03/22/10); cardiovascular stress test (03/23/10); pacemaker placement; eye surgery; Colonoscopy; Eye surgery (Left, 9/2014); Foot surgery (Left, 1/12/2016); Breast surgery; Cardiac catheterization (11-); Cardiac catheterization (05-); Cardiac defibrillator placement (02/02/2011); pr egd balloon dilation esophagus <30 mm diam (N/A, 9/18/2017); pr esophagogastroduodenoscopy transoral diagnostic (9/18/2017); Endoscopy, colon, diagnostic; and other surgical history (10/18/2018).     CURRENT MEDICATIONS       Previous Medications    ACETAMINOPHEN (TYLENOL) 325 MG TABLET    Take 650 mg by mouth 4 times daily     AMLODIPINE (NORVASC) 5 MG TABLET    Take 1 tablet by mouth nightly    ARTIFICIAL TEAR OP    Instill one drop into each eye as needed    ASCORBIC ACID (VITAMIN C) 500 MG TABLET    Take 500 mg by mouth 2 times daily     BISACODYL (DULCOLAX) 10 MG SUPPOSITORY    Place 10 mg rectally daily as needed for Constipation    CLOPIDOGREL (PLAVIX) 75 MG TABLET    Take 75 mg by mouth every evening     CYANOCOBALAMIN 1000 MCG TABLET    Take 1,000 mcg by mouth daily    DOCUSATE SODIUM (COLACE) 100 MG CAPSULE    Take 200 mg by mouth daily    EPOETIN JORGE-EPBX (RETACRIT) 80494 UNIT/ML SOLN INJECTION    Inject into the skin once a week     GUAIFENESIN (ROBITUSSIN) 100 MG/5ML SYRUP    Take 200 mg by mouth every 4 hours as needed for Cough    INSULIN ASPART (NOVOLOG) 100 UNIT/ML INJECTION VIAL    Inject into the skin 2 times daily Inject per sliding scale 0-150 = 0 units; 151-200 = 1 unit; 201-205 = 2 units; 251-300 = 3 units; 301-350 = 4 units; 351-400 = 5 units; 401-450 = 6 units    INSULIN GLARGINE (LANTUS) 100 UNIT/ML INJECTION VIAL    Inject 10 Units into the skin nightly    IPRATROPIUM-ALBUTEROL (DUONEB) 0.5-2.5 (3) MG/3ML SOLN NEBULIZER SOLUTION    Inhale 1 vial into the lungs every 6 hours as needed for Shortness of Breath    METOPROLOL SUCCINATE (TOPROL XL) 100 MG EXTENDED RELEASE TABLET    Take 1 tablet by mouth daily Hold if SBP less than 100 and HR less than 50 bpm    MICONAZOLE (MICOTIN) 2 % POWDER    Apply topically 2 times daily. MULTIPLE VITAMINS-MINERALS (OCUVITE EXTRA PO)    Take 1 tablet by mouth daily     NITROGLYCERIN (NITROSTAT) 0.4 MG SL TABLET    up to max of 3 total doses. If no relief after 1 dose, call 911.     ONDANSETRON (ZOFRAN) 4 MG TABLET    Take 4 mg by mouth 2 times daily     PANTOPRAZOLE (PROTONIX) 40 MG TABLET    Take 40 mg by mouth 2 times daily    POLYETHYLENE GLYCOL (GLYCOLAX) POWDER    Take 17 g by mouth 2 times daily    PRAVASTATIN (PRAVACHOL) 40 MG TABLET    Take 40 mg by mouth every evening Indications: Blood Cholesterol Abnormal     PROBIOTIC PRODUCT (PROBIOTIC-10) CAPS    Take 1 capsule by mouth daily     SEVELAMER (RENVELA) 800 MG TABLET    Take 1 tablet by mouth 3 times daily (with meals)    SODIUM CHLORIDE (OCEAN, BABY AYR) 0.65 % NASAL SPRAY    1 spray by Nasal route as needed for Congestion    VITAMIN D 25 MCG (1000 UT) CAPS    Take 1,000 Units by mouth daily    WARFARIN (COUMADIN) 2 MG TABLET    As dosed by [facility]. ALLERGIES     is allergic to heparin. FAMILY HISTORY     She indicated that her mother is . She indicated that her father is . She indicated that both of her sisters are . She indicated that both of her brothers are . She indicated that her maternal grandmother is . She indicated that her maternal grandfather is . She indicated that her paternal grandmother is . She indicated that her paternal grandfather is . family history includes Cancer in her brother, sister, and sister; Diabetes in her father and mother; Heart Disease in her brother and mother; Pacemaker in her mother; Stroke in her mother. SOCIAL HISTORY      reports that she quit smoking about 68 years ago. Her smoking use included cigarettes. She has a 1.00 pack-year smoking history. She has never used smokeless tobacco. She reports that she does not drink alcohol or use drugs. PHYSICAL EXAM       ED Triage Vitals [20 1700]   BP Temp Temp Source Pulse Resp SpO2 Height Weight   (!) 137/58 98 °F (36.7 °C) Oral 97 18 93 % -- --      Physical Exam  Constitutional:       General: She is awake. Appearance: She is well-developed. She is not ill-appearing. Interventions: Face mask in place. HENT:      Head: Normocephalic. Right Ear: Hearing normal. No drainage. Left Ear: Hearing normal. No drainage. Eyes:      Extraocular Movements: Extraocular movements intact. Right eye: No nystagmus. Left eye: No nystagmus. Conjunctiva/sclera:      Right eye: Right conjunctiva is not injected. Left eye: Left conjunctiva is not injected. Neck:      Musculoskeletal: Normal range of motion and neck supple. Cardiovascular:      Rate and Rhythm: Normal rate. Pulmonary:      Effort: Pulmonary effort is normal. No respiratory distress. Breath sounds: No stridor. Abdominal:      General: There is no distension. Comments: Peritoneal dialysis catheter in place. Skin:     Coloration: Skin is not cyanotic. Findings: No rash (On exposed skin surfaces). Neurological:      Mental Status: She is alert. GCS: GCS eye subscore is 4. GCS verbal subscore is 4. GCS motor subscore is 6. Motor: No tremor or seizure activity. Psychiatric:         Speech: Speech normal.         Behavior: Behavior normal.         Cognition and Memory: Cognition normal. Memory is impaired. DIFFERENTIAL DIAGNOSIS:   Recurrent lightheadedness and orthostatic hypotension likely. Less likely arrhythmia. DIAGNOSTIC RESULTS     EKG: All EKG's are interpreted by the Emergency Department Physician    EKG interpreted by me shows a atrial sensed ventricular paced rhythm with a rate of 96 bpm.  The QRS duration is 186 ms. The OR interval is 142 ms. The R axis is 254.   No old EKG provided for comparison no obvious ST elevation MI.    RADIOLOGY:    No orders to display       [x] Visualized and interpreted by me   [x] Radiologist's Wet Read Report Reviewed   [] Discussed with Radiologist.    Fabrizio Alpha:   Results for orders placed or performed during the hospital encounter of 04/24/20   CBC Auto Differential   Result Value Ref Range    WBC 8.3 4.8 - 10.8 thou/mm3    RBC 3.57 (L) 4.20 - 5.40 mill/mm3    Hemoglobin 10.2 (L) 12.0 - 16.0 gm/dl    Hematocrit 33.5 (L) 37.0 - 47.0 %    MCV 93.8 81.0 - 99.0 fL    MCH 28.6 26.0 - 33.0 pg    MCHC 30.4 (L) 32.2 - 35.5 gm/dl    RDW-CV 13.8 11.5 - 14.5 %    RDW-SD 47.4 (H) 35.0 - 45.0 fL    Platelets 560 390 - 800 thou/mm3    MPV 10.4 9.4 - 12.4 fL    Seg Neutrophils 58.9 %    Lymphocytes 24.9 %    Monocytes 10.0 %    Eosinophils 5.3 %    Basophils 0.4 %    Immature Granulocytes 0.5 %    Segs Absolute 4.9 1.8 - 7.7 thou/mm3    Lymphocytes Absolute 2.1 1.0 - 4.8 thou/mm3    Monocytes Absolute 0.8 0.4 - 1.3 thou/mm3    Eosinophils Absolute 0.4 0.0 - 0.4 thou/mm3    Basophils Absolute 0.0 0.0 - 0.1 thou/mm3    Immature Grans (Abs) 0.04 0.00 - 0.07 thou/mm3    nRBC 0 /100 wbc   Basic Metabolic Panel w/ Reflex to MG   Result Value Ref Range    Sodium 134 (L) 135 - 145 meq/L    Potassium reflex Magnesium 3.8 3.5 - 5.2 meq/L    Chloride 92 (L) 98 - 111 meq/L    CO2 28 23 - 33 meq/L    Glucose 121 (H) 70 - 108 mg/dL    BUN 48 (H) 7 - 22 mg/dL    CREATININE 8.5 (HH) 0.4 - 1.2 mg/dL    Calcium 8.6 8.5 - 10.5 mg/dL   Hepatic Function Panel   Result Value Ref Range    Alb 2.8 (L) 3.5 - 5.1 g/dL    Total Bilirubin 0.2 (L) 0.3 - 1.2 mg/dL    Bilirubin, Direct <0.2 0.0 - 0.3 mg/dL    Alkaline Phosphatase 113 38 - 126 U/L    AST 13 5 - 40 U/L    ALT 6 (L) 11 - 66 U/L    Total Protein 6.0 (L) 6.1 - 8.0 g/dL   Troponin   Result Value Ref Range    Troponin T 0.141 (A) ng/ml   Brain Natriuretic Peptide   Result Value Ref Range    Pro-BNP 8651.0 (H) 0.0 - 1800.0 pg/mL   Anion Gap   Result Value Ref Range    Anion Gap 14.0 8.0 - 16.0 meq/L   Osmolality   Result Value Ref Range    Osmolality Calc 282.1 275.0 - 300 mOsmol/kg   Glomerular Filtration Rate, Estimated   Result Value Ref Range    Est, Glom Filt Rate 4 (A) ml/min/1.73m2   EKG 12 Lead   Result Value Ref Range    Ventricular Rate 96 BPM    Atrial Rate 96 BPM    P-R Interval 142 ms    QRS Duration 186 ms    Q-T Interval 422 ms    QTc Calculation (Bazett) 533 ms    P Axis 56 degrees    R Axis -106 degrees    T Axis 61 degrees       EMERGENCY DEPARTMENT COURSE:   Vitals:    Vitals:    04/24/20 1900 04/24/20 2003 04/24/20 2111 04/24/20 2112   BP: (!) 120/52 (!) 124/44     Pulse: 101 99  91   Resp: 16 18  18   Temp:       TempSrc:       SpO2: 97% 94%  94%   Weight:   240 lb (108.9 kg)        Orders Placed This Encounter   Medications    0.9 % sodium chloride bolus    0.9 % sodium chloride infusion       Patient was seen and evaluated in the emergency department. History and physical were completed.   Diagnostic labs and imaging studies are reviewed. Workup demonstrates stable labs and vital signs. The patient says she's feeling fine and wishes to return home. Discussed options for further care and treatment and she can return to the facility and proceed with the rest of her dialysis treatment. FINAL IMPRESSION      1. Dizziness    2.  Lightheadedness          DISPOSITION/PLAN   DISPOSITION Decision To Discharge 04/24/2020 08:57:52 PM    PATIENT REFERRED TO:  Feliciano Blackman MD  [address] [facility]  [phone]    Schedule an appointment as soon as possible for a visit in 5 days  For [place] [facility] DEPT  [address]    Return If Symptoms Worsen    Dr. Malou Kohli M.D 4/24/20 9:16 PM            Malou Kohli MD  04/24/20 4014

## 2020-04-24 NOTE — ED NOTES
Upon first contact with patient this RN receives bedside shift report [facility]. Pt resting in bed in a semi fowlers position at this time and appears to be resting comfortably.             Clarissa Mayen RN  04/24/20 1916

## 2020-04-25 PROBLEM — N93.9 VAGINA BLEEDING: Status: ACTIVE | Noted: 2020-01-01

## 2020-04-25 NOTE — ED NOTES
Pt updated on discharge status. Pt resting in bed in a semi fowlers position at this time. Pt vital signs stable.       Lio Sepulveda RN  04/24/20 2110

## 2020-04-25 NOTE — ED NOTES
Pt resting on cot comfortably. Pt denies any pain at this time. Pt respirations easy and unlabored.       Roshni EVANS  04/24/20 2005

## 2020-04-25 NOTE — ED NOTES
Report called to Bella at [facility]. Pt given jello at this time. Pt denies further needs.         Josue Ortega RN  04/24/20

## 2020-04-25 NOTE — ED PROVIDER NOTES
[facility] [address]  [place] [address]  Phone: [phone]  Fax: [phone]                  Joon Henley   2017 1:20 PM   Office Visit    Description:  Male : 1965   Provider:  Dena James MD   Department:  [facility]           Reason for Visit     Annual Exam           Diagnoses this Visit        Comments    Wellness examination    -  Primary     Family history of diabetes mellitus type II         Abnormal finding on EKG         Family history of colon cancer         Need for hepatitis C screening test         Screening for lipid disorders         Screening for prostate cancer         Frequent urination                To Do List           Goals (5 Years of Data)     None      [facility]     [facility] Care Line -  Assistance  Registered nurses in the [facility] On Call Center provide clinical advisement, health education, appointment booking, and other advisory services.  Call for this free service at [phone].             Medications           Message regarding Medications     Verify the changes and/or additions to your medication regime listed below are the same as discussed with your clinician today.  If any of these changes or additions are incorrect, please notify your healthcare provider.             Verify that the below list of medications is an accurate representation of the medications you are currently taking.  If none reported, the list may be blank. If incorrect, please contact your healthcare provider. Carry this list with you in case of emergency.                Clinical Reference Information           Vital Signs - Last Recorded  Most recent update: 2017  1:38 PM by Beatrice Jain MA    BP Pulse Temp Wt SpO2 BMI    105/74 (BP Location: Right arm, Patient Position: Sitting, BP Method: Automatic) 78 98 °F (36.7 °C) (Tympanic) 80 kg (176 lb 7.7 oz) 95% 28.48 kg/m2      Blood Pressure          Most  [address] COMPLAINT       Chief Complaint   Patient presents with    Vaginal Bleeding       Nurses Notes reviewed and I agree except as noted in the HPI. HISTORY OF PRESENT ILLNESS    Katherine Tee is a 80 y.o. female who presents to the ED from [facility] for the evaluation of vaginal bleeding. Nursing staff changed the patient's diaper and noticed blood. Patient states she had no idea she was bleeding or for how long she was bleeding. She states there might be \"occasional drops of blood here and there\". Patient was in the ED yesterday (04/24/2020) for weakness and dizziness which the patient states she does not have today. She denies abdominal pain or pelvic pain. Patient has been in the NH for 3 years and has been nonambulatory. The patient has a past medical history of CAD, CHF, CKD, COPD, DM, PE, HLD, HTN, and OA. Patient takes Coumadin. No further complaints at the time of the initial encounter. REVIEW OF SYSTEMS     Review of Systems   Constitutional: Negative for chills and fever. HENT: Negative for congestion, rhinorrhea and sore throat. Eyes: Negative for visual disturbance. Respiratory: Negative for cough and shortness of breath. Cardiovascular: Negative for chest pain. Gastrointestinal: Negative for abdominal pain, constipation, diarrhea, nausea and vomiting. Genitourinary: Positive for vaginal bleeding. Negative for difficulty urinating and pelvic pain. Musculoskeletal: Negative for arthralgias and myalgias. Skin: Negative for rash. Neurological: Negative for dizziness, weakness and light-headedness. Hematological: Bruises/bleeds easily. Psychiatric/Behavioral: Negative for confusion.         PAST MEDICAL HISTORY    has a past medical history of CAD (coronary artery disease), Cancer ([address] Cardiomyopathy, nonischemic ([address] CHF (congestive heart failure) ([address] CKD (chronic kidney disease) stage 3, GFR 30-59 ml/min ([facility]), Congenital heart disease, COPD exacerbation ([facility] [address] 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 ([facility] [address] [facility] DM2 (diabetes mellitus, type 2) ([facility] [address] [facility] Ex-smoker, GERD (gastroesophageal reflux disease), Heart failure with preserved ejection fraction ([facility] [address] His of Heparin induced thrombocytopenia, History of breast cancer, History of CVA (cerebrovascular accident), History of pulmonary embolism, Hyperlipidemia, Hypertension, Iron deficiency anemia, LBBB (left bundle branch block), Osteoarthritis, Paget's disease of bone, [place] BiV ICD, and Vitamin D deficiency. SURGICAL HISTORY      has a past surgical history that includes doppler echocardiography (03/22/10); cardiovascular stress test (03/23/10); pacemaker placement; eye surgery; Colonoscopy; Eye surgery (Left, 9/2014); Foot surgery (Left, 1/12/2016); Breast surgery; Cardiac catheterization (11-); Cardiac catheterization (05-); Cardiac defibrillator placement (02/02/2011); pr egd balloon dilation esophagus <30 mm diam (N/A, 9/18/2017); pr esophagogastroduodenoscopy transoral diagnostic (9/18/2017); Endoscopy, colon, diagnostic; and other surgical history (10/18/2018).     CURRENT MEDICATIONS       Previous Medications    ACETAMINOPHEN (TYLENOL) 325 MG TABLET    Take 650 mg by mouth 4 times daily     AMLODIPINE (NORVASC) 5 MG TABLET    Take 1 tablet by mouth nightly    ARTIFICIAL TEAR OP    Instill one drop into each eye as needed    ASCORBIC ACID (VITAMIN C) 500 MG TABLET    Take 500 mg by mouth 2 times daily     BISACODYL (DULCOLAX) 10 MG SUPPOSITORY    Place 10 mg rectally daily as needed for Constipation    CLOPIDOGREL (PLAVIX) 75 MG TABLET    Take 75 mg by mouth every evening     CYANOCOBALAMIN 1000 MCG TABLET    Take 1,000 mcg by mouth daily    DOCUSATE SODIUM (COLACE) 100 MG CAPSULE    Take 200 mg by mouth daily    EPOETIN JORGE-EPBX (RETACRIT) 19188 UNIT/ML SOLN INJECTION    Inject into the skin once a week Recent Value    BP  105/74      Allergies as of 1/30/2017     No Known Allergies      Immunizations Administered on Date of Encounter - 1/30/2017     None      Orders Placed During Today's Visit      Normal Orders This Visit    Basic metabolic panel     CBC auto differential     EKG 12-lead     Hepatitis C antibody     Lipid panel     POCT urine dipstick without microscope     PSA, Screening          1/30/2017  3:07 PM - Beatrice Jain MA      Component Results     Component    Color, UA    dark yellow    Spec Grav, UA    1.025    pH, UA    6    WBC, UA    -    Nitrite, UA    -    Protein, UA    trace    Glucose, UA    normal    Ketones, UA    -    Urobilinogen, UA    normal    Bilirubin, UA    -    Blood, UA    trace            [facility] Sign-Up     Activating your [facility] account is as easy as 1-2-3!     1) Visit [email or web address], select Sign Up Now, enter this activation code and your date of birth, then select Next.  [email or web address]  Expires: 3/2/2017  2:38 PM      2) Create a username and password to use when you visit [facility] in the future and select a security question in case you lose your password and select Next.    3) Enter your e-mail address and click Sign Up!    Additional Information  If you have questions, please e-mail [email or web address] or call [phone] to talk to our [facility] staff. Remember, [facility] is NOT to be used for urgent needs. For medical emergencies, dial 911.          GUAIFENESIN (ROBITUSSIN) 100 MG/5ML SYRUP    Take 200 mg by mouth every 4 hours as needed for Cough    INSULIN ASPART (NOVOLOG) 100 UNIT/ML INJECTION VIAL    Inject into the skin 2 times daily Inject per sliding scale 0-150 = 0 units; 151-200 = 1 unit; 201-205 = 2 units; 251-300 = 3 units; 301-350 = 4 units; 351-400 = 5 units; 401-450 = 6 units    INSULIN GLARGINE (LANTUS) 100 UNIT/ML INJECTION VIAL    Inject 10 Units into the skin nightly    IPRATROPIUM-ALBUTEROL (DUONEB) 0.5-2.5 (3) MG/3ML SOLN NEBULIZER SOLUTION    Inhale 1 vial into the lungs every 6 hours as needed for Shortness of Breath    METOPROLOL SUCCINATE (TOPROL XL) 100 MG EXTENDED RELEASE TABLET    Take 1 tablet by mouth daily Hold if SBP less than 100 and HR less than 50 bpm    MICONAZOLE (MICOTIN) 2 % POWDER    Apply topically 2 times daily. MULTIPLE VITAMINS-MINERALS (OCUVITE EXTRA PO)    Take 1 tablet by mouth daily     NITROGLYCERIN (NITROSTAT) 0.4 MG SL TABLET    up to max of 3 total doses. If no relief after 1 dose, call 911. ONDANSETRON (ZOFRAN) 4 MG TABLET    Take 4 mg by mouth 2 times daily     PANTOPRAZOLE (PROTONIX) 40 MG TABLET    Take 40 mg by mouth 2 times daily    POLYETHYLENE GLYCOL (GLYCOLAX) POWDER    Take 17 g by mouth 2 times daily    PRAVASTATIN (PRAVACHOL) 40 MG TABLET    Take 40 mg by mouth every evening Indications: Blood Cholesterol Abnormal     PROBIOTIC PRODUCT (PROBIOTIC-10) CAPS    Take 1 capsule by mouth daily     SEVELAMER (RENVELA) 800 MG TABLET    Take 1 tablet by mouth 3 times daily (with meals)    SODIUM CHLORIDE (OCEAN, BABY AYR) 0.65 % NASAL SPRAY    1 spray by Nasal route as needed for Congestion    VITAMIN D 25 MCG (1000 UT) CAPS    Take 1,000 Units by mouth daily    WARFARIN (COUMADIN) 2 MG TABLET    As dosed by F. ALLERGIES     is allergic to heparin. FAMILY HISTORY     She indicated that her mother is . She indicated that her father is .  She indicated that both of her sisters are . She indicated that both of her brothers are . She indicated that her maternal grandmother is . She indicated that her maternal grandfather is . She indicated that her paternal grandmother is . She indicated that her paternal grandfather is . family history includes Cancer in her brother, sister, and sister; Diabetes in her father and mother; Heart Disease in her brother and mother; Pacemaker in her mother; Stroke in her mother. SOCIAL HISTORY    reports that she quit smoking about 68 years ago. Her smoking use included cigarettes. She has a 1.00 pack-year smoking history. She has never used smokeless tobacco. She reports that she does not drink alcohol or use drugs. PHYSICAL EXAM     INITIAL VITALS:  weight is 240 lb (108.9 kg). Her temperature is 98.3 °F (36.8 °C). Her blood pressure is 110/69 and her pulse is 93. Her respiration is 16 and oxygen saturation is 100%. Physical Exam  Vitals signs and nursing note reviewed. Constitutional:       General: She is not in acute distress. Appearance: Normal appearance. She is well-developed. She is morbidly obese. She is not toxic-appearing or diaphoretic. HENT:      Head: Normocephalic and atraumatic. Right Ear: Hearing normal.      Left Ear: Hearing normal.      Nose: Nose normal. No rhinorrhea. Mouth/Throat:      Lips: Pink. Mouth: Mucous membranes are moist.      Pharynx: Uvula midline. Eyes:      General: Lids are normal. No scleral icterus. Extraocular Movements: Extraocular movements intact. Conjunctiva/sclera: Conjunctivae normal.      Pupils: Pupils are equal, round, and reactive to light. Neck:      Musculoskeletal: No neck rigidity. Vascular: No JVD. Trachea: Trachea normal. No tracheal deviation. Cardiovascular:      Rate and Rhythm: Normal rate and regular rhythm. Heart sounds: Normal heart sounds.    Pulmonary:      Effort: Pulmonary effort is normal. No respiratory distress. Breath sounds: Normal breath sounds. No decreased breath sounds or wheezing. Abdominal:      General: Bowel sounds are normal. There is no distension. Palpations: Abdomen is soft. Abdomen is not rigid. There is no pulsatile mass. Tenderness: There is no abdominal tenderness. There is no right CVA tenderness, left CVA tenderness, guarding or rebound. Negative signs include Chavira's sign and McBurney's sign. Hernia: No hernia is present. Genitourinary:     Comments: Unsuccessful attempt at pelvic exam. Patient could not tolerate the speculum. There was active bleeding noted. Musculoskeletal: Normal range of motion. Comments: Movement normal as observed   Lymphadenopathy:      Cervical: No cervical adenopathy. Skin:     General: Skin is warm and dry. Coloration: Skin is not pale. Findings: No rash. Neurological:      General: No focal deficit present. Mental Status: She is alert and oriented to person, place, and time. Gait: Gait normal.   Psychiatric:         Mood and Affect: Mood normal.         Speech: Speech normal.         Behavior: Behavior normal.         Thought Content: Thought content normal.         Cognition and Memory: Cognition normal.         DIFFERENTIAL DIAGNOSIS:   Including but not limited to: uterine cancer, anemia, over anticoagulation, SHAILA    DIAGNOSTIC RESULTS     EKG: All EKG's are [facility] who either signs or Co-signs this chart in the absence of a cardiologist.    None    RADIOLOGY: non-plain film images(s) such as CT,Ultrasound and MRI are read by the radiologist.  Plain radiographic images are visualized and preliminarily interpreted by the emergency physician unless otherwise stated below.   No orders to display       LABS:   Labs Reviewed   CBC WITH AUTO DIFFERENTIAL - Abnormal; Notable for the following components:       Result Value    RBC 3.56 (*)     Hemoglobin 10.3 (*)     Hematocrit 33.1 (*)     MCHC 31.1 (*)     RDW-SD 46.6 (*)     All other components within normal limits   COMPREHENSIVE METABOLIC PANEL - Abnormal; Notable for the following components:    Glucose 130 (*)     CREATININE 7.6 (*)     BUN 48 (*)     Chloride 94 (*)     Alb 2.9 (*)     Total Bilirubin 0.2 (*)     ALT 8 (*)     All other components within normal limits   PROTIME-INR - Abnormal; Notable for the following components:    INR 2.17 (*)     All other components within normal limits   ANION GAP - Abnormal; Notable for the following components:    Anion Gap 17.0 (*)     All other components within normal limits   GLOMERULAR FILTRATION RATE, ESTIMATED - Abnormal; Notable for the following components:    Est, Glom Filt Rate 5 (*)     All other components within normal limits   OSMOLALITY   TYPE AND SCREEN       EMERGENCY DEPARTMENT COURSE:   Vitals:    Vitals:    04/25/20 1706 04/25/20 1902   BP: (!) 148/51 110/69   Pulse: 92 93   Resp: 15 16   Temp: 98.3 °F (36.8 °C)    SpO2: 100% 100%   Weight: 240 lb (108.9 kg)      1855: Consulted Dr. Alee Centeno who advised discharge of patient on Megace 40mg bid and adjustment of coumadin therapy. 1905: Consulted Dr. Pam Zimmerman accepted admission. MDM:  The patient was seen and evaluated within the ED today for vaginal bleeding. On exam, I appreciated active bleeding from the vagina. The patient was unable to tolerate speculum exam.  Old records were reviewed. Within the department, I observed the patient's vital signs to be within acceptable range. Radiological studies were not deemed necessary at this time. Laboratory work was revealed INR of 2.17, end-stage renal disease with a creatinine of 7.6, and stable anemia at 10.3. Within the department the patient was observed. I observed the patient's condition to remain stable during the duration of their stay.  On re-exam patient had no complaints but continued to have active vaginal bleeding. Vital signs have remained stable. The patient remains non-toxic appearing with no distress evident in the ER. I discussed this patient with my attending physician, Dr. Sri Michael, who advised admission. It was not felt safe for this patient to be discharged as she was actively bleeding and anticoagulated on Coumadin for history of PE. Results and desire for admission was discussed with the patient and she was amenable. Consults were made as noted above. Dr. Gabrielle Escamilla of our hospitalist service graciously accepted admission. The patient was admitted to the hospital in fair condition. CRITICAL CARE:   None    CONSULTS:  Dr. Marivel Coronado (OB/GYN)  Dr. Zain Sevilla (hospitalist)    PROCEDURES:  None    FINAL IMPRESSION      1. Abnormal vaginal bleeding    2. Warfarin-induced coagulopathy ([address]    3. History of pulmonary embolus (PE)          DISPOSITION/PLAN     1. Abnormal vaginal bleeding    2. Warfarin-induced coagulopathy ([address]    3. History of pulmonary embolus (PE)    Admission    (Please note that portions of this note were completed with a voice recognition program.  Efforts were made to edit the dictations but occasionally words are mis-transcribed.)    Scribe:  Karen Tate 4/25/20 5:38 PM EDT Scribing for and in the presence of TEENA Peng. Signed by: Dora Lim, 04/25/20 7:45 PM    Provider:  I personally performed the services described in the documentation, reviewed and edited the documentation which was dictated to the scribe in my presence, and it accurately records my words and actions.     TEENA Peng 04/25/20 7:45 PM    TEENA Peng [place]  04/25/20 1945

## 2020-04-25 NOTE — ED NOTES
Pt updated at this time. Pt vital signs stable and respirations unlabored.         Amy Francis RN  04/24/20 3959

## 2020-04-25 NOTE — H&P
History & Physical        Patient: Lupe Cobos  YOB: 1934    MRN: [id number]     Acct: [de-identified]    PCP: Claudetta Bilberry, MD    Date of Admission: 4/25/2020    Date of Service: Pt seen/examined on 4/25/2020   and Admitted to Inpatient with expected LOS greater than two midnights due to medical therapy. Chief Complaint:   Chief Complaint   Patient presents with    Vaginal Bleeding         History Of Present Illness:      80 y.o. female who presented to [facility] with complaints of vaginal bleeding x1 day. Patient states that she is currently on warfarin and Plavix. Patient states that she bruises and bleeds easily. Patient denies chest pain or shortness of breath. Denies nausea, vomiting, diarrhea constipation. Denies abdominal pain. Patient does not endorse urinary complaints at this time. Patient resides at [facility] at the present time. Patient denies smoking. Denies illicit drug or alcohol use. While in emergency department patient had a CBC evaluated and hemoglobin was found to be 10.3 with hematocrit 33.1. INR stable at 2.17. GYN was consulted while patient was in ED. Patient to be admitted at this time with further evaluation and treatment of symptoms.     Past Medical History:          Diagnosis Date    CAD (coronary artery disease)     nonobstructive    Cancer ([facility] 2007    breast cancer    Cardiomyopathy, nonischemic ([facility]     Dr. Sandy Garcia CHF (congestive heart failure) ([facility])     CKD (chronic kidney disease) stage 3, GFR 30-59 ml/min ([facility])     Dr. Gadiel Thorne    Congenital heart disease     COPD exacerbation ([facility]     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 ([facility]     DM2 (diabetes mellitus, type 2) ([facility] 1998    with CKD3, R foot ulcer and hx of prior ulcerations and PVD    Ex-smoker 10/2/2012    GERD (gastroesophageal reflux disease)     Heart failure with preserved ejection fraction ([facility]     Dr. Galicia Hemp    His of Heparin induced thrombocytopenia     History of breast cancer     right 1982 s/p mastectomy and chemo, left 2007 s/p mastectomy    History of CVA (cerebrovascular accident)     History of pulmonary embolism 05/2014    on [address] (coumadin)    Hyperlipidemia     Hypertension     pulmonary    Iron deficiency anemia     Dr. 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 No aortic stenosis. No mitral stenosis. Coronaries; mild disease of the LAD not more than 40%. LV; severe LV dysfunction and EF 30-35%.  CARDIAC CATHETERIZATION  05-    Mild disease of small distal LAD (approximately  50% stenosis) angiographically normal CA. Mild to moderate LV systolic dysfunction. EF 35%. Mildly elevated LV end diastolic pressure. No significant MR. Systemic hypertension.  CARDIAC DEFIBRILLATOR PLACEMENT  02/02/2011    CARDIOVASCULAR STRESS TEST  03/23/10    EF 30%  Severe LV Dys    COLONOSCOPY      Dr. Russell Pierce  03/22/10    EF 45%. LV mildly dilated. Systolic function mildly reducted. No regional wall motion abnormalities. Wall thickness mildly increased. LA mildly dilated. MV mild annular calification. Mild TR. Ventricular septum tickness mildly increased.  ENDOSCOPY, COLON, DIAGNOSTIC      EYE SURGERY      bilateral cataracts    EYE SURGERY Left 9/2014    laser surgery for retinopathy?     FOOT SURGERY Left 1/12/2016    OTHER SURGICAL HISTORY  10/18/2018    CardioMEMS    PACEMAKER PLACEMENT      WI EGD BALLOON DILATION ESOPHAGUS <30 MM DIAM N/A 9/18/2017    EGD ESOPHAGOGASTRODUODENOSCOPY DILATATION performed by Bianca Kelley MD at [facility] ESOPHAGOGASTRODUODENOSCOPY TRANSORAL DIAGNOSTIC  9/18/2017    EGD ESOPHAGOGASTRODUODENOSCOPY performed by Bianca Kelley MD at [facility]       Medications Prior to Admission:      Prior to Admission medications    Medication Sig Start Date End Date Taking? Authorizing Provider   amLODIPine (NORVASC) 5 MG tablet Take 1 tablet by mouth nightly 4/13/20   Sloan Garcia MD   insulin glargine (LANTUS) 100 UNIT/ML injection vial Inject 10 Units into the skin nightly 4/9/20   Sloan Garcia MD   polyethylene glycol (GLYCOLAX) powder Take 17 g by mouth 2 times daily    Historical Provider, MD   sevelamer (RENVELA) 800 MG tablet Take 1 tablet by mouth 3 times daily (with meals)    Historical Provider, MD   vitamin D 25 MCG (1000 UT) CAPS Take 1,000 Units by mouth daily    Historical Provider, MD   miconazole (MICOTIN) 2 % powder Apply topically 2 times daily. 12/19/19   Abel Quintanilla DO   sodium chloride (OCEAN, BABY AYR) 0.65 % nasal spray 1 spray by Nasal route as needed for Congestion 12/19/19   Abel Quintanilla DO   insulin aspart (NOVOLOG) 100 UNIT/ML injection vial Inject into the skin 2 times daily Inject per sliding scale 0-150 = 0 units; 151-200 = 1 unit; 201-205 = 2 units; 251-300 = 3 units; 301-350 = 4 units; 351-400 = 5 units; 401-450 = 6 units    Historical Provider, MD   cyanocobalamin 1000 MCG tablet Take 1,000 mcg by mouth daily    Historical Provider, MD   warfarin (COUMADIN) 2 MG tablet As dosed by ECF.     Historical Provider, MD   epoetin schuyler-epbx (RETACRIT) 14068 UNIT/ML SOLN injection Inject into the skin once a week     Historical Provider, MD   pantoprazole (PROTONIX) 40 MG tablet Take 40 mg by mouth 2 times daily    Historical Provider, MD   metoprolol succinate (TOPROL XL) 100 MG extended release tablet Take 1 tablet by mouth daily Hold if SBP less than 100 Positive as follows:        Problem Relation Age of Onset    Diabetes Mother     Pacemaker Mother     Stroke Mother     Heart Disease Mother     Diabetes Father     Cancer Sister     Cancer Brother     Cancer Sister     Heart Disease Brother         cardiomegaly       Diet:  DIET CARB CONTROL;    REVIEW OF SYSTEMS:   Pertinent positives as noted in the HPI. All other systems reviewed and negative. PHYSICAL EXAM:    BP (!) 158/80   Pulse 88   Temp 97.7 °F (36.5 °C) (Oral)   Resp 16   Wt 240 lb (108.9 kg)   SpO2 100%   BMI 38.74 kg/m²     General appearance:  Noted apparent distress, appears stated age and cooperative. HEENT:  Normal cephalic, atraumatic without obvious deformity. Pupils equal, round, and reactive to light. Extra ocular muscles intact. Conjunctivae/corneas clear. Neck: Supple, with full range of motion. No jugular venous distention. Trachea midline. Respiratory:  Normal respiratory effort. Clear to auscultation, bilaterally without Rales/Wheezes/Rhonchi. Cardiovascular:  Regular rate and rhythm with normal S1/S2 without murmurs, rubs or gallops. Abdomen: Soft, non-tender, non-distended with normal bowel sounds. Musculoskeletal:  No clubbing, cyanosis or edema bilaterally. Full range of motion without deformity. Skin: Skin color, texture, turgor normal.  No rashes or lesions. Neurologic:  Neurovascularly intact without any focal sensory/motor deficits.  Cranial nerves: II-XII intact, grossly non-focal.  Psychiatric:  Alert and oriented, thought content appropriate, normal insight  Capillary Refill: Brisk,< 3 seconds   Peripheral Pulses: +2 palpable, equal bilaterally       Labs:     Recent Labs     04/24/20  1741 04/25/20  1740   WBC 8.3 7.5   HGB 10.2* 10.3*   HCT 33.5* 33.1*    231     Recent Labs     04/24/20  1741 04/25/20  1740   * 136   K 3.8 3.8   CL 92* 94*   CO2 28 25   BUN 48* 48*   CREATININE 8.5* 7.6*   CALCIUM 8.6 8.7     Recent Labs 04/24/20  1741 04/25/20  1740   AST 13 17   ALT 6* 8*   BILIDIR <0.2  --    BILITOT 0.2* 0.2*   ALKPHOS 113 117     Recent Labs     04/25/20  1740   INR 2.17*     No results for input(s): CKTOTAL, TROPONINI in the last 72 hours. Urinalysis:      Lab Results   Component Value Date    NITRU NEGATIVE 04/09/2020    WBCUA 10-15 04/09/2020    BACTERIA FEW 04/09/2020    RBCUA 5-10 04/09/2020    BLOODU MODERATE 04/09/2020    SPECGRAV 1.019 03/26/2019    GLUCOSEU NEGATIVE 04/09/2020       Radiology:       No orders to display            DVT prophylaxis: [] Lovenox                                 [x] SCDs                                 [] SQ Heparin                                 [] Encourage ambulation           [] Already on Anticoagulation    Code Status: DNR-CCA      PT/OT Eval Status: Encouraged    Disposition:    [] Home       [] TCU       [] Rehab       [] Psych       [] SNF       [x] [place]       [] Other-    ASSESSMENT:    C[address] Problems    Diagnosis Date Noted    Vagina bleeding [N93.9] 04/25/2020     Priority: High    CHF (congestive heart failure), NYHA class III, chronic, diastolic (HCC) [A46.44]      Priority: Low    ESRD on peritoneal dialysis ([facility] [N18.6, Z99.2]     Anemia, chronic disease [D63.8] 03/06/2019    [facility] BiV ICD [Z95.810] 11/17/2011    Cardiomyopathy, nonischemic ([facility] now EF 60% [I42.8]     Hyperlipidemia [E78.5]     DM2 (diabetes mellitus, type 2) ([facility] [E11.9] 01/01/1998       PLAN:    1. Vaginal bleeding, currently on warfarin  Admit to Telemetry Unit  Diet  ADA  Analgesics PRN  Antiemetics PRN  DVT prophylaxis, SCD  PT/OT encouraged  GYN consult, appreciate chart rec  CBC in a.m. Warfarin on hold, INR in a.m; will need to reevaluate warfarin use in a.m. pending symptoms    2. CHF, hypertension underlying CAD  Troponin trend  EKG in AM  Continue with Toprol 100 mg daily, home medication    3.   Hyperlipidemia  Continue Pravachol 40 mg daily, home

## 2020-04-25 NOTE — ED NOTES
Patient is resting in bed with easy and unlabored respirations. Minimal bleeding is noted. Call light in reach. Side rails up x2. Patient denies further complaints or concerns. Will monitor.         Lakhwinder Gardiner RN  04/25/20 9815

## 2020-04-26 NOTE — ED NOTES
Patient transported to [facility]. Oralia PRIEST notified.      Giovani Zhao  04/25/20 2017

## 2020-04-26 NOTE — PROGRESS NOTES
Pt admitted to 5k16. Complaints: vaginal bleeding    IV: int to left forearm. site free of s/s of infection or infiltration. Vital signs obtained. Assessment and data collection initiated. Two nurse skin assessment performed by Helena Mejia and [facility] RN. Oriented to room. Explained patients right to have family, representative or physician notified of their admission. Patient has declined for physician to be notified. Patient has declined for family/representative to be notified. The patient is interested in [facility]. Lists of hospitals in the [place] meds to beds program?:  no    Policies and procedures for  explained. All questions answered with no further questions at this time. Fall prevention and safety brochure discussed with patient. Bed alarm on. Call light in reach.

## 2020-04-26 NOTE — PROGRESS NOTES
Assessment and Plan:        1. Vaginal bleeding- Gyn to see; has had \"spotting\" for some itme. 2. ESRD- Dr Katlyn Asif to see- not sure which form of dialysis currently getting; why on diuretic if no urine? 3. Impaired mobility- PT  4. Nonischemic cardiomyopathy stable- has ICD. 5. Hx of PE- reason for coumadin- no data to say why she was kept on coumadin  6. Hx CVA - on Plavix- doubt needs higher level of antiplatelet. CT of head from 2011 shows multiple infarcts- ?due to emboli or hbp? CC:  Sent to ER due to vaginal bleeding  HPI:  Pt with nonischemic cm, spotting x yr, apparently significant vaginal bleeding recently. Has ICD. Unable to stand on own  ROS (12 point review of systems completed. Pertinent positives noted.  Otherwise ROS is negative) : chronic leg swelling  PMH:  Per HPI  SHX:  Lives in [facility].  exsmoker  FHX: Noncontributory    Allergies: See above    Medications:     dextrose        metoprolol succinate  50 mg Oral Daily    insulin lispro  0-6 Units Subcutaneous TID WC    insulin lispro  0-3 Units Subcutaneous Nightly    sodium chloride flush  10 mL Intravenous 2 times per day    amLODIPine  5 mg Oral Nightly    ascorbic acid  500 mg Oral BID    [Held by provider] clopidogrel  75 mg Oral QPM    cyanocobalamin  1,000 mcg Oral Daily    docusate sodium  200 mg Oral Daily    [START ON 4/27/2020] epoetin schuyler-epbx  10,000 Units Subcutaneous Weekly    insulin glargine  10 Units Subcutaneous Nightly    miconazole   Topical BID    therapeutic multivitamin-minerals  1 tablet Oral Daily    pantoprazole  40 mg Oral BID    polyethylene glycol  17 g Oral BID    pravastatin  40 mg Oral QPM    lactobacillus  1 capsule Oral Daily    Vitamin D  1,000 Units Oral Daily    megestrol  40 mg Oral BID    hydrALAZINE  50 mg Oral TID    bumetanide  2 mg Oral TID    isosorbide dinitrate  40 mg Oral TID    sevelamer  800 mg Oral TID WC       Vital Signs:   BP (!) 96/40   Pulse 83   Temp 98.3 °F (36.8 °C) (Oral)   Resp 16   Ht 5' 6\" (1.676 m)   Wt 244 lb 3.2 oz (110.8 kg)   SpO2 94%   BMI 39.41 kg/m²      Intake/Output Summary (Last 24 hours) at 4/26/2020 1085  Last data filed at 4/26/2020 0419  Gross per 24 hour   Intake 450 ml   Output --   Net 450 ml        Physical Examination: General appearance - chronically ill appearing  Mental status - alert, oriented to person, place, and time  Neck - supple, no significant adenopathy, no JVD, or carotid bruits  Chest - clear to auscultation, no wheezes, rales or rhonchi, symmetric air entry  Heart - normal rate, regular rhythm, normal S1, S2, no murmurs, rubs, clicks or gallops  Abdomen - capd catheter in place  Neurological - alert, oriented, normal speech, no focal findings or movement disorder noted  Musculoskeletal - no muscular tenderness noted  Extremities - no pedal edema noted  Skin - normal coloration and turgor, no rashes, no suspicious skin lesions noted    Data: (All radiographs, tracings, PFTs, and imaging are personally viewed and interpreted unless otherwise noted).     Reviewed labs      Electronically signed by Prudencio Padron MD on 4/26/2020 at 6:32 AM

## 2020-04-26 NOTE — CONSULTS
Department of Gynecology  Consult Note  Date of Consult:  4/26/2020    Reason for Consult:  Vaginal bleeding    CHIEF COMPLAINT:   Vaginal bleeding    History obtained from patient    HISTORY OF PRESENT ILLNESS:     The patient is a 80 y.o. female with multiple medical problems who was admitted form the [facility] for worsening vaginal bleeding. Pt states her care givers have told her that there was been \"spots\" of blood on her pads for several months but it worsened yesterday. She states she had an episode where she felt like she urinated herself but it was found to be a gush of blood. She denies pain. She is nonambulatory and has been for some time. She is incontinent of Stool. The ED provider attempted a speculum exam but it was unable to be done due to [facility] intolerance and pain.      Past Medical History:        Diagnosis Date    Anxiety     Atherosclerotic heart disease of native coronary artery without angina pectoris     CAD (coronary artery disease)     nonobstructive    Cancer ([facility] 2007    breast cancer    Cardiomyopathy, nonischemic ([facility]     Dr. Griffith [place] Cerebral artery occlusion with cerebral infarction ([facility] [facility]     CHF (congestive heart failure) ([facility]     CKD (chronic kidney disease) stage 3, GFR 30-59 ml/min ([facility])     Dr. Loc Mulligan    Congenital heart disease     COPD exacerbation ([facility]     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 ([facility] [address] [facility]     DM2 (diabetes mellitus, type 2) ([address] 1998    with CKD3, R foot ulcer and hx of prior ulcerations and PVD    Dyspnea     ESRD (end stage renal disease) ([address]     Ex-smoker 10/2/2012    GERD (gastroesophageal reflux disease)     GERD (gastroesophageal reflux disease)     Heart failure with preserved ejection fraction ([address]     Dr. Nacho Lewis Hemodialysis patient [facility])     His of Heparin induced thrombocytopenia     History of breast cancer     right 1982 s/p mastectomy and chemo, left 2007 s/p mastectomy    History of CVA (cerebrovascular accident)     History of pulmonary embolism 05/2014    on [address] (coumadin)    Hyperkalemia     Hyperlipidemia     Hypertension     pulmonary    Iron deficiency anemia     Dr. 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 No aortic stenosis. No mitral stenosis. Coronaries; mild disease of the LAD not more than 40%. LV; severe LV dysfunction and EF 30-35%.  CARDIAC CATHETERIZATION  05-    Mild disease of small distal LAD (approximately  50% stenosis) angiographically normal CA. Mild to moderate LV systolic dysfunction. EF 35%. Mildly elevated LV end diastolic pressure. No significant MR. Systemic hypertension.  CARDIAC DEFIBRILLATOR PLACEMENT  02/02/2011    CARDIOVASCULAR STRESS TEST  03/23/10    EF 30%  Severe LV Dys    COLONOSCOPY      Dr. Shaikh Po  03/22/10    EF 45%. LV mildly dilated. Systolic function mildly reducted. No regional wall motion abnormalities. Wall thickness mildly increased. LA mildly dilated. MV mild annular calification. Mild TR. Ventricular septum tickness mildly increased.      ENDOSCOPY, COLON, DIAGNOSTIC      EYE SURGERY      bilateral cataracts    EYE SURGERY Left 9/2014    laser surgery for retinopathy?     FOOT SURGERY Left 1/12/2016    OTHER SURGICAL HISTORY  10/18/2018    CardioMEMS    PACEMAKER PLACEMENT      NE EGD BALLOON DILATION ESOPHAGUS <30 MM DIAM N/A 9/18/2017    EGD ESOPHAGOGASTRODUODENOSCOPY DILATATION performed by Cris Osorio MD at [facility]    NE ESOPHAGOGASTRODUODENOSCOPY TRANSORAL DIAGNOSTIC  9/18/2017    EGD ESOPHAGOGASTRODUODENOSCOPY performed by Cris Osorio MD at [facility]         meds:  Current Facility-Administered Medications:     metoprolol succinate (TOPROL XL) extended release tablet 50 mg, 50 mg, Oral, Daily, Yomaira Black MD    insulin lispro (HUMALOG) injection vial 0-6 Units, 0-6 Units, Subcutaneous, TID WC, Larayne Deem, DO    insulin lispro (HUMALOG) injection vial 0-3 Units, 0-3 Units, Subcutaneous, Nightly, Larayne Deem, DO, 1 Units at 04/25/20 2153    glucose (GLUTOSE) 40 % oral gel 15 g, 15 g, Oral, PRN, Larayne Deem, DO    dextrose 50 % IV solution, 12.5 g, Intravenous, PRN, Larayne Deem, DO    glucagon (rDNA) injection 1 mg, 1 mg, Intramuscular, PRN, Larayne Deem, DO    dextrose 5 % solution, 100 mL/hr, Intravenous, PRN, Larayne Deem, DO    sodium chloride flush 0.9 % injection 10 mL, 10 mL, Intravenous, 2 times per day, Larayne Deem, DO, 10 mL at 04/26/20 0930    sodium chloride flush 0.9 % injection 10 mL, 10 mL, Intravenous, PRN, Larayne Deem, DO    acetaminophen (TYLENOL) tablet 650 mg, 650 mg, Oral, Q6H PRN **OR** acetaminophen (TYLENOL) suppository 650 mg, 650 mg, Rectal, Q6H PRN, Larayne Deem, DO    polyethylene glycol (GLYCOLAX) packet 17 g, 17 g, Oral, Daily PRN, Larayne Deem, DO    promethazine (PHENERGAN) tablet 12.5 mg, 12.5 mg, Oral, Q6H PRN **OR** ondansetron (ZOFRAN) injection 4 mg, 4 mg, Intravenous, Q6H PRN, Isabel Hall DO    vitamin C (ASCORBIC ACID) tablet 500 mg, 500 mg, Oral, BID, Isabel Hall DO, 500 mg at 04/26/20 1956    bisacodyl (DULCOLAX) suppository 10 mg, 10 mg, Rectal, Daily PRN, Oni Chesterole, DO    [Held by provider] clopidogrel (PLAVIX) tablet 75 mg, 75 mg, Oral, QPM, Oni Chesterole, DO, 75 mg at 04/25/20 2211    vitamin B-12 (CYANOCOBALAMIN) tablet 1,000 mcg, 1,000 mcg, Oral, Daily, Oni Chesterole, DO, 1,000 mcg at 04/26/20 6328    docusate sodium (COLACE) capsule 200 mg, 200 mg, Oral, Daily, Oni Console, DO, 200 mg at 04/26/20 0926    [START ON 4/27/2020] epoetin schuyler-epbx (RETACRIT) injection 10,000 Units, 10,000 Units, Subcutaneous, Weekly, Oni Chesterole, DO    guaiFENesin (ROBITUSSIN) 100 MG/5ML oral solution 200 mg, 200 mg, Oral, Q4H PRN, Oni Chesterole, DO    insulin glargine (LANTUS) injection vial 10 Units, 10 Units, Subcutaneous, Nightly, Oni Chesterole, DO, 10 Units at 04/25/20 2154    ipratropium-albuterol (DUONEB) nebulizer solution 3 mL, 1 vial, Inhalation, Q6H PRN, Oni Chesterole, DO    miconazole (MICOTIN) 2 % powder, , Topical, BID, Oni Chesterole, DO    therapeutic multivitamin-minerals 1 tablet, 1 tablet, Oral, Daily, Oni Chesterole, DO, 1 tablet at 04/26/20 3481    nitroGLYCERIN (NITROSTAT) SL tablet 0.4 mg, 0.4 mg, Sublingual, Q5 Min PRN, Krystal Esparza MD    pantoprazole (PROTONIX) tablet 40 mg, 40 mg, Oral, BID, Oni Console, DO, 40 mg at 04/26/20 6573    polyethylene glycol (GLYCOLAX) packet 17 g, 17 g, Oral, BID, Oni Console, DO, 17 g at 04/26/20 3213    lactobacillus (CULTURELLE) capsule 1 capsule, 1 capsule, Oral, Daily, Oni Console, DO, 1 capsule at 04/26/20 5772    sodium chloride (OCEAN, BABY AYR) 0.65 % nasal spray 1 spray, 1 spray, Nasal, PRN, Oni Chesterole,     Vitamin D (CHOLECALCIFEROL) tablet 1,000 Units, 1,000 Units, Oral, Daily, Oni Clemens DO, 1,000 Units at 04/26/20 0926    HYDROcodone-acetaminophen (NORCO) 5-325 MG per tablet 1 tablet, 1 tablet, Oral, Q6H PRN, Oni Clemens DO    megestrol (MEGACE) tablet 40 mg, 40 mg, Oral, BID, Jung ARNDT MD Ramakrishna, 40 mg at 04/26/20 0926    sevelamer (RENVELA) tablet 800 mg, 800 mg, Oral, TID WC, Aneta Israel DO, 800 mg at 04/26/20 9231       Allergies:  Heparin     Social History:  TOBACCO:   reports that she quit smoking about 68 years ago. Her smoking use included cigarettes. She has a 1.00 pack-year smoking history. She has never used smokeless tobacco.  ETOH:   reports no history of alcohol use. DRUGS:   reports no history of drug use. Family History:       Problem Relation Age of Onset    Diabetes Mother    Stephanie Abler Pacemaker Mother     Stroke Mother     Heart Disease Mother     Diabetes Father     Cancer Sister     Cancer Brother     Cancer Sister     Heart Disease Brother         cardiomegaly        PHYSICAL EXAM:    Vitals:  BP (!) 106/49   Pulse 82   Temp 98.1 °F (36.7 °C) (Oral)   Resp 16   Ht 5' 6\" (1.676 m)   Wt 244 lb 3.2 oz (110.8 kg)   SpO2 95%   BMI 39.41 kg/m²     CONSTITUTIONAL:  awake, alert, cooperative, no apparent distress, and appears stated age  ABDOMEN:  Soft, NT, ND  GYN PELVIC EXAM: external exam done in the bed, pad shows 3 x 5cm dark red blood spot on the pad. Blood present at the posterior vaginal fornix. When external genitalia examined pt expressed some discomfort. DATA:  Labs:    CBC:   Lab Results   Component Value Date    WBC 8.6 04/26/2020    RBC 3.18 04/26/2020    HGB 9.3 04/26/2020    HCT 30.0 04/26/2020    MCV 94.3 04/26/2020    RDW 18.2 06/13/2018     04/26/2020     BMP:    Lab Results   Component Value Date     04/26/2020    K 3.9 04/26/2020    K 3.8 04/24/2020    CL 92 04/26/2020    CO2 26 04/26/2020    BUN 50 04/26/2020       IMPRESSION/RECOMMENDATIONS:    85yo with PMB on anticoagulants  - Reviewed with pt finding and standard evaluation with pelvic exam, pelvic US and possible biopsy. - She has been started on megace in hopes to stabalize the endometrium if this is the etiology.  Will need to weight pros and cons of long term megace with her breast cancer hx x2.   - Pt wants to proceed with evaluation and is concerned about having cancer.   - Discussed possible etiologies including atrophy, trauma, cervical and uterine pathology. - We discussed that the preferred method of US would be transvaginal and she is agreeable to this exam. We also discussed the need for a speculum exam. She had significant pain when attempted in the ED. Plan for exam in the office tomorrow as an exam in the 5K bed will be suboptimal.   - Pt is agreeable to the plan.   - Dr. Rianna Singh will see her tomorrow for the exam and review US report.      Juan Antonio Patricia MD

## 2020-04-26 NOTE — PLAN OF CARE
Problem: Falls - Risk of:  Goal: Will remain free from falls  Description: Will remain free from falls  Outcome: Ongoing  Note: Pt using call light appropriately to call for assistance with ambulation to the bathroom and to chair. Pt is also compliant with use of non-skid slippers. Pt reports understanding of fall prevention when discussed. Problem: Falls - Risk of:  Goal: Absence of physical injury  Description: Absence of physical injury  Outcome: Ongoing  Note: Pt using call light appropriately to call for assistance with ambulation to the bathroom and to chair. Pt is also compliant with use of non-skid slippers. Pt reports understanding of fall prevention when discussed. Problem: Pain Control  Goal: Maintain pain level at or below patient's acceptable level (or 5 if patient is unable to determine acceptable level)  Outcome: Ongoing  Flowsheets (Taken 4/26/2020 0055)  Patient's Stated Pain Goal: No pain  Note: Pt report pain at 0/10 on scale. Pt states oral/iv/im medication, rest, and repositioning helping to achieve pain goal of a 0/10 on scale. Problem: Neurological  Goal: Maximum potential motor/sensory/cognitive function  Outcome: Ongoing  Note: Oriented x4. Denies numbness or tingling. Problem: Cardiovascular  Goal: No DVT, peripheral vascular complications  Outcome: Ongoing  Note: Vitals stable. Peripheral vascular within normal limits. Denies calf pain and tenderness. Patient on anticoagulants. Continuous telemetry. Problem: Respiratory  Goal: No pulmonary complications  Outcome: Ongoing  Note: Oxygen saturation above 90% on 2L NC. Patient wears 2L NC at home. Lung sounds clear and diminished throughout. Problem: GI  Goal: No bowel complications  Outcome: Ongoing  Note: Bowel sounds active. Abdomen soft and non-tender. Passing gas. Problem:   Goal: Adequate urinary output  Outcome: Ongoing  Note: Patient due to void.  Dialysis patient Monday- Friday      Problem: Nutrition  Goal: Optimal nutrition therapy  Outcome: Ongoing  Note: Patient on carb control diet. Blood glucose monitored and managed with insulin. Denies nausea      Problem: Skin Integrity/Risk  Goal: No skin breakdown during hospitalization  Outcome: Ongoing  Note: Deep tissue injury to coccyx and right and left ischium. Epc applied. Patient refuses to turn and reposition every 2 hours to prevent skin breakdown and educated on the importance of repositioning. Redness to bilateral heels, heels elevated with pillow support. Dialysis site to right chest. Peritoneal dialysis cath to left lower quadrant. Bruising scattered. Excoriation to abdominal folds, groin, and rodrigo area. Low air loss and alternating pressure relief mattress to bed. Problem: Musculor/Skeletal Functional Status  Goal: Highest potential functional level  Outcome: Ongoing  Note: Patient has moderate hand  and weak pedal push and pull bilaterally. Patient has weakness to bilateral legs. Problem: Discharge Planning:  Goal: Discharged to appropriate level of care  Description: Discharged to appropriate level of care  Outcome: Ongoing  Note: Patient plans on returning to [place] at discharge.  on care team      Problem: Bleeding:  Goal: Will show no signs and symptoms of excessive bleeding  Description: Will show no signs and symptoms of excessive bleeding  Outcome: Ongoing  Note: Moderate amount of vaginal bleeding. Managed with megace. Vitals stable. Care plan reviewed with patient. Patient verbalize understanding of the plan of care and contribute to goal setting.

## 2020-04-26 NOTE — PROGRESS NOTES
Patient refuses to turn as recommended every 2 hours. EPC applied to buttocks and coccyx. Education provided regarding the benefits of repositioning and the risk to skin integrity associated with not repositioning as recommended. Low air loss and alternating pressure relief mattress to bed. Heels elevated off bed with pillow support.

## 2020-04-26 NOTE — CONSULTS
[facility]    [facility]  [address]  [facility], [address]  [address] [place] Nephrology Consult      4/26/2020 12:33 PM         Pt Name:    Sultana Silva  MRN:     [id number]   [id number]  YOB: 1934  Admit Date:    4/25/2020  4:56 PM  Primary Care Physician:  Matti Noel MD   Room Number:  6O-57/142-K   Reason for Consult:  Management of peritoneal dialysis   Requesting Providor: Dr. Alyssa Anguiano  Primary Nephrologist: Dr. Maureen Savage    ### ISOLATION ###:   yes     Admit Chief Complaint: vaginal bleeding     History of Chief Complaint:   The patient is a 80 y.o. year old white female who has been followed for many months by Dr. 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 She had an access created for hemodialysis by Dr. Juan Oro that has failed. She was started on dialysis via a tunneled hemodialysis catheter and subsequently switched to peritoneal dialysis which has been proceeding well. She had the tunneled catheter removed 04/07/2020. She has been doing PD since at the nursing home. She does not like it complaining that it makes her feel bad and she desires to switch back to hemodialysis. She was using the cycler. Nephrology is following the patient for: ESRD requiring peritoneal dialysis. 24 hour summary:   The patient was relaxing in bed eating lunch. She feels normal. She is unaware of anything going on \"down below\". She does not know when her bladder leaks nor is she aware if she is having vaginal bleeding. She denied N/V/C/D/SOB/CP.       Baseline eGFR Peritoneal dialysis patient    Admit eGFR Assume eGFR=15 ml/min   Current eGFR        eGFR trend    Lab Results   Component Value Date    LABGLOM 4 04/26/2020    LABGLOM 5 04/25/2020    LABGLOM 4 04/24/2020    LABGLOM 4 04/08/2020    LABGLOM 5 04/07/2020    LABGLOM 19 02/09/2020    LABGLOM 13 01/04/2020    LABGLOM 24 12/19/2019    LABGLOM 14 12/18/2019    LABGLOM 16 12/17/2019    LABGLOM 17 12/16/2019 LABGLOM 21 12/15/2019    LABGLOM 24 12/14/2019          Compliance with treatment plan: 100%     Allergies and Intolerances:   ALLERGIES: Heparin  MEDICATION INTOLERANCES: none  FOOD ALLERGIES: none  INSECT ALLERGIES: none  PLANT ALLERGIES: none     Past Medical History:  Past Medical History:   Diagnosis Date    Anxiety     Atherosclerotic heart disease of native coronary artery without angina pectoris     CAD (coronary artery disease)     nonobstructive    Cancer ([facility] 2007    breast cancer    Cardiomyopathy, nonischemic ([facility]     Dr. Abrahan Quintanilla Cerebral artery occlusion with cerebral infarction ([facility]     CHF (congestive heart failure) ([facility]     CKD (chronic kidney disease) stage 3, GFR 30-59 ml/min ([facility]     Dr. Cabrera Monge Congenital heart disease     COPD exacerbation ([facility]     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 ([facility]     DM2 (diabetes mellitus, type 2) ([facility] 1998    with CKD3, R foot ulcer and hx of prior ulcerations and PVD    Dyspnea     ESRD (end stage renal disease) ([facility]     Ex-smoker 10/2/2012    GERD (gastroesophageal reflux disease)     GERD (gastroesophageal reflux disease)     Heart failure with preserved ejection fraction ([facility]     Dr. Abrahan Quintanilla Hemodialysis patient [facility])     His of Heparin induced thrombocytopenia     History of breast cancer     right 1982 s/p mastectomy and chemo, left 2007 s/p mastectomy    History of CVA (cerebrovascular accident)     History of pulmonary embolism 05/2014    on [facility] (coumadin)    Hyperkalemia     Hyperlipidemia     Hypertension     pulmonary    Iron deficiency anemia     Dr. 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 No aortic stenosis. No mitral stenosis. Coronaries; mild disease of the LAD not more than 40%. LV; severe LV dysfunction and EF 30-35%.  CARDIAC CATHETERIZATION  05-    Mild disease of small distal LAD (approximately  50% stenosis) angiographically normal CA. Mild to moderate LV systolic dysfunction. EF 35%. Mildly elevated LV end diastolic pressure. No significant MR. Systemic hypertension.  CARDIAC DEFIBRILLATOR PLACEMENT  02/02/2011    CARDIOVASCULAR STRESS TEST  03/23/10    EF 30%  Severe LV Dys    COLONOSCOPY      Dr. Nanci Dewitt  03/22/10    EF 45%. LV mildly dilated. Systolic function mildly reducted. No regional wall motion abnormalities. Wall thickness mildly increased. LA mildly dilated. MV mild annular calification. Mild TR. Ventricular septum tickness mildly increased.  ENDOSCOPY, COLON, DIAGNOSTIC      EYE SURGERY      bilateral cataracts    EYE SURGERY Left 9/2014    laser surgery for retinopathy?     FOOT SURGERY Left 1/12/2016    OTHER SURGICAL HISTORY  10/18/2018    CardioMEMS    PACEMAKER PLACEMENT      NH EGD BALLOON DILATION ESOPHAGUS <30 MM DIAM N/A 9/18/2017    EGD ESOPHAGOGASTRODUODENOSCOPY DILATATION performed by Yvonne Tiwari MD at [facility]    NH ESOPHAGOGASTRODUODENOSCOPY TRANSORAL DIAGNOSTIC  9/18/2017    EGD ESOPHAGOGASTRODUODENOSCOPY performed by Yvonne Tiwari MD at [facility]        Family History:  Family History Problem Relation Age of Onset    Diabetes Mother    [employer] Mother     Stroke Mother     Heart Disease Mother     Diabetes Father     Cancer Sister     Cancer Brother     Cancer Sister     Heart Disease Brother         cardiomegaly        Social History:  Social History     Socioeconomic History    Marital status:      Spouse name: Not on file    Number of children: 0    Years of education: Not on file    Highest education level: Not on file   Occupational History    Occupation: Retired    Social Needs    Financial resource strain: Not on file    Food insecurity     Worry: Not on file     Inability: Not on file   [employer] needs     Medical: Not on file     Non-medical: Not on file   Tobacco Use    Smoking status: Former Smoker     Packs/day: 0.50     Years: 2.00     Pack years: 1.00     Types: Cigarettes     Last attempt to quit: 1952     Years since quittin.3    Smokeless tobacco: Never Used   Substance and Sexual Activity    Alcohol use: No     Alcohol/week: 0.0 standard drinks    Drug use: No    Sexual activity: Never   Lifestyle    Physical activity     Days per week: Not on file     Minutes per session: Not on file    Stress: Not on file   Relationships    Social connections     Talks on phone: Not on file     Gets together: Not on file     Attends Spiritism service: Not on file     Active member of club or organization: Not on file     Attends meetings of clubs or organizations: Not on file     Relationship status: Not on file    Intimate partner violence     Fear of current or ex partner: Not on file     Emotionally abused: Not on file     Physically abused: Not on file     Forced sexual activity: Not on file   Other Topics Concern    Not on file   Social History Narrative    Not on file        Home Medications:  Prior to Admission medications    Medication Sig Start Date End Date Taking?  Authorizing Provider   hydrALAZINE (APRESOLINE) 50 MG and HR less than 50 bpm 3/30/19   Alan Wick MD   ipratropium-albuterol (DUONEB) 0.5-2.5 (3) MG/3ML SOLN nebulizer solution Inhale 1 vial into the lungs every 6 hours as needed for Shortness of Breath    Historical Provider, MD   ARTIFICIAL TEAR OP Instill one drop into each eye as needed    Historical Provider, MD   Probiotic Product (PROBIOTIC-10) CAPS Take 1 capsule by mouth daily     Historical Provider, MD   nitroGLYCERIN (NITROSTAT) 0.4 MG SL tablet up to max of 3 total doses.  If no relief after 1 dose, call 911. 10/18/18   Teo Mitchell, MD   ascorbic acid (VITAMIN C) 500 MG tablet Take 500 mg by mouth 2 times daily     Historical Provider, MD   docusate sodium (COLACE) 100 MG capsule Take 200 mg by mouth daily    Historical Provider, MD   clopidogrel (PLAVIX) 75 MG tablet Take 75 mg by mouth every evening     Historical Provider, MD   ondansetron (ZOFRAN) 4 MG tablet Take 4 mg by mouth 2 times daily     Historical Provider, MD   guaiFENesin (ROBITUSSIN) 100 MG/5ML syrup Take 200 mg by mouth every 4 hours as needed for Cough    Historical Provider, MD   Multiple Vitamins-Minerals (OCUVITE EXTRA PO) Take 1 tablet by mouth daily     Historical Provider, MD   bisacodyl (DULCOLAX) 10 MG suppository Place 10 mg rectally daily as needed for Constipation    Historical Provider, MD   pravastatin (PRAVACHOL) 40 MG tablet Take 40 mg by mouth every evening Indications: Blood Cholesterol Abnormal  5/8/16   Historical Provider, MD   acetaminophen (TYLENOL) 325 MG tablet Take 650 mg by mouth 4 times daily     Historical Provider, MD        Lab data:  CBC:    Recent Labs     04/24/20  1741 04/25/20  1740 04/26/20  0405   WBC 8.3 7.5 8.6   HGB 10.2* 10.3* 9.3*    231 200     CMP:    Recent Labs     04/24/20  1741 04/25/20  1740 04/26/20  0405   * 136 133*   K 3.8 3.8 3.9   CL 92* 94* 92*   CO2 28 25 26   BUN 48* 48* 50*   CREATININE 8.5* 7.6* 8.7*   GLUCOSE 121* 130* 100   CALCIUM 8.6 8.7 8.6 Urine:No results for input(s): COLORU, PHUR, LABCAST, WBCUA, RBCUA, MUCUS, TRICHOMONAS, YEAST, BACTERIA, CLARITYU, SPECGRAV, LEUKOCYTESUR, UROBILINOGEN, BILIRUBINUR, BLOODU in the last 72 hours. Invalid input(s): NITRATE, GLUCOSEUKETONESUAMORPHOUS   Sed Rate: No results for input(s): SEDRATE in the last 72 hours. Radiology       Review of Systems:  Source of data: patient    CONSTITUTIONAL  Fever: none, Chills: none, Weight loss: none, Malaise: none, Fatigue: none, Diaphoresis: none, Weakness: none    SKIN  Rash: none, Itch: none, Open sores: none    HENT:  Headache: none, Hearing loss: none, Tinnitus: none, Ear pain: none, Ear discharge: none,   Nasal bleeding: none, Congestion: none, Stridor: none, Sore throat: none. EYES  Blurred vision: none, Double vision: none, Photophobia: none, Eye pain: none, Eye discharge: none, Eye redness: none. CARDIOVASCULAR  Chest pain: none, Arm pain: none, Palpitations: none, Orthopnea: none, Claudication: none,  Leg swelling: none, PND: none. RESPIRATORY  Cough: none, Hemoptysis: none, Sputum production: none, Shortness of breath: none, Wheezing: none    GASTROINTESTINAL  Heartburn: none, Nausea: none, Vomiting: none, Abdominal pain: none, Diarrhea: none,  Constipation: none, Blood in the stool: none, Melena: none, Rebound: none, Rovsing's: none. GENITOURINARY  Dysuria: none, Pyuria: none, Gross hematuria: none, Urgency: none, Flank pain: none, STD: none. MUSCULOSKELETAL  Myalgia: none, Neck pain: none, Thoracic pain: none, Lumbar pain: none, Joint pain: none, Falls: none    ENDOCRINE/HEMATOLOGY/ALLERGIC  Easy bruising: none, Easy bleeding: none, Environmental allergies: none, Polydipsia: none.     NEUROLOGICAL  Dizziness: none, Tingling: none, Numbness: none, Tremor: none, Sensory change: none, Speech change: none, Focal weakness: none, Seizures: none, Loss of consciousness: none,    PSYCHIATRIC  Depression: none, Suicidal ideation: none, Heroin abuse: none, Cocaine abuse: none, Marijuana abuse: none, Hallucinations: none, Anxiety: none, Memory loss: none       Physical Examination:  BP (!) 106/49   Pulse 82   Temp 98.1 °F (36.7 °C) (Oral)   Resp 16   Ht 5' 6\" (1.676 m)   Wt 244 lb 3.2 oz (110.8 kg)   SpO2 95%   BMI 39.41 kg/m²       General appearance: alert and cooperative with exam  HEENT: Head: Normocephalic, no lesions, without obvious abnormality. Neck: no adenopathy, no carotid bruit, no JVD, supple, symmetrical, trachea midline and thyroid not enlarged, symmetric, no tenderness/mass/nodules  Lungs: clear to auscultation bilaterally  Heart: regular rate and rhythm, S1, S2 normal, no murmur, click, rub or gallop  Abdomen: soft, non-tender; bowel sounds normal; no masses,  no organomegaly. PD exit site looks great  Extremities: extremities normal, atraumatic, no cyanosis or edema  Neurologic: Mental status: Alert, oriented, thought content appropriate  PSY: No evidence of depression. Mood is normal for the patient. Skin: Warm and dry. No unusual lesions or rashes noted. Muscles: Hand  is equal and strong bilaterally. Leg strength is equal and strong. Skeletal: No bony or skeletal abnormalities noted. Admission weight: 240 lb (108.9 kg)  Wt Readings from Last 3 Encounters:   04/25/20 244 lb 3.2 oz (110.8 kg)   04/24/20 240 lb (108.9 kg)   04/09/20 240 lb (108.9 kg)     Body mass index is 39.41 kg/m². Clinical Impressions:    1. ESRD from DM, HTN and aging. 2. Vaginal bleeding  3. DM  4. HTN  5. Anemia from kidney disease and blood loss     Plan of Care    1. Since starting dialysis her urine output has dropped to less than 250 ml per day. Will stop all diuretics. 2. Will continue PD.   3. Will switch back to hemo outpatient. PD is working and elective surgeries are on hold due to Eloise Serrato DO  [facility].

## 2020-04-27 NOTE — PROGRESS NOTES
Wound care consulted for DTPI to coccyx. Patient in bed. Assessment and photos below. Assisted to turn to right side for visualization. Pt had BM, cleansed with wet wash cloths. Patient noted to have multiple areas of chronic staining to bilateral buttocks, as well as posterior upper thighs, likely from prolonged sitting. All areas blanchable. Recommend staff to apply EPC cream twice daily and PRN for incontinence. Continue to turn every 2 hours and offload. Limit chair time to 2hrs, with waffle cushion and repositioning in chair if patient unable to do so independently. Pt on hercules dream air support surface with alternating pressure and microclimate control. Bed in low, call light in reach. Wound care to sign off at this time, call with concerns. Wound type: Chronic staining- bilat buttocks & posterior t thighs, blanchable, POA  Wound assessment: purple, pink  Wound color: 80% purple, 20% pink  Lola wound: Blanchable erythema      Wound type: Chronic staining- coccyx, blanchable, POA  Wound assessment: purple, pink  Wound color: 80% purple, 20% pink  Lola wound: Blanchable erythema    Thank you for allowing us to participate in the care of your patient. TIME   Wound/ostomy individual minutes  Time In: 0915  Time Out: 0930  Minutes: 15  Time does not include documentation.

## 2020-04-27 NOTE — PLAN OF CARE
Problem: Discharge Planning:  Goal: Patients continuum of care needs are met  Description: Patients continuum of care needs are met  Note: Patient return to [facility]. See  notes 4/27/20.

## 2020-04-27 NOTE — PROGRESS NOTES
Hospitalist Progress Note      Patient: [address]    YOB: 1934    MRN: [id number]       Acct: [de-identified]     PCP: Maura Mcgee MD    Date of Admission: 4/25/2020    Assessment/Plan:    1. Vaginal bleeding/post-menopausal bleeding - complicated by plavix and coumadin use -- apprec gyn assist -> started on megace 4/26 and bleeding improving 4/27 -> u/s 4/26 with thickened endometrium -> d/w Dr. Everton Rodriguez gyn who went through options again with pt 4/27 -> tx will be with Megace despite bx or not and pt is too high risk for any surgery or radiation if this were a cancer thus pt agrees to conservative tx at this time with megace  -- per Dr. Everton Rodriguez can titrate megace to 400 mg bid prn for excessive bleeding and that breast cancer hx is remote enough that megace is not contraindicated  -- stopping/holding plavix and coumadin will also help bleeding issues --> ?? can we resume ASA and/or plavix for hx of CVA/CAD (no stents) and cont to hold coumadin  2. Acute blood loss anemia on anemia of chronic disease -- down due to vaginal bleeding to 9's since 4/26 and stable 9.6 on 4/27 from 10's back to 12/23/2019 --> has been 7-10's in past year with baseline more likely 9-10's?? -- cont trend with #1  -- holding home coumadin and plavix --> INR down 1.24 on 4/27 from 2.17 on 4/25  -- on retacrit weekly as outpt -> per renal  -- last iron studies 12/2019 with high ferritin, low iron at 49, normal B12/folate  3. ESRD on PD -- apprec renal assist --cont with PD and per renal pt wishes to change back to HD at some point -- fluid mgmt per renal -- cont renvela, Vit D  -- bumex stopped per renal due to low u/o  4.  Elevated troponin --chronic component --no current ACS symptoms --tropes stabilized 0.135-0.141 and prior on 4/7/2020 was 0.104 --> ??related to ESRD -- follows with cardio dr. Motley -- no sx thus monitor   -- off plavix for #1 --> ??resume when ok with gyn  -- cont toprol, statin --> ??on isordil at [facility] -> stopped here 4/26-> clarify meds  -- last cath = distal LAD 50%, patent LM, RCA, left circumflex  -- last echo 12/2019 = EF 55%, no WMA  -- last stress 10/2018 = (-) ischemia  5. Non-ischemic CM with ICD/chronic diastolic CHF -- renal following for fluid mgmt with PD -- has [facility] --> to be done 4/27 -- follows with CHF clinic and Dr. Santiago Roth -- last [facility] 12/2019 = EF 55%, no WMA, mild AS, mild TR  -- home bumex stopped per Dr. Rd Taylor renal as \"since starting dialysis her urine output has dropped to less than 250 mL per day, will stop all diuretics\"  -- cont continue Toprol 50 mg daily --> holding home hydralazine and ?on isordil also (discontinued per home med list) -- ?resume  6. Non-obstructive CAD -- follows with Dr. Santiago Roth - asx -- home plavix held for #1 --> ??resume plavix or start ASA if ok with gyn --> cont toprol, statin  -- last cath = distal LAD 50%, patent LM, RCA, left circumflex  -- last echo 12/2019 = EF 55%, no WMA  -- last stress 10/2018 = (-) ischemia  7. Chronic anticoagulation -- on chronic coumadin for hx of PE --> best can tell dx was in 2013 and cont on coumadin since --> coumadin held since admission with #1 -- ?need to continue -> hold at d/c and have f/u outpt - however megace does make pt more hypercoagulable and will need monitored closely  --  INR down 1.24 on 4/27 from 2.17 on 4/25 -> cont monitor  8. Hx PE -- holding home coumadin - ??details -- no CTA chest ever done here - VQ in 2013 with high prob PE but last VQ scan 11/2016 with PE ABSENT -- cont hold coumadin at d/c analisa with #1 --  and unlikely needs resumed but will be slightly more hypercoagulable with megace -- cont monitor resp status  9.  Hx CVA -- NO new neuro sx this admission -- Plavix held on admission due to #1 --> ??ok to resume -- ?change to ASA but CVA's seem recurrent and multifocal old areas --> ?embolic and ?why on coumadin - no documented hx of Atrial fib --> cont monitor neuro status off Plavix   -- cont statin  -- last CT head 2/9/2020 (-) acute process and chronic left encephalomalacia of left occipital lobe and moderate zone of and cephalo-malacia within the left temporal lobe both appear stable--    -- prior CT head 2018 notes \"Focal areas of chronic appearing encephalomalacia along the right temporal occipital region and anterior left temporal lobe are demonstrated\" and back to 2012 with chronic appearing infarctions of bilateral cerebral hemispheres and right cerebellum\"  10. COPD w/o exacerbation -- asx -- stable on RA - cont prn duonebs, O2 prn   11. Mild/mod Pulm HTN -- per [facility] 2016 -- O2 prn  12. Essential HTN -- cont toprol 50 mg po daily -- holding home hydralazine -- ??on norvasc and isordil at [facility] -> initially on home med list and now \"discontinued\" -- cont monitor and adjust prn  -- home bumex stopped 4/26 per renal (see above)  -- fluid mgmt per renal as PD pt  13. Type 2 DM -- cont lantus 10 units and SSI -- cont monitor chems and adjust prn - last A1C 2/2019 = 4.9  14. HLD -- cont statin  15. Hx breast cancer -- hx 1982 with right mastectomy and 2007 with left mastectomy -- gyn aware with megace use and per d/w Dr. Kemi Sena 4/27 states breast cancer was long enough ago that benefit outweighs risks and pt aware  16. Hx esophageal stricture with hx dilation, GERD -- cont PPI po bid  17. Hx adrenal nodule -- noted per CT abd 2015 was 1.4 x 1.0 cm left nodule  18. Fatty liver -- LFT WNL 4/26 -- asx  19. PAD with hx left SFA PTA -- asx -- Plavix held on admission with #1 -> Resume when ok with gyn and bleeding subsides -- cont statin  20. Anxiety d/o  21. Hx HIT  22. Mild AS, mild TR -- noted per echo 12/2019 -- f/u cardio  23. Morbid Obesity Body mass index is 40.06 kg/m². 24. Deconditioning/age related debility -- PT/OT consulted - from [facility] - per review pt hasn't walked in ~ 4 weeks -- ?reason - cont monitor;  Pt recent admission 4/7 - 4/13 for near syncope -- ? Need further neuro imaging but exam is nonfocal    Dispo  -- 4/27 --> apprec consultants -- apprec renal assist for fluid mgmt -- gyn assist apprec -> d/w Dr. Prince Stout and rec to cont tx with megace and to titrate prn for bleeding but currently bleeding has improved and risks of procedures explained with pt and the tx would not change if bx done thus he and pt agreed to cont with conservative tx with megace at this time as she is also a very high risk for surgery or radiation given her multiple comorbidities. H/h stable 9's this am -> cont trend. ??when to resume plavix for hx of CVA and LAD dz and PAD --> likely outpt when bleeding improves. Rec cont to hold coumadin at d/c given appears PE was back in 2013 --> however Megace will make pt more hypercoagulable and close monitoring will need to be taken outpt and risks explained to pt. PT/OT to eval - Per SS note this am pt is pre-cert to return to [facility] - ??d/c tomorrow        Chief Complaint: Vaginal bleeding    Hospital Course: Arleth Willard is a 80 y.o. female with ESRD on PD, non-ischemic CM with ICD, non-obstructive CAD, HTN, GERD, COPD, CHF, anxiety, HLD, DM2, CVA, breast cancer in 1982 with right mastectomy and 2007 with left mastectomy, hx HIT, chronic anemia, hx PE, chronic debility/immobility presenting with vaginal bleeding.    -- Gyn c/s --> started pt on megace and bleeding improving as of 4/27 --> per d/w Dr. Prince Stout to continue Megace as this is tx whether there is a tissue bx or not and pt is NOT a good candidate for surgery given her MULTIPLE comorbidities. H/h stable 9's 4/26 - 4/27  -- renal consulted for ESRD on PD (hx of HD) -> tolerating PD during admission. -- Pt/OT consulted due to recent decline in mobility over last 4 weeks. Subjective/HPI:   -- 4/27/2020  --> pt states she is feeling \"pretty good. \"  Slept well. Denies any pain. Per RN pt's vaginal bleeding is less but still present. Pt denies cp, sob.   Denies abd pain, n/v.  Denies f/c.  Leia po. On RA this am (was on 1 L overnight). Afebrile. Last BM 4/26 x 1.  PD going well - no abd pain, fluid clear. PMH, SURGICAL HX, FH, SOCIAL HX reviewed and updated as needed. Medications:  Reviewed    Infusion Medications    dextrose       Scheduled Medications    metoprolol succinate  50 mg Oral Daily    dianeal lo-nikko 2.5%  2,000 mL Intraperitoneal 4x Daily    insulin lispro  0-6 Units Subcutaneous TID WC    insulin lispro  0-3 Units Subcutaneous Nightly    sodium chloride flush  10 mL Intravenous 2 times per day    ascorbic acid  500 mg Oral BID    [Held by provider] clopidogrel  75 mg Oral QPM    cyanocobalamin  1,000 mcg Oral Daily    docusate sodium  200 mg Oral Daily    epoetin schuyler-epbx  10,000 Units Subcutaneous Weekly    insulin glargine  10 Units Subcutaneous Nightly    miconazole   Topical BID    therapeutic multivitamin-minerals  1 tablet Oral Daily    pantoprazole  40 mg Oral BID    polyethylene glycol  17 g Oral BID    lactobacillus  1 capsule Oral Daily    Vitamin D  1,000 Units Oral Daily    megestrol  40 mg Oral BID    sevelamer  800 mg Oral TID WC     PRN Meds: glucose, dextrose, glucagon (rDNA), dextrose, sodium chloride flush, acetaminophen **OR** acetaminophen, polyethylene glycol, promethazine **OR** ondansetron, bisacodyl, guaiFENesin, ipratropium-albuterol, nitroGLYCERIN, sodium chloride, HYDROcodone-acetaminophen      Intake/Output Summary (Last 24 hours) at 4/27/2020 0730  Last data filed at 4/27/2020 0430  Gross per 24 hour   Intake 4850 ml   Output 3100 ml   Net 1750 ml       Diet:  DIET CARB CONTROL; Exam:  BP (!) 118/58   Pulse 73   Temp 98.1 °F (36.7 °C) (Oral)   Resp 20   Ht 5' 6\" (1.676 m)   Wt 244 lb 3.2 oz (110.8 kg)   SpO2 95%   BMI 39.41 kg/m²     General appearance: No apparent distress, appears stated age and cooperative. HEENT: Pupils equal, round, and reactive to light. Conjunctivae/corneas clear.   Neck: Supple, with full range of motion. Trachea midline. Respiratory:  Normal respiratory effort. Diminished in bases but Clear to auscultation, bilaterally without Rales/Wheezes/Rhonchi. No respiratory distress or accessory muscle use. Cardiovascular: Regular rate and rhythm with normal S1/S2 without murmurs, rubs or gallops. No JVD. Abdomen: Soft, non-tender, non-distended with normal bowel sounds. No rebound or guarding, PD catheter in mid abd. Musculoskeletal: 1+ BLE edema, no TTP, moves feet equally, no rashes or lesions. Skin: Skin color, texture, turgor normal.  No rashes or lesions. Neurologic:  BLE weak but otherwise Cranial nerves: II-XII intact and no focal weakness.   Psychiatric: Alert and oriented, thought content appropriate   Capillary Refill: Brisk,< 3 seconds   Peripheral Pulses: +1 palpable, equal bilaterally       All labs reviewed and interpreted by me:  Labs:   Recent Labs     04/24/20 1741 04/25/20 1740 04/26/20 0405 04/27/20  0531   WBC 8.3 7.5 8.6  --    HGB 10.2* 10.3* 9.3* 9.6*   HCT 33.5* 33.1* 30.0* 30.1*    231 200  --      Recent Labs     04/24/20 1741 04/25/20 1740 04/26/20  0405   * 136 133*   K 3.8 3.8 3.9   CL 92* 94* 92*   CO2 28 25 26   BUN 48* 48* 50*   CREATININE 8.5* 7.6* 8.7*   CALCIUM 8.6 8.7 8.6     Recent Labs     04/24/20 1741 04/25/20 1740   AST 13 17   ALT 6* 8*   BILIDIR <0.2  --    BILITOT 0.2* 0.2*   ALKPHOS 113 117     Recent Labs     04/25/20 1740 04/26/20 0405 04/27/20  0531   INR 2.17* 1.80* 1.24*     Recent Labs     04/24/20 1741 04/25/20 2016 04/26/20  0405   TROPONINT 0.141* 0.135* 0.138*       Urinalysis:      Lab Results   Component Value Date    NITRU NEGATIVE 04/09/2020    WBCUA 10-15 04/09/2020    BACTERIA FEW 04/09/2020    RBCUA 5-10 04/09/2020    BLOODU MODERATE 04/09/2020    SPECGRAV 1.019 03/26/2019    GLUCOSEU NEGATIVE 04/09/2020       All radiology images and reports reviewed and interpreted by me:  Radiology:  [address]   Final Result   . 1. Thickened appearing endometrium. Consider follow-up sonohysterography and/or hysteroscopy and correlation with gynecologic consult. **This report has been created using voice recognition software. It may contain minor errors which are inherent in voice recognition technology. **      Final report electronically signed by Dr. Rome Handley on 4/27/2020 12:16 AM      US NON OB TRANSVAGINAL   Final Result   . 1. Thickened appearing endometrium. Consider follow-up sonohysterography and/or hysteroscopy and correlation with gynecologic consult. **This report has been created using voice recognition software. It may contain minor errors which are inherent in voice recognition technology. **      Final report electronically signed by Dr. Rome Handley on 4/27/2020 12:16 AM          Diet: DIET CARB CONTROL;     Soler: no    Microbiology:  none    Tele review last 24 hrs:  off    DVT prophylaxis: [] Lovenox                                 [x] SCDs                                 [] SQ Heparin                                 [] Encourage ambulation           [] Already on Anticoagulation     Disposition:    [] Home       [] TCU       [] Rehab       [] Psych       [x] SNF       [] [place]       [] Other-    Code Status: DNR-CCA    PT/OT Eval Status: consulted      Electronically signed by Lauren Vasques MD on 4/27/2020 at 7:30 AM

## 2020-04-27 NOTE — DISCHARGE INSTR - COC
Continuity of Care Form    Patient Name: Maegan Costello   :  1934  MRN:  [id number]    Admit date:  2020  Discharge date:  20    Code Status Order: DNR-CCA   Advance Directives:   [facility] Documentation     Date/Time Healthcare Directive Type of Healthcare Directive Copy in [address] Agent's Name Healthcare Agent's Phone Number    [phone]  Yes, patient has an advance directive for healthcare treatment  Durable power of  for health care;Living will  Yes, copy in chart -- --  --          Admitting Physician:  Erica Lorenz DO  PCP: Maricruz Sousa MD    Discharging [facility] 6K-18/018-A  Discharging Unit Phone Number: [phone]    Emergency Contact:   Extended Emergency Contact Information  Primary Emergency Contact: Vicenta Lopez           [address] Phone: [phone]  Mobile Phone: [phone]  Relation: Niece/Nephew  Secondary Emergency Contact: [address] [place] [address] Phone: [phone]  Mobile Phone: [phone]  Relation: Brother/Sister    Past Surgical History:  Past Surgical History:   Procedure Laterality Date    BREAST SURGERY      s/p right mastectomy  and left mastectomy     CARDIAC CATHETERIZATION  2010    Hemodynamics w pulmonary hypertension, systemic hypertension and no sats gradient difference. No aortic stenosis. No mitral stenosis. Coronaries; mild disease of the LAD not more than 40%. LV; severe LV dysfunction and EF 30-35%.  CARDIAC CATHETERIZATION  2007    Mild disease of small distal LAD (approximately  50% stenosis) angiographically normal CA. Mild to moderate LV systolic dysfunction. EF 35%. Mildly elevated LV end diastolic pressure. No significant MR. Systemic hypertension.     CARDIAC DEFIBRILLATOR PLACEMENT  2011    CARDIOVASCULAR STRESS TEST  03/23/10    EF 30%  Severe LV [place] President COLONOSCOPY      Dr. Ronda Osei    DOPPLER ECHOCARDIOGRAPHY  03/22/10    EF 45%. LV mildly dilated. Systolic function mildly reducted. No regional wall motion abnormalities. Wall thickness mildly increased. LA mildly dilated. MV mild annular calification. Mild TR. Ventricular septum tickness mildly increased.  ENDOSCOPY, COLON, DIAGNOSTIC      EYE SURGERY      bilateral cataracts    EYE SURGERY Left 9/2014    laser surgery for retinopathy?     FOOT SURGERY Left 1/12/2016    OTHER SURGICAL HISTORY  10/18/2018    CardioMEMS    PACEMAKER PLACEMENT      DE EGD BALLOON DILATION ESOPHAGUS <30 MM DIAM N/A 9/18/2017    EGD ESOPHAGOGASTRODUODENOSCOPY DILATATION performed by Duran Cummings MD at [address] [place] [address] ESOPHAGOGASTRODUODENOSCOPY TRANSORAL DIAGNOSTIC  9/18/2017    EGD ESOPHAGOGASTRODUODENOSCOPY performed by Duran Cummings MD at [facility]       Immunization History:   Immunization History   Administered Date(s) Administered    Influenza Vaccine, unspecified formulation 10/31/2016    Influenza Virus Vaccine 10/03/2013, 10/09/2014, 12/24/2015    Influenza Whole 10/03/2013    Pneumococcal Conjugate 13-valent (Takkgxz08) 12/14/2016    Pneumococcal Polysaccharide (Leeegmaxj22) 06/20/2017    Tdap (Boostrix, Adacel) 09/03/2018       Active Problems:  Patient Active Problem List   Diagnosis Code    Cardiomyopathy, nonischemic [facility] now EF 60% I42.8    Hypertension I10    LBBB (left bundle branch block) I44.7    Hyperlipidemia E78.5    [facility] BiV ICD Z95.810    CKD (chronic kidney disease) stage 3, GFR 30-59 ml/min ([facility]) N18.3    Ex-smoker J31.332    Mild carotid artery disease ([facility]) I73.9    CAD (coronary artery disease) I25.10    Chronic congestive heart failure ([facility]) I50.9    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 ([facility]) E11.41    DM2 (diabetes mellitus, type 2) ([facility]) E11.9    GERD (gastroesophageal reflux disease) K21.9    Heart failure with preserved ejection fraction (HCC) I50.30    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 02XA    Anemia, chronic disease D63.8    CKD (chronic kidney disease), stage IV ([facility]) N18.4    Anemia, chronic renal failure N18.9, D63.1    Fluid overload E87.70    Acute combined systolic and diastolic CHF, NYHA class 1 ([facility]) I50.41    Anasarca R60.1    Near syncope R51    ESRD on peritoneal dialysis ([facility] N18.6, Z99.2    Abnormal vaginal bleeding N93.9       Isolation/Infection:   Isolation          Contact        Patient Infection Status     Infection Onset Added Last Indicated Last Indicated By Review Planned Expiration Resolved Resolved By    MRSA  09/06/18 12/14/19 MRSA Screening Culture Only        Scalp 9/2018  Nares 3/2019, 12/2019          Nurse Assessment:  Last Vital Signs: BP (!) 118/58   Pulse 73   Temp 98.1 °F (36.7 °C) (Oral)   Resp 20   Ht 5' 6\" (1.676 m)   Wt 248 lb 3.2 oz (112.6 kg)   SpO2 95%   BMI 40.06 kg/m²     Last documented pain score (0-10 scale): Pain Level: 0  Last Weight:   Wt Readings from Last 1 Encounters:   04/27/20 248 lb 3.2 oz (112.6 kg)     Mental Status:  oriented and alert    IV Access:  - None    Nursing Mobility/ADLs:  Walking   Assisted  Transfer  Assisted  Bathing  Assisted  Dressing  Assisted  Toileting  Assisted  Feeding  Independent  Med Admin  Assisted  Med Delivery   whole    Wound Care Documentation and Therapy:  Wound 09/05/18 Coccyx DTI (Active)   Wound Type Wound 4/27/2020  4:36 AM   Wound Deep tissue/Injury 4/26/2020  8:30 PM   Dressing/Treatment Protective barrier 4/27/2020  4:36 AM   Number of days: 600       Wound 12/09/19 Ischium Right;Left possible DTI, purple (Active)   Wound Deep tissue/Injury 4/26/2020  3:45 PM   Dressing/Treatment Protective barrier 4/26/2020  3:45 PM   Wound Assessment Red;Purple 4/26/2020  3:45 PM   Lola-wound Assessment Red; Intact 4/26/2020  3:45 PM   Number of days: 140        Elimination:  Continence:   · Bowel: No  · Bladder: No  Urinary Catheter: None   Colostomy/Ileostomy/Ileal Conduit: No       Date of Last BM: 4/29/20    Intake/Output Summary (Last 24 hours) at 4/27/2020 1004  Last data filed at 4/27/2020 0430  Gross per 24 hour   Intake 4460 ml   Output 3100 ml   Net 1360 ml     I/O last 3 completed shifts: In: 9665 [P.O.:850; Other:4000]  Out: 3100 [Other:3100]    Safety Concerns: At Risk for Falls    Impairments/Disabilities:      Vision    Nutrition Therapy:  Current Nutrition Therapy:   - Oral Diet:  General    Routes of Feeding: Oral  Liquids: No Restrictions  Daily Fluid Restriction: no  Last Modified Barium Swallow with Video (Video Swallowing Test): not done    Treatments at the Time of Hospital Discharge:   Respiratory Treatments: see MAR  Oxygen Therapy:  is not on home oxygen therapy.   Ventilator:    - No ventilator support    Rehab Therapies: Physical Therapy and Occupational Therapy  Weight Bearing Status/Restrictions: No weight bearing restirctions  Other Medical Equipment (for information only, NOT a DME order):  walker  Other Treatments: CAPD  Patient's personal belongings (please select all that are sent with patient):  None    RN SIGNATURE:  Electronically signed by Simón Del Castillo RN on 4/29/20 at 2:01 PM EDT    CASE MANAGEMENT/SOCIAL WORK SECTION    Inpatient Status Date: 4/25/20    Readmission Risk Assessment Score:  Readmission Risk              Risk of Unplanned Readmission:        44           Discharging to Facility/ Agency   · Name:  [facility] [address]  [facility] [place] [address]         Phone: [phone]       Fax: [phone]          Dialysis Facility (if applicable)   · Name:  · Address:  · Dialysis Schedule:  · Phone:  · Fax:    / signature: Electronically signed by MELLO Hendrix, NETTIEW on 4/27/20 at 10:04 AM EDT    PHYSICIAN SECTION    Prognosis: Good    Condition at Discharge: Stable    Rehab Potential (if transferring to Rehab): Good    Recommended Labs or Other Treatments After Discharge:     Physician Certification: I certify the above information and transfer of Julio Zee  is necessary for the continuing treatment of the diagnosis listed and that she requires [facility] for greater 30 days.      Update Admission H&P: No change in H&P    PHYSICIAN SIGNATURE:  Electronically signed by Seymour Frankel MD on 4/29/2020 at 9:12 AM

## 2020-04-27 NOTE — CARE COORDINATION
4/27/20, 9:57 AM EDT  Discharge Planning Evaluation  Social work consult received, patient from [facility]. Patient/Family preference is to return to [facility]. The patient's current payor source at the facility is Medicaid. Medicare skilled days available:  Yes with [employer] Medicare, spoke with Toma Bella she will initiate pre-cert as soon as PT/OT evaluations are completed. Insurance precert:  yes  Spoke with Rubina at the facility. Patient bed hold: yes medicaid  Anticipated transport plan:  ambulette upon return.

## 2020-04-27 NOTE — PROGRESS NOTES
[facility]  INPATIENT PHYSICAL THERAPY  EVALUATION  [facility]Z RENAL TELEMETRY 6K - 6K-18/018-A    Time In: 5901  Time Out: 1019  Timed Code Treatment Minutes: 8 Minutes  Minutes: 23          Date: 2020  Patient Name: Dayan Garcia,  Gender:  female        MRN: [id number]  : 1934  (80 y.o.)      Referring Practitioner: Ana Rosa Kim MD  Diagnosis: Vaginal bleeding  Additional Pertinent Hx: The patient is a 80 y.o. female with multiple medical problems who was admitted form the [facility] for worsening vaginal bleeding. Pt states her care givers have told her that there was been \"spots\" of blood on her pads for several months but it worsened yesterday. She states she had an episode where she felt like she urinated herself but it was found to be a gush of blood. She denies pain. She is nonambulatory and has been for some time. She is incontinent of Stool. Restrictions/Precautions:  Restrictions/Precautions: Fall Risk    Subjective:  Chart Reviewed: Yes  Patient assessed for rehabilitation services?: Yes  Family / Caregiver Present: No  Subjective: RN approved session, pt is supine in bed and agreeable. General:  Overall Orientation Status: Within Functional Limits  Follows Commands: Within Functional Limits    Vision: Within Functional Limits    Hearing: Exceptions to [facility]  Hearing Exceptions: Hard of hearing/hearing concerns         Pain:  Denies.           Social/Functional History:    Type of Home: Facility([facility])  Home Layout: One level  Home Equipment: [address], Rolling walker      Ambulation Assistance: Needs assistance  Transfer Assistance: Needs assistance        Additional Comments: Pt transfers stand pivot with +2 assist, was ambulating ~4 weeks ago, was doing PT/OT    OBJECTIVE:  Range of Motion:  Bilateral Lower Extremity: WFL    Strength:  Bilateral Lower Extremity: Impaired - L hip 2-/5, L quad/DF 3+/5, R LE grossly 4-/5    Balance:  Static Sitting Balance:  Stand By Assistance  Static Standing Balance: Minimal Assistance, X 2, leans R  Dynamic Standing Balance: Minimal Assistance, X 2    Bed Mobility:  Supine to Sit: Moderate Assistance, HOB elevated, use of bed rail, assist for trunk elevation  Scooting: Moderate Assistance    Transfers:  Sit to Stand: Minimal Assistance, X 2, from EOB, pt fearful of falling  Stand to Sit:Minimal Assistance, X 2    Ambulation:  Minimal Assistance, X 2  Distance: 2 feet to chair  Surface: Level Tile  Device:Rolling Walker  Gait Deviations: Forward Flexed Posture, Decreased Step Length Bilaterally, Decreased Weight Shift Bilaterally, Lean to Right, Decreased Gait Speed and Unsteady Gait  **Leans R, unsteady, verbal cues for sequencing, manual assist to guide walker    Exercise:  Patient was guided in 1 set(s) 10 reps of exercise to both lower extremities. Ankle pumps, Quad sets, AROM R, AAROM L Heelslides and AROM R, AAROM L Hip abduction/adduction. Exercises were completed for increased independence with functional mobility. Functional Outcome Measures: Completed  AM-PAC Inpatient Mobility without Stair Climbing Raw Score : 9  AM-PAC Inpatient without Stair Climbing T-Scale Score : 32.44    ASSESSMENT:  Activity Tolerance:  Patient tolerance of  treatment: good. Treatment Initiated: Treatment and education initiated within context of evaluation. Evaluation time included review of current medical information, gathering information related to past medical, social and functional history, completion of standardized testing, formal and informal observation of tasks, assessment of data and development of plan of care and goals. Treatment time included skilled education and facilitation of tasks to increase safety and independence with functional mobility for improved independence and quality of life. See above exercises. Assessment:   Body structures, Functions, Activity limitations: Decreased functional mobility , Decreased balance,

## 2020-04-27 NOTE — CARE COORDINATION
4/27/20, 10:18 AM EDT  DISCHARGE PLANNING EVALUATION:    Dayan Garcia       Admitted from: ED  4/25/2020/ 1656 Hospital day: 2   Location: -18/018-A Reason for admit: Vagina bleeding [N93.9] Status: IP  Admit order signed?: yes  PMH:  has a past medical history of Anxiety, Atherosclerotic heart disease of native coronary artery without angina pectoris, CAD (coronary artery disease), Cancer ([address] Cardiomyopathy, nonischemic ([address] Cerebral artery occlusion with cerebral infarction ([address] CHF (congestive heart failure) ([address] CKD (chronic kidney disease) stage 3, GFR 30-59 ml/min ([address] Congenital heart disease, COPD exacerbation ([address] 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 ([address] DM2 (diabetes mellitus, type 2) ([address] Dyspnea, ESRD (end stage renal disease) ([address] Ex-smoker, GERD (gastroesophageal reflux disease), GERD (gastroesophageal reflux disease), Heart failure with preserved ejection fraction ([address] Hemodialysis patient ([address] His of Heparin induced thrombocytopenia, History of breast cancer, History of CVA (cerebrovascular accident), History of pulmonary embolism, Hyperkalemia, Hyperlipidemia, Hypertension, Iron deficiency anemia, LBBB (left bundle branch block), Localized edema, Malignant neoplasm of breast (female) ([address] Osteoarthritis, Paget's disease of bone, Personal history of transient ischemic attack (TIA), and cerebral infarction without residual deficits, Pulmonary embolism ([address] [facility] Bi[facility] ICD, Thyroid disease, Venous insufficiency (chronic) (peripheral), Vitamin D deficiency, Vitamin D deficiency, and Weakness.   Procedure: na  Pertinent abnormal Imaging:  na      Medications:  Scheduled Meds:   metoprolol succinate  50 mg Oral Daily    dianeal lo-nikko 2.5%  2,000 mL Intraperitoneal 4x Daily    insulin lispro  0-6 Units Subcutaneous TID WC    insulin lispro  0-3 Units Subcutaneous Nightly    sodium chloride flush  10 mL Intravenous 2 times per day    ascorbic acid  500 mg Oral BID    [Held by provider] clopidogrel  75 mg Oral QPM    cyanocobalamin  1,000 mcg Oral Daily    docusate sodium  200 mg Oral Daily    epoetin schuyler-epbx  10,000 Units Subcutaneous Weekly    insulin glargine  10 Units Subcutaneous Nightly    miconazole   Topical BID    therapeutic multivitamin-minerals  1 tablet Oral Daily    pantoprazole  40 mg Oral BID    polyethylene glycol  17 g Oral BID    lactobacillus  1 capsule Oral Daily    Vitamin D  1,000 Units Oral Daily    megestrol  40 mg Oral BID    sevelamer  800 mg Oral TID WC     Continuous Infusions:   dextrose        Pertinent Info/Orders/Treatment Plan: To ED with vaginal bleeding x1day, from ECF- to 5K 16, transferred to 6K18    Telemetry, vaginal bleeding, on Plavix and Coumadin, Hospitalist attending,Gyn consulted, on Megace, hgb 9.6 (was 10.2), diabetic management, PT/OT evals, Wound care consulted for DTPI to coccyx, nephrology consulted, BUN 50, creatinine 8.7, receiving CAPD dianeal 2.5 4x/day,s trict I/O, daily weights, INR 1.24. Diet: DIET CARB CONTROL;   Smoking status:  reports that she quit smoking about 68 years ago. Her smoking use included cigarettes. She has a 1.00 pack-year smoking history. She has never used smokeless tobacco.   PCP: Matti Noel MD  Readmission 30 days or less: YES  Readmission Risk Score: 44%    Discharge Planning Evaluation  Current Residence:  Nursing Home  Living Arrangements:  Other (Comment)   Support Systems:  Family Members  Current Services PTA:     Potential Assistance Needed:  N/A  Potential Assistance Purchasing Medications:  No  Does patient want to participate in local refill/ meds to beds program?  Yes  Type of Home Care Services:  None  Patient expects to be discharged to:  return [place]  Expected Discharge date:  04/27/20  Follow Up Appointment: Best Day/ Time: (anytime)    Patient Goals/Plan/Treatment Preferences: Roverto Adam is from [facility]; plan is return. She is CAPD patient at this time and will switch back to HD as OP per nephrology. SW follows. Transportation/Food Security/Housekeeping Addressed:  No issues identified.     Evaluation: yes

## 2020-04-27 NOTE — PLAN OF CARE
Problem: Falls - Risk of:  Goal: Will remain free from falls  Description: Will remain free from falls  Outcome: Met This Shift  Goal: Absence of physical injury  Description: Absence of physical injury  Outcome: Met This Shift     Problem: Pain Control  Goal: Maintain pain level at or below patient's acceptable level (or 5 if patient is unable to determine acceptable level)  Outcome: Met This Shift     Problem: Cardiovascular  Goal: No DVT, peripheral vascular complications  Outcome: Met This Shift     Problem: GI  Goal: No bowel complications  Outcome: Met This Shift  Note: Brown smears noted per rectum     Problem: Neurological  Goal: Maximum potential motor/sensory/cognitive function  Outcome: Ongoing  Note: Pt weak in all extremities       Problem: Respiratory  Goal: No pulmonary complications  Outcome: Ongoing  Note: Clear diminished lung sounds. Pt on room air and tolerating      Problem:   Goal: Adequate urinary output  Outcome: Ongoing  Note: Noted output noted     Problem: Nutrition  Goal: Optimal nutrition therapy  Outcome: Ongoing  Note: Pt tolerates diet well     Problem: Skin Integrity/Risk  Goal: No skin breakdown during hospitalization  Outcome: Ongoing  Note: Dti noted on buttocks     Problem: Musculor/Skeletal Functional Status  Goal: Highest potential functional level  Outcome: Ongoing  Note: Pt assisted with adls     Problem: Discharge Planning:  Goal: Discharged to appropriate level of care  Description: Discharged to appropriate level of care  Outcome: Ongoing  Note: To be discharged to nursing home      Problem: Bleeding:  Goal: Will show no signs and symptoms of excessive bleeding  Description: Will show no signs and symptoms of excessive bleeding  Outcome: Ongoing  Note: Vaginal bleeding mininimal. Ultrasound pictures taken, awaiting results    Care plan reviewed with patient. Patient  verbalize understanding of the plan of care and contribute to goal setting.

## 2020-04-27 NOTE — PROGRESS NOTES
GYN PROGRESS NOTE    Chart reviewed and pt. visited. She is declining any further GYN eval., specifically pelvic and/or   Endometrial  Bx. US:   Thickened endometrial lining. Could be cancer or could be hyperplasia. In either case, Megace po is the treatment given her overall morbidity. Discussed at length with pt and her niece and they concur. Plan:  Continue the Megace at current dose indefinitely. Increase gradually up to 400 mg bid prn for excessive Vag Bleeding. Breast cancer hx was so remote that this is not contraindicated. Signing off. Available prn. Thank you.

## 2020-04-27 NOTE — CONSULTS
[facility]    [facility]  [address]  [place], [address]  [address] [employer] Nephrology Consult      4/27/2020 12:08 PM         Pt Name:    Jaret Valle  MRN:     [id number]   [id number]  YOB: 1934  Admit Date:    4/25/2020  4:56 PM  Primary Care Physician:  Juani Sandoval MD   Room Number:  6K-18/018-A   Reason for Consult:  Management of peritoneal dialysis   Requesting Providor: Dr. Wesley Galo  Primary Nephrologist: Dr. Mike Choi    ### ISOLATION ###:   yes     Admit Chief Complaint: vaginal bleeding     History of Chief Complaint:   The patient is a 80 y.o. year old white female who has been followed for many months by Dr. Mike Choi for CKD stage G-IV-A2 from DM, HTM and aging. She had an access created for hemodialysis by Dr. Keily Koch that has failed. She was started on dialysis via a tunneled hemodialysis catheter and subsequently switched to peritoneal dialysis which has been proceeding well. She had the tunneled catheter removed 04/07/2020. She has been doing PD since at the nursing home. She does not like it complaining that it makes her feel bad and she desires to switch back to hemodialysis. She was using the cycler. Nephrology is following the patient for: ESRD requiring peritoneal dialysis. 24 hour summary:   The patient was relaxing in the bedside chair eating lunch and undergoing a PD exchange. Peritoneal dialysis is progressing without complication. Inflow and outflow of dialysate remains painless and the dialysate remains clear. Surgery is not going to intervene about the vaginal bleeding. She feels better doing CAPD than cycler. She feels normal.  She denied N/V/C/D/SOB/CP.       Baseline eGFR Peritoneal dialysis patient    Admit eGFR Assume eGFR=15 ml/min   Current eGFR        eGFR trend    Lab Results   Component Value Date    LABGLOM 4 04/26/2020    LABGLOM 5 04/25/2020    LABGLOM 4 04/24/2020    LABGLOM 4 04/08/2020    LABGLOM 5 04/07/2020 LABGLOM 19 02/09/2020    LABGLOM 13 01/04/2020    LABGLOM 24 12/19/2019    LABGLOM 14 12/18/2019    LABGLOM 16 12/17/2019    LABGLOM 17 12/16/2019    LABGLOM 21 12/15/2019    LABGLOM 24 12/14/2019          Compliance with treatment plan: 100%     Allergies and Intolerances:   ALLERGIES: Heparin  MEDICATION INTOLERANCES: none  FOOD ALLERGIES: none  INSECT ALLERGIES: none  PLANT ALLERGIES: none     Past Medical History:  Past Medical History:   Diagnosis Date    Anxiety     Atherosclerotic heart disease of native coronary artery without angina pectoris     CAD (coronary artery disease)     nonobstructive    Cancer ([facility] 2007    breast cancer    Cardiomyopathy, nonischemic ([facility]     Dr. Mikel Amos Cerebral artery occlusion with cerebral infarction ([facility]     CHF (congestive heart failure) ([facility]     CKD (chronic kidney disease) stage 3, GFR 30-59 ml/min ([facility])     Dr. Sandy Tobin Congenital heart disease     COPD exacerbation ([facility]     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 ([facility]     DM2 (diabetes mellitus, type 2) ([facility] 1998    with CKD3, R foot ulcer and hx of prior ulcerations and PVD    Dyspnea     ESRD (end stage renal disease) ([facility]     Ex-smoker 10/2/2012    GERD (gastroesophageal reflux disease)     GERD (gastroesophageal reflux disease)     Heart failure with preserved ejection fraction ([facility]     Dr. Mikel Amos Hemodialysis patient [facility])     His of Heparin induced thrombocytopenia     History of breast cancer     right 1982 s/p mastectomy and chemo, left 2007 s/p mastectomy    History of CVA (cerebrovascular accident)     History of pulmonary embolism 05/2014    on [address] (coumadin)    Hyperkalemia     Hyperlipidemia     Hypertension     pulmonary    Iron deficiency anemia     Dr. 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 No aortic stenosis. No mitral stenosis. Coronaries; mild disease of the LAD not more than 40%. LV; severe LV dysfunction and EF 30-35%.  CARDIAC CATHETERIZATION  05-    Mild disease of small distal LAD (approximately  50% stenosis) angiographically normal CA. Mild to moderate LV systolic dysfunction. EF 35%. Mildly elevated LV end diastolic pressure. No significant MR. Systemic hypertension.  CARDIAC DEFIBRILLATOR PLACEMENT  02/02/2011    CARDIOVASCULAR STRESS TEST  03/23/10    EF 30%  Severe LV Dys    COLONOSCOPY      Dr. Khalif Nieves  03/22/10    EF 45%. LV mildly dilated. Systolic function mildly reducted. No regional wall motion abnormalities. Wall thickness mildly increased. LA mildly dilated. MV mild annular calification. Mild TR. Ventricular septum tickness mildly increased.  ENDOSCOPY, COLON, DIAGNOSTIC      EYE SURGERY      bilateral cataracts    EYE SURGERY Left 9/2014    laser surgery for retinopathy?     FOOT SURGERY Left 1/12/2016    OTHER SURGICAL HISTORY  10/18/2018    CardioMEMS    PACEMAKER PLACEMENT      MN EGD BALLOON DILATION ESOPHAGUS <30 MM DIAM N/A 9/18/2017    EGD ESOPHAGOGASTRODUODENOSCOPY DILATATION performed by Bianca Kelley MD at [facility]    MN ESOPHAGOGASTRODUODENOSCOPY TRANSORAL DIAGNOSTIC  2017    EGD ESOPHAGOGASTRODUODENOSCOPY performed by Carrie Berg MD at [facility]        Family History:  Family History   Problem Relation Age of Onset   Concepcion Diabetes Mother    Concepcion Pacemaker Mother     Stroke Mother     Heart Disease Mother     Diabetes Father     Cancer Sister     Cancer Brother     Cancer Sister     Heart Disease Brother         cardiomegaly        Social History:  Social History     Socioeconomic History    Marital status:       Spouse name: Not on file    Number of children: 0    Years of education: Not on file    Highest education level: Not on file   Occupational History    Occupation: Retired    Social Needs    Financial resource strain: Not on file    Food insecurity     Worry: Not on file     Inability: Not on file   [employer] needs     Medical: Not on file     Non-medical: Not on file   Tobacco Use    Smoking status: Former Smoker     Packs/day: 0.50     Years: 2.00     Pack years: 1.00     Types: Cigarettes     Last attempt to quit: 1952     Years since quittin.3    Smokeless tobacco: Never Used   Substance and Sexual Activity    Alcohol use: No     Alcohol/week: 0.0 standard drinks    Drug use: No    Sexual activity: Never   Lifestyle    Physical activity     Days per week: Not on file     Minutes per session: Not on file    Stress: Not on file   Relationships    Social connections     Talks on phone: Not on file     Gets together: Not on file     Attends [employer] service: Not on file     Active member of club or organization: Not on file     Attends meetings of clubs or organizations: Not on file     Relationship status: Not on file    Intimate partner violence     Fear of current or ex partner: Not on file     Emotionally abused: Not on file     Physically abused: Not on file     Forced sexual activity: Not on file   Other Topics Concern    Not on file   Social History Narrative    Not on file        Home Medications:  Prior to Admission medications    Medication Sig Start Date End Date Taking? Authorizing Provider   hydrALAZINE (APRESOLINE) 50 MG tablet Take 50 mg by mouth 3 times daily   Yes Historical Provider, MD   lactulose 20 GM/30ML SOLN Take by mouth   Yes Historical Provider, MD   Amino Acids (LIQUACEL) LIQD Take 30 mLs by mouth Once time a day every Monday, Wednesday, and Friday related to ESRD   Yes Historical Provider, MD   sevelamer (RENVELA) 800 MG tablet Take 1 tablet by mouth 3 times daily (with meals)   Yes Historical Provider, MD   insulin glargine (LANTUS) 100 UNIT/ML injection vial Inject 10 Units into the skin nightly 4/9/20   Morris Jesus MD   polyethylene glycol (GLYCOLAX) powder Take 17 g by mouth 2 times daily    Historical Provider, MD   sevelamer (RENVELA) 800 MG tablet Take 1 tablet by mouth 3 times daily (with meals)    Historical Provider, MD   vitamin D 25 MCG (1000 UT) CAPS Take 1,000 Units by mouth daily    Historical Provider, MD   miconazole (MICOTIN) 2 % powder Apply topically 2 times daily. 12/19/19   Buzz Spittle, DO   sodium chloride (OCEAN, BABY AYR) 0.65 % nasal spray 1 spray by Nasal route as needed for Congestion 12/19/19   Buzz Spittle, DO   insulin aspart (NOVOLOG) 100 UNIT/ML injection vial Inject into the skin 2 times daily Inject per sliding scale 0-150 = 0 units; 151-200 = 1 unit; 201-205 = 2 units; 251-300 = 3 units; 301-350 = 4 units; 351-400 = 5 units; 401-450 = 6 units     Historical Provider, MD   cyanocobalamin 1000 MCG tablet Take 1,000 mcg by mouth daily    Historical Provider, MD   warfarin (COUMADIN) 2 MG tablet As dosed by ECF.     Historical Provider, MD   epoetin schuyler-epbx (RETACRIT) 24910 UNIT/ML SOLN injection Inject into the skin once a week     Historical Provider, MD   pantoprazole (PROTONIX) 40 MG tablet Take 40 mg by mouth 2 times daily    Historical Provider, MD   metoprolol succinate (TOPROL XL) 100 MG extended release tablet Take 1 tablet by mouth daily Hold if SBP less than 100 and HR less than 50 bpm  Patient taking differently: Take 50 mg by mouth daily Hold if SBP less than 100 and HR less than 50 bpm 3/30/19   Keke Irizarry MD   ipratropium-albuterol (DUONEB) 0.5-2.5 (3) MG/3ML SOLN nebulizer solution Inhale 1 vial into the lungs every 6 hours as needed for Shortness of Breath    Historical Provider, MD   ARTIFICIAL TEAR OP Instill one drop into each eye as needed    Historical Provider, MD   Probiotic Product (PROBIOTIC-10) CAPS Take 1 capsule by mouth daily     Historical Provider, MD   nitroGLYCERIN (NITROSTAT) 0.4 MG SL tablet up to max of 3 total doses.  If no relief after 1 dose, call 911. 10/18/18   Manuela Longoria MD   ascorbic acid (VITAMIN C) 500 MG tablet Take 500 mg by mouth 2 times daily     Historical Provider, MD   docusate sodium (COLACE) 100 MG capsule Take 200 mg by mouth daily    Historical Provider, MD   clopidogrel (PLAVIX) 75 MG tablet Take 75 mg by mouth every evening     Historical Provider, MD   ondansetron (ZOFRAN) 4 MG tablet Take 4 mg by mouth 2 times daily     Historical Provider, MD   guaiFENesin (ROBITUSSIN) 100 MG/5ML syrup Take 200 mg by mouth every 4 hours as needed for Cough    Historical Provider, MD   Multiple Vitamins-Minerals (OCUVITE EXTRA PO) Take 1 tablet by mouth daily     Historical Provider, MD   bisacodyl (DULCOLAX) 10 MG suppository Place 10 mg rectally daily as needed for Constipation    Historical Provider, MD   pravastatin (PRAVACHOL) 40 MG tablet Take 40 mg by mouth every evening Indications: Blood Cholesterol Abnormal  5/8/16   Historical Provider, MD   acetaminophen (TYLENOL) 325 MG tablet Take 650 mg by mouth 4 times daily     Historical Provider, MD        Lab data:  CBC:    Recent Labs     04/24/20  1741 04/25/20  1740 04/26/20  0405 04/27/20  0531   WBC 8.3 7.5 8.6  --    HGB 10.2* 10.3* 9.3* 9.6*    231 200  --      CMP:    Recent Labs     04/24/20  1741 04/25/20  1740 04/26/20  0405   * 136 133*   K 3.8 3.8 3.9   CL 92* 94* 92*   CO2 28 25 26   BUN 48* 48* 50*   CREATININE 8.5* 7.6* 8.7*   GLUCOSE 121* 130* 100   CALCIUM 8.6 8.7 8.6     Urine:No results for input(s): COLORU, PHUR, LABCAST, WBCUA, RBCUA, MUCUS, TRICHOMONAS, YEAST, BACTERIA, CLARITYU, SPECGRAV, LEUKOCYTESUR, UROBILINOGEN, BILIRUBINUR, BLOODU in the last 72 hours. Invalid input(s): NITRATE, GLUCOSEUKETONESUAMORPHOUS   Sed Rate: No results for input(s): SEDRATE in the last 72 hours. Radiology       Review of Systems:  Source of data: patient    CONSTITUTIONAL  Fever: none, Chills: none, Weight loss: none, Malaise: none, Fatigue: none, Diaphoresis: none, Weakness: none    SKIN  Rash: none, Itch: none, Open sores: none    HENT:  Headache: none, Hearing loss: none, Tinnitus: none, Ear pain: none, Ear discharge: none,   Nasal bleeding: none, Congestion: none, Stridor: none, Sore throat: none. EYES  Blurred vision: none, Double vision: none, Photophobia: none, Eye pain: none, Eye discharge: none, Eye redness: none. CARDIOVASCULAR  Chest pain: none, Arm pain: none, Palpitations: none, Orthopnea: none, Claudication: none,  Leg swelling: none, PND: none. RESPIRATORY  Cough: none, Hemoptysis: none, Sputum production: none, Shortness of breath: none, Wheezing: none    GASTROINTESTINAL  Heartburn: none, Nausea: none, Vomiting: none, Abdominal pain: none, Diarrhea: none,  Constipation: none, Blood in the stool: none, Melena: none, Rebound: none, Rovsing's: none. GENITOURINARY  Dysuria: none, Pyuria: none, Gross hematuria: none, Urgency: none, Flank pain: none, STD: none. MUSCULOSKELETAL  Myalgia: none, Neck pain: none, Thoracic pain: none, Lumbar pain: none, Joint pain: none, Falls: none    ENDOCRINE/HEMATOLOGY/ALLERGIC  Easy bruising: none, Easy bleeding: none, Environmental allergies: none, Polydipsia: none.     NEUROLOGICAL  Dizziness: none, Tingling: none, Numbness: none, not necessary as we see peritoneal dialysis patients  Monthly. 2. Ama  3. Kamaljit Serrato DO  [facility].

## 2020-04-27 NOTE — PROGRESS NOTES
[facility]  INPATIENT OCCUPATIONAL THERAPY  [facility] RENAL TELEMETRY 6K  EVALUATION    Time:   Time In: 1079  Time Out: 1413  Timed Code Treatment Minutes: 10 Minutes  Minutes: 18    Date: 2020  Patient Name: Olvin Fajardo,   Gender: female      MRN: [id number]  : 1934  (80 y.o.)  Referring Practitioner: Mohit Mendez MD  Diagnosis: Vaginal Bleeding   Additional Pertinent Hx: The patient is a 80 y.o. female with multiple medical problems who was admitted form the [facility] for worsening vaginal bleeding. Pt states her care givers have told her that there was been \"spots\" of blood on her pads for several months but it worsened yesterday. She states she had an episode where she felt like she urinated herself but it was found to be a gush of blood. She denies pain. She is nonambulatory and has been for some time. She is incontinent of Stool. Restrictions/Precautions:  Restrictions/Precautions: Fall Risk    Subjective  Chart Reviewed: Yes, Progress Notes, History and Physical, Imaging, Operative Notes  Patient assessed for rehabilitation services?: Yes    Subjective: RN okayed OT session. Upon arrival patient was sitting up in recliner. Pt was agreeable to OT session. Pain:  Pain Assessment  Patient Currently in Pain: Denies    Social/Functional History:  Type of Home: Facility([facility])  Home Layout: One level  Home Equipment: Wheelchair-manual, Rolling walker      ADL Assistance: Needs assistance  Homemaking Assistance: Needs assistance  Homemaking Responsibilities: No  Ambulation Assistance: Needs assistance  Transfer Assistance: Needs assistance     Additional Comments: Pt transfers stand pivot with +2 assist, was ambulating ~4 weeks ago, was doing PT/OT    Cognition/Orientation:  Overall Orientation Status: Within Functional Limits  Overall Cognitive Status: Exceptions  Cognition Comment: Slow processing, decreased safety awareness, decreased insight.      ADL's:  LE Dressing:

## 2020-04-28 NOTE — PROGRESS NOTES
Nutrition Assessment    Type and Reason for Visit: Initial, Positive Nutrition Screen(pressure ulcer/wound, missing teeth)    Nutrition Recommendations:   Continue weight checks. Pt with missing teeth, but denies need for modified textured diet. Will continue monitor po & possible need for ONS. Continue weight  checks. Monitor labs including current Na+ 129. Nutrition Assessment:   Pt. nutritionally compromised AEB is ESRD on CAPD,  know losses with dialysis, reports bruises & bleeds easily- on blood thinner & with missing teeth. At risk for further nutrition compromise r/t admitted with vagina bleedingand underlying medical condition (ESRD, CHF, HTNDM, breast CA & CVA). Nutrition recommendations/interventions as per above. Malnutrition Assessment:  · Malnutrition Status: At risk for malnutrition  · Context: Chronic illness  · Findings of the 6 clinical characteristics of malnutrition (Minimum of 2 out of 6 clinical characteristics is required to make the diagnosis of moderate or severe Protein Calorie Malnutrition based on AND/ASPEN Guidelines):  1. Energy Intake-Greater than 75% of estimated energy requirement, Greater than or equal to 7 days    2. Weight Loss-No significant weight loss,    3. Fat Loss-No significant subcutaneous fat loss,    4. Muscle Loss-No significant muscle mass loss,    5. Fluid Accumulation-Moderate to severe fluid accumulation,      Nutrition Risk Level:  Moderate    Nutrient Needs:  · Estimated Daily Total Kcal: ~1689 kcals ( 15 kcals /kg on admit weight of 112.6 kg)  · Estimated Daily Protein (g): ~83 grams (1.4 grams proein/kg on IBW of 59 kg)  CAPD pt  · Estimated Daily Total Fluid (ml/day):  Per Dr    Nutrition Diagnosis:   · Problem: Altered nutrition-related lab values  · Etiology: related to (ESRD on CAPD)     Signs and symptoms:  as evidenced by Known losses from dialysis    Objective Information:  · Nutrition-Focused Physical Findings: Obese pt seen sitting up in bed, is very pealsant & reports her appetite is good, does not have teeth, but chews meat fine. Per pt she has bms all the time d/t \" they give her Ex Lax\". Pt reports she does not have wounds, but bruises easily d/t coumadin. Pt is CAPD pt with current order 2.5% dianeal 2000 ml  qid. Na+ 129, BUN 53, Cr 8.2, INR 1.09, Hgb 9.5. OB/GYN has ordered Megace. Pt admitted with vaginal bleeding & hx of breast CA. other meds include MVI, Vitamin D, renvella, bm med, B12, Vitamin C & culturrelle . Pt from [facility]. · Wound Type: (Per pt bruises & bleeds easily. Area on Coccyx per RN notes)  · Current Nutrition Therapies:  · Oral Diet Orders: (DB CHO control)   · Oral Diet intake: 51-75%, %(Pt reports eating great including meats)  · Oral Nutrition Supplement (ONS) Orders: None  · Anthropometric Measures:  · Ht: 5' 6\" (167.6 cm)   · Current Body Wt: 248 lb 3.8 oz (112.6 kg)(4/27 + 2 edema)  · Admission Body Wt: 248 lb 3.8 oz (112.6 kg)(4/27 +2 edema)  · Usual Body Wt: (per pt ~234#. Per EMR 12/8/19 111.2 kg/  244#. Pt is CAPD pt)  · % Weight Change:  ,  Pt si CAPD pt . Edema this admit  · Ideal Body Wt: 130 lb (59 kg), %   · BMI Classification: BMI > or equal to 40.0 Obese Class III(40.1)    Nutrition Interventions:   (Diet per Dr & current order is CHO control.   Pt denies need for modified textures .  )  Continued Inpatient Monitoring, Education Initiated, Coordination of Care    Nutrition Evaluation:   · Evaluation: Goals set   · Goals: % of healthy po intake during LOS    · Monitoring: Nutrition Progression, Meal Intake, Diet Tolerance, Skin Integrity, Weight, Pertinent Labs, Chewing/Swallowing, Monitor Bowel Function      Electronically signed by Dia Kirby RD, LD on 4/28/20 at 4:02 PM EDT    Contact Number: [phone]

## 2020-04-28 NOTE — PLAN OF CARE
Problem: Falls - Risk of:  Goal: Will remain free from falls  Description: Will remain free from falls  Outcome: Ongoing  Note: Call light within reach. Side rails up x2. Bed alarm on. Non skid slippers available. Problem: Pain Control  Goal: Maintain pain level at or below patient's acceptable level (or 5 if patient is unable to determine acceptable level)  Outcome: Ongoing  Note: Patient free from pain this shift. Pain rated on 0-10 pain rating scale. Will continue to reassess. Problem: Neurological  Goal: Maximum potential motor/sensory/cognitive function  Outcome: Ongoing  Note: Patient is alert and oriented x4. Problem: Cardiovascular  Goal: No DVT, peripheral vascular complications  Outcome: Ongoing  Note: Vital signs stable, no complaints of chest pain or shortness of breath. Will continue to monitor. Problem: Respiratory  Goal: No pulmonary complications  Outcome: Ongoing  Note: Lungs are clear and diminished. No complaints of shortness of breath. On room air. Will continue to monitor. Problem: GI  Goal: No bowel complications  Outcome: Ongoing  Note: Patient has no bowel complications. Problem:   Goal: Adequate urinary output  Outcome: Ongoing  Note: Peritoneal dialysis output. Little urinary output. Problem: Nutrition  Goal: Optimal nutrition therapy  Outcome: Ongoing  Note: Patient on a carb control diet. Eating 75% of meals. Will continue to monitor intake and output. Problem: Skin Integrity/Risk  Goal: No skin breakdown during hospitalization  Outcome: Ongoing  Note: Patient has DTI on buttocks. It is blanchable. Barrier cream applied and patient is turned every two hours. Will continue to monitor. Problem: Musculor/Skeletal Functional Status  Goal: Highest potential functional level  Outcome: Ongoing  Note: Patient works with therapy. Will continue therapy at [facility]. Will continue to monitor.      Problem: Discharge Planning:  Goal: Discharged to appropriate level of care  Description: Discharged to appropriate level of care  Outcome: Ongoing  Note: Patient plans to be discharged to [facility] when medically stable. Problem: Bleeding:  Goal: Will show no signs and symptoms of excessive bleeding  Description: Will show no signs and symptoms of excessive bleeding  Outcome: Ongoing  Note: Patient has minimal bleeding. Hemoglobin and blood pressure stable. No dizziness noted. Will continue to monitor. Care plan reviewed with patient. Patient verbalize understanding of the plan of care and contribute to goal setting.

## 2020-04-28 NOTE — PROGRESS NOTES
[address] RENAL TELEMETRY 6K  Occupational Therapy  Daily Note  Time:   Time In: 7  Time Out: 9776  Timed Code Treatment Minutes: 13 Minutes  Minutes: 13          Date: 2020  Patient Name: Jerod Gandhi,   Gender: female      Room: [facility]18/018-A  MRN: [id number]  : 1934  (80 y.o.)  Referring Practitioner: Steve Laguerre MD  Diagnosis: Vaginal Bleeding   Additional Pertinent Hx: The patient is a 80 y.o. female with multiple medical problems who was admitted form the [facility] for worsening vaginal bleeding. Pt states her care givers have told her that there was been \"spots\" of blood on her pads for several months but it worsened yesterday. She states she had an episode where she felt like she urinated herself but it was found to be a gush of blood. She denies pain. She is nonambulatory and has been for some time. She is incontinent of Stool. Restrictions/Precautions:  Restrictions/Precautions: Fall Risk    SUBJECTIVE: Pt seated upright in bed upon arrival, agreeable to OT session declining OOB activities stating she finished PT session shortly before therapists arrival. RN okayed therapy session. PAIN: No c/o. COGNITION: Slow Processing and Decreased Recall    ADL:   No ADL's completed this session. Pt declined    TRANSFERS:  Pt declined. ADDITIONAL ACTIVITIES:  Completed BUE exercises x20 reps x1 sets using min resistance band in all joints/planes to increase strength and endurance required for ADLs. Pt required lengthy rest breaks between each exercise and mod v/c to continue exercise using proper technique. ASSESSMENT:     Activity Tolerance:  Patient tolerance of  treatment: fair. Pt limited by fatigue. Discharge Recommendations: Continue to assess pending progress, Subacute/Skilled Nursing Facility  Equipment Recommendations: Equipment Needed: No  Other: Defer to next level of care.    Plan: Times per week: 3-5x  Current Treatment Recommendations: Strengthening, Endurance Training, Patient/Caregiver Education & Training, Balance Training, Safety Education & Training, Self-Care / ADL    Patient Education  Patient Education: Home Exercise Program and Importance of Increasing Activity    Goals  Short term goals  Time Frame for Short term goals: Until discharge   Short term goal 1: Pt will complete BUE light resistive exercises with min vcs for technique to increase indep and safety with all self cares and transfers. Short term goal 2: Pt will complete standing tolerance x 2 minutes with Min A x 1 and 1 UE release to increase indep and safety with all grooming tasks. Short term goal 3: Pt will complete sit to stand to/from various with min A x 1 and min vcs for safety to increase indep and safety with all self cares. Short term goal 4: Pt will tolerate further assessment of pivots to/from various surfaces with OTR to increase indep and safety with all toileting. Long term goals  Time Frame for Long term goals : No LTGs d/t short estimated length of stay. Following session, patient left in safe position with all fall risk precautions in place.

## 2020-04-28 NOTE — CARE COORDINATION
4/28/20, 1:03 PM EDT  [facility] [address] [facility] [place] [facility]  Location: -18/018-A Reason for admit: Vagina bleeding [N93.9]   Treatment plan of care: Pt admitted with vaginal bleeding x 1 day, C/O dizziness per nursing staff, Hgb 9.5, creatinine 8.2 (CAPD patient). US pelvis shows thickened endometrium PT/OT following, wound care to coccyx wound, nephrology following. Pt is a DNR CCA. Barriers to Discharge: medical stability  PCP: Augustus Rios MD  Readmission Risk Score: 42%  Patient Goals/Plan/Treatment Preferences: Pt is from [facility].

## 2020-04-28 NOTE — PLAN OF CARE
Problem: Nutrition  Goal: Optimal nutrition therapy  4/28/2020 1601 by Oumou Menjivar RD, LD  Outcome: Ongoing   Nutrition Problem: Altered nutrition-related lab values  Intervention: Food and/or Nutrient Delivery: (Diet per Dr & current order is CHO control.   Pt denies need for modified textures .  )  Nutritional Goals: % of healthy po intake during LOS

## 2020-04-28 NOTE — PLAN OF CARE
Problem: Falls - Risk of:  Goal: Will remain free from falls  Description: Will remain free from falls  4/28/2020 1251 by Iveth Crisostomo RN  Outcome: Ongoing  Note: Call light within reach. Side rails up x2. Bed alarm on. Non skid slippers available. Problem: Pain Control  Goal: Maintain pain level at or below patient's acceptable level (or 5 if patient is unable to determine acceptable level)  4/28/2020 1251 by Iveth Crisostomo RN  Outcome: Ongoing  Note: Patient free from pain this shift. Pain rated on 0-10 pain rating scale. Will continue to reassess. Problem: Neurological  Goal: Maximum potential motor/sensory/cognitive function  4/28/2020 1251 by Iveth Crisostomo RN  Outcome: Ongoing  Note: Patient is alert and oriented x4. Problem: Cardiovascular  Goal: No DVT, peripheral vascular complications  6/19/9705 1251 by Iveth Crisostomo RN  Outcome: Ongoing  Note: Vital signs stable, no complaints of chest pain or shortness of breath. Will continue to monitor. Problem: Respiratory  Goal: No pulmonary complications  4/22/1582 1251 by Iveth Crisostomo RN  Outcome: Ongoing  Note: Lungs are clear and diminished, no complaints of shortness of breath. On room air. Problem: GI  Goal: No bowel complications  6/22/7586 1251 by Iveth Crisostomo RN  Outcome: Ongoing  Note: No bowel complications. Problem:   Goal: Adequate urinary output  4/28/2020 1251 by Iveth Crisostomo RN  Outcome: Ongoing  Note: Peritoneal dialysis patient. Little urinary output. Problem: Nutrition  Goal: Optimal nutrition therapy  4/28/2020 1251 by Iveth Crisostomo RN  Outcome: Ongoing  Note: Patient on a carb control diet. Eating 75% of meals. Will continue to monitor intake and output. Problem: Skin Integrity/Risk  Goal: No skin breakdown during hospitalization  4/28/2020 1251 by Iveth Crisostomo RN  Outcome: Ongoing  Note: Patient has blanchable DTI on buttocks.  Barrier cream applied and patient is turned every two hours. Will continue to monitor. Problem: Musculor/Skeletal Functional Status  Goal: Highest potential functional level  4/28/2020 1251 by Elise Parmar RN  Outcome: Ongoing  Note: Patient is up to chair with two assist and walker. Problem: Discharge Planning:  Goal: Discharged to appropriate level of care  Description: Discharged to appropriate level of care  4/28/2020 1251 by Elise Parmar RN  Outcome: Ongoing  Note: Patient plans to be discharged to [facility] when medically stable. Problem: Bleeding:  Goal: Will show no signs and symptoms of excessive bleeding  Description: Will show no signs and symptoms of excessive bleeding  4/28/2020 1251 by Elise Parmar RN  Outcome: Ongoing  Note: Minimal bleeding. Hemoglobin and blood pressure stable. No dizziness noted. Will continue to monitor. Care plan reviewed with patient. Patient verbalize understanding of the plan of care and contribute to goal setting.

## 2020-04-28 NOTE — CONSULTS
[facility]    [facility]  [address]  [facility], [address]  [address] Nephrology Consult      4/28/2020 1:27 PM         Pt Name:    Mac Tanner  MRN:     [id number]   [id number]  YOB: 1934  Admit Date:    4/25/2020  4:56 PM  Primary Care Physician:  Carloz Dillon MD   Room Number:  6K-18/018-A   Reason for Consult:  Management of peritoneal dialysis   Requesting Providor: Dr. Vera Boyer  Primary Nephrologist: Dr. Heather Hassan    ### ISOLATION ###:   yes     Admit Chief Complaint: vaginal bleeding     History of Chief Complaint:   The patient is a 80 y.o. year old white female who has been followed for many months by Dr. Heather Hassan for CKD stage G-IV-A2 from DM, HTM and aging. She had an access created for hemodialysis by Dr. Aston Chahal that has failed. She was started on dialysis via a tunneled hemodialysis catheter and subsequently switched to peritoneal dialysis which has been proceeding well. She had the tunneled catheter removed 04/07/2020. She has been doing PD since at the nursing home. She does not like it complaining that it makes her feel bad and she desires to switch back to hemodialysis. She was using the cycler. Nephrology is following the patient for: ESRD requiring peritoneal dialysis. 24 hour summary:   The patient was relaxing in the bedside chair eating lunch. Peritoneal dialysis is progressing without complication. Inflow and outflow of dialysate remains painless and the dialysate remains clear. Surgery is not going to intervene about the vaginal bleeding. She feels better doing CAPD than cycler. She feels normal.  She denied N/V/C/D/SOB/CP.       Baseline eGFR Peritoneal dialysis patient    Admit eGFR Assume eGFR=15 ml/min   Current eGFR        eGFR trend    Lab Results   Component Value Date    LABGLOM 5 04/28/2020    LABGLOM 4 04/26/2020    LABGLOM 5 04/25/2020    LABGLOM 4 04/24/2020    LABGLOM 4 04/08/2020    LABGLOM 5 04/07/2020    LABGLOM 19 02/09/2020    LABGLOM 13 01/04/2020    LABGLOM 24 12/19/2019    LABGLOM 14 12/18/2019    LABGLOM 16 12/17/2019    LABGLOM 17 12/16/2019    LABGLOM 21 12/15/2019          Compliance with treatment plan: 100%     Allergies and Intolerances:   ALLERGIES: Heparin  MEDICATION INTOLERANCES: none  FOOD ALLERGIES: none  INSECT ALLERGIES: none  PLANT ALLERGIES: none     Past Medical History:  Past Medical History:   Diagnosis Date    Anxiety     Atherosclerotic heart disease of native coronary artery without angina pectoris     CAD (coronary artery disease)     nonobstructive    Cancer ([facility] 2007    breast cancer    Cardiomyopathy, nonischemic ([facility]     Dr. Juan Alberto Wilson Cerebral artery occlusion with cerebral infarction ([facility]     CHF (congestive heart failure) ([facility]     CKD (chronic kidney disease) stage 3, GFR 30-59 ml/min ([facility])     Dr. Haydee Apodaca Congenital heart disease     COPD exacerbation ([facility]     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 [facility]     DM2 (diabetes mellitus, type 2) ([facility] 1998    with CKD3, R foot ulcer and hx of prior ulcerations and PVD    Dyspnea     ESRD (end stage renal disease) ([facility]     Ex-smoker 10/2/2012    GERD (gastroesophageal reflux disease)     GERD (gastroesophageal reflux disease)     Heart failure with preserved ejection fraction ([facility]     Dr. Juan Alberto Wilson Hemodialysis patient [facility])     His of Heparin induced thrombocytopenia     History of breast cancer     right 1982 s/p mastectomy and chemo, left 2007 s/p mastectomy    History of CVA (cerebrovascular accident)     History of pulmonary embolism 05/2014    on [address] (coumadin)    Hyperkalemia     Hyperlipidemia     Hypertension     pulmonary    Iron deficiency anemia     Dr. 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 No aortic stenosis. No mitral stenosis. Coronaries; mild disease of the LAD not more than 40%. LV; severe LV dysfunction and EF 30-35%.  CARDIAC CATHETERIZATION  05-    Mild disease of small distal LAD (approximately  50% stenosis) angiographically normal CA. Mild to moderate LV systolic dysfunction. EF 35%. Mildly elevated LV end diastolic pressure. No significant MR. Systemic hypertension.  CARDIAC DEFIBRILLATOR PLACEMENT  02/02/2011    CARDIOVASCULAR STRESS TEST  03/23/10    EF 30%  Severe LV Dys    COLONOSCOPY      Dr. Waylon Jorgensen  03/22/10    EF 45%. LV mildly dilated. Systolic function mildly reducted. No regional wall motion abnormalities. Wall thickness mildly increased. LA mildly dilated. MV mild annular calification. Mild TR. Ventricular septum tickness mildly increased.  ENDOSCOPY, COLON, DIAGNOSTIC      EYE SURGERY      bilateral cataracts    EYE SURGERY Left 9/2014    laser surgery for retinopathy?     FOOT SURGERY Left 1/12/2016    OTHER SURGICAL HISTORY  10/18/2018    CardioMEMS    PACEMAKER PLACEMENT      NC EGD BALLOON DILATION ESOPHAGUS <30 MM DIAM N/A 9/18/2017    EGD ESOPHAGOGASTRODUODENOSCOPY DILATATION performed by Sole Moody MD at [facility]    NC ESOPHAGOGASTRODUODENOSCOPY TRANSORAL DIAGNOSTIC  9/18/2017    EGD Admission medications    Medication Sig Start Date End Date Taking? Authorizing Provider   hydrALAZINE (APRESOLINE) 50 MG tablet Take 50 mg by mouth 3 times daily   Yes Historical Provider, MD   lactulose 20 GM/30ML SOLN Take by mouth   Yes Historical Provider, MD   Amino Acids (LIQUACEL) LIQD Take 30 mLs by mouth Once time a day every Monday, Wednesday, and Friday related to ESRD   Yes Historical Provider, MD   sevelamer (RENVELA) 800 MG tablet Take 1 tablet by mouth 3 times daily (with meals)   Yes Historical Provider, MD   insulin glargine (LANTUS) 100 UNIT/ML injection vial Inject 10 Units into the skin nightly 4/9/20   Fabienne Niño MD   polyethylene glycol (GLYCOLAX) powder Take 17 g by mouth 2 times daily    Historical Provider, MD   sevelamer (RENVELA) 800 MG tablet Take 1 tablet by mouth 3 times daily (with meals)    Historical Provider, MD   vitamin D 25 MCG (1000 UT) CAPS Take 1,000 Units by mouth daily    Historical Provider, MD   miconazole (MICOTIN) 2 % powder Apply topically 2 times daily. 12/19/19   Martin Solis DO   sodium chloride (OCEAN, BABY AYR) 0.65 % nasal spray 1 spray by Nasal route as needed for Congestion 12/19/19   Martin Solis DO   insulin aspart (NOVOLOG) 100 UNIT/ML injection vial Inject into the skin 2 times daily Inject per sliding scale 0-150 = 0 units; 151-200 = 1 unit; 201-205 = 2 units; 251-300 = 3 units; 301-350 = 4 units; 351-400 = 5 units; 401-450 = 6 units     Historical Provider, MD   cyanocobalamin 1000 MCG tablet Take 1,000 mcg by mouth daily    Historical Provider, MD   warfarin (COUMADIN) 2 MG tablet As dosed by ECF.     Historical Provider, MD   epoetin schuyler-epbx (RETACRIT) 18476 UNIT/ML SOLN injection Inject into the skin once a week     Historical Provider, MD   pantoprazole (PROTONIX) 40 MG tablet Take 40 mg by mouth 2 times daily    Historical Provider, MD   metoprolol succinate (TOPROL XL) 100 MG extended release tablet Take 1 tablet by mouth daily Hold if SBP less than 100 and HR less than 50 bpm  Patient taking differently: Take 50 mg by mouth daily Hold if SBP less than 100 and HR less than 50 bpm 3/30/19   Hargis Romberg, MD   ipratropium-albuterol (DUONEB) 0.5-2.5 (3) MG/3ML SOLN nebulizer solution Inhale 1 vial into the lungs every 6 hours as needed for Shortness of Breath    Historical Provider, MD   ARTIFICIAL TEAR OP Instill one drop into each eye as needed    Historical Provider, MD   Probiotic Product (PROBIOTIC-10) CAPS Take 1 capsule by mouth daily     Historical Provider, MD   nitroGLYCERIN (NITROSTAT) 0.4 MG SL tablet up to max of 3 total doses.  If no relief after 1 dose, call 911. 10/18/18   Mandi Cline MD   ascorbic acid (VITAMIN C) 500 MG tablet Take 500 mg by mouth 2 times daily     Historical Provider, MD   docusate sodium (COLACE) 100 MG capsule Take 200 mg by mouth daily    Historical Provider, MD   clopidogrel (PLAVIX) 75 MG tablet Take 75 mg by mouth every evening     Historical Provider, MD   ondansetron (ZOFRAN) 4 MG tablet Take 4 mg by mouth 2 times daily     Historical Provider, MD   guaiFENesin (ROBITUSSIN) 100 MG/5ML syrup Take 200 mg by mouth every 4 hours as needed for Cough    Historical Provider, MD   Multiple Vitamins-Minerals (OCUVITE EXTRA PO) Take 1 tablet by mouth daily     Historical Provider, MD   bisacodyl (DULCOLAX) 10 MG suppository Place 10 mg rectally daily as needed for Constipation    Historical Provider, MD   pravastatin (PRAVACHOL) 40 MG tablet Take 40 mg by mouth every evening Indications: Blood Cholesterol Abnormal  5/8/16   Historical Provider, MD   acetaminophen (TYLENOL) 325 MG tablet Take 650 mg by mouth 4 times daily     Historical Provider, MD        Lab data:  CBC:    Recent Labs     04/25/20  1740 04/26/20  0405 04/27/20  0531 04/28/20  0531   WBC 7.5 8.6  --  7.9   HGB 10.3* 9.3* 9.6* 9.5*    200  --  200     CMP:    Recent Labs     04/25/20  1740 04/26/20  0405 04/28/20  0531    133* 129* K 3.8 3.9 3.6   CL 94* 92* 89*   CO2 25 26 24   BUN 48* 50* 53*   CREATININE 7.6* 8.7* 8.2*   GLUCOSE 130* 100 106   CALCIUM 8.7 8.6 8.7     Urine:No results for input(s): COLORU, PHUR, LABCAST, WBCUA, RBCUA, MUCUS, TRICHOMONAS, YEAST, BACTERIA, CLARITYU, SPECGRAV, LEUKOCYTESUR, UROBILINOGEN, BILIRUBINUR, BLOODU in the last 72 hours. Invalid input(s): NITRATE, GLUCOSEUKETONESUAMORPHOUS   Sed Rate: No results for input(s): SEDRATE in the last 72 hours. Radiology       Review of Systems:  Source of data: patient    CONSTITUTIONAL  Fever: none, Chills: none, Weight loss: none, Malaise: none, Fatigue: none, Diaphoresis: none, Weakness: none    SKIN  Rash: none, Itch: none, Open sores: none    HENT:  Headache: none, Hearing loss: none, Tinnitus: none, Ear pain: none, Ear discharge: none,   Nasal bleeding: none, Congestion: none, Stridor: none, Sore throat: none. EYES  Blurred vision: none, Double vision: none, Photophobia: none, Eye pain: none, Eye discharge: none, Eye redness: none. CARDIOVASCULAR  Chest pain: none, Arm pain: none, Palpitations: none, Orthopnea: none, Claudication: none,  Leg swelling: none, PND: none. RESPIRATORY  Cough: none, Hemoptysis: none, Sputum production: none, Shortness of breath: none, Wheezing: none    GASTROINTESTINAL  Heartburn: none, Nausea: none, Vomiting: none, Abdominal pain: none, Diarrhea: none,  Constipation: none, Blood in the stool: none, Melena: none, Rebound: none, Rovsing's: none. GENITOURINARY  Dysuria: none, Pyuria: none, Gross hematuria: none, Urgency: none, Flank pain: none, STD: none. MUSCULOSKELETAL  Myalgia: none, Neck pain: none, Thoracic pain: none, Lumbar pain: none, Joint pain: none, Falls: none    ENDOCRINE/HEMATOLOGY/ALLERGIC  Easy bruising: none, Easy bleeding: none, Environmental allergies: none, Polydipsia: none.     NEUROLOGICAL  Dizziness: none, Tingling: none, Numbness: none, Tremor: none, Sensory change: none, Speech change: none, Focal weakness: none, Seizures: none, Loss of consciousness: none,    PSYCHIATRIC  Depression: none, Suicidal ideation: none, Heroin abuse: none, Cocaine abuse: none, Marijuana abuse: none, Hallucinations: none, Anxiety: none, Memory loss: none       Physical Examination:  /63   Pulse 84   Temp 98 °F (36.7 °C) (Oral)   Resp 18   Ht 5' 6\" (1.676 m)   Wt 248 lb 3.2 oz (112.6 kg)   SpO2 94%   BMI 40.06 kg/m²       General appearance: alert and cooperative with exam  HEENT: Head: Normocephalic, no lesions, without obvious abnormality. Neck: no adenopathy, no carotid bruit, no JVD, supple, symmetrical, trachea midline and thyroid not enlarged, symmetric, no tenderness/mass/nodules  Lungs: clear to auscultation bilaterally  Heart: regular rate and rhythm, S1, S2 normal, no murmur, click, rub or gallop  Abdomen: soft, non-tender; bowel sounds normal; no masses,  no organomegaly. PD exit site looks great  Extremities: extremities normal, atraumatic, no cyanosis or edema  Neurologic: Mental status: Alert, oriented, thought content appropriate  PSY: No evidence of depression. Mood is normal for the patient. Skin: Warm and dry. No unusual lesions or rashes noted. Muscles: Hand  is equal and strong bilaterally. Leg strength is equal and strong. Skeletal: No bony or skeletal abnormalities noted. Admission weight: 240 lb (108.9 kg)  Wt Readings from Last 3 Encounters:   04/27/20 248 lb 3.2 oz (112.6 kg)   04/24/20 240 lb (108.9 kg)   04/09/20 240 lb (108.9 kg)     Body mass index is 40.06 kg/m². Clinical Impressions:    1. ESRD from DM, HTN and aging. 2. Vaginal bleeding  3. DM  4. HTN  5. Anemia from kidney disease and blood loss     Plan of Care    1. Stop cycler PD outpatient and continue CAPD outpatient  It is OK with nephrology for the primary care physician to discharge the patient when ready.   A follow up visit with nephrology is not necessary as we see peritoneal dialysis patients  Monthly. 2. Ama  3. Palak Serrato DO  [facility].

## 2020-04-28 NOTE — PLAN OF CARE
Problem: Falls - Risk of:  Goal: Will remain free from falls  Description: Will remain free from falls  Outcome: Ongoing  Note: No falls this shift. Patient educated on not getting up without help. Falling star protocol in place. Call light in reach. Bed in lowest position. Side rails up x2. Bed alarm set. Patient visually checked on hourly rounds. Problem: Falls - Risk of:  Goal: Absence of physical injury  Description: Absence of physical injury  Outcome: Ongoing     Problem: Pain Control  Goal: Maintain pain level at or below patient's acceptable level (or 5 if patient is unable to determine acceptable level)  Outcome: Ongoing  Note: Pt denies pain so far this shift. Will continue to monitor. Problem: Neurological  Goal: Maximum potential motor/sensory/cognitive function  Outcome: Ongoing  Note: PT/OT on case. Pt A&O x4. Problem: Cardiovascular  Goal: No DVT, peripheral vascular complications  Outcome: Ongoing  Note: No signs or symptoms of DVT this shift. Will continue to monitor. Problem: Respiratory  Goal: No pulmonary complications  Outcome: Ongoing  Note: O2 sat >90% on room air. Pt denies SOB. Problem: GI  Goal: No bowel complications  Outcome: Ongoing  Note: Bowel sounds active. No BM this shift. Pt denies tenderness. Problem:   Goal: Adequate urinary output  Outcome: Ongoing  Note: CAPD pt. Monitoring urine output. Problem: Nutrition  Goal: Optimal nutrition therapy  Outcome: Ongoing  Note: Pt tolerating CARB CONTROL diet. Problem: Skin Integrity/Risk  Goal: No skin breakdown during hospitalization  Outcome: Ongoing  Note: No new skin breakdown noted this shift. Pt being repositioned q2h and PRN. Heels floating off of bed with pillow support.       Problem: Musculor/Skeletal Functional Status  Goal: Highest potential functional level  Outcome: Ongoing     Problem: Discharge Planning:  Goal: Discharged to appropriate level of care  Description: Discharged to appropriate level of care  Outcome: Ongoing  Note: Pt plans on [facility] to [facility] when medically stable. Problem: Bleeding:  Goal: Will show no signs and symptoms of excessive bleeding  Description: Will show no signs and symptoms of excessive bleeding  Outcome: Ongoing  Note: No vaginal bleeding noted this shift. CBC ordered for AM.      Problem: Discharge Planning:  Goal: Patients continuum of care needs are met  Description: Patients continuum of care needs are met  4/27/2020 2238 by Mariza Arias RN  Outcome: Ongoing     Care plan reviewed with patient. Patient verbalizes understanding of the plan of care and contribute to goal setting.

## 2020-04-28 NOTE — PROGRESS NOTES
Hospitalist Progress Note      Patient: [address]    YOB: 1934    MRN: [id number]       Acct: [de-identified]     PCP: Cheyanne Noyola MD    Date of Admission: 4/25/2020    Assessment/Plan:    1. Vaginal bleeding/post-menopausal bleeding - complicated by plavix and coumadin use -- apprec gyn assist -> started on megace 4/26 and bleeding improving since 4/27 and minimal on 4/28-> u/s 4/26 with thickened endometrium -> d/w Dr. Claudia Singh gyn who went through options again with pt 4/27 -> tx will be with Megace despite bx or not and pt is too high risk for any surgery or radiation if this were a cancer thus pt agrees to conservative tx at this time with megace  -- per Dr. Claudia Singh can titrate megace to 400 mg bid prn for excessive bleeding and that breast cancer hx is remote enough that megace is not contraindicated  -- stopping/holding plavix and coumadin will also help bleeding issues --> ?? can we resume ASA and/or plavix for hx of CVA/CAD (no stents) and cont to hold coumadin  2. Acute blood loss anemia on anemia of chronic disease -- down due to vaginal bleeding to 9's since 4/26 and stable 9.6 on 4/27 and 9.5 on 4/28 from 10's back to 12/23/2019 --> has been 7-10's in past year with baseline more likely 9-10's?? -- cont trend with #1  -- holding home coumadin and plavix --> INR down 1.09 on 4/28 from 2.17 on 4/25  -- on retacrit weekly as outpt -> per renal  -- last iron studies 12/2019 with high ferritin, low iron at 49, normal B12/folate  3. ESRD on PD -- apprec renal assist --cont with PD and per renal pt wishes to change back to HD at some point -- fluid mgmt per renal -- cont renvela, Vit D  -- bumex stopped per renal due to low u/o due to being on PD  4.  Elevated troponin --chronic component --no current ACS symptoms --tropes stabilized 0.135-0.141 and prior on 4/7/2020 was 0.104 --> ??related to ESRD -- follows with cardio dr. Boo Calero -- no sx thus monitor   -- off plavix for #1 --> ??resume when ok with gyn  -- cont toprol, statin --> ??on isordil at [facility] -> stopped here 4/26-> clarify meds  -- last cath = distal LAD 50%, patent LM, RCA, left circumflex  -- last echo 12/2019 = EF 55%, no WMA  -- last stress 10/2018 = (-) ischemia  5. Hyponatremia -- 4/28 - due to being dialysis pt - per renal - asx  6. Non-ischemic CM with ICD/chronic diastolic CHF -- renal following for fluid mgmt with PD -- has [facility] --> to be done 4/27 -- follows with CHF clinic and Dr. Cain Marquez -- last Echo 12/2019 = EF 55%, no WMA, mild AS, mild TR  -- home bumex stopped per Dr. Keri Andrews renal as \"since starting dialysis her urine output has dropped to less than 250 mL per day, will stop all diuretics\"  -- cont continue Toprol 50 mg daily --> holding home hydralazine and ?on isordil also (discontinued per home med list) -- ?resume  7. Non-obstructive CAD -- follows with Dr. Cain Marquez - asx -- home plavix held for #1 --> ??resume plavix or start ASA if ok with gyn --> cont toprol, statin  -- last cath = distal LAD 50%, patent LM, RCA, left circumflex  -- last echo 12/2019 = EF 55%, no WMA  -- last stress 10/2018 = (-) ischemia  8. Chronic anticoagulation -- on chronic coumadin for hx of PE --> best can tell dx was in 2013 and cont on coumadin since --> coumadin held since admission with #1 -- ?need to continue -> hold at d/c and have f/u outpt - however megace does make pt more hypercoagulable and will need monitored closely  --  INR down 1.24 on 4/27 from 2.17 on 4/25 -> cont monitor  9. Hx PE -- holding home coumadin - ??details -- no CTA chest ever done here - VQ in 2013 with high prob PE but last VQ scan 11/2016 with PE ABSENT and no further studies since-- cont hold coumadin at d/c analisa with #1 --  and unlikely needs resumed but will be slightly more hypercoagulable with megace -- cont monitor resp status  10.  Hx CVA -- NO new neuro sx this admission -- Plavix held on admission due to #1 --> ??ok to resume PT/OT consulted - from [facility] - per review pt hasn't walked in ~ 4 weeks -- ?reason - cont monitor;  Pt recent admission 4/7 - 4/13 for near syncope -- ? Need further neuro imaging but exam is nonfocal    Dispo  -- 4/28 --> apprec consultants -- h/h stable, HD stable and bleeding cont to improve with megace and off coumadin and plavix - d/w SS and awaiting precert to return to [facility] -- cont monitor h/h, BP, and cont PT/OT to increase fxn. ??when to resume plavix or ASA outpt    -- 4/27 --> apprec consultants -- apprec renal assist for fluid mgmt -- gyn assist apprec -> d/w Dr. Clemencia Goodwin and rec to cont tx with megace and to titrate prn for bleeding but currently bleeding has improved and risks of procedures explained with pt and the tx would not change if bx done thus he and pt agreed to cont with conservative tx with megace at this time as she is also a very high risk for surgery or radiation given her multiple comorbidities. H/h stable 9's this am -> cont trend. ??when to resume plavix for hx of CVA and LAD dz and PAD --> likely outpt when bleeding improves. Rec cont to hold coumadin at d/c given appears PE was back in 2013 --> however Megace will make pt more hypercoagulable and close monitoring will need to be taken outpt and risks explained to pt. PT/OT to eval - Per  note this am pt is pre-cert to return to [facility] - ??d/c tomorrow        Chief Complaint: Vaginal bleeding    Hospital Course:  Russ Liu is a 80 y.o. female with ESRD on PD, non-ischemic CM with ICD, non-obstructive CAD, HTN, GERD, COPD, CHF, anxiety, HLD, DM2, CVA, breast cancer in 1982 with right mastectomy and 2007 with left mastectomy, hx HIT, chronic anemia, hx PE, chronic debility/immobility presenting with vaginal bleeding.    -- Gyn c/s --> started pt on megace and bleeding improving as of 4/27 --> per d/w Dr. Patsi Rumble to continue Megace as this is tx whether there is a tissue bx or not and pt is NOT a good candidate for surgery given her MULTIPLE comorbidities. H/h stable 9's 4/26 - 4/27  -- renal consulted for ESRD on PD (hx of HD) -> tolerating PD during admission. -- Pt/OT consulted due to recent decline in mobility over last 4 weeks. Subjective/HPI:   -- 4/28/2020  --> pt without any complaints this AM.  Denies any pain. Per RN overnight patient had just 2 small dark episodes of vaginal bleeding. Has much improved. Patient denies any associated abdominal pain or cramping. Pt denies cp, sob. Denies f/c.  Leia po. On RA this am (was on 1 L overnight). Afebrile. Last BM 4/26 x 1.  PD going well - no abd pain, fluid clear. PMH, SURGICAL HX, FH, SOCIAL HX reviewed and updated as needed.     Medications:  Reviewed    Infusion Medications    dextrose       Scheduled Medications    metoprolol succinate  50 mg Oral Daily    dianeal lo-nikko 2.5%  2,000 mL Intraperitoneal 4x Daily    insulin lispro  0-6 Units Subcutaneous TID WC    insulin lispro  0-3 Units Subcutaneous Nightly    sodium chloride flush  10 mL Intravenous 2 times per day    ascorbic acid  500 mg Oral BID    [Held by provider] clopidogrel  75 mg Oral QPM    cyanocobalamin  1,000 mcg Oral Daily    docusate sodium  200 mg Oral Daily    epoetin schuyler-epbx  10,000 Units Subcutaneous Weekly    insulin glargine  10 Units Subcutaneous Nightly    miconazole   Topical BID    therapeutic multivitamin-minerals  1 tablet Oral Daily    pantoprazole  40 mg Oral BID    polyethylene glycol  17 g Oral BID    lactobacillus  1 capsule Oral Daily    Vitamin D  1,000 Units Oral Daily    megestrol  40 mg Oral BID    sevelamer  800 mg Oral TID      PRN Meds: glucose, dextrose, glucagon (rDNA), dextrose, sodium chloride flush, acetaminophen **OR** acetaminophen, polyethylene glycol, promethazine **OR** ondansetron, bisacodyl, guaiFENesin, ipratropium-albuterol, nitroGLYCERIN, sodium chloride, HYDROcodone-acetaminophen      Intake/Output Summary (Last 24 hours) at 4/28/2020 5446  Last data filed at 4/28/2020 0616  Gross per 24 hour   Intake 8440 ml   Output 8950 ml   Net -510 ml       Diet:  DIET CARB CONTROL; Exam:  BP (!) 121/57   Pulse 86   Temp 98 °F (36.7 °C) (Oral)   Resp 18   Ht 5' 6\" (1.676 m)   Wt 248 lb 3.2 oz (112.6 kg)   SpO2 94%   BMI 40.06 kg/m²     General appearance: No apparent distress, appears stated age and cooperative. HEENT: Pupils equal, round, and reactive to light. Conjunctivae/corneas clear. Neck: Supple, with full range of motion. Trachea midline. Respiratory:  Normal respiratory effort. Diminished in bases but Clear to auscultation, bilaterally without Rales/Wheezes/Rhonchi. No respiratory distress or accessory muscle use. Cardiovascular: Regular rate and rhythm with normal S1/S2 without murmurs, rubs or gallops. No JVD. Abdomen: Soft, non-tender, non-distended with normal bowel sounds. No rebound or guarding, PD catheter in mid abd. Musculoskeletal: 1+ BLE edema, no TTP, moves feet equally, no rashes or lesions. Skin: Skin color, texture, turgor normal.  No rashes or lesions. Neurologic:  BLE weak but otherwise Cranial nerves: II-XII intact and no focal weakness.   Psychiatric: Alert and oriented, thought content appropriate   Capillary Refill: Brisk,< 3 seconds   Peripheral Pulses: +1 palpable, equal bilaterally       All labs reviewed and interpreted by me:  Labs:   Recent Labs     04/25/20 1740 04/26/20 0405 04/27/20  0531 04/28/20  0531   WBC 7.5 8.6  --  7.9   HGB 10.3* 9.3* 9.6* 9.5*   HCT 33.1* 30.0* 30.1* 30.4*    200  --  200     Recent Labs     04/25/20 1740 04/26/20  0405 04/28/20  0531    133* 129*   K 3.8 3.9 3.6   CL 94* 92* 89*   CO2 25 26 24   BUN 48* 50* 53*   CREATININE 7.6* 8.7* 8.2*   CALCIUM 8.7 8.6 8.7     Recent Labs     04/25/20  1740   AST 17   ALT 8*   BILITOT 0.2*   ALKPHOS 117     Recent Labs     04/26/20  0405 04/27/20  0531 04/28/20  0531   INR 1.80* 1.24* 1.09     Recent Labs 04/25/20 2016 04/26/20  0405   TROPONINT 0.135* 0.138*       Urinalysis:      Lab Results   Component Value Date    NITRU NEGATIVE 04/09/2020    WBCUA 10-15 04/09/2020    BACTERIA FEW 04/09/2020    RBCUA 5-10 04/09/2020    BLOODU MODERATE 04/09/2020    SPECGRAV 1.019 03/26/2019    GLUCOSEU NEGATIVE 04/09/2020       All radiology images and reports reviewed and interpreted by me:  Radiology:  [address]   Final Result   . 1. Thickened appearing endometrium. Consider follow-up sonohysterography and/or hysteroscopy and correlation with gynecologic consult. **This report has been created using voice recognition software. It may contain minor errors which are inherent in voice recognition technology. **      Final report electronically signed by Dr. Lavanda Holter on 4/27/2020 12:16 AM      US NON OB TRANSVAGINAL   Final Result   . 1. Thickened appearing endometrium. Consider follow-up sonohysterography and/or hysteroscopy and correlation with gynecologic consult. **This report has been created using voice recognition software. It may contain minor errors which are inherent in voice recognition technology. **      Final report electronically signed by Dr. Lavanda Holter on 4/27/2020 12:16 AM          Diet: DIET CARB CONTROL;     Soler: no    Microbiology:  none    Tele review last 24 hrs:  off    DVT prophylaxis: [] Lovenox                                 [x] SCDs                                 [] SQ Heparin                                 [] Encourage ambulation           [] Already on Anticoagulation     Disposition:    [] Home       [] TCU       [] Rehab       [] Psych       [x] SNF       [] [place]       [] Other-    Code Status: DNR-CCA    PT/OT Eval Status: consulted      Electronically signed by Mainor Lyn MD on 4/28/2020 at 8:09 AM

## 2020-04-29 NOTE — CARE COORDINATION
4/29/20, 8:23 AM EDT    Nicholas 24 day: 4  Location: -18/018-A Reason for admit: Vagina bleeding [N93.9]   Procedure:     Treatment Plan of Care: PT/OT follows, strict I/O, CAPD as ordered, Dianeal 2.5% 4x a day, diabetic management with ISS, duo nebs, Zofran IV today. Barriers to Discharge: await precert to ECF      PCP: Matti Noel MD     Readmission Risk Score: 42%    Patient Goals/Plan/Treatment Preferences: discharge to [facility] today; preauth completed and back. 9949 am --called [facility] -PD unit, left message for Jeanine Hernandez PD nurse. 7407 am- Jeanna Torres returned call; updated this CM on PD plan.  Patient is to get regular manual CAPD exchanges at [facility] as ordered by nephrologist.

## 2020-04-29 NOTE — DISCHARGE SUMMARY
Hospitalist Discharge Summary        Patient: Binta Villalta  YOB: 1934  MRN: [id number]   Acct: [de-identified]    Primary Care Physician: Azul Osorio MD    Admit date  4/25/2020    Discharge date:  4/29/2020 1:09 PM    Chief Complaint on presentation :-  Vaginal bleeding    Discharge Assessment and Plan:-   1. Postmenopausal vaginal bleeding: Started on Megace 4/26/2020 with improvement in bleeding. Ultrasound 4/26 shows thickened endometrium. Gynecology discussed options with patient and stated to high risk for surgery/radiation, patient agrees with conservative treatment with Megace at this time. Will discharge on 40 mg twice daily. Resume Plavix upon discharge. Continue holding Coumadin, reevaluate with cardiologist/PCP as she was on it for PE from 2013.  2. Acute blood loss anemia on anemia of chronic disease: Baseline Hgb 9-10. Stable during admission in 9's. Resumed home Plavix, home Coumadin d/c due to patient being on it for PE in 2013. Will reassess need outpatient. On Retacrit weekly outpatient per renal. Last iron studies 12/2019 w/ high ferritin, iron 49, B12/folate wnl.   3. ESRD on PD: Nephrology managed during admission. Continue Renvela/Vitamin D. Bumex d/c due to low urine output and on PD. Patient and nephrology to discuss future HD options outpatient. 4. Elevated troponin: Appears chronic w/ ACS Sx. Tropoinin 0.135-0.141, was 0.104 on 4/7/20. Possibly related to ESRD. Okay to resume Plavix per GYN. Continue metoprolol/statin. Does not appear to be on Isordil at [facility], thus discontinued. Last cath showed distal LAD 50% stenosis, patent LM/RCA/left circumflex. Last echo 12/2019 with EF 55%, no WMA. Last stress test 10/2018 without ischemia. 5. Hyponatremia: Secondary to dialysis, nephrology okay with discharge, asymptomatic. 6. Nonischemic cardiomyopathy with ICD/chronic HFpEF: Follows with CHF clinic and Dr. Negrita Burch. Last echo as detailed in #4.   Bumex discontinued per nephrology due to low urine output. Continue metoprolol 50 mg daily and hydralazine. Isordil discontinued. BiV ICD in place. 7. History of PE: High probability VQ scan noted in 2013, PE absent on last VQ scan 11/2016. No CTA chest can be found. Patient has been on Coumadin since 2013 denies further episodes of PE or DVT. Coumadin held secondary to #1, recommend reassessing need outpatient with PCP/cardiology. Will resume Plavix at this time. 8. History of CVA: Plavix held on admission, will resume upon discharge. Continue statin. Last CT head 2/9/2020 showed no acute process with chronic left encephalomalacia of left occipital lobe moderate zone of encephalomalacia within the left temporal lobe stable. CT head 2018 shows focal areas of chronic encephalomalacia along right temporal occipital region and anterior left temporal lobe and in 2012 showed chronic appearing infarcts of bilateral cerebral hemispheres and right cerebellum. No new neuro symptoms. 9. COPD without exacerbation: Continue DuoNeb as needed. Maintain O2 saturation on room air. 10. Mild/moderate PAH, mild AS, mild TR: Per [facility] in 2016 and echo 12/2019. Follows w/ cardiology. 11. Benign essential hypertension: BP well controlled. Continue Metoprolol and Hydralazine. Bumex discontinued, no longer on Norvasc or Isordi. 12. Insulin-dependent type 2 diabetes mellitus: continue home medications. Last A1c 2/2019 was 4.9.    13. History of hyperlipidemia: Continue statin. 15. History of breast cancer: Hx 1982 w/ right mastectomy and left mastectomy in 2007. Gyn aware w/ megace use, Dr. Fer Mcmahan states breast cancer is remote enough that benefits outweigh risks, patient aware. 15. History of GERD with esophageal stricture s/p dilation: continue PPI. 16. History of adrenal nodule: Noted CT abdomen 2015 was 1.4x1.0cm on left. 17. Fatty liver: LFT within normal limits 4/26/20, denies abdominal pain.    18. PAD with history of left SFA PTA: no lower extremity pain or edema. Okay per GYN to resume Plavix. Continue statin. 19. History of anxiety disorder: Noted. 20. History of HIT: Noted. 21. Morbid obesity: BMI 40.1. Lifestyle modifications including diet and exercise discussed. 22. Physical deconditioning: PT/OT evaluated, recommend returning to [facility] w/ continued therapy. Initial H and P and Hospital course:-  \"Radha Mandel is a 80 y.o. female with ESRD on PD, non-ischemic CM with ICD, non-obstructive CAD, HTN, GERD, COPD, CHF, anxiety, HLD, DM2, CVA, breast cancer in 1982 with right mastectomy and 2007 with left mastectomy, hx HIT, chronic anemia, hx PE, chronic debility/immobility presenting with vaginal bleeding.    -- Gyn c/s --> started pt on megace and bleeding improving as of 4/27 --> per d/w Dr. Lizzy Navarrete to continue Megace as this is tx whether there is a tissue bx or not and pt is NOT a good candidate for surgery given her MULTIPLE comorbidities. H/h stable 9's 4/26 - 4/27  -- renal consulted for ESRD on PD (hx of HD) -> tolerating PD during admission. -- Pt/OT consulted due to recent decline in mobility over last 4 weeks.     -- 4/27 --> apprec consultants -- apprec renal assist for fluid mgmt -- gyn assist apprec -> d/w Dr. Ball Re and rec to cont tx with megace and to titrate prn for bleeding but currently bleeding has improved and risks of procedures explained with pt and the tx would not change if bx done thus he and pt agreed to cont with conservative tx with megace at this time as she is also a very high risk for surgery or radiation given her multiple comorbidities. H/h stable 9's this am -> cont trend. ??when to resume plavix for hx of CVA and LAD dz and PAD --> likely outpt when bleeding improves. Rec cont to hold coumadin at d/c given appears PE was back in 2013 --> however Megace will make pt more hypercoagulable and close monitoring will need to be taken outpt and risks explained to pt. PT/OT to eval - Per SS note this am pt is pre-cert to return to [facility]    4/28 --> apprec consultants -- h/h stable, HD stable and bleeding cont to improve with megace and off coumadin and plavix - d/w SS and awaiting precert to return to ECF -- cont monitor h/h, BP, and cont PT/OT to increase fxn. ??when to resume plavix or ASA outpt\"    4/29 --> patient continued to remain stable throughout admission. Hemoglobin was stable and vaginal bleeding seem to improve with Megace per GYN. GYN okay with resuming Plavix at this time. We will continue to hold Coumadin due to history of PE in 2013, recommend close follow-up with PCP and cardiology for reevaluation current medications. Discussed with patient plan of care to discharge to ECF at this time. Discussed recommendations for continued therapy at the facility. Patient remains agreeable current plan of care. Return to ED for new or worsening concerns or complaints. Physical Exam:-  Vitals:   Patient Vitals for the past 24 hrs:   BP Temp Temp src Pulse Resp SpO2   04/29/20 1038 (!) 149/66 97.7 °F (36.5 °C) Oral 75 18 95 %   04/29/20 0958 -- -- -- -- -- 92 %   04/29/20 0852 (!) 130/58 98 °F (36.7 °C) Oral 76 22 93 %   04/29/20 0331 (!) 165/71 98.2 °F (36.8 °C) Oral 103 18 93 %   04/28/20 2307 (!) 125/59 98.2 °F (36.8 °C) Oral 74 18 91 %   04/28/20 2040 126/60 98.4 °F (36.9 °C) Oral 80 18 94 %   04/28/20 1527 (!) 147/64 98.4 °F (36.9 °C) Oral 77 18 93 %     Weight:   Weight: 248 lb 3.2 oz (112.6 kg)   24 hour intake/output:     Intake/Output Summary (Last 24 hours) at 4/29/2020 1309  Last data filed at 4/29/2020 1249  Gross per 24 hour   Intake 5220 ml   Output 4550 ml   Net 670 ml       1. General appearance: No apparent distress, appears stated age and cooperative. 2. HEENT: Normal cephalic, atraumatic without obvious deformity. Pupils equal, round, and reactive to light. Extra ocular muscles intact. Conjunctivae/corneas clear.   3. Neck: Supple, with full range of motion. No jugular venous distention. Trachea midline. 4. Respiratory:  Normal respiratory effort. Clear to auscultation, bilaterally without Rales/Wheezes/Rhonchi. 5. Cardiovascular: Regular rate and rhythm with normal S1/S2 without murmurs, rubs or gallops. ICD left anterior chest wall. 6. Abdomen: Soft, non-tender, non-distended with normal bowel sounds. Peritoneal dialysis cath in mid abdomen. 7. Musculoskeletal:  No clubbing, cyanosis. 1+ bilateral lower extremity edema, nontender to palpation. 8. Skin: Skin color, texture, turgor normal.  No rashes or lesions. 9. Neurologic:  Neurovascularly intact without any focal sensory/motor deficits. Cranial nerves: II-XII intact, grossly non-focal.  10. Psychiatric: Alert and oriented, thought content appropriate, normal insight  11. Capillary Refill: Brisk,< 3 seconds   12. Peripheral Pulses: +2 palpable, equal bilaterally       Discharge Medications:-      Medication List      START taking these medications    megestrol 40 MG tablet  Commonly known as:  MEGACE  Take 1 tablet by mouth 2 times daily        CHANGE how you take these medications    metoprolol succinate 50 MG extended release tablet  Commonly known as:  TOPROL XL  Take 1 tablet by mouth daily Hold if SBP less than 100 and HR less than 50 bpm  What changed:    · medication strength  · how much to take     sevelamer 800 MG tablet  Commonly known as:  RENVELA  What changed:  Another medication with the same name was removed. Continue taking this medication, and follow the directions you see here.         CONTINUE taking these medications    acetaminophen 325 MG tablet  Commonly known as:  TYLENOL     ARTIFICIAL TEAR OP     ascorbic acid 500 MG tablet  Commonly known as:  VITAMIN C     bisacodyl 10 MG suppository  Commonly known as:  DULCOLAX     clopidogrel 75 MG tablet  Commonly known as:  PLAVIX     cyanocobalamin 1000 MCG tablet     docusate sodium 100 MG capsule  Commonly known as:  COLACE guaiFENesin 100 MG/5ML syrup  Commonly known as:  ROBITUSSIN     hydrALAZINE 50 MG tablet  Commonly known as:  APRESOLINE     insulin glargine 100 UNIT/ML injection vial  Commonly known as:  LANTUS  Inject 10 Units into the skin nightly     ipratropium-albuterol 0.5-2.5 (3) MG/3ML Soln nebulizer solution  Commonly known as:  DUONEB     LiquaCel Liqd     miconazole 2 % powder  Commonly known as:  MICOTIN  Apply topically 2 times daily. nitroGLYCERIN 0.4 MG SL tablet  Commonly known as:  NITROSTAT  up to max of 3 total doses. If no relief after 1 dose, call 911.      NovoLOG 100 UNIT/ML injection vial  Generic drug:  insulin aspart     OCUVITE EXTRA PO     ondansetron 4 MG tablet  Commonly known as:  ZOFRAN     pantoprazole 40 MG tablet  Commonly known as:  PROTONIX     polyethylene glycol 17 GM/SCOOP powder  Commonly known as:  GLYCOLAX     pravastatin 40 MG tablet  Commonly known as:  PRAVACHOL     Probiotic-10 Caps     Retacrit 61527 UNIT/ML Soln injection  Generic drug:  epoetin schuyler-epbx     sodium chloride 0.65 % nasal spray  Commonly known as:  OCEAN, BABY AYR  1 spray by Nasal route as needed for Congestion     vitamin D 25 MCG (1000 UT) Caps        STOP taking these medications    amLODIPine 5 MG tablet  Commonly known as:  NORVASC     bumetanide 1 MG tablet  Commonly known as:  BUMEX     isosorbide dinitrate 40 MG tablet  Commonly known as:  ISORDIL     lactulose 20 GM/30ML Soln     warfarin 2 MG tablet  Commonly known as:  COUMADIN           Where to Get Your Medications      Information about where to get these medications is not yet available    Ask your nurse or doctor about these medications  · megestrol 40 MG tablet  · metoprolol succinate 50 MG extended release tablet          Labs :-  Recent Results (from the past 72 hour(s))   POCT Glucose    Collection Time: 04/26/20  3:28 PM   Result Value Ref Range    POC Glucose 113 (H) 70 - 108 mg/dl   POCT Glucose    Collection Time: 04/26/20  7:06 PM Result Value Ref Range    POC Glucose 143 (H) 70 - 108 mg/dl   POCT Glucose    Collection Time: 04/26/20 10:06 PM   Result Value Ref Range    POC Glucose 122 (H) 70 - 108 mg/dl   Protime-INR    Collection Time: 04/27/20  5:31 AM   Result Value Ref Range    INR 1.24 (H) 0.85 - 1.13   Hemoglobin and Hematocrit, Blood    Collection Time: 04/27/20  5:31 AM   Result Value Ref Range    Hemoglobin 9.6 (L) 12.0 - 16.0 gm/dl    Hematocrit 30.1 (L) 37.0 - 47.0 %   POCT Glucose    Collection Time: 04/27/20  6:46 AM   Result Value Ref Range    POC Glucose 113 (H) 70 - 108 mg/dl   POCT Glucose    Collection Time: 04/27/20 11:11 AM   Result Value Ref Range    POC Glucose 110 (H) 70 - 108 mg/dl   POCT Glucose    Collection Time: 04/27/20  3:43 PM   Result Value Ref Range    POC Glucose 156 (H) 70 - 108 mg/dl   POCT Glucose    Collection Time: 04/27/20  8:37 PM   Result Value Ref Range    POC Glucose 216 (H) 70 - 108 mg/dl   Protime-INR    Collection Time: 04/28/20  5:31 AM   Result Value Ref Range    INR 1.09 0.85 - 9.57   Basic Metabolic Panel    Collection Time: 04/28/20  5:31 AM   Result Value Ref Range    Sodium 129 (L) 135 - 145 meq/L    Potassium 3.6 3.5 - 5.2 meq/L    Chloride 89 (L) 98 - 111 meq/L    CO2 24 23 - 33 meq/L    Glucose 106 70 - 108 mg/dL    BUN 53 (H) 7 - 22 mg/dL    CREATININE 8.2 (HH) 0.4 - 1.2 mg/dL    Calcium 8.7 8.5 - 10.5 mg/dL   CBC    Collection Time: 04/28/20  5:31 AM   Result Value Ref Range    WBC 7.9 4.8 - 10.8 thou/mm3    RBC 3.35 (L) 4.20 - 5.40 mill/mm3    Hemoglobin 9.5 (L) 12.0 - 16.0 gm/dl    Hematocrit 30.4 (L) 37.0 - 47.0 %    MCV 90.7 81.0 - 99.0 fL    MCH 28.4 26.0 - 33.0 pg    MCHC 31.3 (L) 32.2 - 35.5 gm/dl    RDW-CV 13.5 11.5 - 14.5 %    RDW-SD 44.4 35.0 - 45.0 fL    Platelets 748 918 - 352 thou/mm3    MPV 10.9 9.4 - 12.4 fL   Anion Gap    Collection Time: 04/28/20  5:31 AM   Result Value Ref Range    Anion Gap 16.0 8.0 - 16.0 meq/L   Glomerular Filtration Rate, Estimated Collection Time: 04/28/20  5:31 AM   Result Value Ref Range    Est, Glom Filt Rate 5 (A) ml/min/1.73m2   POCT Glucose    Collection Time: 04/28/20  6:19 AM   Result Value Ref Range    POC Glucose 115 (H) 70 - 108 mg/dl   POCT Glucose    Collection Time: 04/28/20 11:23 AM   Result Value Ref Range    POC Glucose 137 (H) 70 - 108 mg/dl   POCT Glucose    Collection Time: 04/28/20  3:51 PM   Result Value Ref Range    POC Glucose 180 (H) 70 - 108 mg/dl   POCT Glucose    Collection Time: 04/28/20  7:19 PM   Result Value Ref Range    POC Glucose 196 (H) 70 - 108 mg/dl   POCT Glucose    Collection Time: 04/29/20  5:55 AM   Result Value Ref Range    POC Glucose 111 (H) 70 - 108 mg/dl   POCT Glucose    Collection Time: 04/29/20 10:36 AM   Result Value Ref Range    POC Glucose 140 (H) 70 - 108 mg/dl   Protime-INR    Collection Time: 04/29/20 10:41 AM   Result Value Ref Range    INR 1.02 0.85 - 1.13        Microbiology:    Blood culture #1:   Lab Results   Component Value Date    BC No growth-preliminary No growth  12/08/2019    BC No growth-preliminary No growth  12/08/2019       Blood culture #2:No results found for: BLOODCULT2    Organism:    Lab Results   Component Value Date    LABGRAM  10/24/2018     Rare segmented neutrophils observed. Few epithelial cells observed. Moderate gram positive cocci occurring singly and in pairs. Few gram negative bacilli. MRSA culture only:No results found for: [facility]    Urine culture:   Lab Results   Component Value Date    LABURIN  03/26/2019     No growth-preliminary  Growth of Contaminants. The mixture of organisms present  are not a common cause of urinary tract infections and  probably represent skin liz or distal urethral liz.        Lab Results   Component Value Date    [facility] Citrobacter youngae 04/11/2020        Respiratory culture: No results found for: CULTRESP    Aerobic and Anaerobic :  Lab Results   Component Value Date    LABAERO  10/24/2018     Culture also yielded moderate growth of Enterococcus species  and heavy growth of gram positive bacilli morphologically  consistent with Corynebacterium species. LABAERO  10/24/2018     moderate growth  This is a MRSA (Methicillin Resistant Staphylococcus  aureus)isolate. Isolates of MRSA (ORSA) Methicillin (Oxacillin) Resistant  Staphylococcus aureus (coagulase positive) require patient  be placed in CONTACT isolation. Methicillin(Oxacillin)resistant strains of staphylococci  (MRSA)or(MRSE)should be considered resistant to all classes  of cephalosporins, penems and beta-lactams. In the treatment of gram positive infections, GENTAMICIN  should be CONSIDERED a SYNERGYSTIC agent ONLY. LABAERO very light growth 10/24/2018     Lab Results   Component Value Date    LABANAE  10/24/2018     Culture yielded moderate growth of anaerobic gram positive  bacilli consistent with Clostridium species. If a true mixed  aerobic and anaerobic infection is suspected, then broad  spectrum empiric antibiotic therapy is indicated and should  include coverage for anaerobic organisms. Urinalysis:      Lab Results   Component Value Date    NITRU NEGATIVE 04/09/2020    WBCUA 10-15 04/09/2020    BACTERIA FEW 04/09/2020    RBCUA 5-10 04/09/2020    BLOODU MODERATE 04/09/2020    SPECGRAV 1.019 03/26/2019    GLUCOSEU NEGATIVE 04/09/2020       Radiology:-  Us Non Ob Transvaginal    Result Date: 4/27/2020  PROCEDURE: US PELVIS COMPLETE, US NON OB TRANSVAGINAL CLINICAL INFORMATION: PMB. COMPARISON: No prior study. TECHNIQUE: Transabdominal images only are reviewed. The patient was unable to tolerate transvaginal exam.  . FINDINGS: The uterus measures 7.6 x 4.1 x 3.7 cm. Endometrium is thickened measuring up to 2.1 cm. The right and left ovary are not visualized. The adnexa are limited in detail with no obvious fluid or other process. . 1. Thickened appearing endometrium.  Consider follow-up sonohysterography and/or hysteroscopy and correlation with gynecologic consult. **This report has been created using voice recognition software. It may contain minor errors which are inherent in voice recognition technology. ** Final report electronically signed by Dr. Gabrielle Beal on 4/27/2020 12:16 AM    Us Pelvis Complete    Result Date: 4/27/2020  PROCEDURE: US PELVIS COMPLETE, US NON OB TRANSVAGINAL CLINICAL INFORMATION: PMB. COMPARISON: No prior study. TECHNIQUE: Transabdominal images only are reviewed. The patient was unable to tolerate transvaginal exam.  . FINDINGS: The uterus measures 7.6 x 4.1 x 3.7 cm. Endometrium is thickened measuring up to 2.1 cm. The right and left ovary are not visualized. The adnexa are limited in detail with no obvious fluid or other process. . 1. Thickened appearing endometrium. Consider follow-up sonohysterography and/or hysteroscopy and correlation with gynecologic consult. **This report has been created using voice recognition software. It may contain minor errors which are inherent in voice recognition technology. ** Final report electronically signed by Dr. Gabrielle Beal on 4/27/2020 12:16 AM       Follow-up scheduled after discharge :-    in 1 weeks with Maricruz Sousa MD  in the next few weeks with Dr. Scooby Tee, nephrology  In the next few weeks with Dr. Zapata Notice, cardiology    Consultations during this hospital stay:-  [] NONE [] Cardiology  [x] Nephrology  [] Hemo onco   [] GI   [] ID  [] Endocrine  [] Pulm    [] Neuro    [] Psych   [] Urology  [] ENT   [] G SURGERY   []Ortho    []CV surg    [] Palliative  [] Hospice [] Pain management   []    []TCU   [x] PT/OT  OTHERS:- OB/GYN    Disposition: SNF  Condition at Discharge: Stable    Time Spent:- 35 minutes    Electronically signed by Gabrielle Cortés PA-C on 4/29/2020 at 1:09 PM  Discharging Hospitalist

## 2020-04-29 NOTE — CARE COORDINATION
4/29/20, 11:21 AM EDT    Patient goals/plan/ treatment preferences discussed by  and . Patient goals/plan/ treatment preferences reviewed with patient/ family. Patient/ family verbalize understanding of discharge plan and are in agreement with goal/plan/treatment preferences. Understanding was demonstrated using the teach back method. AVS provided by RN at time of discharge, which includes all necessary medical information pertaining to the patients current course of illness, treatment, post-discharge goals of care, and treatment preferences. Services After Discharge  Services At/After Discharge: Nursing Services, Aide Services, Skilled Therapy, In ambulette([facility] Letter  IMM Letter given to Patient/Family/Significant other/Guardian/POA/by[de-identified] pt access  IMM Letter date given[de-identified] 04/27/20  IMM Letter time given[de-identified] 1950     Pt being discharged back to [facility] under her [facility]. Pt is aware. Rubina at [facility] is aware. Blue envelope on chart with transportation forms and phone/fax numbers. CEM Cummings is aware.

## 2020-04-29 NOTE — PROGRESS NOTES
[address] RENAL TELEMETRY 6K  Occupational Therapy  Daily Note  Time:   Time In: 1020  Time Out: 1050  Timed Code Treatment Minutes: 30 Minutes  Minutes: 30   -5 minutes due to lab present. Date: 2020  Patient Name: Alexsander Rodriguez,   Gender: female      Room: [facility]18/018-A  MRN: [id number]  : 1934  (80 y.o.)  Referring Practitioner: Adriana Luna MD  Diagnosis: Vaginal Bleeding   Additional Pertinent Hx: The patient is a 80 y.o. female with multiple medical problems who was admitted form the [facility] for worsening vaginal bleeding. Pt states her care givers have told her that there was been \"spots\" of blood on her pads for several months but it worsened yesterday. She states she had an episode where she felt like she urinated herself but it was found to be a gush of blood. She denies pain. She is nonambulatory and has been for some time. She is incontinent of Stool. Restrictions/Precautions:  Restrictions/Precautions: Fall Risk    SUBJECTIVE: Pt reclined in bedside chair upon arrival, agreeable to OT session. Pt reported had just gotten up to chair short time ago, and declined any transfers/standing/mobility due to BLE pain. PAIN: pt did not quantify, although c/o pain in B thighs. COGNITION: Slow Processing and Decreased Problem Solving    ADL:   No ADL's completed this session. .-pt reported all had been completed earlier this date. BALANCE:  Sitting Balance:  Supervision. sitting in chair    ADDITIONAL ACTIVITIES:  Pt completed BUE minimal resistance theraband exercises while seated in chair. Pt completed 1 set X 20 repetitions in all planes with short rest breaks in between variations. Moderate cues for proper technique/hand placement. Exercises completed to increase overall strength/endurance needed for ADLs and transfers. ASSESSMENT:     Activity Tolerance:  Patient tolerance of  treatment: fair.         Discharge Recommendations: Continue to assess pending progress, Subacute/Skilled Nursing Facility  Equipment Recommendations: Equipment Needed: No  Other: Defer to next level of care. Plan: Times per week: 3-5x  Current Treatment Recommendations: Strengthening, Endurance Training, Patient/Caregiver Education & Training, Balance Training, Safety Education & Training, Self-Care / ADL    Patient Education  Patient Education: Plan of Care and Home Exercise Program    Goals  Short term goals  Time Frame for Short term goals: Until discharge   Short term goal 1: Pt will complete BUE light resistive exercises with min vcs for technique to increase indep and safety with all self cares and transfers. Short term goal 2: Pt will complete standing tolerance x 2 minutes with Min A x 1 and 1 UE release to increase indep and safety with all grooming tasks. Short term goal 3: Pt will complete sit to stand to/from various with min A x 1 and min vcs for safety to increase indep and safety with all self cares. Short term goal 4: Pt will tolerate further assessment of pivots to/from various surfaces with OTR to increase indep and safety with all toileting. Long term goals  Time Frame for Long term goals : No LTGs d/t short estimated length of stay. Following session, patient left in safe position with all fall risk precautions in place.

## 2020-04-29 NOTE — PROGRESS NOTES
B[facility] 60  PHYSICAL THERAPY MISSED TREATMENT NOTE  ACUTE CARE    Date: 2020  Patient Name: Sharyn Barnes        MRN: [id number]   : 1934  (80 y.o.)  Gender: female   Referring Practitioner: Carlos Nunn MD  Referring Practitioner: Duy Pillai MD  Diagnosis: Vaginal Bleeding   Diagnosis: Vaginal bleeding         REASON FOR MISSED TREATMENT:  Missed Treat. Pt with OT at time of attempt, per RN, pt DC'ing to SNF today.

## 2020-04-29 NOTE — PLAN OF CARE
Problem: Falls - Risk of:  Goal: Will remain free from falls  Description: Will remain free from falls  4/29/2020 0012 by Jitendra Hester RN  Outcome: Ongoing  Note: No falls this shift. Patient educated on not getting up without help. Falling star protocol in place. Call light in reach. Bed in lowest position. Side rails up x2. Bed alarm set. Patient visually checked on hourly rounds. Problem: Falls - Risk of:  Goal: Absence of physical injury  Description: Absence of physical injury  Outcome: Ongoing     Problem: Pain Control  Goal: Maintain pain level at or below patient's acceptable level (or 5 if patient is unable to determine acceptable level)  4/29/2020 0012 by Jitendra Hester RN  Outcome: Ongoing  Note: Pt denies pain so far this shift. Will continue to monitor. Problem: Neurological  Goal: Maximum potential motor/sensory/cognitive function  4/29/2020 0012 by Jitendra Hester RN  Outcome: Ongoing  Note: Pt A&O x4. PT on case. Problem: Cardiovascular  Goal: No DVT, peripheral vascular complications  6/58/7190 0012 by Jitendra Hester RN  Outcome: Ongoing  Note: No signs or symptoms of DVT this shift. Problem: Respiratory  Goal: No pulmonary complications  8/65/7992 0012 by Jitendra Hester RN  Outcome: Ongoing  Note: O2 sat >90% on room air. Pt denies SOB. Problem: GI  Goal: No bowel complications  6/44/5567 0012 by Jitendra Hester RN  Outcome: Ongoing  Note: Bowel sounds active. 1 BM this shift. Problem:   Goal: Adequate urinary output  4/29/2020 0012 by Jitendra Hester RN  Outcome: Ongoing  Note: Pt oliguric d/t being CAPD patient. Urinated once this shift. Problem: Nutrition  Goal: Optimal nutrition therapy  4/29/2020 0012 by Jitendra Hester RN  Outcome: Ongoing  Note: Pt tolerating carb control diet.       Problem: Skin Integrity/Risk  Goal: No skin breakdown during hospitalization  4/29/2020 0012 by Jitendra Hester RN  Outcome: Ongoing  Note: Pt being repositioned q2h and PRN. Heels floating off of bed with pillow support. Problem: Musculor/Skeletal Functional Status  Goal: Highest potential functional level  4/29/2020 0012 by Dwayne Johnson RN  Outcome: Ongoing     Problem: Discharge Planning:  Goal: Discharged to appropriate level of care  Description: Discharged to appropriate level of care  4/29/2020 0012 by Dwayne Johnson RN  Outcome: Ongoing  Note: Pt plans on returning to [facility] [place] when medically stable. Problem: Bleeding:  Goal: Will show no signs and symptoms of excessive bleeding  Description: Will show no signs and symptoms of excessive bleeding  4/29/2020 0012 by Dwayne Johnson RN  Outcome: Ongoing  Note: No bleeding noted this shift. Will continue to monitor for vaginal bleeding. Problem: Discharge Planning:  Goal: Patients continuum of care needs are met  Description: Patients continuum of care needs are met  4/29/2020 0012 by Dwayne Johnson RN  Outcome: Ongoing  Note: Pt is active in her plan of care. Care plan reviewed with patient. Patient verbalizes understanding of the plan of care and contribute to goal setting.

## 2020-04-29 NOTE — CARE COORDINATION
4/29/20, 11:01 AM EDT    DISCHARGE PLANNING EVALUATION    EASTON spoke with Rubina with [address], they have received pre-cert auth for pt to return. EASTON updated Trudy Hunter.  RN aware. Anticipated discharge today.

## 2020-04-29 NOTE — PROGRESS NOTES
[facility]   [facility]   [address]   [place]   [phone]   Nephrology Hospital progress note       4/29/2020, 9:10 AM        Pt Name:    Sharyn Barnes  MRN:     [id number]     YOB: 1934  Admit Date:    4/25/2020  4:56 PM  Primary Care Physician:  Marce Gallagher MD   Primary Nephrologist:          Dr. Babcock Mediate number  [id number]    ISOLATION: none     Admitting Chief Complaint:   vaginal bleeding     History of Chief Complaint:  The patient is a 80 y.o. year old white female who has been followed for many months by Dr. 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 She had an access created for hemodialysis by Dr. Sandra Han that has failed. She was started on dialysis via a tunneled hemodialysis catheter and subsequently switched to peritoneal dialysis which has been proceeding well. She had the tunneled catheter removed 04/07/2020. She has been doing PD since at the nursing home. She does not like it complaining that it makes her feel bad and she desires to switch back to hemodialysis. She was using the cycler. The patient was admitted for vaginal bleeding     Nephrology is treating:   ESRD requiring peritoneal dialysis. Subjective: The patient was sitting in the bedside chair. She feels normal and ready for discharge. She denied N/V/C/D/SOB/CP.      Lab Results   Component Value Date    LABGLOM 5 04/28/2020    LABGLOM 4 04/26/2020    LABGLOM 5 04/25/2020    LABGLOM 4 04/24/2020    LABGLOM 4 04/08/2020    LABGLOM 5 04/07/2020          Baseline eGFR Peritoneal dialysis patient    Admit eGFR Assume eGFR=15 ml/min   Current eGFR          Objective:    Diet: renal    VITALS:  BP (!) 130/58   Pulse 76   Temp 98 °F (36.7 °C) (Oral)   Resp 22   Ht 5' 6\" (1.676 m)   Wt 248 lb 3.2 oz (112.6 kg)   SpO2 93%   BMI 40.06 kg/m²   24HR INTAKE/OUTPUT:      Intake/Output Summary (Last 24 hours) at 4/29/2020 0910  Last data filed at 4/29/2020 0826  Gross per 24 hour   Intake 7220 ml   Output 6950 ml   Net 270 ml     Admission weight: 240 lb (108.9 kg)  Wt Readings from Last 3 Encounters:   04/27/20 248 lb 3.2 oz (112.6 kg)   04/24/20 240 lb (108.9 kg)   04/09/20 240 lb (108.9 kg)     Body mass index is 40.06 kg/m². Physical examination    General:  Alert and cooperative with exam  HEENT:  Head: Normocephalic, no lesions, without obvious abnormality. Neck:   no adenopathy, no carotid bruit, no JVD, supple, symmetrical, trachea midline and thyroid not enlarged, symmetric, no tenderness/mass/nodules  Lungs:  clear to auscultation bilaterally  Heart:  regular rate and rhythm, S1, S2 normal, no murmur, click, rub or gallop  Abdomen:  soft, non-tender; bowel sounds normal; no masses,  no organomegaly  Extremities:  extremities normal, atraumatic, no cyanosis or edema  Neurologic:  Mental status: Alert, oriented, thought content appropriate  Psy:                 No evidence of depression. Mood is stable. Skin:                Warm and dry. No lesions or rashes noted. Muscles:         Hand  and leg strength are equal and strong bilaterally. Lab Data  CBC:   Recent Labs     04/27/20  0531 04/28/20  0531   WBC  --  7.9   HGB 9.6* 9.5*   PLT  --  200     BMP:  Recent Labs     04/28/20  0531   *   K 3.6   CL 89*   CO2 24   BUN 53*   CREATININE 8.2*   GLUCOSE 106   CALCIUM 8.7     TSH: No results for input(s): TSH in the last 72 hours. HgBa1c: No results for input(s): LABA1C in the last 72 hours. Hepatic: No results for input(s): LABALBU, AST, ALT, ALB, BILITOT, ALKPHOS in the last 72 hours. Troponin: No results for input(s): TROPONINI in the last 72 hours. BNP: No results for input(s): BNP in the last 72 hours. Lipids: No results for input(s): CHOL, HDL in the last 72 hours. Invalid input(s): LDLCALCU  INR:   Recent Labs     04/27/20  0531 04/28/20  0531   INR 1.24* 1.09            Assessment:    1.  ESRD from DM requiring peritoneal dialysis 2. Vaginal bleeding  3. DM  4. HTN  5. anemia     Plan:    1. Continue CAPD  It is OK with nephrology for the primary care physician to discharge the patient when ready. A follow up visit with nephrology is not necessary as we see peritoneal dialysis patients  Monthly. 2. Ama  3. Jadiel Serrato, DO  [facility].

## 2020-05-13 NOTE — PROGRESS NOTES
[facility]       Visit Date: 5/13/2020  Cardiologist:  Dr. Charlie Donovan  Primary Care Physician: Dr. Prema Matute MD    Rayo Smith is a 80 y.o. female who presents today for:  Chief Complaint   Patient presents with    Congestive Heart Failure       HPI: Obtained from patient and chart    Rayo Smith is a 80 y.o. female who presents to the office for a follow up visit in the heart failure clinic. She resides at [facility] [place]. Hx HTN, ICD, CKD, DM, CVA, COPD. No longer needs home Oxygen. CardioMEMS implant 10/18/18 with PAD 10-17. She was hospitalized twice last month therefore no CardioMEMS readings were sent. In the office today PAD was 10 mmHg. She was on HD now on PD. Her legs look the best I have seen in awhile. Legs are wrapped with ACE wrap. She states she \"sometimes\" makes urine. She has not been walking, or riding the stationary bike since return from the hospital. She always sleeps with the [facility] elevated (not new). She denies cough or CP.        Accompanied by: self  Last hospital admission related to Heart Failure:  Dec 2019  Chest Pain: no  Worsening SOB: sometimes  Worsening Orthopnea/PND: no  Edema: improved  Any extra diuretic use: no  Weight gain: no  Fatigue: no  Abdominal bloating: no  Appetite: good  TACO: no  Cough: no  Compliant checking home weight: yes  Compliant checking blood pressure: yes      Past Medical History:   Diagnosis Date    Anxiety     Atherosclerotic heart disease of native coronary artery without angina pectoris     CAD (coronary artery disease)     nonobstructive    Cancer ([facility] 2007    breast cancer    Cardiomyopathy, nonischemic ([facility]     Dr. Carie Ernandez    Cerebral artery occlusion with cerebral infarction ([facility]     CHF (congestive heart failure) (HCC)     CKD (chronic kidney disease) stage 3, GFR 30-59 ml/min ([facility]     Dr. Mendez Preston    Congenital heart disease     COPD exacerbation ([facility]     Diabetic mononeuropathy associated with type 2 significant MR. Systemic hypertension.  CARDIAC DEFIBRILLATOR PLACEMENT  2011    CARDIOVASCULAR STRESS TEST  03/23/10    EF 30%  Severe LV Dys    COLONOSCOPY      Dr. Ruby Cotto  03/22/10    EF 45%. LV mildly dilated. Systolic function mildly reducted. No regional wall motion abnormalities. Wall thickness mildly increased. LA mildly dilated. MV mild annular calification. Mild TR. Ventricular septum tickness mildly increased.  ENDOSCOPY, COLON, DIAGNOSTIC      EYE SURGERY      bilateral cataracts    EYE SURGERY Left 2014    laser surgery for retinopathy?     FOOT SURGERY Left 2016    OTHER SURGICAL HISTORY  10/18/2018    CardioMEMS    PACEMAKER PLACEMENT      IL EGD BALLOON DILATION ESOPHAGUS <30 MM DIAM N/A 2017    EGD ESOPHAGOGASTRODUODENOSCOPY DILATATION performed by Yordan Joe MD at [facility]    IL ESOPHAGOGASTRODUODENOSCOPY TRANSORAL DIAGNOSTIC  2017    EGD ESOPHAGOGASTRODUODENOSCOPY performed by Yordan Joe MD at [facility]     Family History   Problem Relation Age of Onset    Diabetes Mother     Pacemaker Mother     Stroke Mother     Heart Disease Mother     Diabetes Father     Cancer Sister     Cancer Brother     Cancer Sister     Heart Disease Brother         cardiomegaly     Social History     Tobacco Use    Smoking status: Former Smoker     Packs/day: 0.50     Years: 2.00     Pack years: 1.00     Types: Cigarettes     Last attempt to quit: 1952     Years since quittin.4    Smokeless tobacco: Never Used   Substance Use Topics    Alcohol use: No     Alcohol/week: 0.0 standard drinks     Current Outpatient Medications   Medication Sig Dispense Refill    polycarbophil (FIBERCON) 625 MG tablet Take 625 mg by mouth 3 times daily      Glycerin, Laxative, (GLYCERIN ADULT) 2 g SUPP Place rectally as needed      potassium chloride (KLOR-CON M) 20 MEQ extended release tablet Take 20 mEq by mouth daily We discussed the importance of weighing oneself and recording daily. We also discussed the importance of a low sodium diet, higher sodium foods to avoid and appropriate low sodium food choices. Discussed use, benefit, and side effects of prescribed medications. All patient questions answered. Patient verbalizes understanding of plan of care using teach back method, and is agreeable to the treatment plan.        Electronicallysigned by ARELI Hyde CNP on 5/13/2020 at 11:55 AM

## 2020-05-18 PROBLEM — S80.02XA CONTUSION OF LEFT KNEE: Status: RESOLVED | Noted: 2018-09-03 | Resolved: 2020-01-01

## 2020-05-18 PROBLEM — N93.9 VAGINA BLEEDING: Status: RESOLVED | Noted: 2020-01-01 | Resolved: 2020-01-01

## 2020-05-18 PROBLEM — N18.4 CKD (CHRONIC KIDNEY DISEASE), STAGE IV (HCC): Chronic | Status: RESOLVED | Noted: 2019-03-06 | Resolved: 2020-01-01

## 2020-05-18 PROBLEM — S40.011A CONTUSION OF RIGHT SHOULDER: Status: RESOLVED | Noted: 2018-09-03 | Resolved: 2020-01-01

## 2020-05-18 PROBLEM — R60.0 BILATERAL LOWER EXTREMITY EDEMA: Status: RESOLVED | Noted: 2017-10-17 | Resolved: 2020-01-01

## 2020-05-18 PROBLEM — E87.70 FLUID OVERLOAD: Status: RESOLVED | Noted: 2019-01-01 | Resolved: 2020-01-01

## 2020-05-18 PROBLEM — R55 NEAR SYNCOPE: Status: RESOLVED | Noted: 2020-01-01 | Resolved: 2020-01-01

## 2020-05-18 PROBLEM — N39.0 UTI (URINARY TRACT INFECTION): Status: ACTIVE | Noted: 2020-01-01

## 2020-05-18 NOTE — CONSULTS
[facility]    [address] [facility]  5/18/2020 4:56 PM    Pt Name:    Heather Masters  MRN:     [id number]   [id number]  YOB: 1934  Admit Date:    5/17/2020 10:09 PM  Primary Care Physician:  Juan Grande MD    CSN Number:   [id number]    Reason for Consult:  ESRD and peritoneal dialysis management  Requesting provider:  Ms. Sherrie Harrell, ARELI-CNP    History:   The patient is a 80 y.o. pleasant white female with history of end-stage renal disease on peritoneal dialysis who follows with Dr. Sindhu Toure in the outpatient setting who has underlying insulin-dependent diabetes mellitus, COPD, chronic diastolic heart dysfunction with preserved left ventricular ejection fraction, previous volume overload, diabetes mellitus with proteinuria, obesity, history of CVA who was admitted to the [facility] through the emergency room where she had presented after she passed out at her nursing home while using a bedside commode. Patient reports that she felt lightheaded and dizziness after patient had a bowel movement. She reports that she stood up to go back to bed but passed out. When she woke up she found herself surrounded by EMS who was there to take her to the emergency room. Patient denies any fevers, chills, nausea, vomiting or any diarrhea. In the emergency room patient was noted to have stable vitals with blood pressure 107/46. However she was noted to have BNP of 5479. Her procalcitonin was 0.45. White count was 13.  UA showed large amount of pyuria. It was also positive for leukocyte Estrace. COVID-19 test was negative. Chest x-ray did not reveal any acute changes except some basilar atelectasis.     Past Medical History:  Past Medical History:   Diagnosis Date    Anxiety     Atherosclerotic heart disease of native coronary artery without angina pectoris     CAD (coronary artery disease)     nonobstructive    by mouth daily   Yes Historical Provider, MD   megestrol (MEGACE) 40 MG tablet Take 1 tablet by mouth 2 times daily 4/29/20  Yes Anna Lake PA-C   metoprolol succinate (TOPROL XL) 50 MG extended release tablet Take 1 tablet by mouth daily Hold if SBP less than 100 and HR less than 50 bpm 4/29/20 5/29/20 Yes Cornell Benoit PA-C   Amino Acids (LIQUACEL) LIQD Take 30 mLs by mouth Once time a day every Monday, Wednesday, and Friday related to ESRD   Yes Historical Provider, MD   sevelamer (RENVELA) 800 MG tablet Take 1 tablet by mouth 3 times daily (with meals)   Yes Historical Provider, MD   insulin glargine (LANTUS) 100 UNIT/ML injection vial Inject 10 Units into the skin nightly 4/9/20  Yes Doroteo Brewster MD   polyethylene glycol (GLYCOLAX) powder Take 17 g by mouth 2 times daily    Yes Historical Provider, MD   vitamin D 25 MCG (1000 UT) CAPS Take 1,000 Units by mouth daily   Yes Historical Provider, MD   miconazole (MICOTIN) 2 % powder Apply topically 2 times daily.  12/19/19  Yes Jesus Vigil,    sodium chloride (OCEAN, BABY AYR) 0.65 % nasal spray 1 spray by Nasal route as needed for Congestion 12/19/19  Yes Jesus Vigli DO   insulin aspart (NOVOLOG) 100 UNIT/ML injection vial Inject into the skin 2 times daily Inject per sliding scale 0-150 = 0 units; 151-200 = 1 unit; 201-205 = 2 units; 251-300 = 3 units; 301-350 = 4 units; 351-400 = 5 units; 401-450 = 6 units    Yes Historical Provider, MD   cyanocobalamin 1000 MCG tablet Take 1,000 mcg by mouth daily   Yes Historical Provider, MD   pantoprazole (PROTONIX) 40 MG tablet Take 40 mg by mouth 2 times daily   Yes Historical Provider, MD   ipratropium-albuterol (DUONEB) 0.5-2.5 (3) MG/3ML SOLN nebulizer solution Inhale 1 vial into the lungs every 6 hours as needed for Shortness of Breath   Yes Historical Provider, MD   ARTIFICIAL TEAR OP Instill one drop into each eye as needed   Yes Historical Provider, MD   Probiotic Product (PROBIOTIC-10) CAPS Take 1

## 2020-05-18 NOTE — ED NOTES
Urine sample obtained via straight cath. Pt resting in bed, denies pain at this time. Respirs unlabored. Call light in place.       [facility] Ignacia  05/17/20 6243

## 2020-05-18 NOTE — PROGRESS NOTES
Pt admitted to  [facility] as a transfer from . Complaints: Syncope. IV normal saline infusing into the forearm left, condition patent and no redness at a rate of 25 mls/ hour with about 400 mls in the bag still. IV site free of s/s of infection or infiltration. Vital signs obtained. Assessment and data collection initiated. Two nurse skin assessment performed by Kevin PRIEST and Kyle Richey. Oriented to room. Policies and procedures for [facility] explained. Divina discussed hourly rounding with patient addressing 5 P's. Fall prevention and safety brochure discussed with patient. Bed alarm on. Call light in reach.

## 2020-05-18 NOTE — PROGRESS NOTES
Nephrology consulted for patient who is a CAPD candidate. Patient usually sees Dr. Juan Weiss who was updated on patient being in the hospital. Dr Neal Adams was consulted for dialysis treatment. Patient is to be transferred to [facility] today for peritineal dialysis. Patient Alert and oriented x4 and has no complaints at this time. Patient did work with therapy who tried to get her out of bed with a sera steady, patient states they use that at the nursing home to get her in a wheelchair and she does not walk. Patient wasn't able to tolerate getting up due to dizziness so therapy laid her back in bad and repositioned her.

## 2020-05-18 NOTE — ED NOTES
Orthostatics not able to be obtained due to pt stating that she has no control of anything from her hips down     Sid Alves, CEM  05/17/20 9115

## 2020-05-18 NOTE — ED PROVIDER NOTES
for chest pain, palpitations and leg swelling. Gastrointestinal: Negative for abdominal distention, abdominal pain, constipation, diarrhea, nausea and vomiting. Endocrine: Negative for cold intolerance, heat intolerance, polydipsia and polyphagia. Genitourinary: Negative for dysuria, flank pain, frequency and hematuria. Musculoskeletal: Negative for arthralgias, back pain, gait problem, myalgias, neck pain and neck stiffness. Skin: Negative for pallor, rash and wound. Allergic/Immunologic: Negative for environmental allergies and food allergies. Neurological: Positive for syncope. Negative for dizziness, tremors, weakness and headaches. Psychiatric/Behavioral: Negative for agitation, behavioral problems, confusion, self-injury, sleep disturbance and suicidal ideas.           PAST MEDICAL HISTORY    has a past medical history of Anxiety, Atherosclerotic heart disease of native coronary artery without angina pectoris, CAD (coronary artery disease), Cancer ([facility] Cardiomyopathy, nonischemic (HCC), Cerebral artery occlusion with cerebral infarction ([facility] CHF (congestive heart failure) ([facility] CKD (chronic kidney disease) stage 3, GFR 30-59 ml/min ([facility]), Congenital heart disease, COPD exacerbation ([facility] [facility] 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 ([facility] DM2 (diabetes mellitus, type 2) ([facility] Dyspnea, ESRD (end stage renal disease) ([facility] Ex-smoker, GERD (gastroesophageal reflux disease), GERD (gastroesophageal reflux disease), Heart failure with preserved ejection fraction ([facility] His of Heparin induced thrombocytopenia, History of breast cancer, History of CVA (cerebrovascular accident), History of pulmonary embolism, Hyperkalemia, Hyperlipidemia, Hypertension, Iron deficiency anemia, LBBB (left bundle branch block), Localized edema, Malignant neoplasm of breast (female) ([facility] Osteoarthritis, Paget's disease of bone, Personal history of transient ischemic attack (TIA), and cerebral infarction without residual deficits, Pulmonary embolism ([address] [facility] ICD, Thyroid disease, Venous insufficiency (chronic) (peripheral), Vitamin D deficiency, Vitamin D deficiency, and Weakness. SURGICAL HISTORY      has a past surgical history that includes doppler echocardiography (03/22/10); cardiovascular stress test (03/23/10); pacemaker placement; eye surgery; Colonoscopy; Eye surgery (Left, 9/2014); Foot surgery (Left, 1/12/2016); Breast surgery; Cardiac catheterization (11-); Cardiac catheterization (05-); Cardiac defibrillator placement (02/02/2011); pr egd balloon dilation esophagus <30 mm diam (N/A, 9/18/2017); pr esophagogastroduodenoscopy transoral diagnostic (9/18/2017); Endoscopy, colon, diagnostic; and other surgical history (10/18/2018).     CURRENT MEDICATIONS       Previous Medications    ACETAMINOPHEN (TYLENOL) 325 MG TABLET    Take 650 mg by mouth 4 times daily     AMINO ACIDS (LIQUACEL) LIQD    Take 30 mLs by mouth Once time a day every Monday, Wednesday, and Friday related to ESRD    ARTIFICIAL TEAR OP    Instill one drop into each eye as needed    ASCORBIC ACID (VITAMIN C) 500 MG TABLET    Take 500 mg by mouth 2 times daily     BISACODYL (DULCOLAX) 10 MG SUPPOSITORY    Place 10 mg rectally daily as needed for Constipation    CLOPIDOGREL (PLAVIX) 75 MG TABLET    Take 75 mg by mouth every evening     CYANOCOBALAMIN 1000 MCG TABLET    Take 1,000 mcg by mouth daily    DOCUSATE SODIUM (COLACE) 100 MG CAPSULE    Take 200 mg by mouth daily    GLYCERIN, LAXATIVE, (GLYCERIN ADULT) 2 G SUPP    Place rectally as needed    GUAIFENESIN (ROBITUSSIN) 100 MG/5ML SYRUP    Take 200 mg by mouth every 4 hours as needed for Cough    HYDRALAZINE (APRESOLINE) 25 MG TABLET    Take 25 mg by mouth 3 times daily    INSULIN ASPART (NOVOLOG) 100 UNIT/ML INJECTION VIAL    Inject into the skin 2 times daily Inject per sliding scale 0-150 = 0 units; 151-200 = 1 unit; 201-205 sounds are normal. There is no distension. Palpations: Abdomen is soft. There is no mass. Tenderness: There is no abdominal tenderness. There is no guarding or rebound. Hernia: No hernia is present. Comments: CAPD catheter in place, no abdominal tenderness   Musculoskeletal:         General: No tenderness or deformity. Lymphadenopathy:      Cervical: No cervical adenopathy. Skin:     General: Skin is warm and dry. Capillary Refill: Capillary refill takes less than 2 seconds. Coloration: Skin is not pale. Findings: No erythema or rash. Neurological:      Mental Status: She is alert and oriented to person, place, and time. Cranial Nerves: No cranial nerve deficit. Sensory: No sensory deficit. Motor: No abnormal muscle tone. Coordination: Coordination normal.      Deep Tendon Reflexes: Reflexes normal.   Psychiatric:         Behavior: Behavior normal.         Thought Content: Thought content normal.         Judgment: Judgment normal.           DIFFERENTIAL DIAGNOSIS:   Dehydration, seizure, orthostatic hypotension, arrhythmia, AMI, UTI, pneumonia, sepsis, anxiety    DIAGNOSTIC RESULTS     EKG: All EKG's are interpreted by the Emergency Department Physician who either signs or Co-signsthis chart in the absence of a cardiologist.  Interpreted by me  Atrial sensed ventricular paced rhythm  Ventricular rate 117 bpm  OR interval 122 ms  QRS duration 164 ms   ms  No ST elevation or acute T wave  Significant T wave inversions from septal and anterior leads    RADIOLOGY: non-plain film images(s) such as CT, Ultrasound and MRI are read by the radiologist.    XR CHEST PORTABLE   Final Result      No acute pneumonia. Right basilar atelectasis or scarring. Mid and lower left lung scarring. **This report has been created using voice recognition software. It may contain minor errors which are inherent in voice recognition technology. **      Final report electronically signed by Dr. Zahida Juarez on 5/17/2020 11:06 PM      CT HEAD WO CONTRAST    (Results Pending)       []Visualized and interpreted by me   [] Radiologist's Wet Read Report Reviewed   [] Discussed with RadiologistMis Henning:   Results for orders placed or performed during the hospital encounter of 05/17/20   CBC Auto Differential   Result Value Ref Range    WBC 13.0 (H) 4.8 - 10.8 thou/mm3    RBC 3.72 (L) 4.20 - 5.40 mill/mm3    Hemoglobin 10.9 (L) 12.0 - 16.0 gm/dl    Hematocrit 35.3 (L) 37.0 - 47.0 %    MCV 94.9 81.0 - 99.0 fL    MCH 29.3 26.0 - 33.0 pg    MCHC 30.9 (L) 32.2 - 35.5 gm/dl    RDW-CV 14.3 11.5 - 14.5 %    RDW-SD 49.3 (H) 35.0 - 45.0 fL    Platelets 736 094 - 705 thou/mm3    MPV 11.5 9.4 - 12.4 fL    Seg Neutrophils 71.7 %    Lymphocytes 15.2 %    Monocytes 7.0 %    Eosinophils 3.3 %    Basophils 0.4 %    Immature Granulocytes 2.4 %    Segs Absolute 9.3 (H) 1.8 - 7.7 thou/mm3    Lymphocytes Absolute 2.0 1.0 - 4.8 thou/mm3    Monocytes Absolute 0.9 0.4 - 1.3 thou/mm3    Eosinophils Absolute 0.4 0.0 - 0.4 thou/mm3    Basophils Absolute 0.1 0.0 - 0.1 thou/mm3    Immature Grans (Abs) 0.31 (H) 0.00 - 0.07 thou/mm3    nRBC 0 /100 wbc   Comprehensive Metabolic Panel   Result Value Ref Range    Glucose 220 (H) 70 - 108 mg/dL    CREATININE 5.6 (HH) 0.4 - 1.2 mg/dL    BUN 28 (H) 7 - 22 mg/dL    Sodium 132 (L) 135 - 145 meq/L    Potassium 3.5 3.5 - 5.2 meq/L    Chloride 90 (L) 98 - 111 meq/L    CO2 27 23 - 33 meq/L    Calcium 9.6 8.5 - 10.5 mg/dL    AST 13 5 - 40 U/L    Alkaline Phosphatase 114 38 - 126 U/L    Total Protein 6.2 6.1 - 8.0 g/dL    Alb 2.8 (L) 3.5 - 5.1 g/dL    Total Bilirubin 0.2 (L) 0.3 - 1.2 mg/dL    ALT 9 (L) 11 - 66 U/L   Brain Natriuretic Peptide   Result Value Ref Range    Pro-BNP 5479.0 (H) 0.0 - 1800.0 pg/mL   Troponin   Result Value Ref Range    Troponin T 0.112 (A) ng/ml   APTT   Result Value Ref Range    aPTT 29.5 22.0 - 38.0 seconds   Protime-INR   Result Value Ref Range Action:     Large bore IV  Telemetry monitor  EKG  Normal saline bolus 1 L  Labs  Chest x-ray and head CT (medical record shows patient is on Coumadin, with syncope and possible fall, head CT is warranted). MedicalDecision Making    Reassessment: Stable. Patient is medicated with following medications during ED stay. Medications   cefTRIAXone (ROCEPHIN) 1 g IVPB in 50 mL D5W minibag (has no administration in time range)   0.9 % sodium chloride bolus (1,000 mLs Intravenous New Bag 5/17/20 4320)       She persistently is tachycardic. No fever. She is given 1 L saline bolus and is still tacky, WBC 13, lactic acid 2.9, her urine has true UTI, otherwise labs at her baseline. Potassium 3.5. She receives Rocephin. She has SIRS. She has sepsis but no severe sepsis or septic shock. Admission is warranted. Discussed and admitted to hospitalist service. CRITICAL CARE:   None    CONSULTS:  Dr. Claude Ramirez:  None    FINAL IMPRESSION      1. Syncope and collapse    2. ESRD on peritoneal dialysis ([facility]    3. Urinary tract infection in female    4. Septicemia ([facility]    5. Elevated troponin    6. SIRS (systemic inflammatory response syndrome) ([facility])          DISPOSITION/PLAN   Admit    PATIENT REFERRED TO:  No follow-up provider specified.     DISCHARGE MEDICATIONS:  New Prescriptions    No medications on file       (Please note that portions of this note were completed with a voice recognition program.  Efforts were made to edit the dictations but occasionally words aremis-transcribed.)    MD Love Thomson MD  05/18/20 0382

## 2020-05-18 NOTE — ED TRIAGE NOTES
Pt presented to ED via [facility]. Reports periods of syncope and SOB leading to loss of consciousness. Alert and oriented. No N/V. Pt reports this began yesterday afternoon. EMS reports pt tested negative for COVID twice and 14 day quarantining is due to finish tomorrow. EKG conducted. Pt placed on 2 L nasal cannula for pt comfort. Pt denies any pain at this time. Pt denies any other needs at this time. Call light in place.

## 2020-05-18 NOTE — DISCHARGE INSTR - COC
thrombocytopenia D75.82    History of breast cancer Z85.3    History of CVA (cerebrovascular accident) Z80.78    History of pulmonary embolus (PE) Z86.711    Iron deficiency anemia D50.9    Osteoarthritis M19.90    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    Vitamin D deficiency E55.9    COPD without exacerbation ([facility]) J44.9    CHF (congestive heart failure), NYHA class III, chronic, diastolic ([facility]) U49.29    Anemia, chronic disease D63.8    Anemia, chronic renal failure N18.9, D63.1    Acute combined systolic and diastolic CHF, NYHA class 1 ([facility]) I50.41    ESRD on peritoneal dialysis ([facility]) N18.6, Z99.2    Mild aortic stenosis I35.0    Physical deconditioning R53.81    UTI (urinary tract infection) N39.0       Isolation/Infection:   Isolation          Contact        Patient Infection Status     Infection Onset Added Last Indicated Last Indicated By Review Planned Expiration Resolved Resolved By    MRSA  09/06/18 12/14/19 MRSA Screening Culture Only        Scalp 9/2018  Nares 3/2019, 12/2019    Resolved    COVID-19 Rule Out 05/17/20 05/17/20 05/17/20 COVID-19 (Ordered)   05/18/20 Rule-Out Test Resulted          Nurse Assessment:  Last Vital Signs: BP (!) 122/52   Pulse 123   Temp 98.2 °F (36.8 °C) (Oral)   Resp 18   Ht 5' 6\" (1.676 m)   Wt 246 lb (111.6 kg)   SpO2 97%   BMI 39.71 kg/m²     Last documented pain score (0-10 scale): Pain Level: 0  Last Weight:   Wt Readings from Last 1 Encounters:   05/17/20 246 lb (111.6 kg)     Mental Status:  oriented and alert    IV Access:  - None  CAPD Peritineal Catheter   Nursing Mobility/ADLs:  Walking   Dependent  Transfer  Dependent  Bathing  Assisted  Dressing  Assisted  Toileting  Dependent  Feeding  Independent  Med Admin  Independent  Med Delivery   whole    Wound Care Documentation and Therapy:  Wound 09/05/18 Coccyx DTI (Active)   Number of days: 621       Wound 12/09/19 Ischium Right;Left possible DTI, purple (Active)   Dressing/Treatment Protective barrier 5/18/2020  1:18 PM   Wound Cleansed Soap and water 5/18/2020  3:00 AM   Wound Assessment Clean;Dry; Intact; Purple;Red 5/18/2020  1:18 PM   Drainage Amount None 5/18/2020  1:18 PM   Number of days: 161       Wound 05/18/20 Heel Left (Active)   Wound Pressure Stage  1 5/18/2020  1:34 PM   Dressing Status Clean;Dry; Intact 5/18/2020  1:34 PM   Number of days: 0       Wound 05/18/20 Anterior;Right (Active)   Wound Pressure Stage  1 5/18/2020  1:34 PM   Dressing Status Clean;Dry; Intact 5/18/2020  1:34 PM   Number of days: 0        Elimination:  Continence:   · Bowel: Yes  · Bladder: Yes  Urinary Catheter: None   Colostomy/Ileostomy/Ileal Conduit: No       Date of Last BM: 5/21/2020  No intake or output data in the 24 hours ending 05/18/20 1400  No intake/output data recorded. Safety Concerns:     None    Impairments/Disabilities:      Speech and Vision    Nutrition Therapy:  Current Nutrition Therapy:   - Oral Diet:  Carb Control 4 carbs/meal (1800kcals/day)    Routes of Feeding: Oral  Liquids: No Restrictions  Daily Fluid Restriction: no  Last Modified Barium Swallow with Video (Video Swallowing Test): not done    Treatments at the Time of Hospital Discharge:   Respiratory Treatments: n/a  Oxygen Therapy:  is not on home oxygen therapy.   Ventilator:    - No ventilator support    Rehab Therapies: Physical Therapy and Occupational Therapy  Weight Bearing Status/Restrictions: No weight bearing restirctions  Other Medical Equipment (for information only, NOT a DME order):  hospital bed and Sadi Dense with transfer  Other Treatments: none    Patient's personal belongings (please select all that are sent with patient):  Leida    RN SIGNATURE:  Electronically signed by Laury Knight RN on 5/21/20 at 10:36 AM EDT    CASE MANAGEMENT/SOCIAL WORK SECTION    Inpatient Status Date: Observation    Readmission Risk Assessment Score:  Readmission Risk              Risk of Unplanned Readmission:        0           Discharging to Facility/ Agency   · Name: [facility]  · [address], [place], [address] [facility]  · Phone:[phone]  · [address] [facility]    Dialysis Facility (if applicable)   · Name:  · Address:  · Dialysis Schedule:  · Phone:  · Fax:    / signature: Electronically signed by Zeynep Rebollar. BARBARA Harris on 5/18/20 at 2:02 PM EDT    PHYSICIAN SECTION    Prognosis: Guarded    Condition at Discharge: Stable    Rehab Potential (if transferring to Rehab): Fair    Recommended Labs or Other Treatments After Discharge: none     Physician Certification: I certify the above information and transfer of Stephenie Merlos  is necessary for the continuing treatment of the diagnosis listed and that she requires [place] for greater 30 days.      Update Admission H&P: No change in H&P    PHYSICIAN SIGNATURE:  Electronically signed by Dexter Zaidi MD on 5/21/20 at 10:52 AM EDT

## 2020-05-18 NOTE — H&P
[facility] Hospitalist History & Physical   5/17/2020 10:09 PM   Assessment and Plan:        1. Syncope:   a. Differential  i. Orthostatic hypotension (likely): hx is consistent with LOC immediately after standing. Received 1L NS in the ED. Will continue gentle hydration. Pending orthostats. Would appreciate nephrology input with q6 peritoneal dialysis. ii. Cardiac/ arrhythmias unlikely with recent echo only showing mild AS. Continue telemetry. Fall precautions.   iii. Must r/o recurrent PE with tachycardia, hx/risk factors (estrogen use, sedentary) and not currently anticoagulated. Will CTA. 2. Hx of PE (2013): Not currently on warfarin due to recent admission for vaginal bleeding. Will need to discuss with gynecology/PCP and patient about risk/benefits of restarting warfarin. 3. ESRD on PD q6 hours. Consult Nephrology for management. Patient does not know if she still makes urine (UA collected in the ED)  4. Hx Postmenopausal vaginal bleeding: Started on Megace 4/26/2020 with improvement in bleeding. Ultrasound 4/26 shows thickened endometrium. Gynecology discussed options for additional workup with patient and stated too high risk for surgery/radiation, patient agrees with conservative treatment with Megace at this time  5. ? Acute cystitis UTI   a. Leukocytosis, afebrile, no flank pain or CVA tenderness. b. UA shows large LE, >100 bacteria, many bacteria. c. Pend 2 blood cx, urine cx. Continue ceftriaxone 1g daily. 6. Lactic Acidosis likely 2/2 UTI/dehydration. Will trend  7. Acute Dyspnea (without acute hypoxia) on Chronic Hypoxic Respiratory Failure / COPD (on 3L):  a. SOB at baseline, patient states it has worsened in the last day  b. PE with no wheezing, rales or rhonchi, but diffuse diminished breath sounds. Trace b/l LE edema. c. CXR shows no acute pna or pulmonary edema. BNP 5479  d. Ipatroprium/duonebs  e. Encourage close OP follow up / ?TACO/OHS  8.  Nonocclusive CAD / Chronically elevated Protein,  (A) NEGATIVE    Urobilinogen, Urine 1.0 0.0 - 1.0 eu/dl    Nitrite, Urine NEGATIVE NEGATIVE    Leukocyte Esterase, Urine LARGE (A) NEGATIVE    Color, UA YELLOW STRAW-YELL    Character, Urine TURBID (A) CLEAR-SL C    RBC, UA 25-50 0-2/hpf /hpf    WBC, UA > 200 0-4/hpf /hpf    Epithelial Cells, UA 15-20 3-5/hpf /hpf    Amorphous, UA DEBRIS NONE SEEN    Bacteria, UA MANY FEW/NONE S /hpf    Casts UA NONE SEEN NONE SEEN /lpf    Crystals, UA NONE SEEN NONE SEEN    Renal Epithelial, UA NONE SEEN NONE SEEN    Yeast, UA NONE SEEN NONE SEEN    CASTS 2 NONE SEEN NONE SEEN /lpf    MISCELLANEOUS 2 NONE SEEN    EKG 12 Lead   Result Value Ref Range    Ventricular Rate 117 BPM    Atrial Rate 117 BPM    P-R Interval 122 ms    QRS Duration 164 ms    Q-T Interval 394 ms    QTc Calculation (Bazett) 549 ms    P Axis 110 degrees    R Axis -143 degrees    T Axis 46 degrees       EKG / Radiology:     EKG:  Reviewed by me: Ventricular paced rhythm    CXR:   Reviewed by me: no acute findings, low lung volumes. Ct Head Wo Contrast    Result Date: 5/18/2020  PROCEDURE: CT HEAD WO CONTRAST CLINICAL INFORMATION: syncope, fall. COMPARISON: Noncontrast brain CT dated 2/9/2020 TECHNIQUE: Noncontrast 5 mm axial images were obtained through the brain. Sagittal and coronal reconstructions were obtained and reviewed. All CT scans at this facility use dose modulation, iterative reconstruction, and/or weight-based dosing when appropriate to reduce radiation dose to as low as reasonably achievable. FINDINGS: Brain: There is no acute ischemic infarct, hemorrhage, midline shift, mass, or mass effect. There is an old infarct in the right cerebellar hemisphere. There are old lacunar infarcts in the bilateral lentiform nuclei. There are mild deep white matter hypodensities, nonspecific in nature but probably representing small vessel chronic ischemic changes. There is age appropriate cortical atrophy. Ventricles:  The ventricles, cisterns

## 2020-05-18 NOTE — PROGRESS NOTES
[facility]  INPATIENT OCCUPATIONAL THERAPY  [facility] RENAL TELEMETRY 6K  EVALUATION    Time: Time In: 6745  Time Out: 1440  Timed Code Treatment Minutes: 20 Minutes  Minutes: 28    Date: 2020  Patient Name: Heather Masters,   Gender: female      MRN: [id number]  : 1934  (80 y.o.)  Referring Practitioner: LUCINDA Ornelas  Diagnosis: UTI  Additional Pertinent Hx: Per H&P, \" Guero Welch is an 72-year-old white female former smoker with PMH of CAD, nonischemic HFpEF s/p AICD, CVA, ESRD, COPD, IDDM type 2 with neuropathy, GERD, hx breat CA, HLD, OA, thyroid disease, Hx PE on warfarin who presents to [facility] ED on 20 c/o syncope that occurred just prior to arrival after the patient had a bowel movement, was on the toilet, started to feel dizzy and then when she stood up to go back to bed, she passed and woke up in bed with EMS there to take her to the ED. She states she was told she did not fall. She has associated SOB that started this morning when she woke up. She used to receive breathing treatments but has not recently. She denies fever, chills, rhinorrhea, cough, sputum production, dysuria, nausea, vomiting, diarrhea, abdominal pain. \"     Restrictions/Precautions:  Restrictions/Precautions: Fall Risk, General Precautions    Subjective  Chart Reviewed: Yes  Patient assessed for rehabilitation services?: Yes    Subjective: RN okayed OT session. Upon arrival patient was sitting up in bed. Pt was agreeable to OT session.      Pain:  Pain Assessment  Patient Currently in Pain: Denies    Social/Functional History:  Type of [facility]  Home Equipment: [address] Help From: Other (comment)(Staff of facility)  ADL Assistance: Needs assistance  Homemaking Assistance: Needs assistance  Ambulation Assistance: Needs assistance(Wheelchair Bound)  Transfer Assistance: Needs assistance    Active : No  Occupation: Retired     Cognition/Orientation:  Overall

## 2020-05-18 NOTE — ED NOTES
Pt resting in bed, respirations easy and unlabored. Antibiotic started. No c/o pain at this time. Call light in place.       [place] Heater  05/18/20 9716

## 2020-05-18 NOTE — PROGRESS NOTES
Pt admitted to  8AB32 via transport from [address] [place] [address] Drive: [address]. Complaints: orthostatic hypotension. IV IV push only, no IV fluids infusing into the hand left, condition patent and no redness at a rate of 0 mls/ hour. IV site free of s/s of infection or infiltration. Vital signs obtained. Assessment and data collection initiated. Two nurse skin assessment performed by Zoë Gramajo RN and Carlos Vargas RN. Oriented to room. Policies and procedures for 8AB explained. All questions answered with no further questions at this time. Fall prevention and safety brochure discussed with patient. Bed alarm on. Call light in reach. Oriented to room. Explained patients right to have family, representative or physician notified of their admission. Patient has Declined for physician to be notified. Patient has Requested for family/representative to be notified. Patient to floor at 0300, would like family notified of admission and plan of care today after physicians round at a later time after breakfast.  Patient would like family notified once per shift? Yes, requests Omari Silva be notified. Name/number in chart.

## 2020-05-18 NOTE — ED NOTES
Bed: 002A  Expected date: 5/17/20  Expected time: 9:42 PM  Means of arrival: [facility]  Comments:     Charissa Tamayo RN  05/17/20 0104

## 2020-05-19 NOTE — PLAN OF CARE
Problem: Nutrition  Goal: Optimal nutrition therapy  Outcome: Ongoing   Nutrition Problem: Increased nutrient needs  Intervention: Food and/or Nutrient Delivery: Continue current diet  Nutritional Goals: Patient will consume 75% or more of meals and ONS to support wound healing during LOS.

## 2020-05-19 NOTE — FLOWSHEET NOTE
05/19/20 1806   Provider Notification   Reason for Communication Review case   Provider Name Dr. Aga Banerjee   Provider Notification Physician   Method of Communication Secure Message   Response Waiting for response       Notified Dr. Aga Banerjee regarding patient dialysis exchange with fibrinogen present, waiting for response.

## 2020-05-19 NOTE — H&P
Hospitalist Progress Note      Patient: Myles Davis.  YOB: 1934  MRN: [id number]   Acct: [de-identified]     PCP: Prema Matute MD  Date of Admission: 5/17/2020      Assessment and Plan:        1. Syncope:   i. Likely secondary to dehydration orthostatic hypotension  ii. 5/19 receiving gentle IV fluids-andimprovement in symptoms    2. ESRD on PD -? PD peritonitis-check Gram stain from the PD fluid and cultures currently on ceftriaxone. We will follow-up culture reports  3. Hx Postmenopausal vaginal bleeding: Started on Megace 4/26/2020 with improvement in bleeding. Ultrasound 4/26 shows thickened endometrium. Gynecology discussed options for additional workup with patient and stated too high risk for surgery/radiation, patient agrees with conservative treatment with Megace at this time  4. ? Acute cystitis UTI   a. On ceftriaxone will follow final culture reports  5. Lactic Acidosis likely 2/2 UTI/dehydration. Will trend-resolved  6. Acute Dyspnea (without acute hypoxia) on Chronic Hypoxic Respiratory Failure / COPD (on 3L):  a. 5/19-improved  7. Nonocclusive CAD / Chronically elevated Troponin: likely 2/2 ESRD  a. Denies CP  b. EKG shows ventricular paced rhythm. Troponin 0.112 (at baseline). Last cath showed distal LAD 50% stenosis, patent LM/RCA/left circumflex. 8. Nonischemic Cardiomyopathy / HFpEF, compensated s/p pacemaker/AICD  a. Echo 12/09/19 shows EF 55, overall LV function normal, mild AS. 9. IDDM type 2, controlled: HgbA1c 4.9 02/2019. Resume lantus. Low dose SSI, Hypoglycemic order. 10. Chronic normocytic Anemia:  Likely 2/2 chronic disease/CKD  11. GERD with esophageal stricture s/p dilation: continue PPI. 12. History of adrenal nodule: Noted CT abdomen 2015 was 1.4x1.0cm on left. 13. History of HIT: Noted. Avoid heparin products. 14. PAD s/p left SFA PTA: Continue statin/statin  15.  Hx CVA (unclear when, prior to 2012): Continue pulses 1+ b/l. Brisk capillary refill. Skin:  Skin warm and dry. No lesions, rash. Psych:  Alert and oriented x3. Affect appropriate  Lymph:  No supraclavicular or cervical adenopathy. Neurologic: No focal deficits. No Seizures. Data:   Labs:   Results for orders placed or performed during the hospital encounter of 05/17/20   Culture, Blood 1   Result Value Ref Range    Blood Culture, Routine No growth-preliminary     Culture, Blood 2   Result Value Ref Range    Blood Culture, Routine No growth-preliminary     Culture, Reflexed, Urine   Result Value Ref Range    Urine Culture Reflex (A)      Mixed growth. The mixture of organisms present represents both organisms that may cause urinary tract infections and organisms that are not a common cause of urinary tract infections and are possibly distal urethral liz. Organism Mixed Growth    (A)    Gram Stain   Result Value Ref Range    Gram Stain Result       Moderate segmented neutrophils observed. No bacteria seen.  performed on cytospun specimen    CBC Auto Differential   Result Value Ref Range    WBC 13.0 (H) 4.8 - 10.8 thou/mm3    RBC 3.72 (L) 4.20 - 5.40 mill/mm3    Hemoglobin 10.9 (L) 12.0 - 16.0 gm/dl    Hematocrit 35.3 (L) 37.0 - 47.0 %    MCV 94.9 81.0 - 99.0 fL    MCH 29.3 26.0 - 33.0 pg    MCHC 30.9 (L) 32.2 - 35.5 gm/dl    RDW-CV 14.3 11.5 - 14.5 %    RDW-SD 49.3 (H) 35.0 - 45.0 fL    Platelets 365 724 - 648 thou/mm3    MPV 11.5 9.4 - 12.4 fL    Seg Neutrophils 71.7 %    Lymphocytes 15.2 %    Monocytes 7.0 %    Eosinophils 3.3 %    Basophils 0.4 %    Immature Granulocytes 2.4 %    Segs Absolute 9.3 (H) 1.8 - 7.7 thou/mm3    Lymphocytes Absolute 2.0 1.0 - 4.8 thou/mm3    Monocytes Absolute 0.9 0.4 - 1.3 thou/mm3    Eosinophils Absolute 0.4 0.0 - 0.4 thou/mm3    Basophils Absolute 0.1 0.0 - 0.1 thou/mm3    Immature Grans (Abs) 0.31 (H) 0.00 - 0.07 thou/mm3    nRBC 0 /100 wbc   Comprehensive Metabolic Panel   Result Value Ref Range    Glucose 220 Epithelial Cells, UA 15-20 3-5/hpf /hpf    Amorphous, UA DEBRIS NONE SEEN    Bacteria, UA MANY FEW/NONE S /hpf    Casts UA NONE SEEN NONE SEEN /lpf    Crystals, UA NONE SEEN NONE SEEN    Renal Epithelial, UA NONE SEEN NONE SEEN    Yeast, UA NONE SEEN NONE SEEN    CASTS 2 NONE SEEN NONE SEEN /lpf    MISCELLANEOUS 2 NONE SEEN    Basic Metabolic Panel w/ Reflex to MG   Result Value Ref Range    Sodium 130 (L) 135 - 145 meq/L    Potassium reflex Magnesium 3.7 3.5 - 5.2 meq/L    Chloride 91 (L) 98 - 111 meq/L    CO2 26 23 - 33 meq/L    Glucose 143 (H) 70 - 108 mg/dL    BUN 28 (H) 7 - 22 mg/dL    CREATININE 5.2 (HH) 0.4 - 1.2 mg/dL    Calcium 9.2 8.5 - 10.5 mg/dL   Procalcitonin   Result Value Ref Range    Procalcitonin 0.41 (H) 0.01 - 0.09 ng/mL   Lactic acid, plasma   Result Value Ref Range    Lactic Acid 2.4 (H) 0.5 - 2.2 mmol/L   Anion Gap   Result Value Ref Range    Anion Gap 13.0 8.0 - 16.0 meq/L   Glomerular Filtration Rate, Estimated   Result Value Ref Range    Est, Glom Filt Rate 8 (A) ml/min/1.73m2   Lactic acid, plasma   Result Value Ref Range    Lactic Acid 2.6 (H) 0.5 - 2.2 mmol/L   Lactic acid, plasma   Result Value Ref Range    Lactic Acid 3.3 (H) 0.5 - 2.2 mmol/L   Basic Metabolic Panel   Result Value Ref Range    Sodium 133 (L) 135 - 145 meq/L    Potassium 4.8 3.5 - 5.2 meq/L    Chloride 96 (L) 98 - 111 meq/L    CO2 21 (L) 23 - 33 meq/L    Glucose 162 (H) 70 - 108 mg/dL    BUN 32 (H) 7 - 22 mg/dL    CREATININE 6.2 (HH) 0.4 - 1.2 mg/dL    Calcium 9.2 8.5 - 10.5 mg/dL   Troponin   Result Value Ref Range    Troponin T 0.109 (A) ng/ml   Anion Gap   Result Value Ref Range    Anion Gap 16.0 8.0 - 16.0 meq/L   Glomerular Filtration Rate, Estimated   Result Value Ref Range    Est, Glom Filt Rate 6 (A) ml/min/1.73m2   Lactic acid, plasma   Result Value Ref Range    Lactic Acid 2.0 0.5 - 2.2 mmol/L   CBC   Result Value Ref Range    WBC 9.4 4.8 - 10.8 thou/mm3    RBC 3.05 (L) 4.20 - 5.40 mill/mm3    Hemoglobin 9.0 CLINICAL INFORMATION: syncope. COMPARISON: Chest x-ray dated 4/7/2020 TECHNIQUE: AP Portable chest xray FINDINGS: Lines/tubes/devices: There is a stable left subclavian dual lead AICD device, leads in the regions of the right atrium and right ventricle. There has been interval removal of the right jugular dialysis catheter since the prior study. Lungs/pleura: There is right basilar atelectasis or scarring and mid and lower left lung scarring. There is no pneumonia or pulmonary edema. No pleural effusion. No pneumothorax. Heart: Heart size is normal. Mediastinum/yakelin: No obvious mass or adenopathy. Skeleton: No significant bone or joint abnormality. No acute pneumonia. Right basilar atelectasis or scarring. Mid and lower left lung scarring. **This report has been created using voice recognition software. It may contain minor errors which are inherent in voice recognition technology. ** Final report electronically signed by Dr. Martin Milan on 5/17/2020 11:06 PM      Case and Plan discussed with Alexandru Chavez MD  Electronically signed by Carmelita Martínez MD on 5/19/2020 at 2:54 PM

## 2020-05-19 NOTE — PROGRESS NOTES
hospital food. Has no teeth but eats most food just fine. Pt agreable to wound healing ONS. Sodium 132, BUN 29, Creatinine 6, Glucose 120. CAPD 1.5% (2 liter) every 6 hours, megace 40 mg BID, vitamin D, humalog, renvela.  3 BM's. Pt does state she is forgetful. · Wound Type: (lf heel, stage I, right anterior stage I, ischium DTI)  · Current Nutrition Therapies:  · Oral Diet Orders: Renal   · Oral Diet intake: %  · Oral Nutrition Supplement (ONS) Orders: Wound Healing Oral Supplement(Nestor BID)  · ONS intake: Unable to assess  · Anthropometric Measures:  · Ht: 5' 6\" (167.6 cm)   · Current Body Wt: 253 lb (114.8 kg)(5/19/20 +1 LE edema)  · Admission Body Wt: 246 lb (111.6 kg)(5/17/20 stated +1 LE edema)  · Usual Body Wt: (~ 234-245# per pt. Per EMR: 6/6/19: 263#, 12/8/19: 246#4. 8oz)  · % Weight Change:  ,  No significant weight changes, pt does receive CAPD  · Ideal Body Wt: 130 lb (59 kg),   · BMI Classification: BMI > or equal to 40.0 Obese Class III    Nutrition Interventions:   Continue current diet  Continued Inpatient Monitoring, Education Initiated, Coordination of Care    Nutrition Evaluation:   · Evaluation: Goals set   · Goals: Patient will consume 75% or more of meals and ONS to support wound healing during LOS.     · Monitoring: Meal Intake, Supplement Intake, Diet Tolerance, Wound Healing, Weight, Pertinent Labs, Chewing/Swallowing      Electronically signed by Angela Canales RD, LD on 5/19/20 at 2:52 PM EDT    Contact Number: [phone]

## 2020-05-19 NOTE — PROGRESS NOTES
130* 133* 132*   K 3.5 3.7 4.8 4.1   CL 90* 91* 96* 95*   CO2 27 26 21* 24   BUN 28* 28* 32* 29*   CREATININE 5.6* 5.2* 6.2* 6.0*   GLUCOSE 220* 143* 162* 120*   CALCIUM 9.6 9.2 9.2 8.6   MG 1.7  --   --   --      Hepatic:   Recent Labs     05/17/20  2249   LABALBU 2.8*   AST 13   ALT 9*   BILITOT 0.2*   ALKPHOS 114         Meds:  Infusion:    dextrose      sodium chloride 35 mL/hr at 05/18/20 1524     Meds:    cefTRIAXone (ROCEPHIN) IV  1 g Intravenous Q24H    sodium chloride flush  10 mL Intravenous 2 times per day    clopidogrel  75 mg Oral QPM    docusate sodium  200 mg Oral Daily    insulin glargine  10 Units Subcutaneous Nightly    megestrol  40 mg Oral BID    metoprolol succinate  50 mg Oral Daily    pantoprazole  40 mg Oral BID    polycarbophil  625 mg Oral TID    polyethylene glycol  17 g Oral BID    pravastatin  40 mg Oral QPM    sevelamer  800 mg Oral TID WC    insulin lispro  0-6 Units Subcutaneous TID WC    insulin lispro  0-3 Units Subcutaneous Nightly    Vitamin D  1,000 Units Oral Daily    sodium chloride  500 mL Intravenous Once    dianeal lo-nikko 1.5%  2,000 mL Intraperitoneal Q6H     Meds prn: sodium chloride flush, acetaminophen **OR** acetaminophen, polyethylene glycol, promethazine **OR** ondansetron, glucose, dextrose, glucagon (rDNA), dextrose       Impression and Plan:  1. ESRD on PD. PD data reviewed. Continue with 1.5% every 6 hours. Last exchange drained 2200 mL of fluid. Will monitor input and output. Adjust dialysate solution as needed. I was informed last night that patient had some cloudiness of PD effluent. Patient denies any abdominal pain. She has no tenderness on palpation. However cell count and cell cultures were sent. Patient did receive 1 dose of intraperitoneal vancomycin. Patient is already on IV Rocephin. 2. Syncope: Unclear etiology. May need further evaluation including cardiac work-up. Continue with gentle IV fluid hydration for now. Check orthostatics every shift  3. Mild hyponatremia in setting of ESRD. Due to reduced free water excretion  4. Anemia in ESRD  5. Chronic diastolic dysfunction. Patient does not appear to be in decompensated heart failure  6. Chronic respiratory failure on chronic oxygen therapy at home  7. COPD  8. Pulmonary hypertension  9. Insulin-dependent diabetes mellitus  10. Probable UTI. Patient has pyuria and leukocytosis. Follow urine cultures. Continue with IV antibiotics.   White cell count is improving    D/W patient and RN    Alyse Solis MD  [facility]

## 2020-05-20 PROBLEM — R55 NEAR SYNCOPE: Status: ACTIVE | Noted: 2020-01-01

## 2020-05-20 NOTE — PROGRESS NOTES
Hospitalist Progress Note    Patient: Betito    YOB: 1934    MRN: [id number]       Acct: [de-identified]     PCP: Lavon Nicole MD    Date of Admission: 5/17/2020    Active Hospital Problems    Diagnosis Date Noted    Near syncope [R55] 05/20/2020    Hyponatremia [E87.1]     Anemia in ESRD (end-stage renal disease) ([facility] [address] [N18.6, D63.1]     UTI (urinary tract infection) [N39.0] 05/18/2020     Assessment and Plan:        1. Near Syncope: likely 2/2 to dehydration. Improved with IV fluids. 2. ESRD on PD: last drained 2200 ml. Cultures no growth so far. Con to follow. Cont IV Rocephin. 3. Hx Postmenopausal vaginal bleeding: Started on Megace 4/26/2020 with improvement in bleeding.  Ultrasound 4/26 shows thickened endometrium.  Gynecology discussed options for additional workup with patient and stated too high risk for surgery/radiation, patient agrees with conservative treatment with Megace at this time  4. Acute cystitis UTI   a. On ceftriaxone will follow final culture reports  5. Lactic Acidosis likely 2/2 UTI/dehydration. Resolved. 6. Acute Dyspnea (without acute hypoxia) on Chronic Hypoxic Respiratory Failure / COPD (on 3L):  a. 5/19-improved  7. Nonocclusive CAD / Chronically elevated Troponin: likely 2/2 ESRD  a. Denies CP  b. EKG shows ventricular paced rhythm. Troponin 0.112 (at baseline). Last cath showed distal LAD 50% stenosis, patent LM/RCA/left circumflex. 8. Nonischemic Cardiomyopathy / HFpEF, compensated s/p pacemaker/AICD  a. Echo 12/09/19 shows EF 55, overall LV function normal, mild AS. 9. IDDM type 2, controlled: HgbA1c 4.9 02/2019. Resume lantus. Low dose SSI, Hypoglycemic order. 10. Chronic normocytic Anemia:  Likely 2/2 chronic disease/CKD  11. GERD with esophageal stricture s/p dilation: continue PPI.   12. History of adrenal nodule: Noted CT abdomen 2015 was 1.4x1.0cm on left.   13.  History of HIT: Noted. Avoid heparin products. 14. PAD s/p left SFA PTA: Continue statin/statin  15. Hx CVA (unclear when, prior to 2012): Continue plavix/statin  16. Hx Breast CA right 1982 s/p mastectomy and chemo, left 2007 s/p mastectomy  17. Obesity: BMI 39.  on weight loss  18. Deconditioning: PT/OT eval. Resides at nursing home. Dispo: pending final PD Culture report. Spoke with Nephrologist and came up with plan. Pending pre-cert to ECF. Plan discharge tomorrow. Chief Complaint: near syncope     Hospital Course: 80-year-old white female former smoker with PMH of CAD, nonischemic HFpEF s/p AICD, CVA, ESRD, COPD, IDDM type 2 with neuropathy, GERD, hx breat CA, HLD, OA, thyroid disease, Hx PE on warfarin who presents to [address] [place] [address] ED on 5/17/20 c/o syncope that occurred just prior to arrival after the patient had a bowel movement, was on the toilet, started to feel dizzy and then when she stood up to go back to bed, she passed and woke up in bed with EMS there to take her to the ED. She states she was told she did not fall. She has associated SOB that started this morning when she woke up. She used to receive breathing treatments but has not recently. She denies fever, chills, rhinorrhea, cough, sputum production, dysuria, nausea, vomiting, diarrhea, abdominal pain.       Subjective: no acute events overnight. Pt feeling nauseas, tolerating some PO intake. No dizziness or lightheadedness. No chest pain or sob. Having abdominal discomfort. No diarrhea. Pt overall feeling much better.        Medications:  Reviewed    Infusion Medications    dextrose      sodium chloride 20 mL/hr at 05/19/20 0800     Scheduled Medications    [START ON 5/21/2020] metoprolol succinate  100 mg Oral Daily    metoprolol succinate  50 mg Oral Once    cefTRIAXone (ROCEPHIN) IV  1 g Intravenous Q24H    sodium chloride flush  10 mL Intravenous 2 times per day    clopidogrel  75 mg Oral QPM    docusate sodium  200 mg Oral Daily    insulin glargine  10 Units Subcutaneous Nightly    megestrol  40 mg Oral BID    pantoprazole  40 mg Oral BID    polycarbophil  625 mg Oral TID    polyethylene glycol  17 g Oral BID    pravastatin  40 mg Oral QPM    sevelamer  800 mg Oral TID     insulin lispro  0-6 Units Subcutaneous TID     insulin lispro  0-3 Units Subcutaneous Nightly    Vitamin D  1,000 Units Oral Daily    sodium chloride  500 mL Intravenous Once    dianeal lo-nikko 1.5%  2,000 mL Intraperitoneal Q6H     PRN Meds: sodium chloride flush, acetaminophen **OR** acetaminophen, polyethylene glycol, promethazine **OR** ondansetron, glucose, dextrose, glucagon (rDNA), dextrose      Intake/Output Summary (Last 24 hours) at 5/20/2020 1357  Last data filed at 5/20/2020 1211  Gross per 24 hour   Intake 8824.06 ml   Output 8300 ml   Net 524.06 ml       Diet:  DIET RENAL;  Dietary Nutrition Supplements: Wound Healing Oral Supplement    Exam:  /64   Pulse 102   Temp 98.1 °F (36.7 °C) (Oral)   Resp 18   Ht 5' 6\" (1.676 m)   Wt 255 lb 6.4 oz (115.8 kg)   SpO2 98%   BMI 41.22 kg/m²     General:  White female who appears stated age, in no acute distress lying in bed. Head: Normocephalic and atraumatic. EENT: No exophthalmos noted. No scleral or conjunctiva icterus, injection or pallor noted. Neck: Supple. Trachea midline. No thyromegaly. Thorax/Lungs: Thorax is symmetrical with good expansion. Breath sounds diminished throughout, but CTA and equal b/l without rales, wheezing, or rhonchi. No retractions or use of abdominal muscles. Cardiac: S1, S2, RRR without murmur, rub, or gallop. No JVD  Abdomen: Abdomen very large but soft, nontender to palpation, without guarding or rigidity. Normoactive BS. Peripheral Vasculature: Extremities warm, dry with trace edema, no varicosities or stasis changes, DP pulses 1+ b/l. Brisk capillary refill. Skin:  Skin warm and dry. No lesions, rash. Psych:  Alert and oriented x3.   Affect appropriate  Lymph:  No

## 2020-05-20 NOTE — PROGRESS NOTES
[facility]   Nephrology progress note  5/20/2020, 12:48 PM      Pt Name:    Jovana Devi  MRN:     [id number]     [place]:    1934  Admit Date:    5/17/2020 10:09 PM  Primary Care Physician:  Mireya Charles MD   Room number  6K-12/012-A    Chief Complaint: Nephrology following for ESRD/PD    Subjective:  Patient seen and examined  No nausea or vomiting  Pd is going well  Last exchange drained about 2200 ml      Objective:  24HR INTAKE/OUTPUT:      Intake/Output Summary (Last 24 hours) at 5/20/2020 1248  Last data filed at 5/20/2020 1211  Gross per 24 hour   Intake 9843.88 ml   Output 8300 ml   Net 1543.88 ml     I/O last 3 completed shifts: In: 16120.9 [P.O.:1150; I.V.:693.9; Other:46663]  Out: 82222 [Other:86913]  I/O this shift:  In: 2000 [Other:2000]  Out: 2100 [Other:2100]  Admission weight: 246 lb (111.6 kg)  Wt Readings from Last 3 Encounters:   05/20/20 255 lb 6.4 oz (115.8 kg)   04/27/20 248 lb 3.2 oz (112.6 kg)   04/24/20 240 lb (108.9 kg)     Body mass index is 41.22 kg/m².     Physical examination  VITALS:     Vitals:    05/20/20 0427 05/20/20 0552 05/20/20 0748 05/20/20 1120   BP: 139/65  101/78 139/64   Pulse: 103  109 102   Resp: 18 20 20 18   Temp: 98.1 °F (36.7 °C)  98.1 °F (36.7 °C) 98.1 °F (36.7 °C)   TempSrc: Oral  Oral Oral   SpO2: 96% 95% 96% 98%   Weight: 255 lb 6.4 oz (115.8 kg)      Height:         General Appearance: alert and cooperative with exam, appears comfortable, no distress  Mouth/Throat: Oral mucosa moist  Neck: No JVD  Lungs: Air entry B/L, no rales, no use of accessory muscles  Heart:  S1, S2 heard  GI: Soft without any tenderness or any guarding  Extremities: Trace LE edema      Lab Data  CBC:   Recent Labs     05/17/20 2249 05/19/20  0554   WBC 13.0* 9.4   HGB 10.9* 9.0*   HCT 35.3* 29.1*    181     BMP:  Recent Labs     05/17/20 2249 05/18/20  0332 05/18/20  1441 05/19/20  0554   * 130* 133* 132*   K 3.5 3.7 4.8 4.1   CL 90* 91* 96* and leukocytosis. On IV abx, WBC is improving, cultures are negative so far. 11. Hypotension: BP is improving. IVF reduced.  May consider midodrine    D/W patient and RN  D/W hospitalist    Aurelia Wesley MD  [facility]

## 2020-05-21 NOTE — DISCHARGE SUMMARY
Hospital Medicine Discharge Summary      Patient Identification:   Michael Torrez   : 1934  MRN: [id number]   Account: [de-identified]      Patient's PCP: Lavon Nicole MD    Admit Date: 2020     Discharge Date:   20    Admitting Physician: Juan Valencia MD     Discharge Physician: Cece Black MD     Discharge Diagnoses: Vasovagal Syncope, ESRD on PD, Acute Cystitis, Chronic Hypoxic Resp. Failure     Active Hospital Problems    Diagnosis Date Noted    Near syncope [R55] 2020    Hyponatremia [E87.1]     Anemia in ESRD (end-stage renal disease) ([facility] [address] [N18.6, D63.1]     UTI (urinary tract infection) [N39.0] 2020       The patient was seen and examined on day of discharge and this discharge summary is in conjunction with any daily progress note from day of discharge. Hospital Course:     51-year-old white female former smoker with PMH of CAD, nonischemic HFpEF s/p AICD, CVA, ESRD, COPD, IDDM type 2 with neuropathy, GERD, hx breat CA, HLD, OA, thyroid disease, Hx PE on warfarin who presents to [facility] ED on 20 c/o syncope that occurred just prior to arrival after the patient had a bowel movement, was on the toilet, started to feel dizzy and then when she stood up to go back to bed, she passed and woke up in bed with EMS there to take her to the ED. She states she was told she did not fall. She has associated SOB that started this morning when she woke up. When brought to the ED, pt appeared dehydrated. Pt given IV fluid bolus. Found to have UTI. Pt improved significantly with fluids and ABx. There was some concern of infected PD site, cultures drawn with no growth. Likely vasovagal syncope. Nephrology ok for discharge back to [facility]. Pt tolerating PO intake. Assessment and Plan:        1. Vasovagal Syncope: likely 2/2 to dehydration. Improved and resolved IV fluids. 2. ESRD on PD: last drained 2200 ml. Cultures no growth.    3. Hx Postmenopausal vaginal bleeding: Started on Megace 4/26/2020 with improvement in bleeding.  Ultrasound 4/26 shows thickened endometrium.  Gynecology discussed options for additional workup with patient and stated too high risk for surgery/radiation, patient agrees with conservative treatment with Megace at this time  4. Acute cystitis UTI   a. Completed treatment with IV Rocephin. 5. Lactic Acidosis likely 2/2 UTI/dehydration. Resolved. 6. Acute Dyspnea (without acute hypoxia) on Chronic Hypoxic Respiratory Failure / COPD (on 3L):  a. 5/19-improved  7. Nonocclusive CAD / Chronically elevated Troponin: likely 2/2 ESRD  a. Denies CP  b. EKG shows ventricular paced rhythm. Troponin 0.112 (at baseline). Last cath showed distal LAD 50% stenosis, patent LM/RCA/left circumflex. 8. Nonischemic Cardiomyopathy / HFpEF, compensated s/p pacemaker/AICD  a. Echo 12/09/19 shows EF 55, overall LV function normal, mild AS. 9. IDDM type 2, controlled: HgbA1c 4.9 02/2019. Resume lantus. Low dose SSI, Hypoglycemic order. 10. Chronic normocytic Anemia:  Likely 2/2 chronic disease/CKD  11. GERD with esophageal stricture s/p dilation: continue PPI.   12. History of adrenal nodule: Noted CT abdomen 2015 was 1.4x1.0cm on left.   13. History of HIT: Noted. Avoid heparin products. 14. PAD s/p left SFA PTA: Continue statin/statin  15. Hx CVA (unclear when, prior to 2012): Continue plavix/statin  16. Hx Breast CA right 1982 s/p mastectomy and chemo, left 2007 s/p mastectomy  17. Obesity: BMI 39.  on weight loss  18. Deconditioning: PT/OT eval. Resides at nursing home.        Exam:     Vitals:  Vitals:    05/21/20 0642 05/21/20 0845 05/21/20 0929 05/21/20 1045   BP:  (!) 103/57  (!) 123/51   Pulse:  95  98   Resp:  20  20   Temp:  97.6 °F (36.4 °C)  98.3 °F (36.8 °C)   TempSrc:  Oral  Oral   SpO2:  96% 95% 96%   Weight: 248 lb (112.5 kg)      Height:         Weight: Weight: 248 lb (112.5 kg)     24 hour intake/output:    Intake/Output Summary (Last 24 hours) at 5/21/2020 1223  Last data filed at 5/21/2020 1125  Gross per 24 hour   Intake 8838.49 ml   Output 8400 ml   Net 438.49 ml         Labs: For convenience and continuity at follow-up the following most recent labs are provided:      CBC:    Lab Results   Component Value Date    WBC 9.5 05/21/2020    HGB 9.6 05/21/2020    HCT 28.9 05/21/2020     05/21/2020       Renal:    Lab Results   Component Value Date     05/21/2020    K 3.6 05/21/2020    K 3.7 05/18/2020    CL 92 05/21/2020    CO2 24 05/21/2020    BUN 28 05/21/2020    CREATININE 6.0 05/21/2020    CALCIUM 8.2 05/21/2020    PHOS 3.2 12/13/2019         Significant Diagnostic Studies    Radiology:   CTA CHEST W WO CONTRAST   Final Result   1. No pulmonary emboli are seen. 2. Fibroemphysematous lungs. Pulmonary arterial hypertension. 3. Groundglass appearance of the lung parenchyma diffusely, likely due to interstitial fibrosis. Superimposed atelectatic/fibrotic stranding/pneumonia both lower lung fields. No effusion. 4. Cholelithiasis. Fluid in the abdomen and pneumoperitoneum, likely related to peritoneal dialysis. **This report has been created using voice recognition software. It may contain minor errors which are inherent in voice recognition technology. **          Final report electronically signed by Dr. Stella Monroy on 5/18/2020 10:13 AM      CT HEAD WO CONTRAST   Final Result      No acute ischemic infarct, hemorrhage, or mass effect. Stable appearance of the brain compared to the previous study as detailed above. **This report has been created using voice recognition software. It may contain minor errors which are inherent in voice recognition technology. **      Final report electronically signed by Dr. Luis Daniel Day on 5/18/2020 1:15 AM      XR CHEST PORTABLE   Final Result      No acute pneumonia. Right basilar atelectasis or scarring. Mid and lower left lung scarring.       **This report has been created for Cough             insulin aspart (NOVOLOG) 100 UNIT/ML injection vial  Inject into the skin 2 times daily Inject per sliding scale 0-150 = 0 units; 151-200 = 1 unit; 201-205 = 2 units; 251-300 = 3 units; 301-350 = 4 units; 351-400 = 5 units; 401-450 = 6 units              insulin glargine (LANTUS) 100 UNIT/ML injection vial  Inject 10 Units into the skin nightly             ipratropium-albuterol (DUONEB) 0.5-2.5 (3) MG/3ML SOLN nebulizer solution  Inhale 1 vial into the lungs every 6 hours as needed for Shortness of Breath             megestrol (MEGACE) 40 MG tablet  Take 1 tablet by mouth 2 times daily             metoprolol succinate (TOPROL XL) 100 MG extended release tablet  Take 1 tablet by mouth daily Hold if SBP less than 100 and HR less than 50 bpm             miconazole (MICOTIN) 2 % powder  Apply topically 2 times daily. Multiple Vitamins-Minerals (OCUVITE EXTRA PO)  Take 1 tablet by mouth daily              mupirocin (BACTROBAN) 2 % cream  Apply topically daily Apply topically 3 times daily. nitroGLYCERIN (NITROSTAT) 0.4 MG SL tablet  up to max of 3 total doses. If no relief after 1 dose, call 911.              ondansetron (ZOFRAN) 4 MG tablet  Take 1 tablet by mouth every 8 hours as needed for Nausea or Vomiting             pantoprazole (PROTONIX) 40 MG tablet  Take 40 mg by mouth 2 times daily             polycarbophil (FIBERCON) 625 MG tablet  Take 625 mg by mouth 3 times daily             polyethylene glycol (GLYCOLAX) powder  Take 17 g by mouth 2 times daily              potassium chloride (KLOR-CON M) 20 MEQ extended release tablet  Take 20 mEq by mouth daily             pravastatin (PRAVACHOL) 40 MG tablet  Take 40 mg by mouth every evening Indications: Blood Cholesterol Abnormal              Probiotic Product (PROBIOTIC-10) CAPS  Take 1 capsule by mouth daily              sevelamer (RENVELA) 800 MG tablet  Take 1 tablet by mouth 3 times daily (with meals) sodium chloride (OCEAN, BABY AYR) 0.65 % nasal spray  1 spray by Nasal route as needed for Congestion             vitamin D 25 MCG (1000 UT) CAPS  Take 1,000 Units by mouth daily                 Time Spent on discharge is more than 30 minutes in the examination, evaluation, counseling and review of medications and discharge plan. Signed: Thank you Elvira Brock MD for the opportunity to be involved in this patient's care.     Electronically signed by Tiffanie Menon MD on 5/21/2020 at 12:23 PM

## 2020-06-02 PROBLEM — E86.1 HYPOTENSION DUE TO HYPOVOLEMIA: Status: ACTIVE | Noted: 2020-01-01

## 2020-06-02 PROBLEM — I95.89 HYPOTENSION DUE TO HYPOVOLEMIA: Status: ACTIVE | Noted: 2020-01-01

## 2020-06-02 NOTE — H&P
Mastoids: Unremarkable; the mastoid air cells are clear. Other: There is probable cerumen in the right external auditory canal.     No acute ischemic infarct, hemorrhage, or mass effect. Stable appearance of the brain compared to the previous study as detailed above. **This report has been created using voice recognition software. It may contain minor errors which are inherent in voice recognition technology. ** Final report electronically signed by Dr. Rohith Boggs on 5/18/2020 1:15 AM    Cta Chest W Wo Contrast    Result Date: 5/18/2020  CTA THORAX / PULMONARY EMBOLUS STUDY: CLINICAL INFORMATION: Syncope. Dyspnea. Estrogen Replacement therapy. Renal failure. Patient has CAPD catheter. COMPARISON; August 15, 2007 TECHNIQUE: 1.5 mm axial images were obtained through the chest following the administration of IV contrast (ISOVUE). A non-contrast localizer was obtained. 3D reconstructions were performed on the scanner to include sagittal and coronal MIP images through both the right and left pulmonary arteries. ALL CT SCANS AT THIS FACILITY use dose modulation, iterative reconstruction, and/or weight-based dosing when appropriate to reduce radiation dose to as low as reasonably achievable. FINDINGS: Upper abdomen: There is a small amount of ascites in the abdomen and a small pneumoperitoneum, likely related to peritoneal dialysis. Suggestion of a few small gallstones in the gallbladder. Mediastinum and yakelin: Permanent pacemaker/defibrillator. Prominent pulmonary trunk, consistent with pulmonary arterial hypertension. There a few bulky calcified mediastinal lymph nodes and a few small calcified right hilar lymph nodes, consistent with old, healed granulomatous disease. No abnormally enlarged or suspicious appearing mediastinal or hilar lymph nodes are seen. Bones: Moderate multifocal degenerative vertebral body spondylosis scattered in the thoracic spine.  Findings of what appears be a vertebral body hemangioma at the T11 level. No evidence for acute fracture or bone destruction. Lungs: Fibroemphysematous lungs. Groundglass appearance of the lung parenchyma relatively diffusely, likely related to interstitial fibrosis. Moderate parenchymal stranding both lower lung fields, consistent with atelectasis/pneumonia/fibrosis. No effusion seen. Vascular: No pulmonary emboli are seen. There is no large vessel aneurysm or dissection. 1.  No pulmonary emboli are seen. 2. Fibroemphysematous lungs. Pulmonary arterial hypertension. 3. Groundglass appearance of the lung parenchyma diffusely, likely due to interstitial fibrosis. Superimposed atelectatic/fibrotic stranding/pneumonia both lower lung fields. No effusion. 4. Cholelithiasis. Fluid in the abdomen and pneumoperitoneum, likely related to peritoneal dialysis. **This report has been created using voice recognition software. It may contain minor errors which are inherent in voice recognition technology. **  Final report electronically signed by Dr. Wan Vega on 5/18/2020 10:13 AM    Xr Chest Portable    Result Date: 6/2/2020  PROCEDURE: XR CHEST PORTABLE CLINICAL INFORMATION: hypotension . COMPARISON: 5/17/2020 TECHNIQUE: Portable upright FINDINGS: Heart size is normal. Mediastinum is not widened. There are no infiltrates or effusions. Vascularity is normal. Free air seen below the left hemidiaphragm. Patient has peritoneal dialysis which apparently accounts for this finding. AICD over the left chest. EKG leads overlie the chest.    Small amount of free of the left hemidiaphragm which is apparently secondary to peritoneal dialysis otherwise no acute cardiopulmonary process identified. **This report has been created using voice recognition software. It may contain minor errors which are inherent in voice recognition technology. ** Final report electronically signed by Dr. Heaven Suero on 6/2/2020 4:04 PM    Xr Chest Portable    Result Date: 5/17/2020  PROCEDURE: XR CHEST PORTABLE

## 2020-06-02 NOTE — ED PROVIDER NOTES
[facility] EMERGENCY DEPT      CHIEF COMPLAINT       Chief Complaint   Patient presents with    Hypotension    Dizziness       Nurses Notes reviewed and I agree except as noted in the HPI. HISTORY OF PRESENT ILLNESS    Anne Moura is a 80 y.o. female who presents via [facility] from dialysis for hypotension and dizziness that onset during her appointment. Patient states that symptoms onset while she was sitting and additionally describes herself as feeling \"hot. \" Patient describes dizziness as a faint feeling as though she is going to pass out. Patient states that she symptoms have however alleviated since her arrival within the department. Patient denies nausea, chest pain, shortness of breath, fever, cough, sore throat, chills, diarrhea, and heart palpitations. Patient notes that she resides at [facility]. Patient denies any recent fall or injury to her head. Patient has a medical history including type 2 diabetes mellitus, coronary artery disease, congestive heart failure, chronic kidney disease, chronic obstructive pulmonary disease, and hypertension. Patient describes no other symptoms at the time of the initial encounter. Location/Symptom: hypotension and dizziness   Timing/Onset: today prior to arrival   Context/Setting: was sitting at time of onset     REVIEW OF SYSTEMS     Review of Systems   Constitutional: Negative for chills and fever. HENT: Negative for congestion, ear pain, rhinorrhea, sinus pressure and sore throat. Eyes: Negative for photophobia, pain and visual disturbance. Respiratory: Negative for cough and shortness of breath. Cardiovascular: Negative for chest pain and palpitations. Gastrointestinal: Negative for abdominal pain, diarrhea, nausea and vomiting. Endocrine: Negative for polyuria. Genitourinary: Negative for difficulty urinating and frequency. Musculoskeletal: Negative for gait problem, neck pain and neck stiffness.    Skin: Negative for (05-); Cardiac defibrillator placement (02/02/2011); pr egd balloon dilation esophagus <30 mm diam (N/A, 9/18/2017); pr esophagogastroduodenoscopy transoral diagnostic (9/18/2017); Endoscopy, colon, diagnostic; and other surgical history (10/18/2018). CURRENT MEDICATIONS       Current Discharge Medication List      CONTINUE these medications which have NOT CHANGED    Details   calcitRIOL (ROCALTROL) 0.25 MCG capsule Take 0.25 mcg by mouth daily      OXYGEN Inhale 2 L into the lungs as needed      ondansetron (ZOFRAN) 4 MG tablet Take 1 tablet by mouth every 8 hours as needed for Nausea or Vomiting      metoprolol succinate (TOPROL XL) 100 MG extended release tablet Take 1 tablet by mouth daily Hold if SBP less than 100 and HR less than 50 bpm  Qty: 30 tablet, Refills: 0      mupirocin (BACTROBAN) 2 % cream Apply topically daily Apply topically 3 times daily. polycarbophil (FIBERCON) 625 MG tablet Take 625 mg by mouth 3 times daily      Glycerin, Laxative, (GLYCERIN ADULT) 2 g SUPP Place rectally as needed      potassium chloride (KLOR-CON M) 20 MEQ extended release tablet Take 20 mEq by mouth 2 times daily       megestrol (MEGACE) 40 MG tablet Take 1 tablet by mouth 2 times daily  Qty: 30 tablet, Refills: 3      Amino Acids (LIQUACEL) LIQD Take 30 mLs by mouth Once time a day every Monday, Wednesday, and Friday related to ESRD      sevelamer (RENVELA) 800 MG tablet Take 1 tablet by mouth 3 times daily (with meals)      insulin glargine (LANTUS) 100 UNIT/ML injection vial Inject 10 Units into the skin nightly  Qty: 1 vial, Refills: 1      polyethylene glycol (GLYCOLAX) powder Take 17 g by mouth daily       vitamin D 25 MCG (1000 UT) CAPS Take 1,000 Units by mouth daily      miconazole (MICOTIN) 2 % powder Apply topically 2 times daily.   Qty: 45 g, Refills: 1      sodium chloride (OCEAN, BABY AYR) 0.65 % nasal spray 1 spray by Nasal route as needed for Congestion  Refills: 0      insulin aspart (NOVOLOG) 100 UNIT/ML injection vial Inject into the skin 2 times daily Inject per sliding scale 0-150 = 0 units; 151-200 = 1 unit; 201-205 = 2 units; 251-300 = 3 units; 301-350 = 4 units; 351-400 = 5 units; 401-450 = 6 units       cyanocobalamin 1000 MCG tablet Take 1,000 mcg by mouth daily      pantoprazole (PROTONIX) 40 MG tablet Take 40 mg by mouth 2 times daily      ipratropium-albuterol (DUONEB) 0.5-2.5 (3) MG/3ML SOLN nebulizer solution Inhale 1 vial into the lungs every 6 hours as needed for Shortness of Breath      ARTIFICIAL TEAR OP Instill one drop into each eye as needed      Probiotic Product (PROBIOTIC-10) CAPS Take 1 capsule by mouth daily       nitroGLYCERIN (NITROSTAT) 0.4 MG SL tablet up to max of 3 total doses. If no relief after 1 dose, call 911. Qty: 25 tablet, Refills: 3      ascorbic acid (VITAMIN C) 500 MG tablet Take 500 mg by mouth 2 times daily       docusate sodium (COLACE) 100 MG capsule Take 200 mg by mouth daily      clopidogrel (PLAVIX) 75 MG tablet Take 75 mg by mouth every evening       guaiFENesin (ROBITUSSIN) 100 MG/5ML syrup Take 200 mg by mouth every 4 hours as needed for Cough      Multiple Vitamins-Minerals (OCUVITE EXTRA PO) Take 1 tablet by mouth daily       bisacodyl (DULCOLAX) 10 MG suppository Place 10 mg rectally daily as needed for Constipation      pravastatin (PRAVACHOL) 40 MG tablet Take 40 mg by mouth every evening Indications: Blood Cholesterol Abnormal       acetaminophen (TYLENOL) 325 MG tablet Take 650 mg by mouth 4 times daily              ALLERGIES     is allergic to heparin. FAMILY HISTORY     She indicated that her mother is . She indicated that her father is . She indicated that both of her sisters are . She indicated that both of her brothers are . She indicated that her maternal grandmother is . She indicated that her maternal grandfather is . She indicated that her paternal grandmother is .  She components within normal limits   CBC WITH AUTO DIFFERENTIAL - Abnormal; Notable for the following components:    RBC 3.40 (*)     Hemoglobin 10.1 (*)     Hematocrit 36.2 (*)     .5 (*)     MCHC 27.9 (*)     RDW-SD 53.6 (*)     Immature Grans (Abs) 0.22 (*)     All other components within normal limits   COMPREHENSIVE METABOLIC PANEL - Abnormal; Notable for the following components:    Glucose 262 (*)     CREATININE 6.1 (*)     BUN 29 (*)     Sodium 134 (*)     Chloride 96 (*)     Total Protein 5.5 (*)     Alb 2.3 (*)     Total Bilirubin 0.2 (*)     ALT 7 (*)     All other components within normal limits   TROPONIN - Abnormal; Notable for the following components:    Troponin T 0.154 (*)     All other components within normal limits   PROCALCITONIN - Abnormal; Notable for the following components:    Procalcitonin 0.38 (*)     All other components within normal limits   GLOMERULAR FILTRATION RATE, ESTIMATED - Abnormal; Notable for the following components:    Est, Glom Filt Rate 7 (*)     All other components within normal limits   TROPONIN - Abnormal; Notable for the following components:    Troponin T 0.150 (*)     All other components within normal limits   TROPONIN - Abnormal; Notable for the following components:    Troponin T 0.144 (*)     All other components within normal limits   TROPONIN - Abnormal; Notable for the following components:    Troponin T 0.156 (*)     All other components within normal limits   LACTIC ACID, PLASMA - Abnormal; Notable for the following components:    Lactic Acid 2.7 (*)     All other components within normal limits   COMPREHENSIVE METABOLIC PANEL - Abnormal; Notable for the following components:    CREATININE 6.7 (*)     BUN 33 (*)     Total Protein 4.7 (*)     Alb 1.9 (*)     Total Bilirubin 0.2 (*)     ALT 6 (*)     All other components within normal limits   CBC WITH AUTO DIFFERENTIAL - Abnormal; Notable for the following components:    RBC 2.82 (*)     Hemoglobin 8.4

## 2020-06-03 NOTE — CONSULTS
Vitamin D deficiency     Weakness        Past Surgical History:  Past Surgical History:   Procedure Laterality Date    BREAST SURGERY      s/p right mastectomy 1980's and left mastectomy 2007    CARDIAC CATHETERIZATION  11-    Hemodynamics w pulmonary hypertension, systemic hypertension and no sats gradient difference. No aortic stenosis. No mitral stenosis. Coronaries; mild disease of the LAD not more than 40%. LV; severe LV dysfunction and EF 30-35%.  CARDIAC CATHETERIZATION  05-    Mild disease of small distal LAD (approximately  50% stenosis) angiographically normal CA. Mild to moderate LV systolic dysfunction. EF 35%. Mildly elevated LV end diastolic pressure. No significant MR. Systemic hypertension.  CARDIAC DEFIBRILLATOR PLACEMENT  02/02/2011    CARDIOVASCULAR STRESS TEST  03/23/10    EF 30%  Severe LV Dys    COLONOSCOPY      Dr. Maranda Kwan  03/22/10    EF 45%. LV mildly dilated. Systolic function mildly reducted. No regional wall motion abnormalities. Wall thickness mildly increased. LA mildly dilated. MV mild annular calification. Mild TR. Ventricular septum tickness mildly increased.  ENDOSCOPY, COLON, DIAGNOSTIC      EYE SURGERY      bilateral cataracts    EYE SURGERY Left 9/2014    laser surgery for retinopathy?     FOOT SURGERY Left 1/12/2016    OTHER SURGICAL HISTORY  10/18/2018    CardioMEMS    PACEMAKER PLACEMENT      LA EGD BALLOON DILATION ESOPHAGUS <30 MM DIAM N/A 9/18/2017    EGD ESOPHAGOGASTRODUODENOSCOPY DILATATION performed by Katelynn Randolph MD at [facility]    LA ESOPHAGOGASTRODUODENOSCOPY TRANSORAL DIAGNOSTIC  9/18/2017    EGD ESOPHAGOGASTRODUODENOSCOPY performed by Katelynn Randolph MD at [facility]       Family History:  Family History   Problem Relation Age of Onset    Diabetes Mother    [facility] Pacemaker Mother     Stroke Mother     Heart Disease Mother     Diabetes Father     Cancer Sister     Cancer Brother  Cancer Sister     Heart Disease Brother         cardiomegaly       Social History:  Social History     Socioeconomic History    Marital status:      Spouse name: Not on file    Number of children: 0    Years of education: Not on file    Highest education level: Not on file   Occupational History    Occupation: Retired    Social Needs    Financial resource strain: Not on file    Food insecurity     Worry: Not on file     Inability: Not on file   [employer] needs     Medical: Not on file     Non-medical: Not on file   Tobacco Use    Smoking status: Former Smoker     Packs/day: 0.50     Years: 2.00     Pack years: 1.00     Types: Cigarettes     Last attempt to quit: 1952     Years since quittin.4    Smokeless tobacco: Never Used   Substance and Sexual Activity    Alcohol use: No     Alcohol/week: 0.0 standard drinks    Drug use: No    Sexual activity: Never   Lifestyle    Physical activity     Days per week: Not on file     Minutes per session: Not on file    Stress: Not on file   Relationships    Social connections     Talks on phone: Not on file     Gets together: Not on file     Attends Gnosticism service: Not on file     Active member of club or organization: Not on file     Attends meetings of clubs or organizations: Not on file     Relationship status: Not on file    Intimate partner violence     Fear of current or ex partner: Not on file     Emotionally abused: Not on file     Physically abused: Not on file     Forced sexual activity: Not on file   Other Topics Concern    Not on file   Social History Narrative    Not on file       Home Meds:  Prior to Admission medications    Medication Sig Start Date End Date Taking?  Authorizing Provider   calcitRIOL (ROCALTROL) 0.25 MCG capsule Take 0.25 mcg by mouth daily   Yes Historical Provider, MD   OXYGEN Inhale 2 L into the lungs as needed   Yes Historical Provider, MD   ondansetron (ZOFRAN) 4 MG tablet Take 1 tablet by mouth every 8 hours as needed for Nausea or Vomiting 5/21/20  Yes Tawny Fabian MD   metoprolol succinate (TOPROL XL) 100 MG extended release tablet Take 1 tablet by mouth daily Hold if SBP less than 100 and HR less than 50 bpm  Patient taking differently: Take 50 mg by mouth daily Hold if SBP less than 100 and HR less than 50 bpm 5/21/20 6/20/20 Yes Tawny Fabian MD   mupirocin (BACTROBAN) 2 % cream Apply topically daily Apply topically 3 times daily. Yes Historical Provider, MD   polycarbophil (FIBERCON) 625 MG tablet Take 625 mg by mouth 3 times daily   Yes Historical Provider, MD   Glycerin, Laxative, (GLYCERIN ADULT) 2 g SUPP Place rectally as needed   Yes Historical Provider, MD   potassium chloride (KLOR-CON M) 20 MEQ extended release tablet Take 20 mEq by mouth 2 times daily    Yes Historical Provider, MD   megestrol (MEGACE) 40 MG tablet Take 1 tablet by mouth 2 times daily 4/29/20  Yes Vianca Lopez PA-C   Amino Acids (LIQUACEL) LIQD Take 30 mLs by mouth Once time a day every Monday, Wednesday, and Friday related to ESRD   Yes Historical Provider, MD   sevelamer (RENVELA) 800 MG tablet Take 1 tablet by mouth 3 times daily (with meals)   Yes Historical Provider, MD   insulin glargine (LANTUS) 100 UNIT/ML injection vial Inject 10 Units into the skin nightly 4/9/20  Yes Deven Jose MD   polyethylene glycol (GLYCOLAX) powder Take 17 g by mouth daily    Yes Historical Provider, MD   vitamin D 25 MCG (1000 UT) CAPS Take 1,000 Units by mouth daily   Yes Historical Provider, MD   miconazole (MICOTIN) 2 % powder Apply topically 2 times daily.  12/19/19  Yes Cayla Yepez,    sodium chloride (OCEAN, BABY AYR) 0.65 % nasal spray 1 spray by Nasal route as needed for Congestion 12/19/19  Yes Cayla Yepez,    insulin aspart (NOVOLOG) 100 UNIT/ML injection vial Inject into the skin 2 times daily Inject per sliding scale 0-150 = 0 units; 151-200 = 1 unit; 201-205 = 2 units; 251-300 = 3 ear pain or hearing changes  Nose: Negative for runny nose or epistaxis  Respiratory: Positive for chronic shortness of breath. Negative for cough or sputum production. Negative for hemoptysis  Cardiovascular: Negative for chest pain  GI: Negative for nausea, vomiting and diarrhea. Negative for hematochezia and melena  Skin: Negative for rash  Musculoskeletal: Negative for joint pain, moves all ext  Psychiatric: Reports stable mood, negative for depression or insomnia    All other review of systems were reviewed and negative    Current Meds:  Infusion:    dextrose       Meds:    acetaminophen  650 mg Oral 4x Daily    clopidogrel  75 mg Oral QPM    megestrol  40 mg Oral BID    metoprolol succinate  100 mg Oral Daily    pantoprazole  40 mg Oral BID    polyethylene glycol  17 g Oral BID    pravastatin  40 mg Oral QPM    sevelamer  800 mg Oral TID WC    sodium chloride flush  10 mL Intravenous 2 times per day    insulin lispro  0-12 Units Subcutaneous TID WC    insulin lispro  0-6 Units Subcutaneous Nightly    midodrine  2.5 mg Oral TID WC     Meds prn: sodium chloride flush, acetaminophen **OR** acetaminophen, glucose, dextrose, glucagon (rDNA), dextrose, ondansetron **OR** ondansetron     Allergies/Intolerances: ALLERGIES: Heparin    24HR INTAKE/OUTPUT:      Intake/Output Summary (Last 24 hours) at 6/3/2020 0818  Last data filed at 6/3/2020 0350  Gross per 24 hour   Intake 710 ml   Output 2300 ml   Net -1590 ml     I/O last 3 completed shifts: In: 710 [P.O.:350; I.V.:360]  Out: 2300 [Other:2300]  No intake/output data recorded. Admission weight: 250 lb (113.4 kg)  Wt Readings from Last 3 Encounters:   06/03/20 236 lb (107 kg)   05/21/20 248 lb (112.5 kg)   04/27/20 248 lb 3.2 oz (112.6 kg)     Body mass index is 38.09 kg/m².     Physical Examination:  VITALS:   Vitals:    06/02/20 1955 06/02/20 2130 06/03/20 0030 06/03/20 0345   BP:  (!) 98/45 104/60 (!) 94/58   Pulse:   101 96   Resp:   18 18   Temp:

## 2020-06-03 NOTE — PROGRESS NOTES
non-distended with normal bowel sounds. Musculoskeletal: Passive and active ROM x 4 extremities. Skin: Skin color, texture, turgor normal.  No rashes or lesions. Neurologic:  Neurovascularly intact without any focal sensory/motor deficits. Cranial nerves: II-XII intact, grossly non-focal.  Psychiatric: Alert and oriented to person, place, time, and situation. Thought content appropriate, normal insight  Capillary Refill: Brisk,< 3 seconds   Peripheral Pulses: +2 palpable, equal bilaterally     Labs:   Recent Labs     06/02/20  1500 06/03/20  0342   WBC 10.6 10.3   HGB 10.1* 8.4*   HCT 36.2* 28.3*    170     Recent Labs     06/02/20  1500 06/02/20  2247 06/03/20  0342   *  --  136   K 4.7  --  4.2   CL 96*  --  99   CO2 23  --  25   BUN 29*  --  33*   CREATININE 6.1*  --  6.7*   CALCIUM 9.4  --  8.8   PHOS  --  3.7  --      Recent Labs     06/02/20  1500 06/03/20  0342   AST 14 10   ALT 7* 6*   BILITOT 0.2* 0.2*   ALKPHOS 109 96     No results for input(s): INR in the last 72 hours. No results for input(s): Tamara Patella in the last 72 hours. Microbiology:    Blood culture #1:   Lab Results   Component Value Date    BC No growth-preliminary  06/02/2020       Blood culture #2:No results found for: Orestes Montgomery    Organism:  Lab Results   Component Value Date    ORG Mixed Growth    05/17/2020         Lab Results   Component Value Date    LABGRAM  06/02/2020     Rare segmented neutrophils observed. No organisms observed. performed on cytospun specimen        MRSA culture only:No results found for: [facility]    Urine culture:   Lab Results   Component Value Date    LABURIN  03/26/2019     No growth-preliminary  Growth of Contaminants. The mixture of organisms present  are not a common cause of urinary tract infections and  probably represent skin liz or distal urethral liz.          Respiratory culture: No results found for: CULTRESP    Aerobic and Anaerobic :  Lab Results   Component Value Date 5/17/2020 TECHNIQUE: Portable upright FINDINGS: Heart size is normal. Mediastinum is not widened. There are no infiltrates or effusions. Vascularity is normal. Free air seen below the left hemidiaphragm. Patient has peritoneal dialysis which apparently accounts for this finding. AICD over the left chest. EKG leads overlie the chest.    Small amount of free of the left hemidiaphragm which is apparently secondary to peritoneal dialysis otherwise no acute cardiopulmonary process identified. **This report has been created using voice recognition software. It may contain minor errors which are inherent in voice recognition technology. ** Final report electronically signed by Dr. Geena Rao on 6/2/2020 4:04 PM      **This report has been created using voice recognition software. It may contain minor errors which are inherent in voice recognition technology. **  Electronically signed by ARELI Duckworth CNP on 6/3/2020 at 1:39 PM

## 2020-06-03 NOTE — PROGRESS NOTES
Upon review of pt's MAR from [facility], home med list changes include; metoprolol dose reduced to 50 mg, miralax changed to once daily, calcitriol added, potassium changed to twice daily. Last doses administered on 6/1/20. This info explained to pt's floor RN.

## 2020-06-03 NOTE — DISCHARGE INSTR - COC
Continuity of Care Form    Patient Name: Dodie Chavarria   :  1934  MRN:  [id number]    Admit date:  2020  Discharge date:  6/15/20    Code Status Order: Full Code   Advance Directives:   [facility] Documentation     Date/Time Healthcare Directive Type of Healthcare Directive Copy in [address] Agent's Name Healthcare Agent's Phone Number    20  Yes, patient has an advance directive for healthcare treatment  Durable power of  for health care;Living will  Yes, copy in chart  Healthcare power of   Manav Radfordgrecia or Cadence Lopez  --          Admitting Physician:  Britni Bolton DO  PCP: Carmen Zhang MD    Discharging Nurse: Hollie Cockayne [address] Unit/Room#: 6K-19/019-A  Discharging Unit Phone Number: [phone]    Emergency Contact:   Extended Emergency Contact Information  Primary Emergency Contact: Tara [address] Phone: [phone]  Mobile Phone: [phone]  Relation: Niece/Nephew  Secondary Emergency Contact: [facility] [address] Phone: [phone]  Mobile Phone: [phone]  Relation: Brother/Sister    Past Surgical History:  Past Surgical History:   Procedure Laterality Date    BREAST SURGERY      s/p right mastectomy  and left mastectomy     CARDIAC CATHETERIZATION  2010    Hemodynamics w pulmonary hypertension, systemic hypertension and no sats gradient difference. No aortic stenosis. No mitral stenosis. Coronaries; mild disease of the LAD not more than 40%. LV; severe LV dysfunction and EF 30-35%.  CARDIAC CATHETERIZATION  2007    Mild disease of small distal LAD (approximately  50% stenosis) angiographically normal CA. Mild to moderate LV systolic dysfunction. EF 35%. Mildly elevated LV end diastolic pressure. No significant MR. Systemic hypertension.    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    Vitamin D deficiency E55.9    COPD without exacerbation ([facility]) J44.9    CHF (congestive heart failure), NYHA class III, chronic, diastolic ([facility]) H77.23    Anemia, chronic disease D63.8    Anemia, chronic renal failure N18.9, D63.1    Acute combined systolic and diastolic CHF, NYHA class 1 ([facility]) I50.41    ESRD on peritoneal dialysis ([facility]) N18.6, Z99.2    Mild aortic stenosis I35.0    Physical deconditioning R53.81    UTI (urinary tract infection) N39.0    Hyponatremia E87.1    Anemia in ESRD (end-stage renal disease) ([facility]) N18.6, D63.1    Near syncope R55    Hypotension due to hypovolemia I95.89, E86.1       Isolation/Infection:   Isolation          Contact        Patient Infection Status     Infection Onset Added Last Indicated Last Indicated By Review Planned Expiration Resolved Resolved By    MRSA  09/06/18 12/14/19 MRSA Screening Culture Only        Scalp 9/2018  Nares 3/2019, 12/2019    Resolved    COVID-19 Rule Out 05/17/20 05/17/20 05/17/20 COVID-19 (Ordered)   05/18/20 Rule-Out Test Resulted          Nurse Assessment:  Last Vital Signs: BP (!) 115/49   Pulse 62   Temp 98.2 °F (36.8 °C) (Oral)   Resp 16   Ht 5' 6\" (1.676 m)   Wt 236 lb (107 kg)   SpO2 92%   BMI 38.09 kg/m²     Last documented pain score (0-10 scale): Pain Level: 3  Last Weight:   Wt Readings from Last 1 Encounters:   06/03/20 236 lb (107 kg)     Mental Status:  alert    IV Access: none    Nursing Mobility/ADLs:  Walking   Dependent  Transfer  Dependent  Bathing  Dependent  Dressing  Dependent  Toileting  Dependent  Feeding  Independent  Med Admin  Assisted  Med Delivery   whole    Wound Care Documentation and Therapy:  Wound 09/05/18 Coccyx DTI (Active)   Number of days: 637       Wound 12/09/19 Ischium Right;Left possible DTI, purple (Active)   Number of days: 177       Wound 05/18/20 Heel Left;Right (Active)   Wound Pressure Stage  1 6/2/2020  7:50 PM   Dressing Status Clean;Dry; Intact 6/3/2020  3:45 AM   Wound Assessment Non-blanchable erythema 6/3/2020  3:45 AM   Lola-wound Assessment Pink 6/3/2020  3:45 AM   Number of days: 15       Wound 05/18/20 Anterior;Right (Active)   Dressing Status Clean;Dry; Intact 6/2/2020  7:50 PM   Wound Assessment Non-blanchable erythema 6/2/2020  7:50 PM   Lola-wound Assessment Pink 6/2/2020  7:50 PM   Number of days: 15       Wound 05/18/20 Heel Left (Active)   Dressing Status Clean;Dry; Intact 6/2/2020  7:50 PM   Wound Assessment Red;Non-blanchable erythema 6/2/2020  7:50 PM   Lola-wound Assessment Pink 6/2/2020  7:50 PM   Number of days: 15        Elimination:  Continence:   · Bowel: Yes  · Bladder: none  Urinary Catheter: None   Colostomy/Ileostomy/Ileal Conduit: No       Date of Last BM: 6/15/2020    Intake/Output Summary (Last 24 hours) at 6/3/2020 1003  Last data filed at 6/3/2020 0350  Gross per 24 hour   Intake 710 ml   Output 2300 ml   Net -1590 ml     I/O last 3 completed shifts: In: 710 [P.O.:350; I.V.:360]  Out: 2300 [Other:2300]    Safety Concerns:     None    Impairments/Disabilities:      None    Nutrition Therapy:  Current Nutrition Therapy:   - Oral Diet:  Renal and ***    Routes of Feeding: Oral  Liquids: No Restrictions  Daily Fluid Restriction: yes - amount 1500  Last Modified Barium Swallow with Video (Video Swallowing Test): not done    Treatments at the Time of Hospital Discharge:   Respiratory Treatments: none  Oxygen Therapy:  is not on home oxygen therapy.   Ventilator:    - No ventilator support    Rehab Therapies: Physical Therapy and Occupational Therapy  Weight Bearing Status/Restrictions: No weight bearing restirctions  Other Medical Equipment (for

## 2020-06-03 NOTE — PROGRESS NOTES
Pharmacy Medication History Note      List of current medications patient is taking is complete. Source of information: Med list from [facility] received 6/02/20, last updated 5/22/20. Medications added/doses adjusted:  Corrected metoprolol succ dose from 100mg tab to 50mg tab. Corrected bisacodyl dosage form from suppository to PO.   Corrected bactroban topical instructions from three times daily to once daily per med list.  Corrected Zofran SIG from q8h prn N/V to q12h (twice daily) prn N/V per med list.          Electronically signed by Jo Prieto on 6/3/2020 at 12:06 PM

## 2020-06-03 NOTE — PROGRESS NOTES
Spoke with Winnie Ortega about patient being double mastectomy and limb alert on both arms. Stated I attempted leg blood pressures with no success. Patient stated Dannybella takes BP's in right lower arm. Winnie Ortega gave ok to take BP's in right lower arm.

## 2020-06-04 PROBLEM — R55 SYNCOPE: Status: ACTIVE | Noted: 2020-01-01

## 2020-06-04 NOTE — PROGRESS NOTES
[facility]   Nephrology progress note  6/4/2020, 1:24 PM      Pt Name:    Daryl Gonzalez  MRN:     [id number]     [place]:    1934  Admit Date:    6/2/2020  2:00 PM  Primary Care Physician:  Raul Saab MD   Room number  0J-06/868-I    Chief Complaint: Nephrology following for ESRD/PD    Subjective:  Patient seen and examined  Seen earlier today  PD data reviewed  Discussed with RN  No chest pain  No shortness of breath  No dizziness reported    Objective:  24HR INTAKE/OUTPUT:      Intake/Output Summary (Last 24 hours) at 6/4/2020 1324  Last data filed at 6/4/2020 1033  Gross per 24 hour   Intake 9983.36 ml   Output 8350 ml   Net 1633.36 ml     I/O last 3 completed shifts: In: 9983.4 [P.O.:700; I.V.:283.4; Other:9000]  Out: 7000 [Other:7000]  I/O this shift:  In: 1500 [Other:1500]  Out: 1850 [Other:1850]  Admission weight: 250 lb (113.4 kg)  Wt Readings from Last 3 Encounters:   06/04/20 241 lb 11.2 oz (109.6 kg)   05/21/20 248 lb (112.5 kg)   04/27/20 248 lb 3.2 oz (112.6 kg)     Body mass index is 39.01 kg/m².     Physical examination  VITALS:     Vitals:    06/04/20 0258 06/04/20 0650 06/04/20 0830 06/04/20 1150   BP: (!) 107/48 (!) 118/56 (!) 119/56 (!) 114/56   Pulse: 97 127 102 100   Resp: 18  16 15   Temp: 98.2 °F (36.8 °C)  98.3 °F (36.8 °C) 98.1 °F (36.7 °C)   TempSrc: Oral  Oral Oral   SpO2: 94%  95% 96%   Weight: 241 lb 11.2 oz (109.6 kg)      Height:         General Appearance: alert and cooperative with exam, appears comfortable, no distress  Mouth/Throat: Oral mucosa moist  Neck: No JVD  Lungs: Air entry B/L, no rales, no use of accessory muscles  Heart:  S1, S2 heard  GI: soft, non-tender, no guarding  Extremities: trace LE edema      Lab Data  CBC:   Recent Labs     06/02/20  1500 06/03/20  0342   WBC 10.6 10.3   HGB 10.1* 8.4*   HCT 36.2* 28.3*    170     BMP:  Recent Labs     06/02/20  1500 06/02/20  2247 06/03/20  0342 06/04/20  0357   *  --  136 134*

## 2020-06-04 NOTE — PROGRESS NOTES
Fibrin noted in effluent. Updated Dr. Tiffanie Harper. Order placed for heparin exchange x2. Patient has allergy to heparin. Order clarified with Dr. Tiffanie Harper. Stated to do two heparin exchanges back to back, in & out, then instill the normal 1.5% bag.

## 2020-06-04 NOTE — CARE COORDINATION
6/4/20, 11:57 AM EDT    DISCHARGE PLANNING EVALUATION    SW left message with [facility] regarding Patient not being ready for discharge today and pre-cert will need to be initiated tomorrow.

## 2020-06-04 NOTE — PLAN OF CARE
Problem: Falls - Risk of:  Goal: Will remain free from falls  Description: Will remain free from falls  6/3/2020 2308 by Rand Mccabe RN  Outcome: Ongoing  Note: No falls this shift. Patient educated on not getting up without help. Falling star protocol in place. Call light in reach. Bed in lowest position. Bed alarm set. Patient visually checked on hourly rounds. Problem: Falls - Risk of:  Goal: Absence of physical injury  Description: Absence of physical injury  Outcome: Ongoing     Problem: Pain:  Goal: Pain level will decrease  Description: Pain level will decrease  6/3/2020 2308 by Rand Mccabe RN  Outcome: Ongoing  Note: Pt denies pain so far this shift. Will continue to monitor. Problem: Pain:  Goal: Control of acute pain  Description: Control of acute pain  Outcome: Ongoing     Problem: Pain:  Goal: Control of chronic pain  Description: Control of chronic pain  Outcome: Ongoing     Problem: DISCHARGE BARRIERS  Goal: Patient's continuum of care needs are met  6/3/2020 2308 by Rand Mccabe RN  Outcome: Ongoing  Note: Pt plans on [facility] when medically stable. Problem: Cardiac Output - Decreased:  Goal: Hemodynamic stability will improve  Description: Hemodynamic stability will improve  Outcome: Ongoing  Note:   Vitals:    06/03/20 2057   BP: (!) 110/51   Pulse: 87   Resp: 18   Temp: 98.3 °F (36.8 °C)   SpO2: 97%     Monitoring vital signs q4h hours. Problem: Fluid Volume - Imbalance:  Goal: Absence of imbalanced fluid volume signs and symptoms  Description: Absence of imbalanced fluid volume signs and symptoms  Outcome: Ongoing  Note: Monitoring intake and output. Fine crackles in bilateral lungs. CAPD patient. Nephrology following. Problem: Skin Integrity:  Goal: Absence of new skin breakdown  Description: Absence of new skin breakdown  Outcome: Ongoing  Note: No new skin breakdown noted this shift. Pt being repositioned q2h and PRN.  Heels being

## 2020-06-04 NOTE — CONSULTS
procedure:ECHOCARDIOGRAM COMPLETE 2D W DOPPLER W COLOR. Procedure Date  Date: 12/09/2019 Start: 02:39 PM    Study Location: Bedside  Technical Quality: Limited visualization due to body habitus. Indications:Pulmonary edema. Additional Medical History: Former smoker, coronary artery disease, pulmonary  embolism, CVA, anemia, congestive heart failure, cardiomyopathy,  hypertension, LBBB, hyperlipidemia, [facility] ICD, diabetes, GERD, breast  cancer    Patient Status: Routine    Height: 66.14 inches Weight: 277.79 pounds BSA: 2.3 m^2 BMI: 44.64 kg/m^2    BP: 131/59 mmHg    Allergies    - No Known Allergies.      - No Known Allergies.      - No Known Allergies. Conclusions      Summary   Ejection fraction is visually estimated at 55%. Overall left ventricular function is normal.   The aortic valve leaflets were not well visualized. Aortic valve appears tricuspid. Aortic valve leaflets are somewhat thickened. Aortic valve leaflets are Mildly calcified. Mild aortic stenosis is present. Signature      ----------------------------------------------------------------   Electronically signed by Janett Olvera MD (Interpreting   physician) on 12/09/2019 at 03:30 PM   ----------------------------------------------------------------      Findings      Mitral Valve   The mitral valve structure was normal with normal leaflet separation. DOPPLER: The transmitral velocity was within the normal range with no   evidence for mitral stenosis. There was no evidence of mitral   regurgitation. Aortic Valve   The aortic valve leaflets were not well visualized. Aortic valve appears tricuspid. Aortic valve leaflets are somewhat thickened. Aortic valve leaflets are Mildly calcified. Mild aortic stenosis is present. Tricuspid Valve   Tricuspid valve was not well visualized. Mild tricuspid regurgitation. Pulmonic Valve   The pulmonic valve was not well visualized .    Trivial pulmonic regurgitation visualized. Left Atrium   Left atrial size was normal.      Left Ventricle   Ejection fraction is visually estimated at 55%. Overall left ventricular function is normal.      Right Atrium   Right atrial size was normal.      Right Ventricle   The right ventricular size was normal with normal systolic function and   wall thickness. Pericardial Effusion   The pericardium was normal in appearance with no evidence of a pericardial   effusion. Pleural Effusion   No evidence of pleural effusion. Aorta / Great Vessels   -Aortic root dimension within normal limits.   -The Pulmonary artery is within normal limits. -IVC size is within normal limits with normal respiratory phasic changes.      M-Mode/2D Measurements & Calculations      LV Diastolic    LV Systolic Dimension:    AV Cusp Separation: 1.6 cmLA   Dimension: 5.1  3.3 cm                    Dimension: 4.7 cmAO Root   cm              LV Volume Diastolic: 699  Dimension: 3 cmLA Area: 20.5   LV FS:35.3 %    ml                        cm^2   LV PW           LV Volume Systolic: 36.2   Diastolic: 1.1  ml   cm              LV EDV/LV EDV Index: 124   Septum          ml/54 m^2LV ESV/LV ESV    RV Diastolic Dimension: 3.6 cm   Diastolic: 1.1  Index: 38.3 ml/19 m^2   cm              EF Calculated: 64.4 %     LA/Aorta: 1.57                                             Ascending Aorta: 2.6 cm                                             LA volume/Index: 49.8 ml /22m^2     Doppler Measurements & Calculations      MV Peak E-Wave: 123   AV Peak Velocity: 201 LVOT Peak Velocity: 133 cm/s   cm/s                  cm/s                  LVOT Mean Velocity: 85.3 cm/s   MV Peak A-Wave: 98.2  AV Peak Gradient:     LVOT Peak Gradient: 7 mmHgLVOT   cm/s                  16.16 mmHg            Mean Gradient: 4 mmHg   MV E/A Ratio: 1.25    AV Mean Velocity: 151   MV Peak Gradient:     cm/s   6.05 mmHg             AV Mean Gradient: 10                         mmHg 2007 s/p mastectomy    History of CVA (cerebrovascular accident)     History of pulmonary embolism 05/2014    on [address] (coumadin)    Hyperkalemia     Hyperlipidemia     Hypertension     pulmonary    Iron deficiency anemia     Dr. 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 No aortic stenosis. No mitral stenosis. Coronaries; mild disease of the LAD not more than 40%. LV; severe LV dysfunction and EF 30-35%.  CARDIAC CATHETERIZATION  05-    Mild disease of small distal LAD (approximately  50% stenosis) angiographically normal CA. Mild to moderate LV systolic dysfunction. EF 35%. Mildly elevated LV end diastolic pressure. No significant MR. Systemic hypertension.  CARDIAC DEFIBRILLATOR PLACEMENT  02/02/2011    CARDIOVASCULAR STRESS TEST  03/23/10    EF 30%  Severe LV Dys    COLONOSCOPY      Dr. Zeina Love  03/22/10    EF 45%. LV mildly dilated. Systolic function mildly reducted. No regional wall motion abnormalities. Wall thickness mildly increased. LA mildly dilated. MV mild annular calification. Mild TR. Ventricular septum tickness mildly increased.      ENDOSCOPY, COLON, DIAGNOSTIC      EYE SURGERY bilateral cataracts    EYE SURGERY Left 9/2014    laser surgery for retinopathy?     FOOT SURGERY Left 1/12/2016    OTHER SURGICAL HISTORY  10/18/2018    CardioMEMS    PACEMAKER PLACEMENT      PA EGD BALLOON DILATION ESOPHAGUS <30 MM DIAM N/A 9/18/2017    EGD ESOPHAGOGASTRODUODENOSCOPY DILATATION performed by Fernando Schmidt MD at [facility]    PA ESOPHAGOGASTRODUODENOSCOPY TRANSORAL DIAGNOSTIC  9/18/2017    EGD ESOPHAGOGASTRODUODENOSCOPY performed by Fernando Schmidt MD at [facility]     Current Facility-Administered Medications   Medication Dose Route Frequency Provider Last Rate Last Dose    dianeal lo-nikko 1.5% 1,500 mL  1,500 mL Intraperitoneal Q6H Paul Naranjo MD   1,500 mL at 06/04/20 0321    metoprolol succinate (TOPROL XL) extended release tablet 50 mg  50 mg Oral Daily [place] L[place]hal, APRN - CNP        polyethylene glycol (GLYCOLAX) packet 17 g  17 g Oral Daily [place] Leventhal, APRN - CNP        potassium chloride (KLOR-CON M) extended release tablet 20 mEq  20 mEq Oral BID  [place] Leventhal, APRN - CNP   20 mEq at 06/03/20 1744    insulin lispro (HUMALOG) injection vial 0-6 Units  0-6 Units Subcutaneous TID  [place] Leventhal, APRN - CNP        insulin lispro (HUMALOG) injection vial 0-3 Units  0-3 Units Subcutaneous Nightly [place] Leventhal, APRN - CNP        acetaminophen (TYLENOL) tablet 650 mg  650 mg Oral 4x Daily [facility], DO   650 mg at 06/03/20 2112    clopidogrel (PLAVIX) tablet 75 mg  75 mg Oral QPM [facility], DO   75 mg at 06/03/20 1744    megestrol (MEGACE) tablet 40 mg  40 mg Oral BID [facility], DO   40 mg at 06/03/20 2112    pantoprazole (PROTONIX) tablet 40 mg  40 mg Oral BID [facility], DO   40 mg at 06/03/20 2112    pravastatin (PRAVACHOL) tablet 40 mg  40 mg Oral QPM Goran Castro, DO   40 mg at 06/03/20 1744    sevelamer (RENVELA) tablet 800 mg  800 mg Oral TID  Goran Castro DO   800 mg at 06/03/20 7029    sodium chloride flush 0.9 % Yes Historical Provider, MD   Glycerin, Laxative, (GLYCERIN ADULT) 2 g SUPP Place rectally as needed   Yes Historical Provider, MD   potassium chloride (KLOR-CON M) 20 MEQ extended release tablet Take 20 mEq by mouth 2 times daily    Yes Historical Provider, MD   megestrol (MEGACE) 40 MG tablet Take 1 tablet by mouth 2 times daily 4/29/20  Yes Shikha Dao PA-C   Amino Acids (LIQUACEL) LIQD Take 30 mLs by mouth Three times per week; Monday, Wednesday, and Friday related to ESRD   Yes Historical Provider, MD   sevelamer (RENVELA) 800 MG tablet Take 1 tablet by mouth 3 times daily (with meals)   Yes Historical Provider, MD   insulin glargine (LANTUS) 100 UNIT/ML injection vial Inject 10 Units into the skin nightly 4/9/20  Yes Dean Morrissey MD   polyethylene glycol (GLYCOLAX) powder Take 17 g by mouth 2 times daily as needed (As needed for constipation.)    Yes Historical Provider, MD   vitamin D 25 MCG (1000 UT) CAPS Take 1,000 Units by mouth daily   Yes Historical Provider, MD   miconazole (MICOTIN) 2 % powder Apply topically 2 times daily.  12/19/19  Yes Usha Padilla DO   sodium chloride (OCEAN, BABY AYR) 0.65 % nasal spray 1 spray by Nasal route as needed for Congestion 12/19/19  Yes Usha Padilla DO   insulin aspart (NOVOLOG) 100 UNIT/ML injection vial Inject into the skin 2 times daily Inject per sliding scale 0-150 = 0 units; 151-200 = 1 unit; 201-205 = 2 units; 251-300 = 3 units; 301-350 = 4 units; 351-400 = 5 units; 401-450 = 6 units    Yes Historical Provider, MD   cyanocobalamin 1000 MCG tablet Take 1,000 mcg by mouth every evening    Yes Historical Provider, MD   pantoprazole (PROTONIX) 40 MG tablet Take 40 mg by mouth 2 times daily   Yes Historical Provider, MD   ipratropium-albuterol (DUONEB) 0.5-2.5 (3) MG/3ML SOLN nebulizer solution Inhale 1 vial into the lungs every 6 hours as needed for Shortness of Breath   Yes Historical Provider, MD   ARTIFICIAL TEAR OP Instill one drop into each eye as

## 2020-06-04 NOTE — PROGRESS NOTES
Hospitalist Progress Note      Patient: [facility]  YOB: 1934  MRN: [id number]   Acct: [de-identified]   PCP: Venita Carey MD  Date of Admission: 6/2/2020    Assessment/Plan:    1. Near syncope: Likely secondary to dehydration/volume depletion. · Patient responded well to gentle IV fluid hydration. · Orthostatic vital signs negative. · Pacemaker interrogation pending. · TTE performed on 6/4 demonstrated an EF of 45-50% which is a 5-10% decrease from her prior study in December. · Hold beta-blocker for now. Increase midodrine to 10 mg 3 times daily and evaluate dialysis response. · Cardiology following, appreciate assistance. 2. End-stage renal disease: Patient receives peritoneal dialysis, nephrology following/managing, appreciate assistance. 3. Hypotension: Resolved. Secondary to hypovolemia. 4. Hypertension: History, stable. Currently normotensive. Hold metoprolol for now. 5. CAD: History, stable. Hold metoprolol for now. Continue aspirin, pravastatin, and Plavix. Maintain telemetry. 6. HFpEF: Echo performed on 12/9/2019 demonstrates a normal overall left ventricular function with an EF of 55%; repeat echo performed on 6/4/2020 demonstrated an EF of 45-50%. Hold metoprolol. Maintain telemetry and cardiac diet. 7. Hyperlipidemia: History. Continue pravastatin. 8. GERD: History. Continue Protonix. 9. Type 2 diabetes mellitus: Hemoglobin A1c 6.4 on 6/2/2020. Continue Accu-Cheks before meals and at bedtime with sliding scale insulin coverage (low-dose corrective algorithm). Hypoglycemia protocol in place, maintain carb controlled diet. 10. Obesity: BMI 39. 1.     Chief Complaint: Dizziness    Initial H and P:-    Shadi Cassette is an 80-year-old  female, reformed smoker, with a medical history of anxiety, atherosclerotic heart disease, coronary artery disease, history of breast Normal respiratory effort. Clear to auscultation, bilaterally without Rales/Wheezes/Rhonchi. Cardiovascular: Regular rate and rhythm with normal S1/S2 without murmurs, rubs or gallops. Abdomen: Soft, non-tender, non-distended with normal bowel sounds. Musculoskeletal: Passive and active ROM x 4 extremities. Skin: Skin color, texture, turgor normal.  No rashes or lesions. Neurologic:  Neurovascularly intact without any focal sensory/motor deficits. Cranial nerves: II-XII intact, grossly non-focal.  Psychiatric: Alert and oriented to person, place, time, and situation. Thought content appropriate, normal insight  Capillary Refill: Brisk,< 3 seconds   Peripheral Pulses: +2 palpable, equal bilaterally     Labs:   Recent Labs     06/02/20 1500 06/03/20  0342   WBC 10.6 10.3   HGB 10.1* 8.4*   HCT 36.2* 28.3*    170     Recent Labs     06/02/20  1500 06/02/20  2247 06/03/20  0342 06/04/20  0357   *  --  136 134*   K 4.7  --  4.2 4.2   CL 96*  --  99 97*   CO2 23  --  25 22*   BUN 29*  --  33* 37*   CREATININE 6.1*  --  6.7* 6.7*   CALCIUM 9.4  --  8.8 8.6   PHOS  --  3.7  --   --      Recent Labs     06/02/20  1500 06/03/20  0342   AST 14 10   ALT 7* 6*   BILITOT 0.2* 0.2*   ALKPHOS 109 96     No results for input(s): INR in the last 72 hours. No results for input(s): Willechantale Pereira in the last 72 hours. Microbiology:    Blood culture #1:   Lab Results   Component Value Date    BC No growth-preliminary  06/02/2020       Blood culture #2:No results found for: Erlinda Waite    Organism:  Lab Results   Component Value Date    ORG gram negative bacilli 06/02/2020         Lab Results   Component Value Date    LABGRAM  06/02/2020     Rare segmented neutrophils observed. No organisms observed. performed on cytospun specimen        MRSA culture only:No results found for: [address] [place] [address]    Urine culture:   Lab Results   Component Value Date    LABURIN  03/26/2019     No growth-preliminary  Growth of Contaminants. underestimation of arterial narrowing without peak velocity elevation. If further assessment is required consider CT angiography or MRI angiography assessment. 3. Left vertebral artery not visualized during exam.               **This report has been created using voice recognition software. It may contain minor errors which are inherent in voice recognition technology. **      Final report electronically signed by Dr. Adam Mancini on 6/3/2020 10:42 PM      XR CHEST PORTABLE   Final Result   Small amount of free of the left hemidiaphragm which is apparently secondary to peritoneal dialysis otherwise no acute cardiopulmonary process identified. **This report has been created using voice recognition software. It may contain minor errors which are inherent in voice recognition technology. **      Final report electronically signed by Dr. Choco Gonzales on 6/2/2020 4:04 PM        Xr Chest Portable    Result Date: 6/2/2020  PROCEDURE: XR CHEST PORTABLE CLINICAL INFORMATION: hypotension . COMPARISON: 5/17/2020 TECHNIQUE: Portable upright FINDINGS: Heart size is normal. Mediastinum is not widened. There are no infiltrates or effusions. Vascularity is normal. Free air seen below the left hemidiaphragm. Patient has peritoneal dialysis which apparently accounts for this finding. AICD over the left chest. EKG leads overlie the chest.    Small amount of free of the left hemidiaphragm which is apparently secondary to peritoneal dialysis otherwise no acute cardiopulmonary process identified. **This report has been created using voice recognition software. It may contain minor errors which are inherent in voice recognition technology. ** Final report electronically signed by Dr. Choco Gonzales on 6/2/2020 4:04 PM    Vl Dup Carotid Bilateral    Result Date: 6/3/2020  PROCEDURE: VL DUP CAROTID BILATERAL CLINICAL INFORMATION: possible CVA COMPARISON: No prior study.  TECHNIQUE: Bilateral carotid US performed including with gray scale, color flow and spectral doppler imaging. The vertebral arteries were also assessed. FINDINGS: Right carotid system: There is scattered atherosclerotic changes of the distal common carotid artery which extend into the bifurcation. .  There is no elevation of the peak systolic velocity in the internal carotid artery. The systolic ratios are within acceptable limits. PSV/EDV DIST CCA-------->71/13cm/s PROX ICA-------->97/23cm/s ECA---------------->151/6cm/s VERT-------------->37/4cm/s Left carotid system: There is scattered atherosclerotic changes which extend into the distal common carotid artery and into the proximal external carotid segment as well as visualized within the internal carotid artery despite which there is no elevation of arterial velocity changes. There is noted diminished waveforms which may reflect narrowing that is under estimated by ultrasound evaluation. The left vertebral artery is not visualized during exam and flow assessment cannot be determined. LEFT PSV/EDV DIST CCA-------->107/6cm/s PROX ICA-------->71/14cm/s ECA---------------->120/0cm/s VERT-------------->not seen. 1. Scattered atherosclerotic changes of the distal carotid arteries extending into the bifurcation and also involving internal carotid segments discussed above. 2. Left side internal carotid segment waveforms changes may reflect underestimation of arterial narrowing without peak velocity elevation. If further assessment is required consider CT angiography or MRI angiography assessment. 3. Left vertebral artery not visualized during exam. **This report has been created using voice recognition software. It may contain minor errors which are inherent in voice recognition technology. ** Final report electronically signed by Dr. Lei Campbell on 6/3/2020 10:42 PM      **This report has been created using voice recognition software.  It may contain minor errors which are inherent in voice recognition technology. **  Electronically signed by ARELI Solomon CNP on 6/4/2020 at 6:22 PM

## 2020-06-05 NOTE — PLAN OF CARE
Control diet. Care plan reviewed with patient. Patient verbalizes understanding of the plan of care and contribute to goal setting.

## 2020-06-05 NOTE — PROGRESS NOTES
Hospitalist Progress Note      Patient: [facility]  YOB: 1934  MRN: [id number]   Acct: [de-identified]   PCP: Erin Ridley MD  Date of Admission: 6/2/2020    Assessment/Plan:    1. Near syncope: Likely secondary to dehydration/volume depletion.    · Patient responded well to gentle IV fluid hydration.    · Orthostatic vital signs negative. · Pacemaker interrogation pending. · TTE performed on 6/4 demonstrated an EF of 45-50% which is a 5-10% decrease from her prior study in December. · Restart Toprol-XL at 25 mg PO tonight, if patient tolerates increase to home dosage. Continue midodrine 10 mg 3 times daily and evaluate dialysis response. · Pacemaker interrogated, WNL. · Cardiology following, appreciate assistance. 2. End-stage renal disease: Patient receives peritoneal dialysis, prescription increased to 2000 mL every 6 hours and monitor output and blood pressure. Nephrology following/managing, appreciate assistance. 3. Elevated TSH: Currently 15.43 today. T3, T4 within normal ranges. Start Synthroid 25 mcg daily. 4. Leukocytosis: WBC count 11.5 this morning, etiology unclear. Patient remains afebrile and no overt signs of infection identified. Recheck CBC in the morning. 5. Hyponatremia, mild: Improved, stable. Current sodium level 132.  6. Hypotension: Resolved.  Secondary to hypovolemia. 7. Hypertension: History, stable.  Currently normotensive. Hold metoprolol for now. 8. CAD: History, stable. Hold metoprolol for now. Continue aspirin, pravastatin, and Plavix.  Maintain telemetry.    9. HFpEF: Echo performed on 12/9/2019 demonstrates a normal overall left ventricular function with an EF of 55%; repeat echo performed on 6/4/2020 demonstrated an EF of 45-50%.  Maintain telemetry and cardiac diet. 10. Hyperlipidemia: History.  Continue pravastatin.   11. GERD: History.  Continue Clostridium species. If a true mixed  aerobic and anaerobic infection is suspected, then broad  spectrum empiric antibiotic therapy is indicated and should  include coverage for anaerobic organisms. Urinalysis:      Lab Results   Component Value Date    NITRU NEGATIVE 05/17/2020    WBCUA > 200 05/17/2020    BACTERIA MANY 05/17/2020    RBCUA 25-50 05/17/2020    BLOODU MODERATE 05/17/2020    SPECGRAV 1.019 03/26/2019    GLUCOSEU NEGATIVE 05/17/2020       Radiology:  VL DUP CAROTID BILATERAL   Final Result       1. Scattered atherosclerotic changes of the distal carotid arteries extending into the bifurcation and also involving internal carotid segments discussed above. 2. Left side internal carotid segment waveforms changes may reflect underestimation of arterial narrowing without peak velocity elevation. If further assessment is required consider CT angiography or MRI angiography assessment. 3. Left vertebral artery not visualized during exam.               **This report has been created using voice recognition software. It may contain minor errors which are inherent in voice recognition technology. **      Final report electronically signed by Dr. Long Acosta on 6/3/2020 10:42 PM      XR CHEST PORTABLE   Final Result   Small amount of free of the left hemidiaphragm which is apparently secondary to peritoneal dialysis otherwise no acute cardiopulmonary process identified. **This report has been created using voice recognition software. It may contain minor errors which are inherent in voice recognition technology. **      Final report electronically signed by Dr. Sally Gonzáles on 6/2/2020 4:04 PM        Xr Chest Portable    Result Date: 6/2/2020  PROCEDURE: XR CHEST PORTABLE CLINICAL INFORMATION: hypotension . COMPARISON: 5/17/2020 TECHNIQUE: Portable upright FINDINGS: Heart size is normal. Mediastinum is not widened. There are no infiltrates or effusions.  Vascularity is normal. Free air seen

## 2020-06-05 NOTE — PROGRESS NOTES
[facility]  INPATIENT PHYSICAL THERAPY  EVALUATION  STRZ RENAL TELEMETRY 6K - [id number]    Time In: 3020  Time Out: 0840  Timed Code Treatment Minutes: 15 Minutes  Minutes: 30          Date: 2020  Patient Name: Alexandria Perez,  Gender:  female        MRN: [id number]  : 1934  (80 y.o.)  Referral Date : 20   Referring Practitioner: ARELI Tracy CNP  Diagnosis: Hypotentsion due to hypovolemia  Additional Pertinent Hx: Per ER note on 2020: 80 y.o. female who presents via [facility] from dialysis for hypotension and dizziness that onset during her appointment. Patient states that symptoms onset while she was sitting and additionally describes herself as feeling \"hot. \" Patient describes dizziness as a faint feeling as though she is going to pass out. Patient states that she symptoms have however alleviated since her arrival within the department. Patient denies nausea, chest pain, shortness of breath, fever, cough, sore throat, chills, diarrhea, and heart palpitations. Patient notes that she resides at [facility]. Patient denies any recent fall or injury to her head. Patient has a medical history including type 2 diabetes mellitus, coronary artery disease, congestive heart failure, chronic kidney disease, chronic obstructive pulmonary disease, and hypertension. Restrictions/Precautions:  Restrictions/Precautions: Isolation, Fall Risk(MRSA nares)  Position Activity Restriction  Other position/activity restrictions: Perionteal diaylsis    Subjective:  Chart Reviewed: Yes  Patient assessed for rehabilitation services?: Yes  Family / Caregiver Present: No  Subjective: Pt in room, ageeable to PT. RN okay with PT session. Patient pleasant and cooperative.     General:  Overall Orientation Status: Within Functional Limits    Vision: Impaired  Vision Exceptions: Wears glasses at all times    Hearing: Exceptions to [facility]  Hearing Exceptions: Hard of hearing/hearing

## 2020-06-06 NOTE — PROGRESS NOTES
Hospitalist Progress Note    Patient: Michelle Durham Goomzee    YOB: 1934    MRN: [id number]       Acct: [de-identified]     PCP: Gilberto Michel MD    Date of Admission: 6/2/2020    Assessment/Plan:    1. Near syncope: Likely secondary to dehydration/volume depletion vs sepsis/bacteremia.    · Patient responded well to gentle IV fluid hydration.    · Orthostatic vital signs negative.   · Restart Toprol-XL at 25 mg PO tonight, if patient tolerates increase to home dosage.    Continue midodrine 10 mg 3 times daily and evaluate dialysis response.    · Pacemaker interrogated, WNL. · Cardiology following, appreciate assistance. 2. End-stage renal disease: Patient receives peritoneal dialysis, prescription increased to 2000 mL every 6 hours and monitor output and blood pressure. Nephrology following/managing, appreciate assistance. 3. Elevated TSH: Currently 15.43 today. T3, T4 within normal ranges. Start Synthroid 25 mcg daily. Recheck 6 weeks  4. Sepsis with tachycardia/Leukocytosis/bacteremia: WBC count 13 this morning. Blood cx with gram neg pseudomonas stutzeri susceptible to cipro. Start iv abx, ID consult. Repeat cxs.   5. Hyponatremia, mild: Improved, stable. 6. Hypotension: Resolved.  Secondary to hypovolemia. 7. Hypertension: History, stable. bb  8. CAD: History, stable.  BB, Continue aspirin, pravastatin, and Plavix.  Maintain telemetry.    9. HFpEF: Echo performed on 12/9/2019 demonstrates a normal overall left ventricular function with an EF of 55%; repeat echo performed on 6/4/2020 demonstrated an EF of 45-50%.  Maintain telemetry and cardiac diet. 10. Hyperlipidemia: History.  Continue pravastatin. 11. GERD: History.  Continue Protonix.   12. Type 2 diabetes mellitus: Hemoglobin A1c 6.4 on 6/2/2020.  Continue Accu-Cheks before meals and at bedtime with sliding scale insulin coverage (low-dose corrective algorithm).  Hypoglycemia protocol in place, maintain carb round, and reactive to light. Conjunctivae/corneas clear. Neck: Supple, with full range of motion. No jugular venous distention. Trachea midline. Respiratory:  Normal respiratory effort. Clear to auscultation, bilaterally without Rales/Wheezes/Rhonchi. Cardiovascular: Regular rate and rhythm with normal S1/S2 without murmurs, rubs or gallops. Abdomen: Soft, non-tender, non-distended with normal bowel sounds. Musculoskeletal: passive and active ROM x 4 extremities. Skin: Skin color, texture, turgor normal.    Neurologic:  Neurovascularly intact without any focal sensory/motor deficits. Cranial nerves: II-XII intact, grossly non-focal.  Psychiatric: Alert and oriented, thought content appropriate  Peripheral Pulses: +2 palpable, equal bilaterally       Labs:   Recent Labs     06/05/20  0553 06/06/20  0647   WBC 11.5* 13.0*   HGB 9.2* 8.9*   HCT 27.4* 29.0*    185     Recent Labs     06/04/20  1627 06/04/20  2256 06/05/20  1937   * 132* 129*   K 4.4 4.3 4.0   CL 94* 95* 92*   CO2 23 24 22*   BUN 37* 37* 37*   CREATININE 6.6* 6.7* 6.9*   CALCIUM 8.4* 8.1* 8.1*     No results for input(s): AST, ALT, BILIDIR, BILITOT, ALKPHOS in the last 72 hours. No results for input(s): INR in the last 72 hours. No results for input(s): Cherl Ector in the last 72 hours. No results for input(s): PROCAL in the last 72 hours. Microbiology:      Urinalysis:      Lab Results   Component Value Date    NITRU NEGATIVE 05/17/2020    WBCUA > 200 05/17/2020    BACTERIA MANY 05/17/2020    RBCUA 25-50 05/17/2020    BLOODU MODERATE 05/17/2020    SPECGRAV 1.019 03/26/2019    GLUCOSEU NEGATIVE 05/17/2020       Radiology:  VL DUP CAROTID BILATERAL   Final Result       1. Scattered atherosclerotic changes of the distal carotid arteries extending into the bifurcation and also involving internal carotid segments discussed above.    2. Left side internal carotid segment waveforms changes may reflect underestimation of arterial

## 2020-06-06 NOTE — PROGRESS NOTES
Nephrology Progress Note    Patient - Lion Cat   MRN -  [id number]   Acct # - [de-identified]      - 1934    80 y.o. Admit Date: 2020  Hospital Day: 2  Location: [facility]-  Date of evaluation -  2020    Subjective:   CC: lightheadedness  Denies shortness of breath on Room air   Good appetite  Effluent clear yellow, Ultrafiltration 400 last dwell  BP Range: Systolic (87CGG), JFB:741 , Min:122 , VRS:494      Diastolic (69FVF), ZW, Min:56, Max:66    Objective:   VITALS:  BP (!) 124/58   Pulse 85   Temp 98.2 °F (36.8 °C) (Oral)   Resp 18   Ht 5' 6\" (1.676 m)   Wt 234 lb (106.1 kg) Comment: dry weight   SpO2 94%   BMI 37.77 kg/m²    Patient Vitals for the past 24 hrs:   BP Temp Temp src Pulse Resp SpO2 Weight   20 0427 -- -- -- -- -- -- 234 lb (106.1 kg)   20 0334 (!) 124/58 98.2 °F (36.8 °C) Oral 85 18 94 % 240 lb 9.6 oz (109.1 kg)   20 2304 (!) 141/65 98.3 °F (36.8 °C) Oral 87 17 92 % --   20 2143 128/61 98.6 °F (37 °C) Oral 90 16 95 % --   20 1631 (!) 140/66 98.7 °F (37.1 °C) Oral 82 18 93 % --   20 1141 136/60 98 °F (36.7 °C) Oral 104 18 97 % --   20 0915 (!) 122/56 98.3 °F (36.8 °C) Oral 95 18 97 % --     1 L/min      Intake/Output Summary (Last 24 hours) at 2020 0857  Last data filed at 2020 0334  Gross per 24 hour   Intake 6640 ml   Output 6400 ml   Net 240 ml       Admission weight: 250 lb (113.4 kg)  Patient Vitals for the past 96 hrs (Last 3 readings):   Weight   20 0427 234 lb (106.1 kg)   20 0334 240 lb 9.6 oz (109.1 kg)   20 0431 232 lb 12.8 oz (105.6 kg)       EXAM:  CONSTITUTIONAL:  No acute distress. Pleasant  HEENT:  Head is normocephalic, Extraocular movement intact. Neck is supple. Voice is clear. CARDIOVASCULAR:  S1, S2  regular rate and rhythm. RESPIRATORY: Clear to ausculation bilaterally. Equal breath sounds. No wheezes. No shortness of breath noted at rest.  ABDOMEN: soft, non tender.  Peritoneal dialysis cath site clear  NEUROLOGICAL: Patient is alert and oriented to person, place, and time. Recent and remote memory intact. Thought is coherant. SKIN: no rash, No significant bruises on exposed surfaces  MUSCULOSKELETAL: Movement is coordinated. Moves all extremities   EXTREMITIES: Distal lower extremity temp is warm, 1+ lower extremity edema up into thighs. PSYCHIATRIC: mood and affect appropriate. Medications:   Med reviewed  Scheduled Meds:   levothyroxine  25 mcg Oral Daily    metoprolol succinate  25 mg Oral Nightly    midodrine  10 mg Oral TID WC    aspirin  81 mg Oral Daily    dianeal lo-nikko 1.5%  1,500 mL Intraperitoneal Q6H    polyethylene glycol  17 g Oral Daily    potassium chloride  20 mEq Oral BID WC    insulin lispro  0-6 Units Subcutaneous TID WC    insulin lispro  0-3 Units Subcutaneous Nightly    acetaminophen  650 mg Oral 4x Daily    clopidogrel  75 mg Oral QPM    megestrol  40 mg Oral BID    pantoprazole  40 mg Oral BID    pravastatin  40 mg Oral QPM    sevelamer  800 mg Oral TID WC    sodium chloride flush  10 mL Intravenous 2 times per day     PRN Meds sodium chloride flush, acetaminophen **OR** acetaminophen, glucose, dextrose, glucagon (rDNA), dextrose, ondansetron **OR** ondansetron   Labs:   Labs reviewed  Recent Labs     06/04/20  1627 06/04/20  2256 06/05/20  1937   * 132* 129*   K 4.4 4.3 4.0   CL 94* 95* 92*   CO2 23 24 22*   BUN 37* 37* 37*   CREATININE 6.6* 6.7* 6.9*   LABGLOM 6* 6* 6*   GLUCOSE 124* 124* 174*   CALCIUM 8.4* 8.1* 8.1*     Recent Labs     06/05/20  0553 06/06/20  0647   WBC 11.5* 13.0*   RBC 3.07* 2.97*   HGB 9.2* 8.9*   HCT 27.4* 29.0*   MCV 89.3 97.6   MCH 30.0 30.0    185      Summary 6/4/2020   Technically difficult examination. Ejection fraction was estimated at 45-50%. ASSESSMENT:  · End Stage Renal Disease 2nd to diabetic and hypertensive nephrosclerosis on Peritoneal dialysis.  Mild volume overload, BP still

## 2020-06-06 NOTE — PROCEDURES
A Bladder scan was performed at 1230 . The patient's last void was at ? Monalisa Lint The residual amount was measured to be 89 ML. Report of results was given to [facility] RN.

## 2020-06-06 NOTE — FLOWSHEET NOTE
06/06/20 1656   Encounter Summary   Services provided to: Patient   Referral/Consult From: Johnny   Continue Visiting Yes  (6/6/2020 )   Complexity of Encounter Low   Length of Encounter 15 minutes   Advance Care Planning Yes   Routine   Type Initial   Assessment Approachable   Intervention Nurtured hope   Outcome Comfort   Advance Directives (For Healthcare)   Healthcare Directive Yes, patient has an advance directive for healthcare treatment   S- During my contact with the 80 yr old patient, I wanted to assess the spiritual and emotional        needs. I also wanted to verify if the pt had a Advance Directive. The pt does have a          Advance Directive. The pt is listed as a Full Code. O- The patient was in bed and was receptive to my presences. The pt didnt share any             major concerns at the time. The pt does have support through their family. A- I offered emotional support as well as words of encouragement. P-  Continued support would be helpful in order to meet the future Spiritual needs of the         patient.

## 2020-06-06 NOTE — PROGRESS NOTES
Bladder scan completed on this pt as urinalysis was ordered, pt stated she has not urinated in several days and is unsure if she still urinates as she is a CAPD pt.  Informed Dr Greg Stern and urinalysis was cx

## 2020-06-07 NOTE — CONSULTS
SL tablet up to max of 3 total doses. If no relief after 1 dose, call 911. Patient taking differently: Dissolve one tablet under tongue as needed every 5 minutes for chest pain up to max of 3 total doses.  If no relief after 1 dose, call 911. 10/18/18  Yes Mehran Martínez MD   ascorbic acid (VITAMIN C) 500 MG tablet Take 500 mg by mouth 2 times daily    Yes Historical Provider, MD   docusate sodium (COLACE) 100 MG capsule Take 200 mg by mouth daily   Yes Historical Provider, MD   clopidogrel (PLAVIX) 75 MG tablet Take 75 mg by mouth every evening    Yes Historical Provider, MD   guaiFENesin (ROBITUSSIN) 100 MG/5ML syrup Take 200 mg by mouth every 4 hours as needed for Cough   Yes Historical Provider, MD   Multiple Vitamins-Minerals (OCUVITE EXTRA PO) Take 1 tablet by mouth daily    Yes Historical Provider, MD   pravastatin (PRAVACHOL) 40 MG tablet Take 40 mg by mouth every evening Indications: Blood Cholesterol Abnormal  5/8/16  Yes Historical Provider, MD   acetaminophen (TYLENOL) 325 MG tablet Take 650 mg by mouth 4 times daily As needed for pain or fever >100.4 F   Yes Historical Provider, MD    Scheduled Meds:   cefepime  1 g Intravenous Q24H    docusate sodium  100 mg Oral Daily    custom dialysis builder   Intraperitoneal Q6H    levothyroxine  25 mcg Oral Daily    metoprolol succinate  25 mg Oral Nightly    midodrine  10 mg Oral TID WC    aspirin  81 mg Oral Daily    polyethylene glycol  17 g Oral Daily    potassium chloride  20 mEq Oral BID WC    insulin lispro  0-6 Units Subcutaneous TID WC    insulin lispro  0-3 Units Subcutaneous Nightly    acetaminophen  650 mg Oral 4x Daily    clopidogrel  75 mg Oral QPM    megestrol  40 mg Oral BID    pantoprazole  40 mg Oral BID    pravastatin  40 mg Oral QPM    sevelamer  800 mg Oral TID WC    sodium chloride flush  10 mL Intravenous 2 times per day     Continuous Infusions:   dextrose       PRN Meds:.promethazine, bisacodyl, sodium chloride flush,

## 2020-06-07 NOTE — FLOWSHEET NOTE
06/07/20 1800   Provider Notification   Reason for Communication Review case;Patient request   Provider Name Dr. Leonard Samuel   Provider Notification Physician   Method of Communication Secure Message   Response See orders       Notified Physician of patient complaint of sore throat, per Dr. Leonard Samuel order Phenol Spray for comfort.

## 2020-06-07 NOTE — PROGRESS NOTES
morning  Denies any complaint  Updated by the staff  No issues of concern  Blood pressure is better      Medications:   Scheduled Meds:   cefepime  1 g Intravenous Q24H    GI cocktail   Oral Once    custom dialysis builder   Intraperitoneal Q6H    levothyroxine  25 mcg Oral Daily    metoprolol succinate  25 mg Oral Nightly    midodrine  10 mg Oral TID     aspirin  81 mg Oral Daily    polyethylene glycol  17 g Oral Daily    potassium chloride  20 mEq Oral BID WC    insulin lispro  0-6 Units Subcutaneous TID     insulin lispro  0-3 Units Subcutaneous Nightly    acetaminophen  650 mg Oral 4x Daily    clopidogrel  75 mg Oral QPM    megestrol  40 mg Oral BID    pantoprazole  40 mg Oral BID    pravastatin  40 mg Oral QPM    sevelamer  800 mg Oral TID     sodium chloride flush  10 mL Intravenous 2 times per day     Continuous Infusions:   dextrose         CBC:   Recent Labs     06/05/20  0553 06/06/20  0647 06/07/20  0648   WBC 11.5* 13.0* 15.1*   HGB 9.2* 8.9* 9.0*    185 200     CMP:    Recent Labs     06/04/20  2256 06/05/20  1937 06/07/20  0648   * 129* 127*   K 4.3 4.0 4.3   CL 95* 92* 90*   CO2 24 22* 20*   BUN 37* 37* 37*   CREATININE 6.7* 6.9* 7.8*   GLUCOSE 124* 174* 120*   CALCIUM 8.1* 8.1* 8.3*   LABGLOM 6* 6* 5*     Troponin: No results for input(s): TROPONINI in the last 72 hours. BNP: No results for input(s): BNP in the last 72 hours. INR: No results for input(s): INR in the last 72 hours. Lipids: No results for input(s): CHOL, LDLDIRECT, TRIG, HDL, AMYLASE, LIPASE in the last 72 hours. Liver: No results for input(s): AST, ALT, ALKPHOS, PROT, LABALBU, BILITOT in the last 72 hours. Invalid input(s): BILDIR  Iron:  No results for input(s): IRONS, FERRITIN in the last 72 hours. Invalid input(s): LABIRONS  VL DUP CAROTID BILATERAL   Final Result       1.  Scattered atherosclerotic changes of the distal carotid arteries extending into the bifurcation and also

## 2020-06-07 NOTE — PROGRESS NOTES
technology. **         Final report electronically signed by Dr. Pastor Damico on 6/7/2020 2:15 PM      VL DUP CAROTID BILATERAL   Final Result       1. Scattered atherosclerotic changes of the distal carotid arteries extending into the bifurcation and also involving internal carotid segments discussed above. 2. Left side internal carotid segment waveforms changes may reflect underestimation of arterial narrowing without peak velocity elevation. If further assessment is required consider CT angiography or MRI angiography assessment. 3. Left vertebral artery not visualized during exam.               **This report has been created using voice recognition software. It may contain minor errors which are inherent in voice recognition technology. **      Final report electronically signed by Dr. Shruti Pope on 6/3/2020 10:42 PM      XR CHEST PORTABLE   Final Result   Small amount of free of the left hemidiaphragm which is apparently secondary to peritoneal dialysis otherwise no acute cardiopulmonary process identified. **This report has been created using voice recognition software. It may contain minor errors which are inherent in voice recognition technology. **      Final report electronically signed by Dr. Augustus Morales on 6/2/2020 4:04 PM           Code Status: Full Code    Active Hospital Problems    Diagnosis Date Noted    Syncope [R55] 06/04/2020    Hypotension due to hypovolemia [I95.89, E86.1] 06/02/2020    Near syncope [R55] 05/20/2020    ESRD on peritoneal dialysis ([facility] [N18.6, Z99.2]     Lactic acidosis [E87.2]     Morbid obesity ([address] [E66.01] 02/28/2013    [facility] Bi[facility] ICD [Z95.810] 11/17/2011    Type 2 diabetes mellitus with insulin therapy ([facility] [E11.9, Z79.4] 01/01/1998       Electronically signed by Kaveh Nunn MD on 6/7/2020 at 2:22 PM

## 2020-06-08 NOTE — CONSULTS
Dr. Nicole Osorio Hemodialysis patient [facility])     His of Heparin induced thrombocytopenia     History of breast cancer     right 1982 s/p mastectomy and chemo, left 2007 s/p mastectomy    History of CVA (cerebrovascular accident)     History of pulmonary embolism 05/2014    on [address] (coumadin)    Hyperkalemia     Hyperlipidemia     Hypertension     pulmonary    Iron deficiency anemia     Dr. 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 No aortic stenosis. No mitral stenosis. Coronaries; mild disease of the LAD not more than 40%. LV; severe LV dysfunction and EF 30-35%.  CARDIAC CATHETERIZATION  05-    Mild disease of small distal LAD (approximately  50% stenosis) angiographically normal CA. Mild to moderate LV systolic dysfunction. EF 35%. Mildly elevated LV end diastolic pressure. No significant MR. Systemic hypertension.  CARDIAC DEFIBRILLATOR PLACEMENT  02/02/2011    CARDIOVASCULAR STRESS TEST  03/23/10    EF 30%  Severe LV Dys    COLONOSCOPY      Dr. Rafia Skelton  03/22/10    EF 45%. LV mildly dilated. Systolic function mildly reducted. No regional wall motion abnormalities.  [facility] history of alcohol use. Patient currently lives in an assisted living environment      FAMILY HISTORY:         Problem Relation Age of Onset    Diabetes Mother     Pacemaker Mother     Stroke Mother     Heart Disease Mother     Diabetes Father     Cancer Sister     Cancer Brother     Cancer Sister     Heart Disease Brother         cardiomegaly       REVIEW OF SYSTEMS:     Constitutional: no fever, no night sweats, +fatigue, no weight loss. Head: no head ache , no head injury, no migranes. Eye: no eye discharge, blurring of vision, no double vision,no eye pain. Ears: no hearing difficulty, no tinnitus  Mouth/throat: no ulceration, dental caries , dysphagia, no hoarseness and voice change  Respiratory: no cough no chest pain,no shortness of breath,no wheezing  CVS: no palpitation, no chest pain,   GI: abdominal distension  ANN: no dysuria, frequency and urgency, no hematuria, no kidney stones  Musculoskeletal: no joint pain, +swelling , stiffness,  Endocrine: no polyuria, polydipsia, no cold or heat intolerance  Hematology: no anemia, no easy brusing or bleeding, no hx of clotting disorder  Dermatology: no skin rash, no skin lesions, no pruritis,  Neurological:no headaches,no dizziness, no seizure, no numbness. Psychiatry: no depression, no anxiety,no panic attacks, no suicide ideation    PHYSICAL EXAM:     BP (!) 144/60   Pulse 95   Temp 98.1 °F (36.7 °C) (Oral)   Resp 18   Ht 5' 6\" (1.676 m)   Wt 238 lb 11.2 oz (108.3 kg)   SpO2 93%   BMI 38.53 kg/m²   General apperance:  Awake, alert, obese, chronically sick looking  HEENT: pale conjunctiva, unicteric sclera, moist oral mucosa. Chest:  Diminished breath sound  Cardiovascular:  RRR ,S1S2, no murmur or gallop. Abdomen:  Soft,distended, PD catheter in place  Extremities: +edema  Skin:  Warm and dry. CNS:oriented to person place and time.         LABS:     CBC:   Recent Labs     06/06/20  0647 06/07/20  0648 06/08/20  0637   WBC 13.0* 15.1* 11.6*

## 2020-06-08 NOTE — PROGRESS NOTES
Hospitalist Progress Note    Patient: Michelle Key TuneIn    YOB: 1934    MRN: [id number]       Acct: [de-identified]     PCP: Ngoc Gannon MD    Date of Admission: 6/2/2020    Assessment/Plan:    1. Near syncope: Likely secondary to dehydration/volume depletion vs sepsis/bacteremia.    · Patient responded well to gentle IV fluid hydration.    · Orthostatic vital signs negative.   · Increased toporl to 50mg. · Continue midodrine 10 mg 3 times daily and evaluate dialysis response.    · Pacemaker interrogated, WNL. · Cardiology following, appreciate assistance. 2. End-stage renal disease: Patient receives peritoneal dialysis. Nephro following/managing, appreciate assistance. 3. Nausea 2/2 colonic ileus 2/2 stool: zofran, phenergan, maalox prn. miralax  4. Elevated TSH: Currently 15.43 today.  T3, T4 within normal ranges.  Start Synthroid 25 mcg daily. Recheck 6 weeks  5. Gram Negative Pseudomonas bacteremia: IV cefepime. ID consult --> initiated source workup. 6. Hyponatremia, mild: Improved, stable. 7. Hypotension: Resolved.  Secondary to hypovolemia. 8. Hypertension: History, stable. bb  9. CAD: History, stable.  BB, Continue aspirin, pravastatin, and Plavix.  Maintain telemetry.    10. HFpEF: Echo performed on 12/9/2019 demonstrates a normal overall left ventricular function with an EF of 55%; repeat echo performed on 6/4/2020 demonstrated an EF of 45-50%.  Maintain telemetry and cardiac diet. 11. Hyperlipidemia: History.  Continue pravastatin. 12. GERD: History.  Continue Protonix.   13. Type 2 diabetes mellitus: Hemoglobin A1c 6.4 on 6/2/2020.  Continue Accu-Cheks before meals and at bedtime with sliding scale insulin coverage (low-dose corrective algorithm).  Hypoglycemia protocol in place, maintain carb diet        Chief JONES [address] Course:   Patient admitted for dizziness found to have gram neg bacteremia.      Subjective (past 24 hours):  Pt doing well. No acute events overnight. nausea improved. Spoke to her ins for a peer to peer per request of CM according to RN. They were unable to give me an auth as the pt is not medically stable and not ready for dc. A new case has to be initiated by CM when close to dc and plan in place. Notified nurse to update CM. Medications:  Reviewed    Infusion Medications    dextrose       Scheduled Medications    dianeal lo-nikko (ULTRABAG) 2.5%  1,500 mL Intraperitoneal Q6H    cefepime  1 g Intravenous Q24H    docusate sodium  100 mg Oral Daily    levothyroxine  25 mcg Oral Daily    metoprolol succinate  25 mg Oral Nightly    midodrine  10 mg Oral TID WC    aspirin  81 mg Oral Daily    polyethylene glycol  17 g Oral Daily    potassium chloride  20 mEq Oral BID WC    insulin lispro  0-6 Units Subcutaneous TID WC    insulin lispro  0-3 Units Subcutaneous Nightly    acetaminophen  650 mg Oral 4x Daily    clopidogrel  75 mg Oral QPM    megestrol  40 mg Oral BID    pantoprazole  40 mg Oral BID    pravastatin  40 mg Oral QPM    sevelamer  800 mg Oral TID WC    sodium chloride flush  10 mL Intravenous 2 times per day     PRN Meds: promethazine, bisacodyl, phenol, sodium chloride flush, acetaminophen **OR** acetaminophen, glucose, dextrose, glucagon (rDNA), dextrose, ondansetron **OR** ondansetron      Intake/Output Summary (Last 24 hours) at 6/8/2020 1403  Last data filed at 6/8/2020 1342  Gross per 24 hour   Intake 6000 ml   Output 5700 ml   Net 300 ml       Diet:  Diet NPO Effective Now    Exam:  /66   Pulse 95   Temp 98.2 °F (36.8 °C) (Oral)   Resp 20   Ht 5' 6\" (1.676 m)   Wt 238 lb 11.2 oz (108.3 kg)   SpO2 95%   BMI 38.53 kg/m²     General appearance: mild distress, appears stated age and cooperative. nauseated  HEENT: Pupils equal, round, and reactive to light. Conjunctivae/corneas clear. Neck: Supple, with full range of motion. No jugular venous distention.  Trachea

## 2020-06-08 NOTE — PROGRESS NOTES
* 127* 127*   K 4.0 4.3 4.5   CL 92* 90* 89*   CO2 22* 20* 20*   BUN 37* 37* 39*   CREATININE 6.9* 7.8* 7.0*   GLUCOSE 174* 120* 109*   CALCIUM 8.1* 8.3* 8.6   MG  --   --  1.5*     Hepatic:   No results for input(s): LABALBU, AST, ALT, ALB, BILITOT, ALKPHOS in the last 72 hours. Meds:  Infusion:    dextrose       Meds:    dianeal lo-nikko (ULTRABAG) 2.5%  1,500 mL Intraperitoneal Q6H    cefepime  1 g Intravenous Q24H    docusate sodium  100 mg Oral Daily    levothyroxine  25 mcg Oral Daily    metoprolol succinate  25 mg Oral Nightly    midodrine  10 mg Oral TID WC    aspirin  81 mg Oral Daily    polyethylene glycol  17 g Oral Daily    potassium chloride  20 mEq Oral BID WC    insulin lispro  0-6 Units Subcutaneous TID WC    insulin lispro  0-3 Units Subcutaneous Nightly    acetaminophen  650 mg Oral 4x Daily    clopidogrel  75 mg Oral QPM    megestrol  40 mg Oral BID    pantoprazole  40 mg Oral BID    pravastatin  40 mg Oral QPM    sevelamer  800 mg Oral TID WC    sodium chloride flush  10 mL Intravenous 2 times per day     Meds prn: promethazine, bisacodyl, phenol, sodium chloride flush, acetaminophen **OR** acetaminophen, glucose, dextrose, glucagon (rDNA), dextrose, ondansetron **OR** ondansetron       Impression and Plan:  1. ESRD on PD  - PD data reviewed. Clinically patient appears fluid overloaded  - Will change PD to 2.5% dianeal every 6 hours  2. Mild hyponatremia: Will monitor, secondary to reduced free water excretion  3. Anemia in ESRD: Monitor. H&H overall stable. 4. Near syncope: ?  Recurrent. Unclear etiology. Unable to obtain orthostatics. At this time patient appears somewhat hypervolemic. She is definitely not euvolemic or hypovolemic. Blood pressure is stable. Will monitor. Cardiology also following. 5.  Mild LV systolic dysfunction. Patient is on beta-blockers. 6.  Pseudomonas/gram-negative bacteremia. Patient is on IV cefepime. 7. DM  8.  Hypotension:

## 2020-06-08 NOTE — PROGRESS NOTES
Glycerin, Laxative, (GLYCERIN ADULT) 2 g SUPP Place rectally as needed     Yes Historical Provider, MD   potassium chloride (KLOR-CON M) 20 MEQ extended release tablet Take 20 mEq by mouth 2 times daily      Yes Historical Provider, MD   megestrol (MEGACE) 40 MG tablet Take 1 tablet by mouth 2 times daily 4/29/20   Yes Ronal Bettencourt PA-C   Amino Acids (LIQUACEL) LIQD Take 30 mLs by mouth Three times per week; Monday, Wednesday, and Friday related to ESRD     Yes Historical Provider, MD   sevelamer (RENVELA) 800 MG tablet Take 1 tablet by mouth 3 times daily (with meals)     Yes Historical Provider, MD   insulin glargine (LANTUS) 100 UNIT/ML injection vial Inject 10 Units into the skin nightly 4/9/20   Yes Denise Garcia MD   polyethylene glycol (GLYCOLAX) powder Take 17 g by mouth 2 times daily as needed (As needed for constipation.)      Yes Historical Provider, MD   vitamin D 25 MCG (1000 UT) CAPS Take 1,000 Units by mouth daily     Yes Historical Provider, MD   miconazole (MICOTIN) 2 % powder Apply topically 2 times daily.  12/19/19   Yes Sonja Alvarez DO   sodium chloride (OCEAN, BABY AYR) 0.65 % nasal spray 1 spray by Nasal route as needed for Congestion 12/19/19   Yes Sonja Alvarez DO   insulin aspart (NOVOLOG) 100 UNIT/ML injection vial Inject into the skin 2 times daily Inject per sliding scale 0-150 = 0 units; 151-200 = 1 unit; 201-205 = 2 units; 251-300 = 3 units; 301-350 = 4 units; 351-400 = 5 units; 401-450 = 6 units      Yes Historical Provider, MD   cyanocobalamin 1000 MCG tablet Take 1,000 mcg by mouth every evening      Yes Historical Provider, MD   pantoprazole (PROTONIX) 40 MG tablet Take 40 mg by mouth 2 times daily     Yes Historical Provider, MD   ipratropium-albuterol (DUONEB) 0.5-2.5 (3) MG/3ML SOLN nebulizer solution Inhale 1 vial into the lungs every 6 hours as needed for Shortness of Breath     Yes Historical Provider, MD   ARTIFICIAL TEAR OP Instill one drop into each eye as needed Ox Range (24h):  SpO2  Av.7 %  Min: 92 %  Max: 97 %  Oxygen Amount and Delivery: O2 Flow Rate (L/min): 1 L/min  CONSTITUTIONAL: Alert oriented  SKIN: Pale dry skin  LYMPH: No palpable cervical lymph nodes  HEENT: Neck supple sclera anicteric, extraocular muscles intact  NECK: Soft no enlarged lymph nodes   CHEST/LUNGS: Clear no wheezing  CARDIOVASCULAR: Regular   ABDOMEN: Obese nontender throughout to deep palpation. No tympany normoactive bowel sounds. RECTAL: Patient refused  NEUROLOGIC: Alert and oriented moves all extremities  EXTREMITIES: Mild edema lower extremities, warm, no cyanosis  LABS:              Recent Labs     20  2256 20  0553 20  1937 20  0647 20  0648   WBC  --  11.5*  --  13.0* 15.1*   HGB  --  9.2*  --  8.9* 9.0*   HCT  --  27.4*  --  29.0* 28.5*   PLT  --  162  --  185 200   *  --  129*  --  127*   K 4.3  --  4.0  --  4.3   CL 95*  --  92*  --  90*   CO2 24  --  22*  --  20*   BUN 37*  --  37*  --  37*   CREATININE 6.7*  --  6.9*  --  7.8*   CALCIUM 8.1*  --  8.1*  --  8.3*      RADIOLOGY:      XR ABDOMEN (KUB) (SINGLE AP VIEW)   Final Result   1. Findings of constipation and possible element of colonic ileus. 2. CAPD catheter in place. 3. 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               **This report has been created using voice recognition software. It may contain minor errors which are inherent in voice recognition technology. **           Final report electronically signed by Dr. Lynette Stahl on 2020 2:15 PM       VL DUP CAROTID BILATERAL   Final Result       1. Scattered atherosclerotic changes of the distal carotid arteries extending into the bifurcation and also involving internal carotid segments discussed above. 2. Left side internal carotid segment waveforms changes may reflect underestimation of arterial narrowing without peak velocity elevation.  If further assessment is required consider CT angiography or

## 2020-06-09 NOTE — PROGRESS NOTES
[facility]   Nephrology progress note  6/9/2020, 11:21 AM      Pt Name:    Alexandria Perez  MRN:     [id number]     [place]:    1934  Admit Date:    6/2/2020  2:00 PM  Primary Care Physician:  Zhao Espinoza MD   Room number  9N-23/607-Y    Chief Complaint: Nephrology following for ESRD/PD    Subjective:  Patient seen and examined  Seen earlier today  No chest pain or any shortness of breath reported  PD data reviewed    Objective:  24HR INTAKE/OUTPUT:      Intake/Output Summary (Last 24 hours) at 6/9/2020 1121  Last data filed at 6/9/2020 0938  Gross per 24 hour   Intake 4500 ml   Output 5700 ml   Net -1200 ml     I/O last 3 completed shifts: In: 3000 [Other:3000]  Out: 6815 [[facility]:9945]  I/O this shift:  In: 1500 [Other:1500]  Out: 2100 [Other:2100]  Admission weight: 250 lb (113.4 kg)  Wt Readings from Last 3 Encounters:   06/09/20 236 lb 11.2 oz (107.4 kg)   05/21/20 248 lb (112.5 kg)   04/27/20 248 lb 3.2 oz (112.6 kg)     Body mass index is 38.2 kg/m².     Physical examination  VITALS:     Vitals:    06/08/20 2132 06/08/20 2312 06/09/20 0345 06/09/20 0815   BP: (!) 141/61 (!) 133/55 (!) 97/49 (!) 92/47   Pulse: 88 87 90 87   Resp: 20 20 18 18   Temp: 98.1 °F (36.7 °C) 98.7 °F (37.1 °C) 98 °F (36.7 °C) 97.7 °F (36.5 °C)   TempSrc: Oral Oral Oral Oral   SpO2: 94% 94% 93% 96%   Weight:   236 lb 11.2 oz (107.4 kg)    Height:         General Appearance: alert and cooperative with exam, appears comfortable, no distress  Mouth/Throat: Oral mucosa moist  Neck: No JVD  Lungs: Air entry B/L, no rales, no use of accessory muscles  Heart:  S1, S2 heard  GI:  non-tender, no guarding, some distention  Extremities:  bilateral  Trace lower extremity pitting edema      Lab Data  CBC:   Recent Labs     06/07/20  0648 06/08/20  0637 06/09/20  1034   WBC 15.1* 11.6* 12.6*   HGB 9.0* 8.7* 8.8*   HCT 28.5* 27.8* 27.9*    210 210     BMP:  Recent Labs     06/07/20  0648 06/08/20  3711

## 2020-06-09 NOTE — PROGRESS NOTES
[facility]  General Surgery Progress Note- Cadence Bailey MD   Name: Parvez Aviles  MRN: [id number]  YOB: 1934  Date of evaluation: 6/9/2020  Primary Care Physician: Judy Dias MD  Patient evaluated at the request of  Dr. Prem Knight  Reason for evaluation: ileus  IMPRESSIONS:   1. Abdominal pain now resolved. Tolerating NG tube out. Patient has not yet been given oral intake per her report. 2. Benign abdomen. No right upper quadrant abdominal pain. 3. A symptomatic cholelithiasis. No inflammation of gallbladder wall on imaging. 4.  has a past medical history of Anxiety, Atherosclerotic heart disease of native coronary artery without angina pectoris, CAD (coronary artery disease), Cancer ([address] Cardiomyopathy, nonischemic (HCC), Cerebral artery occlusion with cerebral infarction ([address] CHF (congestive heart failure) ([address] CKD (chronic kidney disease) stage 3, GFR 30-59 ml/min ([facility]), Congenital heart disease, COPD exacerbation ([address] 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 ([address] DM2 (diabetes mellitus, type 2) ([address] Dyspnea, ESRD (end stage renal disease) ([address] Ex-smoker, GERD (gastroesophageal reflux disease), GERD (gastroesophageal reflux disease), Heart failure with preserved ejection fraction ([address] Hemodialysis patient ([address] His of Heparin induced thrombocytopenia, History of breast cancer, History of CVA (cerebrovascular accident), History of pulmonary embolism, Hyperkalemia, Hyperlipidemia, Hypertension, Iron deficiency anemia, LBBB (left bundle branch block), Localized edema, Malignant neoplasm of breast (female) ([address] Osteoarthritis, Paget's disease of bone, Personal history of transient ischemic attack (TIA), and cerebral infarction without residual deficits, Pulmonary embolism ([address] [facility] ICD, Thyroid disease, Venous insufficiency (chronic) (peripheral), Vitamin D deficiency, Vitamin D deficiency, and Weakness. RECOMMENDATIONS:   1.  Oral intake   Yes Historical Provider, MD   OXYGEN Inhale 2 L into the lungs as needed     Yes Historical Provider, MD   ondansetron (ZOFRAN) 4 MG tablet Take 1 tablet by mouth every 8 hours as needed for Nausea or Vomiting  Patient taking differently: Take 4 mg by mouth every 12 hours as needed for Nausea or Vomiting  5/21/20   Yes Eric Lyles MD   mupirocin (BACTROBAN) 2 % cream Apply topically daily Apply topically to PD exit site daily     Yes Historical Provider, MD   polycarbophil (FIBERCON) 625 MG tablet Take 625 mg by mouth 3 times daily     Yes Historical Provider, MD   Glycerin, Laxative, (GLYCERIN ADULT) 2 g SUPP Place rectally as needed     Yes Historical Provider, MD   potassium chloride (KLOR-CON M) 20 MEQ extended release tablet Take 20 mEq by mouth 2 times daily      Yes Historical Provider, MD   megestrol (MEGACE) 40 MG tablet Take 1 tablet by mouth 2 times daily 4/29/20   Yes Junito Del Toro PA-C   Amino Acids (LIQUACEL) LIQD Take 30 mLs by mouth Three times per week; Monday, Wednesday, and Friday related to ESRD     Yes Historical Provider, MD   sevelamer (RENVELA) 800 MG tablet Take 1 tablet by mouth 3 times daily (with meals)     Yes Historical Provider, MD   insulin glargine (LANTUS) 100 UNIT/ML injection vial Inject 10 Units into the skin nightly 4/9/20   Yes Tierra Braswell MD   polyethylene glycol (GLYCOLAX) powder Take 17 g by mouth 2 times daily as needed (As needed for constipation.)      Yes Historical Provider, MD   vitamin D 25 MCG (1000 UT) CAPS Take 1,000 Units by mouth daily     Yes Historical Provider, MD   miconazole (MICOTIN) 2 % powder Apply topically 2 times daily.  12/19/19   Yes Liliya Gee DO   sodium chloride (OCEAN, BABY AYR) 0.65 % nasal spray 1 spray by Nasal route as needed for Congestion 12/19/19   Yes Liliya Gee DO   insulin aspart (NOVOLOG) 100 UNIT/ML injection vial Inject into the skin 2 times daily Inject per sliding scale 0-150 = 0 units; 151-200 = 1 unit; Intravenous Q24H    docusate sodium  100 mg Oral Daily    custom dialysis builder   Intraperitoneal Q6H    levothyroxine  25 mcg Oral Daily    metoprolol succinate  25 mg Oral Nightly    midodrine  10 mg Oral TID     aspirin  81 mg Oral Daily    polyethylene glycol  17 g Oral Daily    potassium chloride  20 mEq Oral BID     insulin lispro  0-6 Units Subcutaneous TID     insulin lispro  0-3 Units Subcutaneous Nightly    acetaminophen  650 mg Oral 4x Daily    clopidogrel  75 mg Oral QPM    megestrol  40 mg Oral BID    pantoprazole  40 mg Oral BID    pravastatin  40 mg Oral QPM    sevelamer  800 mg Oral TID     sodium chloride flush  10 mL Intravenous 2 times per day         Continuous Infusions:  Infusions Meds    dextrose           PRN Meds:. PRN Medications   promethazine, bisacodyl, sodium chloride flush, acetaminophen **OR** acetaminophen, glucose, dextrose, glucagon (rDNA), dextrose, ondansetron **OR** ondansetron     Allergies  is allergic to heparin. Family History  family history includes Cancer in her brother, sister, and sister; Diabetes in her father and mother; Heart Disease in her brother and mother; Pacemaker in her mother; Stroke in her mother. Social History   reports that she quit smoking about 68 years ago. Her smoking use included cigarettes. She has a 1.00 pack-year smoking history. She has never used smokeless tobacco. She reports that she does not drink alcohol or use drugs. Review of Systems  General Denies any fever or chills  HEENT Denies any diplopia, tinnitus or vertigo at present  Resp denies cough or wheezing.   Reports shortness of breath with activity  Cardiac denies any chest pain or palpitations  GI Denies any melena, hematochezia, hematemesis or pyrosis   makes very little urine  Heme bruises easily  Endocrine diabetic  Neuro Denies any focal motor or sensory deficits  SUBJECTIVE:   CURRENT VITALS:  height is 5' 6\" (1.676 m) and weight is 238 lb 11.2 oz contain minor errors which are inherent in voice recognition technology. **           Final report electronically signed by Dr. Jia Ware on 6/7/2020 2:15 PM       VL DUP CAROTID BILATERAL   Final Result       1. Scattered atherosclerotic changes of the distal carotid arteries extending into the bifurcation and also involving internal carotid segments discussed above. 2. Left side internal carotid segment waveforms changes may reflect underestimation of arterial narrowing without peak velocity elevation. If further assessment is required consider CT angiography or MRI angiography assessment. 3. Left vertebral artery not visualized during exam.                   **This report has been created using voice recognition software. It may contain minor errors which are inherent in voice recognition technology. **       Final report electronically signed by Dr. Sony Benavides on 6/3/2020 10:42 PM       XR CHEST PORTABLE   Final Result   Small amount of free of the left hemidiaphragm which is apparently secondary to peritoneal dialysis otherwise no acute cardiopulmonary process identified.               **This report has been created using voice recognition software. It may contain minor errors which are inherent in voice recognition technology. **       Final report electronically signed by Dr. Nicole Davis on 6/2/2020 4:04 PM       XR ABDOMEN FOR NG/OG/NE TUBE PLACEMENT    (Results Pending)         PROCEDURE: US GALLBLADDER RUQ, US LIVER       CLINICAL INFORMATION: Cholelithiasis; inflammation.       COMPARISON: CTA chest dated 5/18/2020.       TECHNIQUE: Routine sonographic evaluation of the liver and routine sonographic evaluation of the gallbladder performed.       TECHNICAL DATA:   Liver - L= 13.5 cm   Gallbladder - 8.1 x 3.0 x 3.4 cm   Gallbladder Wall - 0.51 cm   Common Duct - 0.29 cm   Chavira's Sign: neg           FINDINGS:    LIVER:    1.  The liver was not well visualized on this examination secondary to the

## 2020-06-09 NOTE — PROGRESS NOTES
bilaterally without Rales/Wheezes/Rhonchi. Cardiovascular: Regular rate and rhythm with normal S1/S2 without murmurs, rubs or gallops. Abdomen: Soft, non-tender, non-distended with normal bowel sounds. Musculoskeletal: passive and active ROM x 4 extremities. Skin: Skin color, texture, turgor normal.    Neurologic:  Neurovascularly intact without any focal sensory/motor deficits. Cranial nerves: II-XII intact, grossly non-focal.  Psychiatric: Alert and oriented, thought content appropriate  Peripheral Pulses: +2 palpable, equal bilaterally       Labs:   Recent Labs     06/07/20  0648 06/08/20 0637 06/09/20  1034   WBC 15.1* 11.6* 12.6*   HGB 9.0* 8.7* 8.8*   HCT 28.5* 27.8* 27.9*    210 210     Recent Labs     06/07/20  0648 06/08/20  0637 06/09/20  1034   * 127* 128*   K 4.3 4.5 4.3   CL 90* 89* 92*   CO2 20* 20* 21*   BUN 37* 39* 39*   CREATININE 7.8* 7.0* 7.2*   CALCIUM 8.3* 8.6 8.4*     Recent Labs     06/09/20  1034   AST 10   ALT <5*   BILIDIR <0.2   BILITOT 0.2*   ALKPHOS 104     No results for input(s): INR in the last 72 hours. No results for input(s): Yelena Boxer in the last 72 hours. No results for input(s): PROCAL in the last 72 hours. Microbiology:      Urinalysis:      Lab Results   Component Value Date    NITRU NEGATIVE 05/17/2020    WBCUA > 200 05/17/2020    BACTERIA MANY 05/17/2020    RBCUA 25-50 05/17/2020    BLOODU MODERATE 05/17/2020    SPECGRAV 1.019 03/26/2019    GLUCOSEU NEGATIVE 05/17/2020       Radiology:  US LIVER   Final Result   1. The liver is not well visualized on this examination secondary to the patient's body habitus and limited mobility. Given this limitation, no abnormality is identified. 2. The gallbladder is not dilated however the wall is mildly thickened measuring 0.5 cm in greatest transverse dimension.  There are several echogenic foci within the dependent portion of the gallbladder however mobility could not be documented within the

## 2020-06-10 NOTE — CARE COORDINATION
6/10/20, 1:40 PM EDT    DISCHARGE PLANNING EVALUATION    SW spoke with Kassie at [facility], they have not heard from insurance on pre-cert .

## 2020-06-10 NOTE — PROGRESS NOTES
[facility]   Nephrology progress note  6/10/2020, 2:42 PM      Pt Name:    Sanaz Cardenas  MRN:     [id number]     [place]:    1934  Admit Date:    6/2/2020  2:00 PM  Primary Care Physician:  Shanda Walters MD   Room number  5P-07/795-Y    Chief Complaint: Nephrology following for ESRD/PD    Subjective:  Patient seen and examined  Seen earlier today  No chest pain or any shortness of breath reported  PD data reviewed  UF improved  Leg edema improving    Objective:  24HR INTAKE/OUTPUT:      Intake/Output Summary (Last 24 hours) at 6/10/2020 1442  Last data filed at 6/10/2020 0900  Gross per 24 hour   Intake 7650.71 ml   Output 8300 ml   Net -649.29 ml     I/O last 3 completed shifts: In: 7690.7 [P.O.:740; I.V.:50.7; Other:6900]  Out: 8300 [Other:8300]  I/O this shift:  In: 1800 [Other:1800]  Out: 2100 [Other:2100]  Admission weight: 250 lb (113.4 kg)  Wt Readings from Last 3 Encounters:   06/10/20 230 lb 3.2 oz (104.4 kg)   05/21/20 248 lb (112.5 kg)   04/27/20 248 lb 3.2 oz (112.6 kg)     Body mass index is 37.16 kg/m².     Physical examination  VITALS:     Vitals:    06/10/20 0300 06/10/20 0845 06/10/20 1106 06/10/20 1426   BP: 129/64 137/60 (!) 108/49    Pulse: 92 94 77    Resp: 17 18 18 18   Temp: 98 °F (36.7 °C) 97.9 °F (36.6 °C) 97.8 °F (36.6 °C)    TempSrc: Oral Oral Oral    SpO2: 94% 96% 96% 98%   Weight: 230 lb 3.2 oz (104.4 kg)      Height:         General Appearance: alert and cooperative with exam, appears comfortable, no distress  Mouth/Throat: Oral mucosa moist  Neck: No JVD  Lungs: Air entry B/L, no rales, no use of accessory muscles  Heart:  S1, S2 heard  GI:  non-tender, no guarding, some distention  Extremities:  bilateral  Trace lower extremity pitting edema, sig improved since admission      Lab Data  CBC:   Recent Labs     06/08/20  0637 06/09/20  1034 06/10/20  0606   WBC 11.6* 12.6* 11.6*   HGB 8.7* 8.8* 9.1*   HCT 27.8* 27.9* 29.8*    210 224 CEM Burton MD  [facility]

## 2020-06-10 NOTE — PROGRESS NOTES
Progress note: Infectious diseases    Patient - Daryl Gonzalez,  Age - 80 y.o.    - 1934      Room Number - 4V-65/533-F   MRN -  [id number]   Acct # - [de-identified]  Date of Admission -  2020  2:00 PM    SUBJECTIVE:   She has no new complaints    OBJECTIVE   VITALS    height is 5' 6\" (1.676 m) and weight is 230 lb 3.2 oz (104.4 kg). Her oral temperature is 97.8 °F (36.6 °C). Her blood pressure is 143/64 (abnormal) and her pulse is 77. Her respiration is 18 and oxygen saturation is 96%. Wt Readings from Last 3 Encounters:   06/10/20 230 lb 3.2 oz (104.4 kg)   20 248 lb (112.5 kg)   20 248 lb 3.2 oz (112.6 kg)       I/O (24 Hours)    Intake/Output Summary (Last 24 hours) at 6/10/2020 1703  Last data filed at 6/10/2020 1548  Gross per 24 hour   Intake 7650.71 ml   Output 8000 ml   Net -349.29 ml       General Appearance  Awake, alert, oriented,  not  In acute distress  HEENT - normocephalic, atraumatic, slighlty pale conjunctiva,  anicteric sclera  Neck - Supple, no mass  Lungs -  gBilateral ood air entry, no rhonchi, no wheeze  Cardiovascular - Heart sounds are normal.  Regular rate and rhythm without murmur, gallop or rub. Abdomen - soft, distended, PD site no redness or drainage  Neurologic -oriented  Skin - No bruising or bleeding.   Extremities - No edema, no cyanosis, clubbing     MEDICATIONS:      dianeal lo-nikko (ULTRABAG) 2.5%  1,800 mL Intraperitoneal Q6H    metoprolol succinate  50 mg Oral Nightly    cefepime  1 g Intravenous Q24H    docusate sodium  100 mg Oral Daily    levothyroxine  25 mcg Oral Daily    midodrine  10 mg Oral TID WC    aspirin  81 mg Oral Daily    polyethylene glycol  17 g Oral Daily    potassium chloride  20 mEq Oral BID WC    insulin lispro  0-6 Units Subcutaneous TID WC    insulin lispro  0-3 Units Subcutaneous Nightly    acetaminophen  650 mg Oral 4x Daily   

## 2020-06-10 NOTE — PROGRESS NOTES
Diabetic Oral Supplement    Exam:  BP (!) 108/49   Pulse 77   Temp 97.8 °F (36.6 °C) (Oral)   Resp 18   Ht 5' 6\" (1.676 m)   Wt 230 lb 3.2 oz (104.4 kg) Comment: dryweight  SpO2 98%   BMI 37.16 kg/m²     General appearance: mild distress, appears stated age and cooperative. nauseated  HEENT: Pupils equal, round, and reactive to light. Conjunctivae/corneas clear. Neck: Supple, with full range of motion. No jugular venous distention. Trachea midline. Respiratory:  Normal respiratory effort. Clear to auscultation, bilaterally without Rales/Wheezes/Rhonchi. Cardiovascular: Regular rate and rhythm with normal S1/S2 without murmurs, rubs or gallops. Abdomen: Soft, non-tender, non-distended with normal bowel sounds. Musculoskeletal: passive and active ROM x 4 extremities. Skin: Skin color, texture, turgor normal.    Neurologic:  Neurovascularly intact without any focal sensory/motor deficits. Cranial nerves: II-XII intact, grossly non-focal.  Psychiatric: Alert and oriented, thought content appropriate  Peripheral Pulses: +2 palpable, equal bilaterally       Labs:   Recent Labs     06/08/20  0637 06/09/20  1034 06/10/20  0606   WBC 11.6* 12.6* 11.6*   HGB 8.7* 8.8* 9.1*   HCT 27.8* 27.9* 29.8*    210 224     Recent Labs     06/08/20  0637 06/09/20  1034 06/10/20  0606   * 128* 127*   K 4.5 4.3 3.9   CL 89* 92* 89*   CO2 20* 21* 20*   BUN 39* 39* 39*   CREATININE 7.0* 7.2* 7.7*   CALCIUM 8.6 8.4* 8.4*     Recent Labs     06/09/20  1034   AST 10   ALT <5*   BILIDIR <0.2   BILITOT 0.2*   ALKPHOS 104     No results for input(s): INR in the last 72 hours. No results for input(s): Faisal Bending in the last 72 hours. No results for input(s): PROCAL in the last 72 hours.     Microbiology:      Urinalysis:      Lab Results   Component Value Date    NITRU NEGATIVE 05/17/2020    WBCUA > 200 05/17/2020    BACTERIA MANY 05/17/2020    RBCUA 25-50 05/17/2020    BLOODU MODERATE 05/17/2020    SPECGRAV 1.019 acoustic shadowing identified. On the previous CT examination there is high density filling defect within the dependent portion of the gallbladder. These echogenic foci most    likely represent gallstones however mobility and posterior acoustic shadowing cannot be documented. There is no sonographic Chavira's sign or pericholecystic fluid collection. Hepatobiliary imaging could be performed if there is any clinical concern for    acute cholecystitis. 3. There is no biliary dilatation. 4. There is a small amount of ascites within the abdomen. **This report has been created using voice recognition software. It may contain minor errors which are inherent in voice recognition technology. **      Final report electronically signed by Dr. Soniya Block on 6/8/2020 3:04 PM      XR ABDOMEN FOR NG/OG/NE TUBE PLACEMENT   Final Result   NG tube tip passes into stomach. **This report has been created using voice recognition software. It may contain minor errors which are inherent in voice recognition technology. **      Final report electronically signed by Dr. Seamus Martin on 6/7/2020 4:30 PM      XR ABDOMEN (KUB) (SINGLE AP VIEW)   Final Result   1. Findings of constipation and possible element of colonic ileus. 2. CAPD catheter in place. 3. 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 **This report has been created using voice recognition software. It may contain minor errors which are inherent in voice recognition technology. **         Final report electronically signed by Dr. Seamus Martin on 6/7/2020 2:15 PM      VL DUP CAROTID BILATERAL   Final Result       1. Scattered atherosclerotic changes of the distal carotid arteries extending into the bifurcation and also involving internal carotid segments discussed above. 2. Left side internal carotid segment waveforms changes may reflect underestimation of arterial narrowing without peak velocity elevation.  If

## 2020-06-11 NOTE — PROGRESS NOTES
Progress note: Infectious diseases    Patient - Jarocho Bang,  Age - 80 y.o.    - 1934      Room Number - 7F-40/002-E   MRN -  [id number]   Acct # - [de-identified]  Date of Admission -  2020  2:00 PM    SUBJECTIVE:   She has no new complaints. No fever or abdominal pain    OBJECTIVE   VITALS    height is 5' 6\" (1.676 m) and weight is 230 lb 3.2 oz (104.4 kg). Her oral temperature is 97.7 °F (36.5 °C). Her blood pressure is 130/60 and her pulse is 81. Her respiration is 16 and oxygen saturation is 93%. Wt Readings from Last 3 Encounters:   06/10/20 230 lb 3.2 oz (104.4 kg)   20 248 lb (112.5 kg)   20 248 lb 3.2 oz (112.6 kg)       I/O (24 Hours)    Intake/Output Summary (Last 24 hours) at 2020 1451  Last data filed at 2020 1335  Gross per 24 hour   Intake 4855 ml   Output 1800 ml   Net 3055 ml       General Appearance  Awake, alert, oriented,  not  In acute distress  HEENT - normocephalic, atraumatic, slighlty pale conjunctiva,  anicteric sclera  Neck - Supple, no mass  Lungs -  gBilateral ood air entry, no rhonchi, no wheeze  Cardiovascular - Heart sounds are normal.  Regular rate and rhythm without murmur, gallop or rub. Abdomen - soft, distended, PD site no redness or drainage  Neurologic -oriented  Skin - No bruising or bleeding.   Extremities - No edema, no cyanosis, clubbing     MEDICATIONS:      dianeal lo-nikko (ULTRABAG) 2.5%  1,800 mL Intraperitoneal Q6H    metoprolol succinate  50 mg Oral Nightly    cefepime  1 g Intravenous Q24H    docusate sodium  100 mg Oral Daily    levothyroxine  25 mcg Oral Daily    midodrine  10 mg Oral TID WC    aspirin  81 mg Oral Daily    polyethylene glycol  17 g Oral Daily    potassium chloride  20 mEq Oral BID WC    insulin lispro  0-6 Units Subcutaneous TID WC    insulin lispro  0-3 Units Subcutaneous Nightly    acetaminophen  650 mg Oral 4x Daily    clopidogrel  75 mg Oral QPM    megestrol  40 mg Oral BID    pantoprazole  40 mg Oral BID    pravastatin  40 mg Oral QPM    sevelamer  800 mg Oral TID WC    sodium chloride flush  10 mL Intravenous 2 times per day      dextrose       promethazine, bisacodyl, phenol, sodium chloride flush, acetaminophen **OR** acetaminophen, glucose, dextrose, glucagon (rDNA), dextrose, ondansetron **OR** ondansetron      LABS:     CBC:   Recent Labs     06/09/20  1034 06/10/20  0606 06/11/20  0602   WBC 12.6* 11.6* 11.4*   HGB 8.8* 9.1* 9.9*    224 228     BMP:    Recent Labs     06/09/20  1034 06/10/20  0606 06/11/20  0602   * 127* 132*   K 4.3 3.9 3.7   CL 92* 89* 94*   CO2 21* 20* 20*   BUN 39* 39* 36*   CREATININE 7.2* 7.7* 7.4*   GLUCOSE 135* 151* 141*     Calcium:  Recent Labs     06/11/20  0602   CALCIUM 8.6     Ionized Calcium:No results for input(s): IONCA in the last 72 hours. Magnesium:  No results for input(s): MG in the last 72 hours. Phosphorus:No results for input(s): PHOS in the last 72 hours. BNP:No results for input(s): BNP in the last 72 hours.   Glucose:  Recent Labs     06/10/20  2100 06/11/20  0611 06/11/20  1114   POCGLU 121* 158* 185*        Problem list of patient:     Patient Active Problem List   Diagnosis Code    Hypertension I10    LBBB (left bundle branch block) I44.7    Hyperlipidemia E78.5    [facility] [place] BiV ICD Z95.810    CKD (chronic kidney disease) stage 3, GFR 30-59 ml/min ([facility]) N18.3    Ex-smoker Z87.891    Mild carotid artery disease (HCC) I73.9    Morbid obesity (formerly [facility]) E66.01    CAD (coronary artery disease) I25.10    PAD (peripheral artery disease) (formerly [facility]) I73.9    Lactic acidosis E87.2    Mild pulmonary hypertension (formerly [facility]) I27.20    Type 2 diabetes mellitus with insulin therapy ([facility] [address] E11.9, Z79.4    GERD (gastroesophageal reflux disease) K21.9    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

## 2020-06-11 NOTE — PROGRESS NOTES
21* 20* 20*   BUN 39* 39* 36*   CREATININE 7.2* 7.7* 7.4*   GLUCOSE 135* 151* 141*   CALCIUM 8.4* 8.4* 8.6     Hepatic:   Recent Labs     06/09/20  1034   LABALBU 2.3*   AST 10   ALT <5*   BILITOT 0.2*   ALKPHOS 104         Meds:  Infusion:    dextrose       Meds:    dianeal lo-nikko (ULTRABAG) 2.5%  1,800 mL Intraperitoneal Q6H    metoprolol succinate  50 mg Oral Nightly    cefepime  1 g Intravenous Q24H    docusate sodium  100 mg Oral Daily    levothyroxine  25 mcg Oral Daily    midodrine  10 mg Oral TID WC    aspirin  81 mg Oral Daily    polyethylene glycol  17 g Oral Daily    potassium chloride  20 mEq Oral BID WC    insulin lispro  0-6 Units Subcutaneous TID WC    insulin lispro  0-3 Units Subcutaneous Nightly    acetaminophen  650 mg Oral 4x Daily    clopidogrel  75 mg Oral QPM    megestrol  40 mg Oral BID    pantoprazole  40 mg Oral BID    pravastatin  40 mg Oral QPM    sevelamer  800 mg Oral TID WC    sodium chloride flush  10 mL Intravenous 2 times per day     Meds prn: promethazine, bisacodyl, phenol, sodium chloride flush, acetaminophen **OR** acetaminophen, glucose, dextrose, glucagon (rDNA), dextrose, ondansetron **OR** ondansetron       Impression and Plan:  1. ESRD on PD: PD data reviewed  Last exchange drained about 2800 ml, BP stable  Lytes and volume status are both stable  Continue with current mgmt    2. Mild hyponatremia: Will monitor, secondary to reduced free water excretion. Stable  - will keep her on free water restriction of 1500 ml per day, sodium levels are improved today    3. Anemia in ESRD: H/H improving. 4. Near syncope  5. Mild LV systolic dysfunction. Patient is on beta-blockers. Evaluated by cardiology  6. Pseudomonas/gram-negative bacteremia. Patient is on IV cefepime. 7. DM  8. Hypotension: resolved. Continue with midodrine.    9. Disposition: awaiting placement    D/W patient and CEM Perez MD  [facility]

## 2020-06-11 NOTE — PROGRESS NOTES
Hospitalist Progress Note    Patient: Michelle Durham FantasyBook    YOB: 1934    MRN: [id number]       Acct: [de-identified]     PCP: Alicia Dempsey MD    Date of Admission: 6/2/2020    Assessment/Plan:    Near syncope: Likely secondary to dehydration/volume depletion vs sepsis/bacteremia.    Resolved and asymptomatic after IV fluid  Seen by cardiology-liss for discharge from the standpoint      End-stage renal disease: On peritoneal dialysis. Nephro following/managing, appreciate assistance. Nausea 2/2 colonic ileus 2/2 stool: Resolved-Zofran, phenergan, maalox prn. miralax    New diagnosis of hypothyroidism: Currently 15.43 TSH.  T3, T4 within normal ranges.  Started Synthroid 25 mcg daily on 6/9. Recheck 6 weeks-PCP follow-up    Gram Negative Pseudomonas bacteremia: IV cefepime. ID consult --> initiated source workup, asymptomatic cholecystitis. No intervention. Echo wnl. ID recommends IV abx for now and then transition to po in 1-2 days. May need covid test prior to dc to SNF-likely on Cipro for discharge oral    -6/11--ID recommendations for discharge on oral antibiotics-awaiting pre-CERT from nursing home  .   Hyponatremia, mild: Improved, stable. Hypotension: Resolved.  Secondary to hypovolemia. Hypertension: History, stable. bb    CAD: History, stable.  BB, Continue aspirin, pravastatin, and Plavix.  Maintain telemetry.    HFpEF: Echo performed on 12/9/2019 demonstrates a normal overall left ventricular function with an EF of 55%; repeat echo performed on 6/4/2020 demonstrated an EF of 45-50%.  Maintain telemetry and cardiac diet. Cardiologist aware  Hyperlipidemia: History.  Continue pravastatin. GERD: History.  Continue Protonix.     Type 2 diabetes mellitus: Hemoglobin A1c 6.4 on 6/2/2020.  Continue Accu-Cheks before meals and at bedtime with sliding scale insulin coverage (low-dose corrective algorithm).  Hypoglycemia protocol in place, maintain carb NEGATIVE 05/17/2020    WBCUA > 200 05/17/2020    BACTERIA MANY 05/17/2020    RBCUA 25-50 05/17/2020    BLOODU MODERATE 05/17/2020    SPECGRAV 1.019 03/26/2019    GLUCOSEU NEGATIVE 05/17/2020       Radiology:  US LIVER   Final Result   1. The liver is not well visualized on this examination secondary to the patient's body habitus and limited mobility. Given this limitation, no abnormality is identified. 2. The gallbladder is not dilated however the wall is mildly thickened measuring 0.5 cm in greatest transverse dimension. There are several echogenic foci within the dependent portion of the gallbladder however mobility could not be documented within the    patient was moved into a sitting position. There is also no definite posterior acoustic shadowing identified. On the previous CT examination there is high density filling defect within the dependent portion of the gallbladder. These echogenic foci most    likely represent gallstones however mobility and posterior acoustic shadowing cannot be documented. There is no sonographic Chavira's sign or pericholecystic fluid collection. Hepatobiliary imaging could be performed if there is any clinical concern for    acute cholecystitis. 3. There is no biliary dilatation. 4. There is a small amount of ascites within the abdomen. **This report has been created using voice recognition software. It may contain minor errors which are inherent in voice recognition technology. **      Final report electronically signed by Dr. Luis Moreira on 6/8/2020 3:04 PM      [address]   Final Result   1. The liver is not well visualized on this examination secondary to the patient's body habitus and limited mobility. Given this limitation, no abnormality is identified. 2. The gallbladder is not dilated however the wall is mildly thickened measuring 0.5 cm in greatest transverse dimension.  There are several echogenic foci within the dependent portion of

## 2020-06-12 NOTE — PROGRESS NOTES
pantoprazole  40 mg Oral BID    pravastatin  40 mg Oral QPM    sevelamer  800 mg Oral TID WC    sodium chloride flush  10 mL Intravenous 2 times per day      dextrose       promethazine, bisacodyl, phenol, sodium chloride flush, acetaminophen **OR** acetaminophen, glucose, dextrose, glucagon (rDNA), dextrose, ondansetron **OR** ondansetron      LABS:     CBC:   Recent Labs     06/10/20  0606 06/11/20  0602 06/12/20  0629   WBC 11.6* 11.4* 10.2   HGB 9.1* 9.9* 9.1*    228 234     BMP:    Recent Labs     06/10/20  0606 06/11/20  0602   * 132*   K 3.9 3.7   CL 89* 94*   CO2 20* 20*   BUN 39* 36*   CREATININE 7.7* 7.4*   GLUCOSE 151* 141*     Calcium:  Recent Labs     06/11/20  0602   CALCIUM 8.6    Glucose:  Recent Labs     06/11/20  1935 06/12/20  0618 06/12/20  1057   POCGLU 179* 136* 233*        Problem list of patient:     Patient Active Problem List   Diagnosis Code    Hypertension I10    LBBB (left bundle branch block) I44.7    Hyperlipidemia E78.5    [facility] [place] BiV ICD Z95.810    CKD (chronic kidney disease) stage 3, GFR 30-59 ml/min ([facility]) N18.3    Ex-smoker Z87.891    Mild carotid artery disease ([facility]) I73.9    Morbid obesity ([facility]) E66.01    CAD (coronary artery disease) I25.10    PAD (peripheral artery disease) ([facility]) I73.9    Lactic acidosis E87.2    Mild pulmonary hypertension ([facility]) I27.20    Type 2 diabetes mellitus with insulin therapy ([facility]) E11.9, Z79.4    GERD (gastroesophageal reflux disease) K21.9    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

## 2020-06-12 NOTE — PROGRESS NOTES
[facility]   Nephrology progress note  6/12/2020, 8:14 AM      Pt Name:    Freda Argueta  MRN:     [id number]     [place]:    1934  Admit Date:    6/2/2020  2:00 PM  Primary Care Physician:  Kristi Harmon MD   Room number  8V-66/873-V    Chief Complaint: Nephrology following for ESRD/PD    Subjective:  Patient seen and examined  Seen this morning during rounds  Clinically stable  PD data reviewed  Last night exchange drained 2300 ml    Objective:  24HR INTAKE/OUTPUT:      Intake/Output Summary (Last 24 hours) at 6/12/2020 0814  Last data filed at 6/12/2020 0327  Gross per 24 hour   Intake 8251.02 ml   Output 9100 ml   Net -848.98 ml     I/O last 3 completed shifts: In: 8251 [P.O.:1005; I.V.:46; Other:7200]  Out: 9100 [Other:9100]  No intake/output data recorded. Admission weight: 250 lb (113.4 kg)  Wt Readings from Last 3 Encounters:   06/12/20 228 lb 8 oz (103.6 kg)   05/21/20 248 lb (112.5 kg)   04/27/20 248 lb 3.2 oz (112.6 kg)     Body mass index is 36.88 kg/m².     Physical examination  VITALS:     Vitals:    06/11/20 1559 06/11/20 2100 06/11/20 2347 06/12/20 0315   BP: (!) 144/66 (!) 123/58 (!) 106/55 (!) 119/58   Pulse: 80 79 84 84   Resp: 16 16 18 17   Temp: 98 °F (36.7 °C) 98.3 °F (36.8 °C) 98 °F (36.7 °C) 98 °F (36.7 °C)   TempSrc: Oral Oral Oral Oral   SpO2: 98% 99% 96% 96%   Weight:    228 lb 8 oz (103.6 kg)   Height:         General Appearance: alert and cooperative with exam, appears comfortable, no distress  Mouth/Throat: Oral mucosa moist  Neck: No JVD  Lungs: Air entry B/L, no rales, no use of accessory muscles  Heart:  S1, S2 heard  GI:  non-tender, no guarding, some distention  Extremities:  bilateral  Trace lower extremity pitting edema, sig improved since admission      Lab Data  CBC:   Recent Labs     06/10/20  0606 06/11/20  0602 06/12/20  0629   WBC 11.6* 11.4* 10.2   HGB 9.1* 9.9* 9.1*   HCT 29.8* 31.1* 29.3*    228 234     BMP:  Recent Labs

## 2020-06-12 NOTE — PROGRESS NOTES
could not be documented within the    patient was moved into a sitting position. There is also no definite posterior acoustic shadowing identified. On the previous CT examination there is high density filling defect within the dependent portion of the gallbladder. These echogenic foci most    likely represent gallstones however mobility and posterior acoustic shadowing cannot be documented. There is no sonographic Chavira's sign or pericholecystic fluid collection. Hepatobiliary imaging could be performed if there is any clinical concern for    acute cholecystitis. 3. There is no biliary dilatation. 4. There is a small amount of ascites within the abdomen. **This report has been created using voice recognition software. It may contain minor errors which are inherent in voice recognition technology. **      Final report electronically signed by Dr. Zain Miles on 6/8/2020 3:04 PM      XR ABDOMEN FOR NG/OG/NE TUBE PLACEMENT   Final Result   NG tube tip passes into stomach. **This report has been created using voice recognition software. It may contain minor errors which are inherent in voice recognition technology. **      Final report electronically signed by Dr. Jessica Blanchard on 6/7/2020 4:30 PM      XR ABDOMEN (KUB) (SINGLE AP VIEW)   Final Result   1. Findings of constipation and possible element of colonic ileus. 2. CAPD catheter in place. 3. 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 **This report has been created using voice recognition software. It may contain minor errors which are inherent in voice recognition technology. **         Final report electronically signed by Dr. Jessica Blanchard on 6/7/2020 2:15 PM      VL DUP CAROTID BILATERAL   Final Result       1. Scattered atherosclerotic changes of the distal carotid arteries extending into the bifurcation and also involving internal carotid segments discussed above.    2. Left side internal carotid

## 2020-06-13 NOTE — PLAN OF CARE
Problem: Falls - Risk of:  Goal: Will remain free from falls  Description: Will remain free from falls  Outcome: Ongoing  Note: Falling star placed outside of patient's room. 2/4 bed rails up. Non-skid socks provided. Call light and personal items with in reach. Bed alarm on. Problem: Pain:  Goal: Pain level will decrease  Description: Pain level will decrease  Outcome: Ongoing  Note: Patient rates pain on 0-10 pain scale. States pain is a 0 on 0-10 scale. States goal is 0. Repositioned for comfort. Will continue to reassess. Problem: DISCHARGE BARRIERS  Goal: Patient's continuum of care needs are met  Outcome: Ongoing  Note: Patient plans to discharge to [facility] when medically stable. Problem: Cardiac Output - Decreased:  Goal: Hemodynamic stability will improve  Description: Hemodynamic stability will improve  Outcome: Ongoing  Note:   Vitals:    06/12/20 2029   BP: 132/60   Pulse: 80   Resp: 16   Temp:    SpO2: 99%     Will continue to reassess. Problem: Fluid Volume - Imbalance:  Goal: Absence of imbalanced fluid volume signs and symptoms  Description: Absence of imbalanced fluid volume signs and symptoms  Outcome: Ongoing  Note: I/O last 3 completed shifts: In: 5311 [P.O.:930; I.V.:46; Other:7200]  Out: 8450 [Other:8450]  I/O this shift:  In: 220 [P.O.:220]  Out: 0     Monitoring intake and output. Patient on 1500mL fluid restriction. Will continue to reassess. Problem: Skin Integrity:  Goal: Absence of new skin breakdown  Description: Absence of new skin breakdown  Outcome: Ongoing  Note: No new skin breakdown noted at this time. Will continue to reassess. Patient turns and repositions self in bed frequent       Problem: Nutrition  Goal: Optimal nutrition therapy  Outcome: Ongoing  Note: Patient on Renal diet. Tolerating well. Will continue to reassess. Care plan reviewed with patient. No concerns voiced at this time.

## 2020-06-13 NOTE — PROGRESS NOTES
BMP:  Recent Labs     06/11/20  0602 06/13/20  0710   * 134*   K 3.7 3.8   CL 94* 97*   CO2 20* 21*   BUN 36* 40*   CREATININE 7.4* 7.8*   GLUCOSE 141* 157*   CALCIUM 8.6 8.8     Hepatic:   No results for input(s): LABALBU, AST, ALT, ALB, BILITOT, ALKPHOS in the last 72 hours. Meds:  Infusion:    dextrose       Meds:    dianeal lo-nikko (ULTRABAG) 2.5%  1,800 mL Intraperitoneal Q6H    metoprolol succinate  50 mg Oral Nightly    cefepime  1 g Intravenous Q24H    docusate sodium  100 mg Oral Daily    levothyroxine  25 mcg Oral Daily    midodrine  10 mg Oral TID WC    aspirin  81 mg Oral Daily    polyethylene glycol  17 g Oral Daily    potassium chloride  20 mEq Oral BID WC    insulin lispro  0-6 Units Subcutaneous TID WC    insulin lispro  0-3 Units Subcutaneous Nightly    acetaminophen  650 mg Oral 4x Daily    clopidogrel  75 mg Oral QPM    megestrol  40 mg Oral BID    pantoprazole  40 mg Oral BID    pravastatin  40 mg Oral QPM    sevelamer  800 mg Oral TID WC    sodium chloride flush  10 mL Intravenous 2 times per day     Meds prn: promethazine, bisacodyl, phenol, sodium chloride flush, acetaminophen **OR** acetaminophen, glucose, dextrose, glucagon (rDNA), dextrose, ondansetron **OR** ondansetron       Impression and Plan:  1. ESRD on PD: PD data reviewed  Last drain 2300 mL. Ultrafiltration anywhere between 2100 to 2300 mL  Volume status stable  Electrolytes are stable  Continue with peritoneal dialysis 1800 mL every 6 hours    2. Mild hyponatremia: Improved. Secondary to reduced free water excretion. Stable. 3. Anemia in ESRD: H/H improving. 4. Near syncope  5. Mild LV systolic dysfunction. Patient is on beta-blockers  6. Pseudomonas/gram-negative bacteremia. Patient is on IV cefepime. 7. DM  8. Hypotension: resolved. Continue with midodrine.    9. Disposition: awaiting placement/pre-cert    D/W patient    Marie Garcia MD  [facility]

## 2020-06-13 NOTE — PROGRESS NOTES
renal failure N18.9, D63.1    Acute combined systolic and diastolic CHF, NYHA class 1 ([facility]) I50.41    ESRD on peritoneal dialysis ([facility]) N18.6, Z99.2    Mild aortic stenosis I35.0    Physical deconditioning R53.81    UTI (urinary tract infection) N39.0    Hyponatremia E87.1    Anemia in ESRD (end-stage renal disease) ([facility]) N18.6, D63.1    Near syncope R55    Hypotension due to hypovolemia I95.89, E86.1    Syncope R55         ASSESSMENT/PLAN   Pseudomonas bacteremia: clinically better, continue current treatment  ESRD on PD: continue current treatment  COPD  CHF  Awaiting insurance approval      Lexie Rich MD, FACP 6/13/2020 11:12 AM

## 2020-06-13 NOTE — PROGRESS NOTES
corrective algorithm).  Hypoglycemia protocol in place, maintain carb diet     Disposition plan-awaiting precertification from nursing home  Plan for oral antibiotics at discharge  [address] testing today as requested by the nursing home    Disposition plan-COVID is negative for discharge once precertification comes back      Chief Complaint: dizziness     Initial H&P   80year-old morbidly obese  female presents via ambulance from dialysis. Patient developed hypotension dizziness during her appointment. Patient states while she was sitting she described as feeling as being hot. And like she was going to faint. Patient denies nausea, chest pain, shortness of breath, fever, cough, sore throat, chills, diarrhea, and heart palpitations patient resides at [facility]. Patient supposedly received some fluid and felt better.     Subjective (past 24 hours): No other overnight events denies any nausea vomiting diarrhea shortness of breath or chest pain    No overnight events     medications:  Reviewed    Infusion Medications    dextrose       Scheduled Medications    dianeal lo-nikko (ULTRABAG) 2.5%  1,800 mL Intraperitoneal Q6H    metoprolol succinate  50 mg Oral Nightly    cefepime  1 g Intravenous Q24H    docusate sodium  100 mg Oral Daily    levothyroxine  25 mcg Oral Daily    midodrine  10 mg Oral TID WC    aspirin  81 mg Oral Daily    polyethylene glycol  17 g Oral Daily    potassium chloride  20 mEq Oral BID WC    insulin lispro  0-6 Units Subcutaneous TID WC    insulin lispro  0-3 Units Subcutaneous Nightly    acetaminophen  650 mg Oral 4x Daily    clopidogrel  75 mg Oral QPM    megestrol  40 mg Oral BID    pantoprazole  40 mg Oral BID    pravastatin  40 mg Oral QPM    sevelamer  800 mg Oral TID WC    sodium chloride flush  10 mL Intravenous 2 times per day     PRN Meds: promethazine, bisacodyl, phenol, sodium chloride flush, acetaminophen **OR** acetaminophen, cm in greatest transverse dimension. There are several echogenic foci within the dependent portion of the gallbladder however mobility could not be documented within the    patient was moved into a sitting position. There is also no definite posterior acoustic shadowing identified. On the previous CT examination there is high density filling defect within the dependent portion of the gallbladder. These echogenic foci most    likely represent gallstones however mobility and posterior acoustic shadowing cannot be documented. There is no sonographic Chavira's sign or pericholecystic fluid collection. Hepatobiliary imaging could be performed if there is any clinical concern for    acute cholecystitis. 3. There is no biliary dilatation. 4. There is a small amount of ascites within the abdomen. **This report has been created using voice recognition software. It may contain minor errors which are inherent in voice recognition technology. **      Final report electronically signed by Dr. Bubba Bowers on 6/8/2020 3:04 PM      XR ABDOMEN FOR NG/OG/NE TUBE PLACEMENT   Final Result   NG tube tip passes into stomach. **This report has been created using voice recognition software. It may contain minor errors which are inherent in voice recognition technology. **      Final report electronically signed by Dr. Sarah Wren on 6/7/2020 4:30 PM      XR ABDOMEN (KUB) (SINGLE AP VIEW)   Final Result   1. Findings of constipation and possible element of colonic ileus. 2. CAPD catheter in place. 3. 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 **This report has been created using voice recognition software. It may contain minor errors which are inherent in voice recognition technology. **         Final report electronically signed by Dr. Sarah Wren on 6/7/2020 2:15 PM      VL DUP CAROTID BILATERAL   Final Result       1.  Scattered atherosclerotic changes of the distal carotid arteries extending into the bifurcation and also involving internal carotid segments discussed above. 2. Left side internal carotid segment waveforms changes may reflect underestimation of arterial narrowing without peak velocity elevation. If further assessment is required consider CT angiography or MRI angiography assessment. 3. Left vertebral artery not visualized during exam.               **This report has been created using voice recognition software. It may contain minor errors which are inherent in voice recognition technology. **      Final report electronically signed by Dr. Mariama Saldivar on 6/3/2020 10:42 PM      XR CHEST PORTABLE   Final Result   Small amount of free of the left hemidiaphragm which is apparently secondary to peritoneal dialysis otherwise no acute cardiopulmonary process identified. **This report has been created using voice recognition software. It may contain minor errors which are inherent in voice recognition technology. **      Final report electronically signed by Dr. Wesley Zaragoza on 6/2/2020 4:04 PM             Code Status: Full Code        Active Hospital Problems    Diagnosis Date Noted    Syncope [R55] 06/04/2020    Hypotension due to hypovolemia [I95.89, E86.1] 06/02/2020    Near syncope [R55] 05/20/2020    ESRD on peritoneal dialysis ([address] [N18.6, Z99.2]     Lactic acidosis [E87.2]     Morbid obesity ([address] [E66.01] 02/28/2013    [facility] BiV ICD [Z95.810] 11/17/2011    Type 2 diabetes mellitus with insulin therapy ([address] [E11.9, Z79.4] 01/01/1998       Electronically signed by Gerson Cardozo MD on 6/13/2020 at 1:17 PM

## 2020-06-14 NOTE — PROGRESS NOTES
Notified therapy that patient needs to be seen by PT today.  PT reports they will add to list for for visit in the am.

## 2020-06-14 NOTE — PROGRESS NOTES
Balance:  Stand By Assistance. unilateral/bilateral UE reaching for ADL task with B UE support during static sitting. Pt states feeling unsteady, but no visible LOB noted. BED MOBILITY:  Rolling to Left: Modified Independent for placement of bedpan  Supine to Sit: Minimal Assistance   Sit to Supine: Moderate Assistance for LE management. ADDITIONAL ACTIVITIES:  Pt reports completing ADLs seated at ELF. She reports having support of w/c and was nervous to attempt EOB. Good challenge for patient. ASSESSMENT:     Activity Tolerance:  Patient tolerance of  treatment: good. Discharge Recommendations: ECF with OT  Equipment Recommendations: Equipment Needed: No  Plan: Times per week: 2-3x  Current Treatment Recommendations: Balance Training, Safety Education & Training, Self-Care / ADL, Endurance Training    Patient Education  Patient Education: Role of OT and ADL's    Goals  Short term goals  Time Frame for Short term goals: until discharge  Short term goal 1: Tolerate sitting EOB x 5-6 min with S for increased tolerance for EOB ADLs  Short term goal 2: Complete BUE AROM/light strengthening exercises x 10 reps with min RBs to increase endurance for grooming & self feeding tasks. Long term goals  Time Frame for Long term goals : No LTG set d/t short ELOS    Following session, patient left in safe position with all fall risk precautions in place.

## 2020-06-14 NOTE — PLAN OF CARE
Problem: Falls - Risk of:  Goal: Will remain free from falls  Description: Will remain free from falls  6/13/2020 2348 by Lisandro Garcia RN  Outcome: Ongoing  Note: No falls this shift. Patient educated on not getting up without help. Falling star protocol in place. Call light in reach. Bed in lowest position. Side rails up x2. Bed alarm set. Patient visually checked on hourly rounds. Problem: Falls - Risk of:  Goal: Absence of physical injury  Description: Absence of physical injury  6/13/2020 2348 by Lisandro Garcia RN  Outcome: Ongoing     Problem: Pain:  Goal: Pain level will decrease  Description: Pain level will decrease  6/13/2020 2348 by Lisandro Garcia RN  Outcome: Ongoing  Note: Patient denies pain so far this shift. Will continue to monitor. Problem: Pain:  Goal: Control of acute pain  Description: Control of acute pain  6/13/2020 2348 by Lisandro Garcia RN  Outcome: Ongoing     Problem: Pain:  Goal: Control of chronic pain  Description: Control of chronic pain  6/13/2020 2348 by Lisandro Garcia RN  Outcome: Ongoing     Problem: DISCHARGE BARRIERS  Goal: Patient's continuum of care needs are met  6/13/2020 2348 by Lisandro Garcia RN  Outcome: Ongoing  Note: Pt is active in her plan of care. Plans on returning to [facility]. Awaiting precert. Problem: Cardiac Output - Decreased:  Goal: Hemodynamic stability will improve  Description: Hemodynamic stability will improve  6/13/2020 2348 by Lisandro Garcia RN  Outcome: Ongoing  Note:   Vitals:    06/13/20 2300   BP: (!) 121/53   Pulse: 86   Resp: 16   Temp: 97.8 °F (36.6 °C)   SpO2: 97%     Monitoring vital signs. Problem: Fluid Volume - Imbalance:  Goal: Absence of imbalanced fluid volume signs and symptoms  Description: Absence of imbalanced fluid volume signs and symptoms  6/13/2020 2348 by Lisandro Garcia RN  Outcome: Ongoing  Note: CAPD patient. Monitoring intake and output.  Fine crackles in right base of lung.      Problem: Skin Integrity:  Goal: Absence of new skin breakdown  Description: Absence of new skin breakdown  6/13/2020 2348 by Rocio Eugene RN  Outcome: Ongoing  Note: Pt being repositioned q2h and PRN. Heels elevated off of bed with pillow support. Problem: Nutrition  Goal: Optimal nutrition therapy  6/13/2020 2348 by Rocio Eugene RN  Outcome: Ongoing  Note: Pt tolerating RENAL; Daily Fluid Restriction: 1500 ml diet. Care plan reviewed with patient. Patient verbalizes understanding of the plan of care and contribute to goal setting.

## 2020-06-15 NOTE — DISCHARGE SUMMARY
this limitation, no abnormality is identified. 2. The gallbladder is not dilated however the wall is mildly thickened measuring 0.5 cm in greatest transverse dimension. There are several echogenic foci within the dependent portion of the gallbladder however mobility could not be documented within the    patient was moved into a sitting position. There is also no definite posterior acoustic shadowing identified. On the previous CT examination there is high density filling defect within the dependent portion of the gallbladder. These echogenic foci most    likely represent gallstones however mobility and posterior acoustic shadowing cannot be documented. There is no sonographic Chavira's sign or pericholecystic fluid collection. Hepatobiliary imaging could be performed if there is any clinical concern for    acute cholecystitis. 3. There is no biliary dilatation. 4. There is a small amount of ascites within the abdomen. **This report has been created using voice recognition software. It may contain minor errors which are inherent in voice recognition technology. **      Final report electronically signed by Dr. Jersey De La Rosa on 6/8/2020 3:04 PM      XR ABDOMEN FOR NG/OG/NE TUBE PLACEMENT   Final Result   NG tube tip passes into stomach. **This report has been created using voice recognition software. It may contain minor errors which are inherent in voice recognition technology. **      Final report electronically signed by Dr. Wan Vega on 6/7/2020 4:30 PM      XR ABDOMEN (KUB) (SINGLE AP VIEW)   Final Result   1. Findings of constipation and possible element of colonic ileus. 2. CAPD catheter in place. 3. 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 **This report has been created using voice recognition software. It may contain minor errors which are inherent in voice recognition technology. **         Final report electronically signed by Dr. Sanford Counter Ree on 6/7/2020 2:15 PM      VL DUP CAROTID BILATERAL   Final Result       1. Scattered atherosclerotic changes of the distal carotid arteries extending into the bifurcation and also involving internal carotid segments discussed above. 2. Left side internal carotid segment waveforms changes may reflect underestimation of arterial narrowing without peak velocity elevation. If further assessment is required consider CT angiography or MRI angiography assessment. 3. Left vertebral artery not visualized during exam.               **This report has been created using voice recognition software. It may contain minor errors which are inherent in voice recognition technology. **      Final report electronically signed by Dr. Krzysztof Nascimento on 6/3/2020 10:42 PM      XR CHEST PORTABLE   Final Result   Small amount of free of the left hemidiaphragm which is apparently secondary to peritoneal dialysis otherwise no acute cardiopulmonary process identified. **This report has been created using voice recognition software. It may contain minor errors which are inherent in voice recognition technology. **      Final report electronically signed by Dr. Tere Lambert on 6/2/2020 4:04 PM             Code Status: Full Code    Electronically signed by Jayson Germain MD on 6/15/2020 at 2:50 PM

## 2020-06-15 NOTE — PROGRESS NOTES
Updated Edwin Nuñez and Ramón listed on her emergency contacts of the patient being discharged today. All questions answered and they appreciated the update.

## 2020-06-15 NOTE — PROGRESS NOTES
[address] [address]  INPATIENT PHYSICAL THERAPY  DAILY NOTE  [facility] RENAL TELEMETRY 6K - [id number]/[id number]    Time In: 8997  Time Out: 4115  Timed Code Treatment Minutes: 24 Minutes  Minutes: 24          Date: 6/15/2020  Patient Name: Mitchel Frazier,  Gender:  female        MRN: [id number]  : 1934  (80 y.o.)  Referral Date : 20  Referring Practitioner: ARELI Massey CNP  Diagnosis: Hypotentsion due to hypovolemia  Additional Pertinent Hx: Per ER note on 2020: 80 y.o. female who presents via [facility] from dialysis for hypotension and dizziness that onset during her appointment. Patient states that symptoms onset while she was sitting and additionally describes herself as feeling \"hot. \" Patient describes dizziness as a faint feeling as though she is going to pass out. Patient states that she symptoms have however alleviated since her arrival within the department. Patient denies nausea, chest pain, shortness of breath, fever, cough, sore throat, chills, diarrhea, and heart palpitations. Patient notes that she resides at [facility] [place]. Patient denies any recent fall or injury to her head. Patient has a medical history including type 2 diabetes mellitus, coronary artery disease, congestive heart failure, chronic kidney disease, chronic obstructive pulmonary disease, and hypertension. Prior Level of Function:  Lives With: Other (comment)  Type of Home: Facility([facility])  Home Layout: One level        ADL Assistance: Needs assistance  Homemaking Responsibilities: No  Ambulation Assistance: (non ambulatory)  Transfer Assistance: Needs assistance([facility] at [facility])  Additional Comments: Pt has been nonambulatory, reports they use a ángela to get her up.  Pt reports getting therapy recently, working on standing    Restrictions/Precautions:  Restrictions/Precautions: Isolation, Fall Risk(MRSA nares)  Position Activity Restriction  Other position/activity restrictions: Perionteal goals : return to [facility]  Short term goals  Time Frame for Short term goals: at discharge  Short term goal 1: Patient will complete supine < > sit with min assist to transfer in/out of bed with decreased difficulty. Short term goal 2: Patient will roll with bed rail and min assist for pressure relief and improved bed mobility. Short term goal 3: Patient will complete 5 minutes of seated dynamic balance activities with SBA to improve upright tolerance and prepare for transfers. Short term goal 4: PT to assess sit < > stand to increase mobility. Long term goals  Time Frame for Long term goals : N/A due to short estimated length of stay    Following session, patient left in safe position with all fall risk precautions in place.

## 2020-06-15 NOTE — CARE COORDINATION
Had voicemail from [address] saying patient's precert has been declined and physician has until 11:00 today to complete peer to peer. Dr. Jose Antonio Arias notified and does not wish to proceed with peer to peer. Cielo MCCORMACK notified.   Electronically signed by Surya Nelson RN on 6/15/2020 at 8:26 AM

## 2020-06-15 NOTE — PROGRESS NOTES
mg Oral BID    pantoprazole  40 mg Oral BID    pravastatin  40 mg Oral QPM    sevelamer  800 mg Oral TID WC    sodium chloride flush  10 mL Intravenous 2 times per day      dextrose       promethazine, bisacodyl, phenol, sodium chloride flush, acetaminophen **OR** acetaminophen, glucose, dextrose, glucagon (rDNA), dextrose, ondansetron **OR** ondansetron      LABS:     CBC:   Recent Labs     06/13/20  0710   WBC 10.3   HGB 9.2*        BMP:    Recent Labs     06/13/20  0710 06/14/20  0549   * 131*   K 3.8 4.2   CL 97* 93*   CO2 21* 22*   BUN 40* 40*   CREATININE 7.8* 7.6*   GLUCOSE 157* 164*     Calcium:  Recent Labs     06/14/20  0549   CALCIUM 9.2    Glucose:  Recent Labs     06/14/20  1529 06/14/20  2020 06/15/20  0619   POCGLU 191* 189* 169*        Problem list of patient:     Patient Active Problem List   Diagnosis Code    Hypertension I10    LBBB (left bundle branch block) I44.7    Hyperlipidemia E78.5    [facility] BiV ICD Z95.810    CKD (chronic kidney disease) stage 3, GFR 30-59 ml/min ([facility]) N18.3    Ex-smoker Z87.891    Mild carotid artery disease ([facility]) I73.9    Morbid obesity ([facility]) E66.01    CAD (coronary artery disease) I25.10    PAD (peripheral artery disease) ([facility]) I73.9    Lactic acidosis E87.2    Mild pulmonary hypertension ([facility]) I27.20    Type 2 diabetes mellitus with insulin therapy ([facility]) E11.9, Z79.4    GERD (gastroesophageal reflux disease) K21.9    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

## 2020-06-15 NOTE — PLAN OF CARE
breakdown  Description: Absence of new skin breakdown  6/14/2020 2141 by Rand Mccabe RN  Outcome: Ongoing  Note: No new skin breakdown noted this shift. Pt being repositioned q2h and PRN. Pillow support provided to BLE. Heels floating. Problem: Nutrition  Goal: Optimal nutrition therapy  Outcome: Ongoing  Note: Pt tolerating RENAL; Daily Fluid Restriction: 1500 ml diet. Problem: Physical Regulation:  Goal: Diagnostic test results will improve  Description: Diagnostic test results will improve  Outcome: Ongoing     Care plan reviewed with patient. Patient verbalizes understanding of the plan of care and contribute to goal setting.

## 2020-06-17 PROBLEM — N39.0 UTI (URINARY TRACT INFECTION): Status: RESOLVED | Noted: 2020-01-01 | Resolved: 2020-01-01

## 2020-07-31 NOTE — PROGRESS NOTES
[facility]     . Oswald Asif Battery longevity:  2.6-3  Presenting rhythm  AS BiV paced     Atrial impedance 380  RV impedance 790    Shock 48    P wave sensing 0.7  R wave sensing 12    2.2 % atrial paced  100 % RV paced     Atrial threshold 0.75 V  at 0.5ms  RV threshold 0.5 V at 0.5ms  LV 1.625 @ 0.5  Mode switches   0  corvue has been elevated for 10 days   This note also sent to CHF

## 2020-08-04 PROBLEM — R55 SYNCOPE AND COLLAPSE: Status: ACTIVE | Noted: 2020-01-01

## 2020-08-04 NOTE — ED NOTES
Patient resting in bed. Respirations easy and unlabored. No distress noted. Call light within reach. Updated on POC. Will monitor.       Lenord Cranker, RN  08/04/20 6502

## 2020-08-04 NOTE — ED NOTES
ED nurse-to-nurse bedside report    Chief Complaint   Patient presents with    Loss of Consciousness      LOC: alert and orientated to name, place, date  Vital signs   Vitals:    08/04/20 1554 08/04/20 1609 08/04/20 1709 08/04/20 1829   BP: (!) 123/55 137/64 124/64 116/74   Pulse: 99 112 114 115   Resp: 16 21 22 22   Temp:       TempSrc:       SpO2: 100% 100%  96%   Weight:          Pain:    Pain Interventions: none  Pain Goal: 0  Oxygen: Yes    Current needs required 2L NC   Telemetry: Yes  LDAs:   Peripheral IV 08/04/20 Right Forearm (Active)   Site Assessment Clean;Dry; Intact 08/04/20 1258   Dressing Status Clean;Dry; Intact 08/04/20 1258     Continuous Infusions:   Mobility: Fully dependent  Champion Fall Risk Score: No flowsheet data found.   Fall Interventions:   Report given to: Steve Colindres RN  08/04/20 0572

## 2020-08-04 NOTE — PROGRESS NOTES
Pharmacy Medication History Note      List of current medications patient is taking is complete. Source of information: Med list    Changes made to medication list:  Medications removed (include reason, ex. therapy complete or physician discontinued):  Calcitriol  Vit D    Medications added/doses adjusted:  Lantus increased to 16 units nightly      Other notes (ex. Recent course of antibiotics, Coumadin dosing):    Denies use of other OTC or herbal medications.       Allergies reviewed      Electronically signed by Santi Santiago [facility] on 8/4/2020 at 6:07 PM

## 2020-08-04 NOTE — ED NOTES
Patient resting in bed with eyes closed. Respirations easy and unlabored. Pacemaker interrogation complete. Will monitor.     Rodrigue Carty RN  08/04/20 4546

## 2020-08-04 NOTE — ED PROVIDER NOTES
associated with type 2 diabetes mellitus ([facility] DM2 (diabetes mellitus, type 2) ([facility] Dyspnea, ESRD (end stage renal disease) ([facility] Ex-smoker, GERD (gastroesophageal reflux disease), GERD (gastroesophageal reflux disease), Heart failure with preserved ejection fraction ([facility] His of Heparin induced thrombocytopenia, History of breast cancer, History of CVA (cerebrovascular accident), History of pulmonary embolism, Hyperkalemia, Hyperlipidemia, Hypertension, Iron deficiency anemia, LBBB (left bundle branch block), Localized edema, Malignant neoplasm of breast (female) ([facility] Osteoarthritis, Paget's disease of bone, Personal history of transient ischemic attack (TIA), and cerebral infarction without residual deficits, Pulmonary embolism ([facility] Bi[facility] ICD, Thyroid disease, Venous insufficiency (chronic) (peripheral), Vitamin D deficiency, Vitamin D deficiency, and Weakness. SURGICAL HISTORY      has a past surgical history that includes doppler echocardiography (03/22/10); cardiovascular stress test (03/23/10); pacemaker placement; eye surgery; Colonoscopy; Eye surgery (Left, 9/2014); Foot surgery (Left, 1/12/2016); Breast surgery; Cardiac catheterization (11-); Cardiac catheterization (05-); Cardiac defibrillator placement (02/02/2011); pr egd balloon dilation esophagus <30 mm diam (N/A, 9/18/2017); pr esophagogastroduodenoscopy transoral diagnostic (9/18/2017); Endoscopy, colon, diagnostic; other surgical history (10/18/2018); and Dilatation, esophagus.     CURRENT MEDICATIONS       Current Discharge Medication List      CONTINUE these medications which have NOT CHANGED    Details   insulin glargine (LANTUS) 100 UNIT/ML injection vial Inject 16 Units into the skin nightly      guaiFENesin (ROBITUSSIN) 100 MG/5ML SOLN oral solution Take 200 mg by mouth every 4 hours as needed for Cough      aspirin 81 MG EC tablet Take 1 tablet by mouth daily  Qty: 30 tablet, Refills: 3      levothyroxine (SYNTHROID) 25 MCG tablet Take 1 tablet by mouth Daily  Qty: 30 tablet, Refills: 3      midodrine (PROAMATINE) 10 MG tablet Take 1 tablet by mouth 3 times daily (with meals)  Qty: 90 tablet, Refills: 1      bisacodyl (DULCOLAX) 5 MG EC tablet Take 5 mg by mouth daily as needed for Constipation       metoprolol succinate (TOPROL XL) 50 MG extended release tablet Take 50 mg by mouth daily       ondansetron (ZOFRAN) 4 MG tablet Take 1 tablet by mouth every 8 hours as needed for Nausea or Vomiting      mupirocin (BACTROBAN) 2 % cream Apply topically daily Apply topically to PD exit site daily      polycarbophil (FIBERCON) 625 MG tablet Take 625 mg by mouth 3 times daily      potassium chloride (KLOR-CON M) 20 MEQ extended release tablet Take 20 mEq by mouth 2 times daily       megestrol (MEGACE) 40 MG tablet Take 1 tablet by mouth 2 times daily  Qty: 30 tablet, Refills: 3      Amino Acids (LIQUACEL) LIQD Take 30 mLs by mouth Three times per week; Monday, Wednesday, and Friday related to ESRD      sevelamer (RENVELA) 800 MG tablet Take 1 tablet by mouth 3 times daily (with meals)      polyethylene glycol (GLYCOLAX) powder Take 17 g by mouth 2 times daily       miconazole (MICOTIN) 2 % powder Apply topically 2 times daily.   Qty: 45 g, Refills: 1      sodium chloride (OCEAN, BABY AYR) 0.65 % nasal spray 1 spray by Nasal route as needed for Congestion  Refills: 0      insulin aspart (NOVOLOG) 100 UNIT/ML injection vial Inject into the skin 2 times daily Inject per sliding scale 0-150 = 0 units; 151-200 = 1 unit; 201-205 = 2 units; 251-300 = 3 units; 301-350 = 4 units; 351-400 = 5 units; 401-450 = 6 units       cyanocobalamin 1000 MCG tablet Take 1,000 mcg by mouth every evening       pantoprazole (PROTONIX) 40 MG tablet Take 40 mg by mouth 2 times daily      ipratropium-albuterol (DUONEB) 0.5-2.5 (3) MG/3ML SOLN nebulizer solution Inhale 1 vial into the lungs every 6 hours as needed for Shortness of Breath      ARTIFICIAL TEAR OP Instill one drop into each eye as needed      Probiotic Product (PROBIOTIC-10) CAPS Take 1 capsule by mouth daily       nitroGLYCERIN (NITROSTAT) 0.4 MG SL tablet up to max of 3 total doses. If no relief after 1 dose, call 911. Qty: 25 tablet, Refills: 3      ascorbic acid (VITAMIN C) 500 MG tablet Take 500 mg by mouth 2 times daily       docusate sodium (COLACE) 100 MG capsule Take 100 mg by mouth daily       clopidogrel (PLAVIX) 75 MG tablet Take 75 mg by mouth every evening       Multiple Vitamins-Minerals (OCUVITE EXTRA PO) Take 1 tablet by mouth daily       pravastatin (PRAVACHOL) 40 MG tablet Take 40 mg by mouth every evening Indications: Blood Cholesterol Abnormal       acetaminophen (TYLENOL) 325 MG tablet Take 650 mg by mouth 4 times daily As needed for pain or fever >100.4 F      OXYGEN Inhale 2 L into the lungs as needed             ALLERGIES     is allergic to heparin. FAMILY HISTORY     She indicated that her mother is . She indicated that her father is . She indicated that both of her sisters are . She indicated that both of her brothers are . She indicated that her maternal grandmother is . She indicated that her maternal grandfather is . She indicated that her paternal grandmother is . She indicated that her paternal grandfather is . family history includes Cancer in her brother, sister, and sister; Diabetes in her father and mother; Heart Disease in her brother and mother; Pacemaker in her mother; Stroke in her mother. SOCIAL HISTORY      reports that she quit smoking about 68 years ago. Her smoking use included cigarettes. She has a 1.00 pack-year smoking history. She has never used smokeless tobacco. She reports that she does not drink alcohol or use drugs. PHYSICAL EXAM     INITIAL VITALS:  height is 5' 6\" (1.676 m) and weight is 238 lb 9.6 oz (108.2 kg).  Her oral temperature is 97.9 °F (*)     Glucose 155 (*)     BUN 31 (*)     CREATININE 5.6 (*)     All other components within normal limits   TROPONIN - Abnormal; Notable for the following components:    Troponin T 0.164 (*)     All other components within normal limits   BRAIN NATRIURETIC PEPTIDE - Abnormal; Notable for the following components:    Pro-BNP 90530.0 (*)     All other components within normal limits   OSMOLALITY - Abnormal; Notable for the following components:    Osmolality Calc 274.2 (*)     All other components within normal limits   GLOMERULAR FILTRATION RATE, ESTIMATED - Abnormal; Notable for the following components:    Est, Glom Filt Rate 7 (*)     All other components within normal limits   CBC WITH AUTO DIFFERENTIAL - Abnormal; Notable for the following components:    RBC 3.28 (*)     Hemoglobin 9.9 (*)     Hematocrit 32.5 (*)     MCV 99.1 (*)     MCHC 30.5 (*)     RDW-SD 45.9 (*)     Eosinophils Absolute 0.5 (*)     Immature Grans (Abs) 0.08 (*)     All other components within normal limits   TROPONIN - Abnormal; Notable for the following components:    Troponin T 0.142 (*)     All other components within normal limits   TROPONIN - Abnormal; Notable for the following components:    Troponin T 0.153 (*)     All other components within normal limits   POCT GLUCOSE - Abnormal; Notable for the following components:    POC Glucose 126 (*)     All other components within normal limits   POCT GLUCOSE - Abnormal; Notable for the following components:    POC Glucose 111 (*)     All other components within normal limits   ANION GAP   URINALYSIS   TROPONIN   TROPONIN       EMERGENCY DEPARTMENT COURSE:   Vitals:    Vitals:    08/05/20 0545 08/05/20 0617 08/05/20 0630 08/05/20 0815   BP: (!) 121/45 134/61 (!) 116/103 (!) 119/52   Pulse: 111 103 109 114   Resp:    19   Temp:    97.9 °F (36.6 °C)   TempSrc:    Oral   SpO2:    97%   Weight:       Height:         Patient presenting with complaint of syncopal episode, nonexertional.

## 2020-08-04 NOTE — ED NOTES
Patient resting in bed. Respirations easy and unlabored. No distress noted. Call light within reach. Updated on POC. Will monitor.       Sophia Fleming RN  08/04/20 2505

## 2020-08-04 NOTE — ED TRIAGE NOTES
Presents to ED with c/o loss of consciousness at the doctors office today. Reports hypotension. Patient pale upon arrival. Patient has no complaints. Alert and oriented. Respirations easy and unlabored. EKG complete. IV in place.

## 2020-08-05 NOTE — CONSULTS
10/2/2012    GERD (gastroesophageal reflux disease)     GERD (gastroesophageal reflux disease)     Heart failure with preserved ejection fraction ([facility]     Dr. Dwayne Arndt Hemodialysis patient [facility])     His of Heparin induced thrombocytopenia     History of breast cancer     right 1982 s/p mastectomy and chemo, left 2007 s/p mastectomy    History of CVA (cerebrovascular accident)     History of pulmonary embolism 05/2014    on [address] (coumadin)    Hyperkalemia     Hyperlipidemia     Hypertension     pulmonary    Iron deficiency anemia     Dr. Wilda Spivey did EGD and colonoscopy 2011 - normal/neg w/u per chart    LBBB (left bundle branch block)     Localized edema     Malignant neoplasm of breast (female) ([facility]     Osteoarthritis     Paget's disease of bone 09/2014    left hip    Personal history of transient ischemic attack (TIA), and cerebral infarction without residual deficits     Pulmonary embolism (HCC)     [facility] BiV ICD 11/17/2011    Thyroid disease     Venous insufficiency (chronic) (peripheral)     Vitamin D deficiency     Vitamin D deficiency     Weakness      Past Surgical History:   Procedure Laterality Date    BREAST SURGERY      s/p right mastectomy 1980's and left mastectomy 2007    CARDIAC CATHETERIZATION  11-    Hemodynamics w pulmonary hypertension, systemic hypertension and no sats gradient difference. No aortic stenosis. No mitral stenosis. Coronaries; mild disease of the LAD not more than 40%. LV; severe LV dysfunction and EF 30-35%.  CARDIAC CATHETERIZATION  05-    Mild disease of small distal LAD (approximately  50% stenosis) angiographically normal CA. Mild to moderate LV systolic dysfunction. EF 35%. Mildly elevated LV end diastolic pressure. No significant MR. Systemic hypertension.     CARDIAC DEFIBRILLATOR PLACEMENT  02/02/2011    CARDIOVASCULAR STRESS TEST  03/23/10    EF 30%  Severe LV Dys    COLONOSCOPY      Dr. Flakita Grady, exit site daily   Yes Historical Provider, MD   polycarbophil (FIBERCON) 625 MG tablet Take 625 mg by mouth 3 times daily   Yes Historical Provider, MD   potassium chloride (KLOR-CON M) 20 MEQ extended release tablet Take 20 mEq by mouth 2 times daily    Yes Historical Provider, MD   megestrol (MEGACE) 40 MG tablet Take 1 tablet by mouth 2 times daily 4/29/20  Yes LUCINDA Jarrett Che-YRIS   Amino Acids (LIQUACEL) LIQD Take 30 mLs by mouth Three times per week; Monday, Wednesday, and Friday related to ESRD   Yes Historical Provider, MD   sevelamer (RENVELA) 800 MG tablet Take 1 tablet by mouth 3 times daily (with meals)   Yes Historical Provider, MD   polyethylene glycol (GLYCOLAX) powder Take 17 g by mouth 2 times daily    Yes Historical Provider, MD   miconazole (MICOTIN) 2 % powder Apply topically 2 times daily. 12/19/19  Yes Nahomi Burnham,    sodium chloride (OCEAN, BABY AYR) 0.65 % nasal spray 1 spray by Nasal route as needed for Congestion 12/19/19  Yes Nahomi Burnham,    insulin aspart (NOVOLOG) 100 UNIT/ML injection vial Inject into the skin 2 times daily Inject per sliding scale 0-150 = 0 units; 151-200 = 1 unit; 201-205 = 2 units; 251-300 = 3 units; 301-350 = 4 units; 351-400 = 5 units; 401-450 = 6 units    Yes Historical Provider, MD   cyanocobalamin 1000 MCG tablet Take 1,000 mcg by mouth every evening    Yes Historical Provider, MD   pantoprazole (PROTONIX) 40 MG tablet Take 40 mg by mouth 2 times daily   Yes Historical Provider, MD   ipratropium-albuterol (DUONEB) 0.5-2.5 (3) MG/3ML SOLN nebulizer solution Inhale 1 vial into the lungs every 6 hours as needed for Shortness of Breath   Yes Historical Provider, MD   ARTIFICIAL TEAR OP Instill one drop into each eye as needed   Yes Historical Provider, MD   Probiotic Product (PROBIOTIC-10) CAPS Take 1 capsule by mouth daily    Yes Historical Provider, MD   nitroGLYCERIN (NITROSTAT) 0.4 MG SL tablet up to max of 3 total doses.  If no relief after 1 dose, call 911.  Patient taking differently: Dissolve one tablet under tongue as needed every 5 minutes for chest pain up to max of 3 total doses.  If no relief after 1 dose, call 911. 10/18/18  Yes Estee Wiley MD   ascorbic acid (VITAMIN C) 500 MG tablet Take 500 mg by mouth 2 times daily    Yes Historical Provider, MD   docusate sodium (COLACE) 100 MG capsule Take 100 mg by mouth daily    Yes Historical Provider, MD   clopidogrel (PLAVIX) 75 MG tablet Take 75 mg by mouth every evening    Yes Historical Provider, MD   Multiple Vitamins-Minerals (OCUVITE EXTRA PO) Take 1 tablet by mouth daily    Yes Historical Provider, MD   pravastatin (PRAVACHOL) 40 MG tablet Take 40 mg by mouth every evening Indications: Blood Cholesterol Abnormal  5/8/16  Yes Historical Provider, MD   acetaminophen (TYLENOL) 325 MG tablet Take 650 mg by mouth 4 times daily As needed for pain or fever >100.4 F   Yes Historical Provider, MD   OXYGEN Inhale 2 L into the lungs as needed    Historical Provider, MD     Allergies: Heparin  IP meds : Scheduled Meds:   ascorbic acid  500 mg Oral BID    aspirin  81 mg Oral Daily    clopidogrel  75 mg Oral QPM    cyanocobalamin  1,000 mcg Oral QPM    docusate sodium  100 mg Oral Daily    insulin glargine  16 Units Subcutaneous Nightly    levothyroxine  25 mcg Oral Daily    megestrol  40 mg Oral BID    metoprolol succinate  50 mg Oral Nightly    midodrine  10 mg Oral TID WC    mupirocin   Topical Daily    pantoprazole  40 mg Oral BID    polyethylene glycol  17 g Oral BID    polycarbophil  625 mg Oral TID    potassium chloride  20 mEq Oral BID    pravastatin  40 mg Oral Nightly    lactobacillus  1 capsule Oral Daily    sevelamer  800 mg Oral TID     cefTRIAXone (ROCEPHIN) IV  1 g Intravenous Q24H    insulin lispro  0-6 Units Subcutaneous TID     insulin lispro  0-3 Units Subcutaneous Nightly    cefTRIAXone        dextrose        dextrose        dextrose        dianeal lo-nikko 1.5% 2,000 mL Intraperitoneal Q6H     Continuous Infusions:   dilTIAZem (CARDIZEM) 125 mg in dextrose 5% 125 mL infusion       Review of Systems Physical Exam   Review of Systems   Unable to perform ROS: Mental status change    Physical Exam  Vitals signs reviewed. Constitutional:       General: She is not in acute distress. Appearance: Normal appearance. HENT:      Head: Normocephalic and atraumatic. Right Ear: External ear normal.      Left Ear: External ear normal.      Mouth/Throat:      Mouth: Mucous membranes are moist.   Eyes:      Comments: Lids normal appearence   Neck:      Musculoskeletal: Normal range of motion and neck supple. Thyroid: No thyromegaly. Vascular: No JVD. Cardiovascular:      Heart sounds: Normal heart sounds. No murmur. No friction rub. No gallop. Pulmonary:      Effort: Pulmonary effort is normal. No respiratory distress. Breath sounds: Normal breath sounds. Chest:      Chest wall: No tenderness. Abdominal:      General: Bowel sounds are normal. There is no distension. Palpations: Abdomen is soft. Tenderness: There is no abdominal tenderness. Skin:     General: Skin is warm and dry. Findings: No erythema or rash. Neurological:      General: No focal deficit present. Mental Status: She is alert.    Psychiatric:         Mood and Affect: Mood normal.           Vitals:    08/05/20 0630   BP: (!) 116/103   Pulse: 109   Resp:    Temp:    SpO2:      Labs, Radiology and Tests       Recent Labs     08/04/20  1339 08/05/20  0550   WBC 14.1* 10.5   RBC 3.84* 3.28*   HGB 11.6* 9.9*   HCT 36.4* 32.5*   MCV 94.8 99.1*   MCH 30.2 30.2   MCHC 31.9* 30.5*    243     Recent Labs     08/04/20  1339   *   K 4.9   CL 95*   CO2 23   BUN 31*   CREATININE 5.6*   CALCIUM 9.3       Radiology : Chest x-ray-NAD    Other :     Assessment    Renal -ESRD on peritoneal dialysis, volume status appears reasonable  - Continue PD with 2000 mL every 6 hours and monitor ultrafiltration  - Reviewed the peritoneal dialysis data is 1800 mL ultrafiltration which was clear  Recent hypotension improved with some IV fluid bolus currently also on midodrine maintaining well  Mild hyponatremia follow for now  Hx of diabetes mellitus  Hx of COPD  meds reviewed       **This report has been created using voice recognition software. It maycontain minor  errors which are inherent in voice recognition technology. **    Fredrick Kelsey M.D  [facility].

## 2020-08-05 NOTE — PLAN OF CARE
Problem: Falls - Risk of:  Goal: Will remain free from falls  Description: Will remain free from falls  Outcome: Ongoing  Note: Patient remains free from falls this shift. Fall precautions in place with bed/chair exit alarmed. Fall sign posted and fall armband in place. Nonskid footwear used with transferring. Educated patient to use call light when in need of staff assistance with transferring, ambulating, and other activities of daily living. Patient appropriately uses call light this shift. Problem: Falls - Risk of:  Goal: Absence of physical injury  Description: Absence of physical injury  Outcome: Ongoing     Problem: Skin Integrity:  Goal: Will show no infection signs and symptoms  Description: Will show no infection signs and symptoms  Outcome: Ongoing  Note: Patient shows no signs of infection this shift. Patient afebrile. IV Rocephin given. Patient turned every 2 hours with pillow support. Problem: Skin Integrity:  Goal: Absence of new skin breakdown  Description: Absence of new skin breakdown  Outcome: Ongoing  Note: Deep tissue injury and Pressure Wound Stage 2 present upon admission. Blanchable redness on heels. Abrasions and bruising scattered. Patient turned every 2 hours with pillow support. Problem: Discharge Planning:  Goal: Discharged to appropriate level of care  Description: Discharged to appropriate level of care  Outcome: Ongoing  Note: Patient educated that discharge planning is still in progress. Patient from [facility]. Problem: Cardiac:  Goal: Ability to maintain vital signs within normal range will improve  Description: Ability to maintain vital signs within normal range will improve  Outcome: Ongoing  Note: Patient experiencing tachycardia at this time. Patient asymptomatic. Cardiology and attending contacted.       Problem: Cardiac Output - Decreased:  Goal: Hemodynamic stability will improve  Description: Hemodynamic stability will improve  Outcome: Ongoing  Note: Vitals:    08/04/20 2000 08/04/20 2143 08/04/20 2226 08/04/20 2330   BP: (!) 118/50 (!) 144/63  (!) 158/67   Pulse: 121 118  113   Resp: 20 18 16   Temp:  98.3 °F (36.8 °C)  97.7 °F (36.5 °C)   TempSrc:  Oral  Oral   SpO2: 97% 100%  97%   Weight:       Height:   5' 6\" (1.676 m)          Problem: Pain:  Goal: Pain level will decrease  Description: Pain level will decrease  Outcome: Ongoing  Note: Patient reports pain as a 0 on a 0-10 scale this shift. Patient's stated pain goal is no pain. Non-pharmacological pain interventions include rest and repositioning. Pharmacological pain interventions on board per physician's order. Problem: Pain:  Goal: Control of acute pain  Description: Control of acute pain  Outcome: Ongoing     Problem: Pain:  Goal: Control of chronic pain  Description: Control of chronic pain  Outcome: Ongoing     Problem: Tissue Perfusion, Altered:  Goal: Circulatory function within specified parameters  Description: Circulatory function within specified parameters  Outcome: Ongoing  Note: Capillary refill less than 3 seconds in all extremities. Lower extremities otoniel and swollen. Pulses present in all extremities. Care plan reviewed with patient. Patient verbalizes understanding of the plan of care and contributes to goal setting.

## 2020-08-05 NOTE — CONSULTS
Syncope  Hx of hypotnsion  CHF  CMP- NI  LBBB  ESRD on CAPD  Hx of BIV-ICD  Hx of DM, CVA, HTN, COPD  Hx of CardioMems 2018  ? PMT  ? upper tracking rate -decrease  Plan  Cont midodrine  Consider device reprogramming  [id number]    Simon Escalona MD

## 2020-08-05 NOTE — CONSULTS
[address] [place] [address]                                  CONSULTATION    PATIENT NAME: Krishna Archer                      :        1934  MED REC NO:   [id number]                           ROOM:       0015  ACCOUNT NO:   [de-identified]                           ADMIT DATE: 2020  PROVIDER:     Sofya Cavanaugh. Lexy Tomas M.D.    Amy Quezada:  2020    REASON OF CONSULTATION:  Presentation with syncope and possible  pacemaker-mediated tachycardia. HISTORY OF PRESENT ILLNESS:  This is an 59-year-old  female  patient, who is a very poor historian from the nursing home, presented  to the emergency room with loss of consciousness and she does not  recollect what happened, all she knows is that she _____ passed out. She stated that she has been in the nursing home. The patient denied  having chest pain. Today, she feels comfortable. On arrival to the  emergency room yesterday, blood pressure was good, but heart rate was in  the higher rate at 120s. So, Cardiology evaluation was sought for  further evaluation and care. The patient had been admitted before for  similar episodes of syncope and the patient has a history of congestive  heart failure, was on diuretics. Now, she has been on peritoneal  dialysis. For a while, she is off diuretics. She has been  hypertensive. Apparently after had been on dialysis, her blood pressure  is running in the lower side and so, she has been on midodrine now. So,  the patient denied having any chest pain. REVIEW OF SYSTEMS:  Negative except the above-mentioned ones. PAST MEDICAL HISTORY:  Includes the following:  History of nonischemic  cardiomyopathy; history of end-stage renal disease, now on peritoneal  dialysis recently; history of congestive heart failure, was used to be  on diuretics that has been stopped now. She is on peritoneal dialysis,  end-stage kidney failure. Diabetes, hypertension, hyperlipidemia, left  bundle branch block, history of BiV ICD for nonischemic cardiomyopathy. PAST SURGICAL HISTORY:  Includes eye surgery, foot surgery; dilatation  and curettage; defibrillator placement; cardiac catheterization in 2010,  nonobstructive. FAMILY HISTORY:  Positive for pacemaker, diabetes, heart disease and  stroke. SOCIAL HISTORY:  She is a former smoker, quit in 1952. No alcohol. No  drug use. ALLERGIES:  The patient is allergic to HEPARIN. HOME MEDICATIONS:  Prior to admission include ascorbic acid, aspirin,  bisacodyl, Plavix, Lantus, Synthroid, Megace, Lopressor, miconazole,  midodrine 10 three times a day, potassium chloride, pravastatin,  probiotics, and Levemir. PHYSICAL EXAMINATION:  GENERAL:  Looks sick, in no form of distress. VITAL SIGNS:  Includes the following, blood pressure 110/58, pulse rate  102, respiratory rate 18, and temperature 97.7. HEENT:  Slightly pale conjunctivae. Anicteric sclerae. Pupils are  equal and reactive bilaterally to light. NECK:  No lymphadenopathy. No goiter. No JVD. CHEST:  Clear to auscultation and percussion. CARDIOVASCULAR:  Arteries are felt; carotid, femoral, pedal.  No bruits. S1, S2 well heard. No murmur or galloped rhythm. ABDOMEN:  Soft and obese. GENITOURINARY:  No CVA, flank or suprapubic tenderness. LOCOMOTOR:  No cyanosis, clubbing, muscle or joint tenderness. SKIN:  No rashes, ulcers, or nodules. CNS:  Alert, awake, and oriented to time, person, and place. Cranial  nerves are intact. Sensation is intact to light touch and pain. Motor:  Normal muscle strength and tone. Appropriate mood and affect. WORKUP:  The patient had an electronically ventricular paced rhythm at  115 beats per minute and BiV paced. It looks like A sensing and V  pacing. No acute abnormality. Blood chemistry:  Sodium 132, potassium  4.1, BUN 31, and creatinine 5.6. GFR of 7.   Hematology:  Bennie Still [email or web address]  Job#: [id number]     Doc#: [id number]    CC:

## 2020-08-05 NOTE — PROGRESS NOTES
[address] [employer] THERAPY MISSED TREATMENT NOTE  [facility] NEUROSCIENCES 4A  4A-15/015-A      Date: 2020  Patient Name: Susan Thomson        CSN: [id number]   : 1934  (80 y.o.)  Gender: female                REASON FOR MISSED TREATMENT: Nurse reports Pt is [facility]ngela at [facility] & requires significant A for ADLs, verified in chart with previous admission. Will defer OT order at this time.

## 2020-08-05 NOTE — CARE COORDINATION
8/5/20, 2:15 PM EDT  Discharge Planning Evaluation  Social work consult received, patient from [facility]. Patient and niece Michaelle Colindres preference is to return to to [facility]. The patient's current payor source at the facility is [facility]. Medicare skilled days available: yes  Insurance precert:  yes  Spoke with Natasha the  at the facility.   Patient bed hold: yes  Anticipated transport plan: ambulette or ambulance  Do they require COVID 19 test to return to ECF:no  Is there a required time frame which which COVID test needs done:NA

## 2020-08-05 NOTE — FLOWSHEET NOTE
08/05/20 0323   Provider Notification   Reason for Communication Review case   Provider Name Dr. Harrison Kennedy   Provider Notification Physician   Method of Communication Secure Message   Response See orders   Notification Time 0323     Notified Dr. Harrison Kennedy of patient's HR of 120s. MAP in the 60s. Dr. Harrison Kennedy stated that he still wants to start Cardizem drip starting at 5mg.

## 2020-08-05 NOTE — FLOWSHEET NOTE
08/05/20 0213   Provider Notification   Reason for Communication Review case   Provider Name Dr. Milagros Yancey   Provider Notification Physician   Method of Communication Secure Message   Response See orders   Notification Time 0213     Notified Dr. Milagros Yancey of patient heart rate of 125-135. See orders for Cardizem.

## 2020-08-05 NOTE — CARE COORDINATION
8/5/20, 12:29 PM EDT  DISCHARGE PLANNING EVALUATION:    Julieth Gonzalez       Admitted from: ED 8/4/2020/ [address] day: 0   Location: -15/015-A Reason for admit: Syncope and collapse [R55] Status: IP  Admit order signed?: yes  PMH:  has a past medical history of Anxiety, Atherosclerotic heart disease of native coronary artery without angina pectoris, CAD (coronary artery disease), Cancer ([address] Cardiomyopathy, nonischemic ([address] Cerebral artery occlusion with cerebral infarction ([address] CHF (congestive heart failure) ([address] CKD (chronic kidney disease) stage 3, GFR 30-59 ml/min ([facility]), Congenital heart disease, COPD exacerbation ([address] 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 ([address] DM2 (diabetes mellitus, type 2) ([address] Dyspnea, ESRD (end stage renal disease) ([address] Ex-smoker, GERD (gastroesophageal reflux disease), GERD (gastroesophageal reflux disease), Heart failure with preserved ejection fraction ([address] Hemodialysis patient ([address] His of Heparin induced thrombocytopenia, History of breast cancer, History of CVA (cerebrovascular accident), History of pulmonary embolism, Hyperkalemia, Hyperlipidemia, Hypertension, Iron deficiency anemia, LBBB (left bundle branch block), Localized edema, Malignant neoplasm of breast (female) ([address] Osteoarthritis, Paget's disease of bone, Personal history of transient ischemic attack (TIA), and cerebral infarction without residual deficits, Pulmonary embolism ([address] [facility] BiV ICD, Thyroid disease, Venous insufficiency (chronic) (peripheral), Vitamin D deficiency, Vitamin D deficiency, and Weakness. Procedure: none  Pertinent abnormal Imaging:CT Head WO Contrast   Impression:         There is no acute edema or hemorrhage. CXR    Impression:          No acute disease.           Medications:  Scheduled Meds:   ascorbic acid  500 mg Oral BID    aspirin  81 mg Oral Daily    clopidogrel  75 mg Oral QPM    cyanocobalamin  1,000 mcg Oral QPM    docusate sodium  100 mg Oral Daily    insulin glargine  16 Units Subcutaneous Nightly    levothyroxine  25 mcg Oral Daily    megestrol  40 mg Oral BID    metoprolol succinate  50 mg Oral Nightly    midodrine  10 mg Oral TID WC    mupirocin   Topical Daily    pantoprazole  40 mg Oral BID    polyethylene glycol  17 g Oral BID    polycarbophil  625 mg Oral TID    potassium chloride  20 mEq Oral BID    pravastatin  40 mg Oral Nightly    lactobacillus  1 capsule Oral Daily    sevelamer  800 mg Oral TID WC    cefTRIAXone (ROCEPHIN) IV  1 g Intravenous Q24H    insulin lispro  0-6 Units Subcutaneous TID WC    insulin lispro  0-3 Units Subcutaneous Nightly    dianeal lo-nikko 1.5%  2,000 mL Intraperitoneal Q6H     Continuous Infusions:   dilTIAZem (CARDIZEM) 125 mg in dextrose 5% 125 mL infusion        Pertinent Info/Orders/Treatment Plan:  Troponin 0.153, creatinine 5.6, BNP 63706.0, WBC 14.1, Cardiology consult, Nephrology consult for CAPD, Cardizem drip, diabetes management, IV antibiotics, telemetry, Palliative Care consult, PT/OT. Diet: DIET CARDIAC;   Smoking status:  reports that she quit smoking about 68 years ago. Her smoking use included cigarettes. She has a 1.00 pack-year smoking history. She has never used smokeless tobacco.   PCP: Jay Wood MD  Readmission 30 days or less: no   %    Discharge Planning Evaluation  Current Residence:  Nursing Home  Living Arrangements:  Other (Comment)   Support Systems:  Family Members  Current Services PTA:     Potential Assistance Needed:  N/A  Potential Assistance Purchasing Medications:  No  Does patient want to participate in local refill/ meds to beds program?  No  Type of Home Care Services:  None  Patient expects to be discharged to:  return to [place] [address]  Expected Discharge date:  08/05/20  Follow Up Appointment: Best Day/ Time: Tuesday AM    Patient Goals/Plan/Treatment Preferences: Alicia Crawford currently resides at [facility].  Plan is to return at discharge. SW following. Transportation/Food Security/Housekeeping Addressed:  No issues identified.     Evaluation: yes

## 2020-08-05 NOTE — PROGRESS NOTES
IM Progress Note  Dr. Hayde Maire  8/5/2020 1:07 PM      Patient name Susan Thomson  DOB1934  PCP: Bushra Cortes MD  Admit Date: 8/4/2020  Acct No. [de-identified]    Subjective: Interval History:     Pt awake   No dizziness  D/w staff no new issues  HR still elevated  Received fluid bolus    Diet: DIET CARDIAC;    I/O last 3 completed shifts: In: 4000 [Other:4000]  Out: 4800 [Other:4800]  I/O this shift:  In: [address]: 1800 [Other:1800]        Admission weight: 218 lb (98.9 kg) as of 8/4/2020 12:33 PM  Wt Readings from Last 3 Encounters:   08/05/20 238 lb 9.6 oz (108.2 kg)   06/15/20 218 lb 14.4 oz (99.3 kg)   05/21/20 248 lb (112.5 kg)     Body mass index is 38.51 kg/m².     ROS   CVS;  no cp or palpitation  Resp: no SOB or cough  Neuro:  No numbness or weakness or dizziness  Abd: no nausea or vomiting or abd pain      Medications:   Scheduled Meds:   ascorbic acid  500 mg Oral BID    aspirin  81 mg Oral Daily    clopidogrel  75 mg Oral QPM    cyanocobalamin  1,000 mcg Oral QPM    docusate sodium  100 mg Oral Daily    insulin glargine  16 Units Subcutaneous Nightly    levothyroxine  25 mcg Oral Daily    megestrol  40 mg Oral BID    metoprolol succinate  50 mg Oral Nightly    midodrine  10 mg Oral TID WC    mupirocin   Topical Daily    pantoprazole  40 mg Oral BID    polyethylene glycol  17 g Oral BID    polycarbophil  625 mg Oral TID    potassium chloride  20 mEq Oral BID    pravastatin  40 mg Oral Nightly    lactobacillus  1 capsule Oral Daily    sevelamer  800 mg Oral TID WC    cefTRIAXone (ROCEPHIN) IV  1 g Intravenous Q24H    insulin lispro  0-6 Units Subcutaneous TID WC    insulin lispro  0-3 Units Subcutaneous Nightly    dianeal lo-nikko 1.5%  2,000 mL Intraperitoneal Q6H     Continuous Infusions:   dilTIAZem (CARDIZEM) 125 mg in dextrose 5% 125 mL infusion         Labs :     CBC:   Recent Labs     08/04/20  1339 08/05/20  0550   WBC 14.1* 10.5   HGB 11.6* 9.9*   PLT 199 243     BMP:    Recent Labs     08/04/20  1339   *   K 4.9   CL 95*   CO2 23   BUN 31*   CREATININE 5.6*   GLUCOSE 155*     Hepatic: No results for input(s): AST, ALT, ALB, BILITOT, ALKPHOS in the last 72 hours. Troponin: No results for input(s): TROPONINI in the last 72 hours. BNP: No results for input(s): BNP in the last 72 hours. Lipids: No results for input(s): CHOL, HDL in the last 72 hours. Invalid input(s): LDLCALCU  INR: No results for input(s): INR in the last 72 hours. Radiology    Objective:   Vitals: /60   Pulse 102   Temp 97.7 °F (36.5 °C) (Oral)   Resp 20   Ht 5' 6\" (1.676 m)   Wt 238 lb 9.6 oz (108.2 kg)   SpO2 96%   BMI 38.51 kg/m²   HEENT: Head:pupils react  Neck: supple  Lungs: clear to auscultation  Heart: regular rhythm tachycardic  Abdomen: soft BS heard   Extremities: warm no edema  Neurologic:  Alert, oriented X3    Impression:   :   1.  Syncopal episode with tachycardia paced rhythm  2. Cardiomyopathy, preserved ejection fraction, status post AICD pacer with tachycardia and paced rhythm. 3.  Leukocytosis resolved  4. Hyponatremia. 5.  End-stage renal disease, on peritoneal dialysis. 6.  Deconditioning and wheelchair bound. 7.  Insulin-dependent diabetes mellitus. 8.  Previous history of CVA, but the patient denies any. 9.  COPD. 10.  Diabetic neuropathy. 11.  Acid reflux. 12.  History of breast cancer, status post surgery. 13.  History of PE.  14.  Nonobstructive coronary artery disease. 15.  History of heparin-induced thrombocytopenia. 16.  History of peripheral artery disease, status post PTA to saphenous superficial femoral artery.   17 hypotension    Plan:    Await cardio input for her tachycardia  Check TSH   Monitor BP cont midodrine  If afebrile stop antibiotics tomorrow  Cont B Blockers      Gabriel Kline MD

## 2020-08-05 NOTE — FLOWSHEET NOTE
08/05/20 0458   Provider Notification   Reason for Communication Review case  (Continuing dialysis)   Provider Name Dr. Cherylene August   Provider Notification Physician   Method of Communication Call   Response No new orders   Notification Time 0458     Notified Dr. Cherylene August of patient's tachycardia and MAPs in the 46s, asking if peritoneal dialysis were to be continued. See orders for 250mL 0.9% NS bolus and continuation of CAPD.

## 2020-08-05 NOTE — PROGRESS NOTES
Palliative care eval received to discuss code status. Pt admitted with delirium. Pt has multiple co-morbid conditions. Current code status is Full code. Pt has LW and DPOA on file, also has [place] from 2018, which she signed. Per chart review, pt has requested Full code status. Case discussed with pt's nurse,CEM Yin. Writer in to see pt. Pt resting quietly in bed, appears asleep. Pt awakens to name and light touch on arm. Pt is knows she is in the hospital but thinks that she is at [facility]. Pt states that she has \"no idea\" why she is here. Pt knows that year but is not aware of who is president or any other social situations. Writer asked how pt was tolerating dialysis, states \"good\". When asked how long she's been on dialysis, pt's states, \" how should I know? \"  Writer explained plan of care to pt, as well as explaining that the doctors are trying to get her better. Writer then stated to pt, \" if you pass away in your sleep, the doctors are going to let you die peacefully and not try to revive you, is that okay? \". Pt stated, \"yeah. \"  This nurse did not go into any further detail due to pt's lack of insight. Pt states no needs. Emotional support given. Conversation discussed with pt's nurse, Bess West contemplated calling pt's DPOA for input. CEM Yin requests that I wait until she speaks with Dr Valeri Daniels on rounds. Emmanuel Prieto will notify writer after she speaks with Dr Valeri Daniels.    Palliative care will continue to follow.

## 2020-08-05 NOTE — ED NOTES
Pt resting quietly in room no needs expressed. Side rails up x2 with call light in reach. Will continue to monitor.        Edgar Saxena RN  08/04/20 8843

## 2020-08-05 NOTE — H&P
[address] [place] [id number]                              HISTORY AND PHYSICAL    PATIENT NAME: Mary Burton                      :        1934  MED REC NO:   [id number]                           ROOM:       0015  ACCOUNT NO:   [de-identified]                           ADMIT DATE: 2020  PROVIDER:     Dory Simon M.D.    CHIEF COMPLAINT:  Syncopal episode. HISTORY OF PRESENT ILLNESS:  This is an 26-year-old woman, not a great  historian, was transported to the ER as the patient had loss of  consciousness at her physician's office. The patient said she could not  recollect much. She said she was in the process of having dialysis. She lives in a nursing home. Workup in the ER, her CT was unremarkable. She continued to be tachycardic, but in a paced rhythm, and hence the  pacemaker was interrogated. I was informed no significant rhythm  appreciated. Her EKG showed wide QRS and is hard to see the _____  except in V2. The patient is now alert and oriented. She denies any  chest pain, palpitation. She denies any numbness, tingling. She does  have chronic weakness in her lower legs and not able to walk for quite a  few months. She is a wheelchair bound. There is no fever, no chills. She denies having any abdominal pain. No nausea, vomiting. Her white  count was elevated at 14.1, but no fever noted. PAST MEDICAL HISTORY:  Significant for history of end-stage renal  disease, on peritoneal dialysis; history of [facility] ICD placement;  previous history of PE mentioned, TIA mentioned, left bundle branch  block; hypertension; hyperlipidemia; CVA; breast cancer, status post  right mastectomy and chemo; heparin-induced thrombocytopenia; congestive  heart failure with preserved ejection fraction mentioned in the past;  diabetes; diabetic neuropathy.     PAST SURGICAL HISTORY:  Mentioned were breast surgery, cardiac  catheterization, AICD, eye surgery, foot surgery. ALLERGIES:  Listed were HEPARIN. MEDICATIONS:  Nursing home medication list includes Lantus, Synthroid,  _____, Dulcolax, Zofran, Bactroban, FiberCon, Klor-Con, Megace, Januvia,  GlycoLax, mitomycin, Novolog, Protonix, DuoNeb, Nitrostat, vitamin C,  Colace, Plavix, Pravachol, Tylenol, oxygen. SOCIAL HISTORY:  She is a nursing home resident. She is . No  children. She denies any smoking, alcohol. REVIEW OF OTHER SYSTEMS:  Positive for loss of consciousness, but the  patient denied having any chest pain, palpitations, headache, numbness,  tingling. Per previous record, she has had syncopal episodes. She is  on midodrine. No unusual bleeding. No fever. No chills. No cough. PHYSICAL EXAMINATION:  VITAL SIGNS:  Blood pressure was 102/89, is now improved to 116/74,  pulse is 115, respirations 20, temperature 97.5. HEENT:  Pupils are reactive. Facemask in place. NECK:  Supple. HEART:  S1 and S2 heard, tachycardic. LUNGS:  Air exchange is appreciated bilaterally. ABDOMEN:  Obese. Peritoneal dialysis catheter in place. EXTREMITIES:  Warm. Trace ankle edema. NEUROLOGIC:  She is awake and oriented. Able to do plantar and  dorsiflexion minimally, but having difficulty raising both legs well  against gravity. Intact sensation to pain. LABORATORY DATA:  Sodium was 132, potassium of 4.9, chloride of 95, CO2  of 23, BUN of 31, creatinine of 5.6. ProBNP was 14,294. Troponin was  0.164. WBC was 14.1, hemoglobin of 11.6, hematocrit of 36.4, and  platelets of 791. IMPRESSION:  1. Syncopal episode. 2.  History of cardiomyopathy, preserved ejection fraction, status post  AICD pacer with tachycardia and paced rhythm. 3.  Leukocytosis. No clear source of infection. 4.  Hyponatremia. 5.  End-stage renal disease, on peritoneal dialysis. 6.  Deconditioning and wheelchair bound. 7.  Insulin-dependent diabetes mellitus.   8. Previous history of CVA, but the patient denies any. 9.  COPD. 10.  Diabetic neuropathy. 11.  Acid reflux. 12.  History of breast cancer, status post surgery. 13.  History of PE.  14.  Nonobstructive coronary artery disease. 15.  History of heparin-induced thrombocytopenia. 16.  History of peripheral artery disease, status post PTA to saphenous  superficial femoral artery. RECOMMENDATIONS:  1. We will check orthostatics. 2.  Consult Cardiology in view of the tachycardia and paced rhythm. 3.  Consult Nephrology to address her peritoneal dialysis. 4.  Place empirically on Rocephin and follow up with white count. The  patient at this time does not have any source of infection around her  peritoneal dialysis catheter site. 5.  Continue home medications as appropriate. 6.  Continue PT/OT. 7.  We will have Palliative Care consulted as the patient wants to be  full resuscitation.         Zakiya Gaming M.D.    D: 08/04/2020 19:24:40       T: 08/04/2020 20:18:09     [email or web address]  Job#: [id number]     Doc#: [id number]    CC:

## 2020-08-05 NOTE — PLAN OF CARE
Problem: Falls - Risk of:  Goal: Will remain free from falls  Description: Will remain free from falls  8/5/2020 1106 by Candido Araya RN  Outcome: Met This Shift     Problem: Skin Integrity:  Goal: Will show no infection signs and symptoms  Description: Will show no infection signs and symptoms  8/5/2020 1106 by Candido Araya RN  Outcome: Met This Shift     Problem: Discharge Planning:  Goal: Discharged to appropriate level of care  Description: Discharged to appropriate level of care  8/5/2020 1106 by Candido Araya RN  Outcome: Met This Shift     Problem: Cardiac Output - Decreased:  Goal: Hemodynamic stability will improve  Description: Hemodynamic stability will improve  8/5/2020 1106 by Candido Araya RN  Outcome: Met This Shift   Care plan reviewed with patient. Patient verbalize understanding of the plan of care and contribute to goal setting.

## 2020-08-06 NOTE — PROGRESS NOTES
[facility]         Renal Inpatient Follow-Up note         8/6/2020 8:34 AM    Pt Name:   Luiza Thomas:   1934  Attending:   Saulo Doherty MD    Chief Complaint : Leonard Li is a 80 y.o. female being followed by nephrology for ESRD on PD    Interval History :   Patient seen and examined by me. No distress  Feels well. No cp or SOB.   Mental status is much better     Scheduled Medications :    ascorbic acid  500 mg Oral BID    aspirin  81 mg Oral Daily    clopidogrel  75 mg Oral QPM    cyanocobalamin  1,000 mcg Oral QPM    docusate sodium  100 mg Oral Daily    insulin glargine  16 Units Subcutaneous Nightly    levothyroxine  25 mcg Oral Daily    megestrol  40 mg Oral BID    metoprolol succinate  50 mg Oral Nightly    midodrine  10 mg Oral TID WC    mupirocin   Topical Daily    pantoprazole  40 mg Oral BID    polyethylene glycol  17 g Oral BID    polycarbophil  625 mg Oral TID    potassium chloride  20 mEq Oral BID    pravastatin  40 mg Oral Nightly    lactobacillus  1 capsule Oral Daily    sevelamer  800 mg Oral TID WC    cefTRIAXone (ROCEPHIN) IV  1 g Intravenous Q24H    insulin lispro  0-6 Units Subcutaneous TID WC    insulin lispro  0-3 Units Subcutaneous Nightly    dianeal lo-nikko 1.5%  2,000 mL Intraperitoneal Q6H      dilTIAZem (CARDIZEM) 125 mg in dextrose 5% 125 mL infusion         Vitals :  BP (!) 120/44   Pulse 94   Temp 97.9 °F (36.6 °C) (Oral)   Resp 20   Ht 5' 6\" (1.676 m)   Wt 239 lb 4.8 oz (108.5 kg)   SpO2 98%   BMI 38.62 kg/m²     24HR INTAKE/OUTPUT:      Intake/Output Summary (Last 24 hours) at 8/6/2020 0834  Last data filed at 8/6/2020 0735  Gross per 24 hour   Intake 8171.11 ml   Output 7700 ml   Net 471.11 ml     Last 3 weights  Wt Readings from Last 3 Encounters:   08/06/20 239 lb 4.8 oz (108.5 kg)   06/15/20 218 lb 14.4 oz (99.3 kg)   05/21/20 248 lb (112.5 kg)           Physical Exam :  General Appearance: Well developed. No distress  Mouth/Throat:  Oral mucosa moist  Neck:  Supple, no JVD  Lungs:  Breath sounds: clear  Heart[de-identified]  S1,S2 heard  Abdomen:  Soft, non - tender  Musculoskeletal:  Edema - mild         Last 3 CBC   Recent Labs     08/04/20  1339 08/05/20  0550 08/06/20  0049   WBC 14.1* 10.5 9.8   RBC 3.84* 3.28* 3.08*   HGB 11.6* 9.9* 9.6*   HCT 36.4* 32.5* 29.9*    243 233     Last 3 CMP  Recent Labs     08/04/20  1339   *   K 4.9   CL 95*   CO2 23   BUN 31*   CREATININE 5.6*   CALCIUM 9.3             ASSESSMENT / Plan   Renal -ESRD on peritoneal dialysis, volume status appears reasonable  · Continue PD with 2000 mL every 6 hours and monitor ultrafiltration  · Reviewed the peritoneal dialysis data is 2400 mL ultrafiltration which was clear  Recent hypotension improved with some IV fluid bolus currently also on midodrine doing well  Mild hyponatremia follow for now  Hx of diabetes mellitus  Hx of COPD  AMS - nresolved . OTTONIEL Mckee D.  [facility].

## 2020-08-06 NOTE — CARE COORDINATION
8/6/20, 2:21 PM EDT    Patient goals/plan/ treatment preferences discussed by  and . Patient goals/plan/ treatment preferences reviewed with patient/ family. Patient/ family verbalize understanding of discharge plan and are in agreement with goal/plan/treatment preferences. Understanding was demonstrated using the teach back method. AVS provided by RN at time of discharge, which includes all necessary medical information pertaining to the patients current course of illness, treatment, post-discharge goals of care, and treatment preferences. Services After Discharge  Services At/After Discharge: In ambulance, Nursing Services, Hari Washington will be discharged today back to [facility]. She will return under her Medicaid benefit. She will be transported by ambulance. Discharge instructions and transport forms are attached to Radha's blue discharge packet. EASTON spoke with Lalo Carmen at [facility] and they are ready to take her back.

## 2020-08-06 NOTE — PROGRESS NOTES
Cardiology Progress Note      Patient: Vince Morrison  YOB: 1934  MRN: [id number]   Acct: [de-identified]  Admit Date:  8/4/2020  Primary Cardiologist: Luis Franklin MD  Seen by dr Reshma Donato    Note per dr Ravinder Waite OF CONSULTATION:  Presentation with syncope and possible  pacemaker-mediated tachycardia.     HISTORY OF PRESENT ILLNESS:  This is an 63-year-old  female  patient, who is a very poor historian from the nursing home, presented  to the emergency room with loss of consciousness and she does not  recollect what happened, all she knows is that she _____ passed out. She stated that she has been in the nursing home. The patient denied  having chest pain. Today, she feels comfortable. On arrival to the  emergency room yesterday, blood pressure was good, but heart rate was in  the higher rate at 120s. So, Cardiology evaluation was sought for  further evaluation and care. The patient had been admitted before for  similar episodes of syncope and the patient has a history of congestive  heart failure, was on diuretics. Now, she has been on peritoneal  dialysis. For a while, she is off diuretics. She has been  hypertensive. Apparently after had been on dialysis, her blood pressure  is running in the lower side and so, she has been on midodrine now. So,  the patient denied having any chest pain. \"    Subjective (Events in last 24 hours): pt awake and alert. NAD. No cp or sob.   No complaints      Objective:   BP (!) 125/51   Pulse 112   Temp 97.7 °F (36.5 °C)   Resp 20   Ht 5' 6\" (1.676 m)   Wt 239 lb 4.8 oz (108.5 kg)   SpO2 95%   BMI 38.62 kg/m²        TELEMETRY: paced 104    Physical Exam:  General Appearance: alert and oriented to person, place and time, in no acute distress  Cardiovascular: normal rate, regular rhythm, normal S1 and S2, no murmurs, rubs, clicks, or gallops, distal pulses intact, no carotid bruits, no JVD  Pulmonary/Chest: clear to auscultation bilaterally- no wheezes, rales or rhonchi, normal air movement, no respiratory distress  Abdomen: soft, non-tender, non-distended, normal bowel sounds, no masses Extremities: no cyanosis, clubbing or edema, pulse   Skin: warm and dry, no rash or erythema  Head: normocephalic and atraumatic  Eyes: pupils equal, round, and reactive to light  Neck: supple and non-tender without mass, no thyromegaly   Musculoskeletal: normal range of motion, no joint swelling, deformity or tenderness  Neurological: alert, oriented, normal speech, no focal findings or movement disorder noted    Medications:    ascorbic acid  500 mg Oral BID    aspirin  81 mg Oral Daily    clopidogrel  75 mg Oral QPM    cyanocobalamin  1,000 mcg Oral QPM    docusate sodium  100 mg Oral Daily    insulin glargine  16 Units Subcutaneous Nightly    levothyroxine  25 mcg Oral Daily    megestrol  40 mg Oral BID    metoprolol succinate  50 mg Oral Nightly    midodrine  10 mg Oral TID WC    mupirocin   Topical Daily    pantoprazole  40 mg Oral BID    polyethylene glycol  17 g Oral BID    polycarbophil  625 mg Oral TID    potassium chloride  20 mEq Oral BID    pravastatin  40 mg Oral Nightly    lactobacillus  1 capsule Oral Daily    sevelamer  800 mg Oral TID WC    cefTRIAXone (ROCEPHIN) IV  1 g Intravenous Q24H    insulin lispro  0-6 Units Subcutaneous TID WC    insulin lispro  0-3 Units Subcutaneous Nightly    dianeal lo-nikko 1.5%  2,000 mL Intraperitoneal Q6H      dilTIAZem (CARDIZEM) 125 mg in dextrose 5% 125 mL infusion       acetaminophen, 650 mg, 4x Daily PRN  bisacodyl, 10 mg, Daily PRN  ipratropium-albuterol, 1 vial, Q6H PRN  ondansetron, 4 mg, Q8H PRN  sodium chloride, 1 spray, PRN        Lab Data:    Cardiac Enzymes:  No results for input(s): CKTOTAL, CKMB, CKMBINDEX, TROPONINI in the last 72 hours.     CBC:   Lab Results   Component Value Date    WBC 9.8 08/06/2020    RBC 3.08 08/06/2020    HGB 9.6 08/06/2020    HCT 29.9 08/06/2020     08/06/2020       CMP:    Lab Results   Component Value Date     08/04/2020    K 4.9 08/04/2020    CL 95 08/04/2020    CO2 23 08/04/2020    BUN 31 08/04/2020    CREATININE 5.6 08/04/2020    AGRATIO 1.4 10/23/2014    LABGLOM 7 08/04/2020    GLUCOSE 155 08/04/2020    GLUCOSE 316 11/09/2016    CALCIUM 9.3 08/04/2020       Hepatic Function Panel:    Lab Results   Component Value Date    ALKPHOS 104 06/09/2020    ALT <5 06/09/2020    AST 10 06/09/2020    PROT 5.1 06/09/2020    BILITOT 0.2 06/09/2020    BILIDIR <0.2 06/09/2020    LABALBU 2.3 06/09/2020       Magnesium:    Lab Results   Component Value Date    MG 1.5 06/08/2020       PT/INR:    Lab Results   Component Value Date    PROTIME 24.2 11/21/2017    PROTIME 11.1 09/18/2017    INR 0.95 05/17/2020       HgBA1c:    Lab Results   Component Value Date    LABA1C 6.4 06/02/2020       FLP:    Lab Results   Component Value Date    TRIG 81 10/07/2019    HDL 28 10/30/2018    LDLCALC 34 10/30/2018       TSH:    Lab Results   Component Value Date    TSH 4.310 08/06/2020         Assessment:    Syncope  Hx recurrent near syncope  Possible pacemaker-mediated tachycardia  Hx hypotension - on midodrine  ESRD - on CAPD  Hx NICMP - ef 70 per TTE 6/8/20  Hx BiV ICD  LBBB  Hx CHF  Hx cardioMEMS 10/18/18  Hx nonobstructive CAD - s/p cath 2016 with LAD 50% distal dz, patent LM/RCA/LCx  Chronically elevated troponins  DM  Hx COPD  Hx PE  Hx CVA    Plan:  · Cont asa/statin/plavix/BB  · Pacer rep to reprogram device today  · Will see prn  · F/up dr Abrams Fail 2 weeks upon [employer]         Electronically signed by Amari Rosario PA-C on 8/6/2020 at 10:54 AM

## 2020-08-06 NOTE — PROGRESS NOTES
[facility]ED arrived and picked up patient at 1900. Patient discharged from hospital. Personal belongings given to [facility].

## 2020-08-06 NOTE — DISCHARGE INSTR - COC
Continuity of Care Form    Patient Name: Miachel Mohs   :  1934  MRN:  [id number]    Admit date:  2020  Discharge date:  2020    Code Status Order: Limited   Advance Directives:   [facility] Documentation     Date/Time Healthcare Directive Type of Healthcare Directive Copy in [address] Agent's Name Healthcare Agent's Phone Number    [phone]  Yes, patient has an advance directive for healthcare treatment  Durable power of  for health care  Yes, copy in chart  Healthcare power of [address] niece/HCPOA  --          Admitting Physician:  Bryan Phillips MD  PCP: Miles Fong MD    Discharging Nurse: [address] [place] [address] Drive Unit/Room#: 4A-15/015-A  Discharging Unit Phone Number: [phone]    Emergency Contact:   Extended Emergency Contact Information  Primary Emergency Contact: Jovan Parks           [address] Phone: [phone]  Mobile Phone: [phone]  Relation: Niece/Nephew  Secondary Emergency Contact: Gissel Donaldson [facility] [address] [facility] Phone: [phone]  Mobile Phone: [phone]  Relation: Brother/Sister    Past Surgical History:  Past Surgical History:   Procedure Laterality Date    BREAST SURGERY      s/p right mastectomy  and left mastectomy     CARDIAC CATHETERIZATION  2010    Hemodynamics w pulmonary hypertension, systemic hypertension and no sats gradient difference. No aortic stenosis. No mitral stenosis. Coronaries; mild disease of the LAD not more than 40%. LV; severe LV dysfunction and EF 30-35%.  CARDIAC CATHETERIZATION  2007    Mild disease of small distal LAD (approximately  50% stenosis) angiographically normal CA. Mild to moderate LV systolic dysfunction. EF 35%. Mildly elevated LV end diastolic pressure. No significant MR. Systemic hypertension.    [address] PLACEMENT  2011    CARDIOVASCULAR STRESS TEST  03/23/10    EF 30%  Severe LV Dys    COLONOSCOPY      Dr. Eneida Montes De Oca    DILATATION, ESOPHAGUS      DOPPLER ECHOCARDIOGRAPHY  03/22/10    EF 45%. LV mildly dilated. Systolic function mildly reducted. No regional wall motion abnormalities. Wall thickness mildly increased. LA mildly dilated. MV mild annular calification. Mild TR. Ventricular septum tickness mildly increased.  ENDOSCOPY, COLON, DIAGNOSTIC      EYE SURGERY      bilateral cataracts    EYE SURGERY Left 9/2014    laser surgery for retinopathy?     FOOT SURGERY Left 1/12/2016    OTHER SURGICAL HISTORY  10/18/2018    CardioMEMS    PACEMAKER PLACEMENT      NC EGD BALLOON DILATION ESOPHAGUS <30 MM DIAM N/A 9/18/2017    EGD ESOPHAGOGASTRODUODENOSCOPY DILATATION performed by Zilphia Felty, MD at [facility] ESOPHAGOGASTRODUODENOSCOPY TRANSORAL DIAGNOSTIC  9/18/2017    EGD ESOPHAGOGASTRODUODENOSCOPY performed by Zilphia Felty, MD at [facility]       Immunization History:   Immunization History   Administered Date(s) Administered    Influenza Vaccine, unspecified formulation 10/31/2016    Influenza Virus Vaccine 10/03/2013, 10/09/2014, 12/24/2015, 10/02/2019    Influenza Whole 10/03/2013    Pneumococcal Conjugate 13-valent ([id number]) 12/14/2016    Pneumococcal Polysaccharide ([id number]) 06/20/2017    Tdap (Boostrix, Adacel) 09/03/2018       Active Problems:  Patient Active Problem List   Diagnosis Code    Hypertension I10    LBBB (left bundle branch block) I44.7    Hyperlipidemia E78.5    [facility] BiV ICD Z95.810    CKD (chronic kidney disease) stage 3, GFR 30-59 ml/min ([facility]) N18.3    Ex-smoker Z87.891    Mild carotid artery disease (HCC) I73.9    Morbid obesity ([facility] E66.01    CAD (coronary artery disease) I25.10    PAD (peripheral artery disease) ([facility]) I73.9    Lactic acidosis E87.2    Mild pulmonary hypertension ([facility]) I27.20    Type 2 diabetes mellitus with insulin therapy ([facility]

## 2020-08-06 NOTE — PROGRESS NOTES
IM Progress Note  Dr. Anna Frances  8/6/2020 12:03 PM      Patient name Thelma Najera  DOB1934  PCP: Laney Thompson MD  Admit Date: 8/4/2020  Acct No. [de-identified]    Subjective: Interval History:   Doing better  Tolerated her meds  No cp   No dizziness      Diet: DIET CARDIAC; Dietary Nutrition Supplements: Diabetic Oral Supplement    I/O last 3 completed shifts: In: 8451.1 [P.O.:920; I.V.:31.1; Other:7500]  Out: 5700 [Other:5700]  I/O this shift:  In: -   Out: 2000 [Other:2000]        Admission weight: 218 lb (98.9 kg) as of 8/4/2020 12:33 PM  Wt Readings from Last 3 Encounters:   08/06/20 239 lb 4.8 oz (108.5 kg)   06/15/20 218 lb 14.4 oz (99.3 kg)   05/21/20 248 lb (112.5 kg)     Body mass index is 38.62 kg/m².     ROS   CVS;  no cp or palpitation  Resp: no SOB or cough  Neuro:  No numbness or weakness or dizziness  Abd: no nausea or vomiting or abd pain      Medications:   Scheduled Meds:   ascorbic acid  500 mg Oral BID    aspirin  81 mg Oral Daily    clopidogrel  75 mg Oral QPM    cyanocobalamin  1,000 mcg Oral QPM    docusate sodium  100 mg Oral Daily    insulin glargine  16 Units Subcutaneous Nightly    levothyroxine  25 mcg Oral Daily    megestrol  40 mg Oral BID    metoprolol succinate  50 mg Oral Nightly    midodrine  10 mg Oral TID WC    mupirocin   Topical Daily    pantoprazole  40 mg Oral BID    polyethylene glycol  17 g Oral BID    polycarbophil  625 mg Oral TID    potassium chloride  20 mEq Oral BID    pravastatin  40 mg Oral Nightly    lactobacillus  1 capsule Oral Daily    sevelamer  800 mg Oral TID WC    cefTRIAXone (ROCEPHIN) IV  1 g Intravenous Q24H    insulin lispro  0-6 Units Subcutaneous TID WC    insulin lispro  0-3 Units Subcutaneous Nightly    dianeal lo-nikko 1.5%  2,000 mL Intraperitoneal Q6H     Continuous Infusions:   dilTIAZem (CARDIZEM) 125 mg in dextrose 5% 125 mL infusion         Labs :     CBC:   Recent Labs     08/04/20  1339 08/05/20  0550 08/06/20  0049   WBC 14.1* 10.5 9.8   HGB 11.6* 9.9* 9.6*    243 233     BMP:    Recent Labs     08/04/20  1339   *   K 4.9   CL 95*   CO2 23   BUN 31*   CREATININE 5.6*   GLUCOSE 155*     Hepatic: No results for input(s): AST, ALT, ALB, BILITOT, ALKPHOS in the last 72 hours. Troponin: No results for input(s): TROPONINI in the last 72 hours. BNP: No results for input(s): BNP in the last 72 hours. Lipids: No results for input(s): CHOL, HDL in the last 72 hours. Invalid input(s): LDLCALCU  INR: No results for input(s): INR in the last 72 hours. Radiology    Objective:   Vitals: BP (!) 110/49   Pulse 102   Temp 97.9 °F (36.6 °C) (Oral)   Resp 18   Ht 5' 6\" (1.676 m)   Wt 239 lb 4.8 oz (108.5 kg)   SpO2 95%   BMI 38.62 kg/m²   HEENT: Head:pupils react  Neck: supple  Lungs: clear to auscultation  Heart: regular rhythm tachycardic  Abdomen: soft BS heard   Extremities: warm no edema  Neurologic:  Alert, oriented X3    Impression:   :   1.  Syncopal episode with tachycardia paced rhythm  2. Cardiomyopathy, preserved ejection fraction, status post AICD pacer with tachycardia and paced rhythm. 3.  Leukocytosis resolved  4. Hyponatremia. 5.  End-stage renal disease, on peritoneal dialysis. 6.  Deconditioning and wheelchair bound. 7.  Insulin-dependent diabetes mellitus. 8.  Previous history of CVA, but the patient denies any. 9.  COPD. 10.  Diabetic neuropathy. 11.  Acid reflux. 12.  History of breast cancer, status post surgery. 13.  History of PE.  14.  Nonobstructive coronary artery disease. 15.  History of heparin-induced thrombocytopenia. 16.  History of peripheral artery disease, status post PTA to saphenous superficial femoral artery.   17 hypotension improved    Plan:    Pt to have her pacer re programmed  cont B Blockers  If stable and ok with cardio discharge back to [facility]      Theodora Figueroa MD

## 2020-08-06 NOTE — PLAN OF CARE
Problem: Nutrition  Goal: Optimal nutrition therapy  Outcome: Ongoing   Nutrition Problem #1: Increased nutrient needs  Intervention: Food and/or Nutrient Delivery: Continue Current Diet, Start Oral Nutrition Supplement  Nutritional Goals: Patient will consume 75% or more of meals during LOS to assist in wound healing.

## 2020-08-06 NOTE — PLAN OF CARE
Problem: Falls - Risk of:  Goal: Will remain free from falls  Description: Will remain free from falls  Outcome: Ongoing  Note: Patient remained free from falls this shift. Bed is in low position with alarm on and siderails up x2. Patient stayed in bed this shift. Education given on use of call light and patient voiced understanding. Patient uses call light appropriately. Call light and beside table within reach. Arm band and falling star in place. Problem: Falls - Risk of:  Goal: Absence of physical injury  Description: Absence of physical injury  Outcome: Ongoing  Note: Patient remains free from falls this shift. No new evidence of skin breakdown present and current skin breakdown being assessed every four hours and patient turned every two hours. Problem: Skin Integrity:  Goal: Will show no infection signs and symptoms  Description: Will show no infection signs and symptoms  Outcome: Ongoing  Note: Patient afebrile. No other signs of symptoms of infection present at this time. Problem: Skin Integrity:  Goal: Absence of new skin breakdown  Description: Absence of new skin breakdown  Outcome: Ongoing  Note: Patient remains free from new skin breakdown this shift. Problem: Discharge Planning:  Goal: Discharged to appropriate level of care  Description: Discharged to appropriate level of care  8/6/2020 0359 by Brian Valdez RN  Outcome: Ongoing  Note: Patient plans to be discharged to [facility].      Problem: Cardiac:  Goal: Ability to maintain vital signs within normal range will improve  Description: Ability to maintain vital signs within normal range will improve  Outcome: Ongoing  Note: BP (!) 124/59   Pulse 98   Temp 98 °F (36.7 °C) (Oral)   Resp 18   Ht 5' 6\" (1.676 m)   Wt 238 lb 9.6 oz (108.2 kg)   SpO2 93%   BMI 38.51 kg/m²        Problem: Cardiac Output - Decreased:  Goal: Hemodynamic stability will improve  Description: Hemodynamic stability will improve  Outcome: Ongoing  Note: BP (!) 124/59   Pulse 98   Temp 98 °F (36.7 °C) (Oral)   Resp 18   Ht 5' 6\" (1.676 m)   Wt 238 lb 9.6 oz (108.2 kg)   SpO2 93%   BMI 38.51 kg/m²        Problem: Pain:  Goal: Pain level will decrease  Description: Pain level will decrease  Outcome: Ongoing  Note: Patient denies pain this shift. Problem: Pain:  Goal: Control of acute pain  Description: Control of acute pain  Outcome: Ongoing  Note: Patient denies pain this shift. Problem: Tissue Perfusion, Altered:  Goal: Circulatory function within specified parameters  Description: Circulatory function within specified parameters  Outcome: Ongoing  Note: Patient turned every two hours to promote good circulation to skin breakdown areas. Care plan reviewed with patient. Patient verbalized understanding of the plan of care and contributed to goal setting.

## 2020-08-06 NOTE — PROGRESS NOTES
Comprehensive Nutrition Assessment    Type and Reason for Visit:  Initial(Patient requesting ONS at meals)    Nutrition Recommendations/Plan:   Continue current diet. ONS initiated: Glucerna TID. Continue megace for appetite stimulation. Consider folbee plus, renal MVI to assist in wound healing. Nutrition Assessment:    Pt. nutritionally compromised AEB reports of poor appetite and request for ONS at meals. At risk for further nutrition compromise r/t increased nutrient needs to support wound healing and known losses with dialysis, and underlying medical condition (syncope/collapse, history of ileus, HTN, DB, CHF, COPD, CKD, CVA), and need for nutrition support. Nutrition recommendations/interventions as per above. Malnutrition Assessment:  Malnutrition Status: At risk for malnutrition (Comment)    Context:  Chronic Illness     Findings of the 6 clinical characteristics of malnutrition:  Energy Intake:  (variable intake)  Weight Loss:  Unable to assess     Body Fat Loss:  No significant body fat loss     Muscle Mass Loss:  No significant muscle mass loss    Fluid Accumulation:  Unable to assess     Strength:  Not Performed    Estimated Daily Nutrient Needs:  Energy (kcal):  7973-5761 kcals (15-18 kcals/k/day based on 109 kg); Protein (g):  83 grams or more (1.4 grams/kg/day based on 59 kg IBW); Nutrition Related Findings:  Pt seen, known to writer from recent admit 2 months ago. Resides at [facility]. States appetite is pretty good however didn't eat much breakfast.  Recieves ONS with bennie at [facility]. She really  likes glucerna, drank last admit. 2 BM in the last 24 hours. Wounds noted. Receives CAPD. Sodium 132, BUN 31, Creatinine 5.6, Glucose 155. CAPD 1.5% (2L) every 6 hours, humalog, megace 40 mg BID, renvela, culturelle, rocephin. Wounds:  (coccyx DTI, buttocks stage II)       Current Nutrition Therapies:    DIET CARDIAC;   Dietary Nutrition Supplements: Diabetic Oral Supplement    Anthropometric Measures:  · Height: 5' 6\" (167.6 cm)  · Current Body Weight: 239 lb 4.9 oz (108.5 kg)(8/6/20 +2 LE edema)   · Admission Body Weight: 218 lb (98.9 kg)(8/4/20 +2 LE edema)    · Usual Body Weight: (Pt unsure today, on previous admit stated 234-245#. Per EMR: 8/15/19: 268#, 1/17/20: 241#8oz, 5/17/20: 248#, 6/2/20: 383#17.2LC)     · Ideal Body Weight: 130 lbs;   · BMI: 38.6  · BMI Categories: Obese Class 2 (BMI 35.0 -39.9)       Nutrition Diagnosis:   · Increased nutrient needs related to inadequate protein-energy intake, increase demand for energy/nutrients, renal dysfunction as evidenced by wounds, dialysis(some meal intake less than 75%)      Nutrition Interventions:   Food and/or Nutrient Delivery:  Continue Current Diet, Start Oral Nutrition Supplement  Nutrition Education/Counseling:  Education initiated(Encouraged oral intake and ONS use.)   Coordination of Nutrition Care:  Continued Inpatient Monitoring    Goals:  Patient will consume 75% or more of meals during LOS to assist in wound healing. Nutrition Monitoring and Evaluation:   Food/Nutrient Intake Outcomes:  Food and Nutrient Intake, Supplement Intake  Physical Signs/Symptoms Outcomes:  Biochemical Data, Nutrition Focused Physical Findings, Skin, Weight     Discharge Planning:     Too soon to determine     Electronically signed by Jo Ann Stock RD, LD on 8/6/20 at 10:48 AM EDT    Contact: [phone]

## 2020-08-08 NOTE — DISCHARGE SUMMARY
[address] [place] [address]                               DISCHARGE SUMMARY    PATIENT NAME: Mary Moon                      :        1934  MED REC NO:   [id number]                           ROOM:       0015  ACCOUNT NO:   [de-identified]                           ADMIT DATE: 2020  PROVIDER:     Ann Raymond M.D.            Jessie Turner: 2020    HOSPITAL COURSE:  This is an 63-year-old woman who was transferred for  an unresponsive episode. The patient says that she could not recollect  much. Workup in the ER, she was noted to be tachycardic with paced  rhythm and hence we had Cardiology consulted. The patient says she is  pretty much in bed and not ambulating much. We also had Palliative Care  consulted and later we did find out her code status was limited code and  the patient agreed to same. Pacemaker was interrogated and we are also  now aware that she has biventricular pacemaker and Cardiology  recommended a re-program her pacer. The patient was also followed by  Nephrology for her peritoneal dialysis. The patient was initially  hypotensive and required some fluid bolus along with her midodrine being  continued. Overall, she felt better. Her CT head was unremarkable. The patient will be discharged, to follow up as outpatient. FINAL DIAGNOSES:  1. Syncopal episode with EKG showing tachycardia induced by her  pacemaker, status post reprogramming. 2.  Cardiomyopathy with preserved ejection fraction. 3.  Leukocytosis, resolved. 4.  Status post AICD. 5.  End-stage renal disease, on PD.  6.  Deconditioning and wheelchair bound. 7.  Insulin-requiring diabetes mellitus. 8.  Previous history of CVA. 9.  COPD. 10.  Diabetic neuropathy. 11.  Acid reflux. 12.  Previous history of PE.  13.  Nonobstructive coronary artery disease. 14.  Heparin-induced thrombocytopenia history.   15.  Peripheral arterial disease, status post PTA to her superficial  femoral artery. 16.  Hypotension, resolved. MEDICATIONS:  To continue as addressed in the discharge med rec sheet. DISPOSITION:  Nursing home/skilled. DIET:  Diabetic diet, low sodium. ACTIVITY:  The patient is wheelchair bound but continue therapy as  tolerated. CONDITION ON DISCHARGE:  Stable. I spent more than 30 minutes that involved examining the patient,  reviewing his chart, reconciling med rec sheet.         Jessica Irizarry M.D.    D: 08/07/2020 9:31:49       T: 08/07/2020 12:47:42     MATT/DELMY  Job#: [id number]     Doc#: [id number]    CC:

## 2020-08-11 NOTE — LETTER
[address] [facility]  [address] [address]  Phone: [phone]  Fax: [phone]      August 11, 2020     [address] [place] [address] [address] [address]      Dear Elyssa Benito:    I am writing you because I have been informed of your missed appointment(s). We care about you and the management of your healthcare and want to make sure that you follow up as recommended. We're sorry you were unable to keep your appointment and hope that you are doing well. We would like to continue treating your healthcare needs. Please call the office to let us know your plans for treatment and to reschedule your appointment.      Sincerely,        Bhumi Bowers MD

## 2020-08-18 NOTE — TELEPHONE ENCOUNTER
They can try in the chair  As long as I know it was a chair reading and it is consistent that is ok  If they need help with that we can have one of the reps go to assist

## 2020-08-18 NOTE — TELEPHONE ENCOUNTER
Spoke with nurse Renetta   Patient is on bed rest for 2 weeks   They will try to get reading with patient sitting up in bed

## 2020-10-07 PROBLEM — J18.9 PNEUMONIA: Status: ACTIVE | Noted: 2020-01-01

## 2020-10-07 NOTE — CARE COORDINATION
10/7/20, 11:32 AM EDT  DISCHARGE PLANNING EVALUATION:    Nicol Devine       Admitted from: ED 10/7/2020/ [facility] day: 0   Location: [facility]15/015-A Reason for admit: Pneumonia [J18.9] Status: IP  Admit order signed?: yes  PMH:  has a past medical history of Anxiety, Atherosclerotic heart disease of native coronary artery without angina pectoris, CAD (coronary artery disease), Cancer ([address] Cardiomyopathy, nonischemic ([address] Cerebral artery occlusion with cerebral infarction ([address] CHF (congestive heart failure) ([address] CKD (chronic kidney disease) stage 3, GFR 30-59 ml/min, Congenital heart disease, COPD exacerbation ([address] 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 ([address] DM2 (diabetes mellitus, type 2) ([address] Dyspnea, ESRD (end stage renal disease) ([address] Ex-smoker, GERD (gastroesophageal reflux disease), GERD (gastroesophageal reflux disease), Heart failure with preserved ejection fraction ([address] Hemodialysis patient ([address] His of Heparin induced thrombocytopenia, History of breast cancer, History of CVA (cerebrovascular accident), History of pulmonary embolism, Hyperkalemia, Hyperlipidemia, Hypertension, Iron deficiency anemia, LBBB (left bundle branch block), Localized edema, Malignant neoplasm of breast (female) ([address] Osteoarthritis, Paget's disease of bone, Personal history of transient ischemic attack (TIA), and cerebral infarction without residual deficits, Pneumonia, Pulmonary embolism ([address] [facility] Bi[facility] ICD, Thyroid disease, Venous insufficiency (chronic) (peripheral), Vitamin D deficiency, Vitamin D deficiency, and Weakness.   Procedure:   10/7 CXR  Right lung base atelectasis  Medications:  Scheduled Meds:   sodium chloride (PF)  10 mL Intravenous Daily    ascorbic acid  500 mg Oral BID    aspirin  81 mg Oral Daily    clopidogrel  75 mg Oral QPM    cyanocobalamin  1,000 mcg Oral QPM    docusate sodium  100 mg Oral Daily    insulin glargine  16 Units Subcutaneous Nightly  lactobacillus  1 capsule Oral Daily    levothyroxine  25 mcg Oral Daily    megestrol  40 mg Oral BID    metoprolol succinate  50 mg Oral Daily    miconazole   Topical BID    midodrine  10 mg Oral TID WC    pantoprazole  40 mg Oral BID    polycarbophil  625 mg Oral TID    polyethylene glycol  17 g Oral BID    potassium chloride  20 mEq Oral BID    pravastatin  40 mg Oral Nightly    sevelamer  800 mg Oral TID WC    sodium chloride flush  10 mL Intravenous 2 times per day    dianeal lo-nikko 1.5%  2,000 mL Intraperitoneal Q6H    insulin lispro  0-6 Units Subcutaneous TID WC    insulin lispro  0-3 Units Subcutaneous Nightly     Continuous Infusions:   dextrose        Pertinent Info/Orders/Treatment Plan:  Client admitted with Pneumonia (history CAPD patient), elevated BNP/Troponin, Creatinine 5.3, Ammonia 66; monitor; Nephrology following  Diet: DIET RENAL;   Smoking status:  reports that she quit smoking about 68 years ago. Her smoking use included cigarettes. She has a 1.00 pack-year smoking history. She has never used smokeless tobacco.   PCP: Romain Denson MD  Readmission 30 days or less: no  Readmission Risk Score: 51%    Patient Goals/Plan/Treatment Preferences: plans return [facility] when medically cleared, CAPD patient, has nebulizer, oxygen 2L as needed  Transportation/Food Security/Housekeeping Addressed:  No issues identified.     Evaluation: yes

## 2020-10-07 NOTE — DISCHARGE INSTR - COC
Continuity of Care Form    Patient Name: Yomaira Garnett   :  1934  MRN:  [id number]    Admit date:  10/7/2020  Discharge date:  10/12/2020    Code Status Order: Limited   Advance Directives:   Advance Care Flowsheet Documentation     Date/Time Healthcare Directive Type of Healthcare Directive Copy in [address] Agent's Name Healthcare Agent's Phone Number    10/07/20 5640  Yes, patient has an advance directive for healthcare treatment  Health care treatment directive  Yes, copy in chart  --  --  --          Admitting Physician:  Nora Madsen MD  PCP: Bess Martins MD    Discharging Nurse: [facility] Unit/Room#: 4K-15/015-A  Discharging Unit Phone Number: [phone]    Emergency Contact:   Extended Emergency Contact Information  Primary Emergency Contact: Az Max           [address] Phone: [phone]  Mobile Phone: [phone]  Relation: Niece/Nephew  Secondary Emergency Contact: [address] [place] [address] Phone: [phone]  Mobile Phone: [phone]  Relation: Brother/Sister    Past Surgical History:  Past Surgical History:   Procedure Laterality Date    BREAST SURGERY      s/p right mastectomy  and left mastectomy     CARDIAC CATHETERIZATION  2010    Hemodynamics w pulmonary hypertension, systemic hypertension and no sats gradient difference. No aortic stenosis. No mitral stenosis. Coronaries; mild disease of the LAD not more than 40%. LV; severe LV dysfunction and EF 30-35%.  CARDIAC CATHETERIZATION  2007    Mild disease of small distal LAD (approximately  50% stenosis) angiographically normal CA. Mild to moderate LV systolic dysfunction. EF 35%. Mildly elevated LV end diastolic pressure. No significant MR. Systemic hypertension.     CARDIAC DEFIBRILLATOR PLACEMENT  2011    CARDIOVASCULAR STRESS TEST  03/23/10    EF 30%  Severe LV Dys    COLONOSCOPY induced thrombocytopenia D75.82    History of breast cancer Z85.3    History of CVA (cerebrovascular accident) Z80.78    History of pulmonary embolus (PE) Z86.711    Iron deficiency anemia D50.9    Osteoarthritis M19.90    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    Vitamin D deficiency E55.9    COPD without exacerbation ([facility]) J44.9    CHF (congestive heart failure), NYHA class III, chronic, diastolic ([facility]) U24.28    Anemia, chronic disease D63.8    Anemia, chronic renal failure N18.9, D63.1    Acute combined systolic and diastolic CHF, NYHA class 1 ([facility]) I50.41    ESRD on peritoneal dialysis ([facility]) N18.6, Z99.2    Mild aortic stenosis I35.0    Physical deconditioning R53.81    Hyponatremia E87.1    ESRD (end stage renal disease) ([facility]) N18.6    Near syncope R55    Other hypotension I95.89    Syncope R55    Syncope and collapse R55       Isolation/Infection:   Isolation          Contact        Patient Infection Status     Infection Onset Added Last Indicated Last Indicated By Review Planned Expiration Resolved Resolved By    MRSA  09/06/18 10/07/20 MRSA by PCR        Scalp 9/2018  Nar 3/2019, 12/2019, 10/2020    Resolved    COVID-19 Rule Out 10/07/20 10/07/20 10/07/20 COVID-19 (Ordered)   10/07/20 Rule-Out Test Resulted    COVID-19 Rule Out 06/12/20 06/12/20 06/12/20 COVID-19 (Ordered)   06/12/20 Rule-Out Test Resulted    COVID-19 Rule Out 05/17/20 05/17/20 05/17/20 COVID-19 (Ordered)   05/18/20 Rule-Out Test Resulted          Nurse Assessment:  Last Vital Signs: BP (!) 110/54   Pulse 92   Temp 97.5 °F (36.4 °C) (Oral)   Resp 17   Ht 5' 6\" (1.676 m)   Wt 226 lb 2 oz (102.6 kg)   SpO2 98%   BMI 36.50 kg/m²     Last documented pain score (0-10 scale): Pain Level: 3  Last Weight:   Wt Readings from Last 1 Encounters:   10/12/20 226 lb 2 oz (102.6 kg)     Mental Status:  oriented and alert    IV Access:  - None    Nursing Mobility/ADLs:  Walking   Dependent  Transfer  Dependent  Bathing Dependent  Dressing  Dependent  Toileting  Dependent  Feeding  Independent  Med Admin  Assisted  Med Delivery   whole    Wound Care Documentation and Therapy:  Wound 05/18/20 Anterior;Right (Active)   Number of days: 147       Wound 08/04/20 Coccyx Mid (Active)   Wound Etiology Pressure Stage  2 10/12/20 0845   Dressing Status Reinforced dressing 10/10/20 0420   Wound Cleansed Soap and water 10/09/20 1907   Dressing/Treatment Open to air;Protective barrier 10/12/20 0845   Wound Assessment Non-blanchable erythema 10/12/20 0845   Drainage Amount None 10/12/20 0845   Lola-wound Assessment Blanchable erythema 10/12/20 0845   Number of days: 68       Wound 08/04/20 Buttocks Left (Active)   Wound Etiology Pressure Stage  2 10/12/20 0845   Dressing Status Reinforced dressing 10/10/20 0420   Wound Cleansed Soap and water 10/09/20 1907   Dressing/Treatment Open to air;Protective barrier 10/12/20 0845   Wound Assessment Non-blanchable erythema 10/12/20 0845   Drainage Amount None 10/12/20 0845   Lola-wound Assessment Blanchable erythema 10/12/20 0845   Number of days: 68       Wound 10/07/20 Thigh Proximal;Right;Posterior (Active)   Wound Etiology Deep tissue/Injury 10/12/20 0845   Dressing/Treatment Open to air 10/12/20 0845   Wound Assessment Non-blanchable erythema 10/12/20 0845   Drainage Amount None 10/12/20 0845   Lola-wound Assessment Blanchable erythema 10/12/20 0845   Number of days: 5       Wound 10/07/20 Thigh Left;Proximal;Posterior (Active)   Wound Etiology Deep tissue/Injury 10/12/20 0845   Dressing/Treatment Open to air 10/12/20 0845   Wound Assessment Non-blanchable erythema 10/12/20 0845   Drainage Amount None 10/12/20 0845   Lola-wound Assessment Blanchable erythema 10/12/20 0845   Number of days: 5       Wound 10/07/20 Buttocks Right (Active)   Wound Etiology Pressure Stage  2 10/12/20 0845   Dressing Status Reinforced dressing 10/10/20 0420   Wound Cleansed Soap and water 10/09/20 1907   Dressing/Treatment Open to air;Protective barrier 10/12/20 0845   Wound Assessment Non-blanchable erythema 10/12/20 0845   Drainage Amount None 10/12/20 0845   Lola-wound Assessment Blanchable erythema 10/12/20 0845   Number of days: 5        Elimination:  Continence:   · Bowel: No  · Bladder: Doesn't make urine  Urinary Catheter: None   Colostomy/Ileostomy/Ileal Conduit: No       Date of Last BM: 10/12/2020    Intake/Output Summary (Last 24 hours) at 10/12/2020 1539  Last data filed at 10/12/2020 1359  Gross per 24 hour   Intake 6695 ml   Output 6800 ml   Net -105 ml     I/O last 3 completed shifts: In: 6276 [P.O.:495; Other:6200]  Out: 6800 [Other:6800]    Safety Concerns: At Risk for Falls and Aspiration Risk    Impairments/Disabilities:      Vision    Nutrition Therapy:  Current Nutrition Therapy:   - Oral Diet:  General    Routes of Feeding: Oral  Liquids: Thin Liquids  Daily Fluid Restriction: no  Last Modified Barium Swallow with Video (Video Swallowing Test): not done    Treatments at the Time of Hospital Discharge:   Respiratory Treatments: NA  Oxygen Therapy:  is not on home oxygen therapy.   Ventilator:    - No ventilator support    Rehab Therapies: Physical Therapy and Occupational Therapy  Weight Bearing Status/Restrictions: No weight bearing restirctions  Other Medical Equipment (for information only, NOT a DME order):  NA  Other Treatments: NA    Patient's personal belongings (please select all that are sent with patient):  None    RN SIGNATURE: Electronically signed by Yrn Dean RN on 10/12/2020 at 3:39 PM      CASE MANAGEMENT/SOCIAL WORK SECTION    Inpatient Status Date:  10/-07/2020    Readmission Risk Assessment Score:  Readmission Risk              Risk of Unplanned Readmission:        54           Discharging to Facility/ Agency   · Name:  [facility]  · Address:  [place] [address], [facility] [address], [address] [place] [address]  · Phone:  [phone]  · Fax:  [phone]    Dialysis Facility (if applicable) · Name:  · Address:  · Dialysis Schedule:  · Phone:  · Fax:    / signature: Electronically signed by BARBARA Wolfe on 10/7/20 at 12:35 PM EDT    PHYSICIAN SECTION    Prognosis: Good    Condition at Discharge: Stable    Rehab Potential (if transferring to Rehab): Good    Recommended Labs or Other Treatments After Discharge: None    Physician Certification: I certify the above information and transfer of Socorro Parmar  is necessary for the continuing treatment of the diagnosis listed and that she requires [place] for greater 30 days. Update Admission H&P: No change in H&P    PHYSICIAN SIGNATURE:  Electronically signed by Juliet Mullen DO on 10/8/20 at 2:44 PM EDT     ***IMPORTANT MESSAGE***  When you go home, you will start to feel better but there is residual Pneumonia left in your lungs. We know you had 3 days of antibiotic here, but the antibiotic needs completed at home. Do not stop the antibiotic when you feel better and save it for a rainy day; it will not work. Also, you may experience nausea with your antibiotic; taking your medication with meals will help with digestion. If using Incentive Spirometer/Acapella therapy in the hospital, please continue use for 2 weeks after discharge to help you recover from your lung infection with better cough/lung expansion. If smoking, please stop smoking; at a minimum, the next 30 days as you recover from Pneumonia. Please contact your family physician who may have resources to consider with smoking cessation.

## 2020-10-07 NOTE — ED NOTES
ED to inpatient nurses report    Chief Complaint   Patient presents with    Altered Mental Status      Present to ED from home  LOC: alert and orientated to name and place  Vital signs   Vitals:    10/07/20 0446 10/07/20 0532 10/07/20 0602 10/07/20 0616   BP: (!) 93/45 (!) 101/30 115/83 (!) 117/47   Pulse: 96 93  101   Resp: 19 19 20   Temp:       TempSrc:       SpO2: 94% 98%  100%   Weight:       Height:          Oxygen Baseline room air     Current needs required room air  Bipap/Cpap No  LDAs:   Peripheral IV 10/07/20 Right Forearm (Active)   Site Assessment Clean;Dry; Intact 10/07/20 0616   Line Status Normal saline locked 10/07/20 0616   Dressing Status Clean;Dry; Intact 10/07/20 0616       Peripheral IV 10/07/20 Right Hand (Active)   Site Assessment Clean;Dry; Intact 10/07/20 0616   Line Status Normal saline locked 10/07/20 0616   Dressing Status Clean;Dry; Intact 10/07/20 0616   Dressing Intervention New 10/07/20 0356     Mobility: Requires assistance * 2  Pending ED orders: PT, INR   Present condition: pt resting in bed with even and unlabored. Pt blood pressure has increased.  Pt received zofran and protonix in ed  Electronically signed by Clementina Bernal RN on 10/7/2020 at 6:53 AM       Clementina Bernal RN  10/07/20 4537

## 2020-10-07 NOTE — CARE COORDINATION
10/7/20, 12:27 PM EDT  Discharge Planning Evaluation  Social work consult received, patient from [facility]. Patient/Family preference is to return to: [address] [place], [place] [address]. SW spoke to patient as she is alert and oriented. The patient's current payor source at the facility is: Medicaid. Medicare skilled days available: will require a pre-cert  Insurance precert:   yes  Spoke with  Milagros Gutierrez at the facility.   Patient bed hold: yes Medicaid  Anticipated transport plan: unknown at this time  Do they require COVID 19 test to return to [facility]: yes, completed Oct 7  Is there a required time frame which which COVID test needs done: only need one

## 2020-10-07 NOTE — PROGRESS NOTES
Pharmacy Medication History Note      List of current medications patient is taking is complete. Source of information: ECF MAR    Changes made to medication list:  Medications removed (include reason, ex. therapy complete or physician discontinued): Ocuvite- alternate  Mupirocin- not on ECF MAR  [place] nasal spray- not on ECF MAR    Medications added/doses adjusted:  Corrected acetaminophen to every 4 hours PRN  Changed Cipro to 500 mg nightly   Increased docusate to 2 capsules daily   Changed miconazole to PRN  Added Sigrid-katerin  Adjusted Dianeal volume to 2500 ml every 6 hours       Other notes (ex. Recent course of antibiotics, Coumadin dosing):    Denies use of other OTC or herbal medications.       Allergies reviewed      Electronically signed by Lidia Henry, [facility] on 10/7/2020 at 2:23 PM

## 2020-10-07 NOTE — ED NOTES
Pt. Resting in bed with even and unlabored respirations. Pt. Resting with lights off and eyes closed. Pt. Has no further concerns, questions or needs at this time. Call light within reach.         Babar Heller RN  10/07/20 0241

## 2020-10-07 NOTE — CONSULTS
[facility]          [facility]        [address]        [place], [address] [facility] [address]        [phone]           Inpatient Initial consult note         10/7/2020 7:52 AM    Patient Name:   Leoncio Baptiste  YOB: 1934  Primary Care Physician:  Rupal Smith MD     History Obtained From:  patient     Consultation requested by : Manjeet Barrow MD    requested for  : Evaluation of  ESRD Management     History of presentingillness   Leoncio Baptiste is a 80 y.o.   female with Past Medical History as stated below presented with a chief complaint of Altered Mental Status   on 10/7/2020 . Patient presented with apparently change in mental status from last [id number] home. Patient says she is feeling well better now denies any complaints at this time. .  When the squad arrived he was apparently having low blood pressure as per the squad she was not confused. However in the ER patient vital signs have been stable and she does not have any hypotension and she does not appear to be having any change in mental status as well    She was being admitted for hypotension/encephalopathy and possible pneumonia. She is in end-stage renal disease patient on peritoneal dialysis.      Past History      Past Medical History:   Diagnosis Date    Anxiety     Atherosclerotic heart disease of native coronary artery without angina pectoris     CAD (coronary artery disease)     nonobstructive    Cancer ([address] 2007    breast cancer    Cardiomyopathy, nonischemic ([address]     Dr. Juliet Brandt Cerebral artery occlusion with cerebral infarction ([address]     CHF (congestive heart failure) (HCC)     CKD (chronic kidney disease) stage 3, GFR 30-59 ml/min     Dr. Taylor Has Congenital heart disease     COPD exacerbation ([address]     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 ([address]     DM2 (diabetes mellitus, type 2) ([address] 1998    with CKD3, R foot ulcer and hx of prior ulcerations and PVD    Dyspnea     ESRD (end stage renal disease) ([address]     Ex-smoker 10/2/2012    GERD (gastroesophageal reflux disease)     GERD (gastroesophageal reflux disease)     Heart failure with preserved ejection fraction ([address]     Dr. Erin Flynn Hemodialysis patient [facility])     His of Heparin induced thrombocytopenia     History of breast cancer     right 1982 s/p mastectomy and chemo, left 2007 s/p mastectomy    History of CVA (cerebrovascular accident)     History of pulmonary embolism 05/2014    on [address] (coumadin)    Hyperkalemia     Hyperlipidemia     Hypertension     pulmonary    Iron deficiency anemia     Dr. 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    Personal history of transient ischemic attack (TIA), and cerebral infarction without residual deficits     Pneumonia 10/7/2020    Pulmonary embolism ([address]     [facility] ICD 11/17/2011    Thyroid disease     Venous insufficiency (chronic) (peripheral)     Vitamin D deficiency     Vitamin D deficiency     Weakness      Past Surgical History:   Procedure Laterality Date    BREAST SURGERY      s/p right mastectomy 1980's and left mastectomy 2007    CARDIAC CATHETERIZATION  11-    Hemodynamics w pulmonary hypertension, systemic hypertension and no sats gradient difference. No aortic stenosis. No mitral stenosis. Coronaries; mild disease of the LAD not more than 40%. LV; severe LV dysfunction and EF 30-35%.  CARDIAC CATHETERIZATION  05-    Mild disease of small distal LAD (approximately  50% stenosis) angiographically normal CA. Mild to moderate LV systolic dysfunction. EF 35%. Mildly elevated LV end diastolic pressure. No significant MR. Systemic hypertension.     CARDIAC DEFIBRILLATOR PLACEMENT  02/02/2011    CARDIOVASCULAR STRESS TEST  03/23/10    EF 30%  Severe LV Dys    COLONOSCOPY      Dr. Yudy Varma, ESOPHAGUS      DOPPLER ECHOCARDIOGRAPHY  03/22/10    EF 45%. LV mildly dilated. Systolic function mildly reducted. No regional wall motion abnormalities. Wall thickness mildly increased. LA mildly dilated. MV mild annular calification. Mild TR. Ventricular septum tickness mildly increased.  ENDOSCOPY, COLON, DIAGNOSTIC      EYE SURGERY      bilateral cataracts    EYE SURGERY Left 2014    laser surgery for retinopathy?  FOOT SURGERY Left 2016    OTHER SURGICAL HISTORY  10/18/2018    CardioMEMS    PACEMAKER PLACEMENT      [place] EGD BALLOON DILATION ESOPHAGUS <30 MM DIAM N/A 2017    EGD ESOPHAGOGASTRODUODENOSCOPY DILATATION performed by Lexy Brown MD at [facility]    [place] ESOPHAGOGASTRODUODENOSCOPY TRANSORAL DIAGNOSTIC  2017    EGD ESOPHAGOGASTRODUODENOSCOPY performed by [facility]y Brown MD at [facility]     Social History     Socioeconomic History    Marital status:       Spouse name: Not on file    Number of children: 0    Years of education: Not on file    Highest education level: Not on file   Occupational History    Occupation: Retired    Social Needs    Financial resource strain: Not on file    Food insecurity     Worry: Not on file     Inability: Not on file   Rudder needs     Medical: Not on file     Non-medical: Not on file   Tobacco Use    Smoking status: Former Smoker     Packs/day: 0.50     Years: 2.00     Pack years: 1.00     Types: Cigarettes     Last attempt to quit: 1952     Years since quittin.8    Smokeless tobacco: Never Used   Substance and Sexual Activity    Alcohol use: No     Alcohol/week: 0.0 standard drinks    Drug use: No    Sexual activity: Never   Lifestyle    Physical activity     Days per week: Not on file     Minutes per session: Not on file    Stress: Not on file   Relationships    Social connections Talks on phone: Not on file     Gets together: Not on file     Attends Judaism service: Not on file     Active member of club or organization: Not on file     Attends meetings of clubs or organizations: Not on file     Relationship status: Not on file    Intimate partner violence     Fear of current or ex partner: Not on file     Emotionally abused: Not on file     Physically abused: Not on file     Forced sexual activity: Not on file   Other Topics Concern    Not on file   Social History Narrative    Not on file     Family History   Problem Relation Age of Onset    Diabetes Mother     Pacemaker Mother     Stroke Mother     Heart Disease Mother     Diabetes Father     Cancer Sister     Cancer Brother     Cancer Sister     Heart Disease Brother         cardiomegaly     Medications & Allergies      Prior to Admission medications    Medication Sig Start Date End Date Taking?  Authorizing Provider   insulin glargine (LANTUS) 100 UNIT/ML injection vial Inject 16 Units into the skin nightly 8/6/20   Vanna Knight MD   [facility] (DIANEAL LO-VIRGIE 1.5%) 344 MOSM/L SOLN Inject 2,000 mLs into the peritoneum every 6 hours 8/6/20   Vanna Knight MD   lactobacillus (CULTURELLE) capsule Take 1 capsule by mouth daily 8/7/20   Vanna Knight MD   aspirin 81 MG EC tablet Take 1 tablet by mouth daily 6/16/20   Yair Landon MD   levothyroxine (SYNTHROID) 25 MCG tablet Take 1 tablet by mouth Daily 6/16/20   Yair Landon MD   midodrine (PROAMATINE) 10 MG tablet Take 1 tablet by mouth 3 times daily (with meals) 6/15/20   Yair Landon MD   bisacodyl (DULCOLAX) 5 MG EC tablet Take 5 mg by mouth daily as needed for Constipation     Historical Provider, MD   metoprolol succinate (TOPROL XL) 50 MG extended release tablet Take 50 mg by mouth daily     Historical Provider, MD   OXYGEN Inhale 2 L into the lungs as needed    Historical Provider, MD   ondansetron (ZOFRAN) 4 MG tablet Take 1 tablet by mouth every 8 hours as needed for Nausea or Vomiting 5/21/20   Alan Fox MD   mupirocin (BACTROBAN) 2 % cream Apply topically daily Apply topically to PD exit site daily    Historical Provider, MD   polycarbophil (FIBERCON) 625 MG tablet Take 625 mg by mouth 3 times daily    Historical Provider, MD   potassium chloride (KLOR-CON M) 20 MEQ extended release tablet Take 20 mEq by mouth 2 times daily     Historical Provider, MD   megestrol (MEGACE) 40 MG tablet Take 1 tablet by mouth 2 times daily 4/29/20   Apolinar Feliciano PA-C   Amino Acids (LIQUACEL) LIQD Take 30 mLs by mouth Three times per week; Monday, Wednesday, and Friday related to ESRD    Historical Provider, MD   sevelamer (RENVELA) 800 MG tablet Take 1 tablet by mouth 3 times daily (with meals)    Historical Provider, MD   polyethylene glycol (GLYCOLAX) powder Take 17 g by mouth 2 times daily     Historical Provider, MD   miconazole (MICOTIN) 2 % powder Apply topically 2 times daily.  12/19/19   Nicholas Sung, DO   sodium chloride (OCEAN, BABY AYR) 0.65 % nasal spray 1 spray by Nasal route as needed for Congestion 12/19/19   Nicholas Sung DO   insulin aspart (NOVOLOG) 100 UNIT/ML injection vial Inject into the skin 2 times daily Inject per sliding scale 0-150 = 0 units; 151-200 = 1 unit; 201-205 = 2 units; 251-300 = 3 units; 301-350 = 4 units; 351-400 = 5 units; 401-450 = 6 units     Historical Provider, MD   cyanocobalamin 1000 MCG tablet Take 1,000 mcg by mouth every evening     Historical Provider, MD   pantoprazole (PROTONIX) 40 MG tablet Take 40 mg by mouth 2 times daily    Historical Provider, MD   ipratropium-albuterol (DUONEB) 0.5-2.5 (3) MG/3ML SOLN nebulizer solution Inhale 1 vial into the lungs every 6 hours as needed for Shortness of Breath    Historical Provider, MD   ARTIFICIAL TEAR OP Instill one drop into each eye as needed Historical Provider, MD   nitroGLYCERIN (NITROSTAT) 0.4 MG SL tablet up to max of 3 total doses. If no relief after 1 dose, call 911. Patient taking differently: Dissolve one tablet under tongue as needed every 5 minutes for chest pain up to max of 3 total doses.  If no relief after 1 dose, call 911. 10/18/18   Talia Celestin MD   ascorbic acid (VITAMIN C) 500 MG tablet Take 500 mg by mouth 2 times daily     Historical Provider, MD   docusate sodium (COLACE) 100 MG capsule Take 100 mg by mouth daily     Historical Provider, MD   clopidogrel (PLAVIX) 75 MG tablet Take 75 mg by mouth every evening     Historical Provider, MD   Multiple Vitamins-Minerals (OCUVITE EXTRA PO) Take 1 tablet by mouth daily     Historical Provider, MD   pravastatin (PRAVACHOL) 40 MG tablet Take 40 mg by mouth every evening Indications: Blood Cholesterol Abnormal  5/8/16   Historical Provider, MD   acetaminophen (TYLENOL) 325 MG tablet Take 650 mg by mouth 4 times daily As needed for pain or fever >100.4 F    Historical Provider, MD     Allergies: Heparin  IP meds : Scheduled Meds:   sodium chloride (PF)  10 mL Intravenous Daily    ascorbic acid  500 mg Oral BID    aspirin  81 mg Oral Daily    clopidogrel  75 mg Oral QPM    cyanocobalamin  1,000 mcg Oral QPM    docusate sodium  100 mg Oral Daily    insulin glargine  16 Units Subcutaneous Nightly    lactobacillus  1 capsule Oral Daily    levothyroxine  25 mcg Oral Daily    megestrol  40 mg Oral BID    metoprolol succinate  50 mg Oral Daily    miconazole   Topical BID    midodrine  10 mg Oral TID WC    pantoprazole  40 mg Oral BID    polycarbophil  625 mg Oral TID    polyethylene glycol  17 g Oral BID    potassium chloride  20 mEq Oral BID    pravastatin  40 mg Oral QPM    sevelamer  800 mg Oral TID WC    sodium chloride flush  10 mL Intravenous 2 times per day    dianeal lo-nikko 1.5%  2,000 mL Intraperitoneal Q6H     Continuous Infusions:  Review of Systems Physical Exam Review of Systems   Constitutional: Negative for chills and fever. HENT: Negative. Eyes: Negative. Respiratory: Negative for cough. Cardiovascular: Negative for chest pain and leg swelling. Gastrointestinal: Negative for abdominal pain, diarrhea, nausea and vomiting. Genitourinary: Negative. Musculoskeletal: Negative for back pain. Skin: Negative for rash and wound. Neurological: Negative for dizziness, light-headedness and headaches. Psychiatric/Behavioral: Negative for agitation and confusion. Physical Exam  Vitals signs reviewed. Constitutional:       General: She is not in acute distress. Appearance: Normal appearance. HENT:      Head: Normocephalic and atraumatic. Right Ear: External ear normal.      Left Ear: External ear normal.      Mouth/Throat:      Mouth: Mucous membranes are moist.   Eyes:      Comments: Lids normal appearence   Neck:      Musculoskeletal: Normal range of motion and neck supple. Thyroid: No thyromegaly. Vascular: No JVD. Cardiovascular:      Heart sounds: Normal heart sounds. No murmur. No friction rub. No gallop. Pulmonary:      Effort: Pulmonary effort is normal.      Breath sounds: Normal breath sounds. Chest:      Chest wall: No tenderness. Abdominal:      General: Bowel sounds are normal. There is no distension. Palpations: Abdomen is soft. Tenderness: There is no abdominal tenderness. Musculoskeletal:      Right lower leg: No edema. Left lower leg: No edema. Skin:     General: Skin is warm and dry. Findings: No erythema or rash. Neurological:      General: No focal deficit present. Mental Status: She is alert.    Psychiatric:         Mood and Affect: Mood normal.           Vitals:    10/07/20 0748   BP: (!) 97/47   Pulse: 99   Resp: 17   Temp:    SpO2: 97%     Labs, Radiology and Tests       Recent Labs     10/07/20  0345   WBC 13.9*   RBC 3.81*   HGB 11.5*   HCT 35.5*   MCV 93.2   MCH 30.2 MCHC 32.4        Recent Labs     10/07/20  0345      K 3.9   CL 97*   CO2 24   BUN 23*   CREATININE 5.3*   CALCIUM 9.1   PROT 5.2*   LABALBU 1.7*   BILITOT 0.2*   ALKPHOS 117   AST 17   ALT 8*       Radiology : Chest x-ray-reviewed by me appears to be having poor inspiratory effort no significant congestion or consolidation    Other :     Assessment    Renal -ESRD on peritoneal dialysis, volume status appears reasonable electrolytes stable as well  - Due to hypotension we will start the patient on 1.5% every 6 hours and monitor ultrafiltration. Electrolytes appear to be within normal limits on potassium oral can continue for now  Chronic hypotension on midodrine  Hx of diabetes mellitus  Change in mental status appears to be at baseline  Secondary hyperparathyroidism  meds reviewed and D/W patient and the nursing staff      **This report has been created using voice recognition software. It maycontain minor  errors which are inherent in voice recognition technology. **    Fredrick Kelsey M.D  [facility].

## 2020-10-07 NOTE — CONSULTS
Seen the patient again . She is undergoing peritoneal dialysis. Looks well. Denies any complaints.   BP (!) 93/49   Pulse 82   Temp 97.8 °F (36.6 °C) (Oral)   Resp 15   Ht 5' 6\" (1.676 m)   Wt 240 lb (108.9 kg)   SpO2 92%   BMI 38.74 kg/m²     Reviewed the PD data apparently clear and 2200 ml ultrafiltration  Continue current PD prescription    ANUJ ERENDIRA

## 2020-10-07 NOTE — ED PROVIDER NOTES
[facility]  eMERGENCY dEPARTMENT eNCOUnter          CHIEF COMPLAINT       Chief Complaint   Patient presents with    Altered Mental Status       Nurses Notes reviewed and I agree except as noted in the HPI. HISTORY OF PRESENT ILLNESS    Samantha Schmitz is a 80 y.o. female who presents for what was initially called out as altered mental status. Patient comes from lost [address] home. Squad got there and then the nurses stated that that she had low blood pressure. The squad walked into the room the patient was asleep, the patient woke easily, they palpated a strong radial pulse. Patient was not confused for them. There was apparently issues going on at the nursing home at that time in the nurses station. They report the patient is not complaining of any pain. They think that the patient did have a bout of atrial fibrillation. However the patient converted back immediately. The patient does have a [address] by V ICD in place. Patient does have his history of left bundle branch block. She also has a history of a stroke. Patient has a history of heart failure. She is also on peritoneal dialysis. She is complaining of nausea at this time. She has no abdominal pain and has not been complaining of any fevers. Patient did have a bowel movement on the drive in. She is awake alert and oriented and answering questions. Currently the patient is resting comfortably on the cot in no apparent distress. REVIEW OF SYSTEMS     Review of Systems   Constitutional: Negative for activity change, appetite change, diaphoresis, fatigue and unexpected weight change. HENT: Negative for congestion, ear discharge, ear pain, hearing loss, rhinorrhea, sinus pressure, sore throat, trouble swallowing and voice change. Eyes: Negative for photophobia, pain, discharge, redness and itching. Respiratory: Negative for cough, choking, chest tightness, shortness of breath and wheezing.     Cardiovascular: Hyperlipidemia, Hypertension, Iron deficiency anemia, LBBB (left bundle branch block), Localized edema, Malignant neoplasm of breast (female) ([facility] [address] Osteoarthritis, Paget's disease of bone, Personal history of transient ischemic attack (TIA), and cerebral infarction without residual deficits, Pneumonia, Pulmonary embolism ([facility] [address] [facility] Bi[facility] ICD, Thyroid disease, Venous insufficiency (chronic) (peripheral), Vitamin D deficiency, Vitamin D deficiency, and Weakness. SURGICAL HISTORY      has a past surgical history that includes doppler echocardiography (03/22/10); cardiovascular stress test (03/23/10); pacemaker placement; eye surgery; Colonoscopy; Eye surgery (Left, 9/2014); Foot surgery (Left, 1/12/2016); Breast surgery; Cardiac catheterization (11-); Cardiac catheterization (05-); Cardiac defibrillator placement (02/02/2011); pr egd balloon dilation esophagus <30 mm diam (N/A, 9/18/2017); pr esophagogastroduodenoscopy transoral diagnostic (9/18/2017); Endoscopy, colon, diagnostic; other surgical history (10/18/2018); and Dilatation, esophagus.     CURRENT MEDICATIONS       Previous Medications    ACETAMINOPHEN (TYLENOL) 325 MG TABLET    Take 650 mg by mouth 4 times daily As needed for pain or fever >100.4 F    AMINO ACIDS (LIQUACEL) LIQD    Take 30 mLs by mouth Three times per week; Monday, Wednesday, and Friday related to ESRD    ARTIFICIAL TEAR OP    Instill one drop into each eye as needed    ASCORBIC ACID (VITAMIN C) 500 MG TABLET    Take 500 mg by mouth 2 times daily     ASPIRIN 81 MG EC TABLET    Take 1 tablet by mouth daily    BISACODYL (DULCOLAX) 5 MG EC TABLET    Take 5 mg by mouth daily as needed for Constipation     CLOPIDOGREL (PLAVIX) 75 MG TABLET    Take 75 mg by mouth every evening     CYANOCOBALAMIN 1000 MCG TABLET    Take 1,000 mcg by mouth every evening     DOCUSATE SODIUM (COLACE) 100 MG CAPSULE    Take 100 mg by mouth daily     INSULIN ASPART (NOVOLOG) 100 UNIT/ML INJECTION VIAL Inject into the skin 2 times daily Inject per sliding scale 0-150 = 0 units; 151-200 = 1 unit; 201-205 = 2 units; 251-300 = 3 units; 301-350 = 4 units; 351-400 = 5 units; 401-450 = 6 units     INSULIN GLARGINE (LANTUS) 100 UNIT/ML INJECTION VIAL    Inject 16 Units into the skin nightly    IPRATROPIUM-ALBUTEROL (DUONEB) 0.5-2.5 (3) MG/3ML SOLN NEBULIZER SOLUTION    Inhale 1 vial into the lungs every 6 hours as needed for Shortness of Breath    LACTOBACILLUS (CULTURELLE) CAPSULE    Take 1 capsule by mouth daily    LEVOTHYROXINE (SYNTHROID) 25 MCG TABLET    Take 1 tablet by mouth Daily    MEGESTROL (MEGACE) 40 MG TABLET    Take 1 tablet by mouth 2 times daily    METOPROLOL SUCCINATE (TOPROL XL) 50 MG EXTENDED RELEASE TABLET    Take 50 mg by mouth daily     MICONAZOLE (MICOTIN) 2 % POWDER    Apply topically 2 times daily. MIDODRINE (PROAMATINE) 10 MG TABLET    Take 1 tablet by mouth 3 times daily (with meals)    MULTIPLE VITAMINS-MINERALS (OCUVITE EXTRA PO)    Take 1 tablet by mouth daily     MUPIROCIN (BACTROBAN) 2 % CREAM    Apply topically daily Apply topically to PD exit site daily    NITROGLYCERIN (NITROSTAT) 0.4 MG SL TABLET    up to max of 3 total doses. If no relief after 1 dose, call 911.     ONDANSETRON (ZOFRAN) 4 MG TABLET    Take 1 tablet by mouth every 8 hours as needed for Nausea or Vomiting    OXYGEN    Inhale 2 L into the lungs as needed    PANTOPRAZOLE (PROTONIX) 40 MG TABLET    Take 40 mg by mouth 2 times daily    PERITONEAL DIALYSIS SOLUTIONS (DIANEAL LO-VIRGIE 1.5%) 344 MOSM/L SOLN    Inject 2,000 mLs into the peritoneum every 6 hours    POLYCARBOPHIL (FIBERCON) 625 MG TABLET    Take 625 mg by mouth 3 times daily    POLYETHYLENE GLYCOL (GLYCOLAX) POWDER    Take 17 g by mouth 2 times daily     POTASSIUM CHLORIDE (KLOR-CON M) 20 MEQ EXTENDED RELEASE TABLET    Take 20 mEq by mouth 2 times daily     PRAVASTATIN (PRAVACHOL) 40 MG TABLET    Take 40 mg by mouth every evening Indications: Blood Cholesterol Abnormal     SEVELAMER (RENVELA) 800 MG TABLET    Take 1 tablet by mouth 3 times daily (with meals)    SODIUM CHLORIDE (OCEAN, BABY AYR) 0.65 % NASAL SPRAY    1 spray by Nasal route as needed for Congestion       ALLERGIES     is allergic to heparin. FAMILY HISTORY     She indicated that her mother is . She indicated that her father is . She indicated that both of her sisters are . She indicated that both of her brothers are . She indicated that her maternal grandmother is . She indicated that her maternal grandfather is . She indicated that her paternal grandmother is . She indicated that her paternal grandfather is . family history includes Cancer in her brother, sister, and sister; Diabetes in her father and mother; Heart Disease in her brother and mother; Pacemaker in her mother; Stroke in her mother. SOCIAL HISTORY      reports that she quit smoking about 68 years ago. Her smoking use included cigarettes. She has a 1.00 pack-year smoking history. She has never used smokeless tobacco. She reports that she does not drink alcohol or use drugs. PHYSICAL EXAM     INITIAL VITALS:  height is 5' 6\" (1.676 m) and weight is 240 lb (108.9 kg). Her oral temperature is 98.2 °F (36.8 °C). Her blood pressure is 117/47 (abnormal) and her pulse is 101. Her respiration is 20 and oxygen saturation is 100%. Physical Exam  Vitals signs and nursing note reviewed. Constitutional:       General: She is not in acute distress. Appearance: She is well-developed. She is not diaphoretic. HENT:      Head: Normocephalic and atraumatic. Right Ear: External ear normal.      Left Ear: External ear normal.      Nose: Nose normal.      Mouth/Throat:      Pharynx: No oropharyngeal exudate. Eyes:      General: No scleral icterus. Right eye: No discharge. Left eye: No discharge.       Conjunctiva/sclera: Conjunctivae normal.      Pupils: Pupils are equal, round, and reactive to light. Neck:      Musculoskeletal: Normal range of motion and neck supple. Thyroid: No thyromegaly. Vascular: No JVD. Trachea: No tracheal deviation. Cardiovascular:      Rate and Rhythm: Normal rate and regular rhythm. Heart sounds: Normal heart sounds, S1 normal and S2 normal. No murmur. No friction rub. No gallop. Pulmonary:      Effort: Pulmonary effort is normal.      Breath sounds: Normal breath sounds. No stridor. No wheezing, rhonchi or rales. Chest:      Chest wall: No tenderness. Abdominal:      General: Bowel sounds are normal. There is no distension. Palpations: Abdomen is soft. There is no mass. Tenderness: There is no abdominal tenderness. There is no guarding or rebound. Negative signs include Chavira's sign, Rovsing's sign, McBurney's sign, psoas sign and obturator sign. Hernia: No hernia is present. Musculoskeletal: Normal range of motion. General: No tenderness. Lymphadenopathy:      Cervical: No cervical adenopathy. Skin:     General: Skin is warm and dry. Findings: No bruising, ecchymosis, lesion or rash. Neurological:      Mental Status: She is alert and oriented to person, place, and time. GCS: GCS eye subscore is 4. GCS verbal subscore is 5. GCS motor subscore is 6. Cranial Nerves: No cranial nerve deficit. Sensory: No sensory deficit. Motor: No weakness. Coordination: Romberg sign negative. Coordination normal.      Gait: Gait normal.      Deep Tendon Reflexes: Reflexes are normal and symmetric. Reflexes normal. Babinski sign absent on the right side. Psychiatric:         Speech: Speech normal.         Behavior: Behavior normal.         Thought Content:  Thought content normal.         Judgment: Judgment normal.           DIFFERENTIAL DIAGNOSIS:   Differential diagnosis discussed extensively bedside with the patient including but not limited to altered mental status infection spontaneous bacterial peritonitis CVA    DIAGNOSTIC RESULTS     EKG: All EKG's are interpreted by the Emergency Department Physician who either signs or Co-signs this chart in the absence of a cardiologist.  Atrially sensed ventricularly paced rhythm with a ventricular rate of 99 IA interval 148 QRS duration 136 QT interval 406 QTC of 521. RADIOLOGY: non-plain film images(s) such as CT, Ultrasound and MRI are read by the radiologist.  CT HEAD WO CONTRAST   Final Result   Impression:   1. No acute findings. This document has been electronically signed by: Mayra Fried MD on 10/07/2020 05:31 AM      All CT scans at this facility use dose modulation, iterative    reconstruction, and/or weight-based   dosing when appropriate to reduce radiation dose to as low as reasonably    achievable. XR CHEST PORTABLE   Final Result   Right lung base atelectasis. No definite consolidation. This document has been electronically signed by:  Mayra Fried MD on 10/07/2020 04:08 AM               LABS:   Labs Reviewed   CBC WITH AUTO DIFFERENTIAL - Abnormal; Notable for the following components:       Result Value    WBC 13.9 (*)     RBC 3.81 (*)     Hemoglobin 11.5 (*)     Hematocrit 35.5 (*)     RDW-SD 47.8 (*)     Lymphocytes Absolute 5.0 (*)     Immature Grans (Abs) 0.19 (*)     All other components within normal limits   BRAIN NATRIURETIC PEPTIDE - Abnormal; Notable for the following components:    Pro-BNP 91316.0 (*)     All other components within normal limits   BASIC METABOLIC PANEL - Abnormal; Notable for the following components:    Chloride 97 (*)     BUN 23 (*)     CREATININE 5.3 (*)     All other components within normal limits   HEPATIC FUNCTION PANEL - Abnormal; Notable for the following components:    Alb 1.7 (*)     Total Bilirubin 0.2 (*)     ALT 8 (*)     Total Protein 5.2 (*)     All other components within normal limits   TROPONIN - Abnormal; Notable for the following components:    Troponin T 0.126 (*)     All other components within normal limits   MAGNESIUM - Abnormal; Notable for the following components:    Magnesium 1.3 (*)     All other components within normal limits   TSH WITHOUT REFLEX - Abnormal; Notable for the following components:    TSH 4.630 (*)     All other components within normal limits   AMMONIA - Abnormal; Notable for the following components:    Ammonia 66 (*)     All other components within normal limits   GLOMERULAR FILTRATION RATE, ESTIMATED - Abnormal; Notable for the following components:    Est, Glom Filt Rate 8 (*)     All other components within normal limits   CULTURE, BLOOD 1   CULTURE, BLOOD 2   CULTURE, BOTTLE, BODY FLUID    Narrative:     Source: peritoneal dialysis fluid       Site:           Current Antibiotics: not stated   LIPASE   ETHANOL   COVID-19   ANION GAP   OSMOLALITY   APTT   PROTIME-INR   URINE DRUG SCREEN   BODY FLUID CELL COUNT WITH DIFFERENTIAL       EMERGENCY DEPARTMENT COURSE:   Vitals:    Vitals:    10/07/20 0446 10/07/20 0532 10/07/20 0602 10/07/20 0616   BP: (!) 93/45 (!) 101/30 115/83 (!) 117/47   Pulse: 96 93  101   Resp: 19 19  20   Temp:       TempSrc:       SpO2: 94% 98%  100%   Weight:       Height:         Patient was assessed at bedside appropriate labs and imaging were ordered. Patient was given fluids Zofran Protonix at bedside. Dialysis fluid was sent for evaluation. Patient did not have any belly pain we did send the Allises fluid, patient was given Zofran Protonix which made her feel much better. She was however slightly hypotensive we gave her 2 L of fluid and she was fluid responsive. Chest x-ray showed what they thought was atelectasis however the patient does have a white blood cell count and she is afebrile. At this point I feel the patient needs to be admitted. I called the hospitalist service and discussed the case with them. They graciously accepted the admission.   I then called and spoke with nephrology with Dr. Mable Mcfadden discussed case with him he will see the patient in consult. Patient remained stable throughout course. Patient is admitted in stable condition pending further evaluation and treatment. CRITICAL CARE:   None     CONSULTS:  Dr Richie Mcfadden - Nephrology    PROCEDURES:  None    FINAL IMPRESSION      1. Pneumonia due to organism          DISPOSITION/PLAN   Admission    PATIENT REFERRED TO:  No follow-up provider specified.     DISCHARGE MEDICATIONS:  New Prescriptions    No medications on file       (Please note that portions of this note were completed with a voice recognition program.  Efforts were made to edit the dictations but occasionally words are mis-transcribed.)    Zulema Stout, [address] [place] [address],   10/07/20 5059

## 2020-10-07 NOTE — ED NOTES
Pt. Resting in bed with even and unlabored respirations. Pt. Denies any pain. Pt. Updated about plan of care and treatment. Pt. Has no further concerns, questions or needs at this time. Call light within reach.        Vann Mcardle, RN  10/07/20 [id number]

## 2020-10-07 NOTE — ED NOTES
Pt arrives to for altered mental status per nursing home staff. When squad arrived the pt was asleep. Per nursing home pt has been more confused than normal and had been having low blood pressure. On arrival pt was alert and oriented to baseline. Pt is alert to person, place and situation. Pt states she has been nauseated. Pt denies any pain at this time. Pt is a dialysis pt and has a catheter. Pt was drained yesterday per pt. Pt states she is unsure why she is here at this time. Pt respirations are even and unlabored. Pt vitals are stable. Pt blood pressure in not low at this time.       Heydi Amaya RN  10/07/20 [id number]

## 2020-10-07 NOTE — H&P
History & Physical        Patient: Desirae Mckenna  YOB: 1934    MRN: [id number]     Acct: [de-identified]    PCP: Alexis Wang MD    Date of Admission: 10/7/2020    Date of Service: Pt seen/examined on 10/07/20  and Admitted to Inpatient with expected LOS greater than two midnights due to medical therapy. ASSESSMENT/PLAN:    1. Encephalopathy, metabolic--CT head (-) acute; ammonia at 66; not confused in ED; fully awake and alert   2. Hypotension--improved with IVF; monitor closely; on midodrine at home  3. Possible Pneumonia, right base (POA)--COVID 19 (-); denies cough, fever, chills  4. Possible acute on chronic diastolic HF--follows with HF clinic; has Card[facility]; BNP at 00019  5. Hypomagnesia--replace per protocol  6. ESRD--on PD; consult nephrology  7. Chronic Troponin Elevation  8.  Obesity with BMI 38.74    Chief Complaint:  confusion    History Of Present Illness:    80 y.o. female who presented to [facility] with possible confusion; patient has a past medical history of pulmonary embolism, TIA, stroke, breast cancer along with diabetes and end-stage renal disease on peritoneal dialysis; she lives at the nursing home and per the ER report MS was called secondary to altered mental status; when the squad got there they then stated that she had low blood pressure; the squad walked into the room patient was asleep and she awoken easily and per report they palpated a strong radial pulse and they stated the patient was not confused at all; there must have been some issues going on at the nurses station at that time; patient did not have any complaints for the EMS and she continues not to have any complaints for me; she is fully alert and oriented, denies any pain; she tells me that she was startled when she was awakened up by the EMS and she had a little bit of nausea but that has subsided and no emesis; she denies any abdominal pain; she denies any fever chills; in the emergency department her blood pressure kind of bounced around however I see that she is on Midodrin 10 mg 3 times a day so she apparently has a history of low blood pressure however asymptomatic; laboratory studies are fairly unremarkable; she has a mild white blood cell count at 13.9; she is being admitted to the hospital service for further care and evaluation.     Past Medical History:          Diagnosis Date    Anxiety     Atherosclerotic heart disease of native coronary artery without angina pectoris     CAD (coronary artery disease)     nonobstructive    Cancer ([facility] 2007    breast cancer    Cardiomyopathy, nonischemic ([facility]     Dr. Jose Oliveira Cerebral artery occlusion with cerebral infarction ([facility]     CHF (congestive heart failure) ([facility]     CKD (chronic kidney disease) stage 3, GFR 30-59 ml/min     Dr. Dejuan Saucedo Congenital heart disease     COPD exacerbation ([facility]     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 ([facility]     DM2 (diabetes mellitus, type 2) ([facility] 1998    with CKD3, R foot ulcer and hx of prior ulcerations and PVD    Dyspnea     ESRD (end stage renal disease) ([facility]     Ex-smoker 10/2/2012    GERD (gastroesophageal reflux disease)     GERD (gastroesophageal reflux disease)     Heart failure with preserved ejection fraction ([facility]     Dr. Jose Oliveira Hemodialysis patient [facility])     His of Heparin induced thrombocytopenia     History of breast cancer     right 1982 s/p mastectomy and chemo, left 2007 s/p mastectomy    History of CVA (cerebrovascular accident)     History of pulmonary embolism 05/2014    on [address] (coumadin)    Hyperkalemia     Hyperlipidemia     Hypertension     pulmonary    Iron deficiency anemia     Dr. 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 No aortic stenosis. No mitral stenosis. Coronaries; mild disease of the LAD not more than 40%. LV; severe LV dysfunction and EF 30-35%.  CARDIAC CATHETERIZATION  05-    Mild disease of small distal LAD (approximately  50% stenosis) angiographically normal CA. Mild to moderate LV systolic dysfunction. EF 35%. Mildly elevated LV end diastolic pressure. No significant MR. Systemic hypertension.  CARDIAC DEFIBRILLATOR PLACEMENT  02/02/2011    CARDIOVASCULAR STRESS TEST  03/23/10    EF 30%  Severe LV Dys    COLONOSCOPY      Dr. Rob Medrano    DILATATION, ESOPHAGUS      DOPPLER ECHOCARDIOGRAPHY  03/22/10    EF 45%. LV mildly dilated. Systolic function mildly reducted. No regional wall motion abnormalities. Wall thickness mildly increased. LA mildly dilated. MV mild annular calification. Mild TR. Ventricular septum tickness mildly increased.  ENDOSCOPY, COLON, DIAGNOSTIC      EYE SURGERY      bilateral cataracts    EYE SURGERY Left 9/2014    laser surgery for retinopathy?     FOOT SURGERY Left 1/12/2016    OTHER SURGICAL HISTORY  10/18/2018    [facility]    PACEMAKER PLACEMENT      UT EGD BALLOON DILATION ESOPHAGUS <30 MM DIAM N/A 9/18/2017    EGD ESOPHAGOGASTRODUODENOSCOPY DILATATION performed by Michael Colin MD at [facility]    UT ESOPHAGOGASTRODUODENOSCOPY TRANSORAL DIAGNOSTIC  9/18/2017 times per week; Monday, Wednesday, and Friday related to ESRD    Historical Provider, MD   sevelamer (RENVELA) 800 MG tablet Take 1 tablet by mouth 3 times daily (with meals)    Historical Provider, MD   polyethylene glycol (GLYCOLAX) powder Take 17 g by mouth 2 times daily     Historical Provider, MD   miconazole (MICOTIN) 2 % powder Apply topically 2 times daily. 12/19/19   Rox Arlen, DO   sodium chloride (OCEAN, BABY AYR) 0.65 % nasal spray 1 spray by Nasal route as needed for Congestion 12/19/19   Rox Arlen, DO   insulin aspart (NOVOLOG) 100 UNIT/ML injection vial Inject into the skin 2 times daily Inject per sliding scale 0-150 = 0 units; 151-200 = 1 unit; 201-205 = 2 units; 251-300 = 3 units; 301-350 = 4 units; 351-400 = 5 units; 401-450 = 6 units     Historical Provider, MD   cyanocobalamin 1000 MCG tablet Take 1,000 mcg by mouth every evening     Historical Provider, MD   pantoprazole (PROTONIX) 40 MG tablet Take 40 mg by mouth 2 times daily    Historical Provider, MD   ipratropium-albuterol (DUONEB) 0.5-2.5 (3) MG/3ML SOLN nebulizer solution Inhale 1 vial into the lungs every 6 hours as needed for Shortness of Breath    Historical Provider, MD   ARTIFICIAL TEAR OP Instill one drop into each eye as needed    Historical Provider, MD   nitroGLYCERIN (NITROSTAT) 0.4 MG SL tablet up to max of 3 total doses. If no relief after 1 dose, call 911. Patient taking differently: Dissolve one tablet under tongue as needed every 5 minutes for chest pain up to max of 3 total doses.  If no relief after 1 dose, call 911. 10/18/18   Jigar MD Grabiel   ascorbic acid (VITAMIN C) 500 MG tablet Take 500 mg by mouth 2 times daily     Historical Provider, MD   docusate sodium (COLACE) 100 MG capsule Take 100 mg by mouth daily     Historical Provider, MD   clopidogrel (PLAVIX) 75 MG tablet Take 75 mg by mouth every evening     Historical Provider, MD   Multiple Vitamins-Minerals (OCUVITE EXTRA PO) Take 1 tablet by mouth daily     Historical Provider, MD   pravastatin (PRAVACHOL) 40 MG tablet Take 40 mg by mouth every evening Indications: Blood Cholesterol Abnormal  5/8/16   Historical Provider, MD   acetaminophen (TYLENOL) 325 MG tablet Take 650 mg by mouth 4 times daily As needed for pain or fever >100.4 F    Historical Provider, MD       Allergies:  Heparin    Social History:   reports that she quit smoking about 68 years ago. Her smoking use included cigarettes. She has a 1.00 pack-year smoking history. She has never used smokeless tobacco. She reports that she does not drink alcohol or use drugs.     Family History:      Positive as follows:        Problem Relation Age of Onset    Diabetes Mother     Pacemaker Mother     Stroke Mother     Heart Disease Mother     Diabetes Father     Cancer Sister     Cancer Brother     Cancer Sister     Heart Disease Brother         cardiomegaly       REVIEW OF SYSTEMS:     Constitutional: ROS: negative for - chills or fever  Head: no headache, no head injury, no migraine   Eyes ROS: denies blurred/double vision  Ears ROS: no hearing difficulty, no tinnitus  Mouth and Throat ROS: no ulceration, dysphagia, dental caries  Psychological ROS: no depression, no anxiety, no panic attacks, denies suicide/homicide ideation  Endocrine ROS: denies polyuria, polydypsia, no heat or cold intolerance  Respiratory ROS: no cough, shortness of breath, or wheezing  Cardiovascular ROS: no chest pain or dyspnea on exertion  Gastrointestinal ROS: no abdominal pain, change in bowel habits, or black or bloody stools  Genito-Urinary ROS: denies dysuria, frequency, urgency; denies hematuria  Musculoskeletal ROS: negative  Neurological ROS: no syncope, no seizures, no numbness or tingling of hands, no numbness or tingling of feet, no paresis  Dermatology: no skin rash, no eczema  Endocrine: no polyuria, polydypsia, no heat/cold intolerance  Hematology: denies bruising easily, denies bleeding problems, denies clotting disorders    PHYSICAL EXAM:    /83   Pulse 93   Temp 98.2 °F (36.8 °C) (Oral)   Resp 19   Ht 5' 6\" (1.676 m)   Wt 240 lb (108.9 kg)   SpO2 98%   BMI 38.74 kg/m²     General appearance:  No apparent distress, appears stated age and cooperative. HEENT:  Normal cephalic, atraumatic without obvious deformity. Pupils equal, round, and reactive to light. Conjunctivae/corneas clear. Neck: Supple, with full range of motion. No jugular venous distention. Trachea midline. Respiratory:  Normal respiratory effort. Clear to auscultation, bilaterally without Rales/Wheezes/Rhonchi. Cardiovascular:  Regular rate and rhythm with normal S1/S2 without murmurs, rubs or gallops. Abdomen: Soft, non-tender, non-distended with normal bowel sounds. Musculoskeletal:  No clubbing, cyanosis or edema bilaterally. Full range of motion without deformity. Skin: Skin color, texture, turgor normal.    Neurologic:  Neurovascularly intact without any focal sensory/motor deficits. Cranial nerves: II-XII intact, grossly non-focal.  Psychiatric:  Alert and oriented x 4, thought content appropriate  Capillary Refill: Brisk,< 3 seconds   Peripheral Pulses: +2 palpable, equal bilaterally     Labs:     Recent Labs     10/07/20  0345   WBC 13.9*   HGB 11.5*   HCT 35.5*        Recent Labs     10/07/20  0345      K 3.9   CL 97*   CO2 24   BUN 23*   CREATININE 5.3*   CALCIUM 9.1     Recent Labs     10/07/20  0345   AST 17   ALT 8*   BILIDIR <0.2   BILITOT 0.2*   ALKPHOS 117     No results for input(s): INR in the last 72 hours. Recent Labs     10/07/20  0345   TROPONINT 0.126*     Radiology:     Ct Head Wo Contrast    Result Date: 10/7/2020  CT head without contrast Comparison:  CT,SR  - CT HEAD WO CONTRAST  - 08/04/2020 05:53 PM EDT Findings: Moderate atrophy like change and nonspecific white matter hypodensity. No intracranial mass, midline shift, hydrocephalus, or acute hemorrhage.  Orbits, paranasal sinuses, and mastoid air cells are unremarkable. No skull fracture     Impression: 1. No acute findings. This document has been electronically signed by: Bronwyn Watt MD on 10/07/2020 05:31 AM All CT scans at this facility use dose modulation, iterative reconstruction, and/or weight-based dosing when appropriate to reduce radiation dose to as low as reasonably achievable. Xr Chest Portable    Result Date: 10/7/2020  1 view chest x-ray Comparison:  CR,SR  - XR CHEST PORTABLE  - 08/04/2020 01:02 PM EDT Findings: Mild atelectasis right lung base. Low inspiratory volumes. Cardiomegaly. Left subclavian AICD. No acute fracture. Right lung base atelectasis. No definite consolidation. This document has been electronically signed by: Bronwyn Watt MD on 10/07/2020 04:08 AM     EKG:  I have reviewed the EKG with the following interpretation: paced HR 99    Thank you Shell Ring MD for the opportunity to be involved in this patient's care.     Electronically signed by ARELI Lucas CNP on 10/7/2020 at 6:16 AM

## 2020-10-07 NOTE — ED NOTES
Bed: 011A  Expected date:   Expected time:   Means of arrival:   Comments:  Gray Mcbride RN  10/07/20 1940

## 2020-10-08 PROBLEM — J18.9 PNEUMONIA: Status: RESOLVED | Noted: 2020-01-01 | Resolved: 2020-01-01

## 2020-10-08 NOTE — PROGRESS NOTES
Progress Notes       Patient: Juan Torres  YOB: 1934    MRN: [id number]     Acct: [de-identified]    PCP: Raisa Cosme MD    Date of Admission: 10/7/2020    Date of Service: Pt seen/examined on 10/08/20  and Admitted to Observation with expected LOS less than two midnights due to medical therapy. Chief Complaint:  Hypotension    Assessment and Plan:    1.) Hypotension related to PD:  Patient states chronic hypotension related to her dialysis. She is asymptomatic with SBP 80-90. She takes Midodrine out-patient three times a day. Likely BP was lower than normal when checked by nursing home because she was sleeping at the time. - Continue Home Dose of Midodrine                                         - Monitor BP     2.) ESRD on PD Q6H:  Dr. Farida Evans. Patient is doing well on dialysis. States no pain, itching, or redness in area of catheter site.     3.) Chronic Diastolic HF/Chronic Troponin Elevation:  EF 70% (6/2020). Trop 0.126, BNP [id number]. Likely secondary to ESRD and subsequent Hypotension.                                         - Continue Home Medications     4.) Hypomagnesia: 1.3 on admission. Replacement protocol in place.     5.) Prior TIA/Stroke: No new stroke like symptoms. Continue on ASA and Plavix.     6.) Prior PE: History of HIT. Avoid Heparin.     7.) IDDM: Takes Lantus 16U Nightly and Novolog Sliding scale at home. Continue medications and POCT Glucose before meals and before bed.     8.) COPD:  States no increase in shortness of breath.   Continue home inhalers.     9.) Obesity: BMI 38.74 kg/m2    History Of Present Illness:                  Ms. Joe Condon is an 80year old female with a history of TIA, stroke, PE, IDDM, Breast Cancer, and ESRD on PD.  She lives at [address] [place] [address] the night of 10/7/20, the nurses reported that she was confused and called EMS.  EMS arrived and stated her blood pressure was low, but no other abnormalities were found in her vitals.  The patient states she was never confused last night, but was trying to sleep.  On arrival to the ER, her Cr was elevated at 5.3. Radha Hoffmann WBC was 13.9, however chart review showed that the patient always has a mildly elevated count.  She was afebrile.  A CT scan show no acute intracranial findings.  CXR showed atelectasis at the right lung base.  An EKG showed Ventricular paced beats.  Her Troponin and BNP were elevated, however not above her baseline.  The Patient denied chest pain, shortness of breath, nausea, vomiting, diarrhea, constipation, fevers, chills, syncope, lightheadedness, or dizziness.  She stated she knows when she is getting sick, and states she feels just like normal.  She was treated with IVF in the ER. Lico Whitley takes PD every 6 hours.              Dr. Elsie Vega was contacted due to PD, and stated the the patient was doing well on her home parameters for dialysis. [place] stated she would be okay to discharge from a Nephrology standpoint.  The Patient has been Hypotensive without symptoms since admission.  She states she always runs low due to PD.  Since she is asymptomatic, and is being treated with Midodrine out-patient, she is medically stable for discharge.  We have reiterated to her that if she feels symptomatic from her low blood pressure that she should let the nurses or us know.              One bottle of blood culture returned positive for gram positive cocci in clusters.  Since the other cultures are negative, and considering the bacteria involved, this mostly likely represents a skin contaminant.  Patient is Afebrile and Asymptomatic.     Past Medical History:          Diagnosis Date    Anxiety     Atherosclerotic heart disease of native coronary artery without angina pectoris     CAD (coronary artery disease)     nonobstructive    Cancer ([facility] 2007    breast cancer    Cardiomyopathy, nonischemic ([facility]     Dr. Jaime Gonzalez Cerebral artery occlusion with cerebral infarction ([address]     CH[facility]congestive heart failure) ([facility])     CKD (chronic kidney disease) stage 3, GFR 30-59 ml/min     Dr. Kell Still Congenital heart disease     COPD exacerbation ([address]     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 ([address]     DM2 (diabetes mellitus, type 2) ([address] 1998    with CKD3, R foot ulcer and hx of prior ulcerations and PVD    Dyspnea     ESRD (end stage renal disease) ([address]     Ex-smoker 10/2/2012    GERD (gastroesophageal reflux disease)     GERD (gastroesophageal reflux disease)     Heart failure with preserved ejection fraction ([address]     Dr. Mandi Morris Hemodialysis patient [facility])     His of Heparin induced thrombocytopenia     History of breast cancer     right 1982 s/p mastectomy and chemo, left 2007 s/p mastectomy    History of CVA (cerebrovascular accident)     History of pulmonary embolism 05/2014    on [address] (coumadin)    Hyperkalemia     Hyperlipidemia     Hypertension     pulmonary    Iron deficiency anemia     Dr. 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    Personal history of transient ischemic attack (TIA), and cerebral infarction without residual deficits     Pneumonia 10/7/2020    Pulmonary embolism ([address]     [facility] ICD 11/17/2011    Thyroid disease     Venous insufficiency (chronic) (peripheral)     Vitamin D deficiency     Vitamin D deficiency     Weakness        Past Surgical History:          Procedure Laterality Date    BREAST SURGERY      s/p right mastectomy 1980's and left mastectomy 2007    CARDIAC CATHETERIZATION  11-    Hemodynamics w pulmonary hypertension, systemic hypertension and no sats gradient difference. No aortic stenosis. No mitral stenosis.  Coronaries; mild disease of the LAD not more than 40%. LV; severe LV dysfunction and EF 30-35%.  CARDIAC CATHETERIZATION  05-    Mild disease of small distal LAD (approximately  50% stenosis) angiographically normal CA. Mild to moderate LV systolic dysfunction. EF 35%. Mildly elevated LV end diastolic pressure. No significant MR. Systemic hypertension.  CARDIAC DEFIBRILLATOR PLACEMENT  02/02/2011    CARDIOVASCULAR STRESS TEST  03/23/10    EF 30%  Severe LV Dys    COLONOSCOPY      Dr. Rob Medrano    DILATATION, ESOPHAGUS      DOPPLER ECHOCARDIOGRAPHY  03/22/10    EF 45%. LV mildly dilated. Systolic function mildly reducted. No regional wall motion abnormalities. Wall thickness mildly increased. LA mildly dilated. MV mild annular calification. Mild TR. Ventricular septum tickness mildly increased.  ENDOSCOPY, COLON, DIAGNOSTIC      EYE SURGERY      bilateral cataracts    EYE SURGERY Left 9/2014    laser surgery for retinopathy?  FOOT SURGERY Left 1/12/2016    OTHER SURGICAL HISTORY  10/18/2018    Cardio[facility]    PACEMAKER PLACEMENT      NC EGD BALLOON DILATION ESOPHAGUS <30 MM DIAM N/A 9/18/2017    EGD ESOPHAGOGASTRODUODENOSCOPY DILATATION performed by Michael Colin MD at [facility]    NC ESOPHAGOGASTRODUODENOSCOPY TRANSORAL DIAGNOSTIC  9/18/2017    EGD ESOPHAGOGASTRODUODENOSCOPY performed by Michael Colin MD at [address] [facility]       Medications Prior to Admission:      Prior to Admission medications    Medication Sig Start Date End Date Taking?  Authorizing Provider   ciprofloxacin (CIPRO) 500 MG tablet Take 500 mg by mouth nightly    Yes Historical Provider, MD   Magnesium 400 MG TABS Take 1 tablet by mouth daily    Yes Historical Provider, MD   miconazole (MICOTIN) 2 % powder Apply topically as needed for Itching Apply topically to abdominal folds as needed for redness   Yes Historical Provider, MD   B Complex-C-Folic Acid (GAURI-DANNY) TABS Take 1 tablet by mouth daily   Yes Historical Provider, MD [facility] (DIANEAL LOW CALCIUM/1.5% DEX IP) Inject 2,500 mLs into the peritoneum every 6 hours   Yes Historical Provider, MD   insulin glargine (LANTUS) 100 UNIT/ML injection vial Inject 16 Units into the skin nightly 8/6/20  Yes Kody Vann MD   lactobacillus (CULTURELLE) capsule Take 1 capsule by mouth daily 8/7/20  Yes Kody Vann MD   aspirin 81 MG EC tablet Take 1 tablet by mouth daily 6/16/20  Yes José Miguel Layne MD   levothyroxine (SYNTHROID) 25 MCG tablet Take 1 tablet by mouth Daily 6/16/20  Yes José Miguel Layne MD   midodrine (PROAMATINE) 10 MG tablet Take 1 tablet by mouth 3 times daily (with meals) 6/15/20  Yes José Miguel Layne MD   metoprolol succinate (TOPROL XL) 50 MG extended release tablet Take 50 mg by mouth daily    Yes Historical Provider, MD   polycarbophil (FIBERCON) 625 MG tablet Take 625 mg by mouth 3 times daily   Yes Historical Provider, MD   potassium chloride (KLOR-CON M) 20 MEQ extended release tablet Take 20 mEq by mouth 2 times daily    Yes Historical Provider, MD   megestrol (MEGACE) 40 MG tablet Take 1 tablet by mouth 2 times daily 4/29/20  Yes Candelario Simon PA-C   Amino Acids (LIQUACEL) LIQD Take 30 mLs by mouth Three times per week; Monday, Wednesday, and Friday related to ESRD   Yes Historical Provider, MD   sevelamer (RENVELA) 800 MG tablet Take 1 tablet by mouth 3 times daily (with meals)   Yes Historical Provider, MD   polyethylene glycol (GLYCOLAX) powder Take 17 g by mouth 2 times daily    Yes Historical Provider, MD   insulin aspart (NOVOLOG) 100 UNIT/ML injection vial Inject into the skin 2 times daily Inject per sliding scale 0-150 = 0 units; 151-200 = 1 unit; 201-250 = 2 units; 251-300 = 3 units; 301-350 = 4 units; 351-400 = 5 units; 401-450 = 6 units   Yes Historical Provider, MD   cyanocobalamin 1000 MCG tablet Take 1,000 mcg by mouth every evening Yes Historical Provider, MD   pantoprazole (PROTONIX) 40 MG tablet Take 40 mg by mouth 2 times daily   Yes Historical Provider, MD   ascorbic acid (VITAMIN C) 500 MG tablet Take 500 mg by mouth 2 times daily    Yes Historical Provider, MD   docusate sodium (COLACE) 100 MG capsule Take 200 mg by mouth daily    Yes Historical Provider, MD   clopidogrel (PLAVIX) 75 MG tablet Take 75 mg by mouth every evening    Yes Historical Provider, MD   pravastatin (PRAVACHOL) 40 MG tablet Take 40 mg by mouth every evening Indications: Blood Cholesterol Abnormal  5/8/16  Yes Historical Provider, MD   acetaminophen (TYLENOL) 325 MG tablet Take 650 mg by mouth every 4 hours as needed for Pain    Yes Historical Provider, MD   HYPROMELLOSE OP Place 1 drop into both eyes as needed (Dry eyes)    Historical Provider, MD   bisacodyl (DULCOLAX) 5 MG EC tablet Take 5 mg by mouth daily as needed for Constipation     Historical Provider, MD   OXYGEN Inhale 2 L into the lungs as needed    Historical Provider, MD   ondansetron (ZOFRAN) 4 MG tablet Take 1 tablet by mouth every 8 hours as needed for Nausea or Vomiting 5/21/20   Maranda Au MD   ipratropium-albuterol (DUONEB) 0.5-2.5 (3) MG/3ML SOLN nebulizer solution Inhale 1 vial into the lungs every 6 hours as needed for Shortness of Breath    Historical Provider, MD   nitroGLYCERIN (NITROSTAT) 0.4 MG SL tablet up to max of 3 total doses. If no relief after 1 dose, call 911. 10/18/18   Ronni Wilcox MD       Allergies:  Heparin    Social History:      The patient currently lives at [facility]. TOBACCO:   reports that she quit smoking about 68 years ago. Her smoking use included cigarettes. She has a 1.00 pack-year smoking history. She has never used smokeless tobacco.  ETOH:   reports no history of alcohol use. Family History:      Reviewed in detail and negative for DM, CAD, Cancer, CVA.  Positive as follows:        Problem Relation Age of Onset    Diabetes Mother     Pacemaker Mother     Stroke Mother     Heart Disease Mother     Diabetes Father     Cancer Sister     Cancer Brother     Cancer Sister     Heart Disease Brother         cardiomegaly       Diet:  DIET RENAL;    REVIEW OF SYSTEMS:   Pertinent positives as noted in the HPI. All other systems reviewed and negative. Review of Systems - General ROS: negative for - chills, fatigue or fever  ENT ROS: negative for - epistaxis, nasal congestion, nasal discharge, sneezing, sore throat or vertigo  Respiratory ROS: no cough, shortness of breath, or wheezing  Cardiovascular ROS: no chest pain or dyspnea on exertion  Gastrointestinal ROS: no abdominal pain, change in bowel habits, or black or bloody stools  Genito-Urinary ROS: no dysuria, trouble voiding, or hematuria  Musculoskeletal ROS: positive for - swelling in bilateral lower limbs (stated at baseline)  negative for - joint pain, joint stiffness, joint swelling or muscle pain  Neurological ROS: negative for - dizziness, headaches, seizures or visual changes  Dermatological ROS: negative for - pruritus, rash or skin lesion changes     PHYSICAL EXAM:    BP (!) 81/42   Pulse 82   Temp 97.9 °F (36.6 °C) (Oral)   Resp 18   Ht 5' 6\" (1.676 m)   Wt 240 lb (108.9 kg)   SpO2 95%   BMI 38.74 kg/m²     General appearance:  No apparent distress, appears stated age and cooperative. HEENT:  Normal cephalic, atraumatic without obvious deformity. Pupils equal, round, and reactive to light. Extra ocular muscles intact. Conjunctivae/corneas clear. Neck: Supple, with full range of motion. No jugular venous distention. Trachea midline. Respiratory:  Normal respiratory effort. Clear to auscultation, bilaterally without Rales/Wheezes/Rhonchi. Cardiovascular:  Regular rate and rhythm with normal S1/S2 without murmurs, rubs or gallops. Abdomen: Soft, non-tender, non-distended with normal bowel sounds.   Musculoskeletal:  No clubbing or cyanosis, mild-moderate lower limb edema bilaterally. Full range of motion without deformity. Skin: Skin color, texture, turgor normal.  No rashes or lesions. Neurologic:  Neurovascularly intact without any focal sensory/motor deficits. Cranial nerves: II-XII intact, grossly non-focal.  Psychiatric:  Alert and oriented, thought content appropriate, normal insight  Capillary Refill: Brisk,< 3 seconds   Peripheral Pulses: +2 palpable, equal bilaterally       Labs:     Recent Labs     10/07/20  0345 10/08/20  0554   WBC 13.9* 14.1*   HGB 11.5* 10.7*   HCT 35.5* 33.7*    224     Recent Labs     10/07/20  0345 10/08/20  0554    134*   K 3.9 4.1  4.1   CL 97* 99   CO2 24 24   BUN 23* 21   CREATININE 5.3* 4.8*   CALCIUM 9.1 8.7     Recent Labs     10/07/20  0345   AST 17   ALT 8*   BILIDIR <0.2   BILITOT 0.2*   ALKPHOS 117     Recent Labs     10/07/20  0802   INR 0.94     No results for input(s): CKTOTAL, TROPONINI in the last 72 hours. Urinalysis:      Lab Results   Component Value Date    NITRU NEGATIVE 05/17/2020    WBCUA > 200 05/17/2020    BACTERIA MANY 05/17/2020    RBCUA 25-50 05/17/2020    BLOODU MODERATE 05/17/2020    SPECGRAV 1.019 03/26/2019    GLUCOSEU NEGATIVE 05/17/2020       Intake & Output:  I/O last 3 completed shifts: In: 4263 [P.O.:1050; I.V.:20; Other:4100]  Out: 4400 [Other:4400]  I/O this shift:  In: 2000 [Other:2000]  Out: 18 [Other:2300]      Radiology:     CXR: I have reviewed the CXR with the following interpretation: Atelectasis in Right lower lobe  CT Head: I have reviewed the CT with the following interpretation: No acute findings  EKG:  I have reviewed the EKG with the following interpretation: Ventricular paced rhythm    CT HEAD WO CONTRAST   Final Result   Impression:   1. No acute findings. This document has been electronically signed by:  Doug Phillips MD on 10/07/2020 05:31 AM      All CT scans at this facility use dose modulation, iterative    reconstruction, and/or weight-based   dosing when appropriate to reduce radiation dose to as low as reasonably    achievable. XR CHEST PORTABLE   Final Result   Right lung base atelectasis. No definite consolidation. This document has been electronically signed by: Sophia Kaufman MD on 10/07/2020 04:08 AM              DVT prophylaxis: SCD's    Code Status: Limited      PT/OT Eval Status: Not Consulted    Disposition:SNF    Active Hospital Problems    Diagnosis Date Noted    Other hypotension [I95.89] 06/02/2020    ESRD (end stage renal disease) [facility]) [N18.6]      Thank you Simon Cox MD for the opportunity to be involved in this patient's care.     Electronically signed by Ramon Zaragoza DO on 10/8/2020 at 6:31 PM

## 2020-10-08 NOTE — PROGRESS NOTES
[facility]  INPATIENT OCCUPATIONAL THERAPY  STR ICU STEPDOWN TELEMETRY 4K  EVALUATION    Time:    Time In: 1020  Time Out: 0000  Timed Code Treatment Minutes: 0 Minutes  Minutes: -620          Date: 10/8/2020  Patient Name: Denise Moeller,   Gender: female      MRN: [id number]  : 1934  (80 y.o.)  Referring Practitioner: Dr. Alvino Cueto  Diagnosis: pneumonia  Additional Pertinent Hx: 80 y.o. female who presented to [facility] with possible confusion; patient has a past medical history of pulmonary embolism, TIA, stroke, breast cancer along with diabetes and end-stage renal disease on peritoneal dialysis; she lives at the nursing home and per the ER report MS was called secondary to altered mental status; when the squad got there they then stated that she had low blood pressure; the squad walked into the room patient was asleep and she awoken easily and per report they palpated a strong radial pulse and they stated the patient was not confused at all; there must have been some issues going on at the nurses station at that time; patient did not have any complaints for the EMS and she continues not to have any complaints for me; she is fully alert and oriented, denies any pain; she tells me that she was startled when she was awakened up by the EMS and she had a little bit of nausea but that has subsided and no emesis; she denies any abdominal pain; she denies any fever chills; in the emergency department her blood pressure kind of bounced around however I see that she is on Midodrin 10 mg 3 times a day so she apparently has a history of low blood pressure however asymptomatic; laboratory studies are fairly unremarkable; she has a mild white blood cell count at 13.9; she is being admitted to the hospital service for further care and evaluation    Restrictions/Precautions:  Restrictions/Precautions: Fall Risk, Contact Precautions    Subjective  Chart Reviewed: Yes, Orders, Progress Notes,

## 2020-10-08 NOTE — CARE COORDINATION
10/8/20, 1:38 PM EDT    DISCHARGE PLANNING EVALUATION      EASTON spoke to Jose Lee with [facility]. EASTON informed that attending is ready for discharge. They will accept on Medicaid payment. EASTON faxed chart information as well  10/8/20, 4:32 PM EDT    DISCHARGE PLANNING EVALUATION      EASTON received a call back from [facility] stating that since she was admitted and has pneumonia indicated on her paperwork, they will need to try to pre-cert her and can't take under Medicaid until this is approved or denied. EASTON updated the nurse and attending.

## 2020-10-08 NOTE — DISCHARGE SUMMARY
Discharge Summary     Patient Identification:  Ada Lao  : 1934  MRN: [id number]   Account: [de-identified]     Admit date: 10/7/2020  Discharge date: 10/08/2020   Attending provider: Williams Preston DO        Primary care provider: Shaaron Fothergill, MD     Discharge Diagnoses: Active Problems:    ESRD (end stage renal disease) (HCC)    Other hypotension  Resolved Problems:    Pneumonia       Hospital Course:    Ms. Annabel Way is an 80year old female with a history of TIA, stroke, PE, IDDM, Breast Cancer, and ESRD on PD. She lives at [facility]. On the night of 10/7/20, the nurses reported that she was confused and called EMS. EMS arrived and stated her blood pressure was low, but no other abnormalities were found in her vitals. The patient states she was never confused last night, but was trying to sleep. On arrival to the ER, her Cr was elevated at 5.3. Her WBC was 13.9, however chart review showed that the patient always has a mildly elevated count. She was afebrile. A CT scan show no acute intracranial findings. CXR showed atelectasis at the right lung base. An EKG showed Ventricular paced beats. Her Troponin and BNP were elevated, however not above her baseline. The Patient denied chest pain, shortness of breath, nausea, vomiting, diarrhea, constipation, fevers, chills, syncope, lightheadedness, or dizziness. She stated she knows when she is getting sick, and states she feels just like normal.  She was treated with IVF in the ER. She takes PD every 6 hours. Dr. Abe Arreola was contacted due to PD, and stated the the patient was doing well on her home parameters for dialysis. He stated she would be okay to discharge from a Nephrology standpoint. The Patient has been Hypotensive without symptoms since admission. She states she always runs low due to PD. Since she is asymptomatic, and is being treated with Midodrine out-patient, she is medically stable for discharge.   We have reiterated to her that if she feels symptomatic from her low blood pressure that she should let the nurses or us know. Prior to discharge, one bottle of blood culture returned positive for gram positive cocci in clusters. Since the other cultures are negative, and considering the bacteria involved, this mostly likely represents a skin contaminant. Patient is Afebrile and Asymptomatic.     Discharge Medications:   Wendall Sport   [place] Medication Instructions HNE:[id number]    Printed on:10/08/20 6535   Medication Information                      acetaminophen (TYLENOL) 325 MG tablet  Take 650 mg by mouth every 4 hours as needed for Pain              Amino Acids (LIQUACEL) LIQD  Take 30 mLs by mouth Three times per week; Monday, Wednesday, and Friday related to ESRD             ascorbic acid (VITAMIN C) 500 MG tablet  Take 500 mg by mouth 2 times daily              aspirin 81 MG EC tablet  Take 1 tablet by mouth daily             B Complex-C-Folic Acid (GAURI-DANNY) TABS  Take 1 tablet by mouth daily             bisacodyl (DULCOLAX) 5 MG EC tablet  Take 5 mg by mouth daily as needed for Constipation              ciprofloxacin (CIPRO) 500 MG tablet  Take 500 mg by mouth nightly              clopidogrel (PLAVIX) 75 MG tablet  Take 75 mg by mouth every evening              cyanocobalamin 1000 MCG tablet  Take 1,000 mcg by mouth every evening              docusate sodium (COLACE) 100 MG capsule  Take 200 mg by mouth daily              HYPROMELLOSE OP  Place 1 drop into both eyes as needed (Dry eyes)             insulin aspart (NOVOLOG) 100 UNIT/ML injection vial  Inject into the skin 2 times daily Inject per sliding scale 0-150 = 0 units; 151-200 = 1 unit; 201-250 = 2 units; 251-300 = 3 units; 301-350 = 4 units; 351-400 = 5 units; 401-450 = 6 units             insulin glargine (LANTUS) 100 UNIT/ML injection vial  Inject 16 Units into the skin nightly             ipratropium-albuterol (DUONEB) 0.5-2.5 (3) MG/3ML SOLN nebulizer solution  Inhale 1 vial into the lungs every 6 hours as needed for Shortness of Breath             lactobacillus (CULTURELLE) capsule  Take 1 capsule by mouth daily             levothyroxine (SYNTHROID) 25 MCG tablet  Take 1 tablet by mouth Daily             Magnesium 400 MG TABS  Take 1 tablet by mouth daily              megestrol (MEGACE) 40 MG tablet  Take 1 tablet by mouth 2 times daily             metoprolol succinate (TOPROL XL) 50 MG extended release tablet  Take 50 mg by mouth daily              miconazole (MICOTIN) 2 % powder  Apply topically as needed for Itching Apply topically to abdominal folds as needed for redness             midodrine (PROAMATINE) 10 MG tablet  Take 1 tablet by mouth 3 times daily (with meals)             nitroGLYCERIN (NITROSTAT) 0.4 MG SL tablet  up to max of 3 total doses. If no relief after 1 dose, call 911.              ondansetron (ZOFRAN) 4 MG tablet  Take 1 tablet by mouth every 8 hours as needed for Nausea or Vomiting             OXYGEN  Inhale 2 L into the lungs as needed             pantoprazole (PROTONIX) 40 MG tablet  Take 40 mg by mouth 2 times daily             Per[facility] [employer] [employer] (DIANEAL LOW CALCIUM/1.5% DEX IP)  Inject 2,500 mLs into the peritoneum every 6 hours             polycarbophil (FIBERCON) 625 MG tablet  Take 625 mg by mouth 3 times daily             polyethylene glycol (GLYCOLAX) powder  Take 17 g by mouth 2 times daily              potassium chloride (KLOR-CON M) 20 MEQ extended release tablet  Take 20 mEq by mouth 2 times daily              pravastatin (PRAVACHOL) 40 MG tablet  Take 40 mg by mouth every evening Indications: Blood Cholesterol Abnormal              sevelamer (RENVELA) 800 MG tablet  Take 1 tablet by mouth 3 times daily (with meals)                 Patient Instructions:    Discharge lab work: None  Activity: activity as tolerated  Diet: DIET RENAL;    Code Status: Limited    Follow-up visits:   CM [address] [place]  [address] [facility]  [phone]           Procedures: None    Consults:   STR ED TO IP CONSULT  STR ED TO IP CONSULT  IP CONSULT TO SOCIAL WORK  IP CONSULT TO NEPHROLOGY    Examination:  Vitals:  Vitals:    10/07/20 2315 10/08/20 0304 10/08/20 0845 10/08/20 1145   BP: (!) 110/50 (!) 104/44 (!) 80/38 (!) 90/54   Pulse: 83 84 86 85   Resp: 18 16 18 18   Temp: 98.1 °F (36.7 °C) 98.1 °F (36.7 °C) 97.7 °F (36.5 °C) 97.8 °F (36.6 °C)   TempSrc: Oral Oral Oral Oral   SpO2: 98% 93% 97% 96%   Weight:       Height:         Weight: Weight: 240 lb (108.9 kg)     24 hour intake/output:    Intake/Output Summary (Last 24 hours) at 10/8/2020 1447  Last data filed at 10/8/2020 1421  Gross per 24 hour   Intake 5170 ml   Output 4400 ml   Net 770 ml       General appearance - alert, well appearing, and in no distress  Chest - clear to auscultation, no wheezes, rales or rhonchi, symmetric air entry  Heart - normal rate, regular rhythm, normal S1, S2, no murmurs, rubs, clicks or gallops  Abdomen - soft, nontender, nondistended, no masses or organomegaly  Obese: Yes; Protuberant: No   Neurological - alert, oriented, normal speech, no focal findings or movement disorder noted  Extremities - peripheral pulses normal, mild-moderate edema in lower limbs bilaterally (stated normal), no clubbing or cyanosis  Skin - normal coloration and turgor, no rashes, no suspicious skin lesions noted    Significant Diagnostics:   Radiology: Ct Head Wo Contrast    Result Date: 10/7/2020  CT head without contrast Comparison:  CT,SR  - CT HEAD WO CONTRAST  - 08/04/2020 05:53 PM EDT Findings: Moderate atrophy like change and nonspecific white matter hypodensity. No intracranial mass, midline shift, hydrocephalus, or acute hemorrhage. Orbits, paranasal sinuses, and mastoid air cells are unremarkable. No skull fracture     Impression: 1. No acute findings. This document has been electronically signed by:  Mayra Fried MD on 10/07/2020 05:31 AM All CT scans at this facility use dose modulation, iterative reconstruction, and/or weight-based dosing when appropriate to reduce radiation dose to as low as reasonably achievable. Xr Chest Portable    Result Date: 10/7/2020  1 view chest x-ray Comparison:  SR HORACE  - XR CHEST PORTABLE  - 08/04/2020 01:02 PM EDT Findings: Mild atelectasis right lung base. Low inspiratory volumes. Cardiomegaly. Left subclavian AICD. No acute fracture. Right lung base atelectasis. No definite consolidation. This document has been electronically signed by:  Juan José Mercedes MD on 10/07/2020 04:08 AM       Labs:   Recent Results (from the past 72 hour(s))   CBC auto differential    Collection Time: 10/07/20  3:45 AM   Result Value Ref Range    WBC 13.9 (H) 4.8 - 10.8 thou/mm3    RBC 3.81 (L) 4.20 - 5.40 mill/mm3    Hemoglobin 11.5 (L) 12.0 - 16.0 gm/dl    Hematocrit 35.5 (L) 37.0 - 47.0 %    MCV 93.2 81.0 - 99.0 fL    MCH 30.2 26.0 - 33.0 pg    MCHC 32.4 32.2 - 35.5 gm/dl    RDW-CV 14.1 11.5 - 14.5 %    RDW-SD 47.8 (H) 35.0 - 45.0 fL    Platelets 552 252 - 155 thou/mm3    MPV 11.4 9.4 - 12.4 fL    Seg Neutrophils 51.2 %    Lymphocytes 35.9 %    Monocytes 8.9 %    Eosinophils 2.2 %    Basophils 0.4 %    Immature Granulocytes 1.4 %    Segs Absolute 7.1 1.8 - 7.7 thou/mm3    Lymphocytes Absolute 5.0 (H) 1.0 - 4.8 thou/mm3    Monocytes Absolute 1.2 0.4 - 1.3 thou/mm3    Eosinophils Absolute 0.3 0.0 - 0.4 thou/mm3    Basophils Absolute 0.1 0.0 - 0.1 thou/mm3    Immature Grans (Abs) 0.19 (H) 0.00 - 0.07 thou/mm3    nRBC 0 /100 wbc   Brain Natriuretic Peptide    Collection Time: 10/07/20  3:45 AM   Result Value Ref Range    Pro-BNP 46835.0 (H) 0.0 - 1800.0 pg/mL   Basic Metabolic Panel    Collection Time: 10/07/20  3:45 AM   Result Value Ref Range    Sodium 137 135 - 145 meq/L    Potassium 3.9 3.5 - 5.2 meq/L    Chloride 97 (L) 98 - 111 meq/L    CO2 24 23 - 33 meq/L    Glucose 73 70 - 108 mg/dL    BUN 23 (H) 7 - 22 mg/dL    CREATININE 5.3 (HH) 0.4 - 1.2 mg/dL    Calcium 9.1 8.5 - 10.5 mg/dL   Hepatic function panel    Collection Time: 10/07/20  3:45 AM   Result Value Ref Range    Alb 1.7 (L) 3.5 - 5.1 g/dL    Total Bilirubin 0.2 (L) 0.3 - 1.2 mg/dL    Bilirubin, Direct <0.2 0.0 - 0.3 mg/dL    Alkaline Phosphatase 117 38 - 126 U/L    AST 17 5 - 40 U/L    ALT 8 (L) 11 - 66 U/L    Total Protein 5.2 (L) 6.1 - 8.0 g/dL   Lipase    Collection Time: 10/07/20  3:45 AM   Result Value Ref Range    Lipase 17.9 5.6 - 51.3 U/L   Troponin    Collection Time: 10/07/20  3:45 AM   Result Value Ref Range    Troponin T 0.126 (A) ng/ml   Magnesium    Collection Time: 10/07/20  3:45 AM   Result Value Ref Range    Magnesium 1.3 (L) 1.6 - 2.4 mg/dL   TSH without Reflex    Collection Time: 10/07/20  3:45 AM   Result Value Ref Range    TSH 4.630 (H) 0.400 - 4.200 uIU/mL   Ethanol    Collection Time: 10/07/20  3:45 AM   Result Value Ref Range    ETHYL ALCOHOL, SERUM < 0.01 0.00 %   Culture, Blood 1    Collection Time: 10/07/20  3:45 AM    Specimen: Blood   Result Value Ref Range    Blood Culture, Routine No growth-preliminary  (A)     Organism gram positive cocci in clusters (A)    Culture, Blood 2    Collection Time: 10/07/20  3:45 AM    Specimen: Blood   Result Value Ref Range    Blood Culture, Routine No growth-preliminary     Anion Gap    Collection Time: 10/07/20  3:45 AM   Result Value Ref Range    Anion Gap 16.0 8.0 - 16.0 meq/L   Osmolality    Collection Time: 10/07/20  3:45 AM   Result Value Ref Range    Osmolality Calc 276.1 275.0 - 300.0 mOsmol/kg   Glomerular Filtration Rate, Estimated    Collection Time: 10/07/20  3:45 AM   Result Value Ref Range    Est, Glom Filt Rate 8 (A) ml/min/1.73m2   Blood ID, Molecular    Collection Time: 10/07/20  3:45 AM   Result Value Ref Range    CARBAPENEM RESITANT FILM ARRAY NA Not Detected    METHICILLIN RESISTANT FILM ARRAY NA Not Detected    VANCOMYCIN RESISTANT FILM ARRAY NA Not Detected Range    Ammonia 66 (H) 11 - 60 umol/L   Body Fluid Cell Count with Differential    Collection Time: 10/07/20  4:55 AM   Result Value Ref Range    Specimen DILAN. DIALYSATE     Color COLORLESS     Character, Body Fluid CLEAR     Total Volume Received Body Fluid 128.0 ml    Total Nucleated cells Body Fluid 19 0 - 500 /cumm    Body Fluid RBC < 2000 /cumm    Polymorphonuclear Cells Body Fluid 15.8 %    Mononuclear Cells Body Fluid 84.2 %    Pathologist Review RONLAD GREGORIO Culture, Bottle, Body Fluid    Collection Time: 10/07/20  4:55 AM    Specimen: Peritoneal Dialysis Fluid   Result Value Ref Range    Body Fluid Culture, Sterile No growth-preliminary     Gram Stain    Collection Time: 10/07/20  4:55 AM    Specimen: Peritoneal Dialysis Fluid   Result Value Ref Range    Gram Stain Result       Rare segmented neutrophils observed. No organisms observed.  performed on cytospun specimen    MRSA by PCR    Collection Time: 10/07/20  7:00 AM   Result Value Ref Range    MRSA SCREEN RT-PCR POSITIVE (A)    POCT Glucose    Collection Time: 10/07/20  7:54 AM   Result Value Ref Range    POC Glucose 62 (L) 70 - 108 mg/dl   VRE Screen by PCR    Collection Time: 10/07/20  8:00 AM   Result Value Ref Range    Vancomycin Resistant Enterococcus NEGATIVE    APTT    Collection Time: 10/07/20  8:02 AM   Result Value Ref Range    aPTT 24.1 22.0 - 38.0 seconds   Protime-INR    Collection Time: 10/07/20  8:02 AM   Result Value Ref Range    INR 0.94 0.85 - 1.13   POCT Glucose    Collection Time: 10/07/20  8:37 AM   Result Value Ref Range    POC Glucose 93 70 - 108 mg/dl   POCT Glucose    Collection Time: 10/07/20 11:15 AM   Result Value Ref Range    POC Glucose 154 (H) 70 - 108 mg/dl   POCT Glucose    Collection Time: 10/07/20  4:26 PM   Result Value Ref Range    POC Glucose 128 (H) 70 - 108 mg/dl   POCT Glucose    Collection Time: 10/07/20  9:04 PM   Result Value Ref Range    POC Glucose 114 (H) 70 - 108 mg/dl   CBC auto differential signed by Juliet Mullen DO on 10/8/2020 at 2:47 PM

## 2020-10-08 NOTE — PROGRESS NOTES
[facility]  INPATIENT PHYSICAL THERAPY  EVALUATION  [facility] ICU STEPDOWN TELEMETRY 4K - 4K-15/015-A    Time In: 0383  Time Out: 3778  Timed Code Treatment Minutes: 10 Minutes  Minutes: 23          Date: 10/8/2020  Patient Name: Lucinda Maria,  Gender:  female        MRN: [id number]  : 1934  (80 y.o.)  Referral Date : 10/07/20   Referring Practitioner: Sagrario Pulido MD  Diagnosis: Pneumonia  Additional Pertinent Hx: Per H&P: \"80 y.o. female who presented to [facility] with possible confusion; patient has a past medical history of pulmonary embolism, TIA, stroke, breast cancer along with diabetes and end-stage renal disease on peritoneal dialysis; she lives at the nursing home and per the ER report MS was called secondary to altered mental status; when the squad got there they then stated that she had low blood pressure; the squad walked into the room patient was asleep and she awoken easily and per report they palpated a strong radial pulse and they stated the patient was not confused at all; there must have been some issues going on at the nurses station at that time; patient did not have any complaints for the EMS and she continues not to have any complaints for me; she is fully alert and oriented, denies any pain; she tells me that she was startled when she was awakened up by the EMS and she had a little bit of nausea but that has subsided and no emesis; she denies any abdominal pain; she denies any fever chills; in the emergency department her blood pressure kind of bounced around however I see that she is on Midodrin 10 mg 3 times a day so she apparently has a history of low blood pressure however asymptomatic; laboratory studies are fairly unremarkable; she has a mild white blood cell count at 13.9; she is being admitted to the hospital service for further care and evaluation. \"     Restrictions/Precautions:  Restrictions/Precautions: Fall Risk, Contact Precautions    Subjective:  Chart Reviewed: Yes  Patient assessed for rehabilitation services?: Yes  Family / Caregiver Present: No  Subjective: Patient in room, agreeable to PT, in bed upon arrival.  RN okay with PT session. General:  Overall Orientation Status: Within Functional Limits    Vision: Impaired  Vision Exceptions: Wears glasses at all times    Hearing: Exceptions to [facility]  Hearing Exceptions: Hard of hearing/hearing concerns         Pain: 0/10    Social/Functional History:    Type of Home: Facility([facility])             ADL Assistance: Needs assistance     Homemaking Responsibilities: No  Ambulation Assistance: (non ambulatory >1 year)  Transfer Assistance: Needs assistance(reports unable to stand ~1 year, has worked with therapy multiple times in SNF, staff uses [facility]ngela to transfer patient)    Active : No     Additional Comments: Patient reports staff using ángela to transfer patient, pt unable to stand ambulating~1 year    OBJECTIVE:  Range of Motion:  Right Lower Extremity: WFL  Left Lower Extremity: [place]   Knee flexion limited actively due to weakness    Strength:  Right Lower Extremity: Impaired - hip flexion 2+/5, knee 2+/5, ankle 3-/5  Left Lower Extremity: Impaired - hip flexion 2+/5, knee 3-/5, ankle 3-/5    Balance:  Static Sitting Balance:  Minimal Assistance, Moderate Assistance  Dynamic Sitting Balance: Minimal Assistance, Moderate Assistance   Pt fearful of falling sitting edge of bed ~2 minutes, pt started reporting a little bit of lightheadedness and laid back down in supine. Bed Mobility:  Rolling to Right: Maximum Assistance   Supine to Sit: Maximum Assistance  Sit to Supine: Maximum Assistance    Assist with trunk and lower extremities with supine < > sit.   Recommendation to work on sitting edge of bed at [facility] to increase strength    Transfers:  Not Tested-unable to stand ~1 year, impaired sitting balance, fearful of falling sitting edge of bed    Ambulation:  Not Tested-non ambulatory    Exercise:  Patient was guided in 1 set(s) 10-20 reps of exercise to both lower extremities. Supine: ankle pumps x 20, heel slides with active assist x10, hip abduction x 10, quad sets 5\"x10, glut sets 5\"x10, short arc quad x10. Exercises were completed for increased independence with functional mobility. Functional Outcome Measures: Completed  AM-PAC Inpatient Mobility without Stair Climbing Raw Score : 7  AM-PAC Inpatient without Stair Climbing T-Scale Score : 28.66    ASSESSMENT:  Activity Tolerance:  Patient tolerance of  treatment: fair. Treatment Initiated: Treatment and education initiated within context of evaluation. Evaluation time included review of current medical information, gathering information related to past medical, social and functional history, completion of standardized testing, formal and informal observation of tasks, assessment of data and development of plan of care and goals. Treatment time included skilled education and facilitation of tasks to increase safety and independence with functional mobility for improved independence and quality of life. Therapeutic exercise completed to improve patient's lower extremity strength. Assessment: Body structures, Functions, Activity limitations: Decreased functional mobility , Decreased endurance, Decreased strength, Decreased balance  Assessment: The evaluation of Ms. Kami Duggan indicates impaired functional mobility. Pt requiring max assist with bed mobility and min to moderate assistance to sit edge of bed. Pt also demosntrating lower extremity weakness. Patient would benefit from skilled PT services to improve her ability to complete functional mobility, reduce her risk for falls and allow patient to return to [facility].   Prognosis: Good   Co-morbidities: weakness, hx CVA, OA,ESRD, DM, anxiety  Pt with some lightheadedness with mobility, confusion prior to admission, pt anxious at times    REQUIRES PT FOLLOW UP: Yes    Discharge Recommendations:  Discharge Recommendations: [address], Patient would benefit from continued therapy after discharge    Patient Education:  PT Education: PT Role, Plan of Care, Goals    Equipment Recommendations:  Equipment Needed: No    Plan:  Times per week: 3-5xGM  Times per day: Daily  Current Treatment Recommendations: Strengthening, Transfer Training, Endurance Training, Patient/Caregiver Education & Training, Balance Training, Functional Mobility Training, Safety Education & Training    Goals:  Patient goals : return to [facility] Financial term goals  Time Frame for Short term goals: at discharge  Short term goal 1: Patient will complete supine < > sit with moderate assistance to transfer in/out of bed safely. Short term goal 2: Patient will sit edge of bed x3 minutes with CGA/min while completing dynamic seated activity to improve trunk strength for transfers. Long term goals  Time Frame for Long term goals : N/A due to short estimated length fo stay    Following session, patient left in safe position with all fall risk precautions in place.

## 2020-10-08 NOTE — DISCHARGE SUMMARY
sleep. On arrival to the ER, her Cr was elevated at 5.3. Her WBC was 13.9, however chart review showed that the patient always has a mildly elevated count. She was afebrile. A CT scan show no acute intracranial findings. CXR showed atelectasis at the right lung base. An EKG showed Ventricular paced beats. Her Troponin and BNP were elevated, however not above her baseline. The Patient denied chest pain, shortness of breath, nausea, vomiting, diarrhea, constipation, fevers, chills, syncope, lightheadedness, or dizziness. She stated she knows when she is getting sick, and states she feels just like normal.  She was treated with IVF in the ER. She takes PD every 6 hours. Dr. Mariann Peters was contacted due to PD, and stated the the patient was doing well on her home parameters for dialysis. He stated she would be okay to discharge from a Nephrology standpoint. The Patient has been Hypotensive without symptoms since admission. She states she always runs low due to PD. Since she is asymptomatic, and is being treated with Midodrine out-patient, she is medically stable for discharge. We have reiterated to her that if she feels symptomatic from her low blood pressure that she should let the nurses or us know. One bottle of blood culture returned positive for gram positive cocci in clusters. Since the other cultures are negative, and considering the bacteria involved, this mostly likely represents a skin contaminant. Patient is Afebrile and Asymptomatic.     Past Medical History:          Diagnosis Date    Anxiety     Atherosclerotic heart disease of native coronary artery without angina pectoris     CAD (coronary artery disease)     nonobstructive    Cancer ([facility] [address] 2007    breast cancer    Cardiomyopathy, nonischemic ([facility] [address]     Dr. Colin Cavazos Cerebral artery occlusion with cerebral infarction ([facility] [address]     CHF (congestive heart failure) (HCC)     CKD (chronic kidney disease) stage 3, GFR distal LAD (approximately  50% stenosis) angiographically normal CA. Mild to moderate LV systolic dysfunction. EF 35%. Mildly elevated LV end diastolic pressure. No significant MR. Systemic hypertension.  CARDIAC DEFIBRILLATOR PLACEMENT  02/02/2011    CARDIOVASCULAR STRESS TEST  03/23/10    EF 30%  Severe LV Dys    COLONOSCOPY      Dr. Kristel Casarez    DILATATION, ESOPHAGUS      DOPPLER ECHOCARDIOGRAPHY  03/22/10    EF 45%. LV mildly dilated. Systolic function mildly reducted. No regional wall motion abnormalities. Wall thickness mildly increased. LA mildly dilated. MV mild annular calification. Mild TR. Ventricular septum tickness mildly increased.  ENDOSCOPY, COLON, DIAGNOSTIC      EYE SURGERY      bilateral cataracts    EYE SURGERY Left 9/2014    laser surgery for retinopathy?  FOOT SURGERY Left 1/12/2016    OTHER SURGICAL HISTORY  10/18/2018    CardioMEMS    PACEMAKER PLACEMENT      LA EGD BALLOON DILATION ESOPHAGUS <30 MM DIAM N/A 9/18/2017    EGD ESOPHAGOGASTRODUODENOSCOPY DILATATION performed by Balwinder Qiu MD at [facility]    LA ESOPHAGOGASTRODUODENOSCOPY TRANSORAL DIAGNOSTIC  9/18/2017    EGD ESOPHAGOGASTRODUODENOSCOPY performed by Balwinder Qiu MD at [facility]       Medications Prior to Admission:      Prior to Admission medications    Medication Sig Start Date End Date Taking?  Authorizing Provider   ciprofloxacin (CIPRO) 500 MG tablet Take 500 mg by mouth nightly    Yes Historical Provider, MD   Magnesium 400 MG TABS Take 1 tablet by mouth daily    Yes Historical Provider, MD   miconazole (MICOTIN) 2 % powder Apply topically as needed for Itching Apply topically to abdominal folds as needed for redness   Yes Historical Provider, MD   B Complex-C-Folic Acid (GAURI-DANNY) TABS Take 1 tablet by mouth daily   Yes Historical Provider, MD   [facility] (DIANEAL LOW CALCIUM/1.5% DEX IP) Inject 2,500 mLs into the peritoneum every 6 hours   Yes Historical Provider, MD   insulin glargine (LANTUS) 100 UNIT/ML injection vial Inject 16 Units into the skin nightly 8/6/20  Yes Theodora Miller MD   lactobacillus (CULTURELLE) capsule Take 1 capsule by mouth daily 8/7/20  Yes Theodora Miller MD   aspirin 81 MG EC tablet Take 1 tablet by mouth daily 6/16/20  Yes Indigo Lee MD   levothyroxine (SYNTHROID) 25 MCG tablet Take 1 tablet by mouth Daily 6/16/20  Yes Indigo Lee MD   midodrine (PROAMATINE) 10 MG tablet Take 1 tablet by mouth 3 times daily (with meals) 6/15/20  Yes Indigo Lee MD   metoprolol succinate (TOPROL XL) 50 MG extended release tablet Take 50 mg by mouth daily    Yes Historical Provider, MD   polycarbophil (FIBERCON) 625 MG tablet Take 625 mg by mouth 3 times daily   Yes Historical Provider, MD   potassium chloride (KLOR-CON M) 20 MEQ extended release tablet Take 20 mEq by mouth 2 times daily    Yes Historical Provider, MD   megestrol (MEGACE) 40 MG tablet Take 1 tablet by mouth 2 times daily 4/29/20  Yes Tito Barr PA-C   Amino Acids (LIQUACEL) LIQD Take 30 mLs by mouth Three times per week; Monday, Wednesday, and Friday related to ESRD   Yes Historical Provider, MD   sevelamer (RENVELA) 800 MG tablet Take 1 tablet by mouth 3 times daily (with meals)   Yes Historical Provider, MD   polyethylene glycol (GLYCOLAX) powder Take 17 g by mouth 2 times daily    Yes Historical Provider, MD   insulin aspart (NOVOLOG) 100 UNIT/ML injection vial Inject into the skin 2 times daily Inject per sliding scale 0-150 = 0 units; 151-200 = 1 unit; 201-250 = 2 units; 251-300 = 3 units; 301-350 = 4 units; 351-400 = 5 units; 401-450 = 6 units   Yes Historical Provider, MD   cyanocobalamin 1000 MCG tablet Take 1,000 mcg by mouth every evening    Yes Historical Provider, MD   pantoprazole (PROTONIX) 40 MG tablet Take 40 mg by mouth 2 times daily   Yes Historical Provider, MD ascorbic acid (VITAMIN C) 500 MG tablet Take 500 mg by mouth 2 times daily    Yes Historical Provider, MD   docusate sodium (COLACE) 100 MG capsule Take 200 mg by mouth daily    Yes Historical Provider, MD   clopidogrel (PLAVIX) 75 MG tablet Take 75 mg by mouth every evening    Yes Historical Provider, MD   pravastatin (PRAVACHOL) 40 MG tablet Take 40 mg by mouth every evening Indications: Blood Cholesterol Abnormal  5/8/16  Yes Historical Provider, MD   acetaminophen (TYLENOL) 325 MG tablet Take 650 mg by mouth every 4 hours as needed for Pain    Yes Historical Provider, MD   HYPROMELLOSE OP Place 1 drop into both eyes as needed (Dry eyes)    Historical Provider, MD   bisacodyl (DULCOLAX) 5 MG EC tablet Take 5 mg by mouth daily as needed for Constipation     Historical Provider, MD   OXYGEN Inhale 2 L into the lungs as needed    Historical Provider, MD   ondansetron (ZOFRAN) 4 MG tablet Take 1 tablet by mouth every 8 hours as needed for Nausea or Vomiting 5/21/20   Maranda Au MD   ipratropium-albuterol (DUONEB) 0.5-2.5 (3) MG/3ML SOLN nebulizer solution Inhale 1 vial into the lungs every 6 hours as needed for Shortness of Breath    Historical Provider, MD   nitroGLYCERIN (NITROSTAT) 0.4 MG SL tablet up to max of 3 total doses. If no relief after 1 dose, call 911. 10/18/18   Ronni Wilcox MD       Allergies:  Heparin    Social History:      The patient currently lives at [facility]. TOBACCO:   reports that she quit smoking about 68 years ago. Her smoking use included cigarettes. She has a 1.00 pack-year smoking history. She has never used smokeless tobacco.  ETOH:   reports no history of alcohol use. Family History:      Reviewed in detail and negative for DM, CAD, Cancer, CVA.  Positive as follows:        Problem Relation Age of Onset    Diabetes Mother    Lanie Shore Pacemaker Mother     Stroke Mother     Heart Disease Mother     Diabetes Father     Cancer Sister     Cancer Brother     lesions. Neurologic:  Neurovascularly intact without any focal sensory/motor deficits. Cranial nerves: II-XII intact, grossly non-focal.  Psychiatric:  Alert and oriented, thought content appropriate, normal insight  Capillary Refill: Brisk,< 3 seconds   Peripheral Pulses: +2 palpable, equal bilaterally       Labs:     Recent Labs     10/07/20  0345 10/08/20  0554   WBC 13.9* 14.1*   HGB 11.5* 10.7*   HCT 35.5* 33.7*    224     Recent Labs     10/07/20  0345 10/08/20  0554    134*   K 3.9 4.1  4.1   CL 97* 99   CO2 24 24   BUN 23* 21   CREATININE 5.3* 4.8*   CALCIUM 9.1 8.7     Recent Labs     10/07/20  0345   AST 17   ALT 8*   BILIDIR <0.2   BILITOT 0.2*   ALKPHOS 117     Recent Labs     10/07/20  0802   INR 0.94     No results for input(s): CKTOTAL, TROPONINI in the last 72 hours. Urinalysis:      Lab Results   Component Value Date    NITRU NEGATIVE 05/17/2020    WBCUA > 200 05/17/2020    BACTERIA MANY 05/17/2020    RBCUA 25-50 05/17/2020    BLOODU MODERATE 05/17/2020    SPECGRAV 1.019 03/26/2019    GLUCOSEU NEGATIVE 05/17/2020       Intake & Output:  I/O last 3 completed shifts: In: 7018 [P.O.:1050; I.V.:20; Other:4100]  Out: 4400 [Other:4400]  I/O this shift:  In: 2000 [Other:2000]  Out: 18 [Other:2300]      Radiology:     CXR: I have reviewed the CXR with the following interpretation: Atelectasis in Right lower lobe  CT Head: I have reviewed the CT with the following interpretation: No acute findings  EKG:  I have reviewed the EKG with the following interpretation: Ventricular paced rhythm    CT HEAD WO CONTRAST   Final Result   Impression:   1. No acute findings. This document has been electronically signed by: Dodie Hernandez MD on 10/07/2020 05:31 AM      All CT scans at this facility use dose modulation, iterative    reconstruction, and/or weight-based   dosing when appropriate to reduce radiation dose to as low as reasonably    achievable.          XR CHEST PORTABLE Final Result   Right lung base atelectasis. No definite consolidation. This document has been electronically signed by: Neva Castleman, MD on 10/07/2020 04:08 AM              DVT prophylaxis: SCD's    Code Status: Limited      PT/OT Eval Status: Consulted    Disposition:SNF    Active Hospital Problems    Diagnosis Date Noted    Other hypotension [I95.89] 06/02/2020    ESRD (end stage renal disease) [facility]) [N18.6]        Thank you Lula Lockhart MD for the opportunity to be involved in this patient's care.     Electronically signed by Juliet Mullen DO on 10/8/2020 at 4:42 PM

## 2020-10-08 NOTE — PROGRESS NOTES
CLINICAL PHARMACY: DISCHARGE MED RECONCILIATION/REVIEW    [facility] ([facility]) Select Patient?: No  Total # of Interventions Recommended: 0   -   Total # Interventions Accepted: 0  Intervention Severity:   - Level 1 Intervention Present?: No   - Level 2 #: 0   - Level 3 #: 0   Time Spent (min): 15    Additional Documentation:    Radha Acevedo, PharmD, BCPS   10/8/2020  3:01 PM

## 2020-10-08 NOTE — CARE COORDINATION
Update: plan ECF today; Jayme Strickland following  Electronically signed by Kay Del Real RN on 10/8/2020 at 11:12 AM

## 2020-10-08 NOTE — PROGRESS NOTES
[facility]         Renal Inpatient Follow-Up note         10/8/2020 8:47 AM    Pt Name:   Paula Bras:   1934  Attending:   Leeann Alvarenga DO    Chief Complaint : Devika Casper is a 80 y.o. female being followed by nephrology for ESRD on peritoneal dialysis    Interval History :   Patient seen and examined by me.  No distress  Feels okay denies any chest pain or shortness of breath  She is still clearly confused to some degree     Scheduled Medications :    sodium chloride (PF)  10 mL Intravenous Daily    ascorbic acid  500 mg Oral BID    aspirin  81 mg Oral Daily    clopidogrel  75 mg Oral QPM    cyanocobalamin  1,000 mcg Oral QPM    docusate sodium  100 mg Oral Daily    insulin glargine  16 Units Subcutaneous Nightly    lactobacillus  1 capsule Oral Daily    levothyroxine  25 mcg Oral Daily    megestrol  40 mg Oral BID    metoprolol succinate  50 mg Oral Daily    miconazole   Topical BID    midodrine  10 mg Oral TID WC    pantoprazole  40 mg Oral BID    polycarbophil  625 mg Oral TID    polyethylene glycol  17 g Oral BID    potassium chloride  20 mEq Oral BID    pravastatin  40 mg Oral Nightly    sevelamer  800 mg Oral TID WC    sodium chloride flush  10 mL Intravenous 2 times per day    dianeal lo-nikko 1.5%  2,000 mL Intraperitoneal Q6H    insulin lispro  0-6 Units Subcutaneous TID WC    insulin lispro  0-3 Units Subcutaneous Nightly      dextrose         Vitals :  BP (!) 104/44   Pulse 84   Temp 98.1 °F (36.7 °C) (Oral)   Resp 16   Ht 5' 6\" (1.676 m)   Wt 240 lb (108.9 kg)   SpO2 93%   BMI 38.74 kg/m²     24HR INTAKE/OUTPUT:      Intake/Output Summary (Last 24 hours) at 10/8/2020 0847  Last data filed at 10/8/2020 0315  Gross per 24 hour   Intake 8780 ml   Output 9400 ml   Net -620 ml     Last 3 weights  Wt Readings from Last 3 Encounters:   10/07/20 240 lb (108.9 kg)   08/06/20 239 lb 4.8 oz (108.5 kg)   06/15/20 218 lb 14.4 oz (99.3 kg)           Physical Exam :  General Appearance:  Well developed. No distress  Mouth/Throat:  Oral mucosa moist  Neck:  Supple, no JVD  Lungs:  Breath sounds: clear  Heart[de-identified]  S1,S2 heard  Abdomen:  Soft, non - tender  Musculoskeletal:  Edema -no significant edema noted         Last 3 CBC   Recent Labs     10/07/20  0345 10/08/20  0554   WBC 13.9* 14.1*   RBC 3.81* 3.51*   HGB 11.5* 10.7*   HCT 35.5* 33.7*    224     Last 3 CMP  Recent Labs     10/07/20  0345 10/08/20  0554    134*   K 3.9 4.1  4.1   CL 97* 99   CO2 24 24   BUN 23* 21   CREATININE 5.3* 4.8*   CALCIUM 9.1 8.7   LABALBU 1.7*  --    BILITOT 0.2*  --              ASSESSMENT / Plan   Renal -ESRD on peritoneal dialysis, volume status appears reasonable electrolytes stable as well  · Reviewed the peritoneal dialysis data having 2200 mL per exchange  · Continue current PD prescription     Electrolytes appear to be within normal limits on potassium supplement can continue for now  Chronic hypotension on midodrine, maintaining well  Hx of diabetes mellitus  Change in mental status appears to be at baseline  Secondary hyperparathyroidism  Anemia of end-stage renal disease  meds reviewed and D/W patient and the nursing staff    OTTONIEL Cabral D.  [facility].

## 2020-10-08 NOTE — PROGRESS NOTES
Pharmacy Note  [employer] Result    Lani Amaya is a 80 y.o. female, with a positive blood culture result    [employer] message received from Caleb Lucas , laboratory employee on 10/8/2020 at 2:17 PM    First Gram stain result: gram positive cocci in clusters    [employer] BCID result: contaminant    [employer] BCID and gram stain congruent? No    Suspected source? unknown    Patient chart reviewed for signs/symptoms of infection: Yes  BP (!) 90/54   Pulse 85   Temp 97.8 °F (36.6 °C) (Oral)   Resp 18   Ht 5' 6\" (1.676 m)   Wt 240 lb (108.9 kg)   SpO2 96%   BMI 38.74 kg/m²   Lab Results   Component Value Date    WBC 14.1 (H) 10/08/2020       Allergies reviewed  Heparin    Renal function reviewed  Estimated Creatinine Clearance: 11 mL/min (A) (based on SCr of 4.8 mg/dL [facility])).     Current antibiotic regimen: no antibiotics    Intervention needed: No    Individual contacted: Provider Dr. Glenna Hutton  10/8/2020 2:17 PM

## 2020-10-09 PROBLEM — J18.9 PNEUMONIA DUE TO ORGANISM: Status: RESOLVED | Noted: 2020-01-01 | Resolved: 2020-01-01

## 2020-10-09 NOTE — CARE COORDINATION
Update: plan ECF today if precerted; Barbie Lopez following  Electronically signed by Giovanny Lockhart RN on 10/9/2020 at 1:38 PM

## 2020-10-09 NOTE — CARE COORDINATION
10/9/20, 3:07 PM EDT    DISCHARGE PLANNING EVALUATION      EASTON spoke to Kb Donovan with [facility]. Pre-cert is still not back. EASTON advised him to call the nurse if should happen after SW leaves for the day.  EASTON updated the nurse and attending

## 2020-10-09 NOTE — PROGRESS NOTES
low, but no other abnormalities were found in her vitals.  The patient states she was never confused last night, but was trying to sleep.  On arrival to the ER, her Cr was elevated at 5.3. Rajesh Montez WBC was 13.9, however chart review showed that the patient always has a mildly elevated count.  She was afebrile.  A CT scan show no acute intracranial findings.  CXR showed atelectasis at the right lung base.  An EKG showed Ventricular paced beats.  Her Troponin and BNP were elevated, however not above her baseline.  The Patient denied chest pain, shortness of breath, nausea, vomiting, diarrhea, constipation, fevers, chills, syncope, lightheadedness, or dizziness.  She stated she knows when she is getting sick, and states she feels just like normal.  She was treated with IVF in the ER. Tereza Chowdhurylu takes PD every 6 hours.              Dr. Abraham Jenkins was contacted due to PD, and stated the the patient was doing well on her home parameters for dialysis. [facility] stated she would be okay to discharge from a Nephrology standpoint.  The Patient has been Hypotensive without symptoms since admission.  She states she always runs low due to PD.  Since she is asymptomatic, and is being treated with Midodrine out-patient, she is medically stable for discharge.  We have reiterated to her that if she feels symptomatic from her low blood pressure that she should let the nurses or us know.              One bottle of blood culture returned positive for gram positive cocci in clusters.  Since the other cultures are negative, and considering the bacteria involved, this mostly likely represents a skin contaminant.  Patient is Afebrile and Asymptomatic. 10/9/20: Patient doing well, denies chest pain, shortness of breath, nausea, vomiting, diarrhea, or issues with PD. She would like to go home. Waiting on Pre Cert. Patient is medically clear.     Past Medical History:          Diagnosis Date    Anxiety     Atherosclerotic heart disease of native coronary artery without angina pectoris     CAD (coronary artery disease)     nonobstructive    Cancer ([facility] 2007    breast cancer    Cardiomyopathy, nonischemic ([facility]     Dr. John Fine Cerebral artery occlusion with cerebral infarction ([facility]     CHF (congestive heart failure) ([address]     CKD (chronic kidney disease) stage 3, GFR 30-59 ml/min     Dr. Eduardo Cordoba Congenital heart disease     COPD exacerbation ([facility]     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 ([address]     DM2 (diabetes mellitus, type 2) ([address] 1998    with CKD3, R foot ulcer and hx of prior ulcerations and PVD    Dyspnea     ESRD (end stage renal disease) ([address]     Ex-smoker 10/2/2012    GERD (gastroesophageal reflux disease)     GERD (gastroesophageal reflux disease)     Heart failure with preserved ejection fraction ([facility]     Dr. John Fine Hemodialysis patient [facility])     His of Heparin induced thrombocytopenia     History of breast cancer     right 1982 s/p mastectomy and chemo, left 2007 s/p mastectomy    History of CVA (cerebrovascular accident)     History of pulmonary embolism 05/2014    on [address] (coumadin)    Hyperkalemia     Hyperlipidemia     Hypertension     pulmonary    Iron deficiency anemia     Dr. 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    Personal history of transient ischemic attack (TIA), and cerebral infarction without residual deficits     Pneumonia 10/7/2020    Pulmonary embolism ([address]     [facility] ICD 11/17/2011    Thyroid disease     Venous insufficiency (chronic) (peripheral)     Vitamin D deficiency     Vitamin D deficiency     Weakness        Past Surgical History:          Procedure Laterality Date    BREAST SURGERY      s/p right mastectomy 1980's and left mastectomy 2007    CARDIAC CATHETERIZATION  11-    Hemodynamics w pulmonary hypertension, systemic hypertension and no sats gradient difference. No aortic stenosis. No mitral stenosis. Coronaries; mild disease of the LAD not more than 40%. LV; severe LV dysfunction and EF 30-35%.  CARDIAC CATHETERIZATION  05-    Mild disease of small distal LAD (approximately  50% stenosis) angiographically normal CA. Mild to moderate LV systolic dysfunction. EF 35%. Mildly elevated LV end diastolic pressure. No significant MR. Systemic hypertension.  CARDIAC DEFIBRILLATOR PLACEMENT  02/02/2011    CARDIOVASCULAR STRESS TEST  03/23/10    EF 30%  Severe LV Dys    COLONOSCOPY      Dr. Eduardo Leger    DILATATION, ESOPHAGUS      DOPPLER ECHOCARDIOGRAPHY  03/22/10    EF 45%. LV mildly dilated. Systolic function mildly reducted. No regional wall motion abnormalities. Wall thickness mildly increased. LA mildly dilated. MV mild annular calification. Mild TR. Ventricular septum tickness mildly increased.  ENDOSCOPY, COLON, DIAGNOSTIC      EYE SURGERY      bilateral cataracts    EYE SURGERY Left 9/2014    laser surgery for retinopathy?  FOOT SURGERY Left 1/12/2016    OTHER SURGICAL HISTORY  10/18/2018    CardioMEMS    PACEMAKER PLACEMENT      [facility] EGD BALLOON DILATION ESOPHAGUS <30 MM DIAM N/A 9/18/2017    EGD ESOPHAGOGASTRODUODENOSCOPY DILATATION performed by Joseph Wills MD at [facility]    VA ESOPHAGOGASTRODUODENOSCOPY TRANSORAL DIAGNOSTIC  9/18/2017    EGD ESOPHAGOGASTRODUODENOSCOPY performed by Joseph Wills MD at [facility]       Medications Prior to Admission:      Prior to Admission medications    Medication Sig Start Date End Date Taking?  Authorizing Provider   ciprofloxacin (CIPRO) 500 MG tablet Take 500 mg by mouth nightly    Yes Historical Provider, MD   Magnesium 400 MG TABS Take 1 tablet by mouth daily    Yes Historical Provider, MD   miconazole (MICOTIN) 2 % powder Apply topically as needed for Itching Apply topically to abdominal folds as needed for redness   Yes Historical Provider, MD CHAIREZ Complex-C-Folic Acid (GAURI-DANNY) TABS Take 1 tablet by mouth daily   Yes Historical Provider, MD   [facility] (DIANEAL LOW CALCIUM/1.5% DEX IP) Inject 2,500 mLs into the peritoneum every 6 hours   Yes Historical Provider, MD   insulin glargine (LANTUS) 100 UNIT/ML injection vial Inject 16 Units into the skin nightly 8/6/20  Yes John Saldaña MD   lactobacillus (CULTURELLE) capsule Take 1 capsule by mouth daily 8/7/20  Yes John Saldaña MD   aspirin 81 MG EC tablet Take 1 tablet by mouth daily 6/16/20  Yes Katarina Nielsen MD   levothyroxine (SYNTHROID) 25 MCG tablet Take 1 tablet by mouth Daily 6/16/20  Yes Katarina Nielsen MD   midodrine (PROAMATINE) 10 MG tablet Take 1 tablet by mouth 3 times daily (with meals) 6/15/20  Yes Katarina Nielsen MD   metoprolol succinate (TOPROL XL) 50 MG extended release tablet Take 50 mg by mouth daily    Yes Historical Provider, MD   polycarbophil (FIBERCON) 625 MG tablet Take 625 mg by mouth 3 times daily   Yes Historical Provider, MD   potassium chloride (KLOR-CON M) 20 MEQ extended release tablet Take 20 mEq by mouth 2 times daily    Yes Historical Provider, MD   megestrol (MEGACE) 40 MG tablet Take 1 tablet by mouth 2 times daily 4/29/20  Yes Fam Diop PA-C   Amino Acids (LIQUACEL) LIQD Take 30 mLs by mouth Three times per week; Monday, Wednesday, and Friday related to ESRD   Yes Historical Provider, MD   sevelamer (RENVELA) 800 MG tablet Take 1 tablet by mouth 3 times daily (with meals)   Yes Historical Provider, MD   polyethylene glycol (GLYCOLAX) powder Take 17 g by mouth 2 times daily    Yes Historical Provider, MD   insulin aspart (NOVOLOG) 100 UNIT/ML injection vial Inject into the skin 2 times daily Inject per sliding scale 0-150 = 0 units; 151-200 = 1 unit; 201-250 = 2 units; 251-300 = 3 units; 301-350 = 4 units; 351-400 = 5 units; 401-450 = 6 units   Yes Historical Provider, MD   cyanocobalamin 1000 MCG tablet Take 1,000 mcg by mouth every evening    Yes Historical Provider, MD   pantoprazole (PROTONIX) 40 MG tablet Take 40 mg by mouth 2 times daily   Yes Historical Provider, MD   ascorbic acid (VITAMIN C) 500 MG tablet Take 500 mg by mouth 2 times daily    Yes Historical Provider, MD   docusate sodium (COLACE) 100 MG capsule Take 200 mg by mouth daily    Yes Historical Provider, MD   clopidogrel (PLAVIX) 75 MG tablet Take 75 mg by mouth every evening    Yes Historical Provider, MD   pravastatin (PRAVACHOL) 40 MG tablet Take 40 mg by mouth every evening Indications: Blood Cholesterol Abnormal  5/8/16  Yes Historical Provider, MD   acetaminophen (TYLENOL) 325 MG tablet Take 650 mg by mouth every 4 hours as needed for Pain    Yes Historical Provider, MD   HYPROMELLOSE OP Place 1 drop into both eyes as needed (Dry eyes)    Historical Provider, MD   bisacodyl (DULCOLAX) 5 MG EC tablet Take 5 mg by mouth daily as needed for Constipation     Historical Provider, MD   OXYGEN Inhale 2 L into the lungs as needed    Historical Provider, MD   ondansetron (ZOFRAN) 4 MG tablet Take 1 tablet by mouth every 8 hours as needed for Nausea or Vomiting 5/21/20   Mamta Hernandez MD   ipratropium-albuterol (DUONEB) 0.5-2.5 (3) MG/3ML SOLN nebulizer solution Inhale 1 vial into the lungs every 6 hours as needed for Shortness of Breath    Historical Provider, MD   nitroGLYCERIN (NITROSTAT) 0.4 MG SL tablet up to max of 3 total doses. If no relief after 1 dose, call 911. 10/18/18   Marsha Lyn MD       Allergies:  Heparin    Social History:      The patient currently lives at [facility]. TOBACCO:   reports that she quit smoking about 68 years ago. Her smoking use included cigarettes. She has a 1.00 pack-year smoking history.  She has never used smokeless tobacco.  ETOH: reports no history of alcohol use. Family History:      Reviewed in detail and negative for DM, CAD, Cancer, CVA. Positive as follows:        Problem Relation Age of Onset    Diabetes Mother    Naida See Pacemaker Mother     Stroke Mother     Heart Disease Mother     Diabetes Father     Cancer Sister     Cancer Brother     Cancer Sister     Heart Disease Brother         cardiomegaly       Diet:  DIET RENAL;    REVIEW OF SYSTEMS:   Pertinent positives as noted in the HPI. All other systems reviewed and negative. Review of Systems - General ROS: negative for - chills, fatigue or fever  ENT ROS: negative for - epistaxis, nasal congestion, nasal discharge, sneezing, sore throat or vertigo  Respiratory ROS: no cough, shortness of breath, or wheezing  Cardiovascular ROS: no chest pain or dyspnea on exertion  Gastrointestinal ROS: no abdominal pain, change in bowel habits, or black or bloody stools  Genito-Urinary ROS: no dysuria, trouble voiding, or hematuria  Musculoskeletal ROS: positive for - swelling in bilateral lower limbs (stated at baseline)  negative for - joint pain, joint stiffness, joint swelling or muscle pain  Neurological ROS: negative for - dizziness, headaches, seizures or visual changes  Dermatological ROS: negative for - pruritus, rash or skin lesion changes     PHYSICAL EXAM:    BP (!) 110/52   Pulse 82   Temp 98.5 °F (36.9 °C) (Oral)   Resp 20   Ht 5' 6\" (1.676 m)   Wt 225 lb 14.4 oz (102.5 kg)   SpO2 94%   BMI 36.46 kg/m²     General appearance:  No apparent distress, appears stated age and cooperative. HEENT:  Normal cephalic, atraumatic without obvious deformity. Pupils equal, round, and reactive to light. Extra ocular muscles intact. Conjunctivae/corneas clear. Neck: Supple, with full range of motion. No jugular venous distention. Trachea midline. Respiratory:  Normal respiratory effort. Clear to auscultation, bilaterally without Rales/Wheezes/Rhonchi.   Cardiovascular:  Regular rate and rhythm with normal S1/S2 without murmurs, rubs or gallops. Abdomen: Soft, non-tender, non-distended with normal bowel sounds. Musculoskeletal:  No clubbing or cyanosis, mild-moderate lower limb edema bilaterally. Full range of motion without deformity. Skin: Skin color, texture, turgor normal.  No rashes or lesions. Neurologic:  Neurovascularly intact without any focal sensory/motor deficits. Cranial nerves: II-XII intact, grossly non-focal.  Psychiatric:  Alert and oriented, thought content appropriate, normal insight  Capillary Refill: Brisk,< 3 seconds   Peripheral Pulses: +2 palpable, equal bilaterally       Labs:     Recent Labs     10/07/20  0345 10/08/20  0554   WBC 13.9* 14.1*   HGB 11.5* 10.7*   HCT 35.5* 33.7*    224     Recent Labs     10/07/20  0345 10/08/20  0554    134*   K 3.9 4.1  4.1   CL 97* 99   CO2 24 24   BUN 23* 21   CREATININE 5.3* 4.8*   CALCIUM 9.1 8.7     Recent Labs     10/07/20  0345   AST 17   ALT 8*   BILIDIR <0.2   BILITOT 0.2*   ALKPHOS 117     Recent Labs     10/07/20  0802   INR 0.94     No results for input(s): CKTOTAL, TROPONINI in the last 72 hours. Urinalysis:      Lab Results   Component Value Date    NITRU NEGATIVE 05/17/2020    WBCUA > 200 05/17/2020    BACTERIA MANY 05/17/2020    RBCUA 25-50 05/17/2020    BLOODU MODERATE 05/17/2020    SPECGRAV 1.019 03/26/2019    GLUCOSEU NEGATIVE 05/17/2020       Intake & Output:  I/O last 3 completed shifts: In: 1313 [P.O.:320; I.V.:20; Other:4000]  Out: 4500 [Other:4500]  I/O this shift:  In: 100 [P.O.:100]  Out: -       Radiology:     CXR: I have reviewed the CXR with the following interpretation: Atelectasis in Right lower lobe  CT Head: I have reviewed the CT with the following interpretation: No acute findings  EKG:  I have reviewed the EKG with the following interpretation: Ventricular paced rhythm    CT HEAD WO CONTRAST   Final Result   Impression:   1. No acute findings.       This document has been electronically signed by: Barber Huber MD on 10/07/2020 05:31 AM      All CT scans at this facility use dose modulation, iterative    reconstruction, and/or weight-based   dosing when appropriate to reduce radiation dose to as low as reasonably    achievable. XR CHEST PORTABLE   Final Result   Right lung base atelectasis. No definite consolidation. This document has been electronically signed by: Barber Huber MD on 10/07/2020 04:08 AM              DVT prophylaxis: SCD's    Code Status: Limited      PT/OT Eval Status: Not Consulted    Disposition:SNF    Active Hospital Problems    Diagnosis Date Noted    Other hypotension [I95.89] 06/02/2020    ESRD (end stage renal disease) ([address] [N18.6]     ESRD on peritoneal dialysis ([address] [N18.6, Z99.2]     Morbid obesity ([address] [E66.01] 02/28/2013     Thank you Lia Del Valle MD for the opportunity to be involved in this patient's care.     Electronically signed by Mery Marrero DO on 10/9/2020 at 4:24 PM

## 2020-10-09 NOTE — PROGRESS NOTES
[facility]         Renal Inpatient Follow-Up note         10/9/2020 8:00 AM    Pt Name:   Brian Branham  YOB: 1934  Attending:   Amie Cole DO    Chief Complaint : Brian Branham is a 80 y.o. female being followed by nephrology for ESRD on peritoneal dialysis    Interval History :   Patient seen and examined by me. No distress  Feels okay denies any chest pain or shortness of breath  She is still clearly confused to some degree.   Overall stable  Blood pressure fluctuating     Scheduled Medications :    sodium chloride (PF)  10 mL Intravenous Daily    ascorbic acid  500 mg Oral BID    aspirin  81 mg Oral Daily    clopidogrel  75 mg Oral QPM    cyanocobalamin  1,000 mcg Oral QPM    docusate sodium  100 mg Oral Daily    insulin glargine  16 Units Subcutaneous Nightly    lactobacillus  1 capsule Oral Daily    levothyroxine  25 mcg Oral Daily    megestrol  40 mg Oral BID    metoprolol succinate  50 mg Oral Daily    miconazole   Topical BID    midodrine  10 mg Oral TID WC    pantoprazole  40 mg Oral BID    polycarbophil  625 mg Oral TID    polyethylene glycol  17 g Oral BID    potassium chloride  20 mEq Oral BID    pravastatin  40 mg Oral Nightly    sevelamer  800 mg Oral TID WC    sodium chloride flush  10 mL Intravenous 2 times per day    dianeal lo-nikko 1.5%  2,000 mL Intraperitoneal Q6H    insulin lispro  0-6 Units Subcutaneous TID WC    insulin lispro  0-3 Units Subcutaneous Nightly      dextrose         Vitals :  BP (!) 86/51   Pulse 84   Temp 98.8 °F (37.1 °C) (Oral)   Resp 18   Ht 5' 6\" (1.676 m)   Wt 225 lb 14.4 oz (102.5 kg)   SpO2 95%   BMI 36.46 kg/m²     24HR INTAKE/OUTPUT:      Intake/Output Summary (Last 24 hours) at 10/9/2020 0800  Last data filed at 10/9/2020 0417  Gross per 24 hour   Intake 5110 ml   Output 4500 ml   Net 610 ml     Last 3 weights  Wt Readings from Last 3 Encounters:   10/09/20 225 lb 14.4 oz (102.5 kg) 08/06/20 239 lb 4.8 oz (108.5 kg)   06/15/20 218 lb 14.4 oz (99.3 kg)           Physical Exam :  General Appearance:  Well developed. No distress  Mouth/Throat:  Oral mucosa moist  Neck:  Supple, no JVD  Lungs:  Breath sounds: clear  Heart[de-identified]  S1,S2 heard  Abdomen:  Soft, non - tender  Musculoskeletal:  Edema -no significant edema noted         Last 3 CBC   Recent Labs     10/07/20  0345 10/08/20  0554   WBC 13.9* 14.1*   RBC 3.81* 3.51*   HGB 11.5* 10.7*   HCT 35.5* 33.7*    224     Last 3 CMP  Recent Labs     10/07/20  0345 10/08/20  0554    134*   K 3.9 4.1  4.1   CL 97* 99   CO2 24 24   BUN 23* 21   CREATININE 5.3* 4.8*   CALCIUM 9.1 8.7   LABALBU 1.7*  --    BILITOT 0.2*  --              ASSESSMENT / Plan   Renal -ESRD on peritoneal dialysis, volume status appears reasonable electrolytes stable as well  · Reviewed the peritoneal dialysis data having 2200, 2300, 2200 mL per exchange  · Volume status is well and blood pressure is running slightly lower will change to every 8 hours     Electrolytes appear to be within normal limits on potassium supplement can continue for now  Chronic hypotension on midodrine, fluctuating will increase midodrine to 15 3 times daily  Hx of diabetes mellitus  Change in mental status appears to be at baseline  Secondary hyperparathyroidism  Anemia of end-stage renal disease  meds reviewed and D/W patient and the nursing staff    OTTONIEL Leon D.  [facility].

## 2020-10-10 NOTE — PROGRESS NOTES
Progress Notes       Patient: Mariano Zaidi  YOB: 1934    MRN: [id number]     Acct: [de-identified]    PCP: Won Tesfaye MD    Date of Admission: 10/7/2020    Date of Service: Pt seen/examined on 10/10/20  and Admitted to Observation with expected LOS less than two midnights due to medical therapy. Chief Complaint:  Hypotension    Assessment and Plan:    1.) Hypotension related to PD:  Patient states chronic hypotension related to her dialysis. She is asymptomatic with SBP 80-90. She takes Midodrine out-patient three times a day. Likely BP was lower than normal when checked by nursing home because she was sleeping at the time. - Continue Home Dose of Midodrine                                         - Monitor BP     2.) ESRD on PD Q6H:  Dr. Rose Marie Mota. Patient is doing well on dialysis. States no pain, itching, or redness in area of catheter site.     3.) Chronic Diastolic HF/Chronic Troponin Elevation:  EF 70% (6/2020). Trop 0.126, BNP [id number]. Likely secondary to ESRD and subsequent Hypotension.                                         - Continue Home Medications     4.) Hypomagnesia: 1.3 on admission. Replacement protocol in place.     5.) Prior TIA/Stroke: No new stroke like symptoms. Continue on ASA and Plavix.     6.) Prior PE: History of HIT. Avoid Heparin.     7.) IDDM: Takes Lantus 16U Nightly and Novolog Sliding scale at home. Continue medications and POCT Glucose before meals and before bed.     8.) COPD:  States no increase in shortness of breath.   Continue home inhalers.     9.) Obesity: BMI 38.74 kg/m2    History Of Present Illness:                  Ms. Kenneth Anguiano is an 80year old female with a history of TIA, stroke, PE, IDDM, Breast Cancer, and ESRD on PD.  She lives at [address] [place] [address] the night of 10/7/20, the nurses reported that she was confused and called EMS.  EMS arrived and stated her blood pressure was low, but no other abnormalities were found in her vitals.  The patient states she was never confused last night, but was trying to sleep.  On arrival to the ER, her Cr was elevated at 5.3. Norman Morales WBC was 13.9, however chart review showed that the patient always has a mildly elevated count.  She was afebrile.  A CT scan show no acute intracranial findings.  CXR showed atelectasis at the right lung base.  An EKG showed Ventricular paced beats.  Her Troponin and BNP were elevated, however not above her baseline.  The Patient denied chest pain, shortness of breath, nausea, vomiting, diarrhea, constipation, fevers, chills, syncope, lightheadedness, or dizziness.  She stated she knows when she is getting sick, and states she feels just like normal.  She was treated with IVF in the ER. Trey Schultz takes PD every 6 hours.              Dr. Elizabeth Huddleston was contacted due to PD, and stated the the patient was doing well on her home parameters for dialysis. Prasanna Cannon stated she would be okay to discharge from a Nephrology standpoint.  The Patient has been Hypotensive without symptoms since admission.  She states she always runs low due to PD.  Since she is asymptomatic, and is being treated with Midodrine out-patient, she is medically stable for discharge.  We have reiterated to her that if she feels symptomatic from her low blood pressure that she should let the nurses or us know.              One bottle of blood culture returned positive for gram positive cocci in clusters.  Since the other cultures are negative, and considering the bacteria involved, this mostly likely represents a skin contaminant.  Patient is Afebrile and Asymptomatic. 10/9/20: Patient doing well, denies chest pain, shortness of breath, nausea, vomiting, diarrhea, or issues with PD. She would like to go home. Waiting on Pre Cert. Patient is medically clear.     10/10/20: Patient doing well, denies chest pain, shortness of breath, nausea, vomiting, diarrhea, or issues with PD. She would like to go home. Waiting on Pre Cert. Patient is medically clear.     Past Medical History:          Diagnosis Date    Anxiety     Atherosclerotic heart disease of native coronary artery without angina pectoris     CAD (coronary artery disease)     nonobstructive    Cancer ([address] 2007    breast cancer    Cardiomyopathy, nonischemic [facility]     Dr. Yumiko Skelton Cerebral artery occlusion with cerebral infarction ([address]     CHF (congestive heart failure) ([facility]     CKD (chronic kidney disease) stage 3, GFR 30-59 ml/min     Dr. Pro Uribe Congenital heart disease     COPD exacerbation ([facility]     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 ([facility]     DM2 (diabetes mellitus, type 2) ([address] 1998    with CKD3, R foot ulcer and hx of prior ulcerations and PVD    Dyspnea     ESRD (end stage renal disease) ([address]     Ex-smoker 10/2/2012    GERD (gastroesophageal reflux disease)     GERD (gastroesophageal reflux disease)     Heart failure with preserved ejection fraction ([facility]     Dr. Yumiko Skelton Hemodialysis patient [facility])     His of Heparin induced thrombocytopenia     History of breast cancer     right 1982 s/p mastectomy and chemo, left 2007 s/p mastectomy    History of CVA (cerebrovascular accident)     History of pulmonary embolism 05/2014    on [address] (coumadin)    Hyperkalemia     Hyperlipidemia     Hypertension     pulmonary    Iron deficiency anemia     Dr. 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    Personal history of transient ischemic attack (TIA), and cerebral infarction without residual deficits     Pneumonia 10/7/2020    Pulmonary embolism ([address]     [facility] [employer] ICD 11/17/2011    Thyroid disease     Venous insufficiency (chronic) (peripheral)     Vitamin D deficiency     Vitamin D deficiency     Weakness        Past Surgical History:          Procedure Laterality Date    BREAST SURGERY      s/p right mastectomy 1980's and left mastectomy 2007    CARDIAC CATHETERIZATION  11-    Hemodynamics w pulmonary hypertension, systemic hypertension and no sats gradient difference. No aortic stenosis. No mitral stenosis. Coronaries; mild disease of the LAD not more than 40%. LV; severe LV dysfunction and EF 30-35%.  CARDIAC CATHETERIZATION  05-    Mild disease of small distal LAD (approximately  50% stenosis) angiographically normal CA. Mild to moderate LV systolic dysfunction. EF 35%. Mildly elevated LV end diastolic pressure. No significant MR. Systemic hypertension.  CARDIAC DEFIBRILLATOR PLACEMENT  02/02/2011    CARDIOVASCULAR STRESS TEST  03/23/10    EF 30%  Severe LV Dys    COLONOSCOPY      Dr. Rob Medrano    DILATATION, ESOPHAGUS      DOPPLER ECHOCARDIOGRAPHY  03/22/10    EF 45%. LV mildly dilated. Systolic function mildly reducted. No regional wall motion abnormalities. Wall thickness mildly increased. LA mildly dilated. MV mild annular calification. Mild TR. Ventricular septum tickness mildly increased.  ENDOSCOPY, COLON, DIAGNOSTIC      EYE SURGERY      bilateral cataracts    EYE SURGERY Left 9/2014    laser surgery for retinopathy?  FOOT SURGERY Left 1/12/2016    OTHER SURGICAL HISTORY  10/18/2018    CardioMEMS    PACEMAKER PLACEMENT      MI EGD BALLOON DILATION ESOPHAGUS <30 MM DIAM N/A 9/18/2017    EGD ESOPHAGOGASTRODUODENOSCOPY DILATATION performed by Michael Colin MD at [facility]    MI ESOPHAGOGASTRODUODENOSCOPY TRANSORAL DIAGNOSTIC  9/18/2017    EGD ESOPHAGOGASTRODUODENOSCOPY performed by Michael Colin MD at [facility]       Medications Prior to Admission:      Prior to Admission medications    Medication Sig Start Date End Date Taking?  Authorizing Provider   ciprofloxacin (CIPRO) 500 MG tablet Take 500 mg mouth 2 times daily    Yes Historical Provider, MD   insulin aspart (NOVOLOG) 100 UNIT/ML injection vial Inject into the skin 2 times daily Inject per sliding scale 0-150 = 0 units; 151-200 = 1 unit; 201-250 = 2 units; 251-300 = 3 units; 301-350 = 4 units; 351-400 = 5 units; 401-450 = 6 units   Yes Historical Provider, MD   cyanocobalamin 1000 MCG tablet Take 1,000 mcg by mouth every evening    Yes Historical Provider, MD   pantoprazole (PROTONIX) 40 MG tablet Take 40 mg by mouth 2 times daily   Yes Historical Provider, MD   ascorbic acid (VITAMIN C) 500 MG tablet Take 500 mg by mouth 2 times daily    Yes Historical Provider, MD   docusate sodium (COLACE) 100 MG capsule Take 200 mg by mouth daily    Yes Historical Provider, MD   clopidogrel (PLAVIX) 75 MG tablet Take 75 mg by mouth every evening    Yes Historical Provider, MD   pravastatin (PRAVACHOL) 40 MG tablet Take 40 mg by mouth every evening Indications: Blood Cholesterol Abnormal  5/8/16  Yes Historical Provider, MD   acetaminophen (TYLENOL) 325 MG tablet Take 650 mg by mouth every 4 hours as needed for Pain    Yes Historical Provider, MD   HYPROMELLOSE OP Place 1 drop into both eyes as needed (Dry eyes)    Historical Provider, MD   bisacodyl (DULCOLAX) 5 MG EC tablet Take 5 mg by mouth daily as needed for Constipation     Historical Provider, MD   OXYGEN Inhale 2 L into the lungs as needed    Historical Provider, MD   ondansetron (ZOFRAN) 4 MG tablet Take 1 tablet by mouth every 8 hours as needed for Nausea or Vomiting 5/21/20   Guillermo Correa MD   ipratropium-albuterol (DUONEB) 0.5-2.5 (3) MG/3ML SOLN nebulizer solution Inhale 1 vial into the lungs every 6 hours as needed for Shortness of Breath    Historical Provider, MD   nitroGLYCERIN (NITROSTAT) 0.4 MG SL tablet up to max of 3 total doses.  If no relief after 1 dose, call 911. 10/18/18   Denver Hakim, MD       Allergies:  Heparin    Social History:      The patient currently lives at [facility]. TOBACCO:   reports that she quit smoking about 68 years ago. Her smoking use included cigarettes. She has a 1.00 pack-year smoking history. She has never used smokeless tobacco.  ETOH:   reports no history of alcohol use. Family History:      Reviewed in detail and negative for DM, CAD, Cancer, CVA. Positive as follows:        Problem Relation Age of Onset    Diabetes Mother    Michael Shah Pacemaker Mother     Stroke Mother     Heart Disease Mother     Diabetes Father     Cancer Sister     Cancer Brother     Cancer Sister     Heart Disease Brother         cardiomegaly       Diet:  DIET RENAL;    REVIEW OF SYSTEMS:   Pertinent positives as noted in the HPI. All other systems reviewed and negative. Review of Systems - General ROS: negative for - chills, fatigue or fever  ENT ROS: negative for - epistaxis, nasal congestion, nasal discharge, sneezing, sore throat or vertigo  Respiratory ROS: no cough, shortness of breath, or wheezing  Cardiovascular ROS: no chest pain or dyspnea on exertion  Gastrointestinal ROS: no abdominal pain, change in bowel habits, or black or bloody stools  Genito-Urinary ROS: no dysuria, trouble voiding, or hematuria  Musculoskeletal ROS: positive for - swelling in bilateral lower limbs (stated at baseline)  negative for - joint pain, joint stiffness, joint swelling or muscle pain  Neurological ROS: negative for - dizziness, headaches, seizures or visual changes  Dermatological ROS: negative for - pruritus, rash or skin lesion changes     PHYSICAL EXAM:    BP (!) 105/51   Pulse 79   Temp 97.4 °F (36.3 °C) (Oral)   Resp 19   Ht 5' 6\" (1.676 m)   Wt 232 lb 8 oz (105.5 kg)   SpO2 95%   BMI 37.53 kg/m²     General appearance:  No apparent distress, appears stated age and cooperative. HEENT:  Normal cephalic, atraumatic without obvious deformity. Pupils equal, round, and reactive to light. Extra ocular muscles intact. Conjunctivae/corneas clear.   Neck: Supple, with full range of motion. No jugular venous distention. Trachea midline. Respiratory:  Normal respiratory effort. Clear to auscultation, bilaterally without Rales/Wheezes/Rhonchi. Cardiovascular:  Regular rate and rhythm with normal S1/S2 without murmurs, rubs or gallops. Abdomen: Soft, non-tender, non-distended with normal bowel sounds. Musculoskeletal:  No clubbing or cyanosis, mild-moderate lower limb edema bilaterally. Full range of motion without deformity. Skin: Skin color, texture, turgor normal.  No rashes or lesions. Neurologic:  Neurovascularly intact without any focal sensory/motor deficits. Cranial nerves: II-XII intact, grossly non-focal.  Psychiatric:  Alert and oriented, thought content appropriate, normal insight  Capillary Refill: Brisk,< 3 seconds   Peripheral Pulses: +2 palpable, equal bilaterally       Labs:     Recent Labs     10/08/20  0554   WBC 14.1*   HGB 10.7*   HCT 33.7*        Recent Labs     10/08/20  0554   *   K 4.1  4.1   CL 99   CO2 24   BUN 21   CREATININE 4.8*   CALCIUM 8.7     No results for input(s): AST, ALT, BILIDIR, BILITOT, ALKPHOS in the last 72 hours. Recent Labs     10/07/20  0802   INR 0.94     No results for input(s): Jadene Moh in the last 72 hours. Urinalysis:      Lab Results   Component Value Date    NITRU NEGATIVE 05/17/2020    WBCUA > 200 05/17/2020    BACTERIA MANY 05/17/2020    RBCUA 25-50 05/17/2020    BLOODU MODERATE 05/17/2020    SPECGRAV 1.019 03/26/2019    GLUCOSEU NEGATIVE 05/17/2020       Intake & Output:  I/O last 3 completed shifts: In: 2736 [P.O.:750; I.V.:2000; Other:6000]  Out: 6600 [Other:6600]  No intake/output data recorded.       Radiology:     CXR: I have reviewed the CXR with the following interpretation: Atelectasis in Right lower lobe  CT Head: I have reviewed the CT with the following interpretation: No acute findings  EKG:  I have reviewed the EKG with the following interpretation: Ventricular paced rhythm    CT HEAD WO CONTRAST   Final Result   Impression:   1. No acute findings. This document has been electronically signed by: Rajan Rodriguez MD on 10/07/2020 05:31 AM      All CT scans at this facility use dose modulation, iterative    reconstruction, and/or weight-based   dosing when appropriate to reduce radiation dose to as low as reasonably    achievable. XR CHEST PORTABLE   Final Result   Right lung base atelectasis. No definite consolidation. This document has been electronically signed by: Rajan Rodriguez MD on 10/07/2020 04:08 AM              DVT prophylaxis: SCD's    Code Status: Limited      PT/OT Eval Status: Not Consulted    Disposition:[facility]    Active Hospital Problems    Diagnosis Date Noted    Other hypotension [I95.89] 06/02/2020    ESRD (end stage renal disease) ([address] [N18.6]     ESRD on peritoneal dialysis ([address] [N18.6, Z99.2]     Morbid obesity ([address] [E66.01] 02/28/2013     Thank you Berna Dhaliwal MD for the opportunity to be involved in this patient's care.     Electronically signed by Ivan Neville DO on 10/10/2020 at 7:51 AM

## 2020-10-10 NOTE — PROGRESS NOTES
[facility]         Renal Inpatient Follow-Up note         10/10/2020 11:57 AM    Pt Name:   Juan Torres  YOB: 1934  Attending:   Tabatha Romero DO    Chief Complaint : Jaun Torres is a 80 y.o. female being followed by nephrology for ESRD on peritoneal dialysis    Interval History :   Patient seen and examined by me. No distress  Feels okay denies any chest pain or shortness of breath  She is still clearly confused to some degree.   Overall stable  Blood pressure fluctuating but better     Scheduled Medications :    dianeal lo-nikko 1.5%  2,000 mL Intraperitoneal Q8H    midodrine  15 mg Oral TID WC    ascorbic acid  500 mg Oral BID    aspirin  81 mg Oral Daily    clopidogrel  75 mg Oral QPM    cyanocobalamin  1,000 mcg Oral QPM    docusate sodium  100 mg Oral Daily    insulin glargine  16 Units Subcutaneous Nightly    lactobacillus  1 capsule Oral Daily    levothyroxine  25 mcg Oral Daily    megestrol  40 mg Oral BID    metoprolol succinate  50 mg Oral Daily    miconazole   Topical BID    pantoprazole  40 mg Oral BID    polycarbophil  625 mg Oral TID    polyethylene glycol  17 g Oral BID    potassium chloride  20 mEq Oral BID    pravastatin  40 mg Oral Nightly    sevelamer  800 mg Oral TID WC    sodium chloride flush  10 mL Intravenous 2 times per day    insulin lispro  0-6 Units Subcutaneous TID WC    insulin lispro  0-3 Units Subcutaneous Nightly      dextrose         Vitals :  BP (!) 94/46   Pulse 87   Temp 97.6 °F (36.4 °C) (Oral)   Resp 24   Ht 5' 6\" (1.676 m)   Wt 232 lb 8 oz (105.5 kg)   SpO2 96%   BMI 37.53 kg/m²     24HR INTAKE/OUTPUT:      Intake/Output Summary (Last 24 hours) at 10/10/2020 1157  Last data filed at 10/10/2020 1004  Gross per 24 hour   Intake 8650 ml   Output 6500 ml   Net 2150 ml     Last 3 weights  Wt Readings from Last 3 Encounters:   10/10/20 232 lb 8 oz (105.5 kg)   08/06/20 239 lb 4.8 oz (108.5 kg)   06/15/20 218 lb 14.4 oz (99.3 kg)           Physical Exam :  General Appearance:  Well developed. No distress  Mouth/Throat:  Oral mucosa moist  Neck:  Supple, no JVD  Lungs:  Breath sounds: clear  Heart[de-identified]  S1,S2 heard  Abdomen:  Soft, non - tender  Musculoskeletal:  Edema -no significant edema noted         Last 3 CBC   Recent Labs     10/08/20  0554   WBC 14.1*   RBC 3.51*   HGB 10.7*   HCT 33.7*        Last 3 CMP  Recent Labs     10/08/20  0554   *   K 4.1  4.1   CL 99   CO2 24   BUN 21   CREATININE 4.8*   CALCIUM 8.7             ASSESSMENT / Plan   Renal -ESRD on peritoneal dialysis, volume status appears reasonable electrolytes stable as well  · Reviewed the peritoneal dialysis data having 2100, 2700, 1700 mL per exchange  · Volume status is well and blood pressure is running better continue exchanges every 8 hours for now mostly will change to every 6 in the morning     Electrolytes appear to be within normal limits on potassium supplement can continue for now  Chronic hypotension on midodrine, doing better on 15 mg 3 times a day  Hx of diabetes mellitus  Change in mental status appears to be at baseline  Secondary hyperparathyroidism  Anemia of end-stage renal disease  meds reviewed and D/W patient and the nursing staff    OTTONIEL Arango D.  [facility].

## 2020-10-11 NOTE — FLOWSHEET NOTE
10/11/20 1635   Provider Notification   Reason for Communication Evaluate  (BP 89/47, does he still want CAPD exchange at [phone] ? )   Provider Name Dr Alexsander Jara   Provider Notification Physician   Method of Communication Secure Message   Response   (ok to give CAPD exchange )   Notification Time [phone]

## 2020-10-11 NOTE — PROGRESS NOTES
[facility]         Renal Inpatient Follow-Up note         10/11/2020 1:01 PM    Pt Name:   Suma Thomas:   1934  Attending:   Shanthi Aleman DO    Chief Complaint : Samantha Schmitz is a 80 y.o. female being followed by nephrology for ESRD on peritoneal dialysis    Interval History :   Patient seen and examined by me. No distress  Feels okay denies any chest pain or shortness of breath  She is still clearly confused to some degree.   Overall stable     Scheduled Medications :    dianeal lo-nikko 1.5%  2,000 mL Intraperitoneal Q8H    midodrine  15 mg Oral TID WC    ascorbic acid  500 mg Oral BID    aspirin  81 mg Oral Daily    clopidogrel  75 mg Oral QPM    cyanocobalamin  1,000 mcg Oral QPM    docusate sodium  100 mg Oral Daily    insulin glargine  16 Units Subcutaneous Nightly    lactobacillus  1 capsule Oral Daily    levothyroxine  25 mcg Oral Daily    megestrol  40 mg Oral BID    metoprolol succinate  50 mg Oral Daily    miconazole   Topical BID    pantoprazole  40 mg Oral BID    polycarbophil  625 mg Oral TID    polyethylene glycol  17 g Oral BID    potassium chloride  20 mEq Oral BID    pravastatin  40 mg Oral Nightly    sevelamer  800 mg Oral TID WC    sodium chloride flush  10 mL Intravenous 2 times per day    insulin lispro  0-6 Units Subcutaneous TID WC    insulin lispro  0-3 Units Subcutaneous Nightly      dextrose         Vitals :  BP (!) 96/42   Pulse 96   Temp 97.5 °F (36.4 °C) (Oral)   Resp 22   Ht 5' 6\" (1.676 m)   Wt 232 lb 8 oz (105.5 kg)   SpO2 93%   BMI 37.53 kg/m²     24HR INTAKE/OUTPUT:      Intake/Output Summary (Last 24 hours) at 10/11/2020 1301  Last data filed at 10/11/2020 0950  Gross per 24 hour   Intake 4930 ml   Output 6800 ml   Net -1870 ml     Last 3 weights  Wt Readings from Last 3 Encounters:   10/10/20 232 lb 8 oz (105.5 kg)   08/06/20 239 lb 4.8 oz (108.5 kg)   06/15/20 218 lb 14.4 oz (99.3 kg)

## 2020-10-12 NOTE — PROGRESS NOTES
[facility]  Occupational Therapy  Daily Note  Time:    Time In: 53  Time Out: 7830  Timed Code Treatment Minutes: 12 Minutes  Minutes: 12          Date: 10/12/2020  Patient Name: Yomaira Garnett,   Gender: female      Room: [facility]15/015-A  MRN: [id number]  : 1934  (80 y.o.)  Referring Practitioner: Dr. Era Hammans  Diagnosis: pneumonia  Additional Pertinent Hx: 80 y.o. female who presented to [facility] with possible confusion; patient has a past medical history of pulmonary embolism, TIA, stroke, breast cancer along with diabetes and end-stage renal disease on peritoneal dialysis; she lives at the nursing home and per the ER report MS was called secondary to altered mental status; when the squad got there they then stated that she had low blood pressure; the squad walked into the room patient was asleep and she awoken easily and per report they palpated a strong radial pulse and they stated the patient was not confused at all; there must have been some issues going on at the nurses station at that time; patient did not have any complaints for the EMS and she continues not to have any complaints for me; she is fully alert and oriented, denies any pain; she tells me that she was startled when she was awakened up by the EMS and she had a little bit of nausea but that has subsided and no emesis; she denies any abdominal pain; she denies any fever chills; in the emergency department her blood pressure kind of bounced around however I see that she is on Midodrin 10 mg 3 times a day so she apparently has a history of low blood pressure however asymptomatic; laboratory studies are fairly unremarkable; she has a mild white blood cell count at 13.9; she is being admitted to the hospital service for further care and evaluation    Restrictions/Precautions:  Restrictions/Precautions: Fall Risk, Contact Precautions      SUBJECTIVE: Required encouragement for

## 2020-10-12 NOTE — PROGRESS NOTES
[address] [place] [address]  INPATIENT PHYSICAL THERAPY  DAILY NOTE  [facility] ICU STEPDOWN TELEMETRY 4K - 4K-15/015-A  Time In: 1336  Time Out: 1353  Timed Code Treatment Minutes: 17 Minutes  Minutes: 17          Date: 10/12/2020  Patient Name: Leoncio Baptiste,  Gender:  female        MRN: [id number]  : 1934  (80 y.o.)  Referral Date : 10/07/20  Referring Practitioner: Manjeet Barrow MD  Diagnosis: Pneumonia  Additional Pertinent Hx: Per H&P: \"80 y.o. female who presented to [address] [place] [address] with possible confusion; patient has a past medical history of pulmonary embolism, TIA, stroke, breast cancer along with diabetes and end-stage renal disease on peritoneal dialysis; she lives at the nursing home and per the ER report MS was called secondary to altered mental status; when the squad got there they then stated that she had low blood pressure; the squad walked into the room patient was asleep and she awoken easily and per report they palpated a strong radial pulse and they stated the patient was not confused at all; there must have been some issues going on at the nurses station at that time; patient did not have any complaints for the EMS and she continues not to have any complaints for me; she is fully alert and oriented, denies any pain; she tells me that she was startled when she was awakened up by the EMS and she had a little bit of nausea but that has subsided and no emesis; she denies any abdominal pain; she denies any fever chills; in the emergency department her blood pressure kind of bounced around however I see that she is on Midodrin 10 mg 3 times a day so she apparently has a history of low blood pressure however asymptomatic; laboratory studies are fairly unremarkable; she has a mild white blood cell count at 13.9; she is being admitted to the hospital service for further care and evaluation. \"     Prior Level of Function:  Type of Home: Facility([facility])        ADL Assistance: Needs assistance  Homemaking Responsibilities: No  Ambulation Assistance: (non-ambulary for over 1 year)  Transfer Assistance: Needs assistance(staff uses ángela lift to transfer)  Active : No  Additional Comments: Patient reports staff using ángela to transfer patient, pt unable to stand ambulating~1 year. Pt stating she has worked [facility] therapy on multiple occasions. Pt able to feed self but has assistance for all other ADL tasks    Restrictions/Precautions:  Restrictions/Precautions: Fall Risk, Contact Precautions    SUBJECTIVE: RN agreeable to PT tx session. Pt states, \"I do not feel well. I do not want to work with you. \" Encouraged pt to participate so she can return to her nursing facility. Pt agreed to do bed exercises only. Pt noted to be incontinent of stool. Nurse in room for bowel hygiene with PT assisting to roll pt. PAIN: pain in R LE, pt pointing towards hip. Pain in buttocks (pressure wounds). OBJECTIVE:  Bed Mobility:  Rolled to L x 2 and to R x 3 for bowel hygeine. Rolling to Left: Moderate Assistance, X 1, with verbal cues , cues to place hand on rail   Rolling to Right: Moderate Assistance, X 1, with verbal cues , cues to place hand on rail, cues to bend L knee to push with leg while rolling   Scooting: Maximum Assistance, X 2, dependent to scoot to [facility]     Transfers:  Not Tested, as pt has not performed sit to from  a year    Ambulation:  Not tested, as pt has not ambulated for a year    Balance:  Not tested, as pt refused to do activity beyond bed mobility and exercise in bed     Exercise:  Patient was guided in 1 set(s) 10 reps of exercise to both lower extremities. Ankle pumps, Heelslides and Hip abduction/adduction. Exercises were completed for increased independence with functional mobility.     Functional Outcome Measures: Completed  AM-PAC Inpatient Mobility without Stair Climbing Raw Score : 6  AM-PAC Inpatient without Stair Climbing T-Scale Score : 26.48    ASSESSMENT:  Assessment: Pt is not progressing to goals at this time. Pt refused to do activity beyond bed mobility, so unable to assess/ work towards goals today. Activity Tolerance:  Patient tolerance of  treatment: fair. Equipment Recommendations:Equipment Needed: No  Discharge Recommendations:  [address], Patient would benefit from continued therapy after discharge    Plan: Times per week: 3-5xGM  Times per day: Daily  Current Treatment Recommendations: Strengthening, Transfer Training, Endurance Training, Patient/Caregiver Education & Training, Balance Training, Functional Mobility Training, Safety Education & Training    Patient Education  Patient Education: Plan of Care, [employer] Mobility, Reviewed Prior Education, Verbal Exercise Instruction    Goals:  Patient goals : return to [facility] Financial term goals  Time Frame for Short term goals: at discharge  Short term goal 1: Patient will complete supine < > sit with moderate assistance to transfer in/out of bed safely. Short term goal 2: Patient will sit edge of bed x3 minutes with CGA/min while completing dynamic seated activity to improve trunk strength for transfers. Long term goals  Time Frame for Long term goals : N/A due to short estimated length fo stay    Following session, patient left in safe position with all fall risk precautions in place, call light in reach, bed alarm on, and nurse in room.     Yehuda Cano, PT, DPT

## 2020-10-12 NOTE — CARE COORDINATION
Update; await F precert; collaborated with Lacey Nunez, [address]  Electronically signed by Craig Cruz RN on 10/12/2020 at 1:19 PM

## 2020-10-12 NOTE — CARE COORDINATION
10/12/20, 11:59 AM EDT    DISCHARGE PLANNING EVALUATION      EASTON left a message for Raymundo Molina with [facility] admissions, did not hear back from him so SW left a message with the business office, did not hear back from her so now SW left a message for the DON--regarding the pre-cert   58/26/48, 27:37 PM EDT    DISCHARGE PLANNING EVALUATION      EASTON received a call from Raymundo Molina, questioning if patient will need IV antibiotics at discharge, told him no, he states they should have an answer soon from [facility] regarding the pre-cert. 10/12/20, 2:52 PM EDT    DISCHARGE PLANNING EVALUATION      EASTON received a call from the [facility] [employer] representative stating that the Dr could do a peer to peer by 9:30 EST on Oct 13 by calling [phone] option #5 and if the attending does not want to do a peer to peer to call the same number and let them know, following the prompts. EASTON updated the attending resident and the nurse. 10/12/20, 3:42 PM EDT  Attending does not want to do a peer to peer, EASTON spoke to Raymundo Molina with admissions, they are in agreement to accept her back under her Medicaid payment. EASTON provided the nurse with the phone number--[phone] and fax number--[phone] to complete the discharge. She will transport by [facility]. Patient goals/plan/ treatment preferences discussed by  and . Patient goals/plan/ treatment preferences reviewed with patient/ family. Patient/ family verbalize understanding of discharge plan and are in agreement with goal/plan/treatment preferences. Understanding was demonstrated using the teach back method. AVS provided by RN at time of discharge, which includes all necessary medical information pertaining to the patients current course of illness, treatment, post-discharge goals of care, and treatment preferences.     Services After Discharge  Services At/After Discharge: Nursing Services, Aide Services([facility])   IMM Letter  IMM Letter given to Patient/Family/Significant other/Guardian/POA/by[de-identified]   IMM Letter date given[de-identified] 10/12/20  IMM Letter time given[de-identified] 3596

## 2020-10-12 NOTE — DISCHARGE INSTR - DIET
foods. Cutting back on your intake of high fat food can help reduce body weight and cholesterol levels. Reduce intake of fried food, gallegos, sausage, luncheon meat, gravy, sour cream, cheese, egg yolks and margarine/butter. Limit your intake of alcohol. Drink alcohol only with permission of your doctor. Never drink alcohol on an empty stomach. Be more active. Regular exercise is an important part of your diabetes care as exercise can help lower your blood sugar levels. The type and amount of exercise that is right for you should be discussed with your doctor.

## 2020-10-12 NOTE — DISCHARGE SUMMARY
Discharge Summary     Patient Identification:  Avinash Sainz  : 1934  MRN: [id number]   Account: [de-identified]     Admit date: 10/7/2020  Discharge date: 10/12/2020   Attending provider: Radha Floyd DO        Primary care provider: Hector Angel MD     Discharge Diagnoses:   Principal Problem:    Other hypotension  Active Problems: Morbid obesity ([address]    ESRD on peritoneal dialysis ([address]    ESRD (end stage renal disease) [address]  Resolved Problems:    Pneumonia due to organism       Hospital Course:             Ms. Eva Tamayo is an 80year old female with a history of TIA, stroke, PE, IDDM, Breast Cancer, and ESRD on PD.  She lives at [address] [place] [address] 27 the night of 10/7/20, the nurses reported that she was confused and called EMS.  EMS arrived and stated her blood pressure was low, but no other abnormalities were found in her vitals.  The patient states she was never confused last night, but was trying to sleep.  On arrival to the ER, her Cr was elevated at 5.3. [facility] WBC was 13.9, however chart review showed that the patient always has a mildly elevated count.  She was afebrile.  A CT scan show no acute intracranial findings.   CXR showed atelectasis at the right lung base.  An EKG showed Ventricular paced beats.  Her Troponin and BNP were elevated, however not above her baseline.  The Patient denied chest pain, shortness of breath, nausea, vomiting, diarrhea, constipation, fevers, chills, syncope, lightheadedness, or dizziness.  She stated she knows when she is getting sick, and states she feels just like normal.  She was treated with IVF in the ER. Cheyenne Cain takes PD every 6 hours.              Dr. Jorje Cuellar was contacted due to PD, and stated the the patient was doing well on her home parameters for dialysis. [facility] stated she would be okay to discharge from a Nephrology standpoint.  The Patient has been Hypotensive without symptoms since admission.  She states she always runs low due to PD.  Since she is asymptomatic, and is being treated with Midodrine out-patient, she is medically stable for discharge.  We have reiterated to her that if she feels symptomatic from her low blood pressure that she should let the nurses or us know.              One bottle of blood culture returned positive for gram positive cocci in clusters.  Since the other cultures are negative, and considering the bacteria involved, this mostly likely represents a skin contaminant.  Patient remained Afebrile and Asymptomatic until discharge. Patient was ready for discharge on Thursday 10/8/2020, however she was not approved until 10/12/2020. No new issues arose in this time.     Discharge Medications:   [facility]   Home Medication Instructions [id number]    Printed on:10/12/20 3133   Medication Information                      acetaminophen (TYLENOL) 325 MG tablet  Take 650 mg by mouth every 4 hours as needed for Pain              Amino Acids (LIQUACEL) LIQD  Take 30 mLs by mouth Three times per week; Monday, Wednesday, and Friday related to ESRD             ascorbic acid (VITAMIN C) 500 MG tablet  Take 500 mg by mouth 2 times daily              aspirin 81 MG EC tablet  Take 1 tablet by mouth daily             B Complex-C-Folic Acid (GAURI-DANNY) TABS  Take 1 tablet by mouth daily             bisacodyl (DULCOLAX) 5 MG EC tablet  Take 5 mg by mouth daily as needed for Constipation              ciprofloxacin (CIPRO) 500 MG tablet  Take 500 mg by mouth nightly              clopidogrel (PLAVIX) 75 MG tablet  Take 75 mg by mouth every evening              cyanocobalamin 1000 MCG tablet  Take 1,000 mcg by mouth every evening              docusate sodium (COLACE) 100 MG capsule  Take 200 mg by mouth daily              HYPROMELLOSE OP  Place 1 drop into both eyes as needed (Dry eyes)             insulin aspart (NOVOLOG) 100 UNIT/ML injection vial  Inject into the skin 2 times daily Inject per sliding scale 0-150 = 0 units; 151-200 = 1 unit; (RENVELA) 800 MG tablet  Take 1 tablet by mouth 3 times daily (with meals)                 Patient Instructions:    Discharge lab work: None  Activity: activity as tolerated  Diet: DIET RENAL;    Code Status: Limited    Follow-up visits:   [address]  [facility]  [phone]        Filiberto Chaves MD  [address] [address]  [phone]      Will see in PD Clinic       Procedures: None    Consults:   STR ED TO IP CONSULT  STR ED TO IP CONSULT  IP CONSULT TO SOCIAL WORK  IP CONSULT TO NEPHROLOGY    Examination:  Vitals:  Vitals:    10/12/20 0843 10/12/20 0845 10/12/20 1100 10/12/20 1515   BP: (!) 83/35 (!) 84/50 (!) 95/36 (!) 110/54   Pulse:  89 88 92   Resp:  16 16 17   Temp:  98 °F (36.7 °C) 98 °F (36.7 °C) 97.5 °F (36.4 °C)   TempSrc:  Oral Oral Oral   SpO2:  94% 95% 98%   Weight:       Height:         Weight: Weight: 226 lb 2 oz (102.6 kg)     24 hour intake/output:    Intake/Output Summary (Last 24 hours) at 10/12/2020 1623  Last data filed at 10/12/2020 1359  Gross per 24 hour   Intake 6695 ml   Output 6800 ml   Net -105 ml       General appearance:  No apparent distress, appears stated age and cooperative. HEENT:  Normal cephalic, atraumatic without obvious deformity. Pupils equal, round, and reactive to light.  Extra ocular muscles intact. Conjunctivae/corneas clear. Neck: Supple, with full range of motion. No jugular venous distention. Trachea midline. Respiratory:  Normal respiratory effort. Clear to auscultation, bilaterally without Rales/Wheezes/Rhonchi. Cardiovascular:  Regular rate and rhythm with normal S1/S2 without murmurs, rubs or gallops. Abdomen: Soft, non-tender, non-distended with normal bowel sounds. Musculoskeletal:  No clubbing or cyanosis, mild-moderate lower limb edema bilaterally.  Full range of motion without deformity. Skin: Skin color, texture, turgor normal.  No rashes or lesions.   Neurologic:  Neurovascularly intact without any focal sensory/motor deficits. Cranial nerves: II-XII intact, grossly non-focal.  Psychiatric:  Alert and oriented, thought content appropriate, normal insight  Capillary Refill: Brisk,< 3 seconds   Peripheral Pulses: +2 palpable, equal bilaterally     Significant Diagnostics:   Radiology: Ct Head Wo Contrast    Result Date: 10/7/2020  CT head without contrast Comparison:  CT,SR  - CT HEAD WO CONTRAST  - 08/04/2020 05:53 PM EDT Findings: Moderate atrophy like change and nonspecific white matter hypodensity. No intracranial mass, midline shift, hydrocephalus, or acute hemorrhage. Orbits, paranasal sinuses, and mastoid air cells are unremarkable. No skull fracture     Impression: 1. No acute findings. This document has been electronically signed by: Bianca Tejada MD on 10/07/2020 05:31 AM All CT scans at this facility use dose modulation, iterative reconstruction, and/or weight-based dosing when appropriate to reduce radiation dose to as low as reasonably achievable. Xr Chest Portable    Result Date: 10/7/2020  1 view chest x-ray Comparison:  CR,SR  - XR CHEST PORTABLE  - 08/04/2020 01:02 PM EDT Findings: Mild atelectasis right lung base. Low inspiratory volumes. Cardiomegaly. Left subclavian AICD. No acute fracture. Right lung base atelectasis. No definite consolidation. This document has been electronically signed by:  Bianca Tejada MD on 10/07/2020 04:08 AM       Labs:   Recent Results (from the past 72 hour(s))   POCT Glucose    Collection Time: 10/09/20  4:51 PM   Result Value Ref Range    POC Glucose 133 (H) 70 - 108 mg/dl   POCT Glucose    Collection Time: 10/09/20  8:14 PM   Result Value Ref Range    POC Glucose 127 (H) 70 - 108 mg/dl   POCT Glucose    Collection Time: 10/09/20  9:21 PM   Result Value Ref Range    POC Glucose 113 (H) 70 - 108 mg/dl   POCT Glucose    Collection Time: 10/10/20  6:59 AM   Result Value Ref Range    POC Glucose 77 70 - 108 mg/dl   POCT Glucose Collection Time: 10/10/20 11:59 AM   Result Value Ref Range    POC Glucose 96 70 - 108 mg/dl   POCT Glucose    Collection Time: 10/10/20  4:27 PM   Result Value Ref Range    POC Glucose 92 70 - 108 mg/dl   POCT Glucose    Collection Time: 10/10/20  7:24 PM   Result Value Ref Range    POC Glucose 101 70 - 108 mg/dl   Basic Metabolic Panel    Collection Time: 10/11/20  6:19 AM   Result Value Ref Range    Sodium 131 (L) 135 - 145 meq/L    Potassium 4.4 3.5 - 5.2 meq/L    Chloride 96 (L) 98 - 111 meq/L    CO2 21 (L) 23 - 33 meq/L    Glucose 73 70 - 108 mg/dL    BUN 20 7 - 22 mg/dL    CREATININE 4.9 (HH) 0.4 - 1.2 mg/dL    Calcium 8.2 (L) 8.5 - 10.5 mg/dL   Hemoglobin and Hematocrit, Blood    Collection Time: 10/11/20  6:19 AM   Result Value Ref Range    Hemoglobin 10.4 (L) 12.0 - 16.0 gm/dl    Hematocrit 32.1 (L) 37.0 - 47.0 %   Anion Gap    Collection Time: 10/11/20  6:19 AM   Result Value Ref Range    Anion Gap 14.0 8.0 - 16.0 meq/L   Glomerular Filtration Rate, Estimated    Collection Time: 10/11/20  6:19 AM   Result Value Ref Range    Est, Glom Filt Rate 8 (A) ml/min/1.73m2   POCT Glucose    Collection Time: 10/11/20  6:32 AM   Result Value Ref Range    POC Glucose 83 70 - 108 mg/dl   POCT Glucose    Collection Time: 10/11/20 12:04 PM   Result Value Ref Range    POC Glucose 94 70 - 108 mg/dl   POCT Glucose    Collection Time: 10/11/20  4:46 PM   Result Value Ref Range    POC Glucose 101 70 - 108 mg/dl   POCT Glucose    Collection Time: 10/11/20  9:51 PM   Result Value Ref Range    POC Glucose 106 70 - 108 mg/dl   Basic Metabolic Panel    Collection Time: 10/12/20  6:00 AM   Result Value Ref Range    Sodium 122 (L) 135 - 145 meq/L    Potassium 4.6 3.5 - 5.2 meq/L    Chloride 93 (L) 98 - 111 meq/L    CO2 17 (L) 23 - 33 meq/L    Glucose 100 70 - 108 mg/dL    BUN 21 7 - 22 mg/dL    CREATININE 4.8 (HH) 0.4 - 1.2 mg/dL    Calcium 8.2 (L) 8.5 - 10.5 mg/dL   Anion Gap    Collection Time: 10/12/20  6:00 AM   Result Value Ref Range    Anion Gap 12.0 8.0 - 16.0 meq/L   Glomerular Filtration Rate, Estimated    Collection Time: 10/12/20  6:00 AM   Result Value Ref Range    Est, Glom Filt Rate 9 (A) ml/min/1.73m2   POCT Glucose    Collection Time: 10/12/20  6:15 AM   Result Value Ref Range    POC Glucose 109 (H) 70 - 108 mg/dl   POCT Glucose    Collection Time: 10/12/20 11:35 AM   Result Value Ref Range    POC Glucose 130 (H) 70 - 108 mg/dl       Discharge condition: good  Disposition: Long-Term Acute Care  Time spent on discharge: 35 minutes    Electronically signed by Shena Kirby DO on 10/12/2020 at 4:23 PM

## 2020-10-12 NOTE — PROGRESS NOTES
During my encounter with the 80 yr old patient, I attempted to visit with the pt on  4K. The patient appears to be resting now and I didnt want to disturb the patient. I or another Chantal Sole will attempt to visit the patient or the family at another time.

## 2020-10-12 NOTE — PROGRESS NOTES
Attempted to call Jeyson to update on pt discharge, no answer. Called pt's sister Darylene Lopes and updated on pt status and discharge. Pt's sister stated she will update Jeyson.

## 2020-10-12 NOTE — PROGRESS NOTES
Discharge teaching and instructions for diagnosis/procedure of hypotension completed with patient using teachback method. AVS reviewed. Printed prescriptions given to patient. Patient voiced understanding regarding prescriptions, follow up appointments, and care of self at home. [facility].

## 2020-10-12 NOTE — PROGRESS NOTES
Nursing home.  On the night of 10/7/20, the nurses reported that she was confused and called EMS.  EMS arrived and stated her blood pressure was low, but no other abnormalities were found in her vitals.  The patient states she was never confused last night, but was trying to sleep.  On arrival to the ER, her Cr was elevated at 5.3. Rivera Speed WBC was 13.9, however chart review showed that the patient always has a mildly elevated count.  She was afebrile.  A CT scan show no acute intracranial findings.  CXR showed atelectasis at the right lung base.  An EKG showed Ventricular paced beats.  Her Troponin and BNP were elevated, however not above her baseline.  The Patient denied chest pain, shortness of breath, nausea, vomiting, diarrhea, constipation, fevers, chills, syncope, lightheadedness, or dizziness.  She stated she knows when she is getting sick, and states she feels just like normal.  She was treated with IVF in the ER. Irina Luciano takes PD every 6 hours.              Dr. Camillo Cogan was contacted due to PD, and stated the the patient was doing well on her home parameters for dialysis. [facility] stated she would be okay to discharge from a Nephrology standpoint.  The Patient has been Hypotensive without symptoms since admission.  She states she always runs low due to PD.  Since she is asymptomatic, and is being treated with Midodrine out-patient, she is medically stable for discharge.  We have reiterated to her that if she feels symptomatic from her low blood pressure that she should let the nurses or us know.              One bottle of blood culture returned positive for gram positive cocci in clusters.  Since the other cultures are negative, and considering the bacteria involved, this mostly likely represents a skin contaminant.  Patient is Afebrile and Asymptomatic. 10/9/20: Patient doing well, denies chest pain, shortness of breath, nausea, vomiting, diarrhea, or issues with PD. She would like to go home.   Waiting on Pre Cert.  Patient is medically clear. 10/10/20: Patient doing well, denies chest pain, shortness of breath, nausea, vomiting, diarrhea, or issues with PD. She would like to go home. Waiting on Pre Cert. Patient is medically clear.     10/11/20: Again feels well, no complaints, just waiting to be able to send back to [facility]    Past Medical History:          Diagnosis Date    Anxiety     Atherosclerotic heart disease of native coronary artery without angina pectoris     CAD (coronary artery disease)     nonobstructive    Cancer ([address] 2007    breast cancer    Cardiomyopathy, nonischemic ([address]     Dr. Juliet Brandt Cerebral artery occlusion with cerebral infarction ([address]     CHF (congestive heart failure) ([address]     CKD (chronic kidney disease) stage 3, GFR 30-59 ml/min     Dr. Taylor Has Congenital heart disease     COPD exacerbation ([address]     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 ([address]     DM2 (diabetes mellitus, type 2) ([address] 1998    with CKD3, R foot ulcer and hx of prior ulcerations and PVD    Dyspnea     ESRD (end stage renal disease) ([address]     Ex-smoker 10/2/2012    GERD (gastroesophageal reflux disease)     GERD (gastroesophageal reflux disease)     Heart failure with preserved ejection fraction ([address]     Dr. Juliet Brandt Hemodialysis patient [facility])     His of Heparin induced thrombocytopenia     History of breast cancer     right 1982 s/p mastectomy and chemo, left 2007 s/p mastectomy    History of CVA (cerebrovascular accident)     History of pulmonary embolism 05/2014    on [address] (coumadin)    Hyperkalemia     Hyperlipidemia     Hypertension     pulmonary    Iron deficiency anemia     Dr. 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    Personal history of transient ischemic attack (TIA), and cerebral infarction without residual deficits     Pneumonia 10/7/2020    Pulmonary embolism (HCC)     [facility] ICD 11/17/2011    Thyroid disease     Venous insufficiency (chronic) (peripheral)     Vitamin D deficiency     Vitamin D deficiency     Weakness        Past Surgical History:          Procedure Laterality Date    BREAST SURGERY      s/p right mastectomy 1980's and left mastectomy 2007    CARDIAC CATHETERIZATION  11-    Hemodynamics w pulmonary hypertension, systemic hypertension and no sats gradient difference. No aortic stenosis. No mitral stenosis. Coronaries; mild disease of the LAD not more than 40%. LV; severe LV dysfunction and EF 30-35%.  CARDIAC CATHETERIZATION  05-    Mild disease of small distal LAD (approximately  50% stenosis) angiographically normal CA. Mild to moderate LV systolic dysfunction. EF 35%. Mildly elevated LV end diastolic pressure. No significant MR. Systemic hypertension.  CARDIAC DEFIBRILLATOR PLACEMENT  02/02/2011    CARDIOVASCULAR STRESS TEST  03/23/10    EF 30%  Severe LV Dys    COLONOSCOPY      Dr. Jorge Caicedo    DILATATION, ESOPHAGUS      DOPPLER ECHOCARDIOGRAPHY  03/22/10    EF 45%. LV mildly dilated. Systolic function mildly reducted. No regional wall motion abnormalities. Wall thickness mildly increased. LA mildly dilated. MV mild annular calification. Mild TR. Ventricular septum tickness mildly increased.  ENDOSCOPY, COLON, DIAGNOSTIC      EYE SURGERY      bilateral cataracts    EYE SURGERY Left 9/2014    laser surgery for retinopathy?     FOOT SURGERY Left 1/12/2016    OTHER SURGICAL HISTORY  10/18/2018    CardioMEMS    PACEMAKER PLACEMENT      MI EGD BALLOON DILATION ESOPHAGUS <30 MM DIAM N/A 9/18/2017    EGD ESOPHAGOGASTRODUODENOSCOPY DILATATION performed by Melissa Dumont MD at [facility]    MI ESOPHAGOGASTRODUODENOSCOPY TRANSORAL DIAGNOSTIC  9/18/2017    EGD ESOPHAGOGASTRODUODENOSCOPY performed by Fareed Hernandez MD at [facility]       Medications Prior to Admission:      Prior to Admission medications    Medication Sig Start Date End Date Taking?  Authorizing Provider   ciprofloxacin (CIPRO) 500 MG tablet Take 500 mg by mouth nightly    Yes Historical Provider, MD   Magnesium 400 MG TABS Take 1 tablet by mouth daily    Yes Historical Provider, MD   miconazole (MICOTIN) 2 % powder Apply topically as needed for Itching Apply topically to abdominal folds as needed for redness   Yes Historical Provider, MD   B Complex-C-Folic Acid (GAURI-DANNY) TABS Take 1 tablet by mouth daily   Yes Historical Provider, MD   Peritoneal [facility] (DIANEAL LOW CALCIUM/1.5% DEX IP) Inject 2,500 mLs into the peritoneum every 6 hours   Yes Historical Provider, MD   insulin glargine (LANTUS) 100 UNIT/ML injection vial Inject 16 Units into the skin nightly 8/6/20  Yes Mary Quiroz MD   lactobacillus (CULTURELLE) capsule Take 1 capsule by mouth daily 8/7/20  Yes Mary Quiroz MD   aspirin 81 MG EC tablet Take 1 tablet by mouth daily 6/16/20  Yes Mary Rosas MD   levothyroxine (SYNTHROID) 25 MCG tablet Take 1 tablet by mouth Daily 6/16/20  Yes Mary Rosas MD   midodrine (PROAMATINE) 10 MG tablet Take 1 tablet by mouth 3 times daily (with meals) 6/15/20  Yes Mary Rosas MD   metoprolol succinate (TOPROL XL) 50 MG extended release tablet Take 50 mg by mouth daily    Yes Historical Provider, MD   polycarbophil (FIBERCON) 625 MG tablet Take 625 mg by mouth 3 times daily   Yes Historical Provider, MD   potassium chloride (KLOR-CON M) 20 MEQ extended release tablet Take 20 mEq by mouth 2 times daily    Yes Historical Provider, MD   megestrol (MEGACE) 40 MG tablet Take 1 tablet by mouth 2 times daily 4/29/20  Yes Etta Goff PA-C   Amino Acids (LIQUACEL) LIQD Take 30 mLs by mouth Three times per week; Monday, Wednesday, and Friday related to ESRD   Yes Historical Provider, MD   sevelamer (RENVELA) 800 MG tablet Take 1 tablet by mouth 3 times daily (with meals)   Yes Historical Provider, MD   polyethylene glycol (GLYCOLAX) powder Take 17 g by mouth 2 times daily    Yes Historical Provider, MD   insulin aspart (NOVOLOG) 100 UNIT/ML injection vial Inject into the skin 2 times daily Inject per sliding scale 0-150 = 0 units; 151-200 = 1 unit; 201-250 = 2 units; 251-300 = 3 units; 301-350 = 4 units; 351-400 = 5 units; 401-450 = 6 units   Yes Historical Provider, MD   cyanocobalamin 1000 MCG tablet Take 1,000 mcg by mouth every evening    Yes Historical Provider, MD   pantoprazole (PROTONIX) 40 MG tablet Take 40 mg by mouth 2 times daily   Yes Historical Provider, MD   ascorbic acid (VITAMIN C) 500 MG tablet Take 500 mg by mouth 2 times daily    Yes Historical Provider, MD   docusate sodium (COLACE) 100 MG capsule Take 200 mg by mouth daily    Yes Historical Provider, MD   clopidogrel (PLAVIX) 75 MG tablet Take 75 mg by mouth every evening    Yes Historical Provider, MD   pravastatin (PRAVACHOL) 40 MG tablet Take 40 mg by mouth every evening Indications: Blood Cholesterol Abnormal  5/8/16  Yes Historical Provider, MD   acetaminophen (TYLENOL) 325 MG tablet Take 650 mg by mouth every 4 hours as needed for Pain    Yes Historical Provider, MD   HYPROMELLOSE OP Place 1 drop into both eyes as needed (Dry eyes)    Historical Provider, MD   bisacodyl (DULCOLAX) 5 MG EC tablet Take 5 mg by mouth daily as needed for Constipation     Historical Provider, MD   OXYGEN Inhale 2 L into the lungs as needed    Historical Provider, MD   ondansetron (ZOFRAN) 4 MG tablet Take 1 tablet by mouth every 8 hours as needed for Nausea or Vomiting 5/21/20   Aaln Fox MD   ipratropium-albuterol (DUONEB) 0.5-2.5 (3) MG/3ML SOLN nebulizer solution Inhale 1 vial into the lungs every 6 hours as needed for Shortness of Breath    Historical Provider, MD nitroGLYCERIN (NITROSTAT) 0.4 MG SL tablet up to max of 3 total doses. If no relief after 1 dose, call 911. 10/18/18   [address] [place] [address] Alfredyonathan Aiken MD       Allergies:  Heparin    Social History:      The patient currently lives at [facility]. TOBACCO:   reports that she quit smoking about 68 years ago. Her smoking use included cigarettes. She has a 1.00 pack-year smoking history. She has never used smokeless tobacco.  ETOH:   reports no history of alcohol use. Family History:      Reviewed in detail and negative for DM, CAD, Cancer, CVA. Positive as follows:        Problem Relation Age of Onset    Diabetes Mother    Zannie Cowden Pacemaker Mother     Stroke Mother     Heart Disease Mother     Diabetes Father     Cancer Sister     Cancer Brother     Cancer Sister     Heart Disease Brother         cardiomegaly       Diet:  DIET RENAL;    REVIEW OF SYSTEMS:   Pertinent positives as noted in the HPI. All other systems reviewed and negative.     Review of Systems - General ROS: negative for - chills, fatigue or fever  ENT ROS: negative for - epistaxis, nasal congestion, nasal discharge, sneezing, sore throat or vertigo  Respiratory ROS: no cough, shortness of breath, or wheezing  Cardiovascular ROS: no chest pain or dyspnea on exertion  Gastrointestinal ROS: no abdominal pain, change in bowel habits, or black or bloody stools  Genito-Urinary ROS: no dysuria, trouble voiding, or hematuria  Musculoskeletal ROS: positive for - swelling in bilateral lower limbs (stated at baseline)  negative for - joint pain, joint stiffness, joint swelling or muscle pain  Neurological ROS: negative for - dizziness, headaches, seizures or visual changes  Dermatological ROS: negative for - pruritus, rash or skin lesion changes     PHYSICAL EXAM:    BP (!) 109/51   Pulse 87   Temp 97.6 °F (36.4 °C) (Oral)   Resp 16   Ht 5' 6\" (1.676 m)   Wt 226 lb 2 oz (102.6 kg)   SpO2 93%   BMI 36.50 kg/m²     General appearance:  No apparent distress, appears stated age and cooperative. HEENT:  Normal cephalic, atraumatic without obvious deformity. Pupils equal, round, and reactive to light. Extra ocular muscles intact. Conjunctivae/corneas clear. Neck: Supple, with full range of motion. No jugular venous distention. Trachea midline. Respiratory:  Normal respiratory effort. Clear to auscultation, bilaterally without Rales/Wheezes/Rhonchi. Cardiovascular:  Regular rate and rhythm with normal S1/S2 without murmurs, rubs or gallops. Abdomen: Soft, non-tender, non-distended with normal bowel sounds. Musculoskeletal:  No clubbing or cyanosis, mild-moderate lower limb edema bilaterally. Full range of motion without deformity. Skin: Skin color, texture, turgor normal.  No rashes or lesions. Neurologic:  Neurovascularly intact without any focal sensory/motor deficits. Cranial nerves: II-XII intact, grossly non-focal.  Psychiatric:  Alert and oriented, thought content appropriate, normal insight  Capillary Refill: Brisk,< 3 seconds   Peripheral Pulses: +2 palpable, equal bilaterally       Labs:     Recent Labs     10/11/20  0619   HGB 10.4*   HCT 32.1*     Recent Labs     10/11/20  0619 10/12/20  0600   * 122*   K 4.4 4.6   CL 96* 93*   CO2 21* 17*   BUN 20 21   CREATININE 4.9* 4.8*   CALCIUM 8.2* 8.2*     No results for input(s): AST, ALT, BILIDIR, BILITOT, ALKPHOS in the last 72 hours. No results for input(s): INR in the last 72 hours. No results for input(s): Greg Oiler in the last 72 hours. Urinalysis:      Lab Results   Component Value Date    NITRU NEGATIVE 05/17/2020    WBCUA > 200 05/17/2020    BACTERIA MANY 05/17/2020    RBCUA 25-50 05/17/2020    BLOODU MODERATE 05/17/2020    SPECGRAV 1.019 03/26/2019    GLUCOSEU NEGATIVE 05/17/2020       Intake & Output:  I/O last 3 completed shifts: In: 1154 [P.O.:615; I.V.:10; Other:6200]  Out: 6500 [Other:6500]  No intake/output data recorded.       Radiology:     CXR: I have reviewed the CXR with the following interpretation: Atelectasis in Right lower lobe  CT Head: I have reviewed the CT with the following interpretation: No acute findings  EKG:  I have reviewed the EKG with the following interpretation: Ventricular paced rhythm    CT HEAD WO CONTRAST   Final Result   Impression:   1. No acute findings. This document has been electronically signed by: Doug Phillips MD on 10/07/2020 05:31 AM      All CT scans at this facility use dose modulation, iterative    reconstruction, and/or weight-based   dosing when appropriate to reduce radiation dose to as low as reasonably    achievable. XR CHEST PORTABLE   Final Result   Right lung base atelectasis. No definite consolidation. This document has been electronically signed by: Doug Phillips MD on 10/07/2020 04:08 AM              DVT prophylaxis: SCD's    Code Status: Limited      PT/OT Eval Status: Not Consulted    Disposition:SNF    Active Hospital Problems    Diagnosis Date Noted    Other hypotension [I95.89] 06/02/2020    ESRD (end stage renal disease) ([facility] [N18.6]     ESRD on peritoneal dialysis ([facility] [N18.6, Z99.2]     Morbid obesity ([facility] [E66.01] 02/28/2013     Thank you Riana Mchugh MD for the opportunity to be involved in this patient's care.     Electronically signed by Kate Johnston DO on 10/12/2020 at 7:50 AM

## 2020-10-26 PROBLEM — R10.9 ABDOMINAL PAIN: Status: ACTIVE | Noted: 2020-01-01

## 2020-10-26 NOTE — ED NOTES
[address] [facility] 951 calls for update at this time. Update provided and nurse advised that there is no decision on admission at this time.       Caryl Moraes RN  10/26/20 0899

## 2020-10-26 NOTE — H&P
History & Physical       Patient: Ivone Ambrocio  YOB: 1934    MRN: [id number]     Acct: [de-identified]    PCP: Maddy Jeffers MD    Date of Admission: 10/26/2020    Date of Service: Patient seen / examined on 10/27/20 and admitted to inpatient with expected LOS greater than two midnights due to medical therapy. ASSESSMENT / PLAN:    Lower abdominal pain, resolved  Presenting complaint. Poorly-characterized. Etiology unclear, but patient states symptoms have since resolved. Abdominal exam is benign. Prelim CAPD fluid studies appear negative for infection. Moderate diffuse free air noted on CT A/P -- per radiologist read, possible bowel origin but could be related to CAPD. ED provider discussed findings with Dr. Mandi Rizvi -- stated likely related to CAPD and no indication for general surgery consult unless clinical status changes. Admit to stepdown unit (hypotensive; see below). Follow final CAPD fluid studies/cultures. Continue to monitor clinically. ESRD on CAPD  Nephrology consulted for CAPD assistance. Renvela resumed. Monitor I/O, daily weights. BMP/Mg/Phos daily. Chronic hypotension  Hypotensive on arrival. Midodrine resumed. BB resumed (?for hx non-ischemic cardiomyopathy / HFmrEF) with strict hold parameters. Monitor BP. ? CAP vs atelectasis  Incidentally noted on CXR (low lung volumes). Patient denies respiratory symptoms. No fever, hypoxia. WBC is mildly elevated -- appears chronic. Procal 0.33 -- could be renal in origin. Received vanc/zosyn in the ED due to concern for [facility]. \" I have low clinical suspicion for CAP or HAP and will defer resuming antibiotics at this time. Monitor clinically for development of fever, respiratory symptoms. Trend CBC daily. 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 BIV-ICD  Compensated. BB resumed. Maintain telemetry.     NIDDM2 with hypoglycemia  BG 68 on arrival. Home basal and SSI insulin held with plan to resume as indicated (may need titrated prior to discharge). Encourage PO intake. POCT BG checks. Hypoglycemia protocol. Mild hyponatremia, with associated hypochloremia  ? Dehydration-related. Received 1750 cc IVF. Check UA. Will defer additional IVF / encourage oral intake. BMP daily. Hypoalbuminemia  Prealbumin ordered to better assess nutrition status. Isolated AlkP elevation  Noted. Consider additional outpatient workup as indicated. Chronically elevated troponin  In the setting of ESRD. Stable / at baseline. Patient denies CP. No additional workup at this time. Elevated LA without acidosis, improving  ? Also dehydration-related. Low suspicion for sepsis (as stated above). No hypoxia, liver injury. Received 1750 cc IVF. Repeat in the AM.    COPD  Without exacerbation. Bronchodilator therapy as needed. Non-obstructive CAD / HLD / hx CVA/TIA  Statin, DAPT resumed (outpatient regimen). Hypothyroidism  With mild TSH elevation on presentation. Synthroid resumed. Check free T4. GERD  PPI resumed / *would consider transition to H2 blocker given hx B12 deficiency. History of HIT  Noted. Avoid heparin products. History of pulmonary embolism  Previously on coumadin (additional details unclear). Avoiding heparin products due to hx HIT. SCDs. History of breast cancer  R-sided (1982) s/p mastectomy and chemo, L-sided (2007) s/p mastectomy. Megace resumed (*caution with hx PE). Chronic normocytic anemia  Mild / stable. Secondary to iron deficiency (history) and ESRD. No active bleeding assessed. CBC daily. PAH  Historical. Likely Class 2/3. Chronic LBBB  Noted. Chronic vitamin B12 deficiency  Outpatient supplementation resumed. OA  Noted. Symptom control as needed. Anxiety  Historical. No current outpatient therapies listed. Dementia  Noted. No outpatient therapies listed.       Chief Complaint: abdominal pain    History of Present Illness:  81 y/o F PMHx ESRD (on CAPD), chronic hypotension, HFmrEF (EF 45-50% per ECHO 6/2020), non-ischemic cardiomyopathy / s/p BIV-ICD, NIDDM2, COPD (no baseline supplemental oxygen requirement), non-obstructive CAD, HLD, CVA/TIA, PE (previously on coumadin), dementia, hypothyroidism, former tobacco abuse and other PMHx as indicated below -- presents to [facility] from [facility] with a chief complaint of abdominal pain. Limited history provided by the patient (due to dementia, confusion). Patient was admitted to [facility] from 10/7-10/12 for low BP / hospital course was grossly unremarkable (hypotension chronic / related to PD) / patient remained asymptomatic during her hospital stay and was discharged to SNF in stable condition. She returns today with reported complaints of lower abdominal pain / poorly-characterized / no additional information provided. No fevers reported at [facility]. Per nursing staff, no CAPD performed x 48 hours due to recent hypotension. Patient appears to answer yes/no questions regarding symptoms reliably, but is otherwise confused and unable to provide additional history. She denies chest pain, cough, shortness of breath, n/v/d or other symptoms at this time. States abdominal pain has resolved and is requesting something to eat. ED course: afebrile, hypotensive (90s/40s), tachycardic (100s-110s), tachypnic (RR low 20s) with SpO2 96% on RA. Workup revealed: Hgb 11.4 (MCV 94.3), WBC 14.9, Plt 234. Na 133, Cl 93, K 4.1, Cr 5.7 / BUN 29 / eGFR 7. BG 68. CO2: 26. AST/ALT nml. AlkP 148. Alb 1.9. Trop 0.123. TSH 4.6. LA 3.4 > 2.6. Procal 0.33. Prelim CAPD fluid studies:  / no organisms on gram stain. CXR: moderate atelectasis/PNA along both sarah-diaphragms, small bilateral effusions (overall appearance worsened since prior). CT A/P WO contrast: + non-specific, moderate diffuse free air / bowel origin possible, though could be related to CAPD / + mild pleural and parenchymal opacities at each lung base.  Received 1750 cc IVF, midodrine x 1, vanc/zosyn x 1. Hospitalist service contacted for admission. Please see A&P for additional details. Past Medical History:    Diagnosis Date    Anxiety     CAD (coronary artery disease)     Non-obstructive    Cardiomyopathy, nonischemic (HCC)     CHF (congestive heart failure) (HCC)     Congenital heart disease     COPD exacerbation (HCC)     Diabetic neuropathy     DM2 (diabetes mellitus, type 2) ([facility] [facility] 1998    ESRD (end stage renal disease) ([facility] [address] on CAPD     GERD (gastroesophageal reflux disease)     Heart failure with preserved ejection fraction (HCC)     History of HIT     History of breast cancer     Right 1982 s/p mastectomy and chemo, left 2007 s/p mastectomy    History of CVA (cerebrovascular accident)     History of pulmonary embolism 05/2014    on [address] (coumadin)    Hyperlipidemia     Pulmonary hypertension     Iron deficiency anemia     Dr. 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 No aortic stenosis. No mitral stenosis. Coronaries; mild disease of the LAD not more than 40%. LV; severe LV dysfunction and EF 30-35%.  CARDIAC CATHETERIZATION  05-    Mild disease of small distal LAD (approximately  50% stenosis) angiographically normal CA. Mild to moderate LV systolic dysfunction. EF 35%. Mildly elevated LV end diastolic pressure. No significant MR. Systemic hypertension.     CARDIAC DEFIBRILLATOR PLACEMENT  02/02/2011    CARDIOVASCULAR STRESS TEST  03/23/10    EF 30%  Severe LV Dys    COLONOSCOPY      Dr. Eduardo Leger    DILATATION, ESOPHAGUS      DOPPLER ECHOCARDIOGRAPHY  03/22/10    EF 45%. LV mildly dilated. Systolic function mildly reducted. No regional wall motion abnormalities. Wall thickness mildly increased. LA mildly dilated. MV mild annular calification. Mild TR. Ventricular septum tickness mildly increased.  ENDOSCOPY, COLON, DIAGNOSTIC      EYE SURGERY      bilateral cataracts    EYE SURGERY Left 9/2014    laser surgery for retinopathy?     FOOT SURGERY Left 1/12/2016    OTHER SURGICAL HISTORY  10/18/2018    CardioMEMS    PACEMAKER PLACEMENT      MN EGD BALLOON DILATION ESOPHAGUS <30 MM DIAM N/A 9/18/2017    EGD ESOPHAGOGASTRODUODENOSCOPY DILATATION performed by Joseph Wills MD at [facility]    MN ESOPHAGOGASTRODUODENOSCOPY TRANSORAL DIAGNOSTIC  9/18/2017    EGD ESOPHAGOGASTRODUODENOSCOPY performed by Joseph Wills MD at [facility]      Medications Prior to Admission:   Medication Sig    ciprofloxacin (CIPRO) 500 MG tablet Take 500 mg by mouth nightly     Magnesium 400 MG TABS Take 1 tablet by mouth daily     B Complex-C-Folic Acid (GAURI-DANNY) TABS Take 1 tablet by mouth daily    Peritoneal Dialysis Solutions (DIANEAL LOW CALCIUM/1.5% DEX IP) Inject 2,500 mLs into the peritoneum every 6 hours    insulin glargine (LANTUS) 100 UNIT/ML injection vial Inject 16 Units into the skin nightly    lactobacillus (CULTURELLE) capsule Take 1 capsule by mouth daily    aspirin 81 MG EC tablet Take 1 tablet by mouth daily    levothyroxine (SYNTHROID) 25 MCG tablet Take 1 tablet by mouth Daily    midodrine (PROAMATINE) 10 MG tablet Take 1 tablet by mouth 3 times daily (with meals)    metoprolol succinate (TOPROL XL) 50 MG extended release tablet Take 50 mg by mouth daily     OXYGEN Inhale 2 L into the lungs as needed    polycarbophil (FIBERCON) 625 Take 650 mg by mouth every 4 hours as needed for Pain      Allergies:   Heparin    Social History:   Socioeconomic History    Marital status:    Tobacco Use    Smoking status: Former Smoker     Packs/day: 0.50     Years: 2.00     Pack years: 1.00     Types: Cigarettes     Last attempt to quit: 1952     Years since quittin.7    Smokeless tobacco: Never Used   Substance and Sexual Activity    Alcohol use: No    Drug use: No     Family History:   Problem Relation Age of Onset    Diabetes Mother     Pacemaker Mother     Stroke Mother     Heart Disease Mother     Diabetes Father     Cancer Sister     Cancer Brother     Cancer Sister     Heart Disease Brother         cardiomegaly     REVIEW OF SYSTEMS:  A 14-point ROS was obtained and negative, with the exception of pertinent positives as stated in the HPI. PHYSICAL EXAM:  Vitals:    10/26/20 2202 10/27/20 0008 10/27/20 0359 10/27/20 0410   BP: (!) 107/52 (!) 107/53 (!) 105/52    Pulse: 105 107 102    Resp: 22 22 24    Temp: 97.7 °F (36.5 °C) 97.5 °F (36.4 °C) 97.7 °F (36.5 °C)    TempSrc: Oral Oral Oral    SpO2: 97% 95% 94%    Weight: 219 lb 8 oz (99.6 kg)   219 lb 12.8 oz (99.7 kg)   Height: 5' 6\" (1.676 m)        General appearance: Alert / well-appearing  female. Pleasant. Cooperative. NAD. HEENT: Normocephalic / atraumatic. Conjunctivae appear normal.  Neck: Supple. No JVD. Respiratory: Unlabored on RA. CTAB. No wheezes / rales / rhonchi. Cardiovascular: Mildly tachycardic rate. Regular rhythm. Normal S1/S2. No murmurs / rubs / gallops. Abdomen: Obese. Soft / non-tender / non-distended. BS present. CAPD catheter noted to left abdomen -- site appears C/D/I / no surrounding erythema or drainage. Musculoskeletal: No cyanosis. Moves all extremities spontaneously. Skin: Warm / dry. No pallor / diaphoresis. Neurologic: A/O x 3.  Speech is normal. Provides limited history but answers yes/no questions and follows commands appropriately. No obvious focal neurologic deficits. Psychiatric: Normal mood / affect. Peripheral pulses: +2 bilaterally. Labs:   Results for orders placed or performed during the hospital encounter of 10/26/20   Culture, Blood 2    Specimen: Blood   Result Value Ref Range    Blood Culture, Routine No growth-preliminary     Culture, Blood 1    Specimen: Blood   Result Value Ref Range    Blood Culture, Routine No growth-preliminary     Culture, Bottle, Body Fluid    Specimen: Peritoneal Dialysis Fluid   Result Value Ref Range    Body Fluid Culture, Sterile No growth-preliminary     Gram Stain    Specimen: Peritoneal Dialysis Fluid   Result Value Ref Range    Gram Stain Result       Few segmented neutrophils observed. No organisms observed.  performed on cytospun specimen    VRE Screen by PCR    Specimen: Rectal Swab   Result Value Ref Range    Vancomycin Resistant Enterococcus NEGATIVE    CBC Auto Differential   Result Value Ref Range    WBC 14.9 (H) 4.8 - 10.8 thou/mm3    RBC 3.71 (L) 4.20 - 5.40 mill/mm3    Hemoglobin 11.4 (L) 12.0 - 16.0 gm/dl    Hematocrit 35.0 (L) 37.0 - 47.0 %    MCV 94.3 81.0 - 99.0 fL    MCH 30.7 26.0 - 33.0 pg    MCHC 32.6 32.2 - 35.5 gm/dl    RDW-CV 15.8 (H) 11.5 - 14.5 %    RDW-SD 52.9 (H) 35.0 - 45.0 fL    Platelets 339 171 - 026 thou/mm3    MPV 11.8 9.4 - 12.4 fL    Seg Neutrophils 55.3 %    Lymphocytes 31.2 %    Monocytes 9.5 %    Eosinophils 2.1 %    Basophils 0.4 %    Immature Granulocytes 1.5 %    Segs Absolute 8.2 (H) 1.8 - 7.7 thou/mm3    Lymphocytes Absolute 4.6 1.0 - 4.8 thou/mm3    Monocytes Absolute 1.4 (H) 0.4 - 1.3 thou/mm3    Eosinophils Absolute 0.3 0.0 - 0.4 thou/mm3    Basophils Absolute 0.1 0.0 - 0.1 thou/mm3    Immature Grans (Abs) 0.22 (H) 0.00 - 0.07 thou/mm3    nRBC 0 /100 wbc   Comprehensive Metabolic Panel w/ Reflex to MG   Result Value Ref Range    Glucose 68 (L) 70 - 108 mg/dL    CREATININE 5.7 (HH) 0.4 - 1.2 mg/dL    BUN 29 (H) 7 - 22 mg/dL    Sodium 133 (L) 135 - 145 meq/L    Potassium reflex Magnesium 4.1 3.5 - 5.2 meq/L    Chloride 93 (L) 98 - 111 meq/L    CO2 26 23 - 33 meq/L    Calcium 9.0 8.5 - 10.5 mg/dL    AST 21 5 - 40 U/L    Alkaline Phosphatase 148 (H) 38 - 126 U/L    Total Protein 5.2 (L) 6.1 - 8.0 g/dL    Alb 1.9 (L) 3.5 - 5.1 g/dL    Total Bilirubin 0.3 0.3 - 1.2 mg/dL    ALT 13 11 - 66 U/L   Troponin   Result Value Ref Range    Troponin T 0.123 (A) ng/ml   TSH without Reflex   Result Value Ref Range    TSH 4.550 (H) 0.400 - 4.200 uIU/mL   Lactic acid, plasma   Result Value Ref Range    Lactic Acid 3.4 (H) 0.5 - 2.2 mmol/L   Procalcitonin   Result Value Ref Range    Procalcitonin 0.33 (H) 0.01 - 0.09 ng/mL   Body fluid cell count with differential   Result Value Ref Range    Specimen DILAN.  DIALYSATE     Color LIGHT YELLOW     Character, Body Fluid CLEAR     Total Volume Received Body Fluid 100.0 ml    Total Nucleated cells Body Fluid 140 0 - 500 /cumm    Body Fluid RBC < 2000 /cumm   Glomerular Filtration Rate, Estimated   Result Value Ref Range    Est, Glom Filt Rate 7 (A) ml/min/1.73m2   Osmolality   Result Value Ref Range    Osmolality Calc 270.5 (L) 275.0 - 300.0 mOsmol/kg   Anion Gap   Result Value Ref Range    Anion Gap 14.0 8.0 - 16.0 meq/L   COVID-19   Result Value Ref Range    SARS-CoV-2, NAAT NOT DETECTED NOT DETECTED   Lactic acid, plasma   Result Value Ref Range    Lactic Acid 2.6 (H) 0.5 - 2.2 mmol/L   MRSA by PCR   Result Value Ref Range    MRSA SCREEN RT-PCR NEGATIVE    T4, Free   Result Value Ref Range    T4 Free 1.08 0.93 - 1.76 ng/dL   POCT Glucose   Result Value Ref Range    POC Glucose 66 (L) 70 - 108 mg/dl   EKG 12 Lead   Result Value Ref Range    Ventricular Rate 109 BPM    Atrial Rate 109 BPM    P-R Interval 130 ms    QRS Duration 122 ms    Q-T Interval 388 ms    QTc Calculation (Bazett) 522 ms    P Axis 56 degrees    R Axis -93 degrees    T Axis 69 degrees     Narrative & Impression   *Suspect unspecified pacemaker failure  Atrial-sensed ventricular-paced rhythm  Abnormal ECG  When compared with ECG of 07-OCT-2020 03:50,  Ventricular rate has increased BY  10 BPM  Confirmed by Krystal Tristan ([id number]) on 10/26/2020 2:04:46 PM     Radiology:     Ct Abdomen Pelvis Wo Contrast Additional Contrast? None  Result Date: 10/26/2020  PROCEDURE: CT ABDOMEN PELVIS WO CONTRAST CLINICAL INFORMATION: right lower abdominal pain. leukocytosis . COMPARISON: Chest CT 5/18/2020 TECHNIQUE: Axial 5 mm CT images were obtained through the abdomen and pelvis. No contrast was given. Coronal reconstructions were obtained. All CT scans at this facility use dose modulation, iterative reconstruction, and/or weight-based dosing when appropriate to reduce radiation dose to as low as reasonably achievable. FINDINGS Lung base: Small right pleural effusion. Tiny left pleural effusion. There are calcified pleural plaques. Mild atelectasis at each lung base. Moderate coronary artery calcification. There is an AICD. Epicardial pacing wires are also noted. Liver and spleen: homogeneous attenuation, no masses seen. Biliary: Multiple small gallstones. Pancreas: head, body, and tail unremarkable. Adrenals: symmetric, no calcifications or masses. Kidneys: No hydronephrosis. Mild free fluid may relate to peritoneal dialysis. Dialysis catheter is coiled within the pelvis. There is moderate free air that will need to be correlated clinically. Aorta: normal caliber. Marked vascular calcification. Lymph Nodes: normal size. Bowel: Moderate diffuse free air. Some of these bubbles abut small bowel loops within the left upper quadrant. There is air projecting adjacent to the duodenal sweep. Peptic ulcer disease is a possibility. Bladder: Normal Reproductive: No definite mass. Bones: Marked irregular thickening with innumerable lucencies throughout the left side of the pelvis suggests Paget's disease. Moderate left hip joint arthritis. Moderate diffuse free air is nonspecific. A bowel origin is possible particularly peptic ulcer disease, though it also could relate to CAPD. Multiple small gallstones are noted. Mild pleural and parenchymal opacities at each lung base. **This report has been created using voice recognition software. It may contain minor errors which are inherent in voice recognition technology. ** Final report electronically signed by Dr. Greg Butt on 10/26/2020 6:24 PM    Xr Chest Portable  Result Date: 10/26/2020  PROCEDURE: XR CHEST PORTABLE CLINICAL INFORMATION: crackles. COPD. COMPARISON: 10/7/2020 TECHNIQUE: A single mobile view of the chest was obtained. 1. Very poor inflation of lungs. Moderate atelectasis/pneumonia along both hemidiaphragms. Small bilateral effusions. 2. Borderline heart size. Permanent pacemaker/defibrillator. CardioMems device projects over left hilum. 3. Overall appearance of chest has worsened since prior. **This report has been created using voice recognition software. It may contain minor errors which are inherent in voice recognition technology. ** Final report electronically signed by Dr. Nava Barrow on 10/26/2020 3:54 PM    CODE STATUS: Limited x 4    Thank you Lawrence Gentile MD for the opportunity to be involved in this patient's care. Electronically signed by Tawny Anguiano MD on 10/27/2020.

## 2020-10-26 NOTE — ED NOTES
Patient noted to be hypertensive again. Provider notified of this and continued pain with new orders received. Bolus infusing at this time. Patient reports some pain relief from medication. Patient appears to be resting more comfortably at this time.       Zenaida Moreno RN  10/26/20 [id number]

## 2020-10-26 NOTE — ED TRIAGE NOTES
Patient to room 35 via [address]. Patient is a resident at [facility]. Per report, patient was sent in for evaluation by Dr. Mary Peterson regarding abdominal pain associated with patient's peritoneal dialysis. Patient is unsure of specifics. EMS reports that patient became very hypotensive en route with a reading of 70s/30s. Patient continues to be hypotensive but reading has improved somewhat.

## 2020-10-26 NOTE — ED NOTES
Per nursing home packet, patient last peritoneal dialysis exchange occurred on 10/22 and has been held since due to hypotension.      Neda Gamboa RN  10/26/20 [id number]

## 2020-10-26 NOTE — ED NOTES
Patient now yelling out about right leg pain. Patient states that her leg hurts daily and has for some time. Patient denies any known injury.      Kia Michaels RN  10/26/20 0308

## 2020-10-26 NOTE — ED PROVIDER NOTES
[address] COMPLAINT       Chief Complaint   Patient presents with    Abdominal Pain       Nurses Notes reviewed and I agree except as noted in the HPI. HISTORY OF PRESENT ILLNESS    Socorro Parmar is a 80 y.o. female who presents to the Emergency Department for the evaluation of lower abdominal pain, possible hip pain. Patient has increased confusion dementia. She was admitted to [facility]) on 10/12 for altered mental status and dialysis. The patient is a resident of an extended care facility. Nursing staff sent the patient in for complaints of abdominal pain. The patient is a CAPD dialysis patient. She has a history of low blood pressure and is on midodrine at the nursing home for treatment of this. She has had no known fevers. Nursing staff at the [facility] care [facility] have not performed her CAPD exchange in the past 2 days due to hypotension. The patient is a poor historian and she is unable to provide any further information      The HPI was provided by the patient. REVIEW OF SYSTEMS     Review of Systems   Unable to perform ROS: Dementia   Gastrointestinal: Positive for abdominal pain. Musculoskeletal: Positive for arthralgias (right hip pain). Psychiatric/Behavioral: Positive for confusion.        PAST MEDICAL HISTORY    has a past medical history of Anxiety, Atherosclerotic heart disease of native coronary artery without angina pectoris, CAD (coronary artery disease), Cancer ([address] Cardiomyopathy, nonischemic ([address] Cerebral artery occlusion with cerebral infarction ([address] CHF (congestive heart failure) ([address] CKD (chronic kidney disease) stage 3, GFR 30-59 ml/min, Congenital heart disease, COPD exacerbation ([address] 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 ([address] DM2 (diabetes mellitus, type 2) ([address] Dyspnea, ESRD (end stage renal disease) ([address] Ex-smoker, GERD (gastroesophageal reflux disease), GERD (gastroesophageal reflux disease), Heart failure with preserved ejection fraction ([facility] Hemodialysis patient ([facility] [address] His of Heparin induced thrombocytopenia, History of breast cancer, History of CVA (cerebrovascular accident), History of pulmonary embolism, Hyperkalemia, Hyperlipidemia, Hypertension, Iron deficiency anemia, LBBB (left bundle branch block), Localized edema, Malignant neoplasm of breast (female) ([facility] [address] Osteoarthritis, Paget's disease of bone, Personal history of transient ischemic attack (TIA), and cerebral infarction without residual deficits, Pneumonia, Pulmonary embolism ([facility] [address] [facility] Bi[facility] ICD, Thyroid disease, Venous insufficiency (chronic) (peripheral), Vitamin D deficiency, Vitamin D deficiency, and Weakness. SURGICAL HISTORY      has a past surgical history that includes doppler echocardiography (03/22/10); cardiovascular stress test (03/23/10); pacemaker placement; eye surgery; Colonoscopy; Eye surgery (Left, 9/2014); Foot surgery (Left, 1/12/2016); Breast surgery; Cardiac catheterization (11-); Cardiac catheterization (05-); Cardiac defibrillator placement (02/02/2011); pr egd balloon dilation esophagus <30 mm diam (N/A, 9/18/2017); pr esophagogastroduodenoscopy transoral diagnostic (9/18/2017); Endoscopy, colon, diagnostic; other surgical history (10/18/2018); and Dilatation, esophagus.     CURRENT MEDICATIONS       Previous Medications    ACETAMINOPHEN (TYLENOL) 325 MG TABLET    Take 650 mg by mouth every 4 hours as needed for Pain     AMINO ACIDS (LIQUACEL) LIQD    Take 30 mLs by mouth Three times per week; Monday, Wednesday, and Friday related to ESRD    ASCORBIC ACID (VITAMIN C) 500 MG TABLET    Take 500 mg by mouth 2 times daily     ASPIRIN 81 MG EC TABLET    Take 1 tablet by mouth daily    B COMPLEX-C-FOLIC ACID (GAURI-DANNY) TABS    Take 1 tablet by mouth daily    BISACODYL (DULCOLAX) 5 MG EC TABLET    Take 5 mg by mouth daily as needed for Constipation CIPROFLOXACIN (CIPRO) 500 MG TABLET    Take 500 mg by mouth nightly     CLOPIDOGREL (PLAVIX) 75 MG TABLET    Take 75 mg by mouth every evening     CYANOCOBALAMIN 1000 MCG TABLET    Take 1,000 mcg by mouth every evening     DOCUSATE SODIUM (COLACE) 100 MG CAPSULE    Take 200 mg by mouth daily     HYPROMELLOSE OP    Place 1 drop into both eyes as needed (Dry eyes)    INSULIN ASPART (NOVOLOG) 100 UNIT/ML INJECTION VIAL    Inject into the skin 2 times daily Inject per sliding scale 0-150 = 0 units; 151-200 = 1 unit; 201-250 = 2 units; 251-300 = 3 units; 301-350 = 4 units; 351-400 = 5 units; 401-450 = 6 units    INSULIN GLARGINE (LANTUS) 100 UNIT/ML INJECTION VIAL    Inject 16 Units into the skin nightly    IPRATROPIUM-ALBUTEROL (DUONEB) 0.5-2.5 (3) MG/3ML SOLN NEBULIZER SOLUTION    Inhale 1 vial into the lungs every 6 hours as needed for Shortness of Breath    LACTOBACILLUS (CULTURELLE) CAPSULE    Take 1 capsule by mouth daily    LEVOTHYROXINE (SYNTHROID) 25 MCG TABLET    Take 1 tablet by mouth Daily    MAGNESIUM 400 MG TABS    Take 1 tablet by mouth daily     MEGESTROL (MEGACE) 40 MG TABLET    Take 1 tablet by mouth 2 times daily    METOPROLOL SUCCINATE (TOPROL XL) 50 MG EXTENDED RELEASE TABLET    Take 50 mg by mouth daily     MICONAZOLE (MICOTIN) 2 % POWDER    Apply topically as needed for Itching Apply topically to abdominal folds as needed for redness    MIDODRINE (PROAMATINE) 10 MG TABLET    Take 1 tablet by mouth 3 times daily (with meals)    NITROGLYCERIN (NITROSTAT) 0.4 MG SL TABLET    up to max of 3 total doses. If no relief after 1 dose, call 911.     ONDANSETRON (ZOFRAN) 4 MG TABLET    Take 1 tablet by mouth every 8 hours as needed for Nausea or Vomiting    OXYGEN    Inhale 2 L into the lungs as needed    PANTOPRAZOLE (PROTONIX) 40 MG TABLET    Take 40 mg by mouth 2 times daily    PERITONEAL DIALYSIS SOLUTIONS (DIANEAL LOW CALCIUM/1.5% DEX IP)    Inject 2,500 mLs into the peritoneum every 6 hours POLYCARBOPHIL (FIBERCON) 625 MG TABLET    Take 625 mg by mouth 3 times daily    POLYETHYLENE GLYCOL (GLYCOLAX) POWDER    Take 17 g by mouth 2 times daily     POTASSIUM CHLORIDE (KLOR-CON M) 20 MEQ EXTENDED RELEASE TABLET    Take 20 mEq by mouth 2 times daily     PRAVASTATIN (PRAVACHOL) 40 MG TABLET    Take 40 mg by mouth every evening Indications: Blood Cholesterol Abnormal     SEVELAMER (RENVELA) 800 MG TABLET    Take 1 tablet by mouth 3 times daily (with meals)       ALLERGIES     is allergic to heparin. FAMILY HISTORY     She indicated that her mother is . She indicated that her father is . She indicated that both of her sisters are . She indicated that both of her brothers are . She indicated that her maternal grandmother is . She indicated that her maternal grandfather is . She indicated that her paternal grandmother is . She indicated that her paternal grandfather is . family history includes Cancer in her brother, sister, and sister; Diabetes in her father and mother; Heart Disease in her brother and mother; Pacemaker in her mother; Stroke in her mother. SOCIAL HISTORY      reports that she quit smoking about 68 years ago. Her smoking use included cigarettes. She has a 1.00 pack-year smoking history. She has never used smokeless tobacco. She reports that she does not drink alcohol or use drugs. PHYSICAL EXAM     INITIAL VITALS:  height is 5' 6\" (1.676 m) and weight is 226 lb (102.5 kg). Her oral temperature is 98.3 °F (36.8 °C). Her blood pressure is 99/59 (abnormal) and her pulse is 112. Her respiration is 24 and oxygen saturation is 97%. Physical Exam  Constitutional:       General: She is not in acute distress. Appearance: She is not ill-appearing. HENT:      Head: Normocephalic.       Nose: Nose normal.      Mouth/Throat:      Mouth: Mucous membranes are moist.   Eyes:      Pupils: Pupils are equal, round, and reactive to light.   Neck:      Musculoskeletal: Normal range of motion. Cardiovascular:      Rate and Rhythm: Regular rhythm. Tachycardia present. Pulses: Normal pulses. Pulmonary:      Effort: Pulmonary effort is normal.      Breath sounds: Decreased breath sounds present. Abdominal:      General: Bowel sounds are normal.      Palpations: Abdomen is soft. Tenderness: There is abdominal tenderness in the right lower quadrant. Comments: CAPD catheter in place   Musculoskeletal:      Right hip: She exhibits tenderness (tenderness proximal hip and anterior thigh). Skin:     General: Skin is warm and dry. Capillary Refill: Capillary refill takes less than 2 seconds. Coloration: Skin is not jaundiced. Neurological:      Mental Status: She is alert. She is confused. Comments: Oriented to name. Confused to location, date and events          DIFFERENTIAL DIAGNOSIS:   Peritonitis, appendicitis, colitis, cholecystitis possible UTI but unable to confirm if patient makes urine, right hip pain, right hip fracture, sepsis    DIAGNOSTIC RESULTS     EKG: All EKG's are interpreted by the Emergency Department Physician who either signs or Co-signs this chart in the absence of a cardiologist.    Atrial sensed ventricular paced rhythm ventricular rate 109 bpm.  FL interval 130, QRS duration 122, corrected  ms    RADIOLOGY: non-plainfilm images(s) such as CT, Ultrasound and MRI are read by the radiologist.    CT ABDOMEN PELVIS WO CONTRAST Additional Contrast? None   Final Result   Moderate diffuse free air is nonspecific. A bowel origin is possible particularly peptic ulcer disease, though it also could relate to CAPD. Multiple small gallstones are noted. Mild pleural and parenchymal opacities at each lung base. **This report has been created using voice recognition software. It may contain minor errors which are inherent in voice recognition technology. **      Final report electronically signed by Dr. Elver Mena on 10/26/2020 6:24 PM      XR CHEST PORTABLE   Final Result   1. Very poor inflation of lungs. Moderate atelectasis/pneumonia along both hemidiaphragms. Small bilateral effusions. 2. Borderline heart size. Permanent pacemaker/defibrillator. CardioMems device projects over left hilum. 3. Overall appearance of chest has worsened since prior. **This report has been created using voice recognition software. It may contain minor errors which are inherent in voice recognition technology. **      Final report electronically signed by Dr. Kim Dickey on 10/26/2020 3:54 PM          LABS:     Labs Reviewed   CBC WITH AUTO DIFFERENTIAL - Abnormal; Notable for the following components:       Result Value    WBC 14.9 (*)     RBC 3.71 (*)     Hemoglobin 11.4 (*)     Hematocrit 35.0 (*)     RDW-CV 15.8 (*)     RDW-SD 52.9 (*)     Segs Absolute 8.2 (*)     Monocytes Absolute 1.4 (*)     Immature Grans (Abs) 0.22 (*)     All other components within normal limits   COMPREHENSIVE METABOLIC PANEL W/ REFLEX TO MG FOR LOW K - Abnormal; Notable for the following components:    Glucose 68 (*)     CREATININE 5.7 (*)     BUN 29 (*)     Sodium 133 (*)     Chloride 93 (*)     Alkaline Phosphatase 148 (*)     Total Protein 5.2 (*)     Alb 1.9 (*)     All other components within normal limits   TROPONIN - Abnormal; Notable for the following components:    Troponin T 0.123 (*)     All other components within normal limits   TSH WITHOUT REFLEX - Abnormal; Notable for the following components:    TSH 4.550 (*)     All other components within normal limits   LACTIC ACID, PLASMA - Abnormal; Notable for the following components:    Lactic Acid 3.4 (*)     All other components within normal limits   PROCALCITONIN - Abnormal; Notable for the following components:    Procalcitonin 0.33 (*)     All other components within normal limits   GLOMERULAR FILTRATION RATE, ESTIMATED - Abnormal; Notable for the following components:    Est, Glom Filt Rate 7 (*)     All other components within normal limits   OSMOLALITY - Abnormal; Notable for the following components:    Osmolality Calc 270.5 (*)     All other components within normal limits   LACTIC ACID, PLASMA - Abnormal; Notable for the following components:    Lactic Acid 2.6 (*)     All other components within normal limits   GRAM STAIN    Narrative:     Source: peritoneal dialysis fluid       Site:           Current Antibiotics: not stated   CULTURE, BLOOD 2   CULTURE, BLOOD 1   CULTURE, BOTTLE, BODY FLUID    Narrative:     Source: peritoneal dialysis fluid       Site:           Current Antibiotics: not stated   BODY FLUID CELL COUNT WITH DIFFERENTIAL   ANION GAP   [id number]   BASIC METABOLIC PANEL       EMERGENCY DEPARTMENT COURSE:   Vitals:    Vitals:    10/26/20 1519 10/26/20 1627 10/26/20 1735 10/26/20 1912   BP: (!) 101/56 (!) 123/43 (!) 117/54 (!) 99/59   Pulse: 101 114 116 112   Resp: 22 24 21 24   Temp:       TempSrc:       SpO2:  97% 96% 97%   Weight:       Height:           2:17 PM EDT: The patient was seen and evaluated. Appropriate labs and imaging are ordered. The patient is also being seen as consult by Juliana Castro CNP nephrology    It is difficult to determine the source of the patient's pain. She seems to be tender in the right lower quadrant but also tender in the right thigh and hip. Patient has increased confusion and has inappropriate responses to many questions. Patient has noted tachycardia and hypotension. Concern for sepsis. Appropriate work-up completed. Patient had improvement of heart rate and blood pressure with 250 mL bolus of normal saline. This was followed by an additional 500 mL bolus of normal saline. Patient had noted elevated lactic acid 3.4. Additional liter of saline is given. Blood cultures in process. Patient has noted white blood cell count elevation 14.9. Hemoglobin 11.4, hematocrit 35.0.   She has procalcitonin elevation 0.33. Lactic acid 3.4 initially. Chest x-ray is concerning for a worsening pneumonia. CT of the abdomen pelvis shows moderate diffuse free air is nonspecific. Abdominal origin is possible particularly peptic ulcer disease, though it is also could relate to CAPD. Multiple gallstones are noted. Mild pleural and parenchymal opacities at each lung base. Patient has mild alkaline phosphatase elevation 148. Total bili within normal limits. Other transaminases not elevated. Patient has chronic elevated creatinine. Her electrolytes are essentially normal.  Has troponin elevation 0.123 which is consistent with previous elevated results. Patient did have improvement of lactic acid after bolus of fluids. Lactic acid repeated 2.6. Patient is treated with vancomycin and Zosyn for healthcare acquired pneumonia and also possible intra-abdominal pathology. Nursing staff did insert catheter for urine sample but no urine was available. Patient appears anuric. MDM:  Patient has leukocytosis. Pneumonia on chest x-ray. Patient will be admitted. Antibiotics were initiated. Heart rate and potentially improved with IV fluids in the emergency department and vital signs are currently stable. Patient has free air noted on CT scan which could be related to the patient's CAPD exchanges. I did contact Dr. Dirk Christina, general surgeon. He advised that this is likely the patient's abnormal CT scan. [facility] and evaluated the patient as well. Patient will be admitted to the hospital for further evaluation and treatment. I contacted Dr. Bandar Pena the hospitalist service who graciously excepted the patient for admission. CRITICAL CARE:   None    CONSULTS:  Dr. Bandar Pena, hospitalist  Dr. Dirk Christina, general surgeon  Michael Chua CNP nephrology    PROCEDURES:  None    FINAL IMPRESSION      1. Peritoneal dialysis status ([facility] [address]    2.  Generalized abdominal pain    3. Pneumonia due to organism          DISPOSITION/PLAN   Admit    PATIENT REFERRED TO:  No follow-up provider specified. DISCHARGE MEDICATIONS:  New Prescriptions    No medications on file       (Please note that portions of this note were completed with a voice recognition program.  Efforts were made to edit the dictations but occasionally words are mis-transcribed.)    The patient was given an opportunity to see the Emergency Attending. The patient voiced understanding that I was a Mid-LevelProvider and was in agreement with being seen independently by myself.           Nicky Marie, ARELI - CNP  10/26/20 5095

## 2020-10-26 NOTE — ED NOTES
Patient yelling out in room, states \"I'm not supposed to be here. \"  When asked where she is, patient states \"the morgue. \"  Patient reassured that she is not in the morgue and reoriented to surroundings and situation. Provider updated.       Pina Mccain RN  10/26/20 1914

## 2020-10-26 NOTE — ED NOTES
Zosyn not received from pharmacy, pharmacy notified. Provider notified regarding lack of urine.   Vancomycin started with provider approval.      Artemus Osgood, RN  10/26/20 8338

## 2020-10-26 NOTE — CONSULTS
Minimal urine output, daughter has questions Nephrology Consult Note    Patient - Jessica Aleman   MRN -  [id number]      - 1934   80 y.o. Date of Admission -  10/26/2020 12:54 PM        Date of evaluation -  10/26/2020 3:14 PM    Reason for Consult  Management of End Stage Renal Disease    Requesting Physician:  No att. providers found  History Information Obtained From: Nursing staff, Electronic medical records, Patient    [facility] / HPI:   The patient is a 80 y.o. female with past medical history of DM II, Hypotension, CAD, TIA, ESRD on Peritoneal dialysis who presented from . [facility] with c/o of generalized abdominal pain and Rt hip and back pain. Pt is poor historian and cannot tell me when pain onset. She is vague about character and associated symptoms. She does not know how she has been eating or drinking. However, she is moaning about her Rt hip and back pain. She was discharged from [facility]) on 10/12 due to admission with Altered Mental Status. She was on Peritoneal dialysis with 1.5%. however, her dialysis has been on hold since 10/23 due to hypotension. She has been receiving her usual dose of K supplement while PD has been held. Apparently her BP was in the 70/s when squad arrived today. Currently is running 107/. She is on Midodrine 100 mg 3x/d at [facility]. No documentation regarding effluent noted from [facility].      Past Medical History:      Diagnosis Date    Anxiety     Atherosclerotic heart disease of native coronary artery without angina pectoris     CAD (coronary artery disease)     nonobstructive    Cancer ([address] 2007    breast cancer    Cardiomyopathy, nonischemic ([address]     Dr. Ronna Baekr Cerebral artery occlusion with cerebral infarction ([address]     CHF (congestive heart failure) ([address]     CKD (chronic kidney disease) stage 3, GFR 30-59 ml/min     Dr. John Bourne Congenital heart disease     COPD exacerbation ([address]     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 [facility]     DM2 (diabetes mellitus, type 2) ([address] 1998    with CKD3, R foot ulcer and hx of prior ulcerations and PVD    Dyspnea     ESRD (end stage renal disease) ([address]     Ex-smoker 10/2/2012    GERD (gastroesophageal reflux disease)     GERD (gastroesophageal reflux disease)     Heart failure with preserved ejection fraction ([address]     Dr. Yue Youssef Hemodialysis patient [facility])     His of Heparin induced thrombocytopenia     History of breast cancer     right 1982 s/p mastectomy and chemo, left 2007 s/p mastectomy    History of CVA (cerebrovascular accident)     History of pulmonary embolism 05/2014    on [address] (coumadin)    Hyperkalemia     Hyperlipidemia     Hypertension     pulmonary    Iron deficiency anemia     Dr. Damir Florentino did EGD and colonoscopy 2011 - normal/neg w/u per chart    LBBB (left bundle branch block)     Localized edema     Malignant neoplasm of breast (female) ([address]     Osteoarthritis     Paget's disease of bone 09/2014    left hip    Personal history of transient ischemic attack (TIA), and cerebral infarction without residual deficits     Pneumonia 10/7/2020    Pulmonary embolism ([facility]     [facility] [employer] BiV ICD 11/17/2011    Thyroid disease     Venous insufficiency (chronic) (peripheral)     Vitamin D deficiency     Vitamin D deficiency     Weakness        Past Surgical History:        Procedure Laterality Date    BREAST SURGERY      s/p right mastectomy 1980's and left mastectomy 2007    CARDIAC CATHETERIZATION  11-    Hemodynamics w pulmonary hypertension, systemic hypertension and no sats gradient difference. No aortic stenosis. No mitral stenosis. Coronaries; mild disease of the LAD not more than 40%. LV; severe LV dysfunction and EF 30-35%.  CARDIAC CATHETERIZATION  05-    Mild disease of small distal LAD (approximately  50% stenosis) angiographically normal CA. Mild to moderate LV systolic dysfunction. EF 35%.  Mildly elevated LV end diastolic pressure. No significant MR. Systemic hypertension.  CARDIAC DEFIBRILLATOR PLACEMENT  2011    CARDIOVASCULAR STRESS TEST  03/23/10    EF 30%  Severe LV Dys    COLONOSCOPY      Dr. Erick Boggs    DILATATION, ESOPHAGUS      DOPPLER ECHOCARDIOGRAPHY  03/22/10    EF 45%. LV mildly dilated. Systolic function mildly reducted. No regional wall motion abnormalities. Wall thickness mildly increased. LA mildly dilated. MV mild annular calification. Mild TR. Ventricular septum tickness mildly increased.  ENDOSCOPY, COLON, DIAGNOSTIC      EYE SURGERY      bilateral cataracts    EYE SURGERY Left 2014    laser surgery for retinopathy?  FOOT SURGERY Left 2016    OTHER SURGICAL HISTORY  10/18/2018    CardioMEMS    PACEMAKER PLACEMENT      [place] EGD BALLOON DILATION ESOPHAGUS <30 MM DIAM N/A 2017    EGD ESOPHAGOGASTRODUODENOSCOPY DILATATION performed by Martin Ram [facility]    [place] ESOPHAGOGASTRODUODENOSCOPY TRANSORAL DIAGNOSTIC  2017    EGD ESOPHAGOGASTRODUODENOSCOPY performed [facility]       Family History:       Problem Relation Age of Onset    Diabetes Mother     Pacemaker Mother     Stroke Mother     Heart Disease Mother     Diabetes Father     Cancer Sister     Cancer Brother     Cancer Sister     Heart Disease Brother         cardiomegaly       Allergies:  Heparin    Social and Occupational History:    TOBACCO:   Social History     Tobacco Use   Smoking Status Former Smoker    Packs/day: 0.50    Years: 2.00    Pack years: 1.00    Types: Cigarettes    Last attempt to quit: 1952    Years since quittin.8   Smokeless Tobacco Never Used     ETOH:   reports no history of alcohol use. reports no history of drug use. Home Medications:  Not in a hospital admission.     Current Medications:      PRN Medications:    Continuous Infusions:    Med and Labs reviewed  24HR INTAKE/OUTPUT:  No intake or output data study. Left Ventricular size is Decreased. Moderate concentric left ventricular hypertrophy. There were no regional wall motion abnormalities. Ejection fraction is visually estimated at 70%. ASSESSMENT  1. End Stage Renal Disease 2nd to diabetic and hypertensive nephrosclerosis on Peritoneal dialysis, which has been held since 10/23 due to hypotension. Will continue to hold due to hypotension. S/P 500 ml fluid bolus. Will review CXR when available. K is ok   2. Abdominal pain, send Peritoneal dialysis effluent for cell count    3. Rt hip/back pain  4. Acute on Chronic hypotension. Increase Midodrine from 10 to 15 mg 3x/d  5. 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 Altered Mental Status   7. Secondary hyperparathyroidism of renal origin  8. Hyperphosphatemia    Active Problems:    * No active hospital problems. *  Resolved Problems:    * No resolved hospital problems. *    Discussed with Dr. Teressa Ayala. Patient was advised about impression and plan. Patient verbalizes understanding and agrees with plan of care.     Thank you  att. providers found  for allowing us to participate in care of ARELI Najera CNP 10/26/2020 3:14 PM

## 2020-10-27 NOTE — ED NOTES
Attempted to call [phone] with update, unable to be connected to a nurse.       Shira Waters RN  10/26/20 2003

## 2020-10-27 NOTE — DISCHARGE INSTR - COC
Continuity of Care Form    Patient Name: David Jones   :  1934  MRN:  [id number]    Admit date:  10/26/2020  Discharge date:  2020    Code Status Order: Limited   Advance Directives:   [facility] [place] [facility] Documentation       Date/Time Healthcare Directive Type of Healthcare Directive Copy in [address] Agent's Name Healthcare Agent's Phone Number    10/26/20 3589  Yes, patient has an advance directive for healthcare treatment  Health care treatment directive  Other (Comment) in EPIC  --  --  --            Admitting Physician:  Tawny Anguiano MD  PCP: Lawrence Gentile MD    Discharging Nurse: [facility] Unit/Room#: 4K-02/002-A  Discharging Unit Phone Number: [phone]    Emergency Contact:   Extended Emergency Contact Information  Primary Emergency Contact: Alicia BOYLE[address] Phone: [phone]  Mobile Phone: [phone]  Relation: Niece/Nephew  Secondary Emergency Contact: Gari Goltz [address] Phone: [phone]  Mobile Phone: [phone]  Relation: Brother/Sister    Past Surgical History:  Past Surgical History:   Procedure Laterality Date    BREAST SURGERY      s/p right mastectomy  and left mastectomy     CARDIAC CATHETERIZATION  2010    Hemodynamics w pulmonary hypertension, systemic hypertension and no sats gradient difference. No aortic stenosis. No mitral stenosis. Coronaries; mild disease of the LAD not more than 40%. LV; severe LV dysfunction and EF 30-35%. CARDIAC CATHETERIZATION  2007    Mild disease of small distal LAD (approximately  50% stenosis) angiographically normal CA. Mild to moderate LV systolic dysfunction. EF 35%. Mildly elevated LV end diastolic pressure. No significant MR. Systemic hypertension.     CARDIAC DEFIBRILLATOR PLACEMENT  2011    CARDIOVASCULAR STRESS TEST  03/23/10    EF 30%  Severe LV Dys COLONOSCOPY      Dr. Maycol Troy, ESOPHAGUS      DOPPLER ECHOCARDIOGRAPHY  03/22/10    EF 45%. LV mildly dilated. Systolic function mildly reducted. No regional wall motion abnormalities. Wall thickness mildly increased. LA mildly dilated. MV mild annular calification. Mild TR. Ventricular septum tickness mildly increased. ENDOSCOPY, COLON, DIAGNOSTIC      EYE SURGERY      bilateral cataracts    EYE SURGERY Left 9/2014    laser surgery for retinopathy?     FOOT SURGERY Left 1/12/2016    OTHER SURGICAL HISTORY  10/18/2018    CardioMEMS    PACEMAKER PLACEMENT      [place] EGD BALLOON DILATION ESOPHAGUS <30 MM DIAM N/A 9/18/2017    EGD ESOPHAGOGASTRODUODENOSCOPY DILATATION performed by Melissa Dumont MD at [facility]    MA ESOPHAGOGASTRODUODENOSCOPY TRANSORAL DIAGNOSTIC  9/18/2017    EGD ESOPHAGOGASTRODUODENOSCOPY performed by Melissa Dumont MD at [facility]       Immunization History:   Immunization History   Administered Date(s) Administered    Influenza Vaccine, unspecified formulation 10/31/2016    Influenza Virus Vaccine 10/03/2013, 10/09/2014, 12/24/2015, 10/02/2019    Influenza Whole 10/03/2013    Pneumococcal Conjugate 13-valent ([id number]) 12/14/2016    Pneumococcal Polysaccharide ([id number]) 06/20/2017    Tdap (Boostrix, Adacel) 09/03/2018       Active Problems:  Patient Active Problem List   Diagnosis Code    Hypertension I10    LBBB (left bundle branch block) I44.7    Hyperlipidemia E78.5    [facility] BiV ICD Z95.810    CKD (chronic kidney disease) stage 3, GFR 30-59 ml/min ([facility]) N18.30    Ex-smoker Z87.891    Mild carotid artery disease ([facility]) I77.9    Morbid obesity ([facility]) E66.01    CAD (coronary artery disease) I25.10    PAD (peripheral artery disease) ([facility]) I73.9    Lactic acidosis E87.2    Mild pulmonary hypertension ([facility]) I27.20    Type 2 diabetes mellitus with insulin therapy ([facility] E11.9, Z79.4    GERD (gastroesophageal reflux disease) K21.9    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2    Mild aortic stenosis I35.0    Physical deconditioning R53.81    Hyponatremia E87.1    ESRD (end stage renal disease) ([facility] N18.6    Near syncope R55    Other hypotension I95.89    Syncope R55    Syncope and collapse R55    Abdominal pain R10.9    Hypotension I95.9       Isolation/Infection:   Isolation            Contact          Patient Infection Status       Infection Onset Added Last Indicated Last Indicated By Review Planned Expiration Resolved Resolved By    MRSA  09/06/18 10/07/20 MRSA by PCR        Scalp 9/2018  Nares 3/2019, 12/2019, 10/2020    Resolved    COVID-19 Rule Out 10/26/20 10/26/20 10/26/20 COVID-19 (Ordered)   10/26/20 Rule-Out Test Resulted    COVID-19 Rule Out 10/07/20 10/07/20 10/07/20 COVID-19 (Ordered)   10/07/20 Rule-Out Test Resulted    COVID-19 Rule Out 06/12/20 06/12/20 06/12/20 COVID-19 (Ordered)   06/12/20 Rule-Out Test Resulted    COVID-19 Rule Out 05/17/20 05/17/20 05/17/20 COVID-19 (Ordered)   05/18/20 Rule-Out Test Resulted            Nurse Assessment:  Last Vital Signs: BP (!) 74/39   Pulse 102   Temp 97.9 °F (36.6 °C) (Oral)   Resp 24   Ht 5' 6\" (1.676 m)   Wt 239 lb 6.4 oz (108.6 kg)   SpO2 97%   BMI 38.64 kg/m²     Last documented pain score (0-10 scale): Pain Level: 0  Last Weight:   Wt Readings from Last 1 Encounters:   11/02/20 239 lb 6.4 oz (108.6 kg)     Mental Status:  disoriented and alert    IV Access:  - None    Nursing Mobility/ADLs:  Walking   Dependent  Transfer  Dependent  Bathing  Dependent  Dressing  Dependent  Toileting  Dependent  Feeding  Assisted  Med Admin  Dependent  Med Delivery   whole    Wound Care Documentation and Therapy:  Wound 05/18/20 Anterior;Right (Active)   Number of days: 167       Wound 08/04/20 Coccyx Mid (Active)   Wound Etiology Pressure Stage  2 11/01/20 2002   Dressing/Treatment Protective barrier 11/02/20 0309   Wound Assessment Non-blanchable erythema 11/02/20 0309   Drainage Amount None 11/02/20 0309   Odor None 11/02/20 0309   Lola-wound Assessment Blanchable erythema 11/02/20 0309   Number of days: 89       Wound 10/26/20 Buttocks Right;Posterior;Proximal (Active)   Wound Etiology Pressure Stage  2 11/01/20 2002   Dressing/Treatment Protective barrier 11/02/20 0309   Wound Assessment Dry;Non-blanchable erythema 11/02/20 0309   Drainage Amount None 11/02/20 0309   Odor None 11/02/20 0309   Lola-wound Assessment Non-blanchable erythema 11/02/20 0309   Number of days: 6       Wound 10/26/20 Thigh Distal;Right;Posterior (Active)   Wound Etiology Pressure Stage  2 11/01/20 2002   Dressing/Treatment Protective barrier 11/02/20 0309   Wound Assessment Dry;Non-blanchable erythema 11/02/20 0309   Drainage Amount None 11/02/20 0309   Odor None 11/02/20 0309   Lola-wound Assessment Non-blanchable erythema 11/02/20 0309   Number of days: 6        Elimination:  Continence: Bowel: No  Bladder: No  Urinary Catheter: None   Colostomy/Ileostomy/Ileal Conduit: No       Date of Last BM: 11/03/2020    Intake/Output Summary (Last 24 hours) at 11/2/2020 0812  Last data filed at 11/2/2020 0500  Gross per 24 hour   Intake 6000 ml   Output 4200 ml   Net 1800 ml     I/O last 3 completed shifts: In: 6030 [I.V.:30; Other:6000]  Out: 4200 [Other:4200]    Safety Concerns:      At Risk for Falls and Aspiration Risk    Impairments/Disabilities:      None    Nutrition Therapy:  Current Nutrition Therapy:   - Oral Diet:  General  - Oral Nutrition Supplement:  Diabetic  three times a day    Routes of Feeding: Oral  Liquids: Thin Liquids  Daily Fluid Restriction: yes - amount 1500 ml  Last Modified Barium Swallow with Video (Video Swallowing Test): not done    Treatments at the Time of Hospital Discharge:   Respiratory Treatments: see attached med req  Oxygen Therapy:  is not on home oxygen therapy. Ventilator:    - No ventilator support    Rehab Therapies:   Weight Bearing Status/Restrictions: No weight bearing restirctions  Other Medical Equipment (for information only, NOT a DME order):  hospital bed  Other Treatments:     Patient's personal belongings (please select all that are sent with patient):  Glasses    RN SIGNATURE:  Electronically signed by Soraya Guerrero RN on 11/4/20 at 10:32 AM EST    CASE MANAGEMENT/SOCIAL WORK SECTION    Inpatient Status Date:  10/26/2020    Readmission Risk Assessment Score:  Readmission Risk              Risk of Unplanned Readmission:        53           Discharging to Facility/ Agency   [place]  [facility]  Address:  [address] [place] [address], [address] [place] [address]  Phone:  [phone]  Fax: [phone]    Dialysis Facility (if applicable)   Name:  Address:  Dialysis Schedule:  Phone:  Fax:    / signature: Electronically signed by BARBARA Todd on 10/27/20 at 2:34 PM EDT    PHYSICIAN SECTION    Prognosis: Poor    Condition at Discharge: Terminal    Rehab Potential (if transferring to Rehab): Poor    Recommended Labs or Other Treatments After Discharge: refer to discharge orders    Physician Certification: I certify the above information and transfer of Kathryn Lewis  is necessary for the continuing treatment of the diagnosis listed and that she requires Hospice for greater 30 days.      Update Admission H&P: No change in H&P    PHYSICIAN SIGNATURE:  Electronically signed by Darcy Villa MD on 11/4/20 at 12:40 PM EST

## 2020-10-27 NOTE — CARE COORDINATION
10/27/20, 11:52 AM EDT    DISCHARGE PLANNING EVALUATION      SW faxed chart information to [facility] to start the pre-cert

## 2020-10-27 NOTE — PROGRESS NOTES
Physician Progress Note      PATIENT:               Louis Hunter  CSN #:                  [id number]  :                       1934  ADMIT DATE:       10/26/2020 12:54 PM  [address] [place] DATE:  RESPONDING  PROVIDER #:        Jack Ramirez DO          QUERY TEXT:    Pt admitted with abd pain and AMS. Pt noted to have criteria for sepsis. If   possible, please respond below and document in your progress notes and   discharge summary if you are evaluating and /or treating any of the following: The medical record reflects the following:  Risk Factors: PNA  Clinical Indicators: Sepsis Criteria: WBC 14.9, LA 3.4, Procal 0.33, noted   tachycardia , Resp 26, and hypotension. IN ED: Concern for sepsis. Chest x-ray is concerning for a worsening pneumonia. CT SCAN:  A bowel origin is possible particularly peptic ulcer disease, though   it also could relate to CAPD. Treatment:  Received vanc/zosyn in the ED due to concern for [facility]. \"      Thank you. Please call if you have any questions. (P) [phone]. Signed   by Ladi Villanueva RN Clinical , CRCR  Options provided:  -- Sepsis, present on admission  -- Sepsis, now resolved,  -- Sepsis, not present on admission,  -- No Sepsis, localized infection only  -- Sepsis was ruled out  -- SIRS only, present on admission  -- Lactic Acidosis only, present on admission  -- Other - I will add my own diagnosis  -- Disagree - Not applicable / Not valid  -- Disagree - Clinically unable to determine / Unknown  -- Refer to Clinical Documentation Reviewer    PROVIDER RESPONSE TEXT:    This patient has sepsis which was present on admission. Query created by: Steve Moise on 10/27/2020 10:05 AM      QUERY TEXT:    Pt admitted with Abd pain and PNA. Pt noted to have AMS. If possible, please   respond below and document in your progress notes and discharge summary if you   are evaluating and /or treating any of the following:     The medical record reflects the following:  Risk Factors: PNA  Clinical Indicators:  increased confusion dementia. She is confused. Comments: Oriented to name. Confused to location, date and events. Pt yelling   out at [facility] staff and hallways. Treatment: Neuro checks and bed alarms. Thank you. Please call if you have any questions. (P) [phone]. Signed   by Abhinav Stringer RN Clinical , CRCR  Options provided:  -- Metabolic encephalopathy  -- Septic encephalopathy  -- Toxic encephalopathy  -- Encephalopathy due to medications or drugs, Please specify  -- Delirium  -- Other - I will add my own diagnosis  -- Disagree - Not applicable / Not valid  -- Disagree - Clinically unable to determine / Unknown  -- Refer to Clinical Documentation Reviewer    PROVIDER RESPONSE TEXT:    This patient has metabolic encephalopathy.     Query created by: Kandy Angulo on 10/27/2020 10:08 AM      Electronically signed by:  Jose Roberto Giles DO 10/27/2020 11:46 AM

## 2020-10-27 NOTE — PLAN OF CARE
Problem: Pain:  Goal: Pain level will decrease  Description: Pain level will decrease  Outcome: Ongoing  Note: Pain Assessment: 0-10  Pain Level: 8   Patient's Stated Pain Goal: 3   Is pain goal met at this time? No     Non-Pharmaceutical Pain Intervention(s): Rest, Repositioned     Problem: Neurological  Goal: Maximum potential motor/sensory/cognitive function  Outcome: Ongoing  Note: Patient is alert and oriented x4. History of dementia. Able to answer all of RN's questions appropriately. Problem: Cardiovascular  Goal: Hemodynamic stability  Outcome: Ongoing  Note: No signs or symptoms of DVTs. Bilateral sequential devices to lower extremities ordered. Problem: Respiratory  Goal: No pulmonary complications  Outcome: Ongoing  Note: Lung sounds are clear and diminished. O2 saturation is 95% on room air. Unlabored and equal breathing. Tachypnea at times. Chest xray showed atelectasis/ pneumonia. Small bilateral effusions. Will continue to monitor. Problem: GI  Goal: No bowel complications  Outcome: Ongoing  Note: Bowel sounds active. Passing flatus. BM noted throughout the night. Will continue to monitor. Problem:   Goal: Adequate urinary output  Outcome: Ongoing  Note: Patient is in end stage renal disease. No output noted. Patient receives peritoneal dialysis. Will continue to monitor. Problem: Nutrition  Goal: Optimal nutrition therapy  Outcome: Ongoing  Note: Patient on a renal diet and tolerating well. Denies any nausea or vomiting. Problem: Discharge Planning:  Goal: Discharged to appropriate level of care  Description: Discharged to appropriate level of care  Outcome: Ongoing  Note: Patient from [facility]. Patient plans to return [facility] at discharge. No plans for discharge at this time. Problem: Activity:  Goal: Fatigue will decrease  Description: Fatigue will decrease  Outcome: Ongoing  Note: No falls noted this shift.  Fall risk assessment completed. Hourly rounding performed. Bed locked in lowest position, bed alarm on, call light and personal items within reach, and fall sign posted. Patient does not ambulate per patient. Problem: Fluid Volume:  Goal: Will show no signs or symptoms of fluid imbalance  Description: Will show no signs or symptoms of fluid imbalance  Outcome: Ongoing  Note: Adequate intake. Will continue to monitor output d/t patient being in ESRD. Problem: Physical Regulation:  Goal: Ability to maintain clinical measurements within normal limits will improve  Description: Ability to maintain clinical measurements within normal limits will improve  Outcome: Ongoing  Note: No signs or symptoms of infection. VSS. Will continue to assess. Problem: Skin Integrity:  Goal: Will show no infection signs and symptoms  Description: Will show no infection signs and symptoms  Outcome: Ongoing  Note: No new skin lesions noted this shift. Patient encouraged to reposition every two hours. Skin assessments completed and ongoing. Nursing staff assist with patient repositioning every two hours. Problem: Serum Glucose Level - Abnormal:  Goal: Ability to maintain appropriate glucose levels has stabilized  Description: Ability to maintain appropriate glucose levels has stabilized  Outcome: Ongoing  Note: Blood sugar was 66. RN provided patient orange juice, apple sauce, jello, and pudding. Patient tolerated well. Will reassess. Care plan reviewed with patient. Patient verbalize understanding of the plan of care and contribute to goal setting.     Electronically signed by Genaro Seth RN on 10/27/2020 at 12:52 AM

## 2020-10-27 NOTE — CARE COORDINATION
10/27/20, 10:34 AM EDT  Discharge Planning Evaluation  Social work consult received, patient from ECF. Patient/Family preference is to return to: [facility] spoke to patient and this is what she wants to do  The patient's current payor source at the facility is Medicaid. Medicare skilled days available:  ECF prefers to try to pre-cert  Insurance precert:  yes  Spoke with Biju Nguyen at the facility.   Patient bed hold:  Yes Medicaid  Anticipated transport plan:  squad  Do they require COVID 19 test to return to ECF: had one completed Oct 26  Is there a required time frame which which COVID test needs done: only need one

## 2020-10-27 NOTE — PROGRESS NOTES
Hospitalist Progress Note    Patient: Avinash Sainz      Unit/Bed:4K-02/002-A    YOB: 1934    MRN: [id number]       Acct: [de-identified]     PCP: Hector Angel MD    Date of Admission: 10/26/2020    Assessment/Plan:    # Abdominal Discomfort likely 2/2 Gastroparesis  Patient's complaints have been about early satiety and feeling that she stays full for a long time. This could likely be secondary to gastroparesis due to history of diabetes     10/27:  -Start Reglan 5 mg TID with meals    -We will reassess in a.m. to look for other causes of early satiety    # ESRD on CAPD  Patient follows Dr. Arnie Burris. She has not had peritoneal dialysis for about 5 days now. Peritoneal dialysis fluid showed some questionable gram-positive cocci growing. Patient is asymptomatic without any evidence of peritonitis however. Likely a contaminant. 10/27:  -As per nephrology we will hold off on peritoneal dialysis as patient is hypotensive    -We will follow with any recommendations from nephrology    -Continue with Renleva 800 mg TID with meals    -Monitor I's and O's, daily weights, BMP daily    # Chronic Hypotension  Patient apparently has a history of hypotension. 10/27:  -Continue with midodrine 15 mg TID with meals    -Monitor vitals closely    # Compensated HFmrEF  Last echocardiogram done 6/2020. EF estimated at 45 to 50%     10/27: -Continue with Lopressor 25 mg qd    # Mild Hyponatremia 2/2 ESRD  This is likely a chronic issue for this patient as she is unable to excrete free water     10/27: -Continue with daily BMPs to monitor and trend    # Lactic Acidosis  Low suspicion of sepsis. Patient has impaired clearance from ESRD, will likely be elevated      10/27: -Repeat lactic acid level in the a.m.     # History of Hypothyroidism  Continue Synthroid 25 mcg qd    # History of GERD  Continue Protonix 40 mg BID    # History of HIT  Avoid heparin use    # History of COPD  Duo nebs q6h PRN    Chief Complaint: Abdominal Pain    Hospital Course:     79 y/o F PMHx ESRD (on CAPD), chronic hypotension, HFmrEF (EF 45-50% per ECHO 6/2020), non-ischemic cardiomyopathy / s/p BIV-ICD, NIDDM2, COPD (no baseline supplemental oxygen requirement), non-obstructive CAD, HLD, CVA/TIA, PE (previously on coumadin), dementia, hypothyroidism, former tobacco abuse and other PMHx as indicated below -- presents to [facility] from [facility] with a chief complaint of abdominal pain. Limited history provided by the patient (due to dementia, confusion).     Patient was admitted to [facility] from 10/7-10/12 for low BP / hospital course was grossly unremarkable (hypotension chronic / related to PD) / patient remained asymptomatic during her hospital stay and was discharged to SNF in stable condition. She returns today with reported complaints of lower abdominal pain / poorly-characterized / no additional information provided. No fevers reported at [facility]. Per nursing staff, no CAPD performed x 48 hours due to recent hypotension.     Patient appears to answer yes/no questions regarding symptoms reliably, but is otherwise confused and unable to provide additional history. She denies chest pain, cough, shortness of breath, n/v/d or other symptoms at this time. States abdominal pain has resolved and is requesting something to eat.     ED course: afebrile, hypotensive (90s/40s), tachycardic (100s-110s), tachypnic (RR low 20s) with SpO2 96% on RA. Workup revealed: Hgb 11.4 (MCV 94.3), WBC 14.9, Plt 234. Na 133, Cl 93, K 4.1, Cr 5.7 / BUN 29 / eGFR 7. BG 68. CO2: 26. AST/ALT nml. AlkP 148. Alb 1.9. Trop 0.123. TSH 4.6. LA 3.4 > 2.6. Procal 0.33. Prelim CAPD fluid studies:  / no organisms on gram stain. CXR: moderate atelectasis/PNA along both sarah-diaphragms, small bilateral effusions (overall appearance worsened since prior).  CT A/P WO contrast: + non-specific, moderate diffuse free air / bowel origin possible, though could be related to CAPD / + mild pleural and parenchymal opacities at each lung base. Received 1750 cc IVF, midodrine x 1, vanc/zosyn x 1. Hospitalist service contacted for admission. Please see A&P for additional details.       Subjective (past 24 hours):     Patient reports vague abdominal discomfort, states she cannot fully eat and feels full quickly. Denies new onset headaches, fevers/chills, chest pain, palpitations, extremity weakness or swelling. Medications:  Reviewed    Infusion Medications    dextrose       Scheduled Medications    gentamicin   Topical Daily    metoclopramide  5 mg Oral TID AC    aspirin  81 mg Oral Daily    clopidogrel  75 mg Oral QPM    levothyroxine  25 mcg Oral Daily    megestrol  40 mg Oral BID    metoprolol tartrate  25 mg Oral Daily    midodrine  15 mg Oral TID WC    pantoprazole  40 mg Oral BID    pravastatin  40 mg Oral QPM    sevelamer  800 mg Oral TID WC    cyanocobalamin  1,000 mcg Oral QPM    folbee plus  1 tablet Oral Daily    ascorbic acid  500 mg Oral BID    sodium chloride flush  10 mL Intravenous 2 times per day     PRN Meds: ipratropium-albuterol, sodium chloride flush, acetaminophen **OR** acetaminophen, glucose, dextrose, glucagon (rDNA), dextrose      Intake/Output Summary (Last 24 hours) at 10/27/2020 1518  Last data filed at 10/27/2020 1313  Gross per 24 hour   Intake 2543.22 ml   Output 0 ml   Net 2543.22 ml       Diet:  DIET RENAL; Carb Control: 3 carb choices (45 gms)/meal; Daily Fluid Restriction: 1500 ml; Low Potassium, Low Phosphorus    Exam:  BP (!) 106/55   Pulse 84   Temp 97.9 °F (36.6 °C) (Oral)   Resp 18   Ht 5' 6\" (1.676 m)   Wt 219 lb 12.8 oz (99.7 kg)   SpO2 95%   BMI 35.48 kg/m²     General appearance: No apparent distress, appears stated age and cooperative. HEENT: Pupils equal, round, and reactive to light. Conjunctivae/corneas clear. Neck: Supple, with full range of motion. No jugular venous distention. Trachea midline.   Respiratory: Normal respiratory effort. Clear to auscultation, bilaterally without Rales/Wheezes/Rhonchi. Cardiovascular: Regular rate and rhythm with normal S1/S2 without murmurs, rubs or gallops. Abdomen: Soft, non-tender, non-distended with normal bowel sounds. PD catheter noted without signs of inflammation. Nontender to palpation around insertion site. Musculoskeletal: passive and active ROM x 4 extremities. Skin: Skin color, texture, turgor normal.  No rashes or lesions. Neurologic:  Neurovascularly intact without any focal sensory/motor deficits. Cranial nerves: II-XII intact, grossly non-focal.  Psychiatric: Alert and oriented, thought content appropriate, normal insight  Capillary Refill: Brisk,< 3 seconds   Peripheral Pulses: +2 palpable, equal bilaterally      Labs:   Recent Labs     10/26/20  1400 10/27/20  0526   WBC 14.9* 11.8*   HGB 11.4* 9.3*   HCT 35.0* 29.7*    183     Recent Labs     10/26/20  1400 10/27/20  0526   * 132*   K 4.1 4.3   CL 93* 96*   CO2 26 23   BUN 29* 31*   CREATININE 5.7* 6.2*   CALCIUM 9.0 8.1*   PHOS  --  2.8     Recent Labs     10/26/20  1400   AST 21   ALT 13   BILITOT 0.3   ALKPHOS 148*     No results for input(s): INR in the last 72 hours. No results for input(s): Zettie Binet in the last 72 hours. Microbiology:      Urinalysis:      Lab Results   Component Value Date    NITRU NEGATIVE 05/17/2020    WBCUA > 200 05/17/2020    BACTERIA MANY 05/17/2020    RBCUA 25-50 05/17/2020    BLOODU MODERATE 05/17/2020    SPECGRAV 1.019 03/26/2019    GLUCOSEU NEGATIVE 05/17/2020       Radiology:  CT ABDOMEN PELVIS WO CONTRAST Additional Contrast? None   Final Result   Moderate diffuse free air is nonspecific. A bowel origin is possible particularly peptic ulcer disease, though it also could relate to CAPD. Multiple small gallstones are noted. Mild pleural and parenchymal opacities at each lung base.                **This report has been created using voice

## 2020-10-27 NOTE — PROGRESS NOTES
Pt admitted to  4K2 via from ED from [address] [place] [address] Drive: [address]. Complaints: abdominal pain. IV normal saline infusing into the antecubital left, condition patent and no redness at a rate of 100 mls/ hour with about 800 mls in the bag still. IV site free of s/s of infection or infiltration. Vital signs obtained. Assessment and data collection initiated. Two nurse skin assessment performed by [facility] RN and Trevon Cerrato. Oriented to room. Policies and procedures for  explained. All questions answered with no further questions at this time. Fall prevention and safety brochure discussed with patient. Bed alarm on. Call light in reach. Oriented to room. China Valenzuela RN 10/26/2020 10:50 PM     Explained patients right to have family, representative or physician notified of their admission. Patient has Declined for physician to be notified. Patient has Declined for family/representative to be notified. Patient noted to have Bilateral mastectomy. IV was placed in left AC in the ER. Patient is a left limb alert. Right mastectomy done in 1988. Left mastectomy done in 2007.

## 2020-10-27 NOTE — PROGRESS NOTES
not sit at EOB and staff does not assist with ROM/gentle exercises anymore either. VISION:WFL    HEARING:  WFL    COGNITION: Slow Processing, Decreased Recall and Decreased Insight    RANGE OF MOTION:  Bilateral Upper Extremity:  WFL    STRENGTH:  Bilateral Upper Extremity:  Impaired - grossly 4-/5    ADL:   No ADL's completed this session. .Just completed all with tech. Pt reported has still been able to feed self while admitted to hospital, and with demoes BUE AROM to confirm. BALANCE:  N/A-bedbound at [facility]    BED MOBILITY:  Rolling to Left: Maximum Assistance, X 2    Rolling to Right: Maximum Assistance, X 2      TRANSFERS:  N/A-bedbound at [facility]    FUNCTIONAL MOBILITY:  N/A-bedbound at [facility]    Exercise:  Pt refused    Activity Tolerance:  Patient tolerance of  treatment: poor. Assessment:  Assessment: Pt currently at baseline for ADLs, mobility, and transfers and safe to return home when medically stable. No further OT services warranted at this time. Performance deficits / Impairments: Decreased functional mobility , Decreased ADL status, Decreased strength, Decreased endurance, Decreased balance  Prognosis: Guarded  REQUIRES OT FOLLOW UP: No  No Skilled OT: No OT goals identified, Patient refusal, At baseline function  Decision Making: High Complexity    Treatment Initiated: No treatment initiated    Discharge Recommendations:  ECF without OT    Patient Education:  OT Education: OT Role    Equipment Recommendations:  Equipment Needed: No    Plan:  Times per week: N/A. Goals:     Short term goals  Time Frame for Short term goals: N/A         Following session, patient left in safe position with all fall risk precautions in place.

## 2020-10-27 NOTE — PROGRESS NOTES
[facility] PHYSICAL THERAPY  EVALUATION  [facility] ICU STEPDOWN TELEMETRY 4K - 4K-02/002-A     Co-eval with OT due to pt's dependent level of function and previous resistance to therapy. Time In: 1021  Time Out: 1035  Timed Code Treatment Minutes: 8 Minutes  Minutes: 14    Date: 10/27/2020  Patient Name: Hollis Vivar,  Gender:  female        MRN: [id number]  : 1934  (80 y.o.)      Referring Practitioner: Glo Bernheim, MD  Diagnosis: abdominal pain      Restrictions/Precautions:  Restrictions/Precautions: General Precautions, Fall Risk    Subjective:  Chart Reviewed: Yes  Patient assessed for rehabilitation services?: Yes  Other (Comment): requires encouragement to participate, initially resistive  Subjective: RN approved PT evaluation. Pt in bed resting upon PT & OT arrival. Pt states she \"did therapy for 5 years and is tired of it. \" She also c/o of being cold and not wanting to remove blankets. With encouragement, she was receptive to PT evaluation for bed mobility only. General:  Follows Commands: Within Functional Limits    Hearing: Within functional limits    Pain: none stated    Social/Functional History:    Type of Home: Facility([facility])  Home Layout: One level  Home Access: Level entry      ADL Assistance: Needs assistance     Ambulation Assistance: Needs assistance(does not ambulate (has not for 1 year))  Transfer Assistance: Needs assistance(receives assistance for all transfers; dependent)     Additional Comments: Pt reported \"I'm not doing therapy anymore. I've done if years back and I'm not doing it again. \" Pt reported does not get OOB anymore, even with use of ángela lift, reporting is in bed all the time. Pt reported does not sit at EOB and staff does not assist with ROM/gentle exercises anymore either. OBJECTIVE:  Range of Motion:  Bilateral Lower Extremity: Impaired - AROM severely limited (~20 deg of knee flexion, ~10 deg of hip abduction):  PROM WNL    Strength:  Bilateral Lower Extremity: Impaired - severely diminished. 2/5 or less    Balance:  not assessed due to pt stating she never sits EOB at nursing home and does not want to attempt here    Bed Mobility:  Rolling to Left: Maximum Assistance, X 2, with verbal cues , cues to place hand on rail to help with transfer--requires assist to get hand to rail and did not pull much once there   Rolling to Right: Maximum Assistance, X 2, with verbal cues , cues to place hand on rail to help with transfer--requires assist to get hand to rail and did not pull much once there      Transfers:  Not Tested - dependent/ángela at baseline    Ambulation:  Not Tested - pt does not ambulate at baseline    Functional Outcome Measures: Completed  AM-PAC Inpatient Mobility Raw Score : 6  AM-PAC Inpatient T-Scale Score : 23.55    ASSESSMENT:  Activity Tolerance:  Patient tolerance of  treatment: poor. Minimal ability for pt to mobilize at all. Dependent on PT/OT, which is her baseline. Treatment Initiated: Treatment and education initiated within context of evaluation. Evaluation time included review of current medical information, gathering information related to past medical, social and functional history, completion of standardized testing, formal and informal observation of tasks, assessment of data and development of plan of care and goals. Treatment time included skilled education and facilitation of tasks to increase safety and independence with functional mobility for improved independence and quality of life. Education on performance of roll technique, encouraging pt to participate throughout session. Assessment:  Assessment: Pt is at baseline level of function at this time. She is bed bound, and dependent for all care. PT is not appropriate for this pt as she has no goals, is at [facility], and is resistive to therapy. REQUIRES PT FOLLOW UP: No  No Skilled PT:  At baseline function    Discharge Recommendations:   return to nursing home. No PT recommended, as pt will likely refuse it (see subjective comments above).      Patient Education:  PT Education: PT Role, Functional Mobility Training, Plan of Care    Equipment Recommendations:  Equipment Needed: No    Plan:  Times per week: N/A    Goals:  Patient goals : return to [facility] term goals  Time Frame for Short term goals: N/A due to pt at baseline  Long term goals  Time Frame for Long term goals : N/A due to pt at baseline    Following session, patient left in safe position with all fall risk precautions in place, warm blanket on, bed alarm on, call light in reach, and SCDs on.    Teagan Najera, PT, DPT

## 2020-10-27 NOTE — FLOWSHEET NOTE
10/27/20 0759   Provider Notification   Reason for Communication Evaluate   Provider Name Dr. Yani Aguero   Provider Notification Resident   Method of Communication Secure Message   Notification Time 6270   Notified doctor that he must enter the magnesium replacement dose himself as the patient's creatinine clearance is less than 30.

## 2020-10-27 NOTE — CARE COORDINATION
10/27/20, 11:47 AM EDT  DISCHARGE PLANNING EVALUATION:    Olivia Charles       Admitted from: ED 10/26/2020/ [facility] 100 day: 1   Location: -02/002-A Reason for admit: Abdominal pain [R10.9] Status: IP  Admit order signed?: yes  PMH:  has a past medical history of Anxiety, Atherosclerotic heart disease of native coronary artery without angina pectoris, Blood circulation, collateral, CAD (coronary artery disease), Cancer ([address] Cardiomyopathy, nonischemic ([address] Cerebral artery occlusion with cerebral infarction ([address] CHF (congestive heart failure) ([address] CKD (chronic kidney disease) stage 3, GFR 30-59 ml/min, Congenital heart disease, COPD exacerbation ([address] 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 ([address] DM2 (diabetes mellitus, type 2) ([address] Dyspnea, ESRD (end stage renal disease) ([address] Ex-smoker, GERD (gastroesophageal reflux disease), GERD (gastroesophageal reflux disease), Heart failure with preserved ejection fraction ([address] Hemodialysis patient ([address] His of Heparin induced thrombocytopenia, History of breast cancer, History of CVA (cerebrovascular accident), History of pulmonary embolism, Hyperkalemia, Hyperlipidemia, Hypertension, Iron deficiency anemia, LBBB (left bundle branch block), Localized edema, Malignant neoplasm of breast (female) ([address] Osteoarthritis, Paget's disease of bone, Personal history of transient ischemic attack (TIA), and cerebral infarction without residual deficits, Pneumonia, Pulmonary embolism ([address] [facility] ICD, Thyroid disease, Venous insufficiency (chronic) (peripheral), Vitamin D deficiency, Vitamin D deficiency, and Weakness. Procedure:   10/26 CT Abdomen  Moderate diffuse free air is nonspecific. A bowel origin is possible particularly peptic ulcer disease, though it also could relate to CAPD. Multiple small gallstones are noted. Mild pleural and parenchymal opacities at each lung base  10/26 CXR   1. Very poor inflation of lungs.  Moderate be discharged to:  return to [facility]  Expected Discharge date:  10/30/20  Follow Up Appointment: Best Day/ Time: Tuesday AM    Patient Goals/Plan/Treatment Preferences: plans return [facility] ECF when medically cleared (precert), has Verlee Roys, CAPD, nebulizer, oxygen 2L, therapy following, client is confused   Transportation/Food Security/Housekeeping Addressed:  No issues identified.     Evaluation: yes

## 2020-10-27 NOTE — ED NOTES
Pt is transported to [facility] without incident. Floor nurse was contacted prior to departure.

## 2020-10-27 NOTE — FLOWSHEET NOTE
10/27/20 1440   Provider Notification   Reason for Communication Evaluate   Provider Name Dr. Montana Barger   Provider Notification Physician   Method of Communication Secure Message   Notification Time 1   Notified Dr. Montana Barger of patient's dialysis fluid having evidence of gram positive cocci in clusters.

## 2020-10-27 NOTE — PROGRESS NOTES
Renal Progress Note    Assessment and Plan:    1. End stage kidney disease on peritoneal dialysis. 2.  Hypotension  3. Deconditioning  4. Abnormalities type II  5. Hyponatremia from end-stage renal disease and inability of the kidneys to excrete a free water  6. Normocytic anemia of chronic disease  7. Leukocytosis  PLAN:   Labs reviewed.     Metabolically she is stable  We will still hold peritoneal dialysis for now  Medications reviewed  Gentamicin cream 0.1% apply to the exit site of PD catheter once a day  Labs in the morning  PT and OT to evaluate and treat  Will follow      Patient Active Problem List:     Hypertension     LBBB (left bundle branch block)     Hyperlipidemia     [facility] [place] BiV ICD     CKD (chronic kidney disease) stage 3, GFR 30-59 ml/min (HCC)     Ex-smoker     Mild carotid artery disease (HCC)     Morbid obesity (HCC)     CAD (coronary artery disease)     PAD (peripheral artery disease) (HCC)     Lactic acidosis     Mild pulmonary hypertension (HCC)     Type 2 diabetes mellitus with insulin therapy (HCC)     GERD (gastroesophageal reflux disease)     His of Heparin induced thrombocytopenia     History of breast cancer     History of CVA (cerebrovascular accident)     History of pulmonary embolus (PE)     Iron deficiency anemia     Osteoarthritis     Paget's disease of bone     Vitamin D deficiency     COPD without exacerbation (HCC)     CHF (congestive heart failure), NYHA class III, chronic, diastolic (HCC)     Anemia, chronic disease     Anemia, chronic renal failure     Acute combined systolic and diastolic CHF, NYHA class 1 ([address]     ESRD on peritoneal dialysis ([address]     Mild aortic stenosis     Physical deconditioning     Hyponatremia     ESRD (end stage renal disease) ([address]     Near syncope     Other hypotension     Syncope     Syncope and collapse     Abdominal pain      Subjective:   Admit Date: 10/26/2020    Interval History:   Seen for end-stage kidney disease on peritoneal dialysis  Awake and alert this morning  No complaint  Updated by the staff  No issues  Blood pressure is stable but low      Medications:   Scheduled Meds:   gentamicin   Topical Daily    aspirin  81 mg Oral Daily    clopidogrel  75 mg Oral QPM    levothyroxine  25 mcg Oral Daily    megestrol  40 mg Oral BID    metoprolol tartrate  25 mg Oral Daily    midodrine  15 mg Oral TID WC    pantoprazole  40 mg Oral BID    pravastatin  40 mg Oral QPM    sevelamer  800 mg Oral TID WC    cyanocobalamin  1,000 mcg Oral QPM    folbee plus  1 tablet Oral Daily    ascorbic acid  500 mg Oral BID    sodium chloride flush  10 mL Intravenous 2 times per day     Continuous Infusions:   dextrose         CBC:   Recent Labs     10/26/20  1400 10/27/20  0526   WBC 14.9* 11.8*   HGB 11.4* 9.3*    183     CMP:    Recent Labs     10/26/20  1400 10/27/20  0526   * 132*   K 4.1 4.3   CL 93* 96*   CO2 26 23   BUN 29* 31*   CREATININE 5.7* 6.2*   GLUCOSE 68* 68*   CALCIUM 9.0 8.1*   LABGLOM 7* 6*     Troponin: No results for input(s): TROPONINI in the last 72 hours. BNP: No results for input(s): BNP in the last 72 hours. INR: No results for input(s): INR in the last 72 hours. Lipids: No results for input(s): CHOL, LDLDIRECT, TRIG, HDL, AMYLASE, LIPASE in the last 72 hours. Liver:   Recent Labs     10/26/20  1400   AST 21   ALT 13   ALKPHOS 148*   PROT 5.2*   LABALBU 1.9*   BILITOT 0.3     Iron:  No results for input(s): IRONS, FERRITIN in the last 72 hours. Invalid input(s): LABIRONS  CT ABDOMEN PELVIS WO CONTRAST Additional Contrast? None   Final Result   Moderate diffuse free air is nonspecific. A bowel origin is possible particularly peptic ulcer disease, though it also could relate to CAPD. Multiple small gallstones are noted. Mild pleural and parenchymal opacities at each lung base. **This report has been created using voice recognition software.  It may contain minor errors which are

## 2020-10-27 NOTE — PROGRESS NOTES
Reviewed the peritoneal dialysis -fluid data. There is questionable gram-positive cocci growing   No cell count done however. Patient has no abdominal pain. It is not specified whether the PD fluid was cloudy or not. Patient has not done peritoneal dialysis in about 5 days now. Therefore I doubt this is a correct and may be a contaminant. Spoke to the staff RN. She will talk to the lab to get cell count that would include WBC and neutrophils done. We will proceed from there.

## 2020-10-28 NOTE — PROGRESS NOTES
Hospitalist Progress Note    Patient: Denise Moeller      Unit/Bed:4K-02/002-A    YOB: 1934    MRN: [id number]       Acct: [de-identified]     PCP: Ford Burton MD    Date of Admission: 10/26/2020    Assessment/Plan:    # Abdominal Discomfort likely 2/2 Gastroparesis  Patient's complaints have been about early satiety and feeling that she stays full for a long time. This could likely be secondary to gastroparesis due to history of diabetes     10/27:  -Start Reglan 5 mg TID with meals    -We will reassess in a.m. to look for other causes of early satiety     10/28:  -Continue with Reglan 5 mg TID with meals    -Patient is able to tolerate meals better    # ESRD on CAPD  Patient follows Dr. Luana Marrero. She has not had peritoneal dialysis for about 5 days now. Peritoneal dialysis fluid showed some questionable gram-positive cocci growing. Patient is asymptomatic without any evidence of peritonitis however. Likely a contaminant. 10/27:  -As per nephrology we will hold off on peritoneal dialysis as patient is hypotensive    -We will follow with any recommendations from nephrology    -Continue with Renleva 800 mg TID with meals    -Monitor I's and O's, daily weights, BMP daily     10/28:  -As per nephrology patient will resume peritoneal dialysis q8h    -Continue with Renleva 800 mg TID with meals    -Monitor I's and O's, daily weights, BMP daily    # Chronic Hypotension  Patient apparently has a history of hypotension. 10/27:  -Continue with midodrine 15 mg TID with meals    -Monitor vitals closely      10/28: -Continue with midodrine 15 mg TID with meals    # Compensated HFmrEF  Last echocardiogram done 6/2020.   EF estimated at 45 to 50%     10/27: -Continue with Lopressor 25 mg qd      10/28: -Continue with Lopressor 25 mg qd    # Mild Hyponatremia 2/2 ESRD  This is likely a chronic issue for this patient as she is unable to excrete free water     10/27: -Continue with daily BMPs to monitor and trend    # Lactic Acidosis - Resolved  Low suspicion of sepsis. Patient has impaired clearance from ESRD, will likely be elevated      10/27: -Repeat lactic acid level in the a.m.     10/28:  -Repeat lactic acid this morning is 1.6    -No further need to trend lactic acid levels    # History of Hypothyroidism  Continue Synthroid 25 mcg qd    # History of GERD  Continue Protonix 40 mg BID    # History of HIT  Avoid heparin use    # History of COPD  Duo nebs q6h PRN    # Disposition Planning  Patient will be transferred to [facility] for further management. At this time there is no indication for ICU Stepdown. Chief Complaint: Abdominal Pain    Hospital Course:     81 y/o F PMHx ESRD (on CAPD), chronic hypotension, HFmrEF (EF 45-50% per ECHO 6/2020), non-ischemic cardiomyopathy / s/p BIV-ICD, NIDDM2, COPD (no baseline supplemental oxygen requirement), non-obstructive CAD, HLD, CVA/TIA, PE (previously on coumadin), dementia, hypothyroidism, former tobacco abuse and other PMHx as indicated below -- presents to [facility] from [facility] with a chief complaint of abdominal pain. Limited history provided by the patient (due to dementia, confusion).     Patient was admitted to [facility] from 10/7-10/12 for low BP / hospital course was grossly unremarkable (hypotension chronic / related to PD) / patient remained asymptomatic during her hospital stay and was discharged to SNF in stable condition. She returns today with reported complaints of lower abdominal pain / poorly-characterized / no additional information provided. No fevers reported at [facility]. Per nursing staff, no CAPD performed x 48 hours due to recent hypotension.     Patient appears to answer yes/no questions regarding symptoms reliably, but is otherwise confused and unable to provide additional history. She denies chest pain, cough, shortness of breath, n/v/d or other symptoms at this time.  States abdominal pain has resolved and is requesting something to eat.     ED ascorbic acid  500 mg Oral BID    sodium chloride flush  10 mL Intravenous 2 times per day     PRN Meds: ipratropium-albuterol, sodium chloride flush, acetaminophen **OR** acetaminophen, glucose, dextrose, glucagon (rDNA), dextrose      Intake/Output Summary (Last 24 hours) at 10/28/2020 1305  Last data filed at 10/28/2020 0314  Gross per 24 hour   Intake 1585.22 ml   Output 0 ml   Net 1585.22 ml       Diet:  DIET RENAL; Carb Control: 3 carb choices (45 gms)/meal; Daily Fluid Restriction: 1500 ml; Low Potassium, Low Phosphorus    Exam:  BP (!) 104/55   Pulse 100   Temp 97.6 °F (36.4 °C) (Oral)   Resp 18   Ht 5' 6\" (1.676 m)   Wt 229 lb 1.6 oz (103.9 kg)   SpO2 99%   BMI 36.98 kg/m²     General appearance: No apparent distress, appears stated age and cooperative. HEENT: Pupils equal, round, and reactive to light. Conjunctivae/corneas clear. Neck: Supple, with full range of motion. No jugular venous distention. Trachea midline. Respiratory:  Normal respiratory effort. Clear to auscultation, bilaterally without Rales/Wheezes/Rhonchi. Cardiovascular: Regular rate and rhythm with normal S1/S2 without murmurs, rubs or gallops. Abdomen: Soft, non-tender, non-distended with normal bowel sounds. PD catheter noted without signs of inflammation. Musculoskeletal: passive and active ROM x 4 extremities. Skin: Skin color, texture, turgor normal.  No rashes or lesions. Neurologic:  Neurovascularly intact without any focal sensory/motor deficits.  Cranial nerves: II-XII intact, grossly non-focal.   Psychiatric: Alert and oriented, thought content appropriate, normal insight  Capillary Refill: Brisk,< 3 seconds   Peripheral Pulses: +2 palpable, equal bilaterally      Labs:   Recent Labs     10/26/20  1400 10/27/20  0526 10/28/20  0627   WBC 14.9* 11.8* 11.8*   HGB 11.4* 9.3* 10.7*   HCT 35.0* 29.7* 32.4*    183 309     Recent Labs     10/26/20  1400 10/27/20  0526 10/28/20  0530   * 132* 129*   K 4.1 4.3 4.3  4.3   CL 93* 96* 94*   CO2 26 23 21*   BUN 29* 31* 34*   CREATININE 5.7* 6.2* 6.9*   CALCIUM 9.0 8.1* 8.0*   PHOS  --  2.8 3.3     Recent Labs     10/26/20  1400   AST 21   ALT 13   BILITOT 0.3   ALKPHOS 148*     No results for input(s): INR in the last 72 hours. No results for input(s): Darlene Belts in the last 72 hours. Microbiology:      Urinalysis:      Lab Results   Component Value Date    NITRU NEGATIVE 05/17/2020    WBCUA > 200 05/17/2020    BACTERIA MANY 05/17/2020    RBCUA 25-50 05/17/2020    BLOODU MODERATE 05/17/2020    SPECGRAV 1.019 03/26/2019    GLUCOSEU NEGATIVE 05/17/2020       Radiology:  CT ABDOMEN PELVIS WO CONTRAST Additional Contrast? None   Final Result   Moderate diffuse free air is nonspecific. A bowel origin is possible particularly peptic ulcer disease, though it also could relate to CAPD. Multiple small gallstones are noted. Mild pleural and parenchymal opacities at each lung base. **This report has been created using voice recognition software. It may contain minor errors which are inherent in voice recognition technology. **      Final report electronically signed by Dr. Ibeth Lo on 10/26/2020 6:24 PM      XR CHEST PORTABLE   Final Result   1. Very poor inflation of lungs. Moderate atelectasis/pneumonia along both hemidiaphragms. Small bilateral effusions. 2. Borderline heart size. Permanent pacemaker/defibrillator. CardioMems device projects over left hilum. 3. Overall appearance of chest has worsened since prior. **This report has been created using voice recognition software. It may contain minor errors which are inherent in voice recognition technology. **      Final report electronically signed by Dr. Jesse Pérez on 10/26/2020 3:54 PM        DVT prophylaxis: [] Lovenox                                 [x] SCDs                                 [] SQ Heparin                                 [] Encourage ambulation [] Already on Anticoagulation     Code Status: Limited    Tele:   [x] yes             [] no    Electronically signed by Nahomi Humphrey MD on 10/28/2020 at 1:05 PM

## 2020-10-28 NOTE — PROGRESS NOTES
Nephrology Progress Note    Patient - Olivia Charles   MRN -  [id number]   Acct # - [de-identified]      - 1934    80 y.o. Admit Date: 10/26/2020  Hospital Day: 2  Location: --A  Date of evaluation -  10/28/2020    Subjective:   CC: abd pain  Denies shortness of breath on Room air   UOP not recorded, Wt up  BP generally improved  BP Range: Systolic (78HPW), VDT:377 , Min:88 , LUCIO:018      Diastolic (37FRY), RYD:26, Min:45, Max:55    Objective:   VITALS:  BP (!) 88/45   Pulse 107   Temp 97.8 °F (36.6 °C) (Oral)   Resp 17   Ht 5' 6\" (1.676 m)   Wt 229 lb 1.6 oz (103.9 kg)   SpO2 93%   BMI 36.98 kg/m²    Patient Vitals for the past 24 hrs:   BP Temp Temp src Pulse Resp SpO2 Weight   10/28/20 0815 (!) 88/45 97.8 °F (36.6 °C) Oral 107 17 93 % --   10/28/20 0314 (!) 120/55 97.8 °F (36.6 °C) Oral 92 18 97 % 229 lb 1.6 oz (103.9 kg)   10/27/20 2317 (!) 118/51 97.5 °F (36.4 °C) Oral 80 17 97 % --   10/27/20 2145 (!) 97/46 97.6 °F (36.4 °C) Oral 82 18 99 % --   10/27/20 1532 (!) 105/47 98 °F (36.7 °C) Oral 91 18 96 % --   10/27/20 1109 (!) 106/55 97.9 °F (36.6 °C) Oral 84 18 95 % --       Intake/Output Summary (Last 24 hours) at 10/28/2020 0855  Last data filed at 10/28/2020 0314  Gross per 24 hour   Intake 1585.22 ml   Output 0 ml   Net 1585.22 ml       Admission weight: 226 lb (102.5 kg)  Patient Vitals for the past 96 hrs (Last 3 readings):   Weight   10/28/20 0314 229 lb 1.6 oz (103.9 kg)   10/27/20 0410 219 lb 12.8 oz (99.7 kg)   10/26/20 2202 219 lb 8 oz (99.6 kg)     Body mass index is 36.98 kg/m². EXAM:  CONSTITUTIONAL:  No acute distress. Pleasant  HEENT:  Head is normocephalic, Extraocular movement intact. Neck is supple. Voice is clear. CARDIOVASCULAR:  S1, S2  regular rate and rhythm. RESPIRATORY: Clear to ausculation bilaterally. Equal breath sounds. No wheezes. No shortness of breath noted at rest.  ABDOMEN: soft, non tender.  Peritoneal dialysis cath site clear  NEUROLOGICAL: Patient is alert and oriented to person, place. Recent and remote memory partially intact. Thought is coherant. SKIN: no rash, No significant bruises on exposed surfaces  MUSCULOSKELETAL: Movement is coordinated. Moves all extremities   EXTREMITIES: Distal lower extremity temp is warm, 1+ lower extremity and general body edema. PSYCHIATRIC: mood and affect appropriate. Medications:   Med reviewed  Scheduled Meds:   dianeal lo-nikko 1.5%  2,000 mL Intraperitoneal Q8H    gentamicin   Topical Daily    metoclopramide  5 mg Oral TID AC    aspirin  81 mg Oral Daily    clopidogrel  75 mg Oral QPM    levothyroxine  25 mcg Oral Daily    megestrol  40 mg Oral BID    metoprolol tartrate  25 mg Oral Daily    midodrine  15 mg Oral TID WC    pantoprazole  40 mg Oral BID    pravastatin  40 mg Oral QPM    sevelamer  800 mg Oral TID WC    cyanocobalamin  1,000 mcg Oral QPM    folbee plus  1 tablet Oral Daily    ascorbic acid  500 mg Oral BID    sodium chloride flush  10 mL Intravenous 2 times per day     PRN Meds ipratropium-albuterol, sodium chloride flush, acetaminophen **OR** acetaminophen, glucose, dextrose, glucagon (rDNA), dextrose   Labs:   Labs reviewed  Lab Results   Component Value Date    ANIONGAP 14.0 10/28/2020     Recent Labs     10/26/20  1400 10/27/20  0526 10/28/20  0627   WBC 14.9* 11.8* 11.8*   RBC 3.71* 3.10* 3.44*   HGB 11.4* 9.3* 10.7*   HCT 35.0* 29.7* 32.4*   MCV 94.3 95.8 94.2   MCH 30.7 30.0 31.1    183 309       Recent Labs     10/26/20  1400 10/27/20  0526 10/28/20  0530   * 132* 129*   K 4.1 4.3 4.3  4.3   CL 93* 96* 94*   CO2 26 23 21*   BUN 29* 31* 34*   CREATININE 5.7* 6.2* 6.9*   LABGLOM 7* 6* 6*   GLUCOSE 68* 68* 73   MG  --  1.4* 1.7   CALCIUM 9.0 8.1* 8.0*      Summary 6/8/2020   Technically difficult examination. Technically limited study. Left Ventricular size is Decreased. Moderate concentric left ventricular hypertrophy.    There were no regional wall motion abnormalities. Ejection fraction is visually estimated at 70%. Xr Chest Portable  Result Date: 10/26/2020  1. Very poor inflation of lungs. Moderate atelectasis/pneumonia along both hemidiaphragms. Small bilateral effusions. 2. Borderline heart size. Permanent pacemaker/defibrillator. CardioMems device projects over left hilum. 3. Overall appearance of chest has worsened since prior. ASSESSMENT:  1. End Stage Renal Disease 2nd to diabetic and hypertensive nephrosclerosis on Peritoneal dialysis, which has been held since 10/23 due to hypotension. Will resume Peritoneal dialysis 1.5% q8h. Discussed with Dr Matt Wei  2. Hyponatremia 2nd to hypervolemia  3. Abdominal pain, Peritoneal dialysis effluent analysis, clear, without evidence of peritonitis  4. Rt hip/back pain  5. Chronic hypotension. Midodrine from 10 to 15 mg 3x/d  6. 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 Altered Mental Status   8. Secondary hyperparathyroidism of renal origin  9. Hyperphosphatemia. Phos down to 3.3. Stop Renvela    BMP in AM  Active Problems:    Peritoneal dialysis status ([facility] [address]    Abdominal pain  Resolved Problems:    * No resolved hospital problems.  Jean-Paul Gamez, ARELI - CNP 8:55 AM 10/28/2020

## 2020-10-28 NOTE — PLAN OF CARE
Problem: Pain:  Goal: Pain level will decrease  Description: Pain level will decrease  Outcome: Ongoing  Note: Patient's stated pain goal is a \"0/10. \" Patient's stated pain level is a \"0/10. \" Pt does verbalize occasional tenderness in abdomen. Will continue to monitor. Problem: Neurological  Goal: Maximum potential motor/sensory/cognitive function  Outcome: Ongoing  Note: Pt is alert and oriented x4. Speech appropriate and clear. Will continue to monitor. Problem: Cardiovascular  Goal: Hemodynamic stability  Outcome: Ongoing  Note: Pt to receive bolus this shift to maintain BP. Will continue to monitor. Problem: Respiratory  Goal: No pulmonary complications  Outcome: Ongoing  Note: Pt remains >90% on RA. Pt denies SOB. Lung sounds diminished bilaterally. Will continue to monitor. Problem: GI  Goal: No bowel complications  Outcome: Ongoing  Note: Pt's bowel sounds are active. Pt had smear today. Pt verbalizes occasional tenderness in abdomen. Will continue to monitor. Problem:   Goal: Adequate urinary output  Outcome: Ongoing  Note: CAPD pt. Peritoneal dialysis started today at 1330. First treatment since 10/23. Problem: Nutrition  Goal: Optimal nutrition therapy  10/28/2020 1744 by Rickey Cyr RN  Outcome: Ongoing  Note: Pt having very poor appetite. Pt eating less than 25% of meal trays, and refused to order dinner this shift. Snacks and supplements offered intermittently. Problem: Discharge Planning:  Goal: Discharged to appropriate level of care  Description: Discharged to appropriate level of care  Outcome: Ongoing  Note: Pt is from [facility] and plans to return there at d/c. Pt is working with social work and case management on d/c planning. Transfer order to  is placed. Awaiting bed. Problem: Activity:  Goal: Fatigue will decrease  Description: Fatigue will decrease  Outcome: Ongoing  Note: Pt is bed bound.  Pt agreeable to Q2 hour turns, but is unable to get up out of bed. Problem: Fluid Volume:  Goal: Will show no signs or symptoms of fluid imbalance  Description: Will show no signs or symptoms of fluid imbalance  Outcome: Ongoing  Note: Pt to receive bolus this shift to maintain BP. Will continue to monitor. Problem: Physical Regulation:  Goal: Ability to maintain clinical measurements within normal limits will improve  Description: Ability to maintain clinical measurements within normal limits will improve  Outcome: Ongoing  Note: Pt is bed bound. Pt agreeable to Q2 hour turns, but is unable to get up out of bed. Problem: Skin Integrity:  Goal: Will show no infection signs and symptoms  Description: Will show no infection signs and symptoms  Outcome: Ongoing  Note: No new skin issues this shift. Pt turning Q2 hours with EPC cream and pillow support. Will continue to monitor. Problem: Serum Glucose Level - Abnormal:  Goal: Ability to maintain appropriate glucose levels has stabilized  Description: Ability to maintain appropriate glucose levels has stabilized  Outcome: Ongoing  Note: Pt able to maintain appropriate glucose levels. Care plan reviewed with patient and family. Patient and family verbalize understanding of the plan of care and contribute to goal setting.

## 2020-10-28 NOTE — PLAN OF CARE
Problem: Nutrition  Goal: Optimal nutrition therapy  10/28/2020 1540 by Barbra Gonsalez RD, KEVIN  Outcome: Ongoing  Nutrition Problem #1: Increased nutrient needs  Intervention: Food and/or Nutrient Delivery: Continue Current Diet, Start Oral Nutrition Supplement  Nutritional Goals: Pt. will tolerate and consume 75% or more of meals to promote wound healing during LOS.

## 2020-10-28 NOTE — CARE COORDINATION
Nicholas 24 day: 2  Location: -02/002-A Reason for admit: Abdominal pain [R10.9]   Procedure:   10/26 CT Abdomen  Moderate diffuse free air is nonspecific. A bowel origin is possible particularly peptic ulcer disease, though it also could relate to CAPD. Multiple small gallstones are noted. Mild pleural and parenchymal opacities at each lung base  10/29 CXR  Small right pleural effusion. There is possible pneumoperitoneum beneath the right hemidiaphragm. Recommend further evaluation or follow-up. Treatment Plan of Care:   Client admitted with Hypotension/Abdominal Pain (history CAPD); Nephrology following  Barriers to Discharge: Creatinine 6.5, H 8.6; monitor.  AB, Dopamine gtt continued for BP  PCP: Jennyfer Lyn MD  Readmission Risk Score: 54%  Patient Goals/Plan/Treatment Preferences:   plans return [facility] when medically cleared (precert), has Dianakay Rosene, CAPD, nebulizer, oxygen 2L, therapy following, client is confused

## 2020-10-28 NOTE — PLAN OF CARE
Problem: Pain:  Goal: Pain level will decrease  Description: Pain level will decrease  Outcome: Ongoing  Note: Pain Assessment: 0-10  Pain Level: 8   Pain goal:   no pain  Is pain goal met at this time? No     Additional interventions to be implemented: medications Tylenol, position change, and rest    Goal: Control of acute pain  Description: Control of acute pain  Outcome: Ongoing  Goal: Control of chronic pain  Description: Control of chronic pain  Outcome: Ongoing     Problem: Neurological  Goal: Maximum potential motor/sensory/cognitive function  Outcome: Ongoing  Note: Patient alert and oriented x4. Problem: Cardiovascular  Goal: Hemodynamic stability  Outcome: Ongoing  Note: No s/s of DVT. BP lower, appears to be chronic. Problem: Respiratory  Goal: No pulmonary complications  Outcome: Ongoing  Note: Patient able to maintain O2 sats >92% on room air. Lung sounds are clear and diminished throughout. Problem: GI  Goal: No bowel complications  Outcome: Ongoing  Note: Patient had 1 BM this shift. Abdomen is soft and nontender. Bowel sounds auscultated in all four quadrants. Problem:   Goal: Adequate urinary output  Outcome: Ongoing  Note: No output noted. Patient is ESRD. CAPD patient. Problem: Nutrition  Goal: Optimal nutrition therapy  Outcome: Ongoing  Note: Patient on renal diet and tolerating well. Problem: Discharge Planning:  Goal: Discharged to appropriate level of care  Description: Discharged to appropriate level of care  Outcome: Ongoing  Note: Patient plans to be discharged to [facility] when medically stable. Problem:  Activity:  Goal: Fatigue will decrease  Description: Fatigue will decrease  Outcome: Ongoing  Goal: Risk for activity intolerance will decrease  Description: Risk for activity intolerance will decrease  Outcome: Ongoing     Problem: Fluid Volume:  Goal: Will show no signs or symptoms of fluid imbalance  Description: Will show no signs or symptoms of fluid imbalance  Outcome: Ongoing     Problem: Physical Regulation:  Goal: Ability to maintain clinical measurements within normal limits will improve  Description: Ability to maintain clinical measurements within normal limits will improve  Outcome: Ongoing  Goal: Complications related to the disease process, condition or treatment will be avoided or minimized  Description: Complications related to the disease process, condition or treatment will be avoided or minimized  Outcome: Ongoing     Problem: Skin Integrity:  Goal: Will show no infection signs and symptoms  Description: Will show no infection signs and symptoms  Outcome: Ongoing  Note: No s/s of infection this shift. Patient being turned q2 hr.     Problem: Serum Glucose Level - Abnormal:  Goal: Ability to maintain appropriate glucose levels has stabilized  Description: Ability to maintain appropriate glucose levels has stabilized  Outcome: Ongoing     Care plan reviewed with patient. Patient verbalize understanding of the plan of care and contribute to goal setting.

## 2020-10-28 NOTE — PROGRESS NOTES
Comprehensive Nutrition Assessment    Type and Reason for Visit:  Initial, Positive Nutrition Screen(wound)    Nutrition Recommendations/Plan: Will send Jose TID as pt. Agreeable. Recommend diet as tolerated. Consider renal MVI. Rx per MD.    Nutrition Assessment:    Pt. nutritionally compromised AEB wounds. At risk for further nutrition compromise r/t increased nutrient needs for wound healing, known losses from dialysis, abdominal pain and underlying medical condition (breast cancer, COPD, CVA and DM). Nutrition recommendations/interventions as per above. Malnutrition Assessment:  Malnutrition Status:  Insufficient data    Context:  Chronic Illness     Findings of the 6 clinical characteristics of malnutrition:  Energy Intake:  Unable to assess(reports po ~ half pta -u/a to confirm with [facility])  Weight Loss:  Unable to assess(CAPD, weight fluctuations)     Body Fat Loss:  Unable to assess     Muscle Mass Loss:  Unable to assess    Fluid Accumulation:  Unable to assess     Strength:  Not Performed    Estimated Daily Nutrient Needs:  Energy (kcal):  3549-8983 kcals (15-18); Weight Used for Energy Requirements:  (104 kgm 10/28)     Protein (g):  71-89 gm (1.2-1.5); Weight Used for Protein Requirements:  Ideal(59 kgm)      Nutrition Related Findings:  Pt. seen - sleeping; did wake for some questions but states \"I don't feel like talking\"; c/o belly pain all the time per RN; MD ? gastroparesis - Reglan started; 2 BMs noted past 24 hours;  Rx includes Reglan, CAPD 1.5% every 8 hours, ATB, Vitamin B12, C; 10/28: BUN 34, Cr 6.9, Sodium 129      Wounds:  (stage I - coccyx; stage II - buttocks, thigh)       Current Nutrition Therapies:    DIET RENAL; Carb Control: 3 carb choices (45 gms)/meal; Daily Fluid Restriction: 1500 ml; Low Potassium, Low Phosphorus  Dietary Nutrition Supplements: Diabetic Oral Supplement    Anthropometric Measures:  · Height: 5' 6\" (167.6 cm)  · Current Body Weight: 229 lb 1.6 oz (103.9 kg)(10/28, +2 edema)   · Admission Body Weight: 219 lb 8 oz (99.6 kg)(10/26, +2 edema)    · Usual Body Weight: (per EMR: 11/13/19: 277# 6.4 oz, 4/25/20: 248# 3.2 oz, 6/2/20: 218# 14.4 oz)     · Ideal Body Weight: 130 lbs;   · BMI: 37  · BMI Categories: Obese Class 2 (BMI 35.0 -39.9)       Nutrition Diagnosis:   · Increased nutrient needs related to increase demand for energy/nutrients(wound healing, known losses from dialysis) as evidenced by wounds      Nutrition Interventions:   Food and/or Nutrient Delivery:  Continue Current Diet, Start Oral Nutrition Supplement  Nutrition Education/Counseling:  Education initiated(10/28 Encouraged good intake at best efforts.)   Coordination of Nutrition Care:  Continue to monitor while inpatient    Goals:  Pt. will tolerate and consume 75% or more of meals to promote wound healing during LOS. Nutrition Monitoring and Evaluation:   Behavioral-Environmental Outcomes:  None Identified   Food/Nutrient Intake Outcomes:  Diet Advancement/Tolerance, Food and Nutrient Intake, Supplement Intake  Physical Signs/Symptoms Outcomes:  Biochemical Data, GI Status, Fluid Status or Edema, Nutrition Focused Physical Findings, Skin, Weight     Discharge Planning:     Too soon to determine     Electronically signed by Nahum Mcclellan RD, LD on 10/28/20 at 3:41 PM EDT    Contact: [phone]

## 2020-10-28 NOTE — PROGRESS NOTES
Pharmacy Medication History Note      List of current medications patient is taking is complete. Source of information: [address] made to medication list:  Medications removed (include reason, ex. therapy complete or physician discontinued):  Removed Cipro 500 mg tab- therapy completed 10/9/20  Removed Oxygen    Medications added/doses adjusted:  Adjusted Tylenol indication  Adjusted Sigrid-Julienne dose  Adjusted Dulcolax dose  Adjusted Hypromellose dose- nonformulary  Adjusted Midodrine dose    Added PEG 1450 powder  Added Gentamicin 0.1 % cream    Other notes (ex. Recent course of antibiotics, Coumadin dosing):    Denies use of other OTC or herbal medications.       Allergies reviewed      Electronically signed by Trinity Winters on 10/28/2020 at 1:52 PM

## 2020-10-29 NOTE — FLOWSHEET NOTE
Pt was laying in bed and was confused. Pt could not remember the name of the [employer] she is a member of. Nurse came into the room to give medication and pt had to be asked each question more than once.  prayed with the pt. Spiritual care to continue to provide support and encouragement. Attempt to complete spiritual care assessment at another time, so see if pt's condition has improved.      10/29/20 1320   Encounter Summary   Services provided to: Patient   Referral/Consult From: Rounding   Support System Unknown   Continue Visiting Yes  (10/29)   Complexity of Encounter Low   Length of Encounter 15 minutes   Routine   Type Initial   Assessment Approachable;Passive   Intervention Prayer;Sustaining presence/ Ministry of presence   Outcome Comfort;Engaged in conversation

## 2020-10-29 NOTE — PROGRESS NOTES
Renal Progress Note    Assessment and Plan:    1. End stage kidney disease on peritoneal dialysis. 2.  Hypotension chronic  3. Diabetes mellitus type 2  4. Deconditioning  5. Normocytic anemia    PLAN:  Labs reviewed  Hemoglobin has declined  Medications reviewed  Darbepoetin 100 mcg subcu once  Stool for occult blood  Peritoneal exit data reviewed  Ultrafiltration appears to be good though I am not certain the documentation is accurate. She is documented to be 17,000 L negative in less than 24 hours of peritoneal dialysis which is doubtful.   In any case we will hold peritoneal dialysis for now due to low blood pressure  May benefit from droxidopa for hypotension but not available in the formulary  Discussed with the staff  We will follow    Patient Active Problem List:     Hypertension     LBBB (left bundle branch block)     Hyperlipidemia     [facility] BiV ICD     CKD (chronic kidney disease) stage 3, GFR 30-59 ml/min ([facility])     Ex-smoker     Mild carotid artery disease (HCC)     Morbid obesity (HCC)     CAD (coronary artery disease)     PAD (peripheral artery disease) ([facility])     Lactic acidosis     Mild pulmonary hypertension ([facility])     Type 2 diabetes mellitus with insulin therapy ([address]     GERD (gastroesophageal reflux disease)     His of Heparin induced thrombocytopenia     History of breast cancer     History of CVA (cerebrovascular accident)     History of pulmonary embolus ([facility])     Iron deficiency anemia     Osteoarthritis     Paget's disease of bone     Vitamin D deficiency     COPD without exacerbation ([facility])     CHF (congestive heart failure), NYHA class III, chronic, diastolic ([facility])     Anemia, chronic disease     Anemia, chronic renal failure     Acute combined systolic and diastolic CHF, NYHA class 1 ([address]     Peritoneal dialysis status ([address]     Mild aortic stenosis     Physical deconditioning     Hyponatremia     ESRD (end stage renal disease) ([address]     Near syncope     Other hypotension effusion. There is possible pneumoperitoneum beneath the right hemidiaphragm. Recommend further evaluation or follow-up. This document has been electronically signed by: Adriana Fitzgerald MD on    10/29/2020 05:28 AM         XR CHEST PORTABLE   Final Result   Atelectasis or infiltrate at the right lung base. Small right pleural effusion. Support devices as above. This document has been electronically signed by: Shruthi Knowles MD on    10/28/2020 09:44 PM         CT ABDOMEN PELVIS WO CONTRAST Additional Contrast? None   Final Result   Moderate diffuse free air is nonspecific. A bowel origin is possible particularly peptic ulcer disease, though it also could relate to CAPD. Multiple small gallstones are noted. Mild pleural and parenchymal opacities at each lung base. **This report has been created using voice recognition software. It may contain minor errors which are inherent in voice recognition technology. **      Final report electronically signed by Dr. Leeann Krueger on 10/26/2020 6:24 PM      XR CHEST PORTABLE   Final Result   1. Very poor inflation of lungs. Moderate atelectasis/pneumonia along both hemidiaphragms. Small bilateral effusions. 2. Borderline heart size. Permanent pacemaker/defibrillator. CardioMems device projects over left hilum. 3. Overall appearance of chest has worsened since prior. **This report has been created using voice recognition software. It may contain minor errors which are inherent in voice recognition technology. **      Final report electronically signed by Dr. Zenaida Lopez on 10/26/2020 3:54 PM            Objective:   Vitals: BP (!) 83/27   Pulse 105   Temp 97.5 °F (36.4 °C) (Oral)   Resp 18   Ht 5' 6\" (1.676 m)   Wt 230 lb 1.6 oz (104.4 kg)   SpO2 99%   BMI 37.14 kg/m²    Wt Readings from Last 3 Encounters:   10/29/20 230 lb 1.6 oz (104.4 kg)   10/12/20 226 lb 2 oz (102.6 kg)   08/06/20 239 lb 4.8 oz (108.5 kg)      24HR INTAKE/OUTPUT:      Intake/Output Summary (Last 24 hours) at 10/29/2020 9698  Last data filed at 10/29/2020 0320  Gross per 24 hour   Intake 4854.95 ml   Output 17179 ml   Net -21234.05 ml       Constitutional: Comfortably asleep  Skin:normal with no rash or lesions. HEENT:Pupils are reactive . Throat is clear . Oral mucosa is moist   Neck:supple with no carotid bruit  Cardiovascular: Regular sinus rhythm  Respiratory:  Clear to ausculation without wheezes, rhonchi or rales. Abdomen: Soft. Good bowel sounds. Peritoneal dialysis catheter is noted.   Ext: No LE edema  Musculoskeletal:Intact  Neuro: Deferred      Electronically signed by Gabriella Pickard MD on 10/29/2020 at 7:12 AM

## 2020-10-29 NOTE — FLOWSHEET NOTE
10/29/20 0336   Provider Notification   Reason for Communication Evaluate   Provider Name Nj Freed   Provider Notification Physician   Method of Communication Secure Message   Response No new orders   Notification Time [phone]- This RN sent a secure message to Dr. Dwayne Jones to evaluate patient after 2L bolus complete. BP is 100/30 map of 50    0411- Dr. Dwayne Jones asked for a manual BP    0430- This RN notified Dr. Dwayne Jones the manual BP was 82/40    0433- Dr. Dwayne Jones ordered a 500ml bolus over 30 min    0501- This RN notified Dr. Dwayne Jones patient had crackles in lower lobes of her lungs bilaterally and a history of CHF.    0502- Stat chest ordered per Dr. Dwayne Jones. 8782- This RN sent a secure message to notify of critical findings, chest xray showed small right pleural effusion, possible pneumoperitoneum beneath right hemidiaphragm    0546- This RN sent a secure message to Dr. Dwayne Jones to update patient had 9 beat run of Vtach and O2 sats dropped to 85%. Patient was placed on 2L nasal cannula at this time. 5879- 500ml bolus cancelled per Dr. Dwayne Jones. K+ and Mg+ to be drawn this AM.    8203- This RN notified Dr. Dwayne Jones patient's BP 96/26 map of 47.    0609- Dr. Dwayne Jones gave this RN an order to give patient's morning midodrine at this time.

## 2020-10-29 NOTE — CARE COORDINATION
10/29/20, 10:30 AM EDT    DISCHARGE PLANNING EVALUATION      SW spoke to Rome Simon with [facility] regarding the pre-cert, he states he has checked on this but has not heard anything yet

## 2020-10-29 NOTE — PROGRESS NOTES
Pharmacy Renal Adjustment    Yeny Arriola is a 80 y.o. female. Pharmacy renally adjust the following medications per P[employer]T approved policy: zosyn    Recent Labs     10/28/20  0530 10/29/20  0620   BUN 34* 32*       Recent Labs     10/28/20  0530 10/29/20  0620   CREATININE 6.9* 6.5*       Estimated Creatinine Clearance: 8 mL/min (A) (based on SCr of 6.5 mg/dL [facility] [place]     Height:   Ht Readings from Last 1 Encounters:   10/26/20 5' 6\" (1.676 m)     Weight:  Wt Readings from Last 1 Encounters:   10/29/20 230 lb 1.6 oz (104.4 kg)       CKD stage: ESRD         Baseline SCr: on CAPD    Plan: Adjustments based on renal function:          Decrease zosyn 3.375 gram IV q12h to 2.25 grams IV q8h for CAPD      Giulia Jerez PharmD, BCPS   10/29/2020  4:02 PM

## 2020-10-29 NOTE — FLOWSHEET NOTE
10/29/20 0100   Provider Notification   Reason for Communication Evaluate   Provider Name Elizabeth Couch   Provider Notification Physician   Method of Communication Secure Message   Response No new orders   Notification Time 0100 0100- This RN sent a secure message to Dr. Kena Esparza to evaluate after the cheetah was just completed and her stroke volume variance is 42%. BP was 83/30 with a map of 47.    0113- This RN received orders for a 2000ml bolus over 2 hours.

## 2020-10-29 NOTE — FLOWSHEET NOTE
10/28/20 2340   Provider Notification   Reason for Communication Evaluate   Provider Name Nj Freed   Provider Notification Physician   Method of Communication Secure Message   Response No new orders   Notification Time 810 1727- This RN sent a secure message to Dwayne Zhegn to evaluate patient after a 1000ml bolus. Her BPs are still running low.  Her latest is 73/36 map of 52.    2341- orders to obtain stroke volume variance via [address]

## 2020-10-29 NOTE — PROGRESS NOTES
Hospitalist Progress Note    Patient: Corey Becerra      Unit/Bed:4K-02/002-A    YOB: 1934    MRN: [id number]       Acct: [de-identified]     PCP: Camille Kohli MD    Date of Admission: 10/26/2020    Assessment/Plan:    # Sepsis with Possible Abdominal/Peritoneal Source      10/29:  -Zosyn IV 2.25 g every 8 hours started    -Multiple attempts at trying blood cultures were unsuccessful due to patient's edematous state and poor veinous access    -Discontinue trending lactic acid as the latest is 2.1    -Continue monitoring blood pressure and MAP closely - trend WBC and Temperatures     # ESRD on CAPD  Patient follows Dr. Matt Wei. She has not had peritoneal dialysis for about 5 days now. Peritoneal dialysis fluid showed some questionable gram-positive cocci growing. Patient is asymptomatic without any evidence of peritonitis however. Likely a contaminant. 10/27:  -As per nephrology we will hold off on peritoneal dialysis as patient is hypotensive    -We will follow with any recommendations from nephrology    -Continue with Renleva 800 mg TID with meals    -Monitor I's and O's, daily weights, BMP daily     10/28:  -As per nephrology patient will resume peritoneal dialysis q8h    -Continue with Renleva 800 mg TID with meals    -Monitor I's and O's, daily weights, BMP daily     10/29:  -As per nephrology prior, dialysis will be held again now due to low blood pressures    -Continue as above otherwise    # Chronic Hypotension  Patient apparently has a history of hypotension. 10/27:  -Continue with midodrine 15 mg TID with meals    -Monitor vitals closely      10/28: -Continue with midodrine 15 mg TID with meals     10/29:  -Continue weaning down dopamine drip    -Continue with midodrine 15 mg 3 times daily with meals    -There could be likely sepsis, patient treated with antibiotics currently    # Compensated HFmrEF - Stable  Last echocardiogram done 6/2020.   EF estimated at 45 to 50%     10/27: -Continue with Lopressor 25 mg qd      10/28: -Continue with Lopressor 25 mg qd    # Mild Hyponatremia 2/2 ESRD  This is likely a chronic issue for this patient as she is unable to excrete free water     10/27: -Continue with daily BMPs to monitor and trend    # Abdominal Discomfort likely 2/2 Gastroparesis - Resolved  Patient's complaints have been about early satiety and feeling that she stays full for a long time. This could likely be secondary to gastroparesis due to history of diabetes     10/27:  -Start Reglan 5 mg TID with meals    -We will reassess in a.m. to look for other causes of early satiety     10/28:  -Continue with Reglan 5 mg TID with meals    -Patient is able to tolerate meals better     10/29:  -Hold Reglan and monitor BP response. Reassess in the AM.    # History of Hypothyroidism  Continue Synthroid 25 mcg qd    # History of GERD  Continue Protonix 40 mg BID    # History of HIT  Avoid heparin use    # History of COPD  Duo nebs q6h PRN    # Disposition Planning  Patient to remain on 4K ICU Stepdown. Will need medical stabilization. Chief Complaint: Abdominal Pain    Hospital Course:     79 y/o F PMHx ESRD (on CAPD), chronic hypotension, HFmrEF (EF 45-50% per ECHO 6/2020), non-ischemic cardiomyopathy / s/p BIV-ICD, NIDDM2, COPD (no baseline supplemental oxygen requirement), non-obstructive CAD, HLD, CVA/TIA, PE (previously on coumadin), dementia, hypothyroidism, former tobacco abuse and other PMHx as indicated below -- presents to [facility] from [facility] with a chief complaint of abdominal pain. Limited history provided by the patient (due to dementia, confusion).     Patient was admitted to [facility] from 10/7-10/12 for low BP / hospital course was grossly unremarkable (hypotension chronic / related to PD) / patient remained asymptomatic during her hospital stay and was discharged to SNF in stable condition.  She returns today with reported complaints of lower abdominal pain / jugular vein. Patient received multiple 1000 cc boluses, however she did not stay very fluid responsive. Her midodrine doses were given with only temporary increases in blood pressure. Intropin was started on the patient with a dose of 2.5 mcg/kg/min. This showed some improvement into her blood pressure throughout the day today 10/29. However we believe that her low blood pressures could be affected by her edematous state and inaccurate readings by the blood pressure cuff. Intropin drip was decreased to 1.5 mcg/kg/h with eventual cessation. Lactate drawn at noon on 10/29 revealed a level of 3, repeat lactic acid 3 hours later revealed a level of 2.1. Subjective (past 24 hours):     See hospital course 10/28 for details on overnight events. Patient states that she feels well. Has no issues with dizziness or feeling lightheaded. Denies any chest pain or palpitations. Throughout the day patient continued stating to feeling fine.     Medications:  Reviewed    Infusion Medications    DOPamine 1.5 mcg/kg/min (10/29/20 1500)    dextrose       Scheduled Medications    sodium chloride  500 mL Intravenous Once    piperacillin-tazobactam  2.25 g Intravenous Q8H    magnesium sulfate  2 g Intravenous Once    potassium (CARDIAC) replacement protocol   Other RX Placeholder    vancomycin (VANCOCIN) intermittent dosing (placeholder)   Other RX Placeholder    potassium chloride  40 mEq Oral Once    [Held by provider] dianeal lo-nikko 1.5%  2,000 mL Intraperitoneal Q8H    gentamicin   Topical Daily    metoclopramide  5 mg Oral TID AC    aspirin  81 mg Oral Daily    clopidogrel  75 mg Oral QPM    levothyroxine  25 mcg Oral Daily    megestrol  40 mg Oral BID    metoprolol tartrate  25 mg Oral Daily    midodrine  15 mg Oral TID WC    pantoprazole  40 mg Oral BID    pravastatin  40 mg Oral QPM    cyanocobalamin  1,000 mcg Oral QPM    folbee plus  1 tablet Oral Daily    ascorbic acid  500 mg Oral BID    sodium chloride flush  10 mL Intravenous 2 times per day     PRN Meds: ipratropium-albuterol, sodium chloride flush, acetaminophen **OR** acetaminophen, glucose, dextrose, glucagon (rDNA), dextrose      Intake/Output Summary (Last 24 hours) at 10/29/2020 1749  Last data filed at 10/29/2020 1453  Gross per 24 hour   Intake 5368.09 ml   Output 31873 ml   Net -69393.91 ml       Diet:  DIET RENAL; Carb Control: 3 carb choices (45 gms)/meal; Daily Fluid Restriction: 1500 ml; Low Potassium, Low Phosphorus  Dietary Nutrition Supplements: Diabetic Oral Supplement    Exam:  BP (!) 108/96   Pulse 112   Temp 97.7 °F (36.5 °C) (Oral)   Resp 18   Ht 5' 6\" (1.676 m)   Wt 230 lb 1.6 oz (104.4 kg)   SpO2 96%   BMI 37.14 kg/m²     General appearance: No apparent distress, appears stated age and cooperative. HEENT: Pupils equal, round, and reactive to light. Conjunctivae/corneas clear. Neck: Supple, with full range of motion. No jugular venous distention. Trachea midline. Respiratory:  Normal respiratory effort. Clear to auscultation, bilaterally without Rales/Wheezes/Rhonchi. Cardiovascular: Regular rate and rhythm with normal S1/S2 without murmurs, rubs or gallops. Abdomen: Soft, non-tender, non-distended with normal bowel sounds. PD catheter noted without signs of inflammation. Musculoskeletal: passive and active ROM x 4 extremities. Grossly edematous in both upper and lower extremities bilaterally  Skin: Skin color, texture, turgor normal.  No rashes or lesions. Neurologic:  Neurovascularly intact without any focal sensory/motor deficits.  Cranial nerves: II-XII intact, grossly non-focal.   Psychiatric: Alert and oriented, thought content appropriate, normal insight  Capillary Refill: Brisk,< 3 seconds   Peripheral Pulses: +2 palpable, equal bilaterally      Labs:   Recent Labs     10/27/20  0526 10/28/20  0627 10/29/20  0620   WBC 11.8* 11.8* 9.5   HGB 9.3* 10.7* 8.6*   HCT 29.7* 32.4* 25.9*    309 187 Recent Labs     10/27/20  0526 10/28/20  0530 10/29/20  0620 10/29/20  1540   * 129* 132*  --    K 4.3 4.3  4.3 3.3* 3.4*   CL 96* 94* 98  --    CO2 23 21* 22*  --    BUN 31* 34* 32*  --    CREATININE 6.2* 6.9* 6.5*  --    CALCIUM 8.1* 8.0* 7.4*  --    PHOS 2.8 3.3  --   --      No results for input(s): AST, ALT, BILIDIR, BILITOT, ALKPHOS in the last 72 hours. No results for input(s): INR in the last 72 hours. No results for input(s): Ruby Beat in the last 72 hours. Microbiology:      Urinalysis:      Lab Results   Component Value Date    NITRU NEGATIVE 05/17/2020    WBCUA > 200 05/17/2020    BACTERIA MANY 05/17/2020    RBCUA 25-50 05/17/2020    BLOODU MODERATE 05/17/2020    SPECGRAV 1.019 03/26/2019    GLUCOSEU NEGATIVE 05/17/2020       Radiology:  XR CHEST PORTABLE   Final Result   Impression:   Small right pleural effusion. There is possible pneumoperitoneum beneath the right hemidiaphragm. Recommend further evaluation or follow-up. This document has been electronically signed by: Nishi Carlos MD on    10/29/2020 05:28 AM         XR CHEST PORTABLE   Final Result   Atelectasis or infiltrate at the right lung base. Small right pleural effusion. Support devices as above. This document has been electronically signed by: Graciela Hernandez MD on    10/28/2020 09:44 PM         CT ABDOMEN PELVIS WO CONTRAST Additional Contrast? None   Final Result   Moderate diffuse free air is nonspecific. A bowel origin is possible particularly peptic ulcer disease, though it also could relate to CAPD. Multiple small gallstones are noted. Mild pleural and parenchymal opacities at each lung base. **This report has been created using voice recognition software. It may contain minor errors which are inherent in voice recognition technology. **      Final report electronically signed by Dr. Nael Harvey on 10/26/2020 6:24 PM      XR CHEST PORTABLE   Final Result   1.  Very poor inflation of lungs. Moderate atelectasis/pneumonia along both hemidiaphragms. Small bilateral effusions. 2. Borderline heart size. Permanent pacemaker/defibrillator. CardioMems device projects over left hilum. 3. Overall appearance of chest has worsened since prior. **This report has been created using voice recognition software. It may contain minor errors which are inherent in voice recognition technology. **      Final report electronically signed by Dr. Kate Domingo on 10/26/2020 3:54 PM        DVT prophylaxis: [] Lovenox                                 [x] SCDs                                 [] SQ Heparin                                 [] Encourage ambulation           [] Already on Anticoagulation     Code Status: Limited    Tele:   [x] yes             [] no    Electronically signed by Izabela Knight MD on 10/29/2020 at 5:49 PM

## 2020-10-29 NOTE — PROCEDURES
Mariano Zaidi is a 80 y.o. female patient. 1. Peritoneal dialysis status ([address]    2. Generalized abdominal pain    3.  Pneumonia due to organism      Past Medical History:   Diagnosis Date    Anxiety     Atherosclerotic heart disease of native coronary artery without angina pectoris     Blood circulation, collateral     CAD (coronary artery disease)     nonobstructive    Cancer ([address] 2007    breast cancer    Cardiomyopathy, nonischemic ([address]     Dr. Davi Quiroga Cerebral artery occlusion with cerebral infarction ([address]     CHF (congestive heart failure) ([address]     CKD (chronic kidney disease) stage 3, GFR 30-59 ml/min     Dr. Lei Cerna Congenital heart disease     COPD exacerbation [facility]     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 ([address]     DM2 (diabetes mellitus, type 2) ([address] 1998    with CKD3, R foot ulcer and hx of prior ulcerations and PVD    Dyspnea     ESRD (end stage renal disease) ([address]     Ex-smoker 10/2/2012    GERD (gastroesophageal reflux disease)     GERD (gastroesophageal reflux disease)     Heart failure with preserved ejection fraction ([address]     Dr. Davi Quiroga Hemodialysis patient [facility])     His of Heparin induced thrombocytopenia     History of breast cancer     right 1982 s/p mastectomy and chemo, left 2007 s/p mastectomy    History of CVA (cerebrovascular accident)     History of pulmonary embolism 05/2014    on [address] (coumadin)    Hyperkalemia     Hyperlipidemia     Hypertension     pulmonary    Iron deficiency anemia     Dr. 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 rate 18, height 5' 6\" (1.676 m), weight 229 lb 1.6 oz (103.9 kg), SpO2 94 %, not currently breastfeeding. Central Line    Date/Time: 10/28/2020 9:28 PM  Performed by: Josr Barnett DO  Authorized by: Josr Barnett DO   Consent: Verbal consent obtained. Written consent obtained. Risks and benefits: risks, benefits and alternatives were discussed  Consent given by: patient  Patient understanding: patient states understanding of the procedure being performed  Patient consent: the patient's understanding of the procedure matches consent given  Procedure consent: procedure consent matches procedure scheduled  Relevant documents: relevant documents present and verified  Site marked: the operative site was marked  Patient identity confirmed: arm band and verbally with patient  Time out: Immediately prior to procedure a \"time out\" was called to verify the correct patient, procedure, equipment, support staff and site/side marked as required.   Indications: vascular access  Anesthesia: local infiltration    Anesthesia:  Local Anesthetic: lidocaine 1% with epinephrine  Anesthetic total: 1 mL    Sedation:  Patient sedated: no    Preparation: skin prepped with 2% chlorhexidine  Skin prep agent dried: skin prep agent completely dried prior to procedure  Sterile barriers: all five maximum sterile barriers used - cap, mask, sterile gown, sterile gloves, and large sterile sheet  Hand hygiene: hand hygiene performed prior to central venous catheter insertion  Location details: right internal jugular  Patient position: Trendelenburg  Catheter type: triple lumen  Catheter size: 7 Fr  Pre-procedure: landmarks identified  Ultrasound guidance: yes  Sterile ultrasound techniques: sterile gel and sterile probe covers were used  Number of attempts: 1  Successful placement: yes  Post-procedure: line sutured and dressing applied  Assessment: blood return through all ports and placement verified by x-ray  Patient tolerance: Patient tolerated the procedure well with no immediate complications          Yonathan Alva DO  10/28/2020

## 2020-10-29 NOTE — FLOWSHEET NOTE
10/29/20 0038   Vitals   Pulse 105   BP (!) 99/50   MAP (mmHg) 66   [place]   CO (l/min) 6.9 l/min   CI (l/min/m2) 3.3 l/min/m2      TPRI 1650   SV (ml) 66.2 ml   SVI 31   Hemodynamic Monitoring   Hemodynamic Device   (NICOM)   Pre-PLR hemodynamics

## 2020-10-29 NOTE — PROGRESS NOTES
Pharmacy Vancomycin Consult     Vancomycin Day: 4  Current Dosing: intermittent     Date Time Vancomycin dose Vancomycin Random   10/26/20 1712 1500 mg    10/29/20 1540  10.4   10/29/20  2030 1000 mg                                  Recent Labs     10/28/20  0530 10/29/20  0620   BUN 34* 32*       Recent Labs     10/28/20  0530 10/29/20  0620   CREATININE 6.9* 6.5*       Recent Labs     10/28/20  0627 10/29/20  0620   WBC 11.8* 9.5         Intake/Output Summary (Last 24 hours) at 10/29/2020 1931  Last data filed at 10/29/2020 1453  Gross per 24 hour   Intake 5368.09 ml   Output 73203 ml   Net -90172.91 ml         Ht Readings from Last 1 Encounters:   10/26/20 5' 6\" (1.676 m)        Wt Readings from Last 1 Encounters:   10/29/20 230 lb 1.6 oz (104.4 kg)         Body mass index is 37.14 kg/m². Estimated Creatinine Clearance: 8 mL/min (A) (based on SCr of 6.5 mg/dL [facility])). Trough: 10.4    Assessment/Plan:  Will order vancomycin 1000 mg IVPB x 1 dose. Will continue to follow.     Marisol GoD, BCPS  10/29/2020  7:31 PM

## 2020-10-29 NOTE — FLOWSHEET NOTE
10/28/20 2010   Provider Notification   Reason for Communication Evaluate   Provider Name [facility]   Provider Notification Physician   Method of Communication Call   Response En route   Notification Time 2010 2010- This RN called Dr. Kena Esparza to update her that patient in 4K02 BP was 64/42 manually. 2010- Dr. Kena Esparza responded to the unit and gave orders.  See orders

## 2020-10-29 NOTE — FLOWSHEET NOTE
10/29/20 0047   Vitals   Pulse 104   BP (!) 90/48   MAP (mmHg) (!) 62   [place]   CO (l/min) 9.8 l/min   CI (l/min/m2) 4.6 l/min/m2      TPRI 1077   SV (ml) 93.7 ml   SVI 44   Stroke Volume Variance 42 %   Post PLR Hemodynamics. 42% change in SV.

## 2020-10-30 NOTE — PROGRESS NOTES
non-ischemic cardiomyopathy / s/p BIV-ICD, NIDDM2, COPD (no baseline supplemental oxygen requirement), non-obstructive CAD, HLD, CVA/TIA, PE (previously on coumadin), dementia, hypothyroidism, former tobacco abuse and other PMHx as indicated below -- presents to [facility] from [facility] with a chief complaint of abdominal pain. Limited history provided by the patient (due to dementia, confusion).     Patient was admitted to [facility] from 10/7-10/12 for low BP / hospital course was grossly unremarkable (hypotension chronic / related to PD) / patient remained asymptomatic during her hospital stay and was discharged to SNF in stable condition. She returns today with reported complaints of lower abdominal pain / poorly-characterized / no additional information provided. No fevers reported at [facility]. Per nursing staff, no CAPD performed x 48 hours due to recent hypotension.     Patient appears to answer yes/no questions regarding symptoms reliably, but is otherwise confused and unable to provide additional history. She denies chest pain, cough, shortness of breath, n/v/d or other symptoms at this time. States abdominal pain has resolved and is requesting something to eat.     ED course: afebrile, hypotensive (90s/40s), tachycardic (100s-110s), tachypnic (RR low 20s) with SpO2 96% on RA. Workup revealed: Hgb 11.4 (MCV 94.3), WBC 14.9, Plt 234. Na 133, Cl 93, K 4.1, Cr 5.7 / BUN 29 / eGFR 7. BG 68. CO2: 26. AST/ALT nml. AlkP 148. Alb 1.9. Trop 0.123. TSH 4.6. LA 3.4 > 2.6. Procal 0.33. Prelim CAPD fluid studies:  / no organisms on gram stain. CXR: moderate atelectasis/PNA along both sarah-diaphragms, small bilateral effusions (overall appearance worsened since prior). CT A/P WO contrast: + non-specific, moderate diffuse free air / bowel origin possible, though could be related to CAPD / + mild pleural and parenchymal opacities at each lung base. Received 1750 cc IVF, midodrine x 1, vanc/zosyn x 1.  Hospitalist service contacted Intraperitoneal Q8H    gentamicin   Topical Daily    [Held by provider] metoclopramide  5 mg Oral TID AC    aspirin  81 mg Oral Daily    clopidogrel  75 mg Oral QPM    levothyroxine  25 mcg Oral Daily    megestrol  40 mg Oral BID    metoprolol tartrate  25 mg Oral Daily    midodrine  15 mg Oral TID WC    pantoprazole  40 mg Oral BID    pravastatin  40 mg Oral QPM    cyanocobalamin  1,000 mcg Oral QPM    folbee plus  1 tablet Oral Daily    ascorbic acid  500 mg Oral BID    sodium chloride flush  10 mL Intravenous 2 times per day     PRN Meds: ipratropium-albuterol, sodium chloride flush, acetaminophen **OR** acetaminophen, glucose, dextrose, glucagon (rDNA), dextrose      Intake/Output Summary (Last 24 hours) at 10/30/2020 0734  Last data filed at 10/29/2020 1453  Gross per 24 hour   Intake 813.14 ml   Output 0 ml   Net 813.14 ml       Diet:  DIET RENAL; Carb Control: 3 carb choices (45 gms)/meal; Daily Fluid Restriction: 1500 ml; Low Potassium, Low Phosphorus  Dietary Nutrition Supplements: Diabetic Oral Supplement    Exam:  BP (!) 101/35   Pulse 98   Temp 97.4 °F (36.3 °C) (Oral)   Resp 20   Ht 5' 6\" (1.676 m)   Wt 230 lb 1.6 oz (104.4 kg)   SpO2 96%   BMI 37.14 kg/m²     General appearance: A&O x3. No apparent distress, appears stated age and cooperative. HEENT: Pupils equal, round, and reactive to light. Conjunctivae/corneas clear. Neck: Supple, with full range of motion. No jugular venous distention. Trachea midline. Respiratory:  Normal respiratory effort. Clear to auscultation, bilaterally without Rales/Wheezes/Rhonchi. Cardiovascular: Regular rate and rhythm with normal S1/S2 without murmurs, rubs or gallops. Abdomen: Soft, non-tender, non-distended with normal bowel sounds. PD catheter noted without signs of inflammation. Musculoskeletal: passive and active ROM x 4 extremities.   Grossly edematous in both upper and lower extremities bilaterally  Skin: Skin color, texture, turgor

## 2020-10-30 NOTE — PROGRESS NOTES
acetaminophen, glucose, dextrose, glucagon (rDNA), dextrose   Labs:   Labs reviewed  Lab Results   Component Value Date    ANIONGAP 13.0 10/30/2020     Recent Labs     10/28/20  0627 10/29/20  0620 10/30/20  0515   WBC 11.8* 9.5 12.9*   RBC 3.44* 2.82* 2.96*   HGB 10.7* 8.6* 8.9*   HCT 32.4* 25.9* 27.2*   MCV 94.2 91.8 91.9   MCH 31.1 30.5 30.1    187 205       Recent Labs     10/28/20  0530 10/29/20  0620 10/29/20  1540 10/30/20  0515   * 132*  --  129*   K 4.3  4.3 3.3* 3.4* 4.1   CL 94* 98  --  96*   CO2 21* 22*  --  20*   BUN 34* 32*  --  34*   CREATININE 6.9* 6.5*  --  6.4*   LABGLOM 6* 6*  --  6*   GLUCOSE 73 134*  --  162*   MG 1.7 1.4* 1.5* 1.9   CALCIUM 8.0* 7.4*  --  7.5*      Summary 6/8/2020   Technically difficult examination. Technically limited study. Left Ventricular size is Decreased. Moderate concentric left ventricular hypertrophy. There were no regional wall motion abnormalities. Ejection fraction is visually estimated at 70%. ASSESSMENT:  1. End Stage Renal Disease 2nd to diabetic and hypertensive nephrosclerosis on Peritoneal dialysis. Continue to hold Peritoneal dialysis due to hypotension,  Noted nausea, ? Uremic, Will continue to monitor for now. Discussed with Dr Luana Marrero  2. Hyponatremia 2nd to End Stage Renal Disease and decreased ability to excrete free water  3. Hypokalemia, replete  4. Abdominal pain, Improving. Peritoneal dialysis effluent analysis, clear, without evidence of peritonitis. 5. Chronic hypotension. Midodrine 15 mg 3x/d. Low dose dopamine  6. 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 Altered Mental Status improving  8. Secondary hyperparathyroidism of renal origin  9. Hyperphosphatemia. Phos down to 3.3. Renvela on hold    BMP in AM  Active Problems:    Peritoneal dialysis status ([address]    Abdominal pain    Hypotension  Resolved Problems:    * No resolved hospital problems.  Génesis Dela Cruz, APRN - CNP 9:20 AM 10/30/2020

## 2020-10-30 NOTE — CARE COORDINATION
10/30/20, 10:06 AM EDT    DISCHARGE PLANNING EVALUATION      EASTON left a message for Saul Franklin with [facility], questioning the \"hold up\" on the pre-cert. EASTON also received a call from [facility] [employer] [facility] representative after SW left for the day yesterday (assuming regarding this patient). EASTON called her back today but had to leave a message. 10/30/20, 10:20 AM EDT    DISCHARGE PLANNING EVALUATION      SW received a call back from [place] and she had called regarding this patient. Case was discussed. EASTON advised that no response from [facility] regarding pre-cert.  She requests a call from EASTON when she returns and if they accept under Medicaid

## 2020-10-30 NOTE — FLOWSHEET NOTE
10/29/20 1500   Provider Notification   Reason for Communication Evaluate;New orders   Provider Name Dr Ifeanyi Clemons   Provider Notification Physician   Method of Communication Call   Response See orders   Notification Time 1500   updated about BP /HR  And vtach, order for lab draws replace electrolytes if needed and try to wean dopamine off if able to , RN updated that patient is alert and oriented x4 and arouses easily even with low blood pressures

## 2020-10-30 NOTE — CARE COORDINATION
Update: ECF precert day 3; CAPD held r/t hypotension, on Midodrine.  IV AB, Dopamine gtt continued; collaborated with EASTON Elizabeth  Electronically signed by Mabel Peng RN on 10/30/2020 at 2:17 PM

## 2020-10-30 NOTE — FLOWSHEET NOTE
10/29/20 1300   Provider Notification   Reason for Communication Evaluate;New orders   Provider Name Dr Giana Mishra   Provider Notification Physician   Method of Communication Face to face   Response See orders   Notification Time 1300   1330: order for 20 meq of potassium  1730: per MD give 20 meq to equal 40 meq for the day - recheck mag and potassium in AM

## 2020-10-31 NOTE — FLOWSHEET NOTE
10/31/20 1820   Provider Notification   Reason for Communication Evaluate;New orders   Provider Name Dr Candido Watson    Provider Notification Resident   Method of Communication Face to face   Response No new orders   Notification Time 1820     Informed Dr Candido Watson of patients continued low blood pressures in the 80s with MAPs below 60. States that hospitalist is aware.  No new orders at this time

## 2020-10-31 NOTE — FLOWSHEET NOTE
10/31/20 1831   Provider Notification   Reason for Communication Evaluate; Review case;New orders   Provider Name Dr. Montana Barger   Provider Notification Physician   Method of Communication Secure Message   Response See orders   Notification Time [phone]     Secure messages sent to update on patient status after CAPD exchange. BP remains in the 80s with MAPs in the 50s. Did we want to continue to hold CAPD or any other interventions for BP?    1836- Call received from Dr. Montana Barger. Orders to hold CAPD exchanges in systolic blood pressure is 75 or less or if patient is symptomatic.     No further orders at this time

## 2020-10-31 NOTE — FLOWSHEET NOTE
10/30/20 1500   Provider Notification   Reason for Communication Evaluate;New orders   Provider Name Dr Elder Mccabe   Provider Notification Resident   Method of Communication Face to face   Response Other (Comment)   Notification Time 1500   updated that patient is weaned off dopamine gtt and still complaining  of nausea and stomach pain , RN also asked resident if she has spoken with nephrology about restarting dialysis, Dr Elder Mccabe stated she would try to get a hold of them

## 2020-10-31 NOTE — FLOWSHEET NOTE
10/30/20 0930   Provider Notification   Reason for Communication Evaluate; Review case   Provider Name Colby Henry   Provider Notification [facility] Practice Clinician (CNS, NP, CNM, CRNA, PA)   Method of Communication Call   Response No new orders   Notification Time 0930   updated Araceli Waite via telephone and asked about PD getting started today and she stated she would look into case and update this RN later  Per note PD on hold still for low blood pressures.

## 2020-10-31 NOTE — PROGRESS NOTES
Renal Progress Note    Assessment and Plan:    1. ESRD needing PD  2. Hypotension neurogenic ? 3. Deconditioning  4. Leukocytosis  5. Normocytic anemia of chronic disease  6. Hyponatremia from end-stage kidney disease  7. Metabolic acidosis from end-stage kidney disease  PLAN:   Labs reviewed  Medications reviewed  Darbepoetin schuyler 100 mcg subcu once for anemia. Will add non formulary droxidopa 200 mg po tid  Hopefully this will improve blood pressure and allow us to do PD   Then the droxidopa can be resumed in the out patient on discharge. Discussed with staff  Discussed with primary service   Blood culture though I seriously doubt sepsis despite the mild leukocytosis and patient has also been on antibiotic   Reviewed previous echocardiogram reports. Ejection fraction has been normal for the most part. Discontinue vancomycin. There is no evidence of peritonitis. We will monitor closely. I do not believe  CRRT is an option as of now.     Patient Active Problem List:     Hypertension     LBBB (left bundle branch block)     Hyperlipidemia     [place] BiV ICD     CKD (chronic kidney disease) stage 3, GFR 30-59 ml/min ([facility])     Ex-smoker     Mild carotid artery disease ([facility])     Morbid obesity ([facility])     CAD (coronary artery disease)     PAD (peripheral artery disease) ([facility])     Lactic acidosis     Mild pulmonary hypertension ([facility])     Type 2 diabetes mellitus with insulin therapy (HCC)     GERD (gastroesophageal reflux disease)     His of Heparin induced thrombocytopenia     History of breast cancer     History of CVA (cerebrovascular accident)     History of pulmonary embolus (PE)     Iron deficiency anemia     Osteoarthritis     Paget's disease of bone     Vitamin D deficiency     COPD without exacerbation (HCC)     CHF (congestive heart failure), NYHA class III, chronic, diastolic (HCC)     Anemia, chronic disease     Anemia, chronic renal failure     Acute combined systolic and diastolic CHF, NYHA class 1 [facility])     Peritoneal dialysis status ([facility] [address]     Mild aortic stenosis     Physical deconditioning     Hyponatremia     ESRD (end stage renal disease) ([facility] [address]     Near syncope     Other hypotension     Syncope     Syncope and collapse     Abdominal pain     Hypotension      Subjective:   Admit Date: 10/26/2020    Interval History:   Seen for end-stage kidney disease. Very awake and alert this morning. No complaint. Denies any shortness of breath. Updated by the staff. No new issues. Blood pressure remains low however.       Medications:   Scheduled Meds:   dianeal lo-nikko 1.5%  2,000 mL Intraperitoneal Q6H    Droxidopa  200 mg Oral TID    sodium chloride  500 mL Intravenous Once    potassium (CARDIAC) replacement protocol   Other RX Placeholder    potassium chloride  40 mEq Oral Once    [Held by provider] dianeal lo-nikko 1.5%  2,000 mL Intraperitoneal Q8H    gentamicin   Topical Daily    [Held by provider] metoclopramide  5 mg Oral TID AC    aspirin  81 mg Oral Daily    clopidogrel  75 mg Oral QPM    levothyroxine  25 mcg Oral Daily    megestrol  40 mg Oral BID    metoprolol tartrate  25 mg Oral Daily    midodrine  15 mg Oral TID WC    pantoprazole  40 mg Oral BID    pravastatin  40 mg Oral QPM    cyanocobalamin  1,000 mcg Oral QPM    folbee plus  1 tablet Oral Daily    ascorbic acid  500 mg Oral BID    sodium chloride flush  10 mL Intravenous 2 times per day     Continuous Infusions:   dextrose         CBC:   Recent Labs     10/29/20  0620 10/30/20  0515 10/31/20  0455   WBC 9.5 12.9* 13.1*   HGB 8.6* 8.9* 8.9*    205 211     CMP:    Recent Labs     10/29/20  0620 10/29/20  1540 10/30/20  0515 10/31/20  0455   *  --  129* 133*   K 3.3* 3.4* 4.1 4.1   CL 98  --  96* 98   CO2 22*  --  20* 21*   BUN 32*  --  34* 39*   CREATININE 6.5*  --  6.4* 7.6*   GLUCOSE 134*  --  162* 100   CALCIUM 7.4*  --  7.5* 7.5*   LABGLOM 6*  --  6* 5*     Troponin: No results for input(s): TROPONINI in the last 72 hours. BNP: No results for input(s): BNP in the last 72 hours. INR: No results for input(s): INR in the last 72 hours. Lipids: No results for input(s): CHOL, LDLDIRECT, TRIG, HDL, AMYLASE, LIPASE in the last 72 hours. Liver: No results for input(s): AST, ALT, ALKPHOS, PROT, LABALBU, BILITOT in the last 72 hours. Invalid input(s): BILDIR  Iron:  No results for input(s): IRONS, FERRITIN in the last 72 hours. Invalid input(s): LABIRONS  XR CHEST PORTABLE   Final Result   Impression:   Small right pleural effusion. There is possible pneumoperitoneum beneath the right hemidiaphragm. Recommend further evaluation or follow-up. This document has been electronically signed by: Florencio Iverson MD on    10/29/2020 05:28 AM         XR CHEST PORTABLE   Final Result   Atelectasis or infiltrate at the right lung base. Small right pleural effusion. Support devices as above. This document has been electronically signed by: Fco Prater MD on    10/28/2020 09:44 PM         CT ABDOMEN PELVIS WO CONTRAST Additional Contrast? None   Final Result   Moderate diffuse free air is nonspecific. A bowel origin is possible particularly peptic ulcer disease, though it also could relate to CAPD. Multiple small gallstones are noted. Mild pleural and parenchymal opacities at each lung base. **This report has been created using voice recognition software. It may contain minor errors which are inherent in voice recognition technology. **      Final report electronically signed by Dr. Maira Cohen on 10/26/2020 6:24 PM      XR CHEST PORTABLE   Final Result   1. Very poor inflation of lungs. Moderate atelectasis/pneumonia along both hemidiaphragms. Small bilateral effusions. 2. Borderline heart size. Permanent pacemaker/defibrillator. CardioMems device projects over left hilum. 3. Overall appearance of chest has worsened since prior.             **This report has been created using voice recognition software. It may contain minor errors which are inherent in voice recognition technology. **      Final report electronically signed by Dr. Kate Domingo on 10/26/2020 3:54 PM            Objective:   Vitals: BP 99/86   Pulse 101   Temp 97.4 °F (36.3 °C) (Oral)   Resp 19   Ht 5' 6\" (1.676 m)   Wt 230 lb 1.6 oz (104.4 kg)   SpO2 98%   BMI 37.14 kg/m²    Wt Readings from Last 3 Encounters:   10/29/20 230 lb 1.6 oz (104.4 kg)   10/12/20 226 lb 2 oz (102.6 kg)   08/06/20 239 lb 4.8 oz (108.5 kg)      24HR INTAKE/OUTPUT:      Intake/Output Summary (Last 24 hours) at 10/31/2020 1140  Last data filed at 10/31/2020 0937  Gross per 24 hour   Intake 1369.27 ml   Output 0 ml   Net 1369.27 ml       Constitutional:  Alert, awake, no apparent distress   Skin:normal with no rash or lesions. HEENT:Pupils are reactive . Throat is clear . Oral mucosa is moist   Neck:supple with no thyromegaly or carotid bruit  Cardiovascular:  S1, S2 without murmur or rubs   Respiratory: Bibasilar crackles. Abdomen: +bs, soft, no tenderness to palpation and no palpable mass. No abdominal bruit. PD catheter is intact. Ext: 2+ bilateral upper extremity edema noted. Trace to 1+ bilateral LE edema  Musculoskeletal:Intact  Neuro:Alert and awake. No focal deficit.   Speech is normal.      Electronically signed by Rosalie Joel MD on 10/31/2020 at 11:40 AM

## 2020-10-31 NOTE — PROGRESS NOTES
Hospitalist Progress Note    Patient: Denise Moeller      Unit/Bed:4K-02/002-A    YOB: 1934    MRN: [id number]       Acct: [de-identified]     PCP: Ford Burton MD    Date of Admission: 10/26/2020    Assessment/Plan:    # Sepsis with Possible Abdominal/Peritoneal Source      10/30: On Zosyn and Vanc, remains afebrile; WBC only mildly elevated today. pancultures no growth thus far. No identifiable localized source of infection. Pt mentating well; BP readings likely erroneous d/t severe peripheral edema; mildly tachycardic, could 2/2 Dopamin. Mg/K WLNs w/ replacement. RN aware to wean off Dopamin. Consider de-escalaing Abx treatment if clinical status remains stable within the next 24-48 hrs      10/31: Discontinue Antibiotics Per Nephrology. Continue to watch for fevers. Remains afebrile. WBC 13.1, no growth on blood cultures. No identifiable source of infection, however does state pain in her Abdomen and nausea. Patient is alert and oriented. Tachycardia has improved with D/C of Dopamine. Monitor BP in PD. If eating well and BP stable, may be discharged by Monday. # ESRD on CAPD  Patient follows Dr. Luana Marrero. She has not had peritoneal dialysis for about 6 days now. Peritoneal dialysis fluid showed some questionable gram-positive cocci growing. Patient is without evidence of peritonitis, but states abdominal pain and nausea. May be a contaimninant. 10/31:  BP still mildly hypotensive, Dopamine held with little improvement. Patient is volume overloaded and edematous. Nephro stated will start PD again today wand will run Droxidopa to aide BP. Continue to Monitor I/O, Daily BMP. # Chronic Hypotension  Patient apparently has a history of hypotension. 10/31: Continue Midodrine, Reglan and Dopamine held. Consider Hypotension as an aspect of sepsis, Continue Antibiotics for one more day. # Compensated HFmrEF - Stable  Last echocardiogram done 6/2020.   EF estimated at 45 to 50%     10/31: Continue Lopressor as tolerated. PD to begin today due to edema    # Mild Hyponatremia 2/2 ESRD  This is likely a chronic issue for this patient as she is unable to excrete free water     10/31: Sodium 133, Impaired Free water excretion. Should correct with PD. Daily BMP to monitor    # Abdominal Discomfort likely 2/2 Gastroparesis - Resolved  Patient's complaints have been about early satiety and feeling that she stays full for a long time. This could likely be secondary to gastroparesis due to history of diabetes. Reglan started 10/27, held on 10/29 for Hypotension and prolonged QT. Continue to Monitor. 10/31: Patient states nausea and pain, may be secondary to fluid overload or gastroparesis    # History of Hypothyroidism  Continue Synthroid 25 mcg qd    # History of GERD  Continue Protonix 40 mg BID    # History of HIT  Avoid heparin use    # History of COPD  Duo nebs q6h PRN    # Disposition Planning  PT/OT/SW following; plan for ECF when pt stabilized. Chief Complaint: Abdominal Pain    Hospital Course:     81 y/o F PMHx ESRD (on CAPD), chronic hypotension, HFmrEF (EF 45-50% per ECHO 6/2020), non-ischemic cardiomyopathy / s/p BIV-ICD, NIDDM2, COPD (no baseline supplemental oxygen requirement), non-obstructive CAD, HLD, CVA/TIA, PE (previously on coumadin), dementia, hypothyroidism, former tobacco abuse and other PMHx as indicated below -- [facility] with a chief complaint of abdominal pain. Limited history provided by the patient (due to dementia, confusion).     Patient was admitted to [facility] from 10/7-10/12 for low BP / hospital course was grossly unremarkable (hypotension chronic / related to PD) / patient remained asymptomatic during her hospital stay and was discharged to SNF in stable condition. She returns today with reported complaints of lower abdominal pain / poorly-characterized / no additional information provided. No fevers reported at [facility]. Per nursing staff, no CAPD performed x 48 hours due to recent hypotension.     Patient appears to answer yes/no questions regarding symptoms reliably, but is otherwise confused and unable to provide additional history. She denies chest pain, cough, shortness of breath, n/v/d or other symptoms at this time. States abdominal pain has resolved and is requesting something to eat.     ED course: afebrile, hypotensive (90s/40s), tachycardic (100s-110s), tachypnic (RR low 20s) with SpO2 96% on RA. Workup revealed: Hgb 11.4 (MCV 94.3), WBC 14.9, Plt 234. Na 133, Cl 93, K 4.1, Cr 5.7 / BUN 29 / eGFR 7. BG 68. CO2: 26. AST/ALT nml. AlkP 148. Alb 1.9. Trop 0.123. TSH 4.6. LA 3.4 > 2.6. Procal 0.33. Prelim CAPD fluid studies:  / no organisms on gram stain. CXR: moderate atelectasis/PNA along both sarah-diaphragms, small bilateral effusions (overall appearance worsened since prior). CT A/P WO contrast: + non-specific, moderate diffuse free air / bowel origin possible, though could be related to CAPD / + mild pleural and parenchymal opacities at each lung base. Received 1750 cc IVF, midodrine x 1, vanc/zosyn x 1. Hospitalist service contacted for admission. Please see A&P for additional details. 10/27: Patient had reported some abdominal discomfort and stated cannot fully eat and feels early satiety. Reglan was added. No peritoneal dialysis done. Body fluid cell count with differential revealed no evidence of any SBP. Preliminary body fluid culture showed no growth however now has coagulase-negative Staphylococcus. This is likely a contaminant. 10/28: On the night of 10/28 and continuing into the morning of 10/29 patient's blood pressures started to decline and her MAP was consistently below 60. IV access could not be obtained due to poor venous access. Central line was placed on the evening of 10/28 in the right internal jugular vein.   Patient received multiple 1000 cc boluses, however she did not stay very fluid responsive. Her midodrine doses were given with only temporary increases in blood pressure. Intropin was started on the patient with a dose of 2.5 mcg/kg/min. This showed some improvement into her blood pressure throughout the day today 10/29. However we believe that her low blood pressures could be affected by her edematous state and inaccurate readings by the blood pressure cuff. Intropin drip was decreased to 1.5 mcg/kg/h with eventual cessation. Lactate drawn at noon on 10/29 revealed a level of 3, repeat lactic acid 3 hours later revealed a level of 2.1.    10/30: Patient states that she feels well. Has no issues with dizziness or feeling lightheaded. Denies any chest pain or palpitations. Throughout the day patient continued stating to feeling fine. Subjective (past 24 hours):     No overnight events. Patient states she feels okay today, but states abdominal pain and nausea when she tries to drink. She is holding an emesis bag on exam.  She denies headaches, chest pain, shortness of breath, vomiting, diarrhea, or syncope.     Medications:  Reviewed    Infusion Medications    DOPamine Stopped (10/30/20 1250)    dextrose       Scheduled Medications    Droxidopa  100 mg Oral TID    dianeal lo-nikko 1.5%  2,000 mL Intraperitoneal Q6H    sodium chloride  500 mL Intravenous Once    piperacillin-tazobactam  2.25 g Intravenous Q8H    potassium (CARDIAC) replacement protocol   Other RX Placeholder    potassium chloride  40 mEq Oral Once    [Held by provider] dianeal lo-nikko 1.5%  2,000 mL Intraperitoneal Q8H    gentamicin   Topical Daily    [Held by provider] metoclopramide  5 mg Oral TID AC    aspirin  81 mg Oral Daily    clopidogrel  75 mg Oral QPM    levothyroxine  25 mcg Oral Daily    megestrol  40 mg Oral BID    metoprolol tartrate  25 mg Oral Daily    midodrine  15 mg Oral TID WC    pantoprazole  40 mg Oral BID    pravastatin  40 mg Oral QPM    cyanocobalamin  1,000 mcg Oral Labs:   Recent Labs     10/29/20  0620 10/30/20  0515 10/31/20  0455   WBC 9.5 12.9* 13.1*   HGB 8.6* 8.9* 8.9*   HCT 25.9* 27.2* 26.9*    205 211     Recent Labs     10/29/20  0620 10/29/20  1540 10/30/20  0515 10/31/20  0455   *  --  129* 133*   K 3.3* 3.4* 4.1 4.1   CL 98  --  96* 98   CO2 22*  --  20* 21*   BUN 32*  --  34* 39*   CREATININE 6.5*  --  6.4* 7.6*   CALCIUM 7.4*  --  7.5* 7.5*     No results for input(s): AST, ALT, BILIDIR, BILITOT, ALKPHOS in the last 72 hours. No results for input(s): INR in the last 72 hours. No results for input(s): [place] Lager in the last 72 hours. Microbiology:      Urinalysis:      Lab Results   Component Value Date    NITRU NEGATIVE 05/17/2020    WBCUA > 200 05/17/2020    BACTERIA MANY 05/17/2020    RBCUA 25-50 05/17/2020    BLOODU MODERATE 05/17/2020    SPECGRAV 1.019 03/26/2019    GLUCOSEU NEGATIVE 05/17/2020       Radiology:  XR CHEST PORTABLE   Final Result   Impression:   Small right pleural effusion. There is possible pneumoperitoneum beneath the right hemidiaphragm. Recommend further evaluation or follow-up. This document has been electronically signed by: Kristel Monroy MD on    10/29/2020 05:28 AM         XR CHEST PORTABLE   Final Result   Atelectasis or infiltrate at the right lung base. Small right pleural effusion. Support devices as above. This document has been electronically signed by: Garrick Ellis MD on    10/28/2020 09:44 PM         CT ABDOMEN PELVIS WO CONTRAST Additional Contrast? None   Final Result   Moderate diffuse free air is nonspecific. A bowel origin is possible particularly peptic ulcer disease, though it also could relate to CAPD. Multiple small gallstones are noted. Mild pleural and parenchymal opacities at each lung base. **This report has been created using voice recognition software. It may contain minor errors which are inherent in voice recognition technology. **      Final report electronically signed by Dr. Mary Ann Etienne on 10/26/2020 6:24 PM      XR CHEST PORTABLE   Final Result   1. Very poor inflation of lungs. Moderate atelectasis/pneumonia along both hemidiaphragms. Small bilateral effusions. 2. Borderline heart size. Permanent pacemaker/defibrillator. CardioMems device projects over left hilum. 3. Overall appearance of chest has worsened since prior. **This report has been created using voice recognition software. It may contain minor errors which are inherent in voice recognition technology. **      Final report electronically signed by Dr. Triny Alexis on 10/26/2020 3:54 PM        DVT prophylaxis: [] Lovenox                                 [x] SCDs                                 [] SQ Heparin                                 [] Encourage ambulation           [] Already on Anticoagulation     Code Status: Limited    Tele:   [x] yes             [] no    Electronically signed by Mere Urena DO on 10/31/2020 at 8:55 AM

## 2020-11-01 NOTE — PROGRESS NOTES
Hospitalist Progress Note    Patient: Frederic Mcfadden      Unit/Bed:4K-02/002-A    YOB: 1934    MRN: [id number]       Acct: [de-identified]     PCP: Raad Perrin MD    Date of Admission: 10/26/2020    Assessment/Plan:    # Sepsis with Possible Abdominal/Peritoneal Source      11/1:  Saira Kahn - DC'ed Oct 31, ok per nephrology. Patient remains afebrile, trending TEMP; slight bump in WBC to 16 up from 13 yesterday. Pancultures no growth thus far. No identifiable localized source of infection. Pt mentating well; BP readings likely erroneous d/t severe peripheral edema; mildly tachycardic, could 2/2 Dopamin. Mg/K WLNs w/ replacement. RN aware to wean off Dopamin. Patient continues to complain of undulating abdominal pain, nausea. Tachycardia has improved with D/C of Dopamine. Monitor BP in PD. If eating well and BP stable, may be discharged by Monday. Palliative consulted-to evaluate for hospice placement. # ESRD on CAPD  Dr. Eleanor Lees following appreciate his assistance. Patient gets daily peritoneal dialysis with cytology and culture, so far negative other than some questionable gram-positive cocci on 10/26/20. Patient is without evidence of peritonitis, but states abdominal pain and nausea. May be a contaminant. 11/1:  BP still mildly hypotensive, Dopamine held d/t tachycardia. Patient is volume overloaded and edematous. Nephro re-started daily PD again, Droxidopa started 10/31/20 to aide BP. Continue to Monitor I/O, Daily BMP. # Chronic Hypotension  Patient apparently has a history of hypotension.    - Continue Midodrine; Reglan and Dopamine held. # Compensated HFmrEF - Stable  Last echocardiogram done 6/2020. EF estimated at 45 to 50%  - Continue Lopressor as tolerated. PD to begin today due to edema    # Mild Chronic Hyponatremia 2/2 ESRD  This is likely a chronic issue for this patient as she is unable to excrete free water.  Corrects and fluctuates with PD. Daily BMP to monitor.  - Stable; at baseline (~901-715)    # Abdominal Discomfort likely 2/2 Gastroparesis - Resolved  Patient's complaints have been about early satiety and feeling that she stays full for a long time. This could likely be secondary to gastroparesis due to history of diabetes. Reglan started 10/27, held since 10/29 for Hypotension and prolonged QT. Continue to Monitor. This is likely secondary to over distention with fluid overload and gastroparesis. # History of Hypothyroidism   Continue Synthroid 25 mcg qd    # History of GERD  Continue Protonix 40 mg BID    # History of HIT  Avoid heparin use    # History of COPD  Duo nebs q6h PRN    # Disposition Planning  PT/OT/SW following; plan for ECF when pt stabilized. Chief Complaint: Abdominal Pain    Hospital Course:     79 y/o F PMHx ESRD (on CAPD), chronic hypotension, HFmrEF (EF 45-50% per ECHO 6/2020), non-ischemic cardiomyopathy / s/p BIV-ICD, NIDDM2, COPD (no baseline supplemental oxygen requirement), non-obstructive CAD, HLD, CVA/TIA, PE (previously on coumadin), dementia, hypothyroidism, former tobacco abuse and other PMHx as indicated below -- presents to [facility] from [facility] with a chief complaint of abdominal pain. Limited history provided by the patient (due to dementia, confusion).     Patient was admitted to [facility] from 10/7-10/12 for low BP / hospital course was grossly unremarkable (hypotension chronic / related to PD) / patient remained asymptomatic during her hospital stay and was discharged to SNF in stable condition. She returns today with reported complaints of lower abdominal pain / poorly-characterized / no additional information provided. No fevers reported at [facility]. Per nursing staff, no CAPD performed x 48 hours due to recent hypotension.     Patient appears to answer yes/no questions regarding symptoms reliably, but is otherwise confused and unable to provide additional history.  She denies chest pain, cough, shortness of breath, n/v/d or other symptoms at this time. States abdominal pain has resolved and is requesting something to eat.     ED course: afebrile, hypotensive (90s/40s), tachycardic (100s-110s), tachypnic (RR low 20s) with SpO2 96% on RA. Workup revealed: Hgb 11.4 (MCV 94.3), WBC 14.9, Plt 234. Na 133, Cl 93, K 4.1, Cr 5.7 / BUN 29 / eGFR 7. BG 68. CO2: 26. AST/ALT nml. AlkP 148. Alb 1.9. Trop 0.123. TSH 4.6. LA 3.4 > 2.6. Procal 0.33. Prelim CAPD fluid studies:  / no organisms on gram stain. CXR: moderate atelectasis/PNA along both sarah-diaphragms, small bilateral effusions (overall appearance worsened since prior). CT A/P WO contrast: + non-specific, moderate diffuse free air / bowel origin possible, though could be related to CAPD / + mild pleural and parenchymal opacities at each lung base. Received 1750 cc IVF, midodrine x 1, vanc/zosyn x 1. Hospitalist service contacted for admission. Please see A&P for additional details. 10/27: Patient had reported some abdominal discomfort and stated cannot fully eat and feels early satiety. Reglan was added. No peritoneal dialysis done. Body fluid cell count with differential revealed no evidence of any SBP. Preliminary body fluid culture showed no growth however now has coagulase-negative Staphylococcus. This is likely a contaminant. 10/28: On the night of 10/28 and continuing into the morning of 10/29 patient's blood pressures started to decline and her MAP was consistently below 60. IV access could not be obtained due to poor venous access. Central line was placed on the evening of 10/28 in the right internal jugular vein. Patient received multiple 1000 cc boluses, however she did not stay very fluid responsive. Her midodrine doses were given with only temporary increases in blood pressure. Intropin was started on the patient with a dose of 2.5 mcg/kg/min.   This showed some improvement into her blood pressure throughout the day today acetaminophen **OR** acetaminophen, glucose, dextrose, glucagon (rDNA), dextrose      Intake/Output Summary (Last 24 hours) at 11/1/2020 1425  Last data filed at 11/1/2020 1318  Gross per 24 hour   Intake 8456.81 ml   Output 6150 ml   Net 2306.81 ml       Diet:  Dietary Nutrition Supplements: Diabetic Oral Supplement  DIET RENAL; Carb Control: 3 carb choices (45 gms)/meal; Daily Fluid Restriction: 1500 ml; Low Phosphorus    Exam:  BP (!) 84/40   Pulse 104   Temp 98.7 °F (37.1 °C) (Oral)   Resp 19   Ht 5' 6\" (1.676 m)   Wt 223 lb 11.2 oz (101.5 kg) Comment: dry  SpO2 99%   BMI 36.11 kg/m²     General appearance: No apparent distress, appears stated age and cooperative. HEENT: Pupils equal, round, and reactive to light. Conjunctivae/corneas clear. Neck: Supple, with full range of motion. No jugular venous distention. Trachea midline. Respiratory:  Normal respiratory effort. Clear to auscultation, bilaterally without Rales/Wheezes/Rhonchi. Cardiovascular: Regular rate and rhythm with normal S6/X4, systolic murmur best heard over 2nd right intercostal space, rubs or gallops. Abdomen: Soft, non-tender, non-distended with normal bowel sounds. PD catheter noted without signs of inflammation. Musculoskeletal: passive and active ROM x 4 extremities. Grossly edematous in both upper and lower extremities bilaterally, mild weeping present  Skin: Skin color, texture, turgor normal.  No rashes or lesions. Neurologic:  Neurovascularly intact without any focal sensory/motor deficits.  Cranial nerves: II-XII intact, grossly non-focal.   Psychiatric: Alert and oriented, thought content appropriate, normal insight  Capillary Refill: Brisk,< 3 seconds   Peripheral Pulses: +2 palpable, equal bilaterally      Labs:   Recent Labs     10/30/20  0515 10/31/20  0455 11/01/20  0630   WBC 12.9* 13.1* 16.0*   HGB 8.9* 8.9* 9.9*   HCT 27.2* 26.9* 30.0*    211 238     Recent Labs     10/30/20  0515 10/31/20  0455

## 2020-11-01 NOTE — PROGRESS NOTES
--   10/31/20 1100 (!) 84/46 -- -- 91 -- -- --   10/31/20 0927 (!) 84/38 97.4 °F (36.3 °C) Oral 99 21 99 % --       Intake/Output Summary (Last 24 hours) at 11/1/2020 0758  Last data filed at 11/1/2020 0725  Gross per 24 hour   Intake 6486.81 ml   Output 6150 ml   Net 336.81 ml       Admission weight: 226 lb (102.5 kg)  Patient Vitals for the past 96 hrs (Last 3 readings):   Weight   11/01/20 0500 223 lb 11.2 oz (101.5 kg)   10/29/20 0320 230 lb 1.6 oz (104.4 kg)     Body mass index is 36.11 kg/m². EXAM:  CONSTITUTIONAL:  No acute distress. Pleasant  HEENT:  Head is normocephalic, Extraocular movement intact. Neck is supple. Voice is clear. CARDIOVASCULAR:  S1, S2  regular rate and rhythm. RESPIRATORY: Clear to ausculation bilaterally. Equal breath sounds. No wheezes. No shortness of breath noted at rest.  ABDOMEN: soft, non tender. Peritoneal dialysis cath site clear  NEUROLOGICAL: Patient is alert and oriented to person, place. Recent and remote memory partially intact. Thought is coherant. SKIN: no rash, No significant bruises on exposed surfaces  MUSCULOSKELETAL: Movement is coordinated. Moves all extremities   EXTREMITIES: Distal lower extremity temp is warm, 2+  general body edema. PSYCHIATRIC: mood and affect appropriate.     Medications:   Med reviewed  Scheduled Meds:   dianeal lo-nikko 1.5%  2,000 mL Intraperitoneal Q6H    Droxidopa  200 mg Oral TID    sodium chloride  500 mL Intravenous Once    potassium (CARDIAC) replacement protocol   Other RX Placeholder    potassium chloride  40 mEq Oral Once    gentamicin   Topical Daily    [Held by provider] metoclopramide  5 mg Oral TID AC    aspirin  81 mg Oral Daily    clopidogrel  75 mg Oral QPM    levothyroxine  25 mcg Oral Daily    megestrol  40 mg Oral BID    metoprolol tartrate  25 mg Oral Daily    midodrine  15 mg Oral TID WC    pantoprazole  40 mg Oral BID    pravastatin  40 mg Oral QPM    cyanocobalamin  1,000 mcg Oral QPM    folbee plus  1 tablet Oral Daily    ascorbic acid  500 mg Oral BID    sodium chloride flush  10 mL Intravenous 2 times per day     PRN Meds trimethobenzamide, ipratropium-albuterol, sodium chloride flush, acetaminophen **OR** acetaminophen, glucose, dextrose, glucagon (rDNA), dextrose   Labs:   Labs reviewed  Lab Results   Component Value Date    ANIONGAP 16.0 11/01/2020     Recent Labs     10/30/20  0515 10/31/20  0455 11/01/20  0630   WBC 12.9* 13.1* 16.0*   RBC 2.96* 2.92* 3.28*   HGB 8.9* 8.9* 9.9*   HCT 27.2* 26.9* 30.0*   MCV 91.9 92.1 91.5   MCH 30.1 30.5 30.2    211 238       Recent Labs     10/29/20  1540  10/30/20  0515 10/31/20  0455 11/01/20  0630   NA  --   --  129* 133* 131*   K 3.4*  --  4.1 4.1 3.6   CL  --   --  96* 98 96*   CO2  --   --  20* 21* 19*   BUN  --   --  34* 39* 39*   CREATININE  --   --  6.4* 7.6* 6.6*   LABGLOM  --    < > 6* 5* 6*   GLUCOSE  --   --  162* 100 197*   MG 1.5*  --  1.9 1.9  --    CALCIUM  --   --  7.5* 7.5* 7.7*    < > = values in this interval not displayed. Summary 6/8/2020   Technically difficult examination. Technically limited study. Left Ventricular size is Decreased. Moderate concentric left ventricular hypertrophy. There were no regional wall motion abnormalities. Ejection fraction is visually estimated at 70%. ASSESSMENT:  1. End Stage Renal Disease 2nd to diabetic and hypertensive nephrosclerosis on Peritoneal dialysis. Continue Peritoneal dialysis 1.5%. No evidence of peritonitis. Nausea possibly 2nd to uremia   2. Hyponatremia 2nd to End Stage Renal Disease and decreased ability to excrete free water  3. Hypokalemia, replace with 40 meq PO x 1. Cancel Low K diet order  4. Metabolic acidosis 2nd to ESRD  5. Anemia of chronic disease, S/P Aranesp 10/31  6. Chronic hypotension. Midodrine 15 mg 3x/d. Continue Droxidopa for probable neurogenic hypotension  7.  Diabetes Mellitus Type II with nephrosclerosis with long term use of insulin 8. Altered Mental Status Resolving  9. Secondary hyperparathyroidism of renal origin  10. Hyperphosphatemia. Phos down to 3.3. Renvela on hold  11. Deconditioning    BMP in AM  Active Problems:    [facility] ([facility]    Abdominal pain    Hypotension  Resolved Problems:    * No resolved hospital problems.  Dave Rodríguez, ARELI - CNP 7:58 AM 11/1/2020

## 2020-11-02 NOTE — PROGRESS NOTES
Patient ate no breakfast or lunch, attempted a couple sips through a straw of her chicken noodle soup, immediately stated she was nauseas. PRN tigan administered.

## 2020-11-02 NOTE — FLOWSHEET NOTE
11/01/20 2206   Vital Signs   BP (!) 72/35   MAP (mmHg) (!) 46     Patient refused taking her medications (Oral Midodrine and Dopamine). Second nurse attempted to get patient to take medications. Gave patient Tigan for nausea. Re-assessed an hour later and patient continued to refuse medications. PD was not given at this time.

## 2020-11-02 NOTE — PROGRESS NOTES
Renal Progress Note    Assessment and Plan:    1. ESRD on peritoneal dialysis. 2.Hypotension neurogenic stable. 3.  Normocytic anemia of chronic disease. 4.  Leukocytosis improved. 5.  Normocytic anemia of chronic disease  6. Hyponatremia from end-stage kidney disease with serum sodium slightly decreased this morning from yesterday. 7.  Metabolic acidosis from end-stage kidney disease improved. 8.  Hypokalemia. PLAN:   Labs reviewed  Serum potassium is slightly low. Medications reviewed  Continue droxidopa for now  Peritoneal dialysis data reviewed  Very marginal ultrafiltration  Okay to discharge if discharged by the primary service  Potassium replacement  Discussed with the staff  Continue droxidopa on discharge   probably need to start the arrangement now with the drug company which is [employer] in [place]. That is the only place you can get it from as of now.         Patient Active Problem List:     Hypertension     LBBB (left bundle branch block)     Hyperlipidemia     [facility] [place] BiV ICD     CKD (chronic kidney disease) stage 3, GFR 30-59 ml/min (HCC)     Ex-smoker     Mild carotid artery disease (HCC)     Morbid obesity (HCC)     CAD (coronary artery disease)     PAD (peripheral artery disease) (HCC)     Lactic acidosis     Mild pulmonary hypertension (HCC)     Type 2 diabetes mellitus with insulin therapy (HCC)     GERD (gastroesophageal reflux disease)     His of Heparin induced thrombocytopenia     History of breast cancer     History of CVA (cerebrovascular accident)     History of pulmonary embolus (PE)     Iron deficiency anemia     Osteoarthritis     Paget's disease of bone     Vitamin D deficiency     COPD without exacerbation (HCC)     CHF (congestive heart failure), NYHA class III, chronic, diastolic (HCC)     Anemia, chronic disease     Anemia, chronic renal failure     Acute combined systolic and diastolic CHF, NYHA class 1 ([address]     Peritoneal dialysis status ([facility]     Mild aortic stenosis     Physical deconditioning     Hyponatremia     ESRD (end stage renal disease) ([facility]     Near syncope     Other hypotension     Syncope     Syncope and collapse     Abdominal pain     Hypotension      Subjective:   Admit Date: 10/26/2020    Interval History:   Seen for end-stage kidney disease. Comfortably asleep this morning. Updated by the staff. Had one bag with a small fibrin for the PD solution. It has become clear since then. Blood pressure remains low however but is improved. Medications:   Scheduled Meds:   potassium chloride  40 mEq Oral Once    dianeal lo-nikko 1.5%  2,000 mL Intraperitoneal Q6H    Droxidopa  200 mg Oral TID    sodium chloride  500 mL Intravenous Once    potassium (CARDIAC) replacement protocol   Other RX Placeholder    gentamicin   Topical Daily    [Held by provider] metoclopramide  5 mg Oral TID AC    aspirin  81 mg Oral Daily    clopidogrel  75 mg Oral QPM    levothyroxine  25 mcg Oral Daily    megestrol  40 mg Oral BID    metoprolol tartrate  25 mg Oral Daily    midodrine  15 mg Oral TID WC    pantoprazole  40 mg Oral BID    pravastatin  40 mg Oral QPM    cyanocobalamin  1,000 mcg Oral QPM    folbee plus  1 tablet Oral Daily    ascorbic acid  500 mg Oral BID    sodium chloride flush  10 mL Intravenous 2 times per day     Continuous Infusions:   dextrose         CBC:   Recent Labs     10/31/20  0455 11/01/20  0630 11/02/20  0417   WBC 13.1* 16.0* 14.8*   HGB 8.9* 9.9* 9.3*    238 250     CMP:    Recent Labs     10/31/20  0455 11/01/20 0630 11/02/20  0417   * 131* 129*   K 4.1 3.6 3.3*   CL 98 96* 93*   CO2 21* 19* 21*   BUN 39* 39* 38*   CREATININE 7.6* 6.6* 6.8*   GLUCOSE 100 197* 160*   CALCIUM 7.5* 7.7* 7.4*   LABGLOM 5* 6* 6*     Troponin: No results for input(s): TROPONINI in the last 72 hours. BNP: No results for input(s): BNP in the last 72 hours.   INR: No results for input(s): INR in the last 72 hours. Lipids: No results for input(s): CHOL, LDLDIRECT, TRIG, HDL, AMYLASE, LIPASE in the last 72 hours. Liver: No results for input(s): AST, ALT, ALKPHOS, PROT, LABALBU, BILITOT in the last 72 hours. Invalid input(s): BILDIR  Iron:  No results for input(s): IRONS, FERRITIN in the last 72 hours. Invalid input(s): LABIRONS  XR CHEST PORTABLE   Final Result   Impression:   Small right pleural effusion. There is possible pneumoperitoneum beneath the right hemidiaphragm. Recommend further evaluation or follow-up. This document has been electronically signed by: Marino Portillo MD on    10/29/2020 05:28 AM         XR CHEST PORTABLE   Final Result   Atelectasis or infiltrate at the right lung base. Small right pleural effusion. Support devices as above. This document has been electronically signed by: Dru Wyman MD on    10/28/2020 09:44 PM         CT ABDOMEN PELVIS WO CONTRAST Additional Contrast? None   Final Result   Moderate diffuse free air is nonspecific. A bowel origin is possible particularly peptic ulcer disease, though it also could relate to CAPD. Multiple small gallstones are noted. Mild pleural and parenchymal opacities at each lung base. **This report has been created using voice recognition software. It may contain minor errors which are inherent in voice recognition technology. **      Final report electronically signed by Dr. Jessica Gupta on 10/26/2020 6:24 PM      XR CHEST PORTABLE   Final Result   1. Very poor inflation of lungs. Moderate atelectasis/pneumonia along both hemidiaphragms. Small bilateral effusions. 2. Borderline heart size. Permanent pacemaker/defibrillator. CardioMems device projects over left hilum. 3. Overall appearance of chest has worsened since prior. **This report has been created using voice recognition software. It may contain minor errors which are inherent in voice recognition technology. **      Final report electronically signed by Dr. Catrachita Merchant on 10/26/2020 3:54 PM            Objective:   Vitals: BP (!) 74/39   Pulse 102   Temp 97.9 °F (36.6 °C) (Oral)   Resp 24   Ht 5' 6\" (1.676 m)   Wt 239 lb 6.4 oz (108.6 kg)   SpO2 97%   BMI 38.64 kg/m²    Wt Readings from Last 3 Encounters:   11/02/20 239 lb 6.4 oz (108.6 kg)   10/12/20 226 lb 2 oz (102.6 kg)   08/06/20 239 lb 4.8 oz (108.5 kg)      24HR INTAKE/OUTPUT:      Intake/Output Summary (Last 24 hours) at 11/2/2020 0934  Last data filed at 11/2/2020 0500  Gross per 24 hour   Intake 6000 ml   Output 4200 ml   Net 1800 ml       Constitutional: Comfortably asleep  Skin:normal with no rash or lesions. HEENT:Pupils are reactive . Throat is clear . Oral mucosa is moist   Neck:supple with no  carotid bruit  Cardiovascular:  S1, S2 without murmur or rubs   Respiratory: Clear in the anterior  Abdomen: Soft. Good bowel sounds. PD catheter is intact.   Ext: 1+  Neuro: Deferred    Electronically signed by Olaf Singh MD on 11/2/2020 at 9:34 AM

## 2020-11-02 NOTE — CARE COORDINATION
Update: plans return [address] E[facility] when medically cleared (BP 60/s last 24h; monitor), is precerted now, family refused Hospice, is CAPD client PTA  Electronically signed by Julian Bolton RN on 11/2/2020 at 3:27 PM

## 2020-11-02 NOTE — CARE COORDINATION
11/2/20, 9:17 AM EST    DISCHARGE PLANNING EVALUATION      EASTON received a call from Iris Rico with [phone], pre-cert has been approved. EASTON advised that there is a hospice consult now. They are still able to take, has Medicaid. EASTON updated the nurse and CM  11/2/20, 4:23 PM EST    DISCHARGE PLANNING EVALUATION      EASTON spoke to Bry Oscar with ECF to inform that patient does not want Hospice but that she won't be discharged today due to her blood pressure.

## 2020-11-02 NOTE — PROGRESS NOTES
Palliative care eval received to assess pt for hospice services. Chart review shows that hospice referral was completed, pt does not wish for hospice services at this time but states that she will attempt to be more compliant with treatment plan. Palliative care did not see but will remain available. Floor to notify PRN for needs.

## 2020-11-02 NOTE — PROGRESS NOTES
Hospitalist Progress Note    Patient: Tod Fuller      Unit/Bed:4K-02/002-A    YOB: 1934    MRN: [id number]       Acct: [de-identified]     PCP: Haley Castellon MD    Date of Admission: 10/26/2020    Assessment/Plan:    # Sepsis with Possible Abdominal/Peritoneal Source      11/2:  Pauly Leak and Vanc - DC'ed since Oct 31, per nephrology. Patient remains afebrile, trending temperature; leukocytosis resolving, no nidus of infection. Pancultures no growth thus far. Pt mentating well; BP readings likely erroneous d/t severe peripheral edema; mildly tachycardic, could 2/2 Dopamin. Mg/K WLNs w/ replacement. RN aware to wean off Dopamin. Tachycardia has improved with D/C of Dopamine. Monitor BP in PD. Ideally, disposition plan per hospice care. Hospice consulted-to evaluate for hospice placement d/t to multiple comorbidities and refusal of treatment. Patient may be unable to fully understand the information regarding hospice care. It would be reasonable for hospice care discussion directly with POA. # ESRD on CAPD  Dr. Stanton Chung following appreciate his assistance. Patient gets daily peritoneal dialysis with cytology and culture, so far negative other than some questionable gram-positive cocci on 10/26/20 for which patient finished a course of Vanc Zosyn. This morning patient is without evidence of peritonitis. 11/1:  BP still mildly hypotensive, Dopamine held d/t tachycardia. Patient is volume overloaded and edematous. Nephro re-started daily PD again, Droxidopa started 10/31/20 to aide BP. Continue to Monitor I/O, Daily BMP. # Chronic Hypotension  Patient has a history of hypotension. Has been trending ~70/40.  -Continue Midodrine; Reglan and Dopamine held. -Patient refusing treatment    # Compensated HFmrEF - Stable  Last echocardiogram done 6/2020. EF estimated at 45 to 50%  - Continue Lopressor as tolerated.   PD to begin today due to edema    # Mild Chronic Hyponatremia 2/2 ESRD  This is likely a chronic issue for this patient as she is unable to excrete free water. Corrects and fluctuates with PD. Daily BMP to monitor.  - Stable; at baseline (~631-729)    # Abdominal Discomfort likely 2/2 Gastroparesis - Resolved  Patient's complaints have been about early satiety and feeling that she stays full for a long time. This could likely be secondary to gastroparesis due to history of diabetes. Reglan started 10/27, held since 10/29 for Hypotension and prolonged QT. Continue to Monitor. This is likely secondary to over distention with fluid overload and gastroparesis. # History of Hypothyroidism   Continue Synthroid 25 mcg qd    # History of GERD  Continue Protonix 40 mg BID    # History of HIT  Avoid heparin use    # History of COPD  Duo nebs q6h PRN    # Disposition Planning  PT/OT/SW following; plan for ECF when pt stabilized. Chief Complaint: Abdominal Pain    Hospital Course:     79 y/o F PMHx ESRD (on CAPD), chronic hypotension, HFmrEF (EF 45-50% per ECHO 6/2020), non-ischemic cardiomyopathy / s/p BIV-ICD, NIDDM2, COPD (no baseline supplemental oxygen requirement), non-obstructive CAD, HLD, CVA/TIA, PE (previously on coumadin), dementia, hypothyroidism, former tobacco abuse and other PMHx as indicated below -- presents to [facility] from [facility] with a chief complaint of abdominal pain. Limited history provided by the patient (due to dementia, confusion).     Patient was admitted to [facility] from 10/7-10/12 for low BP / hospital course was grossly unremarkable (hypotension chronic / related to PD) / patient remained asymptomatic during her hospital stay and was discharged to SNF in stable condition. She returns today with reported complaints of lower abdominal pain / poorly-characterized / no additional information provided. No fevers reported at [facility].  Per nursing staff, no CAPD performed x 48 hours due to recent hypotension.     Patient appears to answer yes/no questions regarding symptoms reliably, but is otherwise confused and unable to provide additional history. She denies chest pain, cough, shortness of breath, n/v/d or other symptoms at this time. States abdominal pain has resolved and is requesting something to eat.     ED course: afebrile, hypotensive (90s/40s), tachycardic (100s-110s), tachypnic (RR low 20s) with SpO2 96% on RA. Workup revealed: Hgb 11.4 (MCV 94.3), WBC 14.9, Plt 234. Na 133, Cl 93, K 4.1, Cr 5.7 / BUN 29 / eGFR 7. BG 68. CO2: 26. AST/ALT nml. AlkP 148. Alb 1.9. Trop 0.123. TSH 4.6. LA 3.4 > 2.6. Procal 0.33. Prelim CAPD fluid studies:  / no organisms on gram stain. CXR: moderate atelectasis/PNA along both sarah-diaphragms, small bilateral effusions (overall appearance worsened since prior). CT A/P WO contrast: + non-specific, moderate diffuse free air / bowel origin possible, though could be related to CAPD / + mild pleural and parenchymal opacities at each lung base. Received 1750 cc IVF, midodrine x 1, vanc/zosyn x 1. Hospitalist service contacted for admission. Please see A&P for additional details. 10/27: Patient had reported some abdominal discomfort and stated cannot fully eat and feels early satiety. Reglan was added. No peritoneal dialysis done. Body fluid cell count with differential revealed no evidence of any SBP. Preliminary body fluid culture showed no growth however now has coagulase-negative Staphylococcus. This is likely a contaminant. 10/28: On the night of 10/28 and continuing into the morning of 10/29 patient's blood pressures started to decline and her MAP was consistently below 60. IV access could not be obtained due to poor venous access. Central line was placed on the evening of 10/28 in the right internal jugular vein. Patient received multiple 1000 cc boluses, however she did not stay very fluid responsive. Her midodrine doses were given with only temporary increases in blood pressure.   Intropin was started on the patient with a dose of 2.5 mcg/kg/min. This showed some improvement into her blood pressure throughout the day today 10/29. However we believe that her low blood pressures could be affected by her edematous state and inaccurate readings by the blood pressure cuff. Intropin drip was decreased to 1.5 mcg/kg/h with eventual cessation. Lactate drawn at noon on 10/29 revealed a level of 3, repeat lactic acid 3 hours later revealed a level of 2.1.    10/30: Patient states that she feels well. Has no issues with dizziness or feeling lightheaded. Denies any chest pain or palpitations. Throughout the day patient continued stating to feeling fine. Subjective (past 24 hours):     No acute events overnight. Patient continues to refuse treatment. We had an extensive conversation with the patient with both the attending physician and the nurse present, that hospice care approach was a reasonable option. Patient was amenable to the idea and was looking forward to consultation from hospice care, although I later learned that she was refusing their recommendations. Patient is oriented to place and time but it's not clear whether she actually understands. I contacted patient's family as well as by nurse yesterday, her niece Karen Koenig is a POA. I explained to her that we are recommending to pursue palliative hospice care consult due to multiple comorbidities and patient's refusal of treatment, POA understands and agrees but will not come to the hospital due to fear of debby Covid, which is understandable. We will continue updating family by phone.     Medications:  Reviewed    Infusion Medications    dextrose       Scheduled Medications    potassium chloride  40 mEq Oral Once    dianeal lo-nikko 1.5%  2,000 mL Intraperitoneal Q6H    Droxidopa  200 mg Oral TID    sodium chloride  500 mL Intravenous Once    potassium (CARDIAC) replacement protocol   Other RX Placeholder    gentamicin   Topical Daily  [Held by provider] metoclopramide  5 mg Oral TID AC    aspirin  81 mg Oral Daily    clopidogrel  75 mg Oral QPM    levothyroxine  25 mcg Oral Daily    megestrol  40 mg Oral BID    metoprolol tartrate  25 mg Oral Daily    midodrine  15 mg Oral TID WC    pantoprazole  40 mg Oral BID    pravastatin  40 mg Oral QPM    cyanocobalamin  1,000 mcg Oral QPM    folbee plus  1 tablet Oral Daily    ascorbic acid  500 mg Oral BID    sodium chloride flush  10 mL Intravenous 2 times per day     PRN Meds: trimethobenzamide, ipratropium-albuterol, sodium chloride flush, acetaminophen **OR** acetaminophen, glucose, dextrose, glucagon (rDNA), dextrose      Intake/Output Summary (Last 24 hours) at 11/2/2020 1443  Last data filed at 11/2/2020 0500  Gross per 24 hour   Intake 4000 ml   Output 4200 ml   Net -200 ml       Diet:  Dietary Nutrition Supplements: Diabetic Oral Supplement  DIET RENAL; Carb Control: 3 carb choices (45 gms)/meal; Daily Fluid Restriction: 1500 ml; Low Phosphorus    Exam:  BP (!) 68/39   Pulse 108   Temp 97 °F (36.1 °C) (Oral)   Resp 26   Ht 5' 6\" (1.676 m)   Wt 239 lb 6.4 oz (108.6 kg)   SpO2 100%   BMI 38.64 kg/m²     General appearance: No apparent distress, appears stated age and cooperative. HEENT: Pupils equal, round, and reactive to light. Conjunctivae/corneas clear. Neck: Supple, with full range of motion. No jugular venous distention. Trachea midline. Respiratory:  Normal respiratory effort. Clear to auscultation, bilaterally without Rales/Wheezes/Rhonchi. Cardiovascular: Regular rate and rhythm with normal Z1/D9, systolic murmur best heard over 2nd right intercostal space, rubs or gallops. Abdomen: Soft, non-tender, non-distended with normal bowel sounds. PD catheter noted without signs of inflammation. Musculoskeletal: passive and active ROM x 4 extremities.   Grossly edematous in both upper and lower extremities bilaterally, mild weeping present  Skin: Skin color, texture, turgor normal.  No rashes or lesions. Neurologic:  Neurovascularly intact without any focal sensory/motor deficits. Cranial nerves: II-XII intact, grossly non-focal.   Psychiatric: Alert and oriented, thought content appropriate, normal insight  Capillary Refill: Brisk,< 3 seconds   Peripheral Pulses: +2 palpable, equal bilaterally      Labs:   Recent Labs     10/31/20  0455 11/01/20  0630 11/02/20  0417   WBC 13.1* 16.0* 14.8*   HGB 8.9* 9.9* 9.3*   HCT 26.9* 30.0* 28.2*    238 250     Recent Labs     10/31/20  0455 11/01/20  0630 11/02/20  0417   * 131* 129*   K 4.1 3.6 3.3*   CL 98 96* 93*   CO2 21* 19* 21*   BUN 39* 39* 38*   CREATININE 7.6* 6.6* 6.8*   CALCIUM 7.5* 7.7* 7.4*     No results for input(s): AST, ALT, BILIDIR, BILITOT, ALKPHOS in the last 72 hours. No results for input(s): INR in the last 72 hours. No results for input(s): [place] in the last 72 hours. Microbiology:      Urinalysis:      Lab Results   Component Value Date    NITRU NEGATIVE 05/17/2020    WBCUA > 200 05/17/2020    BACTERIA MANY 05/17/2020    RBCUA 25-50 05/17/2020    BLOODU MODERATE 05/17/2020    SPECGRAV 1.019 03/26/2019    GLUCOSEU NEGATIVE 05/17/2020       Radiology:  XR CHEST PORTABLE   Final Result   Impression:   Small right pleural effusion. There is possible pneumoperitoneum beneath the right hemidiaphragm. Recommend further evaluation or follow-up. This document has been electronically signed by: Foster Solis MD on    10/29/2020 05:28 AM         XR CHEST PORTABLE   Final Result   Atelectasis or infiltrate at the right lung base. Small right pleural effusion. Support devices as above. This document has been electronically signed by: Alexey Fay MD on    10/28/2020 09:44 PM         CT ABDOMEN PELVIS WO CONTRAST Additional Contrast? None   Final Result   Moderate diffuse free air is nonspecific.  A bowel origin is possible particularly peptic ulcer disease, though it also could relate to CAPD. Multiple small gallstones are noted. Mild pleural and parenchymal opacities at each lung base. **This report has been created using voice recognition software. It may contain minor errors which are inherent in voice recognition technology. **      Final report electronically signed by Dr. Sam Kam on 10/26/2020 6:24 PM      XR CHEST PORTABLE   Final Result   1. Very poor inflation of lungs. Moderate atelectasis/pneumonia along both hemidiaphragms. Small bilateral effusions. 2. Borderline heart size. Permanent pacemaker/defibrillator. CardioMems device projects over left hilum. 3. Overall appearance of chest has worsened since prior. **This report has been created using voice recognition software. It may contain minor errors which are inherent in voice recognition technology. **      Final report electronically signed by Dr. Violeta Serrano on 10/26/2020 3:54 PM        DVT prophylaxis: [] Lovenox                                 [x] SCDs                                 [] SQ Heparin                                 [] Encourage ambulation           [] Already on Anticoagulation     Code Status: Limited    Tele:   [x] yes             [] no    Electronically signed by Candi Story MD on 11/2/2020 at 2:43 PM

## 2020-11-03 NOTE — PROGRESS NOTES
Despite patient taking oral medication and receiving IV bolus this morning, BP remaining low and patient with weight increased by 20lbs, edema. Went to room and discussed with Edouard Allred that despite medication BP remaining low and at some point may need to focus on comfort- dialysis may not be option if BP does not increase. Discussed with her that providers are going to attempt to \"determine possible etiology of ongoing hypotension\" but they may find that the cause may be something unable to be fixed. Voiced understanding and fearful of possible findings. Asked her if they would find that unable to continue with dialysis, if would wish to focus on comfort with hospice- voiced that she would. Call placed to patient Chandler Dill and discussed with her that despite taking medication that BP remaining low and have been unable to give dialysis. Updated her of planned echo to see if possible etiology that may be causing hypotension, discussed with her that may be possibility that will be unable to increase BP and will not be able to continue with dialysis. Voiced understanding and told nurse that patient has lived a \"nice long life and does not wish for patient to suffer any longer\". Did tell nurse that she has noted periods of confusion the last month. At this time she voiced that, thoughts are to see results of Echo and see how things go. Asked if she wished to speak with Edouard Allred - phone given to patient to talk with POA. Let patient talk to [address] [place] [address] on phone and at this time will await results of echo. Noted confusion of patient during conversation. Discussed with POA about severity of patient multiple comorbidities and after results of echo, feels that more than likely will wish to move forward with hospice. Updated Dr. Jeanine Montalvo who has discussed with patient that time to focus on comfort. Plan to discuss with patient in morning 11.04.2020 again.

## 2020-11-03 NOTE — PLAN OF CARE
Problem: Pain:  Goal: Pain level will decrease  Description: Pain level will decrease  Outcome: Ongoing  Note: Pain Assessment: 0-10  Pain Level: 0   Patient's Stated Pain Goal: No pain   Is pain goal met at this time? Yes     Non-Pharmaceutical Pain Intervention(s): Repositioned, Rest      Problem: Neurological  Goal: Maximum potential motor/sensory/cognitive function  Outcome: Ongoing  Note: Patient A&Ox4 with episodes of confusion/forgetfulness. Continues to have full range of motion of upper extremities and limit range of motion of lower extremities all with weakness. Able to follow commands without difficulties or hesitation. Problem: Cardiovascular  Goal: Hemodynamic stability  Outcome: Ongoing  Note: BP continues to be hypotensive with episodes of dizziness while repositioning. Problem: Respiratory  Goal: No pulmonary complications  Outcome: Ongoing  Note: Oxygen saturation remains stable, above 95% on room air. Lungs diminished throughout. Problem: GI  Goal: No bowel complications  Outcome: Ongoing  Note: Bowel sounds active x 4 quadrants. Episodes of loose brown stool this shift. Problem:   Goal: Adequate urinary output  Outcome: Ongoing  Note: Patient has minimal urine output. Problem: Nutrition  Goal: Optimal nutrition therapy  Outcome: Ongoing  Note: Patient able to tolerated sips of water but unable to tolerate food at this time without developing nausea. Very minimal vomiting at this time. Problem: Discharge Planning:  Goal: Discharged to appropriate level of care  Description: Discharged to appropriate level of care  Outcome: Ongoing  Note: Patient to discharge back to nursing care facility when stable. Problem: Activity:  Goal: Fatigue will decrease  Description: Fatigue will decrease  Outcome: Ongoing  Note: Continues to c/o of weakness and dizziness with repositioning. Patient a total care assist with bed mobility and hygiene needs.       Problem: Fluid Volume:  Goal: Will show no signs or symptoms of fluid imbalance  Description: Will show no signs or symptoms of fluid imbalance  Outcome: Ongoing  Note: Able to tolerate sips of water only, at this time. Problem: Physical Regulation:  Goal: Ability to maintain clinical measurements within normal limits will improve  Description: Ability to maintain clinical measurements within normal limits will improve  Outcome: Ongoing     Problem: Skin Integrity:  Goal: Will show no infection signs and symptoms  Description: Will show no infection signs and symptoms  Outcome: Ongoing  Note: No s/s of infection at this time. Temp. WNL. Continued monitoring of labs      Problem: Serum Glucose Level - Abnormal:  Goal: Ability to maintain appropriate glucose levels has stabilized  Description: Ability to maintain appropriate glucose levels has stabilized  Outcome: Ongoing   Care plan reviewed with patient. Patient verbalize understanding of the plan of care and contribute to goal setting.

## 2020-11-03 NOTE — FLOWSHEET NOTE
11/03/20 0956   Encounter Summary   Services provided to: Patient   Referral/Consult From: Hospice   Support System Unknown   Continue Visiting Yes  (11/3)   Complexity of Encounter Low   Length of Encounter 15 minutes   Spiritual/Gnosticist   Type Spiritual support   Assessment Approachable   Intervention Sustaining presence/ Ministry of presence   Outcome Expressed gratitude   During my contact with the 80 yr old hospice patient, she was in bed and wasn't very talkative. The pt was asking to see a . He prognosis was poor and was struggling with end of life issues. Plan: I will inform Fr. Nayan Blanton the on call Jorge cardona.

## 2020-11-03 NOTE — PLAN OF CARE
Dr. Yordan Connors spoke with the patient regarding her wishes and goals of care. She was alert to time place and setting. Patient expressed wish for hospice care. Hospice services were notified we will continue to follow. At this time we will withhold any aggressive life-saving measures and pursue comfort care and palliative approach.     Electronically signed by Cami Berry MD on 11/3/2020

## 2020-11-03 NOTE — PLAN OF CARE
Was paged by RN around 0400 hours about patients blood pressures running low with a MAP of 41 mmHg. Patient sleeping comfortably on my assessment. Patients history and hospital course are well known to me, as I was part of the treatment team taking care of her last month. She is chronically hypotensive with her MAP usually in this low range. I have ordered 500 cc of NS to be given slowly over 3 hours. Patient has received multiple boluses of NS during her hospital stay with only temporary improvements in BP. Will update the day team about this and will continue with current treatment plan as per last IM resident progress note.

## 2020-11-03 NOTE — PROGRESS NOTES
Hospitalist Progress Note    Patient: Tod Fuller      Unit/Bed:4K-02/002-A    YOB: 1934    MRN: [id number]       Acct: [de-identified]     PCP: Haley Castellon MD    Date of Admission: 10/26/2020    Assessment/Plan:    # ESRD on CAPD  Dr. Stanton Chung and Brian Brand APRN-CNP/nephrology following appreciate their assistance, had a discussion with Brian Brand regarding plans of care today. Patient typically gets daily peritoneal dialysis with cytology and culture, so far negative other than some questionable gram-positive cocci on 10/26/20 for which patient finished a course of Vanc Zosyn. This morning patient is without evidence of peritonitis.  -Patient is not a good candidate for dialysis due to low blood pressure  -Echocardiogram pending per nephrology      11/1:  BP still hypotensive, Dopamine held d/t tachycardia. Patient is volume overloaded and edematous. Nephro re-started daily PD again, Droxidopa started 10/31/20 to aide BP. Continue to Monitor I/O, Daily BMP.    -> Hospice consulted-to evaluate for hospice placement d/t to multiple comorbidities and refusal of treatment. Patient may be unable to fully understand the information regarding hospice care. It would be reasonable for hospice care discussion directly with POA \"Jeyson\". -> Palliative team/Brenda Foster RN on board, appreciate her assistance, is in direct contact with POA to establish patient wishes and goals of treatment.  -> Patient was offered spiritual care support, I gladly obliged. Spiritual care consulted.  -> If POA not agreeable to hospice, nursing home will be contacted and forewarned that low blood pressure is considered baseline for this patient. # Chronic Hypotension while on droxidopa and midodrine  Patient has a history of hypotension. Has been trending ~70/40.  -Continue Midodrine; Reglan and Dopamine held.     -Patient refusing treatment    # Sepsis with Possible Abdominal/Peritoneal Source   Kashif and Tony Zuñiga DC'ed since Oct 31, per nephrology. Patient remains afebrile, trending temperature; leukocytosis resolving, no nidus of infection. Pancultures no growth thus far. Pt mentating well; BP readings likely erroneous d/t severe peripheral edema; mildly tachycardic, could 2/2 Dopamin. Mg/K WLNs w/ replacement. RN aware to wean off Dopamin. Tachycardia has improved with D/C of Dopamine. Monitor BP in PD. Ideally, disposition plan per hospice care. # Compensated HFmrEF - Stable  Last echocardiogram done 6/2020. EF estimated at 45 to 50%  - Continue Lopressor as tolerated. PD to begin today due to edema    # Mild Chronic Hyponatremia 2/2 ESRD  This is likely a chronic issue for this patient as she is unable to excrete free water. Corrects and fluctuates with PD. Daily BMP to monitor.  - Stable; at baseline (~[phone])    # Hypokalemia  -Potassium replacement protocol    # Abdominal Discomfort likely 2/2 Gastroparesis - Resolved  Patient's complaints have been about early satiety and feeling that she stays full for a long time. This could likely be secondary to gastroparesis due to history of diabetes. Reglan started 10/27, held since 10/29 for Hypotension and prolonged QT. Continue to Monitor. This is likely secondary to over distention with fluid overload and gastroparesis. # History of Hypothyroidism   Continue Synthroid 25 mcg qd    # History of GERD  Continue Protonix 40 mg BID    # History of HIT  Avoid heparin use    # History of COPD  Duo nebs q6h PRN    # Disposition Planning  PT/OT/SW following; plan for ECF when pt stabilized.      Chief Complaint: Abdominal Pain    Hospital Course:     79 y/o F PMHx ESRD (on CAPD), chronic hypotension, HFmrEF (EF 45-50% per ECHO 6/2020), non-ischemic cardiomyopathy / s/p BIV-ICD, NIDDM2, COPD (no baseline supplemental oxygen requirement), non-obstructive CAD, HLD, CVA/TIA, PE (previously on coumadin), dementia, hypothyroidism, former tobacco done.  Body fluid cell count with differential revealed no evidence of any SBP. Preliminary body fluid culture showed no growth however now has coagulase-negative Staphylococcus. This is likely a contaminant. 10/28: On the night of 10/28 and continuing into the morning of 10/29 patient's blood pressures started to decline and her MAP was consistently below 60. IV access could not be obtained due to poor venous access. Central line was placed on the evening of 10/28 in the right internal jugular vein. Patient received multiple 1000 cc boluses, however she did not stay very fluid responsive. Her midodrine doses were given with only temporary increases in blood pressure. Intropin was started on the patient with a dose of 2.5 mcg/kg/min. This showed some improvement into her blood pressure throughout the day today 10/29. However we believe that her low blood pressures could be affected by her edematous state and inaccurate readings by the blood pressure cuff. Intropin drip was decreased to 1.5 mcg/kg/h with eventual cessation. Lactate drawn at noon on 10/29 revealed a level of 3, repeat lactic acid 3 hours later revealed a level of 2.1.    10/30: Patient states that she feels well. Has no issues with dizziness or feeling lightheaded. Denies any chest pain or palpitations. Throughout the day patient continued stating to feeling fine. Subjective (past 24 hours): Internal medicine resident was paged at night for low MAP, patient is chronically hypotensive and it wasn't unusual for this patient with medical noncompliance to be running in low MAP of ~39-52. Palliative care was able to reach POA by phone who expressed understanding that due to hypotension and medical noncompliance continued dialysis may no longer be an option. POA expressed that the patient has lived a \"nice long life and does not wish for her to suffer any longer\". POA was able to speak to the patient on the phone.     We had an cooperative. HEENT: Pupils equal, round, and reactive to light. Conjunctivae/corneas clear. Neck: Supple, with full range of motion. No jugular venous distention. Trachea midline. Respiratory:  Normal respiratory effort. Clear to auscultation, bilaterally without Rales/Wheezes/Rhonchi. Cardiovascular: Regular rate and rhythm with normal G9/V5, systolic murmur best heard over 2nd right intercostal space, rubs or gallops. Abdomen: Soft, non-tender, non-distended with normal bowel sounds. PD catheter noted without signs of inflammation. Musculoskeletal: passive and active ROM x 4 extremities. Grossly edematous in both upper and lower extremities bilaterally, mild weeping present  Skin: Skin color, texture, turgor normal.  No rashes or lesions. Neurologic:  Neurovascularly intact without any focal sensory/motor deficits. Cranial nerves: II-XII intact, grossly non-focal.   Psychiatric: Alert and oriented, thought content appropriate, normal insight  Capillary Refill: Brisk,< 3 seconds   Peripheral Pulses: +2 palpable, equal bilaterally      Labs:   Recent Labs     11/01/20  0630 11/02/20 0417 11/03/20  0452   WBC 16.0* 14.8* 12.9*   HGB 9.9* 9.3* 9.5*   HCT 30.0* 28.2* 29.1*    250 267     Recent Labs     11/01/20  0630 11/02/20 0417 11/03/20  0452   * 129* 132*   K 3.6 3.3* 3.4*   CL 96* 93* 94*   CO2 19* 21* 21*   BUN 39* 38* 41*   CREATININE 6.6* 6.8* 6.7*   CALCIUM 7.7* 7.4* 7.7*     No results for input(s): AST, ALT, BILIDIR, BILITOT, ALKPHOS in the last 72 hours. No results for input(s): INR in the last 72 hours. No results for input(s): Ruby Beat in the last 72 hours.     Microbiology:      Urinalysis:      Lab Results   Component Value Date    NITRU NEGATIVE 05/17/2020    WBCUA > 200 05/17/2020    BACTERIA MANY 05/17/2020    RBCUA 25-50 05/17/2020    BLOODU MODERATE 05/17/2020    SPECGRAV 1.019 03/26/2019    GLUCOSEU NEGATIVE 05/17/2020       Radiology:  XR CHEST PORTABLE   Final Result   Impression:   Small right pleural effusion. There is possible pneumoperitoneum beneath the right hemidiaphragm. Recommend further evaluation or follow-up. This document has been electronically signed by: Martin Licea MD on    10/29/2020 05:28 AM         XR CHEST PORTABLE   Final Result   Atelectasis or infiltrate at the right lung base. Small right pleural effusion. Support devices as above. This document has been electronically signed by: Antony Sagastume MD on    10/28/2020 09:44 PM         CT ABDOMEN PELVIS WO CONTRAST Additional Contrast? None   Final Result   Moderate diffuse free air is nonspecific. A bowel origin is possible particularly peptic ulcer disease, though it also could relate to CAPD. Multiple small gallstones are noted. Mild pleural and parenchymal opacities at each lung base. **This report has been created using voice recognition software. It may contain minor errors which are inherent in voice recognition technology. **      Final report electronically signed by Dr. Christopher Almonte on 10/26/2020 6:24 PM      XR CHEST PORTABLE   Final Result   1. Very poor inflation of lungs. Moderate atelectasis/pneumonia along both hemidiaphragms. Small bilateral effusions. 2. Borderline heart size. Permanent pacemaker/defibrillator. CardioMems device projects over left hilum. 3. Overall appearance of chest has worsened since prior. **This report has been created using voice recognition software. It may contain minor errors which are inherent in voice recognition technology. **      Final report electronically signed by Dr. Kate Domingo on 10/26/2020 3:54 PM        DVT prophylaxis: [] Lovenox                                 [x] SCDs                                 [] SQ Heparin                                 [] Encourage ambulation           [] Already on Anticoagulation     Code Status: Limited    Tele:   [x] yes             [] no    Electronically signed by Elise Patterson MD on 11/3/2020 at 11:30 AM

## 2020-11-03 NOTE — FLOWSHEET NOTE
11/03/20 0359   Provider Notification   Reason for Communication Evaluate   Provider Name Dr. Dean Butler   Provider Notification Resident   Method of Communication Secure Message   Response At bedside   Notification Time [phone]- Updated provider on BP overnight. Provider up to floor to see patient. See orders.

## 2020-11-03 NOTE — FLOWSHEET NOTE
Hospice nurse called and requested a visit and prayer for this pt. She was having abdominal issues. She does not wanted to talk about her issues but only wanted prayer. Prayer was appreciated. 11/03/20 1204   Encounter Summary   Services provided to: Patient   Referral/Consult From: Hospice   Continue Visiting Yes  (11/3)   Complexity of Encounter Low   Length of Encounter 15 minutes   Routine   Type Follow up   Assessment Approachable;Calm   Intervention [place];Prayer;Nurtured hope; Active listening;Empowerment;Sustaining presence/ Ministry of presence   Outcome Acceptance;Expressed gratitude;Encouraged; Hopeful

## 2020-11-03 NOTE — PLAN OF CARE
Problem: Nutrition  Goal: Optimal nutrition therapy  Outcome: Ongoing   Nutrition Problem #1: Increased nutrient needs  Intervention: Food and/or Nutrient Delivery: Continue Oral Nutrition Supplement(MD to advise if diet can be liberalized due to ongoing poor intake)  Nutritional Goals: Pt. will tolerate and consume 75% or more of meals to promote wound healing during LOS.

## 2020-11-03 NOTE — CARE COORDINATION
Update: Palliative Care/Hospice meeting in am re: possible Hospice is planned, plans ECF return when medically cleared (hypotensive with CAPD being held); collaborated with Chan Albarran  Electronically signed by Jonatan Aguilar RN on 11/3/2020 at 2:52 PM

## 2020-11-03 NOTE — CARE COORDINATION
11/3/20, 2:54 PM EST    DISCHARGE PLANNING EVALUATION      SW spoke to Garrett Melendez with ECF, pre-cert is good for 7 days, approved yesterday

## 2020-11-03 NOTE — PROGRESS NOTES
Comprehensive Nutrition Assessment    Type and Reason for Visit:  Reassess    Nutrition Recommendations/Plan:   MD to advise if diet can be liberalized due to ongoing poor oral intake. Continue Glucerna TID. Continue Folbee Plus  MD to advise re: megace dose to help improve po. Nutrition Assessment:     Pt. with no improvement from a nutritional standpoint AEB inadequate oral intake. Remains at risk for further nutritional compromise r/t increased nutrient needs for wound healing, known losses from dialysis, ESRD and underlying medical condition (DM II, breast cancer, COPD, CVA). Nutrition recommendations/interventions as per above. Malnutrition Assessment:  Malnutrition Status:  Insufficient data    Context:  Chronic Illness     Findings of the 6 clinical characteristics of malnutrition:  Energy Intake:  (50% po or less 7 or more days)  Weight Loss:  No significant weight loss     Body Fat Loss:  Unable to assess     Muscle Mass Loss:  Unable to assess    Fluid Accumulation:  Unable to assess     Strength:  Not Performed    Estimated Daily Nutrient Needs:  Energy (kcal):  5004-7308 kcals (15-18); Weight Used for Energy Requirements:  (104 kgm 10/28)     Protein (g):  71-89 gm (1.2-1.5); Weight Used for Protein Requirements:  Ideal(59 kgm)          Nutrition Related Findings:  Attempted to see pt x 2 & she was sleeping. RN mentions pt having chewing difficulty & requested we send soft texture foods with ground meats & sauce. Labs: (11/3) Na 132, K+ 3.4, BUN 41, Creatinine 6.7, Glucose 143, chemsticks 146-177, Hemoglobin 9.5. Bm x 1 (11/3). meds: Dianeal lo nikko , folbee plus, megace 40 milligrams twice/day. Intake continues to be <50%. PD was on hold due to hypotension noted.       Wounds:  (stage II coccyx mid, stage II buttocks & thigh)       Current Nutrition Therapies:    Dietary Nutrition Supplements: Diabetic Oral Supplement  DIET CARB CONTROL; Carb Control: 3 carb choices (45 gms)/meal; Daily Fluid Restriction: 1500 ml    Anthropometric Measures:  · Height: 5' 6\" (167.6 cm)  · Current Body Weight: 239 lb 6.4 oz (108.6 kg)(+ 2 RUE, LUE, RLE, LLE, perineal edema)   · Admission Body Weight: 219 lb 8 oz (99.6 kg)(10/26, +2 edema)    · Usual Body Weight: (per EMR: 11/13/19: 277# 6.4 oz, 4/25/20: 248# 3.2 oz, 6/2/20: 218# 14.4 oz)     · Ideal Body Weight: 130 lbs;BMI: 38.7  ·     · BMI Categories: Obese Class 2 (BMI 35.0 -39.9)       Nutrition Diagnosis:   · Increased nutrient needs related to increase demand for energy/nutrients(wound healing, known losses from dialysis) as evidenced by wounds      Nutrition Interventions:   Food and/or Nutrient Delivery:  Continue Oral Nutrition Supplement(MD to advise if diet can be liberalized due to ongoing poor intake)  Nutrition Education/Counseling:  (education not appropriate)   Coordination of Nutrition Care:  Continue to monitor while inpatient    Goals:  Pt. will tolerate and consume 75% or more of meals to promote wound healing during LOS. Nutrition Monitoring and Evaluation:   Behavioral-Environmental Outcomes:  None Identified   Food/Nutrient Intake Outcomes:  Diet Advancement/Tolerance, Food and Nutrient Intake, Supplement Intake  Physical Signs/Symptoms Outcomes:  Biochemical Data, GI Status, Fluid Status or Edema, Nutrition Focused Physical Findings, Skin, Weight     Discharge Planning:     Too soon to determine     Electronically signed by Minal Perez RD, LD on 11/3/20 at 6:19 PM EST    Contact: [phone]

## 2020-11-03 NOTE — PROGRESS NOTES
Nephrology Progress Note    Patient - Giacomo Rudd   MRN -  [id number]   Acct # - [de-identified]      - 1934    80 y.o. Admit Date: 10/26/2020  Hospital Day: 8  Location: --A  Date of evaluation -  11/3/2020    Subjective:   CC: abd pain  Denies shortness of breath on Room air   Nauseated  BP as low as 51/32, 500 ml fluid bolus  Wt up 20 lbs, edema  Peritoneal dialysis on hold lasat 24 hr due to hypotension  BP Range: Systolic (14IBN), NMP:63 , Min:51 , IQ      Diastolic (83AVL), JQQ:68, Min:26, Max:40    Objective:   VITALS:  BP (!)    Pulse 106   Temp 97.4 °F (36.3 °C) (Oral)   Resp 20   Ht 5' 6\" (1.676 m)   Wt 239 lb 6.4 oz (108.6 kg)   SpO2 98%   BMI 38.64 kg/m²    Patient Vitals for the past 24 hrs:   BP Temp Temp src Pulse Resp SpO2   20 0356 (!) 71/ -- -- -- -- --   20 0340 (!) 71/29 97.4 °F (36.3 °C) Oral 106 20 98 %   20 0015 (!) 66/40 97.7 °F (36.5 °C) Axillary 107 22 97 %   206 (!) 51/32 97.5 °F (36.4 °C) Oral 110 22 97 %   20 1605 (!) 74/38 97.7 °F (36.5 °C) Axillary 112 22 100 %   20 1338 -- -- -- -- -- 100 %   20 1115 (!) 68/39 97 °F (36.1 °C) Oral 108 26 100 %   20 0810 (!) 74/39 97.9 °F (36.6 °C) Oral 102 24 97 %       Intake/Output Summary (Last 24 hours) at 11/3/2020 0741  Last data filed at 11/3/2020 0340  Gross per 24 hour   Intake 410 ml   Output 100 ml   Net 310 ml       Admission weight: 226 lb (102.5 kg)  Patient Vitals for the past 96 hrs (Last 3 readings):   Weight   20 0309 239 lb 6.4 oz (108.6 kg)   20 0500 223 lb 11.2 oz (101.5 kg)     Body mass index is 38.64 kg/m². EXAM:  CONSTITUTIONAL:  No acute distress. Pleasant  HEENT:  Head is normocephalic, Extraocular movement intact. Neck is supple. Voice is clear. CARDIOVASCULAR:  S1, S2  regular rate and rhythm. RESPIRATORY: Clear to ausculation bilaterally. Equal breath sounds. No wheezes.  No shortness of breath noted at rest.  ABDOMEN: soft, non tender. Peritoneal dialysis cath site clear  NEUROLOGICAL: Patient is alert and oriented to person, place. Recent and remote memory partially intact. Thought is coherant. SKIN: no rash, No significant bruises on exposed surfaces  MUSCULOSKELETAL: Movement is coordinated. Moves all extremities   EXTREMITIES: Distal lower extremity temp is warm, 2-3+  general body edema. PSYCHIATRIC: mood and affect appropriate.     Medications:   Med reviewed  Scheduled Meds:   sodium chloride  500 mL Intravenous Once    potassium chloride  40 mEq Oral Once    dianeal lo-nikko 1.5%  2,000 mL Intraperitoneal Q6H    Droxidopa  200 mg Oral TID    sodium chloride  500 mL Intravenous Once    potassium (CARDIAC) replacement protocol   Other RX Placeholder    gentamicin   Topical Daily    [Held by provider] metoclopramide  5 mg Oral TID AC    aspirin  81 mg Oral Daily    clopidogrel  75 mg Oral QPM    levothyroxine  25 mcg Oral Daily    megestrol  40 mg Oral BID    metoprolol tartrate  25 mg Oral Daily    midodrine  15 mg Oral TID WC    pantoprazole  40 mg Oral BID    pravastatin  40 mg Oral QPM    cyanocobalamin  1,000 mcg Oral QPM    folbee plus  1 tablet Oral Daily    ascorbic acid  500 mg Oral BID    sodium chloride flush  10 mL Intravenous 2 times per day     PRN Meds trimethobenzamide, ipratropium-albuterol, sodium chloride flush, acetaminophen **OR** acetaminophen, glucose, dextrose, glucagon (rDNA), dextrose   Labs:   Labs reviewed  Lab Results   Component Value Date    ANIONGAP 17.0 (H) 11/03/2020     Recent Labs     11/01/20 0630 11/02/20 0417 11/03/20 0452   WBC 16.0* 14.8* 12.9*   RBC 3.28* 3.09* 3.19*   HGB 9.9* 9.3* 9.5*   HCT 30.0* 28.2* 29.1*   MCV 91.5 91.3 91.2   MCH 30.2 30.1 29.8    250 267       Recent Labs     11/01/20 0630 11/02/20 0417 11/03/20 0452   * 129* 132*   K 3.6 3.3* 3.4*   CL 96* 93* 94*   CO2 19* 21* 21*   BUN 39* 38* 41*   CREATININE 6.6* 6.8* 6.7* LABGLOM 6* 6* 6*   GLUCOSE 197* 160* 143*   MG 1.8 1.7 1.7   CALCIUM 7.7* 7.4* 7.7*      Summary 6/8/2020   Technically difficult examination. Technically limited study. Left Ventricular size is Decreased. Moderate concentric left ventricular hypertrophy. There were no regional wall motion abnormalities. Ejection fraction is visually estimated at 70%. ASSESSMENT:  1. End Stage Renal Disease 2nd to diabetic and hypertensive nephrosclerosis. Nausea possibly 2nd to uremia. 20 lb wt gain. Peritoneal dialysis on hold due to significant hypotension. Check echo to determine possible etiology of ongoing hypotension, not improved with high dose Midodrine and Droxidopa. Pt refused Hospice care 11/2. Discussed with Dr Krystal Mckeon and Dr Pramod Zamora. 2. Hyponatremia 2nd to End Stage Renal Disease and decreased ability to excrete free water  3. Hypokalemia, replace with 40 meq PO x 1.  4. Metabolic acidosis 2nd to ESRD  5. Anemia of chronic disease, S/P Aranesp 10/31  6. Chronic hypotension. Midodrine 15 mg 3x/d. Continue Droxidopa for probable neurogenic hypotension. 7. 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 Altered Mental Status Resolving  9. Secondary hyperparathyroidism of renal origin  10. Deconditioning    BMP in AM  Active Problems:    Peritoneal dialysis status (HCC)    Abdominal pain    Hypotension    ESRD on peritoneal dialysis [facility])  Resolved Problems:    * No resolved hospital problems.  Randy Knight, APRN - CNP 7:41 AM 11/3/2020

## 2020-11-04 NOTE — PROGRESS NOTES
Updated by primary nurse Keiry Oliver RN that Dr. Millicent Ziegler called this morning and concerned about plan of care- pt had told nephrology that wishing to do all treatments and if this is case will need pressor support with transfer. Discussed with her that patient had voice wish for hospice care last evening and patient has vacuolated and do not wish to sway one direction or another but wish for patient to make decision on own with all information shared with her and patient IVETT Benítez(who will support patient decision). Call placed to Veronika Bain RN with palliative care and shared patient case information and asked that she assist with patient plan of care, to be based off patient wishes. Will see this morning. Updated primary nurse Brittney Zaman of palliative care nurse to visit.

## 2020-11-04 NOTE — PROGRESS NOTES
Hospitalist Progress Note    Patient: Socorro Parmar      Unit/Bed:4K-02/002-A    YOB: 1934    MRN: [id number]       Acct: [de-identified]     PCP: Lula Lockhart MD    Date of Admission: 10/26/2020    Assessment/Plan:    # ESRD on CAPD  Dr. Navi Laughlin and Drinda Romberg APRN-CNP/nephrology following appreciate their assistance, had a discussion with Drinda Romberg regarding plans of care today. Patient typically gets daily peritoneal dialysis with cytology and culture, so far negative other than some questionable gram-positive cocci on 10/26/20 for which patient finished a course of Vanc Zosyn. This morning patient is without evidence of peritonitis.  -Patient is not a good candidate for dialysis due to low blood pressure  -Echocardiogram pending per nephrology      11/1:  BP still hypotensive, Dopamine held d/t tachycardia. Patient is volume overloaded and edematous. Nephro re-started daily PD again, Droxidopa started 10/31/20 to aide BP. Continue to Monitor I/O, Daily BMP.    -> Hospice consulted-to evaluate for hospice placement d/t to multiple comorbidities and refusal of treatment. Patient may be unable to fully understand the information regarding hospice care. It would be reasonable for hospice care discussion directly with POA \"Jeyson\". -> Palliative team/Brenda Lyons RN on board, appreciate her assistance, is in direct contact with POA to establish patient wishes and goals of treatment.  -> Patient was offered spiritual care support, I gladly obliged. Spiritual care consulted.  -> If POA not agreeable to hospice, nursing home will be contacted and forewarned that low blood pressure is considered baseline for this patient. # Chronic Hypotension while on droxidopa and midodrine  Patient has a history of hypotension. Has been trending ~70/40.  -Continue Midodrine; Reglan and Dopamine held.     -Patient refusing treatment    # Sepsis with Possible Abdominal/Peritoneal Source   Kashif and Tony Zuñiga DC'ed since Oct 31, per nephrology. Patient remains afebrile, trending temperature; leukocytosis resolving, no nidus of infection. Pancultures no growth thus far. Pt mentating well; BP readings likely erroneous d/t severe peripheral edema; mildly tachycardic, could 2/2 Dopamin. Mg/K WLNs w/ replacement. RN aware to wean off Dopamin. Tachycardia has improved with D/C of Dopamine. Monitor BP in PD. Ideally, disposition plan per hospice care. # Compensated HFmrEF - Stable  Last echocardiogram done 6/2020. EF estimated at 45 to 50%  - Continue Lopressor as tolerated. PD to begin today due to edema    # Mild Chronic Hyponatremia 2/2 ESRD  This is likely a chronic issue for this patient as she is unable to excrete free water. Corrects and fluctuates with PD. Daily BMP to monitor.  - Stable; at baseline (~601-778)    # Hypokalemia  -Potassium replacement protocol    # Abdominal Discomfort likely 2/2 Gastroparesis - Resolved  Patient's complaints have been about early satiety and feeling that she stays full for a long time. This could likely be secondary to gastroparesis due to history of diabetes. Reglan started 10/27, held since 10/29 for Hypotension and prolonged QT. Continue to Monitor. This is likely secondary to over distention with fluid overload and gastroparesis. # History of Hypothyroidism   Continue Synthroid 25 mcg qd    # History of GERD  Continue Protonix 40 mg BID    # History of HIT  Avoid heparin use    # History of COPD  Duo nebs q6h PRN    # Disposition Planning  PT/OT/SW following; plan for ECF when pt stabilized.      Chief Complaint: Abdominal Pain    Hospital Course:     79 y/o F PMHx ESRD (on CAPD), chronic hypotension, HFmrEF (EF 45-50% per ECHO 6/2020), non-ischemic cardiomyopathy / s/p BIV-ICD, NIDDM2, COPD (no baseline supplemental oxygen requirement), non-obstructive CAD, HLD, CVA/TIA, PE (previously on coumadin), dementia, hypothyroidism, former tobacco abuse and other PMHx as indicated below -- presents to [facility] from [facility] with a chief complaint of abdominal pain. Limited history provided by the patient (due to dementia, confusion).     Patient was admitted to [facility] from 10/7-10/12 for low BP / hospital course was grossly unremarkable (hypotension chronic / related to PD) / patient remained asymptomatic during her hospital stay and was discharged to SNF in stable condition. She returns today with reported complaints of lower abdominal pain / poorly-characterized / no additional information provided. No fevers reported at [facility]. Per nursing staff, no CAPD performed x 48 hours due to recent hypotension.     Patient appears to answer yes/no questions regarding symptoms reliably, but is otherwise confused and unable to provide additional history. She denies chest pain, cough, shortness of breath, n/v/d or other symptoms at this time. States abdominal pain has resolved and is requesting something to eat.     ED course: afebrile, hypotensive (90s/40s), tachycardic (100s-110s), tachypnic (RR low 20s) with SpO2 96% on RA. Workup revealed: Hgb 11.4 (MCV 94.3), WBC 14.9, Plt 234. Na 133, Cl 93, K 4.1, Cr 5.7 / BUN 29 / eGFR 7. BG 68. CO2: 26. AST/ALT nml. AlkP 148. Alb 1.9. Trop 0.123. TSH 4.6. LA 3.4 > 2.6. Procal 0.33. Prelim CAPD fluid studies:  / no organisms on gram stain. CXR: moderate atelectasis/PNA along both sarah-diaphragms, small bilateral effusions (overall appearance worsened since prior). CT A/P WO contrast: + non-specific, moderate diffuse free air / bowel origin possible, though could be related to CAPD / + mild pleural and parenchymal opacities at each lung base. Received 1750 cc IVF, midodrine x 1, vanc/zosyn x 1. Hospitalist service contacted for admission. Please see A&P for additional details. 10/27: Patient had reported some abdominal discomfort and stated cannot fully eat and feels early satiety. Reglan was added.   No peritoneal dialysis done.  Body fluid cell count with differential revealed no evidence of any SBP. Preliminary body fluid culture showed no growth however now has coagulase-negative Staphylococcus. This is likely a contaminant. 10/28: On the night of 10/28 and continuing into the morning of 10/29 patient's blood pressures started to decline and her MAP was consistently below 60. IV access could not be obtained due to poor venous access. Central line was placed on the evening of 10/28 in the right internal jugular vein. Patient received multiple 1000 cc boluses, however she did not stay very fluid responsive. Her midodrine doses were given with only temporary increases in blood pressure. Intropin was started on the patient with a dose of 2.5 mcg/kg/min. This showed some improvement into her blood pressure throughout the day today 10/29. However we believe that her low blood pressures could be affected by her edematous state and inaccurate readings by the blood pressure cuff. Intropin drip was decreased to 1.5 mcg/kg/h with eventual cessation. Lactate drawn at noon on 10/29 revealed a level of 3, repeat lactic acid 3 hours later revealed a level of 2.1.    10/30: Patient states that she feels well. Has no issues with dizziness or feeling lightheaded. Denies any chest pain or palpitations. Throughout the day patient continued stating to feeling fine. Subjective (past 24 hours):     Patient is in her baseline state of health. No acute events overnight. The plan is to transition to hospice today. Palliative care was able to reach POA by phone who expressed understanding that due to hypotension and medical noncompliance continued dialysis may no longer be an option. POA expressed that the patient has lived a \"nice long life and does not wish for her to suffer any longer\". POA was able to speak to the patient on the phone.     We had an extensive conversation with the patient with both the attending physician and the nurse present again today, that hospice care approach was the most reasonable option. Patient was amenable to the idea but cannot objectively demonstrate understanding. Palliative/hospice team will establish their recommendations by contacting POA directly by phone. Medications:  Reviewed    Infusion Medications    dextrose       Scheduled Medications    dianeal lo-nikko 1.5%  2,000 mL Intraperitoneal Q6H    Droxidopa  200 mg Oral TID    sodium chloride  500 mL Intravenous Once    potassium (CARDIAC) replacement protocol   Other RX Placeholder    gentamicin   Topical Daily    [Held by provider] metoclopramide  5 mg Oral TID AC    aspirin  81 mg Oral Daily    clopidogrel  75 mg Oral QPM    levothyroxine  25 mcg Oral Daily    megestrol  40 mg Oral BID    metoprolol tartrate  25 mg Oral Daily    midodrine  15 mg Oral TID WC    pantoprazole  40 mg Oral BID    pravastatin  40 mg Oral QPM    cyanocobalamin  1,000 mcg Oral QPM    folbee plus  1 tablet Oral Daily    ascorbic acid  500 mg Oral BID    sodium chloride flush  10 mL Intravenous 2 times per day     PRN Meds: morphine, trimethobenzamide, ipratropium-albuterol, sodium chloride flush, acetaminophen **OR** acetaminophen, glucose, dextrose, glucagon (rDNA), dextrose      Intake/Output Summary (Last 24 hours) at 11/4/2020 0704  Last data filed at 11/3/2020 1315  Gross per 24 hour   Intake 2833.15 ml   Output 1500 ml   Net 1333.15 ml       Diet:  Dietary Nutrition Supplements: Diabetic Oral Supplement  DIET CARB CONTROL; Carb Control: 3 carb choices (45 gms)/meal; Daily Fluid Restriction: 1500 ml    Exam:  BP (!) 71/37   Pulse 91   Temp 97.6 °F (36.4 °C) (Oral)   Resp 23   Ht 5' 6\" (1.676 m)   Wt 239 lb 6.4 oz (108.6 kg)   SpO2 97%   BMI 38.64 kg/m²     General appearance: No apparent distress, appears stated age and cooperative. HEENT: Pupils equal, round, and reactive to light. Conjunctivae/corneas clear.   Neck: Supple, with full range of motion. No jugular venous distention. Trachea midline. Respiratory:  Normal respiratory effort. Clear to auscultation, bilaterally without Rales/Wheezes/Rhonchi. Cardiovascular: Regular rate and rhythm with normal R6/W3, systolic murmur best heard over 2nd right intercostal space, rubs or gallops. Abdomen: Soft, non-tender, non-distended with normal bowel sounds. PD catheter noted without signs of inflammation. Musculoskeletal: passive and active ROM x 4 extremities. Grossly edematous in both upper and lower extremities bilaterally, mild weeping present  Skin: Skin color, texture, turgor normal.  No rashes or lesions. Neurologic:  Neurovascularly intact without any focal sensory/motor deficits. Cranial nerves: II-XII intact, grossly non-focal.   Psychiatric: Alert and oriented, thought content appropriate, normal insight  Capillary Refill: Brisk,< 3 seconds   Peripheral Pulses: +2 palpable, equal bilaterally      Labs:   Recent Labs     11/02/20 0417 11/03/20 0452 11/04/20  0544   WBC 14.8* 12.9* 13.3*   HGB 9.3* 9.5* 9.9*   HCT 28.2* 29.1* 29.4*    267 311     Recent Labs     11/02/20 0417 11/03/20 0452 11/04/20  0544   * 132* 131*   K 3.3* 3.4* 4.1   CL 93* 94* 94*   CO2 21* 21* 20*   BUN 38* 41* 38*   CREATININE 6.8* 6.7* 6.3*   CALCIUM 7.4* 7.7* 8.0*     No results for input(s): AST, ALT, BILIDIR, BILITOT, ALKPHOS in the last 72 hours. No results for input(s): INR in the last 72 hours. No results for input(s): Towana Ball in the last 72 hours. Microbiology:      Urinalysis:      Lab Results   Component Value Date    NITRU NEGATIVE 05/17/2020    WBCUA > 200 05/17/2020    BACTERIA MANY 05/17/2020    RBCUA 25-50 05/17/2020    BLOODU MODERATE 05/17/2020    SPECGRAV 1.019 03/26/2019    GLUCOSEU NEGATIVE 05/17/2020       Radiology:  XR CHEST PORTABLE   Final Result   Impression:   Small right pleural effusion. There is possible pneumoperitoneum beneath the right hemidiaphragm. Recommend further evaluation or follow-up. This document has been electronically signed by: Kathleen Purdy MD on    10/29/2020 05:28 AM         XR CHEST PORTABLE   Final Result   Atelectasis or infiltrate at the right lung base. Small right pleural effusion. Support devices as above. This document has been electronically signed by: Deejay Cross MD on    10/28/2020 09:44 PM         CT ABDOMEN PELVIS WO CONTRAST Additional Contrast? None   Final Result   Moderate diffuse free air is nonspecific. A bowel origin is possible particularly peptic ulcer disease, though it also could relate to CAPD. Multiple small gallstones are noted. Mild pleural and parenchymal opacities at each lung base. **This report has been created using voice recognition software. It may contain minor errors which are inherent in voice recognition technology. **      Final report electronically signed by Dr. Anthony Lockhart on 10/26/2020 6:24 PM      XR CHEST PORTABLE   Final Result   1. Very poor inflation of lungs. Moderate atelectasis/pneumonia along both hemidiaphragms. Small bilateral effusions. 2. Borderline heart size. Permanent pacemaker/defibrillator. CardioMems device projects over left hilum. 3. Overall appearance of chest has worsened since prior. **This report has been created using voice recognition software. It may contain minor errors which are inherent in voice recognition technology. **      Final report electronically signed by Dr. William Agee on 10/26/2020 3:54 PM        DVT prophylaxis: [] Lovenox                                 [x] SCDs                                 [] SQ Heparin                                 [] Encourage ambulation           [] Already on Anticoagulation     Code Status: Limited    Tele:   [x] yes             [] no    Electronically signed by Elise Patterson MD on 11/4/2020 at 7:04 AM

## 2020-11-04 NOTE — PROGRESS NOTES
Updated by palliative care nurse Michaela Couch, that Dr. Michele Denver had spoke with Dr. Ifrah Mckeon and at this time plan is for patient to discharge back to [facility] with [facility]. Visit made and discussed with hospitals, she voiced understanding of plan for comfort care at facility and agrees to plan. Dr. Daria Kocher contact to update of planned  by transport of 1330 and ask for comfort script signature which physician completed. Latonya Mccallum RN will fax information to hospice, send scripts with patient to facility, and call hospice when patient leaves the floor.

## 2020-11-04 NOTE — PROGRESS NOTES
Palliative care to unit to check on pt. Case discussed with Dr Jayne Mayer. Dr Jayne Mayer reports that he spoke with Dr Samaria Mims and plan for pt is to discharge to [facility] today with [facility] [place] [address]'s hospice. Pt is resting in bed, awake, alert but pleasantly confused. Pt appears comfortable. Verbal order received from Dr Jayne Mayer to have ICD deactivated, remove central line and change code status to [facility]- these were placed into electronic record. Pt's Americaanisha Allenjaneth updated on pt condition and plan for discharge to [facility] today with hospice. Oralee Koenig states understanding and agreement with plan. [employer] helpline at [phone] notified of need to deactivate ICD. Esperanza Ramos, took pt's information and will page area rep. Pt's nurse, Fox Henderson updated on above. [employer] rep had already called her to inform her that rep is not able to get to pt until tomorrow, ECF info given to him. Mariangel,RN with hospice also updated. Palliative care will remain available, floor to notify PRN for needs.

## 2020-11-04 NOTE — CARE COORDINATION
11/4/20, 10:44 AM EST  Patient is discharged today by squad to [facility] with [facility]. EASTON notified Alan Randolph with ECF of the above. EASTON provided the nurse with the phone number for report--[phone] and fax number--[phone] to complete the report. Patient's nurse states the family is aware of the plan for discharge. Patient goals/plan/ treatment preferences discussed by  and . Patient goals/plan/ treatment preferences reviewed with patient/ family. Patient/ family verbalize understanding of discharge plan and are in agreement with goal/plan/treatment preferences. Understanding was demonstrated using the teach back method. AVS provided by RN at time of discharge, which includes all necessary medical information pertaining to the patients current course of illness, treatment, post-discharge goals of care, and treatment preferences. Services After Discharge  Services At/After Discharge:  In ambulance, Nursing Services, [facility], Hospice([facility] and [facility])   IMM Letter  IMM Letter date given[de-identified] 10/26/20

## 2020-11-04 NOTE — CARE COORDINATION
Update: plans ECF today with Hospice; collaborated with Liliya Hammer, [address]  Electronically signed by Marlene Hardy RN on 11/4/2020 at 9:54 AM

## 2020-11-04 NOTE — PROGRESS NOTES
Renal Progress Note    Assessment and Plan:    1. End-stage kidney disease on peritoneal dialysis. 2.  Volume overload  3. Mental confusion  4. Persistent hypotension  5. Deconditioned state  6. Hyponatremia multifactorial  7. Metabolic acidosis from end-stage kidney disease and inadequate dialysis  8. Leukocytosis   9. Normocytic anemia  PLAN:  Labs reviewed  Medications reviewed  No changes  Peritoneal dialysis data reviewed  Poor ultrafiltration due to hypotension  Prognosis is very poor  She is said to be going to hospice today  If this is not the case, we might need to transfer her to critical care block with a pressor agent which will enable us to do more aggressive peritoneal dialysis with ultrafiltration to improve the volume overload.   Discussed with primary service  Discussed with staff    Patient Active Problem List:     Hypertension     LBBB (left bundle branch block)     Hyperlipidemia     [facility] Bi[facility] ICD     CKD (chronic kidney disease) stage 3, GFR 30-59 ml/min ([facility])     Ex-smoker     Mild carotid artery disease (HCC)     Morbid obesity (HCC)     CAD (coronary artery disease)     PAD (peripheral artery disease) ([facility])     Lactic acidosis     Mild pulmonary hypertension ([facility])     Type 2 diabetes mellitus with insulin therapy ([facility])     GERD (gastroesophageal reflux disease)     His of Heparin induced thrombocytopenia     History of breast cancer     History of CVA (cerebrovascular accident)     History of pulmonary embolus (PE)     Iron deficiency anemia     Osteoarthritis     Paget's disease of bone     Vitamin D deficiency     COPD without exacerbation ([facility])     CHF (congestive heart failure), NYHA class III, chronic, diastolic (HCC)     Anemia, chronic disease     Anemia, chronic renal failure     Acute combined systolic and diastolic CHF, NYHA class 1 ([address]     Peritoneal dialysis status ([address]     Mild aortic stenosis     Physical deconditioning     Hyponatremia     ESRD (end stage renal Resp 23   Ht 5' 6\" (1.676 m)   Wt 239 lb 6.4 oz (108.6 kg)   SpO2 99%   BMI 38.64 kg/m²    Wt Readings from Last 3 Encounters:   11/02/20 239 lb 6.4 oz (108.6 kg)   10/12/20 226 lb 2 oz (102.6 kg)   08/06/20 239 lb 4.8 oz (108.5 kg)      24HR INTAKE/OUTPUT:      Intake/Output Summary (Last 24 hours) at 11/4/2020 1008  Last data filed at 11/3/2020 1315  Gross per 24 hour   Intake 2300 ml   Output 1500 ml   Net 800 ml       Constitutional:  Alert, awake, no apparent distress   Skin:normal with no rash or lesions. HEENT:Pupils are reactive . Throat is clear . Oral mucosa is moist   Neck:supple with no thyromegaly or carotid bruit  Cardiovascular:  S1, S2 without murmur or rubs   Respiratory:  Clear to ausculation without wheezes, rhonchi or rales. Abdomen: +bs, soft, no tenderness to palpation and no palpable mass. No abdominal bruit. Peritoneal catheter is noted. Ext: Bilateral upper and lower extremity edema respectively edema  Musculoskeletal:Intact  Neuro:Alert and awake. Confused. Speech is normal but slow.       Electronically signed by Maryjane Cruz MD on 11/4/2020 at 10:08 AM

## 2020-11-04 NOTE — DISCHARGE SUMMARY
Hospitalist Discharge Summary        Patient: Hollis Vivar  YOB: 1934  MRN: [id number]   Acct: [de-identified]    Primary Care Physician: Berna Dhaliwal MD    Admit date  10/26/2020    Discharge date:  11/4/2020 12:45 PM    Chief Complaint on presentation :-  Abdominal pain    Discharge Assessment and Plan:-   Assessment/Plan:     # ESRD on CAPD  Dr. Tere Baxter and Chuck Oliva APRN-CNP/nephrology following appreciate their assistance, had a discussion with Chuck Oliva regarding plans of care today. Patient typically gets daily peritoneal dialysis with cytology and culture, so far negative other than some questionable gram-positive cocci on 10/26/20 for which patient finished a course of Vanc Zosyn. This morning patient is without evidence of peritonitis.  -Patient is not a good candidate for dialysis due to low blood pressure  -Echocardiogram pending per nephrology                            11/1:  BP still hypotensive, Dopamine held d/t tachycardia. Patient is volume overloaded and edematous. Nephro re-started daily PD again, Droxidopa started 10/31/20 to aide BP. Continue to Monitor I/O, Daily BMP.     -> Hospice consulted-to evaluate for hospice placement d/t to multiple comorbidities and refusal of treatment. Patient may be unable to fully understand the information regarding hospice care. It would be reasonable for hospice care discussion directly with POA \"Jeyson\". -> Palliative team/Brenda Arrington RN on board, appreciate her assistance, is in direct contact with POA to establish patient wishes and goals of treatment.  -> Patient was offered spiritual care support, I gladly obliged. Spiritual care consulted.  -> If POA not agreeable to hospice, nursing home will be contacted and forewarned that low blood pressure is considered baseline for this patient.     # Chronic Hypotension while on droxidopa and midodrine  Patient has a history of hypotension.   Has been trending ~70/40.  -Continue Midodrine; Reglan and Dopamine held. -Patient refusing treatment     # Sepsis with Possible Abdominal/Peritoneal Source   Kashif and Tony - OK'ed since Oct 31, per nephrology. Patient remains afebrile, trending temperature; leukocytosis resolving, no nidus of infection. Pancultures no growth thus far. Pt mentating well; BP readings likely erroneous d/t severe peripheral edema; mildly tachycardic, could 2/2 Dopamin. Mg/K WLNs w/ replacement. RN aware to wean off Dopamin. Tachycardia has improved with D/C of Dopamine. Monitor BP in PD. Ideally, disposition plan per hospice care.      # Compensated HFmrEF - Stable  Last echocardiogram done 6/2020. EF estimated at 45 to 50%  - Continue Lopressor as tolerated. PD to begin today due to edema     # Mild Chronic Hyponatremia 2/2 ESRD  This is likely a chronic issue for this patient as she is unable to excrete free water. Corrects and fluctuates with PD. Daily BMP to monitor.  - Stable; at baseline (~465-926)     # Hypokalemia  -Potassium replacement protocol     # Abdominal Discomfort likely 2/2 Gastroparesis - Resolved  Patient's complaints have been about early satiety and feeling that she stays full for a long time. This could likely be secondary to gastroparesis due to history of diabetes. Reglan started 10/27, held since 10/29 for Hypotension and prolonged QT. Continue to Monitor. This is likely secondary to over distention with fluid overload and gastroparesis.     # History of Hypothyroidism   Continue Synthroid 25 mcg qd     # History of GERD  Continue Protonix 40 mg BID     # History of HIT  Avoid heparin use     # History of COPD  Duo nebs q6h PRN     # Disposition Planning  Hospice d/t multiple comorbidities, not a candidate for PD.     Initial H and P and Hospital course:-  79 y/o F PMHx ESRD (on CAPD), chronic hypotension, HFmrEF (EF 45-50% per ECHO 6/2020), non-ischemic cardiomyopathy / s/p BIV-ICD, NIDDM2, COPD (no baseline Patient had reported some abdominal discomfort and stated cannot fully eat and feels early satiety. Reglan was added. No peritoneal dialysis done. Body fluid cell count with differential revealed no evidence of any SBP. Preliminary body fluid culture showed no growth however now has coagulase-negative Staphylococcus. This is likely a contaminant.     10/28: On the night of 10/28 and continuing into the morning of 10/29 patient's blood pressures started to decline and her MAP was consistently below 60. IV access could not be obtained due to poor venous access. Central line was placed on the evening of 10/28 in the right internal jugular vein. Patient received multiple 1000 cc boluses, however she did not stay very fluid responsive. Her midodrine doses were given with only temporary increases in blood pressure. Intropin was started on the patient with a dose of 2.5 mcg/kg/min. This showed some improvement into her blood pressure throughout the day today 10/29. However we believe that her low blood pressures could be affected by her edematous state and inaccurate readings by the blood pressure cuff. Intropin drip was decreased to 1.5 mcg/kg/h with eventual cessation. Lactate drawn at noon on 10/29 revealed a level of 3, repeat lactic acid 3 hours later revealed a level of 2.1.     10/30: Patient states that she feels well. Has no issues with dizziness or feeling lightheaded. Denies any chest pain or palpitations. Throughout the day patient continued stating to feeling fine. Physical Exam:-  General appearance: Patient appears ill, edematous. HEENT: Pupils equal, round, and reactive to light. Conjunctivae/corneas clear. Neck: No jugular venous distention. Trachea midline. Respiratory:  Increased respiratory effort. Air entry bilaterally. Cardiovascular: Regular rate and rhythm with normal V4/R5, systolic murmur best heard over 2nd right intercostal space, rubs or gallops.    Abdomen: Soft, non-tender, distended with normal bowel sounds. PD catheter noted without signs of inflammation. Musculoskeletal: Grossly edematous in both upper and lower extremities bilaterally, mild weeping present  Skin: Skin color, texture, turgor normal.  No rashes or lesions. Neurologic:  Neurovascularly intact without any focal sensory/motor deficits. Cranial nerves: II-XII intact, grossly non-focal.   Psychiatric: Alert and oriented, thought content appropriate, normal insight  Capillary Refill: Brisk,< 3 seconds   Peripheral Pulses: +2 palpable, equal bilaterally      Vitals:   Patient Vitals for the past 24 hrs:   BP Temp Temp src Pulse Resp SpO2   11/04/20 0930 (!) 68/34 97.8 °F (36.6 °C) Oral 102 23 99 %   11/04/20 0330 (!) 71/37 97.6 °F (36.4 °C) Oral 91 23 97 %   11/03/20 2305 (!) 76/34 97.7 °F (36.5 °C) Oral 107 22 96 %   11/03/20 1945 (!) 71/27 97.6 °F (36.4 °C) Oral 98 23 97 %   11/03/20 1900 (!) 68/37 -- -- -- -- --   11/03/20 1845 (!) 73/54 -- -- 109 -- 94 %   11/03/20 1800 (!) 68/28 -- -- -- -- --   11/03/20 1700 (!) 82/29 -- -- -- -- --   11/03/20 1600 (!) 75/45 -- -- -- -- --   11/03/20 1551 (!) 71/51 97.4 °F (36.3 °C) Oral 103 24 96 %   11/03/20 1500 (!) 72/38 -- -- -- -- --   11/03/20 1400 (!) 70/41 -- -- -- -- --   11/03/20 1300 (!) 87/46 -- -- -- -- --     Weight:   Weight: 239 lb 6.4 oz (108.6 kg)   24 hour intake/output:     Intake/Output Summary (Last 24 hours) at 11/4/2020 1245  Last data filed at 11/3/2020 1315  Gross per 24 hour   Intake 2200 ml   Output 1500 ml   Net 700 ml         Discharge Medications:-      Medication List      START taking these medications    Droxidopa 200 MG Caps  Take 200 mg by mouth 3 times daily        CHANGE how you take these medications    midodrine 5 MG tablet  Commonly known as:  PROAMATINE  Take 3 tablets by mouth 3 times daily (with meals)  What changed:    · medication strength  · how much to take  · Another medication with the same name was removed.  Continue taking this medication, and follow the directions you see here. CONTINUE taking these medications    acetaminophen 325 MG tablet  Commonly known as:  TYLENOL     aspirin 81 MG EC tablet  Take 1 tablet by mouth daily     bisacodyl 5 MG EC tablet  Commonly known as:  DULCOLAX     clopidogrel 75 MG tablet  Commonly known as:  PLAVIX     cyanocobalamin 1000 MCG tablet     DIANEAL LOW CALCIUM/1.5% DEX IP     docusate sodium 100 MG capsule  Commonly known as:  COLACE     gentamicin 0.1 % cream  Commonly known as:  GARAMYCIN     insulin glargine 100 UNIT/ML injection vial  Commonly known as:  LANTUS  Inject 16 Units into the skin nightly     ipratropium-albuterol 0.5-2.5 (3) MG/3ML Soln nebulizer solution  Commonly known as:  DUONEB     lactobacillus capsule  Take 1 capsule by mouth daily     levothyroxine 25 MCG tablet  Commonly known as:  SYNTHROID  Take 1 tablet by mouth Daily     LiquaCel Liqd     Magnesium 400 MG Tabs     megestrol 40 MG tablet  Commonly known as:  MEGACE  Take 1 tablet by mouth 2 times daily     miconazole 2 % powder  Commonly known as:  MICOTIN     nitroGLYCERIN 0.4 MG SL tablet  Commonly known as:  NITROSTAT  up to max of 3 total doses. If no relief after 1 dose, call 911.      NONFORMULARY     NovoLOG 100 UNIT/ML injection vial  Generic drug:  insulin aspart     ondansetron 4 MG tablet  Commonly known as:  ZOFRAN  Take 1 tablet by mouth every 8 hours as needed for Nausea or Vomiting     pantoprazole 40 MG tablet  Commonly known as:  PROTONIX     polycarbophil 625 MG tablet  Commonly known as:  FIBERCON     polyethylene glycol 17 GM/SCOOP powder  Commonly known as:  GLYCOLAX     potassium chloride 20 MEQ extended release tablet  Commonly known as:  KLOR-CON M     pravastatin 40 MG tablet  Commonly known as:  PRAVACHOL     ortiz-katerin Tabs     sevelamer 800 MG tablet  Commonly known as:  RENVELA        STOP taking these medications    ascorbic acid 500 MG tablet  Commonly known as:  VITAMIN C ciprofloxacin 500 MG tablet  Commonly known as:  CIPRO     metoprolol tartrate 25 MG tablet  Commonly known as:  LOPRESSOR     Polyethylene Glycol 1450 Powd           Where to Get Your Medications      You can get these medications from any pharmacy    Bring a paper prescription for each of these medications  · Droxidopa 200 MG Caps  · midodrine 5 MG tablet          Labs :-  Recent Results (from the past 72 hour(s))   CBC auto differential    Collection Time: 11/02/20  4:17 AM   Result Value Ref Range    WBC 14.8 (H) 4.8 - 10.8 thou/mm3    RBC 3.09 (L) 4.20 - 5.40 mill/mm3    Hemoglobin 9.3 (L) 12.0 - 16.0 gm/dl    Hematocrit 28.2 (L) 37.0 - 47.0 %    MCV 91.3 81.0 - 99.0 fL    MCH 30.1 26.0 - 33.0 pg    MCHC 33.0 32.2 - 35.5 gm/dl    RDW-CV 16.2 (H) 11.5 - 14.5 %    RDW-SD 53.1 (H) 35.0 - 45.0 fL    Platelets 760 305 - 202 thou/mm3    MPV 11.8 9.4 - 12.4 fL    Seg Neutrophils 76.6 %    Lymphocytes 13.7 %    Monocytes 7.5 %    Eosinophils 0.7 %    Basophils 0.1 %    Immature Granulocytes 1.4 %    Segs Absolute 11.3 (H) 1.8 - 7.7 thou/mm3    Lymphocytes Absolute 2.0 1.0 - 4.8 thou/mm3    Monocytes Absolute 1.1 0.4 - 1.3 thou/mm3    Eosinophils Absolute 0.1 0.0 - 0.4 thou/mm3    Basophils Absolute 0.0 0.0 - 0.1 thou/mm3    Immature Grans (Abs) 0.20 (H) 0.00 - 0.07 thou/mm3    nRBC 0 /100 wbc   Basic Metabolic Panel    Collection Time: 11/02/20  4:17 AM   Result Value Ref Range    Sodium 129 (L) 135 - 145 meq/L    Potassium 3.3 (L) 3.5 - 5.2 meq/L    Chloride 93 (L) 98 - 111 meq/L    CO2 21 (L) 23 - 33 meq/L    Glucose 160 (H) 70 - 108 mg/dL    BUN 38 (H) 7 - 22 mg/dL    CREATININE 6.8 (HH) 0.4 - 1.2 mg/dL    Calcium 7.4 (L) 8.5 - 10.5 mg/dL   Magnesium    Collection Time: 11/02/20  4:17 AM   Result Value Ref Range    Magnesium 1.7 1.6 - 2.4 mg/dL   Anion Gap    Collection Time: 11/02/20  4:17 AM   Result Value Ref Range    Anion Gap 15.0 8.0 - 16.0 meq/L   Glomerular Filtration Rate, Estimated    Collection Time: 11/02/20  4:17 AM   Result Value Ref Range    Est, Glom Filt Rate 6 (A) ml/min/1.73m2   POCT glucose    Collection Time: 11/02/20 11:16 AM   Result Value Ref Range    POC Glucose 173 (H) 70 - 108 mg/dl   POCT glucose    Collection Time: 11/02/20  3:27 PM   Result Value Ref Range    POC Glucose 177 (H) 70 - 108 mg/dl   POCT glucose    Collection Time: 11/02/20 10:24 PM   Result Value Ref Range    POC Glucose 155 (H) 70 - 108 mg/dl   CBC auto differential    Collection Time: 11/03/20  4:52 AM   Result Value Ref Range    WBC 12.9 (H) 4.8 - 10.8 thou/mm3    RBC 3.19 (L) 4.20 - 5.40 mill/mm3    Hemoglobin 9.5 (L) 12.0 - 16.0 gm/dl    Hematocrit 29.1 (L) 37.0 - 47.0 %    MCV 91.2 81.0 - 99.0 fL    MCH 29.8 26.0 - 33.0 pg    MCHC 32.6 32.2 - 35.5 gm/dl    RDW-CV 16.5 (H) 11.5 - 14.5 %    RDW-SD 53.9 (H) 35.0 - 45.0 fL    Platelets 560 996 - 042 thou/mm3    MPV 11.6 9.4 - 12.4 fL    Seg Neutrophils 82.2 %    Lymphocytes 11.5 %    Monocytes 4.4 %    Eosinophils 0.2 %    Basophils 0.1 %    Immature Granulocytes 1.6 %    Segs Absolute 10.6 (H) 1.8 - 7.7 thou/mm3    Lymphocytes Absolute 1.5 1.0 - 4.8 thou/mm3    Monocytes Absolute 0.6 0.4 - 1.3 thou/mm3    Eosinophils Absolute 0.0 0.0 - 0.4 thou/mm3    Basophils Absolute 0.0 0.0 - 0.1 thou/mm3    Immature Grans (Abs) 0.21 (H) 0.00 - 0.07 thou/mm3    nRBC 0 /100 wbc   Basic Metabolic Panel    Collection Time: 11/03/20  4:52 AM   Result Value Ref Range    Sodium 132 (L) 135 - 145 meq/L    Potassium 3.4 (L) 3.5 - 5.2 meq/L    Chloride 94 (L) 98 - 111 meq/L    CO2 21 (L) 23 - 33 meq/L    Glucose 143 (H) 70 - 108 mg/dL    BUN 41 (H) 7 - 22 mg/dL    CREATININE 6.7 (HH) 0.4 - 1.2 mg/dL    Calcium 7.7 (L) 8.5 - 10.5 mg/dL   Magnesium    Collection Time: 11/03/20  4:52 AM   Result Value Ref Range    Magnesium 1.7 1.6 - 2.4 mg/dL   Anion Gap    Collection Time: 11/03/20  4:52 AM   Result Value Ref Range    Anion Gap 17.0 (H) 8.0 - 16.0 meq/L   Glomerular Filtration Rate, Estimated 11/04/20  5:44 AM   Result Value Ref Range    Anion Gap 17.0 (H) 8.0 - 16.0 meq/L   Glomerular Filtration Rate, Estimated    Collection Time: 11/04/20  5:44 AM   Result Value Ref Range    Est, Glom Filt Rate 6 (A) ml/min/1.73m2   POCT glucose    Collection Time: 11/04/20  6:45 AM   Result Value Ref Range    POC Glucose 200 (H) 70 - 108 mg/dl   POCT glucose    Collection Time: 11/04/20 11:21 AM   Result Value Ref Range    POC Glucose 162 (H) 70 - 108 mg/dl        Microbiology:    Blood culture #1:   Lab Results   Component Value Date    BC No growth-preliminary No growth  10/26/2020       Blood culture #2:No results found for: BLOODCULT2    Organism:    Lab Results   Component Value Date    LABGRAM  10/26/2020     Few segmented neutrophils observed. No organisms observed. performed on cytospun specimen        MRSA culture only:No results found for: [address] [place] [address]    Urine culture:   Lab Results   Component Value Date    LABURIN  03/26/2019     No growth-preliminary  Growth of Contaminants. The mixture of organisms present  are not a common cause of urinary tract infections and  probably represent skin liz or distal urethral liz. Lab Results   Component Value Date    ORG Staphylococcus epidermidis 10/26/2020        Respiratory culture: No results found for: CULTRESP    Aerobic and Anaerobic :  Lab Results   Component Value Date    LABAERO  10/24/2018     Culture also yielded moderate growth of Enterococcus species  and heavy growth of gram positive bacilli morphologically  consistent with Corynebacterium species. LABAERO  10/24/2018     moderate growth  This is a MRSA (Methicillin Resistant Staphylococcus  aureus)isolate. Isolates of MRSA (ORSA) Methicillin (Oxacillin) Resistant  Staphylococcus aureus (coagulase positive) require patient  be placed in CONTACT isolation.   Methicillin(Oxacillin)resistant strains of staphylococci  (MRSA)or(MRSE)should be considered resistant to all classes  of cephalosporins, penems and beta-lactams. In the treatment of gram positive infections, GENTAMICIN  should be CONSIDERED a SYNERGYSTIC agent ONLY. LABAERO very light growth 10/24/2018     Lab Results   Component Value Date    LABANAE  10/24/2018     Culture yielded moderate growth of anaerobic gram positive  bacilli consistent with Clostridium species. If a true mixed  aerobic and anaerobic infection is suspected, then broad  spectrum empiric antibiotic therapy is indicated and should  include coverage for anaerobic organisms. Urinalysis:      Lab Results   Component Value Date    NITRU NEGATIVE 05/17/2020    WBCUA > 200 05/17/2020    BACTERIA MANY 05/17/2020    RBCUA 25-50 05/17/2020    BLOODU MODERATE 05/17/2020    SPECGRAV 1.019 03/26/2019    GLUCOSEU NEGATIVE 05/17/2020       Radiology:-  Ct Abdomen Pelvis Wo Contrast Additional Contrast? None    Result Date: 10/26/2020  PROCEDURE: CT ABDOMEN PELVIS WO CONTRAST CLINICAL INFORMATION: right lower abdominal pain. leukocytosis . COMPARISON: Chest CT 5/18/2020 TECHNIQUE: Axial 5 mm CT images were obtained through the abdomen and pelvis. No contrast was given. Coronal reconstructions were obtained. All CT scans at this facility use dose modulation, iterative reconstruction, and/or weight-based dosing when appropriate to reduce radiation dose to as low as reasonably achievable. FINDINGS Lung base: Small right pleural effusion. Tiny left pleural effusion. There are calcified pleural plaques. Mild atelectasis at each lung base. Moderate coronary artery calcification. There is an AICD. Epicardial pacing wires are also noted. Liver and spleen: homogeneous attenuation, no masses seen. Biliary: Multiple small gallstones. Pancreas: head, body, and tail unremarkable. Adrenals: symmetric, no calcifications or masses. Kidneys: No hydronephrosis. Mild free fluid may relate to peritoneal dialysis. Dialysis catheter is coiled within the pelvis.  There is moderate free air that will need to be correlated clinically. Aorta: normal caliber. Marked vascular calcification. Lymph Nodes: normal size. Bowel: Moderate diffuse free air. Some of these bubbles abut small bowel loops within the left upper quadrant. There is air projecting adjacent to the duodenal sweep. Peptic ulcer disease is a possibility. Bladder: Normal Reproductive: No definite mass. Bones: Marked irregular thickening with innumerable lucencies throughout the left side of the pelvis suggests Paget's disease. Moderate left hip joint arthritis. Moderate diffuse free air is nonspecific. A bowel origin is possible particularly peptic ulcer disease, though it also could relate to CAPD. Multiple small gallstones are noted. Mild pleural and parenchymal opacities at each lung base. **This report has been created using voice recognition software. It may contain minor errors which are inherent in voice recognition technology. ** Final report electronically signed by Dr. Nael Harvey on 10/26/2020 6:24 PM    Xr Chest Portable    Result Date: 10/26/2020  PROCEDURE: XR CHEST PORTABLE CLINICAL INFORMATION: crackles. COPD. COMPARISON: 10/7/2020 TECHNIQUE: A single mobile view of the chest was obtained. 1. Very poor inflation of lungs. Moderate atelectasis/pneumonia along both hemidiaphragms. Small bilateral effusions. 2. Borderline heart size. Permanent pacemaker/defibrillator. CardioMems device projects over left hilum. 3. Overall appearance of chest has worsened since prior. **This report has been created using voice recognition software. It may contain minor errors which are inherent in voice recognition technology. ** Final report electronically signed by Dr. Ziyad Matson on 10/26/2020 3:54 PM       Follow-up scheduled after discharge :-    As needed with Jose Grayson MD      Consultations during this hospital stay:-  [] NONE [] Cardiology  [x] Nephrology  [] Hemo onco   [] GI   [] ID  [] Endocrine  [] Pulm    [] Neuro    [] Psych [] Urology  [] ENT   [] G SURGERY   []Ortho    []CV surg    [x] Palliative  [x] Hospice [] Pain management   []    []TCU   [] PT/OT  OTHERS:-    Disposition: hospice  Condition at Discharge: terminal     Time Spent:- 45 minutes    Electronically signed by Washignton Shoemaker MD on 11/4/2020 at 12:45 PM  Discharging Hospitalist

## 2023-05-09 NOTE — PROCEDURES
EKG was handed to [facility]. Pounds Preamble Statement (Weight Entered In Details Tab): Reported Weight in pounds:

## 2024-11-15 NOTE — TELEPHONE ENCOUNTER
I called to see if the H&H was done. It is suppose to be faxed to us.
I spoke to Rhonda Green she said that she will fax the h&h to us.
111

## 2024-12-20 NOTE — PROGRESS NOTES
[facility]         Renal Inpatient Follow-Up note         10/12/2020 8:42 AM    Pt Name:   Devika Casper  YOB: 1934  Attending:   Leeann Alvarenga DO    Chief Complaint : Devika Casper is a 80 y.o. female being followed by nephrology for ESRD on peritoneal dialysis    Interval History :   Patient seen and examined by me. No distress  Feels okay denies any chest pain or shortness of breath  She is still clearly confused to some degree.   Overall stable     Scheduled Medications :    dianeal lo-nikko 1.5%  2,000 mL Intraperitoneal Q8H    midodrine  15 mg Oral TID WC    ascorbic acid  500 mg Oral BID    aspirin  81 mg Oral Daily    clopidogrel  75 mg Oral QPM    cyanocobalamin  1,000 mcg Oral QPM    docusate sodium  100 mg Oral Daily    insulin glargine  16 Units Subcutaneous Nightly    lactobacillus  1 capsule Oral Daily    levothyroxine  25 mcg Oral Daily    megestrol  40 mg Oral BID    metoprolol succinate  50 mg Oral Daily    miconazole   Topical BID    pantoprazole  40 mg Oral BID    polycarbophil  625 mg Oral TID    polyethylene glycol  17 g Oral BID    potassium chloride  20 mEq Oral BID    pravastatin  40 mg Oral Nightly    sevelamer  800 mg Oral TID WC    sodium chloride flush  10 mL Intravenous 2 times per day    insulin lispro  0-6 Units Subcutaneous TID WC    insulin lispro  0-3 Units Subcutaneous Nightly      dextrose         Vitals :  BP (!) 109/51   Pulse 87   Temp 97.6 °F (36.4 °C) (Oral)   Resp 16   Ht 5' 6\" (1.676 m)   Wt 226 lb 2 oz (102.6 kg)   SpO2 93%   BMI 36.50 kg/m²     24HR INTAKE/OUTPUT:      Intake/Output Summary (Last 24 hours) at 10/12/2020 0842  Last data filed at 10/12/2020 2042  Gross per 24 hour   Intake 6725 ml   Output 6500 ml   Net 225 ml     Last 3 weights  Wt Readings from Last 3 Encounters:   10/12/20 226 lb 2 oz (102.6 kg)   08/06/20 239 lb 4.8 oz (108.5 kg)   06/15/20 218 lb 14.4 oz (99.3 kg) Pt has been notified and understood    Physical Exam :  General Appearance:  Well developed. No distress  Mouth/Throat:  Oral mucosa moist  Neck:  Supple, no JVD  Lungs:  Breath sounds: clear  Heart[de-identified]  S1,S2 heard  Abdomen:  Soft, non - tender  Musculoskeletal:  Edema -no significant edema noted         Last 3 CBC   Recent Labs     10/11/20  0619   HGB 10.4*   HCT 32.1*     Last 3 CMP  Recent Labs     10/11/20  0619 10/12/20  0600   * 122*   K 4.4 4.6   CL 96* 93*   CO2 21* 17*   BUN 20 21   CREATININE 4.9* 4.8*   CALCIUM 8.2* 8.2*             ASSESSMENT / Plan   Renal -ESRD on peritoneal dialysis, volume status appears reasonable electrolytes stable as well  · Reviewed the peritoneal dialysis data having 2500, 2200, 2100 mL per exchange  · Volume status is well and blood pressure is running better continue exchanges every 8 hours for now      Electrolytes mild hyponatremia. Having a lot of fluid removal as well despite every 8 hours. Blood pressure is okay so will start the patient on every 6 hours and I will also give her gentle IV fluids and salt tablets  Chronic hypotension on midodrine, doing better on 15 mg 3 times a day  Metabolic acidosis probably due to decreasing dialysis  Hx of diabetes mellitus . Change in mental status appears to be at baseline  Secondary hyperparathyroidism  Anemia of end-stage renal disease  meds reviewed and D/W patient . OTTONIEL Garcia D.  [facility].

## (undated) DEVICE — DEFENDO AIR WATER SUCTION AND BIOPSY VALVE KIT FOR  OLYMPUS: Brand: DEFENDO AIR/WATER/SUCTION AND BIOPSY VALVE

## (undated) DEVICE — Device: Brand: JELCO

## (undated) DEVICE — IV START KIT: Brand: MEDLINE INDUSTRIES, INC.

## (undated) DEVICE — 2000CC GUARDIAN II: Brand: GUARDIAN

## (undated) DEVICE — ENDO KIT: Brand: MEDLINE INDUSTRIES, INC.

## (undated) DEVICE — TUBING IV STOPCOCK 48 CM 3 W

## (undated) DEVICE — CONMED SCOPE SAVER BITE BLOCK, 20X27 MM: Brand: SCOPE SAVER

## (undated) DEVICE — SET ADMIN 25ML L117IN PMP MOD CK VLV RLER CLMP 2 SMRTSITE

## (undated) DEVICE — FORCEP RAD JAW W/NEEDLE 160CM

## (undated) DEVICE — SOLUTION IV 1000ML 0.45% SOD CHL PH 5 INJ USP VIAFLX PLAS